# Patient Record
Sex: FEMALE | Race: BLACK OR AFRICAN AMERICAN | NOT HISPANIC OR LATINO | Employment: UNEMPLOYED | URBAN - METROPOLITAN AREA
[De-identification: names, ages, dates, MRNs, and addresses within clinical notes are randomized per-mention and may not be internally consistent; named-entity substitution may affect disease eponyms.]

---

## 2018-05-21 ENCOUNTER — HOSPITAL ENCOUNTER (EMERGENCY)
Facility: HOSPITAL | Age: 43
Discharge: ANOTHER HOSPITAL | End: 2018-05-21
Attending: EMERGENCY MEDICINE
Payer: MEDICAID

## 2018-05-21 VITALS
TEMPERATURE: 102 F | OXYGEN SATURATION: 97 % | RESPIRATION RATE: 20 BRPM | HEART RATE: 111 BPM | DIASTOLIC BLOOD PRESSURE: 56 MMHG | SYSTOLIC BLOOD PRESSURE: 116 MMHG | BODY MASS INDEX: 36.15 KG/M2 | HEIGHT: 65 IN | WEIGHT: 217 LBS

## 2018-05-21 DIAGNOSIS — R00.0 TACHYCARDIA: ICD-10-CM

## 2018-05-21 DIAGNOSIS — N39.0 URINARY TRACT INFECTION WITHOUT HEMATURIA, SITE UNSPECIFIED: ICD-10-CM

## 2018-05-21 DIAGNOSIS — A41.9 LINE SEPSIS ASSOCIATED WITH DIALYSIS CATHETER: ICD-10-CM

## 2018-05-21 DIAGNOSIS — Z99.2 ESRD (END STAGE RENAL DISEASE) ON DIALYSIS: ICD-10-CM

## 2018-05-21 DIAGNOSIS — N18.6 ESRD (END STAGE RENAL DISEASE) ON DIALYSIS: ICD-10-CM

## 2018-05-21 DIAGNOSIS — A41.9 SEPSIS, DUE TO UNSPECIFIED ORGANISM: Primary | ICD-10-CM

## 2018-05-21 DIAGNOSIS — T82.7XXA LINE SEPSIS ASSOCIATED WITH DIALYSIS CATHETER: ICD-10-CM

## 2018-05-21 LAB
ALBUMIN SERPL BCP-MCNC: 2.4 G/DL
ALP SERPL-CCNC: 76 U/L
ALT SERPL W/O P-5'-P-CCNC: 23 U/L
AMORPH CRY URNS QL MICRO: ABNORMAL
ANION GAP SERPL CALC-SCNC: 11 MMOL/L
AST SERPL-CCNC: 19 U/L
BACTERIA #/AREA URNS HPF: ABNORMAL /HPF
BASOPHILS # BLD AUTO: 0.02 K/UL
BASOPHILS NFR BLD: 0.1 %
BILIRUB SERPL-MCNC: 1.3 MG/DL
BILIRUB UR QL STRIP: NEGATIVE
BNP SERPL-MCNC: 407 PG/ML
BUN SERPL-MCNC: 18 MG/DL
CALCIUM SERPL-MCNC: 9.6 MG/DL
CHLORIDE SERPL-SCNC: 95 MMOL/L
CLARITY UR: ABNORMAL
CO2 SERPL-SCNC: 30 MMOL/L
COLOR UR: ABNORMAL
CREAT SERPL-MCNC: 4.3 MG/DL
DIFFERENTIAL METHOD: ABNORMAL
EOSINOPHIL # BLD AUTO: 0.1 K/UL
EOSINOPHIL NFR BLD: 0.2 %
ERYTHROCYTE [DISTWIDTH] IN BLOOD BY AUTOMATED COUNT: 15.5 %
EST. GFR  (AFRICAN AMERICAN): 14 ML/MIN/1.73 M^2
EST. GFR  (NON AFRICAN AMERICAN): 12 ML/MIN/1.73 M^2
GLUCOSE SERPL-MCNC: 266 MG/DL
GLUCOSE UR QL STRIP: ABNORMAL
GRAN CASTS #/AREA URNS LPF: 3 /LPF
HCT VFR BLD AUTO: 29.7 %
HGB BLD-MCNC: 9.4 G/DL
HGB UR QL STRIP: NEGATIVE
HYALINE CASTS #/AREA URNS LPF: ABNORMAL /LPF
INR PPP: 1.2
KETONES UR QL STRIP: NEGATIVE
LACTATE SERPL-SCNC: 2.5 MMOL/L
LEUKOCYTE ESTERASE UR QL STRIP: ABNORMAL
LYMPHOCYTES # BLD AUTO: 0.4 K/UL
LYMPHOCYTES NFR BLD: 1.5 %
MAGNESIUM SERPL-MCNC: 2 MG/DL
MCH RBC QN AUTO: 29.6 PG
MCHC RBC AUTO-ENTMCNC: 31.6 G/DL
MCV RBC AUTO: 93 FL
MICROSCOPIC COMMENT: ABNORMAL
MONOCYTES # BLD AUTO: 1.1 K/UL
MONOCYTES NFR BLD: 4 %
NEUTROPHILS # BLD AUTO: 25.1 K/UL
NEUTROPHILS NFR BLD: 93.3 %
NITRITE UR QL STRIP: NEGATIVE
PH UR STRIP: 5 [PH] (ref 5–8)
PHOSPHATE SERPL-MCNC: 1.6 MG/DL
PLATELET # BLD AUTO: 229 K/UL
PMV BLD AUTO: 9.5 FL
POTASSIUM SERPL-SCNC: 4.1 MMOL/L
PROT SERPL-MCNC: 7.4 G/DL
PROT UR QL STRIP: ABNORMAL
PROTHROMBIN TIME: 12.2 SEC
RBC # BLD AUTO: 3.18 M/UL
RBC #/AREA URNS HPF: 3 /HPF (ref 0–4)
SODIUM SERPL-SCNC: 136 MMOL/L
SP GR UR STRIP: 1.02 (ref 1–1.03)
SQUAMOUS #/AREA URNS HPF: 2 /HPF
TROPONIN I SERPL DL<=0.01 NG/ML-MCNC: 0.03 NG/ML
URN SPEC COLLECT METH UR: ABNORMAL
UROBILINOGEN UR STRIP-ACNC: NEGATIVE EU/DL
WBC # BLD AUTO: 26.84 K/UL
WBC #/AREA URNS HPF: 60 /HPF (ref 0–5)
WBC CASTS #/AREA URNS LPF: 1 /LPF
YEAST URNS QL MICRO: ABNORMAL

## 2018-05-21 PROCEDURE — 87186 SC STD MICRODIL/AGAR DIL: CPT

## 2018-05-21 PROCEDURE — 83735 ASSAY OF MAGNESIUM: CPT

## 2018-05-21 PROCEDURE — 87077 CULTURE AEROBIC IDENTIFY: CPT

## 2018-05-21 PROCEDURE — 80053 COMPREHEN METABOLIC PANEL: CPT

## 2018-05-21 PROCEDURE — 83880 ASSAY OF NATRIURETIC PEPTIDE: CPT

## 2018-05-21 PROCEDURE — 84484 ASSAY OF TROPONIN QUANT: CPT

## 2018-05-21 PROCEDURE — 85025 COMPLETE CBC W/AUTO DIFF WBC: CPT

## 2018-05-21 PROCEDURE — 83605 ASSAY OF LACTIC ACID: CPT

## 2018-05-21 PROCEDURE — 85610 PROTHROMBIN TIME: CPT

## 2018-05-21 PROCEDURE — 87040 BLOOD CULTURE FOR BACTERIA: CPT

## 2018-05-21 PROCEDURE — 81000 URINALYSIS NONAUTO W/SCOPE: CPT

## 2018-05-21 PROCEDURE — 25000003 PHARM REV CODE 250: Performed by: EMERGENCY MEDICINE

## 2018-05-21 PROCEDURE — 84100 ASSAY OF PHOSPHORUS: CPT

## 2018-05-21 PROCEDURE — 99285 EMERGENCY DEPT VISIT HI MDM: CPT

## 2018-05-21 RX ORDER — GENTAMICIN SULFATE 60 MG/50ML
120 INJECTION, SOLUTION INTRAVENOUS
Status: DISCONTINUED | OUTPATIENT
Start: 2018-05-21 | End: 2018-05-21

## 2018-05-21 RX ORDER — CALCIUM ACETATE 667 MG/1
667 CAPSULE ORAL 3 TIMES DAILY
Status: ON HOLD | COMMUNITY
End: 2019-01-09 | Stop reason: HOSPADM

## 2018-05-21 RX ORDER — FUROSEMIDE 40 MG/1
40 TABLET ORAL DAILY
COMMUNITY
End: 2023-04-19 | Stop reason: SDUPTHER

## 2018-05-21 RX ORDER — ASPIRIN 81 MG/1
81 TABLET ORAL DAILY
Status: ON HOLD | COMMUNITY
End: 2019-01-06

## 2018-05-21 RX ORDER — ACETAMINOPHEN 500 MG
1000 TABLET ORAL
Status: COMPLETED | OUTPATIENT
Start: 2018-05-21 | End: 2018-05-21

## 2018-05-21 RX ORDER — DOCUSATE SODIUM 100 MG/1
100 CAPSULE, LIQUID FILLED ORAL DAILY
Status: ON HOLD | COMMUNITY
End: 2023-12-01 | Stop reason: HOSPADM

## 2018-05-21 RX ORDER — LABETALOL 200 MG/1
400 TABLET, FILM COATED ORAL 2 TIMES DAILY
Status: ON HOLD | COMMUNITY
End: 2023-12-01 | Stop reason: HOSPADM

## 2018-05-21 RX ORDER — HYDRALAZINE HYDROCHLORIDE 50 MG/1
100 TABLET, FILM COATED ORAL EVERY 8 HOURS
COMMUNITY
End: 2021-09-01

## 2018-05-21 RX ORDER — VANCOMYCIN HCL IN 5 % DEXTROSE 1G/250ML
1000 PLASTIC BAG, INJECTION (ML) INTRAVENOUS
Status: DISCONTINUED | OUTPATIENT
Start: 2018-05-21 | End: 2018-05-21

## 2018-05-21 RX ORDER — SEVELAMER CARBONATE 800 MG/1
800 TABLET, FILM COATED ORAL
COMMUNITY
End: 2023-04-19 | Stop reason: ALTCHOICE

## 2018-05-21 RX ORDER — LOSARTAN POTASSIUM 100 MG/1
100 TABLET ORAL DAILY
COMMUNITY
End: 2021-09-01

## 2018-05-21 RX ORDER — AMLODIPINE BESYLATE 10 MG/1
10 TABLET ORAL DAILY
COMMUNITY
End: 2021-09-01

## 2018-05-21 RX ORDER — UBIDECARENONE 75 MG
500 CAPSULE ORAL DAILY
Status: ON HOLD | COMMUNITY
End: 2023-12-01 | Stop reason: HOSPADM

## 2018-05-21 RX ORDER — CALCITRIOL 0.5 UG/1
0.5 CAPSULE ORAL DAILY
COMMUNITY
End: 2023-08-07

## 2018-05-21 RX ADMIN — ACETAMINOPHEN 1000 MG: 500 TABLET, FILM COATED ORAL at 07:05

## 2018-05-21 NOTE — ED PROVIDER NOTES
"Encounter Date: 5/21/2018    SCRIBE #1 NOTE: I, Erica Roberts, am scribing for, and in the presence of,  Flaco Chou MD. I have scribed the following portions of the note - Other sections scribed: HPI and ROS.       History     Chief Complaint   Patient presents with    Fever     " I went to Dialysis today and they noted I had a fever of 101. I have also been having some pain in my left side."      Chief Complaint: Fever    HPI: This 43 y.o. Female with HTN, DM, and ESRD presents to the ED with a fever. Patient presented a temperature of 101 today while at dialysis. She received Vancomycin and Gentamycin at clinic and sent to ED for further evaluation and treatment. Patient suspects symptoms began last night. She reports episodes of emesis along with nausea, diarrhea and chills. She also reports constipation but relief secondary to suppository. Lastly, she reports a cough, post nasal drip, and a headache. She denies ear pain, sore throat, rhinorrhea or abdominal pain.      The history is provided by the patient. No  was used.     Review of patient's allergies indicates:   Allergen Reactions    Codeine     Sulfa (sulfonamide antibiotics)      Past Medical History:   Diagnosis Date    Blind     Diabetes     ESRD (end stage renal disease) on dialysis     Hypertension      Past Surgical History:   Procedure Laterality Date    EYE SURGERY       No family history on file.  Social History   Substance Use Topics    Smoking status: Never Smoker    Smokeless tobacco: Never Used    Alcohol use No     Review of Systems   Constitutional: Positive for chills and fever.   HENT: Positive for postnasal drip and sinus pressure. Negative for ear pain and sore throat.    Eyes: Negative for pain.   Respiratory: Positive for cough. Negative for shortness of breath.    Cardiovascular: Negative for chest pain.   Gastrointestinal: Positive for constipation, diarrhea, nausea and vomiting. Negative for " abdominal pain.   Genitourinary: Negative for dysuria and hematuria.   Musculoskeletal: Negative for myalgias (arm or leg pain).   Skin: Negative for rash.   Neurological: Negative for headaches.       Physical Exam     Initial Vitals [05/21/18 1803]   BP Pulse Resp Temp SpO2   (!) 143/74 (!) 130 20 (!) 103.9 °F (39.9 °C) 98 %      MAP       97         Physical Exam    Nursing note and vitals reviewed.  Constitutional: She appears well-developed and well-nourished. No distress.   HENT:   Right Ear: External ear normal.   Left Ear: External ear normal.   Mouth/Throat: Oropharynx is clear and moist.   Eyes: EOM are normal. Pupils are equal, round, and reactive to light.   Neck: Normal range of motion. Neck supple. No thyromegaly present. No JVD present.   No meningeal signs.    Cardiovascular: Normal rate, regular rhythm, normal heart sounds and intact distal pulses. Exam reveals no gallop and no friction rub.    No murmur heard.  Pulmonary/Chest: Breath sounds normal. No respiratory distress. She has no wheezes. She has no rhonchi. She has no rales. She exhibits no tenderness.   Abdominal: Soft. Bowel sounds are normal. She exhibits no distension and no mass. There is no tenderness. There is no rebound and no guarding.   No abdominal ttp. No cvattp.    Musculoskeletal: Normal range of motion. She exhibits no edema or tenderness.   Neurological: She is alert and oriented to person, place, and time. She has normal strength.   Skin: Skin is warm and dry.         ED Course   Critical Care  Date/Time: 5/21/2018 8:56 PM  Performed by: DIEGO SINGLETON  Authorized by: DIEGO SINGLETON   Direct patient critical care time: 15 minutes  Additional history critical care time: 5 minutes  Ordering / reviewing critical care time: 10 minutes  Documentation critical care time: 8 minutes  Consulting other physicians critical care time: 10 minutes  Other critical care time: 10 (transfer) minutes  Total critical care time  (exclusive of procedural time) : 58 minutes  Critical care time was exclusive of separately billable procedures and treating other patients and teaching time.  Critical care was necessary to treat or prevent imminent or life-threatening deterioration of the following conditions: sepsis.  Critical care was time spent personally by me on the following activities: development of treatment plan with patient or surrogate, evaluation of patient's response to treatment, examination of patient, obtaining history from patient or surrogate, ordering and performing treatments and interventions, ordering and review of laboratory studies, ordering and review of radiographic studies, pulse oximetry and re-evaluation of patient's condition.        Labs Reviewed   CBC W/ AUTO DIFFERENTIAL - Abnormal; Notable for the following:        Result Value    WBC 26.84 (*)     RBC 3.18 (*)     Hemoglobin 9.4 (*)     Hematocrit 29.7 (*)     MCHC 31.6 (*)     RDW 15.5 (*)     Gran # (ANC) 25.1 (*)     Lymph # 0.4 (*)     Mono # 1.1 (*)     Gran% 93.3 (*)     Lymph% 1.5 (*)     All other components within normal limits   COMPREHENSIVE METABOLIC PANEL - Abnormal; Notable for the following:     CO2 30 (*)     Glucose 266 (*)     Creatinine 4.3 (*)     Albumin 2.4 (*)     Total Bilirubin 1.3 (*)     eGFR if  14 (*)     eGFR if non  12 (*)     All other components within normal limits   URINALYSIS - Abnormal; Notable for the following:     Appearance, UA Cloudy (*)     Protein, UA 3+ (*)     Glucose, UA 3+ (*)     Leukocytes, UA Trace (*)     All other components within normal limits   LACTIC ACID, PLASMA - Abnormal; Notable for the following:     Lactate (Lactic Acid) 2.5 (*)     All other components within normal limits   B-TYPE NATRIURETIC PEPTIDE - Abnormal; Notable for the following:      (*)     All other components within normal limits   PHOSPHORUS - Abnormal; Notable for the following:     Phosphorus  1.6 (*)     All other components within normal limits   URINALYSIS MICROSCOPIC - Abnormal; Notable for the following:     WBC, UA 60 (*)     Bacteria, UA Few (*)     WBC Casts, UA 1 (*)     Granular Casts, UA 3 (*)     All other components within normal limits   CULTURE, BLOOD   CULTURE, BLOOD   PROTIME-INR   MAGNESIUM   TROPONIN I        Patient presents with sepsis. Likely line sepsis. Right tunneled subclavian Danyel catheter. Patient also has 60 wbc in Urine making this a possible source. Patient already received 2 IV abx at dialysis however names of these antibiotics are unknown and patient presented after dialysis center closed. Likely GEnt and vanc.                 Scribe Attestation:   Scribe #1: I performed the above scribed service and the documentation accurately describes the services I performed. I attest to the accuracy of the note.    Attending Attestation:           Physician Attestation for Scribe:  Physician Attestation Statement for Scribe #1: I, Flaco Chou MD, reviewed documentation, as scribed by Erica Roberts in my presence, and it is both accurate and complete.                    Clinical Impression:   The primary encounter diagnosis was Line sepsis associated with dialysis catheter. Diagnoses of Tachycardia and ESRD (end stage renal disease) on dialysis were also pertinent to this visit. UTI                           Flaco Chou MD  05/21/18 2057

## 2018-05-22 NOTE — ED NOTES
Attempted to call [facility] [place],[phone], where pt has dialysis, no answer, pt came by private vehicle. Unable to obtain which medications pt was given at dialysis.

## 2018-05-22 NOTE — ED NOTES
18g PIV to the R AC X's 1 attempt. Pt tolerated well. Blood obtained and sent to lab. Lab notified of Cx's. Pt states she received antibiotics during dialysis today. Pt given PO tylenol for fever. Pt refused cath for urine. Will continue to monitor.

## 2018-05-22 NOTE — ED NOTES
[facility] [address]  [phone]    Call Transfer Center Back when ready to give report @ [phone]

## 2018-05-24 LAB
BACTERIA BLD CULT: NORMAL

## 2018-09-20 ENCOUNTER — HOSPITAL ENCOUNTER (EMERGENCY)
Facility: HOSPITAL | Age: 43
Discharge: HOME | End: 2018-09-20
Attending: EMERGENCY MEDICINE
Payer: MEDICAID

## 2018-09-20 VITALS
WEIGHT: 221 LBS | OXYGEN SATURATION: 98 % | BODY MASS INDEX: 36.82 KG/M2 | DIASTOLIC BLOOD PRESSURE: 66 MMHG | RESPIRATION RATE: 18 BRPM | HEIGHT: 65 IN | SYSTOLIC BLOOD PRESSURE: 144 MMHG | HEART RATE: 90 BPM | TEMPERATURE: 99 F

## 2018-09-20 DIAGNOSIS — I95.9 TRANSIENT HYPOTENSION: Primary | ICD-10-CM

## 2018-09-20 PROCEDURE — 99283 EMERGENCY DEPT VISIT LOW MDM: CPT

## 2018-09-21 NOTE — ED TRIAGE NOTES
Pt arrived to the ED due to an elevated HR and decreased BP compared to her normal. Pt reports no s/s and has a hx of high blood pressure

## 2018-09-21 NOTE — ED PROVIDER NOTES
Encounter Date: 9/20/2018    SCRIBE #1 NOTE: I, Christiano Reynolds, am scribing for, and in the presence of,  Adali Barry MD. I have scribed the following portions of the note - Other sections scribed: HPI, ROS, PE .       History     Chief Complaint   Patient presents with    Hypotension     Pt states her pressure keep dropping, and it went down yesterday while at HD but today it was in 120's and Tachycardic     CC: Hypotension     This blind 43 y.o F with DM, HTN, and ESRD on dialysis presents to the ED c/o hypotension since yesterday. She states that her pressure started to drop yesterday at dialysis and that she has started to become tachycardic. She checked it again later and it was still 90 systolic. She denies fever, chills, cough. No other symptoms to report.         The history is provided by the patient. No  was used.     Review of patient's allergies indicates:   Allergen Reactions    Codeine     Sulfa (sulfonamide antibiotics)      Past Medical History:   Diagnosis Date    Blind     Diabetes     ESRD (end stage renal disease) on dialysis     Hypertension      Past Surgical History:   Procedure Laterality Date    EYE SURGERY       No family history on file.  Social History     Tobacco Use    Smoking status: Never Smoker    Smokeless tobacco: Never Used   Substance Use Topics    Alcohol use: No    Drug use: No     Review of Systems   Constitutional: Negative for fever.        (+) decreased BP     HENT: Negative for sore throat.    Eyes: Negative for redness.   Respiratory: Negative for shortness of breath.    Cardiovascular: Negative for chest pain.   Gastrointestinal: Negative for nausea.   Genitourinary: Negative for dysuria.   Musculoskeletal: Negative for back pain.   Skin: Negative for rash.   Neurological: Negative for weakness.   All other systems reviewed and are negative.      Physical Exam     Initial Vitals [09/20/18 1950]   BP Pulse Resp Temp SpO2   (!) 150/71 95 18  98.8 °F (37.1 °C) 96 %      MAP       --         Physical Exam    Nursing note and vitals reviewed.  Constitutional: She is not diaphoretic. No distress.   HENT:   Head: Normocephalic and atraumatic.   Mouth/Throat: Oropharynx is clear and moist.   Eyes: No scleral icterus.   Neck: Normal range of motion. Neck supple. No JVD present.   Cardiovascular: Normal rate and regular rhythm.   Pulmonary/Chest: Breath sounds normal. No stridor. No respiratory distress.   Dialysis catheter left chest wall with no erythema.    Abdominal: Soft. Bowel sounds are normal. She exhibits no distension. There is no tenderness.   Musculoskeletal: Normal range of motion. She exhibits no edema or tenderness.   New fistula in LUE, good pulse    Neurological: She is alert and oriented to person, place, and time. She has normal strength. No cranial nerve deficit or sensory deficit.   Skin: Skin is warm and dry.   Psychiatric: She has a normal mood and affect. Her behavior is normal. Thought content normal.         ED Course   Procedures  Labs Reviewed - No data to display       Imaging Results    None          Medical Decision Making:   Initial Assessment:   Patient with report of transient hypotension.  Currently has a mildly elevated blood pressure and normal heart rate.  No acute intervention.  Advised to monitor  Differential Diagnosis:   Dehydration  Sepsis            Scribe Attestation:   Scribe #1: I performed the above scribed service and the documentation accurately describes the services I performed. I attest to the accuracy of the note.    Attending Attestation:           Physician Attestation for Scribe:  Physician Attestation Statement for Scribe #1: I, Adali Barry MD, reviewed documentation, as scribed by Christiano Reynolds in my presence, and it is both accurate and complete.                    Clinical Impression:   The encounter diagnosis was Transient hypotension.      Disposition:   Disposition: Discharged  Condition:  Stable                        Adali Barry MD  09/20/18 2028

## 2018-10-05 ENCOUNTER — HOSPITAL ENCOUNTER (EMERGENCY)
Facility: HOSPITAL | Age: 43
Discharge: HOME | End: 2018-10-05
Attending: EMERGENCY MEDICINE
Payer: MEDICAID

## 2018-10-05 VITALS
DIASTOLIC BLOOD PRESSURE: 79 MMHG | RESPIRATION RATE: 19 BRPM | HEIGHT: 65 IN | WEIGHT: 221 LBS | SYSTOLIC BLOOD PRESSURE: 174 MMHG | HEART RATE: 96 BPM | OXYGEN SATURATION: 98 % | BODY MASS INDEX: 36.82 KG/M2 | TEMPERATURE: 99 F

## 2018-10-05 DIAGNOSIS — H53.9 CHANGE IN VISION: Primary | ICD-10-CM

## 2018-10-05 PROCEDURE — 99282 EMERGENCY DEPT VISIT SF MDM: CPT

## 2018-10-06 NOTE — DISCHARGE INSTRUCTIONS
Follow-up with Ophthalmology on Monday for re-evaluation.  Return to this ED if symptoms worsen, if you lose vision, if any change in character of symptoms, if any other problems occur.

## 2018-10-06 NOTE — ED TRIAGE NOTES
Pt presents to ED c/o distorted vision in R eye. Reports a blood vessel burst and she cannot see clearly. Hx HTN to R eye. Pt is blind in L eye.

## 2018-10-06 NOTE — ED NOTES
During visual acuity, pt states she does not have her glasses and she cannot see anything. Provider aware.

## 2018-10-06 NOTE — ED PROVIDER NOTES
"Encounter Date: 10/5/2018  SORT MSE:  Pt is a 43 y.o. female who presents for emergent consideration for right eye vision change. Pt will be moved to room when one is available, otherwise will wait in waiting room with triage nurse supervision.  Pt arrived by ambulatory. She is not in distress. Orders have been placed. DYLON Li DNP ACNP-BC 10/5/2018 8:50 PM     SCRIBE #1 NOTE: I, Hai Ibarra, am scribing for, and in the presence of,  Onofre Hunter PA-C. I have scribed the following portions of the note - Other sections scribed: HPI, ROS.       History     Chief Complaint   Patient presents with    Eye Problem     reports hx HTN of the eye; reports a vein has burst behind her eye and it is affected her field of vision in right eye     CC: Eye Problem    The patient is a 42 y/o female with past medical hx of DM, ESRD on dilaysis, HTN, glaucoma, and blindness (L eye) that presents for emergent consideration of a R eye problem that began roughly x30 minutes PTA. She reports that her glaucoma results in blood vessel ruptures in the back of her eye and believes that this may have happened to her R eye. She sees an intermittent red "film" or "sheet" in her field of vision. She reports a hx of this symptom that occurred approximately x1 month ago. This episode is similar to the one she previous had that resulted in cauterization. The pt otherwise notes a HA that began prior to these symptoms that has resolved. She denies eye pain, visual disturbance, weakness, numbness, facial asymmetry, speech difficulties, hx of stroke, PE, or DVT.     Of note: The pt was fully dialyzed today and receives heprin during them.      The history is provided by the patient. No  was used.     Review of patient's allergies indicates:   Allergen Reactions    Codeine     Sulfa (sulfonamide antibiotics)      Past Medical History:   Diagnosis Date    Blind     Diabetes     ESRD (end stage renal disease) on dialysis  "    Hypertension      Past Surgical History:   Procedure Laterality Date    EYE SURGERY       History reviewed. No pertinent family history.  Social History     Tobacco Use    Smoking status: Never Smoker    Smokeless tobacco: Never Used   Substance Use Topics    Alcohol use: No    Drug use: No     Review of Systems   Constitutional: Negative for chills and fever.   HENT: Negative for sore throat.    Eyes: Positive for redness. Negative for pain and visual disturbance.   Respiratory: Negative for shortness of breath.    Cardiovascular: Negative for chest pain.   Gastrointestinal: Negative for nausea.   Genitourinary: Negative for dysuria.   Musculoskeletal: Negative for back pain.   Skin: Negative for rash.   Neurological: Positive for headaches. Negative for facial asymmetry, speech difficulty, weakness, light-headedness and numbness.   Hematological: Does not bruise/bleed easily.       Physical Exam     Initial Vitals [10/05/18 2052]   BP Pulse Resp Temp SpO2   (!) 174/79 96 19 98.8 °F (37.1 °C) 98 %      MAP       --         Physical Exam    Nursing note and vitals reviewed.  Constitutional: She appears well-developed and well-nourished. She is not diaphoretic. No distress.   Overall well-appearing, nontoxic.  Resting comfortably on exam table.   HENT:   Head: Normocephalic and atraumatic.   Eyes: Conjunctivae and EOM are normal. Pupils are equal, round, and reactive to light.   No periorbital swelling or erythema. No rash or vesicular lesions near eye. No proptosis. Full EOMs bilaterally without pain. OD briskly reactive. OS chronically irregular, complete blindness. No scleral hyperemia or subconjunctival hemorrhage to OD. No fluorescein uptake. No dendritic lesions. Negative Marsha's sign. No anterior chamber bulge. No cloudy cornea. IOP: OD 14. No foreign body. Unable to perform visual acuity without glasses.    Neck: Normal range of motion. Neck supple. No tracheal deviation present.   Cardiovascular:  Intact distal pulses.   Pulmonary/Chest: Breath sounds normal. No stridor. No respiratory distress. She has no wheezes.   Abdominal: Soft. Bowel sounds are normal. She exhibits no distension. There is no tenderness.   Musculoskeletal: Normal range of motion.   Lymphadenopathy:     She has no cervical adenopathy.   Neurological: She is alert and oriented to person, place, and time.   Skin: Skin is warm and dry. Capillary refill takes less than 2 seconds.   Psychiatric: She has a normal mood and affect. Her behavior is normal. Judgment and thought content normal.         ED Course   Procedures  Labs Reviewed - No data to display       Imaging Results    None          Medical Decision Making:   Differential Diagnosis:   Retinal hemorrhage, corneal abrasion, macular degeneration, retinal detachment,  ED Management:  42yo F with pmh HTN, NIDDM, ESRD, OS blindness, with chief complaint acute onset floater to R visual field. Pt denies loss of vision. Denies trauma. Pt states approx 30min pta, she began with mild frontal headache, shortly thereafter began with red-tinted film floating in eye; comes and goes. Denies loss of vision. Denies headache at this time. pt states she experienced similar episode approx 1 month ago, presented to [facility], transferred to [facility]. Evaluated by ophthalmology. Pt states on Wednesday of this week she had surgery to, what sounds like, cauterize a vessel in her R eye. She states this is same symptom she experienced one month ago. Compliant with dialysis. Dialysis today. Compliant with medications. Unable to perform visual field secondary to not having her glasses.  Denies change in vision.  No corneal abrasion.  No fluorescein uptake.  Negative Marsha sign. No trauma. OD pupil is briskly reactive. Slit lamp without obvious hemorrhage. No cloudy cornea. OD IOP 14. Given recent surgery, heparin load today, may have small bleed; however, low no loss of vision. I will have her f/u with ophthalmology  on Monday. She will return if there is any worsening, if loss of vision, if any other problems occur.   Other:   I have discussed this case with another health care provider.       <> Summary of the Discussion: Dr. Humphrey Charlton Attestation:   Scribe #1: I performed the above scribed service and the documentation accurately describes the services I performed. I attest to the accuracy of the note.    Attending Attestation:     Physician Attestation Statement for NP/PA:   I have conducted a face to face encounter with this patient in addition to the NP/PA, due to NP/PA Request    Other NP/PA Attestation Additions:    History of Present Illness: Right eye vision changes, hx recent eye surgery, hemodialysed today   Physical Exam: Right eye slit lamp exam, no gross abnormalities noted, no vitreous hemorrhage, eye pressures WNL   Medical Decision Making: Probable changes with recent cauterization of eye vessels, no acute pathology now, needs to f/u with her opthalmologist       Physician Attestation for Scribe:  Physician Attestation Statement for Scribe #1: I, Onofre Hunter PA-C, reviewed documentation, as scribed by Hai Ibarra in my presence, and it is both accurate and complete.                    Clinical Impression:   The encounter diagnosis was Change in vision.      Disposition:   Disposition: Discharged  Condition: Stable                        Onofre Hunter PA-C  10/05/18 2133       Willard Yni MD  10/05/18 2210

## 2019-01-05 ENCOUNTER — HOSPITAL ENCOUNTER (INPATIENT)
Facility: HOSPITAL | Age: 44
LOS: 4 days | Discharge: HOME | DRG: 314 | End: 2019-01-09
Attending: EMERGENCY MEDICINE | Admitting: HOSPITALIST
Payer: MEDICAID

## 2019-01-05 DIAGNOSIS — I82.409 DVT (DEEP VENOUS THROMBOSIS): ICD-10-CM

## 2019-01-05 DIAGNOSIS — Z78.9 CENTRAL VENOUS CATHETER IN PLACE: ICD-10-CM

## 2019-01-05 DIAGNOSIS — A41.9 SEPSIS, DUE TO UNSPECIFIED ORGANISM: Primary | ICD-10-CM

## 2019-01-05 DIAGNOSIS — R50.9 FEVER: ICD-10-CM

## 2019-01-05 DIAGNOSIS — A41.9 SEPSIS: ICD-10-CM

## 2019-01-05 DIAGNOSIS — T82.7XXD BACTEREMIA ASSOCIATED WITH INTRAVASCULAR LINE, SUBSEQUENT ENCOUNTER: ICD-10-CM

## 2019-01-05 DIAGNOSIS — R78.81 BACTEREMIA ASSOCIATED WITH INTRAVASCULAR LINE, SUBSEQUENT ENCOUNTER: ICD-10-CM

## 2019-01-05 DIAGNOSIS — T82.7XXA BACTEREMIA ASSOCIATED WITH INTRAVASCULAR LINE: ICD-10-CM

## 2019-01-05 DIAGNOSIS — R78.81 STAPHYLOCOCCUS AUREUS BACTEREMIA: ICD-10-CM

## 2019-01-05 DIAGNOSIS — N18.6 ESRD (END STAGE RENAL DISEASE): ICD-10-CM

## 2019-01-05 DIAGNOSIS — R78.81 BACTEREMIA: ICD-10-CM

## 2019-01-05 DIAGNOSIS — B95.61 STAPHYLOCOCCUS AUREUS BACTEREMIA: ICD-10-CM

## 2019-01-05 DIAGNOSIS — R78.81 BACTEREMIA ASSOCIATED WITH INTRAVASCULAR LINE: ICD-10-CM

## 2019-01-05 PROBLEM — H40.9 GLAUCOMA: Status: ACTIVE | Noted: 2019-01-05

## 2019-01-05 PROBLEM — E11.9 TYPE 2 DIABETES MELLITUS: Status: ACTIVE | Noted: 2019-01-05

## 2019-01-05 PROBLEM — I10 ESSENTIAL HYPERTENSION: Status: ACTIVE | Noted: 2019-01-05

## 2019-01-05 LAB
ALBUMIN SERPL BCP-MCNC: 3.2 G/DL
ALP SERPL-CCNC: 54 U/L
ALT SERPL W/O P-5'-P-CCNC: 20 U/L
ANION GAP SERPL CALC-SCNC: 11 MMOL/L
AST SERPL-CCNC: 15 U/L
BACTERIA #/AREA URNS AUTO: ABNORMAL /HPF
BASOPHILS # BLD AUTO: 0.04 K/UL
BASOPHILS NFR BLD: 0.2 %
BILIRUB SERPL-MCNC: 0.7 MG/DL
BILIRUB UR QL STRIP: NEGATIVE
BUN SERPL-MCNC: 38 MG/DL
CALCIUM SERPL-MCNC: 10.8 MG/DL
CHLORIDE SERPL-SCNC: 97 MMOL/L
CLARITY UR REFRACT.AUTO: CLEAR
CO2 SERPL-SCNC: 27 MMOL/L
COLOR UR AUTO: YELLOW
CREAT SERPL-MCNC: 6.1 MG/DL
CTP QC/QA: YES
DIFFERENTIAL METHOD: ABNORMAL
EOSINOPHIL # BLD AUTO: 0.1 K/UL
EOSINOPHIL NFR BLD: 0.6 %
ERYTHROCYTE [DISTWIDTH] IN BLOOD BY AUTOMATED COUNT: 14.2 %
EST. GFR  (AFRICAN AMERICAN): 9.1 ML/MIN/1.73 M^2
EST. GFR  (NON AFRICAN AMERICAN): 7.9 ML/MIN/1.73 M^2
GLUCOSE SERPL-MCNC: 182 MG/DL
GLUCOSE UR QL STRIP: ABNORMAL
HCT VFR BLD AUTO: 32.8 %
HGB BLD-MCNC: 10.8 G/DL
HGB UR QL STRIP: NEGATIVE
HYALINE CASTS UR QL AUTO: 0 /LPF
IMM GRANULOCYTES # BLD AUTO: 0.21 K/UL
IMM GRANULOCYTES NFR BLD AUTO: 1 %
KETONES UR QL STRIP: NEGATIVE
LACTATE SERPL-SCNC: 2 MMOL/L
LACTATE SERPL-SCNC: 2.2 MMOL/L
LEUKOCYTE ESTERASE UR QL STRIP: NEGATIVE
LYMPHOCYTES # BLD AUTO: 0.3 K/UL
LYMPHOCYTES NFR BLD: 1.4 %
MCH RBC QN AUTO: 33.3 PG
MCHC RBC AUTO-ENTMCNC: 32.9 G/DL
MCV RBC AUTO: 101 FL
MICROSCOPIC COMMENT: ABNORMAL
MONOCYTES # BLD AUTO: 1.1 K/UL
MONOCYTES NFR BLD: 5.2 %
NEUTROPHILS # BLD AUTO: 19.3 K/UL
NEUTROPHILS NFR BLD: 91.6 %
NITRITE UR QL STRIP: NEGATIVE
NRBC BLD-RTO: 0 /100 WBC
PH UR STRIP: 7 [PH] (ref 5–8)
PLATELET # BLD AUTO: 174 K/UL
PMV BLD AUTO: 10.6 FL
POC MOLECULAR INFLUENZA A AGN: NEGATIVE
POC MOLECULAR INFLUENZA B AGN: NEGATIVE
POCT GLUCOSE: 172 MG/DL (ref 70–110)
POCT GLUCOSE: 190 MG/DL (ref 70–110)
POTASSIUM SERPL-SCNC: 4.3 MMOL/L
PROT SERPL-MCNC: 8 G/DL
PROT UR QL STRIP: ABNORMAL
RBC # BLD AUTO: 3.24 M/UL
RBC #/AREA URNS AUTO: 0 /HPF (ref 0–4)
SODIUM SERPL-SCNC: 135 MMOL/L
SP GR UR STRIP: 1.01 (ref 1–1.03)
SQUAMOUS #/AREA URNS AUTO: 4 /HPF
URN SPEC COLLECT METH UR: ABNORMAL
WBC # BLD AUTO: 21.04 K/UL
WBC #/AREA URNS AUTO: 5 /HPF (ref 0–5)

## 2019-01-05 PROCEDURE — 87040 BLOOD CULTURE FOR BACTERIA: CPT

## 2019-01-05 PROCEDURE — 87186 SC STD MICRODIL/AGAR DIL: CPT

## 2019-01-05 PROCEDURE — 96366 THER/PROPH/DIAG IV INF ADDON: CPT

## 2019-01-05 PROCEDURE — 25000003 PHARM REV CODE 250: Performed by: STUDENT IN AN ORGANIZED HEALTH CARE EDUCATION/TRAINING PROGRAM

## 2019-01-05 PROCEDURE — 81001 URINALYSIS AUTO W/SCOPE: CPT

## 2019-01-05 PROCEDURE — 11000001 HC ACUTE MED/SURG PRIVATE ROOM

## 2019-01-05 PROCEDURE — 87040 BLOOD CULTURE FOR BACTERIA: CPT | Mod: 59

## 2019-01-05 PROCEDURE — 96361 HYDRATE IV INFUSION ADD-ON: CPT

## 2019-01-05 PROCEDURE — 93010 ELECTROCARDIOGRAM REPORT: CPT | Mod: ,,, | Performed by: INTERNAL MEDICINE

## 2019-01-05 PROCEDURE — 99291 PR CRITICAL CARE, E/M 30-74 MINUTES: ICD-10-PCS | Mod: ,,, | Performed by: EMERGENCY MEDICINE

## 2019-01-05 PROCEDURE — 25000003 PHARM REV CODE 250: Performed by: PHYSICIAN ASSISTANT

## 2019-01-05 PROCEDURE — 99291 CRITICAL CARE FIRST HOUR: CPT | Mod: 25

## 2019-01-05 PROCEDURE — 99223 PR INITIAL HOSPITAL CARE,LEVL III: ICD-10-PCS | Mod: ,,, | Performed by: HOSPITALIST

## 2019-01-05 PROCEDURE — 63600175 PHARM REV CODE 636 W HCPCS: Performed by: PHYSICIAN ASSISTANT

## 2019-01-05 PROCEDURE — 85025 COMPLETE CBC W/AUTO DIFF WBC: CPT

## 2019-01-05 PROCEDURE — 96367 TX/PROPH/DG ADDL SEQ IV INF: CPT

## 2019-01-05 PROCEDURE — 94761 N-INVAS EAR/PLS OXIMETRY MLT: CPT

## 2019-01-05 PROCEDURE — 83605 ASSAY OF LACTIC ACID: CPT | Mod: 91

## 2019-01-05 PROCEDURE — 93010 EKG 12-LEAD: ICD-10-PCS | Mod: ,,, | Performed by: INTERNAL MEDICINE

## 2019-01-05 PROCEDURE — 93005 ELECTROCARDIOGRAM TRACING: CPT

## 2019-01-05 PROCEDURE — 99291 CRITICAL CARE FIRST HOUR: CPT | Mod: ,,, | Performed by: EMERGENCY MEDICINE

## 2019-01-05 PROCEDURE — 87077 CULTURE AEROBIC IDENTIFY: CPT

## 2019-01-05 PROCEDURE — 99223 1ST HOSP IP/OBS HIGH 75: CPT | Mod: ,,, | Performed by: HOSPITALIST

## 2019-01-05 PROCEDURE — S5571 INSULIN DISPOS PEN 3 ML: HCPCS | Performed by: STUDENT IN AN ORGANIZED HEALTH CARE EDUCATION/TRAINING PROGRAM

## 2019-01-05 PROCEDURE — 96365 THER/PROPH/DIAG IV INF INIT: CPT

## 2019-01-05 PROCEDURE — 80053 COMPREHEN METABOLIC PANEL: CPT

## 2019-01-05 PROCEDURE — 63600175 PHARM REV CODE 636 W HCPCS: Performed by: STUDENT IN AN ORGANIZED HEALTH CARE EDUCATION/TRAINING PROGRAM

## 2019-01-05 RX ORDER — NEOMYCIN SULFATE, POLYMYXIN B SULFATE, AND DEXAMETHASONE 3.5; 10000; 1 MG/G; [USP'U]/G; MG/G
OINTMENT OPHTHALMIC 3 TIMES DAILY
Status: DISCONTINUED | OUTPATIENT
Start: 2019-01-05 | End: 2019-01-09 | Stop reason: HOSPADM

## 2019-01-05 RX ORDER — ACETAMINOPHEN 325 MG/1
650 TABLET ORAL EVERY 8 HOURS PRN
Status: DISCONTINUED | OUTPATIENT
Start: 2019-01-05 | End: 2019-01-05 | Stop reason: SDUPTHER

## 2019-01-05 RX ORDER — IBUPROFEN 200 MG
16 TABLET ORAL
Status: DISCONTINUED | OUTPATIENT
Start: 2019-01-05 | End: 2019-01-05

## 2019-01-05 RX ORDER — HEPARIN SODIUM 5000 [USP'U]/ML
5000 INJECTION, SOLUTION INTRAVENOUS; SUBCUTANEOUS EVERY 8 HOURS
Status: DISCONTINUED | OUTPATIENT
Start: 2019-01-05 | End: 2019-01-05

## 2019-01-05 RX ORDER — LABETALOL 100 MG/1
400 TABLET, FILM COATED ORAL 2 TIMES DAILY
Status: DISCONTINUED | OUTPATIENT
Start: 2019-01-05 | End: 2019-01-05

## 2019-01-05 RX ORDER — HYDRALAZINE HYDROCHLORIDE 50 MG/1
100 TABLET, FILM COATED ORAL EVERY 8 HOURS
Status: DISCONTINUED | OUTPATIENT
Start: 2019-01-05 | End: 2019-01-05

## 2019-01-05 RX ORDER — LATANOPROST 50 UG/ML
1 SOLUTION/ DROPS OPHTHALMIC NIGHTLY
COMMUNITY
End: 2019-01-05 | Stop reason: CLARIF

## 2019-01-05 RX ORDER — NEOMYCIN SULFATE, POLYMYXIN B SULFATE, AND DEXAMETHASONE 3.5; 10000; 1 MG/G; [USP'U]/G; MG/G
OINTMENT OPHTHALMIC
COMMUNITY
End: 2023-08-07

## 2019-01-05 RX ORDER — SEVELAMER CARBONATE 800 MG/1
800 TABLET, FILM COATED ORAL
Status: DISCONTINUED | OUTPATIENT
Start: 2019-01-05 | End: 2019-01-09 | Stop reason: HOSPADM

## 2019-01-05 RX ORDER — LOSARTAN POTASSIUM 50 MG/1
100 TABLET ORAL DAILY
Status: DISCONTINUED | OUTPATIENT
Start: 2019-01-05 | End: 2019-01-05

## 2019-01-05 RX ORDER — SODIUM CHLORIDE 0.9 % (FLUSH) 0.9 %
5 SYRINGE (ML) INJECTION
Status: DISCONTINUED | OUTPATIENT
Start: 2019-01-05 | End: 2019-01-09 | Stop reason: HOSPADM

## 2019-01-05 RX ORDER — FUROSEMIDE 40 MG/1
40 TABLET ORAL DAILY
Status: DISCONTINUED | OUTPATIENT
Start: 2019-01-05 | End: 2019-01-09 | Stop reason: HOSPADM

## 2019-01-05 RX ORDER — LATANOPROST 50 UG/ML
1 SOLUTION/ DROPS OPHTHALMIC NIGHTLY
Status: DISCONTINUED | OUTPATIENT
Start: 2019-01-05 | End: 2019-01-05

## 2019-01-05 RX ORDER — GLUCAGON 1 MG
1 KIT INJECTION
Status: DISCONTINUED | OUTPATIENT
Start: 2019-01-05 | End: 2019-01-05

## 2019-01-05 RX ORDER — INSULIN ASPART 100 [IU]/ML
1-10 INJECTION, SOLUTION INTRAVENOUS; SUBCUTANEOUS
Status: DISCONTINUED | OUTPATIENT
Start: 2019-01-05 | End: 2019-01-09 | Stop reason: HOSPADM

## 2019-01-05 RX ORDER — BRIMONIDINE TARTRATE 2 MG/ML
SOLUTION/ DROPS OPHTHALMIC
COMMUNITY
End: 2023-08-07

## 2019-01-05 RX ORDER — SODIUM CHLORIDE 0.9 % (FLUSH) 0.9 %
5 SYRINGE (ML) INJECTION
Status: DISCONTINUED | OUTPATIENT
Start: 2019-01-05 | End: 2019-01-05

## 2019-01-05 RX ORDER — KETOROLAC TROMETHAMINE 5 MG/ML
1 SOLUTION OPHTHALMIC 4 TIMES DAILY
Status: DISCONTINUED | OUTPATIENT
Start: 2019-01-05 | End: 2019-01-09 | Stop reason: HOSPADM

## 2019-01-05 RX ORDER — ACETAMINOPHEN 325 MG/1
650 TABLET ORAL EVERY 8 HOURS PRN
Status: DISCONTINUED | OUTPATIENT
Start: 2019-01-05 | End: 2019-01-09 | Stop reason: HOSPADM

## 2019-01-05 RX ORDER — DORZOLAMIDE HYDROCHLORIDE AND TIMOLOL MALEATE 20; 5 MG/ML; MG/ML
1 SOLUTION/ DROPS OPHTHALMIC 2 TIMES DAILY
COMMUNITY
End: 2023-08-07

## 2019-01-05 RX ORDER — IBUPROFEN 200 MG
24 TABLET ORAL
Status: DISCONTINUED | OUTPATIENT
Start: 2019-01-05 | End: 2019-01-05

## 2019-01-05 RX ORDER — ONDANSETRON 8 MG/1
8 TABLET, ORALLY DISINTEGRATING ORAL EVERY 8 HOURS PRN
Status: DISCONTINUED | OUTPATIENT
Start: 2019-01-05 | End: 2019-01-09 | Stop reason: HOSPADM

## 2019-01-05 RX ORDER — DOCUSATE SODIUM 100 MG/1
100 CAPSULE, LIQUID FILLED ORAL 2 TIMES DAILY
Status: DISCONTINUED | OUTPATIENT
Start: 2019-01-05 | End: 2019-01-09 | Stop reason: HOSPADM

## 2019-01-05 RX ORDER — VANCOMYCIN 2 GRAM/500 ML IN 0.9 % SODIUM CHLORIDE INTRAVENOUS
2000
Status: COMPLETED | OUTPATIENT
Start: 2019-01-05 | End: 2019-01-05

## 2019-01-05 RX ORDER — KETOROLAC TROMETHAMINE 5 MG/ML
SOLUTION OPHTHALMIC
COMMUNITY
End: 2023-08-07

## 2019-01-05 RX ORDER — ACETAMINOPHEN 500 MG
1000 TABLET ORAL
Status: COMPLETED | OUTPATIENT
Start: 2019-01-05 | End: 2019-01-05

## 2019-01-05 RX ORDER — LABETALOL 200 MG/1
200 TABLET, FILM COATED ORAL 2 TIMES DAILY
Status: DISCONTINUED | OUTPATIENT
Start: 2019-01-05 | End: 2019-01-05

## 2019-01-05 RX ORDER — AMLODIPINE BESYLATE 10 MG/1
10 TABLET ORAL DAILY
Status: DISCONTINUED | OUTPATIENT
Start: 2019-01-05 | End: 2019-01-09 | Stop reason: HOSPADM

## 2019-01-05 RX ORDER — CALCITRIOL 0.5 UG/1
0.5 CAPSULE ORAL DAILY
Status: DISCONTINUED | OUTPATIENT
Start: 2019-01-05 | End: 2019-01-07

## 2019-01-05 RX ORDER — CALCIUM ACETATE 667 MG/1
667 CAPSULE ORAL 2 TIMES DAILY
Status: DISCONTINUED | OUTPATIENT
Start: 2019-01-05 | End: 2019-01-07

## 2019-01-05 RX ORDER — LATANOPROST 50 UG/ML
1 SOLUTION/ DROPS OPHTHALMIC NIGHTLY
Status: DISCONTINUED | OUTPATIENT
Start: 2019-01-05 | End: 2019-01-09 | Stop reason: HOSPADM

## 2019-01-05 RX ORDER — LATANOPROST 50 UG/ML
1 SOLUTION/ DROPS OPHTHALMIC NIGHTLY
COMMUNITY
End: 2023-08-07

## 2019-01-05 RX ORDER — INSULIN ASPART 100 [IU]/ML
0-5 INJECTION, SOLUTION INTRAVENOUS; SUBCUTANEOUS
Status: DISCONTINUED | OUTPATIENT
Start: 2019-01-05 | End: 2019-01-05

## 2019-01-05 RX ADMIN — CALCITRIOL 0.5 MCG: 0.5 CAPSULE, LIQUID FILLED ORAL at 05:01

## 2019-01-05 RX ADMIN — CALCIUM ACETATE 667 MG: 667 CAPSULE ORAL at 09:01

## 2019-01-05 RX ADMIN — LATANOPROST 1 DROP: 50 SOLUTION OPHTHALMIC at 09:01

## 2019-01-05 RX ADMIN — ONDANSETRON 8 MG: 8 TABLET, ORALLY DISINTEGRATING ORAL at 08:01

## 2019-01-05 RX ADMIN — ACETAMINOPHEN 650 MG: 325 TABLET ORAL at 09:01

## 2019-01-05 RX ADMIN — SODIUM CHLORIDE 500 ML: 0.9 INJECTION, SOLUTION INTRAVENOUS at 10:01

## 2019-01-05 RX ADMIN — ACETAMINOPHEN 1000 MG: 500 TABLET ORAL at 11:01

## 2019-01-05 RX ADMIN — FUROSEMIDE 40 MG: 40 TABLET ORAL at 05:01

## 2019-01-05 RX ADMIN — PIPERACILLIN AND TAZOBACTAM 4.5 G: 4; .5 INJECTION, POWDER, LYOPHILIZED, FOR SOLUTION INTRAVENOUS; PARENTERAL at 11:01

## 2019-01-05 RX ADMIN — INSULIN DETEMIR 10 UNITS: 100 INJECTION, SOLUTION SUBCUTANEOUS at 09:01

## 2019-01-05 RX ADMIN — SEVELAMER CARBONATE 800 MG: 800 TABLET, FILM COATED ORAL at 05:01

## 2019-01-05 RX ADMIN — NEOMYCIN, POLYMYXIN B SULFATES, DEXAMETHASONE: 1; 3.5; 1 OINTMENT OPHTHALMIC at 09:01

## 2019-01-05 RX ADMIN — APIXABAN 10 MG: 5 TABLET, FILM COATED ORAL at 09:01

## 2019-01-05 RX ADMIN — DOCUSATE SODIUM 100 MG: 100 CAPSULE, LIQUID FILLED ORAL at 09:01

## 2019-01-05 RX ADMIN — VANCOMYCIN HYDROCHLORIDE 2000 MG: 10 INJECTION, POWDER, LYOPHILIZED, FOR SOLUTION INTRAVENOUS at 11:01

## 2019-01-05 RX ADMIN — AMLODIPINE BESYLATE 10 MG: 10 TABLET ORAL at 05:01

## 2019-01-05 RX ADMIN — KETOROLAC TROMETHAMINE 1 DROP: 5 SOLUTION OPHTHALMIC at 09:01

## 2019-01-05 NOTE — ASSESSMENT & PLAN NOTE
-- Began dialysis in April 2018  -- Has Permacath on (L) chest as well as an AV fistula in the (L) arm  -- As the AV fistula has now matured, it has recently been utilized for dialysis, and the permacath is scheduled for removal at [facility] next week.  -- Hx of Permacath infection in (R) chest in May, 2018 complicated by MSSA bacteremia.   -- Current Permacath does not appear erythematous, and is without purulence or any discharge  -- Last dialysis appointment completed yesterday without incident  -- Electrolyte wnl  -- Does not appear fluid overloaded at this time  -- Pt does still make some urine. On Furosemide 40mg BID  -- Pt followed by Nephrologist Dr. Sweet at [facility]  Plan  -- Nephrology consult for dialysis while inpatient - Plan for Monday Dialysis  -- Daily BMP's  -- Will obtain LUE ultrasound to assess for patency of the L AV fistula

## 2019-01-05 NOTE — ASSESSMENT & PLAN NOTE
-- Symptoms began yesterday evening with fever, chills, non-productive cough, nausea, NBNB watery vomiting, and loosely formed stool  -- Pt reports sick contact (nurse) at dialysis yesterday  -- Hx of R sided permacath infection complicated by MSSA bacteremia in May, 2018; now with L sided permacath that has been scheduled for removal at [facility] next week as she now has completed a full dialysis session with newly matured L arm AV fistula  -- Tmax 102.7 upon arrival  -- CXR without signs of any acute pulmonary process. Not concerning for any developing pneumonia.   -- Initial lactate 2 (negative)  -- Rapid Flu negative  -- Leukocytosis of 21  -- Tachycardic in the 110's; not hypotensive (MAPS >94)  -- Received 500cc NS bolus in ED  Plan  -- Will obtain Blood cx (Peripheral line cx as well as Central line cx)  -- UA pending   -- Will continue Vanc/Zosyn for now and follow up on cx  -- Will leave L permacath in place for now as it does not appear infected (no erythema, swelling, purulence, discharge, foul smell) - currently bandaged.

## 2019-01-05 NOTE — ED NOTES
Patient identifiers verified and correct for Mary Waller.    LOC: The patient is awake, alert and aware of environment with an appropriate affect, the patient is oriented x 3 and speaking appropriately.  APPEARANCE: Patient resting comfortably and in no acute distress, patient is clean and well groomed, patient's clothing is properly fastened.  SKIN: The skin is warm and dry, color consistent with ethnicity, patient has normal skin turgor and moist mucus membranes, skin intact, no breakdown or bruising noted.  MUSCULOSKELETAL: Patient moving all extremities spontaneously, no obvious swelling or deformities noted.  RESPIRATORY: Airway is open and patent, respirations are spontaneous, patient has a normal effort and rate, no accessory muscle use noted, bilateral breath sounds clear and present.  CARDIAC: Patient has a normal rate and regular rhythm, no periphreal edema noted, capillary refill < 3 seconds.  ABDOMEN: Soft and non tender to palpation, no distention noted, normoactive bowel sounds present in all four quadrants.  NEUROLOGIC: PERRL, 3 mm bilaterally, eyes open spontaneously, behavior appropriate to situation, follows commands, facial expression symmetrical, bilateral hand grasp equal and even, purposeful motor response noted, normal sensation in all extremities when touched with a finger.

## 2019-01-05 NOTE — ED TRIAGE NOTES
Pt states she woke up with chills and nausea this am.  Pt reports initial temp at home 102.4.  Pt had dialysis yesterday. Pt having headache.  Pt has not taken any meds today.

## 2019-01-05 NOTE — HPI
Mary Waller is a 43 year old female with a PMHx of DM, ESRD on MWF dialysis since April 2018, HTN, Anemia of chronic disease, glaucoma, and blindness (L eye) who presents to the ED with fever, nausea, and vomiting that began overnight.  Patient reports chills, sweats, non-productive cough, nausea with 1 episode of vomiting and 1 episode of loose stool that began around 2:00 a.m. She states that there was a nurse sick with a cough at her dialysis treatment center.  She has not taken anything for her symptoms. Patient still makes urine, and denies dysuria, headache, dark or bloody stool, myalgias, chest pain, or shortness of breath. Patient has central catheter (permacath) in place in the left subclavian. She was previously receiving dialysis through this, but recently began receiving treatment through her AV fistula in the left upper arm. She reports that the catheter is scheduled to be removed early next week at [facility].     Of note, pt has a recent hx of permacath infection in May, 2018 complicated by MSSA bacteremia.

## 2019-01-05 NOTE — SUBJECTIVE & OBJECTIVE
Past Medical History:   Diagnosis Date    Blind     Diabetes     ESRD (end stage renal disease) on dialysis     Hypertension        Past Surgical History:   Procedure Laterality Date    AV FISTULA PLACEMENT      EYE SURGERY         Review of patient's allergies indicates:   Allergen Reactions    Codeine     Sulfa (sulfonamide antibiotics)        No current facility-administered medications on file prior to encounter.      Current Outpatient Medications on File Prior to Encounter   Medication Sig    amLODIPine (NORVASC) 10 MG tablet Take 10 mg by mouth once daily.    ATROPINE SULFATE, PF, OPHT Apply to eye.    brimonidine 0.2% (ALPHAGAN) 0.2 % Drop 1 drop every 8 (eight) hours.    calcitRIOL (ROCALTROL) 0.5 MCG Cap Take 0.5 mcg by mouth once daily.    calcium acetate (PHOSLO) 667 mg capsule Take 667 mg by mouth 2 (two) times daily.    cyanocobalamin (VITAMIN B-12) 500 MCG tablet Take 500 mcg by mouth once daily.    docusate sodium (COLACE) 100 MG capsule Take 100 mg by mouth 2 (two) times daily.    dorzolamide-timolol 2-0.5% (COSOPT) 22.3-6.8 mg/mL ophthalmic solution Place 1 drop into the right eye 2 (two) times daily.    furosemide (LASIX) 40 MG tablet Take 40 mg by mouth once daily.     hydrALAZINE (APRESOLINE) 50 MG tablet Take 100 mg by mouth every 8 (eight) hours.     ketorolac 0.5% (ACULAR) 0.5 % Drop 1 drop 4 (four) times daily.    labetalol (NORMODYNE) 200 MG tablet Take 400 mg by mouth 2 (two) times daily.     latanoprost 0.005 % ophthalmic solution Place 1 drop into the right eye every evening.    linagliptin (TRADJENTA ORAL) Take 5 mg by mouth once daily.    losartan (COZAAR) 100 MG tablet Take 100 mg by mouth once daily.    neomycin-polymyxin-dexamethasone (DEXACINE) 3.5 mg/g-10,000 unit/g-0.1 % Oint 3 (three) times daily.    sevelamer carbonate (RENVELA) 800 mg Tab Take 800 mg by mouth 2 (two) times daily.    [DISCONTINUED] latanoprost 0.005 % ophthalmic solution 1 drop every  evening.    aspirin (ECOTRIN) 81 MG EC tablet Take 81 mg by mouth once daily.     Family History     None        Tobacco Use    Smoking status: Never Smoker    Smokeless tobacco: Never Used   Substance and Sexual Activity    Alcohol use: No    Drug use: No    Sexual activity: Not on file     Review of Systems   Constitutional: Positive for activity change, appetite change, chills, fatigue and fever.   HENT: Negative for congestion and sinus pain.    Respiratory: Positive for cough. Negative for chest tightness and shortness of breath.    Cardiovascular: Negative for chest pain and palpitations.   Gastrointestinal: Positive for nausea and vomiting. Negative for abdominal distention, abdominal pain, blood in stool, constipation and diarrhea.        Loose stool   Genitourinary: Negative for dysuria and flank pain.   Musculoskeletal: Negative for arthralgias and myalgias.   Neurological: Negative for dizziness, light-headedness and headaches.   Psychiatric/Behavioral: Negative for agitation.     Objective:     Vital Signs (Most Recent):  Temp: (!) 100.6 °F (38.1 °C) (01/05/19 1121)  Pulse: (!) 114 (01/05/19 1121)  Resp: (!) 28 (01/05/19 1121)  BP: (!) 143/65 (01/05/19 1121)  SpO2: 99 % (01/05/19 1121) Vital Signs (24h Range):  Temp:  [100.6 °F (38.1 °C)-102.7 °F (39.3 °C)] 100.6 °F (38.1 °C)  Pulse:  [110-116] 114  Resp:  [18-28] 28  SpO2:  [98 %-99 %] 99 %  BP: (140-175)/(65-80) 143/65     Weight: 104.3 kg (230 lb)  Body mass index is 38.27 kg/m².    Physical Exam   Constitutional: She is oriented to person, place, and time. She appears well-developed and well-nourished. No distress.   HENT:   Head: Normocephalic.   Wearing Sunglasses (glaucoma)   Neck: Normal range of motion.   Cardiovascular: Normal rate, regular rhythm and normal heart sounds.   Pulmonary/Chest: Effort normal and breath sounds normal. No stridor. No respiratory distress. She has no wheezes.   Abdominal: Soft. Bowel sounds are normal. She  exhibits no distension. There is no tenderness. There is no guarding.   Musculoskeletal: Normal range of motion.   Neurological: She is alert and oriented to person, place, and time.   Skin: Skin is warm and dry. Capillary refill takes less than 2 seconds. She is not diaphoretic.   -- Permacath in L chest  -- AV fistula in L upper arm  -- Neither appear infected, erythematous, swollen, and are without purulence or discharge           Significant Labs: All pertinent labs within the past 24 hours have been reviewed.    Significant Imaging: I have reviewed all pertinent imaging results/findings within the past 24 hours.

## 2019-01-05 NOTE — H&P
[facility] Hospital Medicine  History & Physical    Patient Name: Mary Waller  MRN: [id number]  Admission Date: 1/5/2019  Attending Physician: Jessie Arias MD   Primary Care Provider: Bryan Murray MD    [facility] Medicine Team: Networked reference to record PCT  King Faulkner MD     Patient information was obtained from patient and ER records.     Subjective:     Principal Problem:Sepsis    Chief Complaint:   Chief Complaint   Patient presents with    Fever, Nausea, and Vomiting     since 2am. dialysis access right arm, dialysis MWF, had dialysis on Friday         HPI: Mary Waller is a 43 year old female with a PMHx of DM, ESRD on MWF dialysis since April 2018, HTN, Anemia of chronic disease, glaucoma, and blindness (L eye) who presents to the ED with fever, nausea, and vomiting that began overnight.  Patient reports chills, sweats, non-productive cough, nausea with 1 episode of vomiting and 1 episode of loose stool that began around 2:00 a.m. She states that there was a nurse sick with a cough at her dialysis treatment center.  She has not taken anything for her symptoms. Patient still makes urine, and denies dysuria, headache, dark or bloody stool, myalgias, chest pain, or shortness of breath. Patient has central catheter (permacath) in place in the left subclavian. She was previously receiving dialysis through this, but recently began receiving treatment through her AV fistula in the left upper arm. She reports that the catheter is scheduled to be removed early next week at [facility].     Of note, pt has a recent hx of permacath infection in May, 2018 complicated by MSSA bacteremia.         Past Medical History:   Diagnosis Date    Blind     Diabetes     ESRD (end stage renal disease) on dialysis     Hypertension        Past Surgical History:   Procedure Laterality Date    AV FISTULA PLACEMENT      EYE SURGERY         Review of patient's allergies indicates:    Allergen Reactions    Codeine     Sulfa (sulfonamide antibiotics)        No current facility-administered medications on file prior to encounter.      Current Outpatient Medications on File Prior to Encounter   Medication Sig    amLODIPine (NORVASC) 10 MG tablet Take 10 mg by mouth once daily.    ATROPINE SULFATE, PF, OPHT Apply to eye.    brimonidine 0.2% (ALPHAGAN) 0.2 % Drop 1 drop every 8 (eight) hours.    calcitRIOL (ROCALTROL) 0.5 MCG Cap Take 0.5 mcg by mouth once daily.    calcium acetate (PHOSLO) 667 mg capsule Take 667 mg by mouth 2 (two) times daily.    cyanocobalamin (VITAMIN B-12) 500 MCG tablet Take 500 mcg by mouth once daily.    docusate sodium (COLACE) 100 MG capsule Take 100 mg by mouth 2 (two) times daily.    dorzolamide-timolol 2-0.5% (COSOPT) 22.3-6.8 mg/mL ophthalmic solution Place 1 drop into the right eye 2 (two) times daily.    furosemide (LASIX) 40 MG tablet Take 40 mg by mouth once daily.     hydrALAZINE (APRESOLINE) 50 MG tablet Take 100 mg by mouth every 8 (eight) hours.     ketorolac 0.5% (ACULAR) 0.5 % Drop 1 drop 4 (four) times daily.    labetalol (NORMODYNE) 200 MG tablet Take 400 mg by mouth 2 (two) times daily.     latanoprost 0.005 % ophthalmic solution Place 1 drop into the right eye every evening.    linagliptin (TRADJENTA ORAL) Take 5 mg by mouth once daily.    losartan (COZAAR) 100 MG tablet Take 100 mg by mouth once daily.    neomycin-polymyxin-dexamethasone (DEXACINE) 3.5 mg/g-10,000 unit/g-0.1 % Oint 3 (three) times daily.    sevelamer carbonate (RENVELA) 800 mg Tab Take 800 mg by mouth 2 (two) times daily.    [DISCONTINUED] latanoprost 0.005 % ophthalmic solution 1 drop every evening.    aspirin (ECOTRIN) 81 MG EC tablet Take 81 mg by mouth once daily.     Family History     None        Tobacco Use    Smoking status: Never Smoker    Smokeless tobacco: Never Used   Substance and Sexual Activity    Alcohol use: No    Drug use: No    Sexual  activity: Not on file     Review of Systems   Constitutional: Positive for activity change, appetite change, chills, fatigue and fever.   HENT: Negative for congestion and sinus pain.    Respiratory: Positive for cough. Negative for chest tightness and shortness of breath.    Cardiovascular: Negative for chest pain and palpitations.   Gastrointestinal: Positive for nausea and vomiting. Negative for abdominal distention, abdominal pain, blood in stool, constipation and diarrhea.        Loose stool   Genitourinary: Negative for dysuria and flank pain.   Musculoskeletal: Negative for arthralgias and myalgias.   Neurological: Negative for dizziness, light-headedness and headaches.   Psychiatric/Behavioral: Negative for agitation.     Objective:     Vital Signs (Most Recent):  Temp: (!) 100.6 °F (38.1 °C) (01/05/19 1121)  Pulse: (!) 114 (01/05/19 1121)  Resp: (!) 28 (01/05/19 1121)  BP: (!) 143/65 (01/05/19 1121)  SpO2: 99 % (01/05/19 1121) Vital Signs (24h Range):  Temp:  [100.6 °F (38.1 °C)-102.7 °F (39.3 °C)] 100.6 °F (38.1 °C)  Pulse:  [110-116] 114  Resp:  [18-28] 28  SpO2:  [98 %-99 %] 99 %  BP: (140-175)/(65-80) 143/65     Weight: 104.3 kg (230 lb)  Body mass index is 38.27 kg/m².    Physical Exam   Constitutional: She is oriented to person, place, and time. She appears well-developed and well-nourished. No distress.   HENT:   Head: Normocephalic.   Wearing Sunglasses (glaucoma)   Neck: Normal range of motion.   Cardiovascular: Normal rate, regular rhythm and normal heart sounds.   Pulmonary/Chest: Effort normal and breath sounds normal. No stridor. No respiratory distress. She has no wheezes.   Abdominal: Soft. Bowel sounds are normal. She exhibits no distension. There is no tenderness. There is no guarding.   Musculoskeletal: Normal range of motion.   Neurological: She is alert and oriented to person, place, and time.   Skin: Skin is warm and dry. Capillary refill takes less than 2 seconds. She is not  diaphoretic.   -- Permacath in L chest  -- AV fistula in L upper arm  -- Neither appear infected, erythematous, swollen, and are without purulence or discharge           Significant Labs: All pertinent labs within the past 24 hours have been reviewed.    Significant Imaging: I have reviewed all pertinent imaging results/findings within the past 24 hours.    Assessment/Plan:     * Sepsis    -- Symptoms began yesterday evening with fever, chills, non-productive cough, nausea, NBNB watery vomiting, and loosely formed stool  -- Pt reports sick contact (nurse) at dialysis yesterday  -- Hx of R sided permacath infection complicated by MSSA bacteremia in May, 2018; now with L sided permacath that has been scheduled for removal at [facility] next week as she now has completed a full dialysis session with newly matured L arm AV fistula  -- Tmax 102.7 upon arrival  -- CXR without signs of any acute pulmonary process. Not concerning for any developing pneumonia.   -- Initial lactate 2 (negative)  -- Rapid Flu negative  -- Leukocytosis of 21  -- Tachycardic in the 110's; not hypotensive (MAPS >94)  -- Received 500cc NS bolus in ED  Plan  -- Will obtain Blood cx (Peripheral line cx as well as Central line cx)  -- UA pending   -- Will continue Vanc/Zosyn for now and follow up on cx  -- Will leave L permacath in place for now as it does not appear infected (no erythema, swelling, purulence, discharge, foul smell) - currently bandaged.      Glaucoma    -- Resume home eyedrops       ESRD (end stage renal disease)    -- Began dialysis in April 2018  -- Has Permacath on (L) chest as well as an AV fistula in the (L) arm  -- As the AV fistula has now matured, it has recently been utilized for dialysis, and the permacath is scheduled for removal at [facility] next week.  -- Hx of Permacath infection in (R) chest in May, 2018 complicated by MSSA bacteremia.   -- Current Permacath does not appear erythematous, and is without purulence or any  discharge  -- Last dialysis appointment completed yesterday without incident  -- Electrolyte wnl  -- Does not appear fluid overloaded at this time  -- Pt does still make some urine. On Furosemide 40mg BID  -- Pt followed by Nephrologist Dr. Sweet at [facility]  Plan  -- Nephrology consult for dialysis while inpatient - Plan for Monday Dialysis  -- Daily BMP's  -- Will obtain LUE ultrasound to assess for patency of the L AV fistula     Type 2 diabetes mellitus    -- Pt reports taking 15 units insulin long acting daily  -- Medication list does not reflect this but does show Tradjenta po 5mg daily  Plan  -- Will start on LDSSI and reassess as pt currently with poor po intake       Essential hypertension    -- Current home anti-hypertensives include Labetalol 200mg BID, Losartan 50mg qd, Amlodipine 10mg qd, and Hydralazine 100mg tid.  -- Will resume BP meds, but hold Losartan.        VTE Risk Mitigation (From admission, onward)        Ordered     heparin (porcine) injection 5,000 Units  Every 8 hours      01/05/19 1237     IP VTE HIGH RISK PATIENT  Once      01/05/19 1237             King Faulkner MD  Department of Hospital Medicine   [facility]

## 2019-01-05 NOTE — ED PROVIDER NOTES
Encounter Date: 1/5/2019       History     Chief Complaint   Patient presents with    Fever, Nausea, and Vomiting     since 2am. dialysis access right arm, dialysis MWF, had dialysis on Friday      43-year-old female with a history of DM, ESRD on dilaysis, HTN, glaucoma, and blindness (L eye) presents to the ED with fever, n/v that began overnight.  Patient reports chills, sweats, nausea with 1 episode of vomiting and 1 episode of loose stool began around 2:00 a.m. She states that there was a nurse sick at her dialysis treatment center.  She has not taken anything for her symptoms. Patient still urinates.  She denies dysuria, headache, dark or bloody stool, myalgias, chest pain, shortness of breath. Patient has central catheter in place in the left chest.  She was previously receiving dialysis through this, but now receives treatment through her AV fistula in the left upper arm.  She reports that the catheter is scheduled to be removed early next week.           Review of patient's allergies indicates:   Allergen Reactions    Codeine     Sulfa (sulfonamide antibiotics)      Past Medical History:   Diagnosis Date    Blind     Diabetes     ESRD (end stage renal disease) on dialysis     Hypertension      Past Surgical History:   Procedure Laterality Date    AV FISTULA PLACEMENT      EYE SURGERY       History reviewed. No pertinent family history.  Social History     Tobacco Use    Smoking status: Never Smoker    Smokeless tobacco: Never Used   Substance Use Topics    Alcohol use: No    Drug use: No     Review of Systems   Constitutional: Positive for chills and fever.   HENT: Negative for sore throat.    Respiratory: Negative for shortness of breath.    Cardiovascular: Negative for chest pain.   Gastrointestinal: Positive for diarrhea (loose stool), nausea and vomiting. Negative for abdominal pain and blood in stool.   Genitourinary: Negative for dysuria.   Musculoskeletal: Negative for back pain.   Skin:  Negative for rash.   Neurological: Positive for weakness.   Hematological: Does not bruise/bleed easily.       Physical Exam     Initial Vitals [01/05/19 0808]   BP Pulse Resp Temp SpO2   (!) 140/65 (!) 116 20 (!) 102.7 °F (39.3 °C) 99 %      MAP       --         Physical Exam    Nursing note and vitals reviewed.  Constitutional: She appears well-developed and well-nourished. She is not diaphoretic.  Non-toxic appearance. She does not appear ill. No distress.   HENT:   Head: Normocephalic and atraumatic.   Neck: Neck supple.   Cardiovascular: Regular rhythm. Tachycardia present.  Exam reveals no gallop and no friction rub.    No murmur heard.  Pulmonary/Chest: Effort normal and breath sounds normal. No accessory muscle usage. No tachypnea. No respiratory distress. She has no decreased breath sounds. She has no wheezes. She has no rhonchi. She has no rales.   Central venous catheter access noted to left upper chest.   Abdominal: Soft. She exhibits no distension. There is no tenderness.   Neurological: She is alert.   Skin: No rash noted.   Psychiatric: She has a normal mood and affect. Her behavior is normal.         ED Course   Critical Care  Date/Time: 1/5/2019 12:31 PM  Performed by: Cornelia Spring PA-C  Authorized by: Rafael Abdullahi MD   Direct patient critical care time: 10 minutes  Additional history critical care time: 5 minutes  Ordering / reviewing critical care time: 5 minutes  Documentation critical care time: 5 minutes  Consult with family critical care time: 5 minutes  Total critical care time (exclusive of procedural time) : 30 minutes  Critical care was necessary to treat or prevent imminent or life-threatening deterioration of the following conditions: sepsis.  Critical care was time spent personally by me on the following activities: development of treatment plan with patient or surrogate, discussions with consultants, discussions with primary provider, interpretation of cardiac output  measurements, evaluation of patient's response to treatment, examination of patient, obtaining history from patient or surrogate, ordering and performing treatments and interventions, ordering and review of laboratory studies, ordering and review of radiographic studies, re-evaluation of patient's condition and review of old charts.        Labs Reviewed   CBC W/ AUTO DIFFERENTIAL - Abnormal; Notable for the following components:       Result Value    WBC 21.04 (*)     RBC 3.24 (*)     Hemoglobin 10.8 (*)     Hematocrit 32.8 (*)      (*)     MCH 33.3 (*)     Immature Granulocytes 1.0 (*)     Gran # (ANC) 19.3 (*)     Immature Grans (Abs) 0.21 (*)     Lymph # 0.3 (*)     Mono # 1.1 (*)     Gran% 91.6 (*)     Lymph% 1.4 (*)     All other components within normal limits   COMPREHENSIVE METABOLIC PANEL - Abnormal; Notable for the following components:    Sodium 135 (*)     Glucose 182 (*)     BUN, Bld 38 (*)     Creatinine 6.1 (*)     Calcium 10.8 (*)     Albumin 3.2 (*)     Alkaline Phosphatase 54 (*)     eGFR if  9.1 (*)     eGFR if non  7.9 (*)     All other components within normal limits   URINALYSIS, REFLEX TO URINE CULTURE - Abnormal; Notable for the following components:    Protein, UA 3+ (*)     Glucose, UA 2+ (*)     All other components within normal limits    Narrative:     Preferred Collection Type->Urine, Clean Catch  WHITE TOP TUBE   URINALYSIS MICROSCOPIC - Abnormal; Notable for the following components:    Bacteria, UA Few (*)     All other components within normal limits    Narrative:     Preferred Collection Type->Urine, Clean Catch  WHITE TOP TUBE   CULTURE, BLOOD   CULTURE, BLOOD   CULTURE, BLOOD   CULTURE, BLOOD   LACTIC ACID, PLASMA   LACTIC ACID, PLASMA   POCT INFLUENZA A/B MOLECULAR          Imaging Results          X-Ray Chest AP Portable (Final result)  Result time 01/05/19 08:54:04    Final result by Roshan Porter MD (01/05/19 08:54:04)                  Impression:      No acute abnormality.    Left internal jugular central venous catheter has been placed, with tip overlying the right atrium.      Electronically signed by: Roshan Porter MD  Date:    01/05/2019  Time:    08:54             Narrative:    EXAMINATION:  XR CHEST AP PORTABLE    CLINICAL HISTORY:  fever;    TECHNIQUE:  Single frontal portable view of the chest was performed.    COMPARISON:  Chest radiograph 05/21/2018    FINDINGS:  Support devices: Right internal jugular central venous catheter has been removed.  Left internal jugular central venous catheter has been placed, with tip overlying the right atrium.    Lungs are mildly hypoinflated with associated diffuse accentuation of parenchymal markings.  There is additional mild diffuse attenuation of lung parenchymal markings related to patient body habitus and underpenetration of examination.  No focal consolidative opacity.  No pleural effusion or pneumothorax.    The cardiac silhouette is normal in size. The hilar and mediastinal contours are unremarkable.    Bones are intact.                                 Medical Decision Making:   History:   Old Medical Records: I decided to obtain old medical records.  Differential Diagnosis:   My differential diagnosis includes but is not limited to:  Influenza, viral syndrome, sepsis, pneumonia, UTI  Clinical Tests:   Lab Tests: Ordered and Reviewed  Radiological Study: Ordered and Reviewed  Medical Tests: Ordered and Reviewed  Sepsis Perfusion Assessment: I attest, a sepsis perfusion exam was performed within 6 hours of Septic Shock presentation, following fluid resuscitation.       APC / Resident Notes:   43-year-old female presents with fevers, nausea and vomiting since early this morning.  She is febrile at 1:02 a.m. 0.7, tachycardic.  Patient not given standard 30 cc/kg IV fluids given her ESRD/dialysis status.  Patient does urinate, so we will give 500 cc normal saline bolus.    Rapid flu negative.  CBC is  remarkable for a leukocytosis of 21.04.  Chest x-ray unremarkable. Lactate normal. Patient urinated prior to presenting to the ED.  She has been unable to provide a sample.  Patient was started on broad-spectrum antibiotics.  Source is unknown at this time though I suspect central line infection.  She will be admitted to Hospital Medicine for further treatment and management.  Patient comfortable with admission. I have reviewed the patient's records and discussed this case with my supervising physician.                   Clinical Impression:   The primary encounter diagnosis was Sepsis, due to unspecified organism. Diagnoses of Fever and Central venous catheter in place were also pertinent to this visit.      Disposition:   Disposition: Admitted  Condition: Fair                            Cornelia Spring PA-C  01/05/19 150

## 2019-01-05 NOTE — ASSESSMENT & PLAN NOTE
-- Current home anti-hypertensives include Labetalol 200mg BID, Losartan 50mg qd, Amlodipine 10mg qd, and Hydralazine 100mg tid.  -- Will resume BP meds, but hold Losartan.

## 2019-01-06 PROBLEM — B95.61 STAPHYLOCOCCUS AUREUS BACTEREMIA: Status: ACTIVE | Noted: 2019-01-06

## 2019-01-06 PROBLEM — R78.81 STAPHYLOCOCCUS AUREUS BACTEREMIA: Status: ACTIVE | Noted: 2019-01-06

## 2019-01-06 PROBLEM — I82.629 ACUTE DEEP VEIN THROMBOSIS (DVT) OF UPPER EXTREMITY: Status: ACTIVE | Noted: 2019-01-06

## 2019-01-06 LAB
ALBUMIN SERPL BCP-MCNC: 2.9 G/DL
ALP SERPL-CCNC: 57 U/L
ALT SERPL W/O P-5'-P-CCNC: 23 U/L
ANION GAP SERPL CALC-SCNC: 14 MMOL/L
ANISOCYTOSIS BLD QL SMEAR: SLIGHT
ASCENDING AORTA: 2.94 CM
AST SERPL-CCNC: 19 U/L
AV INDEX (PROSTH): 0.77
AV MEAN GRADIENT: 13.27 MMHG
AV PEAK GRADIENT: 22.28 MMHG
AV VALVE AREA: 2.55 CM2
BASOPHILS # BLD AUTO: 0.06 K/UL
BASOPHILS NFR BLD: 0.3 %
BILIRUB SERPL-MCNC: 1.1 MG/DL
BSA FOR ECHO PROCEDURE: 2.21 M2
BUN SERPL-MCNC: 49 MG/DL
CALCIUM SERPL-MCNC: 11.1 MG/DL
CHLORIDE SERPL-SCNC: 93 MMOL/L
CHOLEST SERPL-MCNC: 156 MG/DL
CHOLEST/HDLC SERPL: 7.8 {RATIO}
CO2 SERPL-SCNC: 26 MMOL/L
CREAT SERPL-MCNC: 7.6 MG/DL
CV ECHO LV RWT: 0.46 CM
DIFFERENTIAL METHOD: ABNORMAL
DOP CALC AO PEAK VEL: 2.36 M/S
DOP CALC AO VTI: 36.45 CM
DOP CALC LVOT AREA: 3.3 CM2
DOP CALC LVOT DIAMETER: 2.05 CM
DOP CALC LVOT STROKE VOLUME: 93.06 CM3
DOP CALCLVOT PEAK VEL VTI: 28.21 CM
E WAVE DECELERATION TIME: 275.05 MSEC
E/A RATIO: 0.8
E/E' RATIO: 11.14
ECHO LV POSTERIOR WALL: 1.1 CM (ref 0.6–1.1)
EOSINOPHIL # BLD AUTO: 0.1 K/UL
EOSINOPHIL NFR BLD: 0.3 %
ERYTHROCYTE [DISTWIDTH] IN BLOOD BY AUTOMATED COUNT: 14.4 %
EST. GFR  (AFRICAN AMERICAN): 7 ML/MIN/1.73 M^2
EST. GFR  (NON AFRICAN AMERICAN): 6 ML/MIN/1.73 M^2
ESTIMATED AVG GLUCOSE: 111 MG/DL
FRACTIONAL SHORTENING: 33 % (ref 28–44)
GLUCOSE SERPL-MCNC: 132 MG/DL
HBA1C MFR BLD HPLC: 5.5 %
HCT VFR BLD AUTO: 35.5 %
HDLC SERPL-MCNC: 20 MG/DL
HDLC SERPL: 12.8 %
HGB BLD-MCNC: 10.5 G/DL
IMM GRANULOCYTES # BLD AUTO: 0.15 K/UL
IMM GRANULOCYTES NFR BLD AUTO: 0.7 %
INTERVENTRICULAR SEPTUM: 1 CM (ref 0.6–1.1)
IVRT: 0.04 MSEC
LA MAJOR: 5.41 CM
LA MINOR: 5.37 CM
LA WIDTH: 4.93 CM
LDLC SERPL CALC-MCNC: 88 MG/DL
LEFT ATRIUM SIZE: 5.34 CM
LEFT ATRIUM VOLUME INDEX: 57 ML/M2
LEFT ATRIUM VOLUME: 120.61 CM3
LEFT INTERNAL DIMENSION IN SYSTOLE: 3.24 CM (ref 2.1–4)
LEFT VENTRICLE DIASTOLIC VOLUME INDEX: 51.29 ML/M2
LEFT VENTRICLE DIASTOLIC VOLUME: 108.45 ML
LEFT VENTRICLE MASS INDEX: 86.6 G/M2
LEFT VENTRICLE SYSTOLIC VOLUME INDEX: 20 ML/M2
LEFT VENTRICLE SYSTOLIC VOLUME: 42.28 ML
LEFT VENTRICULAR INTERNAL DIMENSION IN DIASTOLE: 4.82 CM (ref 3.5–6)
LEFT VENTRICULAR MASS: 183.14 G
LV LATERAL E/E' RATIO: 14.63
LV SEPTAL E/E' RATIO: 9
LYMPHOCYTES # BLD AUTO: 0.3 K/UL
LYMPHOCYTES NFR BLD: 1.5 %
MAGNESIUM SERPL-MCNC: 2 MG/DL
MCH RBC QN AUTO: 32.4 PG
MCHC RBC AUTO-ENTMCNC: 29.6 G/DL
MCV RBC AUTO: 110 FL
MONOCYTES # BLD AUTO: 1 K/UL
MONOCYTES NFR BLD: 4.7 %
MV PEAK A VEL: 1.47 M/S
MV PEAK E VEL: 1.17 M/S
NEUTROPHILS # BLD AUTO: 20.1 K/UL
NEUTROPHILS NFR BLD: 92.5 %
NONHDLC SERPL-MCNC: 136 MG/DL
NRBC BLD-RTO: 0 /100 WBC
PHOSPHATE SERPL-MCNC: 4.4 MG/DL
PISA TR MAX VEL: 1.49 M/S
PLATELET # BLD AUTO: 154 K/UL
PLATELET BLD QL SMEAR: ABNORMAL
PMV BLD AUTO: 10.2 FL
POCT GLUCOSE: 130 MG/DL (ref 70–110)
POCT GLUCOSE: 137 MG/DL (ref 70–110)
POCT GLUCOSE: 149 MG/DL (ref 70–110)
POCT GLUCOSE: 152 MG/DL (ref 70–110)
POIKILOCYTOSIS BLD QL SMEAR: SLIGHT
POLYCHROMASIA BLD QL SMEAR: ABNORMAL
POTASSIUM SERPL-SCNC: 4.5 MMOL/L
PROT SERPL-MCNC: 8 G/DL
PULM VEIN S/D RATIO: 1.24
PV PEAK D VEL: 0.58 M/S
PV PEAK S VEL: 0.72 M/S
RA MAJOR: 4.73 CM
RA PRESSURE: 3 MMHG
RA WIDTH: 3.94 CM
RBC # BLD AUTO: 3.24 M/UL
RIGHT VENTRICULAR END-DIASTOLIC DIMENSION: 3.78 CM
RV TISSUE DOPPLER FREE WALL SYSTOLIC VELOCITY 1 (APICAL 4 CHAMBER VIEW): 16.89 M/S
SINUS: 2.21 CM
SODIUM SERPL-SCNC: 133 MMOL/L
STJ: 2.17 CM
TDI LATERAL: 0.08
TDI SEPTAL: 0.13
TDI: 0.11
TR MAX PG: 8.88 MMHG
TRICUSPID ANNULAR PLANE SYSTOLIC EXCURSION: 3.37 CM
TRIGL SERPL-MCNC: 240 MG/DL
TV REST PULMONARY ARTERY PRESSURE: 12 MMHG
WBC # BLD AUTO: 21.77 K/UL

## 2019-01-06 PROCEDURE — 99232 PR SUBSEQUENT HOSPITAL CARE,LEVL II: ICD-10-PCS | Mod: ,,, | Performed by: HOSPITALIST

## 2019-01-06 PROCEDURE — 87040 BLOOD CULTURE FOR BACTERIA: CPT | Mod: 59

## 2019-01-06 PROCEDURE — 99233 SBSQ HOSP IP/OBS HIGH 50: CPT | Mod: ,,, | Performed by: INTERNAL MEDICINE

## 2019-01-06 PROCEDURE — 84100 ASSAY OF PHOSPHORUS: CPT

## 2019-01-06 PROCEDURE — 83735 ASSAY OF MAGNESIUM: CPT

## 2019-01-06 PROCEDURE — 99232 SBSQ HOSP IP/OBS MODERATE 35: CPT | Mod: ,,, | Performed by: HOSPITALIST

## 2019-01-06 PROCEDURE — 25000003 PHARM REV CODE 250: Performed by: STUDENT IN AN ORGANIZED HEALTH CARE EDUCATION/TRAINING PROGRAM

## 2019-01-06 PROCEDURE — 85025 COMPLETE CBC W/AUTO DIFF WBC: CPT

## 2019-01-06 PROCEDURE — 80061 LIPID PANEL: CPT

## 2019-01-06 PROCEDURE — 99233 PR SUBSEQUENT HOSPITAL CARE,LEVL III: ICD-10-PCS | Mod: ,,, | Performed by: INTERNAL MEDICINE

## 2019-01-06 PROCEDURE — 80053 COMPREHEN METABOLIC PANEL: CPT

## 2019-01-06 PROCEDURE — 83036 HEMOGLOBIN GLYCOSYLATED A1C: CPT

## 2019-01-06 PROCEDURE — 11000001 HC ACUTE MED/SURG PRIVATE ROOM

## 2019-01-06 PROCEDURE — 36415 COLL VENOUS BLD VENIPUNCTURE: CPT

## 2019-01-06 RX ORDER — BRINZOLAMIDE 10 MG/ML
1 SUSPENSION/ DROPS OPHTHALMIC 2 TIMES DAILY
COMMUNITY
End: 2023-08-07

## 2019-01-06 RX ORDER — PREDNISOLONE ACETATE 10 MG/ML
1 SUSPENSION/ DROPS OPHTHALMIC 3 TIMES DAILY
COMMUNITY
End: 2023-08-07

## 2019-01-06 RX ORDER — INSULIN GLARGINE 100 [IU]/ML
15 INJECTION, SOLUTION SUBCUTANEOUS NIGHTLY
COMMUNITY
End: 2021-09-01

## 2019-01-06 RX ORDER — SODIUM CHLORIDE 9 MG/ML
INJECTION, SOLUTION INTRAVENOUS ONCE
Status: COMPLETED | OUTPATIENT
Start: 2019-01-07 | End: 2019-01-07

## 2019-01-06 RX ORDER — SODIUM CHLORIDE 9 MG/ML
INJECTION, SOLUTION INTRAVENOUS
Status: DISCONTINUED | OUTPATIENT
Start: 2019-01-07 | End: 2019-01-09 | Stop reason: HOSPADM

## 2019-01-06 RX ADMIN — FUROSEMIDE 40 MG: 40 TABLET ORAL at 09:01

## 2019-01-06 RX ADMIN — SEVELAMER CARBONATE 800 MG: 800 TABLET, FILM COATED ORAL at 06:01

## 2019-01-06 RX ADMIN — NEOMYCIN, POLYMYXIN B SULFATES, DEXAMETHASONE: 1; 3.5; 1 OINTMENT OPHTHALMIC at 09:01

## 2019-01-06 RX ADMIN — ACETAMINOPHEN 650 MG: 325 TABLET ORAL at 08:01

## 2019-01-06 RX ADMIN — APIXABAN 10 MG: 5 TABLET, FILM COATED ORAL at 09:01

## 2019-01-06 RX ADMIN — LATANOPROST 1 DROP: 50 SOLUTION OPHTHALMIC at 09:01

## 2019-01-06 RX ADMIN — SEVELAMER CARBONATE 800 MG: 800 TABLET, FILM COATED ORAL at 09:01

## 2019-01-06 RX ADMIN — INSULIN DETEMIR 10 UNITS: 100 INJECTION, SOLUTION SUBCUTANEOUS at 09:01

## 2019-01-06 RX ADMIN — CALCIUM ACETATE 667 MG: 667 CAPSULE ORAL at 09:01

## 2019-01-06 RX ADMIN — AMLODIPINE BESYLATE 10 MG: 10 TABLET ORAL at 09:01

## 2019-01-06 RX ADMIN — ACETAMINOPHEN 650 MG: 325 TABLET ORAL at 09:01

## 2019-01-06 RX ADMIN — KETOROLAC TROMETHAMINE 1 DROP: 5 SOLUTION OPHTHALMIC at 01:01

## 2019-01-06 RX ADMIN — KETOROLAC TROMETHAMINE 1 DROP: 5 SOLUTION OPHTHALMIC at 09:01

## 2019-01-06 RX ADMIN — CALCITRIOL 0.5 MCG: 0.5 CAPSULE, LIQUID FILLED ORAL at 09:01

## 2019-01-06 RX ADMIN — SEVELAMER CARBONATE 800 MG: 800 TABLET, FILM COATED ORAL at 01:01

## 2019-01-06 NOTE — PROGRESS NOTES
[facility]  Infectious Disease  Progress Note    Patient Name: Mary Waller  MRN: [id number]  Admission Date: 1/5/2019  Length of Stay: 1 days  Attending Physician: Jessie Arias MD  Primary Care Provider: Bryan Murray MD    Isolation Status: No active isolations  Assessment/Plan:      * Sepsis    Mary Waller is a 44 yo female with a PMHx of DM, ESRD on MWF dialysis since April 2018, HTN, Anemia of chronic disease, glaucoma, and blindness (L eye) presented to the ED with F,N,V w associated chills, sweats.  Patient has central catheter (permacath) in place in the left subclavian. She was previously receiving dialysis through this, but recently began receiving treatment through her AV fistula in the left upper arm. She reports that the catheter is scheduled to be removed early next week at [facility]. Of note, pt has a recent hx of permacath infection in May, 2018 complicated by MSSA bacteremia.     Blood cxs positive for Staph Aureus susceptibility pending. F w elevated WBC count.    -Discontinue Zosyn  -Continue Vancomycin after HD  -Will follow susceptibilities of cultures and tailor abx accordingly.  -Recommend TTE to assess for valve vegetations.  -Patient will need to get permacath removed  -Repeat Blood Cultures x2 until clearance  -ID will continue to follow           Thank you for your consult. I will follow-up with patient. Please contact us if you have any additional questions.    Marcell Pinto PA-C  Infectious Disease  [facility]    Subjective:     Principal Problem:Sepsis    HPI: Mary Waller is a 43 year old female with a PMHx of DM, ESRD on MWF dialysis since April 2018, HTN, Anemia of chronic disease, glaucoma, and blindness (L eye) who presents to the ED with fever, nausea, and vomiting that began overnight.  Patient reports chills, sweats, non-productive cough, nausea with 1 episode of vomiting and 1 episode of loose stool that began  around 2:00 a.m. She states that there was a nurse sick with a cough at her dialysis treatment center.  She has not taken anything for her symptoms. Patient still makes urine, and denies dysuria, headache, dark or bloody stool, myalgias, chest pain, or shortness of breath. Patient has central catheter (permacath) in place in the left subclavian. She was previously receiving dialysis through this, but recently began receiving treatment through her AV fistula in the left upper arm. She reports that the catheter is scheduled to be removed early next week at [facility].   Blood cxs- Staph aureus susceptibility pending     Of note, pt has a recent hx of permacath infection in May, 2018 complicated by MSSA bacteremia.          Past Medical History:   Diagnosis Date    Blind     Diabetes     ESRD (end stage renal disease) on dialysis     Hypertension        Past Surgical History:   Procedure Laterality Date    AV FISTULA PLACEMENT      EYE SURGERY         Review of patient's allergies indicates:   Allergen Reactions    Codeine     Sulfa (sulfonamide antibiotics)        Medications:  Medications Prior to Admission   Medication Sig    amLODIPine (NORVASC) 10 MG tablet Take 10 mg by mouth once daily.    ATROPINE SULFATE, PF, OPHT Apply to eye.    brimonidine 0.2% (ALPHAGAN) 0.2 % Drop 1 drop every 8 (eight) hours.    calcitRIOL (ROCALTROL) 0.5 MCG Cap Take 0.5 mcg by mouth once daily.    calcium acetate (PHOSLO) 667 mg capsule Take 667 mg by mouth 2 (two) times daily.    cyanocobalamin (VITAMIN B-12) 500 MCG tablet Take 500 mcg by mouth once daily.    docusate sodium (COLACE) 100 MG capsule Take 100 mg by mouth 2 (two) times daily.    dorzolamide-timolol 2-0.5% (COSOPT) 22.3-6.8 mg/mL ophthalmic solution Place 1 drop into the right eye 2 (two) times daily.    furosemide (LASIX) 40 MG tablet Take 40 mg by mouth once daily.     hydrALAZINE (APRESOLINE) 50 MG tablet Take 100 mg by mouth every 8 (eight) hours.      ketorolac 0.5% (ACULAR) 0.5 % Drop 1 drop 4 (four) times daily.    labetalol (NORMODYNE) 200 MG tablet Take 400 mg by mouth 2 (two) times daily.     latanoprost 0.005 % ophthalmic solution Place 1 drop into the right eye every evening.    linagliptin (TRADJENTA ORAL) Take 5 mg by mouth once daily.    losartan (COZAAR) 100 MG tablet Take 100 mg by mouth once daily.    neomycin-polymyxin-dexamethasone (DEXACINE) 3.5 mg/g-10,000 unit/g-0.1 % Oint 3 (three) times daily.    sevelamer carbonate (RENVELA) 800 mg Tab Take 800 mg by mouth 2 (two) times daily.    aspirin (ECOTRIN) 81 MG EC tablet Take 81 mg by mouth once daily.     Antibiotics (From admission, onward)    Start     Stop Route Frequency Ordered    01/05/19 2300  piperacillin-tazobactam 4.5 g in sodium chloride 0.9% 100 mL IVPB (ready to mix system)      -- IV Every 12 hours (non-standard times) 01/05/19 1314    01/05/19 1500  neomycin-polymyxin-dexamethasone ophthalmic ointment      -- LEFT EYE 3 times daily 01/05/19 1228        Antifungals (From admission, onward)    None        Antivirals (From admission, onward)    None             There is no immunization history on file for this patient.    Family History     None        Social History     Socioeconomic History    Marital status: Single     Spouse name: None    Number of children: None    Years of education: None    Highest education level: None   Social Needs    Financial resource strain: None    Food insecurity - worry: None    Food insecurity - inability: None    Transportation needs - medical: None    Transportation needs - non-medical: None   Occupational History    None   Tobacco Use    Smoking status: Never Smoker    Smokeless tobacco: Never Used   Substance and Sexual Activity    Alcohol use: No    Drug use: No    Sexual activity: None   Other Topics Concern    None   Social History Narrative    None     Review of Systems   Constitutional: Positive for activity change,  appetite change, chills, fatigue and fever.   HENT: Negative for congestion and sinus pain.    Respiratory: Negative for cough, chest tightness and shortness of breath.    Cardiovascular: Negative for chest pain and palpitations.   Gastrointestinal: Positive for nausea and vomiting. Negative for abdominal distention, abdominal pain, blood in stool, constipation and diarrhea.   Genitourinary: Negative for dysuria and flank pain.   Musculoskeletal: Negative for arthralgias and myalgias.   Skin: Negative for color change and wound.   Neurological: Negative for dizziness, light-headedness and headaches.   Psychiatric/Behavioral: Negative for agitation.     Objective:     Vital Signs (Most Recent):  Temp: (!) 100.7 °F (38.2 °C) (01/06/19 0725)  Pulse: 108 (01/06/19 0725)  Resp: 18 (01/06/19 0725)  BP: (!) 142/63 (01/06/19 0725)  SpO2: (!) 93 % (01/06/19 0725) Vital Signs (24h Range):  Temp:  [99.6 °F (37.6 °C)-100.7 °F (38.2 °C)] 100.7 °F (38.2 °C)  Pulse:  [] 108  Resp:  [3-29] 18  SpO2:  [91 %-99 %] 93 %  BP: (116-167)/(55-75) 142/63     Weight: 106.4 kg (234 lb 9.1 oz)  Body mass index is 39.03 kg/m².    Estimated Creatinine Clearance: 11.8 mL/min (A) (based on SCr of 7.6 mg/dL (H)).    Physical Exam   Constitutional: She is oriented to person, place, and time. She appears well-developed and well-nourished. No distress.       HENT:   Head: Normocephalic.   Neck: Normal range of motion.   Cardiovascular: Normal rate, regular rhythm and normal heart sounds.   Pulmonary/Chest: Effort normal and breath sounds normal. No stridor. No respiratory distress. She has no wheezes.   Abdominal: Soft. Bowel sounds are normal. She exhibits no distension. There is no tenderness. There is no guarding.   Musculoskeletal: Normal range of motion.   Neurological: She is alert and oriented to person, place, and time.   Skin: Skin is warm and dry. Capillary refill takes less than 2 seconds. She is not diaphoretic.       Significant  Labs:   Blood Culture:   Recent Labs   Lab 01/05/19  0924 01/05/19  1001 01/05/19  1413   LABBLOO Gram stain moe bottle: Gram positive cocci in clusters resembling Staph   Results called to and read back by: Mehrdad Zhao RN 01/05/2019  23:10  Gram stain aer bottle: Gram positive cocci in clusters resembling Staph   Positive results previously called 01/06/2019  06:15  STAPHYLOCOCCUS AUREUS  Susceptibility pending  ID consult required at [facility],[place],[facility] and [facility].   Gram stain moe bottle: Gram positive cocci in clusters resembling Staph   Results called to and read back by: Mehrdad Zhao RN 01/06/2019  01:32  Gram stain aer bottle: Gram positive cocci in clusters resembling Staph   Positive results previously called 01/06/2019  02:17 Gram stain aer bottle: Gram positive cocci in clusters resembling Staph   Results called to and read back by: Mehrdad Zhao RN 01/06/2019  06:28     BMP:   Recent Labs   Lab 01/06/19  0525   *   *   K 4.5   CL 93*   CO2 26   BUN 49*   CREATININE 7.6*   CALCIUM 11.1*   MG 2.0     CBC:   Recent Labs   Lab 01/05/19  0924 01/06/19  0525   WBC 21.04* 21.77*   HGB 10.8* 10.5*   HCT 32.8* 35.5*    154     CMP:   Recent Labs   Lab 01/05/19  0924 01/06/19  0525   * 133*   K 4.3 4.5   CL 97 93*   CO2 27 26   * 132*   BUN 38* 49*   CREATININE 6.1* 7.6*   CALCIUM 10.8* 11.1*   PROT 8.0 8.0   ALBUMIN 3.2* 2.9*   BILITOT 0.7 1.1*   ALKPHOS 54* 57   AST 15 19   ALT 20 23   ANIONGAP 11 14   EGFRNONAA 7.9* 6.0*     Microbiology Results (last 7 days)     Procedure Component Value Units Date/Time    Blood culture [[id number]] Collected:  01/06/19 1029    Order Status:  Sent Specimen:  Blood Updated:  01/06/19 1032    Blood culture [[id number]] Collected:  01/06/19 1029    Order Status:  Sent Specimen:  Blood Updated:  01/06/19 1032    Blood culture x two cultures. Draw prior to antibiotics. [[id number]] Collected:  01/05/19 0941     Order Status:  Completed Specimen:  Blood from Peripheral, Hand, Right Updated:  01/06/19 0905     Blood Culture, Routine Gram stain moe bottle: Gram positive cocci in clusters resembling Staph      Blood Culture, Routine Results called to and read back by: Mehrdad Zhao RN 01/05/2019  23:10     Blood Culture, Routine Gram stain aer bottle: Gram positive cocci in clusters resembling Staph      Blood Culture, Routine Positive results previously called 01/06/2019  06:15     Blood Culture, Routine --     STAPHYLOCOCCUS AUREUS  Susceptibility pending  ID consult required at [facility],[place],[facility] and [facility] [place]   locations.      Narrative:       Aerobic and anaerobic    Blood culture [[id number]] Collected:  01/05/19 1413    Order Status:  Completed Specimen:  Catheter from Peripheral, Forearm, Right Updated:  01/06/19 0629     Blood Culture, Routine Gram stain aer bottle: Gram positive cocci in clusters resembling Staph      Blood Culture, Routine Results called to and read back by: Mehrdad Zhao RN 01/06/2019  06:28    Narrative:       Blood Line cultures    Blood culture x two cultures. Draw prior to antibiotics. [[id number]] Collected:  01/05/19 1001    Order Status:  Completed Specimen:  Blood from Peripheral, Antecubital, Right Updated:  01/06/19 0217     Blood Culture, Routine Gram stain moe bottle: Gram positive cocci in clusters resembling Staph      Blood Culture, Routine Results called to and read back by: Mehrdad Zhao RN 01/06/2019  01:32     Blood Culture, Routine Gram stain aer bottle: Gram positive cocci in clusters resembling Staph      Blood Culture, Routine Positive results previously called 01/06/2019  02:17    Narrative:       Aerobic and anaerobic    Blood culture [[id number]]     Order Status:  Canceled Specimen:  Catheter         Pathology Results  (Last 10 years)    None        All pertinent labs within the past 24 hours have been reviewed.  Recent Lab Results       01/06/19  0304    01/06/19  0525   01/05/19  2116   01/05/19  1757   01/05/19  1415        Immature Granulocytes   0.7           Immature Grans (Abs)   0.15  Comment:  Mild elevation in immature granulocytes is non specific and   can be seen in a variety of conditions including stress response,   acute inflammation, trauma and pregnancy. Correlation with other   laboratory and clinical findings is essential.             Albumin   2.9           Alkaline Phosphatase   57           ALT   23           Anion Gap   14           Aniso   Slight           Appearance, UA               AST   19           Bacteria, UA               Baso #   0.06           Basophil%   0.3           Bilirubin (UA)               Total Bilirubin   1.1  Comment:  For infants and newborns, interpretation of results should be based  on gestational age, weight and in agreement with clinical  observations.  Premature Infant recommended reference ranges:  Up to 24 hours.............<8.0 mg/dL  Up to 48 hours............<12.0 mg/dL  3-5 days..................<15.0 mg/dL  6-29 days.................<15.0 mg/dL             Blood Culture, Routine               BUN, Bld   49           Calcium   11.1           Chloride   93           Cholesterol   156  Comment:  The National Cholesterol Education Program (NCEP) has set the  following guidelines (reference ranges) for Cholesterol:  Optimal.....................<200 mg/dL  Borderline High.............200-239 mg/dL  High........................> or = 240 mg/dL             CO2   26           Color, UA               Creatinine   7.6           Differential Method   Automated           eGFR if    7.0           eGFR if non    6.0  Comment:  Calculation used to obtain the estimated glomerular filtration  rate (eGFR) is the CKD-EPI equation.              Eos #   0.1           Eosinophil%   0.3           Estimated Avg Glucose   111           Glucose   132           Glucose, UA               Gran # (ANC)    20.1           Gran%   92.5           HDL   20  Comment:  The National Cholesterol Education Program (NCEP) has set the  following guidelines (reference values) for HDL Cholesterol:  Low...............<40 mg/dL  Optimal...........>60 mg/dL             HDL/Chol Ratio   12.8           Hematocrit   35.5           Hemoglobin   10.5           Hemoglobin A1C   5.5  Comment:  ADA Screening Guidelines:  5.7-6.4%  Consistent with prediabetes  >or=6.5%  Consistent with diabetes  High levels of fetal hemoglobin interfere with the HbA1C  assay. Heterozygous hemoglobin variants (HbS, HgC, etc)do  not significantly interfere with this assay.   However, presence of multiple variants may affect accuracy.             Hyaline Casts, UA               Ketones, UA               Lactate, Ga         2.2  Comment:  Falsely low lactic acid results can be found in samples   containing >=13.0 mg/dL total bilirubin and/or >=3.5 mg/dL   direct bilirubin.       LDL Cholesterol   88.0  Comment:  The National Cholesterol Education Program (NCEP) has set the  following guidelines (reference values) for LDL Cholesterol:  Optimal.......................<130 mg/dL  Borderline High...............130-159 mg/dL  High..........................160-189 mg/dL  Very High.....................>190 mg/dL             Leukocytes, UA               Lymph #   0.3           Lymph%   1.5           Magnesium   2.0           MCH   32.4           MCHC   29.6           MCV   110           Microscopic Comment               Mono #   1.0           Mono%   4.7           MPV   10.2           Nitrite, UA               Non-HDL Cholesterol   136  Comment:  Risk category and Non-HDL cholesterol goals:  Coronary heart disease (CHD)or equivalent (10-year risk of CHD >20%):  Non-HDL cholesterol goal     <130 mg/dL  Two or more CHD risk factors and 10-year risk of CHD <= 20%:  Non-HDL cholesterol goal     <160 mg/dL  0 to 1 CHD risk factor:  Non-HDL cholesterol goal     <190 mg/dL              nRBC   0           Occult Blood UA               pH, UA               Phosphorus   4.4           Platelet Estimate   Appears normal           Platelets   154           POCT Glucose 137   172 190       Poik   Slight           Poly   Occasional           Potassium   4.5           Total Protein   8.0           Protein, UA               RBC   3.24           RBC, UA               RDW   14.4           Sodium   133           Specific [place], UA               Specimen UA               Squam Epithel, UA               Total Cholesterol/HDL Ratio   7.8           Triglycerides   240  Comment:  The National Cholesterol Education Program (NCEP) has set the  following guidelines (reference values) for triglycerides:  Normal......................<150 mg/dL  Borderline High.............150-199 mg/dL  High........................200-499 mg/dL             WBC, UA               WBC   21.77                            01/05/19  1413   01/05/19  1310        Immature Granulocytes         Immature Grans (Abs)         Albumin         Alkaline Phosphatase         ALT         Anion Gap         Aniso         Appearance, UA   Clear     AST         Bacteria, UA   Few     Baso #         Basophil%         Bilirubin (UA)   Negative     Total Bilirubin         Blood Culture, Routine Gram stain aer bottle: Gram positive cocci in clusters resembling Staph [P]        Results called to and read back by: Mehrdad Zhao RN 01/06/2019  06:28[P]       BUN, Bld         Calcium         Chloride         Cholesterol         CO2         Color, UA   Yellow     Creatinine         Differential Method         eGFR if          eGFR if non          Eos #         Eosinophil%         Estimated Avg Glucose         Glucose         Glucose, UA   2+     Gran # (ANC)         Gran%         HDL         HDL/Chol Ratio         Hematocrit         Hemoglobin         Hemoglobin A1C         Hyaline Casts, UA   0     Ketones, UA   Negative      Lactate, Ga         LDL Cholesterol         Leukocytes, UA   Negative     Lymph #         Lymph%         Magnesium         MCH         MCHC         MCV         Microscopic Comment   SEE COMMENT  Comment:  Other formed elements not mentioned in the report are not   present in the microscopic examination.        Mono #         Mono%         MPV         Nitrite, UA   Negative     Non-HDL Cholesterol         nRBC         Occult Blood UA   Negative     pH, UA   7.0     Phosphorus         Platelet Estimate         Platelets         POCT Glucose         Poik         Poly         Potassium         Total Protein         Protein, UA   3+  Comment:  Recommend a 24 hour urine protein or a urine   protein/creatinine ratio if globulin induced proteinuria is  clinically suspected.       RBC         RBC, UA   0     RDW         Sodium         Specific Gravity, UA   1.010     Specimen UA   Urine, Catheterized     Squam Epithel, UA   4     Total Cholesterol/HDL Ratio         Triglycerides         WBC, UA   5     WBC               Significant Imaging: I have reviewed all pertinent imaging results/findings within the past 24 hours.

## 2019-01-06 NOTE — ASSESSMENT & PLAN NOTE
-- Symptoms began yesterday evening with fever, chills, non-productive cough, nausea, NBNB watery vomiting, and loosely formed stool  -- Pt reports sick contact (nurse) at dialysis yesterday  -- Hx of R sided permacath infection complicated by MSSA bacteremia in May, 2018; now with L sided permacath that has been scheduled for removal at [facility] next week as she now has completed a full dialysis session with newly matured L arm AV fistula  -- Tmax 102.7 upon arrival  -- CXR without signs of any acute pulmonary process. Not concerning for any developing pneumonia.   -- Initial lactate and repeat (negative)  -- Rapid Flu negative  -- Leukocytosis of 21  -- Tachycardic in the 110's; not hypotensive   -- Received 500cc NS bolus in ED  Plan  -- Blood cx positive for Staph aureus  -- UA negative for UTI  -- Will continue Vanc following dialysis tomorrow and discontinue Zosyn per ID recommendations.   -- General Surgery consulted and permacath removed.   -- Currently afebrile and HDS.

## 2019-01-06 NOTE — PLAN OF CARE
Problem: Adult Inpatient Plan of Care  Goal: Plan of Care Review  Outcome: Ongoing (interventions implemented as appropriate)  Patient is AAOx4. Vital signs stable. No falls throughout shift. Patient ambulates independently. Family at bedside. Tylenol administered for complaint of headache. Blood glucose monitored.

## 2019-01-06 NOTE — PLAN OF CARE
Problem: Adult Inpatient Plan of Care  Goal: Plan of Care Review  Outcome: Ongoing (interventions implemented as appropriate)  Pt remains free of falls and injury. Pt remains on telemetry nsr-st. Provided with PRN analgesic for HA with positive results. Given ondansetron with positive results. Family members remained in room overnight. Bed low and locked, Call light within reach.

## 2019-01-06 NOTE — ASSESSMENT & PLAN NOTE
42 yo F with ESRD on HD via LUE AVG with bacteremia, permacath in place which she is no longer using    -obtained verbal consent to remove permacath at bedside. Area around permacath injected with 9 ml 1% lidocaine with epi. Area prepped and draped. Felt cuff dissected free, catheter removed. Pressure held. Good hemostasis. Dressing placed. Patient tolerated well.   -ID consulted, rec TTE  -may remove dressing tomorrow, gauze as needed for drainage  -general surgery will sign off please call with questions

## 2019-01-06 NOTE — ASSESSMENT & PLAN NOTE
-- RUE ultrasound reveals partially occlusive thrombus in the right internal jugular vein.  -- Recent hx of Permacath in R IJ likely contributory  -- Pt asymptomatic  Plan  -- Started on therapeutic apixaban at 10mg BID to treat DVT

## 2019-01-06 NOTE — SUBJECTIVE & OBJECTIVE
Past Medical History:   Diagnosis Date    Blind     Diabetes     ESRD (end stage renal disease) on dialysis     Hypertension        Past Surgical History:   Procedure Laterality Date    AV FISTULA PLACEMENT      EYE SURGERY         Review of patient's allergies indicates:   Allergen Reactions    Codeine     Sulfa (sulfonamide antibiotics)        Medications:  Medications Prior to Admission   Medication Sig    amLODIPine (NORVASC) 10 MG tablet Take 10 mg by mouth once daily.    ATROPINE SULFATE, PF, OPHT Apply to eye.    brimonidine 0.2% (ALPHAGAN) 0.2 % Drop 1 drop every 8 (eight) hours.    calcitRIOL (ROCALTROL) 0.5 MCG Cap Take 0.5 mcg by mouth once daily.    calcium acetate (PHOSLO) 667 mg capsule Take 667 mg by mouth 2 (two) times daily.    cyanocobalamin (VITAMIN B-12) 500 MCG tablet Take 500 mcg by mouth once daily.    docusate sodium (COLACE) 100 MG capsule Take 100 mg by mouth 2 (two) times daily.    dorzolamide-timolol 2-0.5% (COSOPT) 22.3-6.8 mg/mL ophthalmic solution Place 1 drop into the right eye 2 (two) times daily.    furosemide (LASIX) 40 MG tablet Take 40 mg by mouth once daily.     hydrALAZINE (APRESOLINE) 50 MG tablet Take 100 mg by mouth every 8 (eight) hours.     ketorolac 0.5% (ACULAR) 0.5 % Drop 1 drop 4 (four) times daily.    labetalol (NORMODYNE) 200 MG tablet Take 400 mg by mouth 2 (two) times daily.     latanoprost 0.005 % ophthalmic solution Place 1 drop into the right eye every evening.    linagliptin (TRADJENTA ORAL) Take 5 mg by mouth once daily.    losartan (COZAAR) 100 MG tablet Take 100 mg by mouth once daily.    neomycin-polymyxin-dexamethasone (DEXACINE) 3.5 mg/g-10,000 unit/g-0.1 % Oint 3 (three) times daily.    sevelamer carbonate (RENVELA) 800 mg Tab Take 800 mg by mouth 2 (two) times daily.    aspirin (ECOTRIN) 81 MG EC tablet Take 81 mg by mouth once daily.     Antibiotics (From admission, onward)    Start     Stop Route Frequency Ordered    01/05/19  2300  piperacillin-tazobactam 4.5 g in sodium chloride 0.9% 100 mL IVPB (ready to mix system)      -- IV Every 12 hours (non-standard times) 01/05/19 1314    01/05/19 1500  neomycin-polymyxin-dexamethasone ophthalmic ointment      -- LEFT EYE 3 times daily 01/05/19 1228        Antifungals (From admission, onward)    None        Antivirals (From admission, onward)    None             There is no immunization history on file for this patient.    Family History     None        Social History     Socioeconomic History    Marital status: Single     Spouse name: None    Number of children: None    Years of education: None    Highest education level: None   Social Needs    Financial resource strain: None    Food insecurity - worry: None    Food insecurity - inability: None    Transportation needs - medical: None    Transportation needs - non-medical: None   Occupational History    None   Tobacco Use    Smoking status: Never Smoker    Smokeless tobacco: Never Used   Substance and Sexual Activity    Alcohol use: No    Drug use: No    Sexual activity: None   Other Topics Concern    None   Social History Narrative    None     Review of Systems   Constitutional: Positive for activity change, appetite change, chills, fatigue and fever.   HENT: Negative for congestion and sinus pain.    Respiratory: Negative for cough, chest tightness and shortness of breath.    Cardiovascular: Negative for chest pain and palpitations.   Gastrointestinal: Positive for nausea and vomiting. Negative for abdominal distention, abdominal pain, blood in stool, constipation and diarrhea.   Genitourinary: Negative for dysuria and flank pain.   Musculoskeletal: Negative for arthralgias and myalgias.   Skin: Negative for color change and wound.   Neurological: Negative for dizziness, light-headedness and headaches.   Psychiatric/Behavioral: Negative for agitation.     Objective:     Vital Signs (Most Recent):  Temp: (!) 100.7 °F (38.2 °C)  (01/06/19 0725)  Pulse: 108 (01/06/19 0725)  Resp: 18 (01/06/19 0725)  BP: (!) 142/63 (01/06/19 0725)  SpO2: (!) 93 % (01/06/19 0725) Vital Signs (24h Range):  Temp:  [99.6 °F (37.6 °C)-100.7 °F (38.2 °C)] 100.7 °F (38.2 °C)  Pulse:  [] 108  Resp:  [3-29] 18  SpO2:  [91 %-99 %] 93 %  BP: (116-167)/(55-75) 142/63     Weight: 106.4 kg (234 lb 9.1 oz)  Body mass index is 39.03 kg/m².    Estimated Creatinine Clearance: 11.8 mL/min (A) (based on SCr of 7.6 mg/dL (H)).    Physical Exam   Constitutional: She is oriented to person, place, and time. She appears well-developed and well-nourished. No distress.       HENT:   Head: Normocephalic.   Neck: Normal range of motion.   Cardiovascular: Normal rate, regular rhythm and normal heart sounds.   Pulmonary/Chest: Effort normal and breath sounds normal. No stridor. No respiratory distress. She has no wheezes.   Abdominal: Soft. Bowel sounds are normal. She exhibits no distension. There is no tenderness. There is no guarding.   Musculoskeletal: Normal range of motion.   Neurological: She is alert and oriented to person, place, and time.   Skin: Skin is warm and dry. Capillary refill takes less than 2 seconds. She is not diaphoretic.       Significant Labs:   Blood Culture:   Recent Labs   Lab 01/05/19  0924 01/05/19  1001 01/05/19  1413   LABBLOO Gram stain moe bottle: Gram positive cocci in clusters resembling Staph   Results called to and read back by: Mehrdad Zhao RN 01/05/2019  23:10  Gram stain aer bottle: Gram positive cocci in clusters resembling Staph   Positive results previously called 01/06/2019  06:15  STAPHYLOCOCCUS AUREUS  Susceptibility pending  ID consult required at [facility],[place],[facility] and [facility].   Gram stain moe bottle: Gram positive cocci in clusters resembling Staph   Results called to and read back by: Mehrdad Zhao RN 01/06/2019  01:32  Gram stain aer bottle: Gram positive cocci in clusters resembling Staph    Positive results previously called 01/06/2019  02:17 Gram stain aer bottle: Gram positive cocci in clusters resembling Staph   Results called to and read back by: Mehrdad Zhao RN 01/06/2019  06:28     BMP:   Recent Labs   Lab 01/06/19 0525   *   *   K 4.5   CL 93*   CO2 26   BUN 49*   CREATININE 7.6*   CALCIUM 11.1*   MG 2.0     CBC:   Recent Labs   Lab 01/05/19 0924 01/06/19  0525   WBC 21.04* 21.77*   HGB 10.8* 10.5*   HCT 32.8* 35.5*    154     CMP:   Recent Labs   Lab 01/05/19 0924 01/06/19  0525   * 133*   K 4.3 4.5   CL 97 93*   CO2 27 26   * 132*   BUN 38* 49*   CREATININE 6.1* 7.6*   CALCIUM 10.8* 11.1*   PROT 8.0 8.0   ALBUMIN 3.2* 2.9*   BILITOT 0.7 1.1*   ALKPHOS 54* 57   AST 15 19   ALT 20 23   ANIONGAP 11 14   EGFRNONAA 7.9* 6.0*     Microbiology Results (last 7 days)     Procedure Component Value Units Date/Time    Blood culture [[id number]] Collected:  01/06/19 1029    Order Status:  Sent Specimen:  Blood Updated:  01/06/19 1032    Blood culture [[id number]] Collected:  01/06/19 1029    Order Status:  Sent Specimen:  Blood Updated:  01/06/19 1032    Blood culture x two cultures. Draw prior to antibiotics. [[id number]] Collected:  01/05/19 0924    Order Status:  Completed Specimen:  Blood from Peripheral, Hand, Right Updated:  01/06/19 0905     Blood Culture, Routine Gram stain moe bottle: Gram positive cocci in clusters resembling Staph      Blood Culture, Routine Results called to and read back by: Mehrdad Zhao RN 01/05/2019  23:10     Blood Culture, Routine Gram stain aer bottle: Gram positive cocci in clusters resembling Staph      Blood Culture, Routine Positive results previously called 01/06/2019  06:15     Blood Culture, Routine --     STAPHYLOCOCCUS AUREUS  Susceptibility pending  ID consult required at [facility],[place],[facility] and [facility].      Narrative:       Aerobic and anaerobic    Blood culture [[id number]] Collected:  01/05/19 1417     Order Status:  Completed Specimen:  Catheter from Peripheral, Forearm, Right Updated:  01/06/19 0629     Blood Culture, Routine Gram stain aer bottle: Gram positive cocci in clusters resembling Staph      Blood Culture, Routine Results called to and read back by: Mehrdad Zhao RN 01/06/2019  06:28    Narrative:       Blood Line cultures    Blood culture x two cultures. Draw prior to antibiotics. [[id number]] Collected:  01/05/19 1001    Order Status:  Completed Specimen:  Blood from Peripheral, Antecubital, Right Updated:  01/06/19 0217     Blood Culture, Routine Gram stain moe bottle: Gram positive cocci in clusters resembling Staph      Blood Culture, Routine Results called to and read back by: Mehrdad Zhao RN 01/06/2019  01:32     Blood Culture, Routine Gram stain aer bottle: Gram positive cocci in clusters resembling Staph      Blood Culture, Routine Positive results previously called 01/06/2019  02:17    Narrative:       Aerobic and anaerobic    Blood culture [[id number]]     Order Status:  Canceled Specimen:  Catheter         Pathology Results  (Last 10 years)    None        All pertinent labs within the past 24 hours have been reviewed.  Recent Lab Results       01/06/19  0723   01/06/19  0525   01/05/19  2116   01/05/19  1757   01/05/19  1415        Immature Granulocytes   0.7           Immature Grans (Abs)   0.15  Comment:  Mild elevation in immature granulocytes is non specific and   can be seen in a variety of conditions including stress response,   acute inflammation, trauma and pregnancy. Correlation with other   laboratory and clinical findings is essential.             Albumin   2.9           Alkaline Phosphatase   57           ALT   23           Anion Gap   14           Aniso   Slight           Appearance, UA               AST   19           Bacteria, UA               Baso #   0.06           Basophil%   0.3           Bilirubin (UA)               Total Bilirubin   1.1  Comment:  For infants and  newborns, interpretation of results should be based  on gestational age, weight and in agreement with clinical  observations.  Premature Infant recommended reference ranges:  Up to 24 hours.............<8.0 mg/dL  Up to 48 hours............<12.0 mg/dL  3-5 days..................<15.0 mg/dL  6-29 days.................<15.0 mg/dL             Blood Culture, Routine               BUN, Bld   49           Calcium   11.1           Chloride   93           Cholesterol   156  Comment:  The National Cholesterol Education Program (NCEP) has set the  following guidelines (reference ranges) for Cholesterol:  Optimal.....................<200 mg/dL  Borderline High.............200-239 mg/dL  High........................> or = 240 mg/dL             CO2   26           Color, UA               Creatinine   7.6           Differential Method   Automated           eGFR if    7.0           eGFR if non    6.0  Comment:  Calculation used to obtain the estimated glomerular filtration  rate (eGFR) is the CKD-EPI equation.              Eos #   0.1           Eosinophil%   0.3           Estimated Avg Glucose   111           Glucose   132           Glucose, UA               Gran # (ANC)   20.1           Gran%   92.5           HDL   20  Comment:  The National Cholesterol Education Program (NCEP) has set the  following guidelines (reference values) for HDL Cholesterol:  Low...............<40 mg/dL  Optimal...........>60 mg/dL             HDL/Chol Ratio   12.8           Hematocrit   35.5           Hemoglobin   10.5           Hemoglobin A1C   5.5  Comment:  ADA Screening Guidelines:  5.7-6.4%  Consistent with prediabetes  >or=6.5%  Consistent with diabetes  High levels of fetal hemoglobin interfere with the HbA1C  assay. Heterozygous hemoglobin variants (HbS, HgC, etc)do  not significantly interfere with this assay.   However, presence of multiple variants may affect accuracy.             Hyaline Casts, UA                Ketones, UA               Lactate, Ga         2.2  Comment:  Falsely low lactic acid results can be found in samples   containing >=13.0 mg/dL total bilirubin and/or >=3.5 mg/dL   direct bilirubin.       LDL Cholesterol   88.0  Comment:  The National Cholesterol Education Program (NCEP) has set the  following guidelines (reference values) for LDL Cholesterol:  Optimal.......................<130 mg/dL  Borderline High...............130-159 mg/dL  High..........................160-189 mg/dL  Very High.....................>190 mg/dL             Leukocytes, UA               Lymph #   0.3           Lymph%   1.5           Magnesium   2.0           MCH   32.4           MCHC   29.6           MCV   110           Microscopic Comment               Mono #   1.0           Mono%   4.7           MPV   10.2           Nitrite, UA               Non-HDL Cholesterol   136  Comment:  Risk category and Non-HDL cholesterol goals:  Coronary heart disease (CHD)or equivalent (10-year risk of CHD >20%):  Non-HDL cholesterol goal     <130 mg/dL  Two or more CHD risk factors and 10-year risk of CHD <= 20%:  Non-HDL cholesterol goal     <160 mg/dL  0 to 1 CHD risk factor:  Non-HDL cholesterol goal     <190 mg/dL             nRBC   0           Occult Blood UA               pH, UA               Phosphorus   4.4           Platelet Estimate   Appears normal           Platelets   154           POCT Glucose 137   172 190       Poik   Slight           Poly   Occasional           Potassium   4.5           Total Protein   8.0           Protein, UA               RBC   3.24           RBC, UA               RDW   14.4           Sodium   133           Specific [place], UA               Specimen UA               Squam Epithel, UA               Total Cholesterol/HDL Ratio   7.8           Triglycerides   240  Comment:  The National Cholesterol Education Program (NCEP) has set the  following guidelines (reference values) for  triglycerides:  Normal......................<150 mg/dL  Borderline High.............150-199 mg/dL  High........................200-499 mg/dL             WBC, UA               WBC   21.77                            01/05/19  1413   01/05/19  1310        Immature Granulocytes         Immature Grans (Abs)         Albumin         Alkaline Phosphatase         ALT         Anion Gap         Aniso         Appearance, UA   Clear     AST         Bacteria, UA   Few     Baso #         Basophil%         Bilirubin (UA)   Negative     Total Bilirubin         Blood Culture, Routine Gram stain aer bottle: Gram positive cocci in clusters resembling Staph [P]        Results called to and read back by: Mehrdad Zhao RN 01/06/2019  06:28[P]       BUN, Bld         Calcium         Chloride         Cholesterol         CO2         Color, UA   Yellow     Creatinine         Differential Method         eGFR if          eGFR if non          Eos #         Eosinophil%         Estimated Avg Glucose         Glucose         Glucose, UA   2+     Gran # (ANC)         Gran%         HDL         HDL/Chol Ratio         Hematocrit         Hemoglobin         Hemoglobin A1C         Hyaline Casts, UA   0     Ketones, UA   Negative     Lactate, Ga         LDL Cholesterol         Leukocytes, UA   Negative     Lymph #         Lymph%         Magnesium         MCH         MCHC         MCV         Microscopic Comment   SEE COMMENT  Comment:  Other formed elements not mentioned in the report are not   present in the microscopic examination.        Mono #         Mono%         MPV         Nitrite, UA   Negative     Non-HDL Cholesterol         nRBC         Occult Blood UA   Negative     pH, UA   7.0     Phosphorus         Platelet Estimate         Platelets         POCT Glucose         Poik         Poly         Potassium         Total Protein         Protein, UA   3+  Comment:  Recommend a 24 hour urine protein or a urine    protein/creatinine ratio if globulin induced proteinuria is  clinically suspected.       RBC         RBC, UA   0     RDW         Sodium         Specific Gravity, UA   1.010     Specimen UA   Urine, Catheterized     Squam Epithel, UA   4     Total Cholesterol/HDL Ratio         Triglycerides         WBC, UA   5     WBC               Significant Imaging: I have reviewed all pertinent imaging results/findings within the past 24 hours.

## 2019-01-06 NOTE — SUBJECTIVE & OBJECTIVE
No current facility-administered medications on file prior to encounter.      Current Outpatient Medications on File Prior to Encounter   Medication Sig    amLODIPine (NORVASC) 10 MG tablet Take 10 mg by mouth once daily.    ATROPINE SULFATE, PF, OPHT Apply to eye.    brimonidine 0.2% (ALPHAGAN) 0.2 % Drop 1 drop every 8 (eight) hours.    calcitRIOL (ROCALTROL) 0.5 MCG Cap Take 0.5 mcg by mouth once daily.    calcium acetate (PHOSLO) 667 mg capsule Take 667 mg by mouth 2 (two) times daily.    cyanocobalamin (VITAMIN B-12) 500 MCG tablet Take 500 mcg by mouth once daily.    docusate sodium (COLACE) 100 MG capsule Take 100 mg by mouth 2 (two) times daily.    dorzolamide-timolol 2-0.5% (COSOPT) 22.3-6.8 mg/mL ophthalmic solution Place 1 drop into the right eye 2 (two) times daily.    furosemide (LASIX) 40 MG tablet Take 40 mg by mouth once daily.     hydrALAZINE (APRESOLINE) 50 MG tablet Take 100 mg by mouth every 8 (eight) hours.     ketorolac 0.5% (ACULAR) 0.5 % Drop 1 drop 4 (four) times daily.    labetalol (NORMODYNE) 200 MG tablet Take 400 mg by mouth 2 (two) times daily.     latanoprost 0.005 % ophthalmic solution Place 1 drop into the right eye every evening.    linagliptin (TRADJENTA ORAL) Take 5 mg by mouth once daily.    losartan (COZAAR) 100 MG tablet Take 100 mg by mouth once daily.    neomycin-polymyxin-dexamethasone (DEXACINE) 3.5 mg/g-10,000 unit/g-0.1 % Oint 3 (three) times daily.    sevelamer carbonate (RENVELA) 800 mg Tab Take 800 mg by mouth 2 (two) times daily.    aspirin (ECOTRIN) 81 MG EC tablet Take 81 mg by mouth once daily.       Review of patient's allergies indicates:   Allergen Reactions    Codeine     Sulfa (sulfonamide antibiotics)        Past Medical History:   Diagnosis Date    Blind     Diabetes     ESRD (end stage renal disease) on dialysis     Hypertension      Past Surgical History:   Procedure Laterality Date    AV FISTULA PLACEMENT      EYE SURGERY        Family History     None        Tobacco Use    Smoking status: Never Smoker    Smokeless tobacco: Never Used   Substance and Sexual Activity    Alcohol use: No    Drug use: No    Sexual activity: Not on file     Review of Systems   Constitutional: Positive for chills and fever.   HENT: Negative for sore throat.    Respiratory: Negative for shortness of breath.    Cardiovascular: Negative for chest pain.   Gastrointestinal: Positive for diarrhea (loose stool), nausea and vomiting. Negative for abdominal pain and blood in stool.   Genitourinary: Negative for dysuria.   Musculoskeletal: Negative for back pain.   Skin: Negative for rash.   Neurological: Positive for weakness.   Hematological: Does not bruise/bleed easily.       Objective:     Vital Signs (Most Recent):  Temp: (!) 100.7 °F (38.2 °C) (01/06/19 0725)  Pulse: 98 (01/06/19 1128)  Resp: 18 (01/06/19 0725)  BP: (!) 142/63 (01/06/19 0725)  SpO2: (!) 93 % (01/06/19 0725) Vital Signs (24h Range):  Temp:  [99.6 °F (37.6 °C)-100.7 °F (38.2 °C)] 100.7 °F (38.2 °C)  Pulse:  [] 98  Resp:  [3-29] 18  SpO2:  [91 %-98 %] 93 %  BP: (116-167)/(55-75) 142/63     Weight: 106.4 kg (234 lb 9.1 oz)  Body mass index is 39.03 kg/m².    Physical Exam   Constitutional: She is oriented to person, place, and time. No distress.   Eyes:   Left eye blind   Cardiovascular: Normal rate.   Pulmonary/Chest: Effort normal. No respiratory distress.   Abdominal: Soft. She exhibits no distension.   Musculoskeletal: Normal range of motion.   Neurological: She is alert and oriented to person, place, and time.   Skin: Skin is warm and dry. She is not diaphoretic.   Psychiatric: She has a normal mood and affect. Her behavior is normal.   Left chest with permacath without sutures, cuff distal (not exposed)    Significant Labs:  CBC:   Recent Labs   Lab 01/06/19  0525   WBC 21.77*   RBC 3.24*   HGB 10.5*   HCT 35.5*      *   MCH 32.4*   MCHC 29.6*       Significant  Diagnostics:  CXR- Left permacath

## 2019-01-06 NOTE — CONSULTS
Full Consult pending.  Patient seen and evaluated, plan for HD Monday, awaiting blood cultures prior to further recommendations regarding patient's access.

## 2019-01-06 NOTE — CONSULTS
[facility]  General Surgery  Consult Note    Patient Name: Mary Waller  MRN: [id number]  Code Status: Full Code  Admission Date: 1/5/2019  Hospital Length of Stay: 1 days  Attending Physician: Jessie Arias MD  Primary Care Provider: Bryan Murray MD    Patient information was obtained from patient and ER records.     Inpatient consult to General Surgery  Consult performed by: Ana María Pulido MD  Consult ordered by: King Faulkner MD        Subjective:     Principal Problem: Sepsis    History of Present Illness: Mary Waller is a 42 yo female with a PMHx of DM, ESRD on MWF dialysis since April 2018, HTN, Anemia of chronic disease, glaucoma, and blindness (L eye) presented to the ED with F,N,V w associated chills, sweats.  Patient has central catheter (permacath) in place on the left. She was previously receiving dialysis through this, but recently began receiving treatment through her AV fistula in the left upper arm. She reports that the catheter is scheduled to be removed early next week at [facility]. Of note, pt has a recent hx of permacath infection in May, 2018 complicated by MSSA bacteremia.    No current facility-administered medications on file prior to encounter.      Current Outpatient Medications on File Prior to Encounter   Medication Sig    amLODIPine (NORVASC) 10 MG tablet Take 10 mg by mouth once daily.    ATROPINE SULFATE, PF, OPHT Apply to eye.    brimonidine 0.2% (ALPHAGAN) 0.2 % Drop 1 drop every 8 (eight) hours.    calcitRIOL (ROCALTROL) 0.5 MCG Cap Take 0.5 mcg by mouth once daily.    calcium acetate (PHOSLO) 667 mg capsule Take 667 mg by mouth 2 (two) times daily.    cyanocobalamin (VITAMIN B-12) 500 MCG tablet Take 500 mcg by mouth once daily.    docusate sodium (COLACE) 100 MG capsule Take 100 mg by mouth 2 (two) times daily.    dorzolamide-timolol 2-0.5% (COSOPT) 22.3-6.8 mg/mL ophthalmic solution Place 1 drop into the right eye 2 (two)  times daily.    furosemide (LASIX) 40 MG tablet Take 40 mg by mouth once daily.     hydrALAZINE (APRESOLINE) 50 MG tablet Take 100 mg by mouth every 8 (eight) hours.     ketorolac 0.5% (ACULAR) 0.5 % Drop 1 drop 4 (four) times daily.    labetalol (NORMODYNE) 200 MG tablet Take 400 mg by mouth 2 (two) times daily.     latanoprost 0.005 % ophthalmic solution Place 1 drop into the right eye every evening.    linagliptin (TRADJENTA ORAL) Take 5 mg by mouth once daily.    losartan (COZAAR) 100 MG tablet Take 100 mg by mouth once daily.    neomycin-polymyxin-dexamethasone (DEXACINE) 3.5 mg/g-10,000 unit/g-0.1 % Oint 3 (three) times daily.    sevelamer carbonate (RENVELA) 800 mg Tab Take 800 mg by mouth 2 (two) times daily.    aspirin (ECOTRIN) 81 MG EC tablet Take 81 mg by mouth once daily.       Review of patient's allergies indicates:   Allergen Reactions    Codeine     Sulfa (sulfonamide antibiotics)        Past Medical History:   Diagnosis Date    Blind     Diabetes     ESRD (end stage renal disease) on dialysis     Hypertension      Past Surgical History:   Procedure Laterality Date    AV FISTULA PLACEMENT      EYE SURGERY       Family History     None        Tobacco Use    Smoking status: Never Smoker    Smokeless tobacco: Never Used   Substance and Sexual Activity    Alcohol use: No    Drug use: No    Sexual activity: Not on file     Review of Systems   Constitutional: Positive for chills and fever.   HENT: Negative for sore throat.    Respiratory: Negative for shortness of breath.    Cardiovascular: Negative for chest pain.   Gastrointestinal: Positive for diarrhea (loose stool), nausea and vomiting. Negative for abdominal pain and blood in stool.   Genitourinary: Negative for dysuria.   Musculoskeletal: Negative for back pain.   Skin: Negative for rash.   Neurological: Positive for weakness.   Hematological: Does not bruise/bleed easily.       Objective:     Vital Signs (Most  Recent):  Temp: (!) 100.7 °F (38.2 °C) (01/06/19 0725)  Pulse: 98 (01/06/19 1128)  Resp: 18 (01/06/19 0725)  BP: (!) 142/63 (01/06/19 0725)  SpO2: (!) 93 % (01/06/19 0725) Vital Signs (24h Range):  Temp:  [99.6 °F (37.6 °C)-100.7 °F (38.2 °C)] 100.7 °F (38.2 °C)  Pulse:  [] 98  Resp:  [3-29] 18  SpO2:  [91 %-98 %] 93 %  BP: (116-167)/(55-75) 142/63     Weight: 106.4 kg (234 lb 9.1 oz)  Body mass index is 39.03 kg/m².    Physical Exam   Constitutional: She is oriented to person, place, and time. No distress.   Eyes:   Left eye blind   Cardiovascular: Normal rate.   Pulmonary/Chest: Effort normal. No respiratory distress.   Abdominal: Soft. She exhibits no distension.   Musculoskeletal: Normal range of motion.   Neurological: She is alert and oriented to person, place, and time.   Skin: Skin is warm and dry. She is not diaphoretic.   Psychiatric: She has a normal mood and affect. Her behavior is normal.   Left chest with permacath without sutures, cuff distal (not exposed)    Significant Labs:  CBC:   Recent Labs   Lab 01/06/19  0525   WBC 21.77*   RBC 3.24*   HGB 10.5*   HCT 35.5*      *   MCH 32.4*   MCHC 29.6*       Significant Diagnostics:  CXR- Left permacath    Assessment/Plan:     * Sepsis    44 yo F with ESRD on HD via LUE AVG with bacteremia, permacath in place which she is no longer using    -obtained verbal consent to remove permacath at bedside. Area around permacath injected with 9 ml 1% lidocaine with epi. Area prepped and draped. Felt cuff dissected free, catheter removed. Pressure held. Good hemostasis. Dressing placed. Patient tolerated well.   -ID consulted, rec TTE  -may remove dressing tomorrow, gauze as needed for drainage  -general surgery will sign off please call with questions       VTE Risk Mitigation (From admission, onward)        Ordered     apixaban tablet 10 mg  2 times daily      01/05/19 1701     IP VTE HIGH RISK PATIENT  Once      01/05/19 1235          Thank you  for your consult. I will sign off. Please contact us if you have any additional questions.    Ana María Pulido MD  General Surgery  [facility]

## 2019-01-06 NOTE — PROGRESS NOTES
Patient admitted to room 649. Report received from RAH Blankenship. Vital signs stable. No signs of acute distress noted. Family at bedside. Patient oriented to call light and instructed to call for assistance. Will continue to monitor.

## 2019-01-06 NOTE — HPI
Mary Waller is a 43 year old female with a PMHx of DM, ESRD on MWF dialysis since April 2018, HTN, Anemia of chronic disease, glaucoma, and blindness (L eye) who presents to the ED with fever, nausea, and vomiting that began overnight.  Patient reports chills, sweats, non-productive cough, nausea with 1 episode of vomiting and 1 episode of loose stool that began around 2:00 a.m. She states that there was a nurse sick with a cough at her dialysis treatment center.  She has not taken anything for her symptoms. Patient still makes urine, and denies dysuria, headache, dark or bloody stool, myalgias, chest pain, or shortness of breath. Patient has central catheter (permacath) in place in the left subclavian. She was previously receiving dialysis through this, but recently began receiving treatment through her AV fistula in the left upper arm. She reports that the catheter is scheduled to be removed early next week at [facility].   Blood cxs- Staph aureus susceptibility pending     Of note, pt has a recent hx of permacath infection in May, 2018 complicated by MSSA bacteremia.

## 2019-01-06 NOTE — ASSESSMENT & PLAN NOTE
-- Began dialysis in April 2018  -- Has Permacath on (L) chest as well as an AV fistula in the (L) arm  -- As the AV fistula has now matured, it has recently been utilized for dialysis, and the permacath is scheduled for removal at [facility] next week.  -- Hx of Permacath infection in (R) chest in May, 2018 complicated by MSSA bacteremia.   -- Current Permacath does not appear erythematous, and is without purulence or any discharge  -- Last dialysis appointment completed yesterday without incident  -- Electrolyte wnl  -- Does not appear fluid overloaded at this time  -- Pt does still make some urine. On Furosemide 40mg BID  -- Pt followed by Nephrologist Dr. Sweet at [facility]  Plan  -- Nephrology consult for dialysis while inpatient - Plan for Monday Dialysis  -- Daily BMP's  -- LUE ultrasound shows patency of the L AV fistula

## 2019-01-06 NOTE — PROGRESS NOTES
[facility] Hospital Medicine  Progress Note    Patient Name: Mary Waller  MRN: [id number]  Patient Class: IP- Inpatient   Admission Date: 1/5/2019   Length of Stay: 1 days  Attending Physician: Jessie Arias MD  Primary Care Provider: Bryan Murray MD    [facility] Medicine Team: Networked reference to record PCT  King Faulkner MD    Subjective:     Principal Problem:Sepsis    HPI:  Mary Waller is a 43 year old female with a PMHx of DM, ESRD on MWF dialysis since April 2018, HTN, Anemia of chronic disease, glaucoma, and blindness (L eye) who presents to the ED with fever, nausea, and vomiting that began overnight.  Patient reports chills, sweats, non-productive cough, nausea with 1 episode of vomiting and 1 episode of loose stool that began around 2:00 a.m. She states that there was a nurse sick with a cough at her dialysis treatment center.  She has not taken anything for her symptoms. Patient still makes urine, and denies dysuria, headache, dark or bloody stool, myalgias, chest pain, or shortness of breath. Patient has central catheter (permacath) in place in the left subclavian. She was previously receiving dialysis through this, but recently began receiving treatment through her AV fistula in the left upper arm. She reports that the catheter is scheduled to be removed early next week at [facility].     Of note, pt has a recent hx of permacath infection in May, 2018 complicated by MSSA bacteremia.         Interval History: NAEON. Pt remains afebrile. Blood cx positive for Staph aureus. Vanc to be continued after dialysis tomorrow. Permacath removed be general surgery. 2D Echo pending to assess for endocarditis/valvular vegetations. HDS.    Review of Systems   Constitutional: Positive for activity change, appetite change, chills, fatigue and fever.   HENT: Negative for congestion and sinus pain.    Respiratory: Negative for cough, chest tightness and shortness of breath.     Cardiovascular: Negative for chest pain and palpitations.   Gastrointestinal: Positive for nausea. Negative for abdominal distention, abdominal pain, blood in stool, constipation, diarrhea and vomiting.   Genitourinary: Negative for dysuria and flank pain.   Musculoskeletal: Negative for arthralgias and myalgias.   Skin: Negative for color change and wound.   Neurological: Negative for dizziness, light-headedness and headaches.   Psychiatric/Behavioral: Negative for agitation.     Objective:     Vital Signs (Most Recent):  Temp: 98.9 °F (37.2 °C) (01/06/19 1221)  Pulse: 97 (01/06/19 1221)  Resp: 18 (01/06/19 1221)  BP: (!) 126/58 (01/06/19 1221)  SpO2: 95 % (01/06/19 1221) Vital Signs (24h Range):  Temp:  [98.9 °F (37.2 °C)-100.7 °F (38.2 °C)] 98.9 °F (37.2 °C)  Pulse:  [] 97  Resp:  [18-20] 18  SpO2:  [91 %-97 %] 95 %  BP: (116-142)/(55-67) 126/58     Weight: 106.4 kg (234 lb 9.1 oz)  Body mass index is 39.03 kg/m².  No intake or output data in the 24 hours ending 01/06/19 1439   Physical Exam   Constitutional: She is oriented to person, place, and time. She appears well-developed and well-nourished. No distress.   HENT:   Head: Normocephalic.   Neck: Normal range of motion.   Cardiovascular: Normal rate, regular rhythm and normal heart sounds.   Pulmonary/Chest: Effort normal and breath sounds normal. No stridor. No respiratory distress. She has no wheezes.   Abdominal: Soft. Bowel sounds are normal. She exhibits no distension. There is no tenderness. There is no guarding.   Musculoskeletal: Normal range of motion.   Neurological: She is alert and oriented to person, place, and time.   Skin: Skin is warm and dry. Capillary refill takes less than 2 seconds. She is not diaphoretic.   -- AV fistula in L upper arm         Significant Labs: All pertinent labs within the past 24 hours have been reviewed.    Significant Imaging: I have reviewed and interpreted all pertinent imaging results/findings within the  past 24 hours.    Assessment/Plan:      * Sepsis    -- Symptoms began yesterday evening with fever, chills, non-productive cough, nausea, NBNB watery vomiting, and loosely formed stool  -- Pt reports sick contact (nurse) at dialysis yesterday  -- Hx of R sided permacath infection complicated by MSSA bacteremia in May, 2018; now with L sided permacath that has been scheduled for removal at [facility] next week as she now has completed a full dialysis session with newly matured L arm AV fistula  -- Tmax 102.7 upon arrival  -- CXR without signs of any acute pulmonary process. Not concerning for any developing pneumonia.   -- Initial lactate and repeat (negative)  -- Rapid Flu negative  -- Leukocytosis of 21  -- Tachycardic in the 110's; not hypotensive   -- Received 500cc NS bolus in ED  Plan  -- Blood cx positive for Staph aureus  -- UA negative for UTI  -- Will continue Vanc following dialysis tomorrow and discontinue Zosyn per ID recommendations.   -- General Surgery consulted and permacath removed.   -- Currently afebrile and HDS.      Acute deep vein thrombosis (DVT) of upper extremity    -- RUE ultrasound reveals partially occlusive thrombus in the right internal jugular vein.  -- Recent hx of Permacath in R IJ likely contributory  -- Pt asymptomatic  Plan  -- Started on therapeutic apixaban at 10mg BID to treat DVT     Staphylococcus aureus bacteremia    -- 3/3 Blood cx growing Gram + cocci in clusters resembling Staph  -- 1 blood cx already + for Staph aureus  -- ID Consulted  -- General surgery consulted and permacath removed  Plan    -- Discontinue Zosyn  -- Continue Vancomycin after HD, F/U daily random Vancs  -- Follow susceptibilities of cultures and tailor abx accordingly.  -- TTE pending to assess for valve vegetations.  -- Repeat Blood Cultures x2 until clearance  -- ID will continue to follow     Glaucoma    -- Resume home eyedrops       ESRD (end stage renal disease)    -- Began dialysis in April 2018  --  Has Permacath on (L) chest as well as an AV fistula in the (L) arm  -- As the AV fistula has now matured, it has recently been utilized for dialysis, and the permacath is scheduled for removal at [facility] next week.  -- Hx of Permacath infection in (R) chest in May, 2018 complicated by MSSA bacteremia.   -- Current Permacath does not appear erythematous, and is without purulence or any discharge  -- Last dialysis appointment completed yesterday without incident  -- Electrolyte wnl  -- Does not appear fluid overloaded at this time  -- Pt does still make some urine. On Furosemide 40mg BID  -- Pt followed by Nephrologist Dr. Sweet at [facility]  Plan  -- Nephrology consult for dialysis while inpatient - Plan for Monday Dialysis  -- Daily BMP's  -- LUE ultrasound shows patency of the L AV fistula     Type 2 diabetes mellitus    -- Pt reports taking 15 units insulin long acting daily  -- Medication list does not reflect this but does show Tradjenta po 5mg daily  Plan  -- Will start on LDSSI and reassess as pt currently with poor po intake       Essential hypertension    -- Current home anti-hypertensives include Labetalol 200mg BID, Losartan 50mg qd, Amlodipine 10mg qd, and Hydralazine 100mg tid.  -- Will resume BP meds, but hold Losartan.        VTE Risk Mitigation (From admission, onward)        Ordered     apixaban tablet 10 mg  2 times daily      01/05/19 1701     IP VTE HIGH RISK PATIENT  Once      01/05/19 1237              King Faulkner MD  Department of Hospital Medicine   [facility]

## 2019-01-06 NOTE — ASSESSMENT & PLAN NOTE
-- 3/3 Blood cx growing Gram + cocci in clusters resembling Staph  -- 1 blood cx already + for Staph aureus  -- ID Consulted  -- General surgery consulted and permacath removed  Plan    -- Discontinue Zosyn  -- Continue Vancomycin after HD, F/U daily random Vancs  -- Follow susceptibilities of cultures and tailor abx accordingly.  -- TTE pending to assess for valve vegetations.  -- Repeat Blood Cultures x2 until clearance  -- ID will continue to follow

## 2019-01-06 NOTE — HOSPITAL COURSE
Presents to the ED with F,N,V w associated chills and sweats. Admitted for sepsis. Patient has central catheter (permacath) in place in the left subclavian. She was previously receiving dialysis through this, but recently began receiving treatment through her AV fistula in the left upper arm. She reports that the catheter is scheduled to be removed early next week at [facility]. Of note, pt has a recent hx of permacath infection in May, 2018 complicated by MSSA bacteremia and infective endocarditis. Vanc/Zosyn began in the ED. General Surgery consulted and they were able to remove permacath. DVT incidentally found in the R IJ on U/S to assess for patency of AV fistula, which is asymptomatic. Therapeutic AC with Apixaban started for treatment of partially occlusive DVT. Pt's cultures speciated and was found to have MSSA bacteremia, and JEROME with large round calcified mass attached to the posterior MV leaflet with central echolucent space measuring 1.2 x 0.9 cm that is consistent with caseous necrosis, a variant of Mitral annular calcification. An old healed vegetation cannot be ruled out. There are also small nodular calcium deposits seen on the posterior MV leaflet. With these findings, we will treat the pt for infective endocarditis and she will receive 6 weeks of IV Cefazolin to be administered with dialysis treatments. Pt received HD according to her normal MWF schedule while inpatient without incident. At this time the leukocytosis has resolved, pt is afebrile, and labs are wnl. We will discharge pt home to resume home medications and to complete 6 weeks of IV Cefazolin following HD sessions and 12 weeks of AC on Eliquis for provoked DVT. She will f/u with her PCP along with the rest of her care team at [facility]. ID referral given.

## 2019-01-06 NOTE — PHARMACY MED REC
"Admission Medication Reconciliation - Pharmacy Consult Note    The home medication history was taken by Raquel Iverson, Pharmacy Technician.  Based on information gathered and subsequent review by the clinical pharmacist, the items below may need attention.     You may go to "Admission" then "Reconcile Home Medications" tabs to review and/or act upon these items.     Potentially problematic discrepancies with current MAR  o Patient IS taking the following which was not ordered upon admit  o Atropine sulfate eye drops -- one drop in the left eye BID   o Brimonidine 0.2% eye drops -- 1 drop in the right eye TID   o Dorzolamide-timolol 2-0.5%-- 1 drop in the right eye BID   o Brinzolamide 1%- 1 drop in the right eye BID  o Hydralazine 100 mg BID -- pt with labile blood pressures since admit  o Labetalol 400 mg BID -- pt with labile blood pressures since admit   o Losartan 100 mg daily -- pt with labile blood pressures since admit  o Netarsudil 0.02%- 1 drop in the right eye daily   o Prednisolone 1% eye drops - 1 drop into the right eye TID      Potential issues to be addressed PRIOR TO DISCHARGE  o Calcium acetate and sevelamer is duplicate therapy -- would clarify if patient needs both medications     Please address this information as you see fit.  Feel free to contact us if you have any questions or require assistance.    Nicolette Childs, PharmD  PGY-2 Internal Medicine Pharmacy Resident  [id number]                  .    .            "

## 2019-01-06 NOTE — HPI
Mary Waller is a 42 yo female with a PMHx of DM, ESRD on MWF dialysis since April 2018, HTN, Anemia of chronic disease, glaucoma, and blindness (L eye) presented to the ED with F,N,V w associated chills, sweats.  Patient has central catheter (permacath) in place on the left. She was previously receiving dialysis through this, but recently began receiving treatment through her AV fistula in the left upper arm. She reports that the catheter is scheduled to be removed early next week at [facility]. Of note, pt has a recent hx of permacath infection in May, 2018 complicated by MSSA bacteremia.

## 2019-01-06 NOTE — ASSESSMENT & PLAN NOTE
Mary Waller is a 44 yo female with a PMHx of DM, ESRD on MWF dialysis since April 2018, HTN, Anemia of chronic disease, glaucoma, and blindness (L eye) presented to the ED with F,N,V w associated chills, sweats.  Patient has central catheter (permacath) in place in the left subclavian. She was previously receiving dialysis through this, but recently began receiving treatment through her AV fistula in the left upper arm. She reports that the catheter is scheduled to be removed early next week at [facility]. Of note, pt has a recent hx of permacath infection in May, 2018 complicated by MSSA bacteremia.     Blood cxs positive for Staph Aureus susceptibility pending. F w elevated WBC count.    -Discontinue Zosyn  -Continue Vancomycin after HD  -Will follow susceptibilities of cultures and tailor abx accordingly.  -Recommend TTE to assess for valve vegetations.  -Patient will need to get permacath removed  -Repeat Blood Cultures x2 until clearance  -ID will continue to follow

## 2019-01-06 NOTE — CONSULTS
[facility]  Infectious Disease  Consult Note    Patient Name: Mary Waller  MRN: [id number]  Admission Date: 1/5/2019  Hospital Length of Stay: 1 days  Attending Physician: Jessie Arias MD  Primary Care Provider: Bryan Murray MD     Isolation Status: No active isolations        Inpatient consult to Infectious Diseases  Consult performed by: Marcell Pinto PA-C  Consult ordered by: Jessie Arias MD      See consult note from 1/6/19.    Marcell Pinto PA-C  1/6/2019

## 2019-01-06 NOTE — SUBJECTIVE & OBJECTIVE
Interval History: NAEON. Pt remains afebrile. Blood cx positive for Staph aureus. Vanc to be continued after dialysis tomorrow. Permacath removed be general surgery. 2D Echo pending to assess for endocarditis/valvular vegetations. HDS.    Review of Systems   Constitutional: Positive for activity change, appetite change, chills, fatigue and fever.   HENT: Negative for congestion and sinus pain.    Respiratory: Negative for cough, chest tightness and shortness of breath.    Cardiovascular: Negative for chest pain and palpitations.   Gastrointestinal: Positive for nausea. Negative for abdominal distention, abdominal pain, blood in stool, constipation, diarrhea and vomiting.   Genitourinary: Negative for dysuria and flank pain.   Musculoskeletal: Negative for arthralgias and myalgias.   Skin: Negative for color change and wound.   Neurological: Negative for dizziness, light-headedness and headaches.   Psychiatric/Behavioral: Negative for agitation.     Objective:     Vital Signs (Most Recent):  Temp: 98.9 °F (37.2 °C) (01/06/19 1221)  Pulse: 97 (01/06/19 1221)  Resp: 18 (01/06/19 1221)  BP: (!) 126/58 (01/06/19 1221)  SpO2: 95 % (01/06/19 1221) Vital Signs (24h Range):  Temp:  [98.9 °F (37.2 °C)-100.7 °F (38.2 °C)] 98.9 °F (37.2 °C)  Pulse:  [] 97  Resp:  [18-20] 18  SpO2:  [91 %-97 %] 95 %  BP: (116-142)/(55-67) 126/58     Weight: 106.4 kg (234 lb 9.1 oz)  Body mass index is 39.03 kg/m².  No intake or output data in the 24 hours ending 01/06/19 1439   Physical Exam   Constitutional: She is oriented to person, place, and time. She appears well-developed and well-nourished. No distress.   HENT:   Head: Normocephalic.   Neck: Normal range of motion.   Cardiovascular: Normal rate, regular rhythm and normal heart sounds.   Pulmonary/Chest: Effort normal and breath sounds normal. No stridor. No respiratory distress. She has no wheezes.   Abdominal: Soft. Bowel sounds are normal. She exhibits no distension. There is no  tenderness. There is no guarding.   Musculoskeletal: Normal range of motion.   Neurological: She is alert and oriented to person, place, and time.   Skin: Skin is warm and dry. Capillary refill takes less than 2 seconds. She is not diaphoretic.   -- AV fistula in L upper arm         Significant Labs: All pertinent labs within the past 24 hours have been reviewed.    Significant Imaging: I have reviewed and interpreted all pertinent imaging results/findings within the past 24 hours.

## 2019-01-07 LAB
ALBUMIN SERPL BCP-MCNC: 2.5 G/DL
ALP SERPL-CCNC: 53 U/L
ALT SERPL W/O P-5'-P-CCNC: 19 U/L
ANION GAP SERPL CALC-SCNC: 14 MMOL/L
AST SERPL-CCNC: 14 U/L
BACTERIA BLD CULT: NORMAL
BASOPHILS # BLD AUTO: 0.02 K/UL
BASOPHILS NFR BLD: 0.2 %
BILIRUB SERPL-MCNC: 0.6 MG/DL
BUN SERPL-MCNC: 60 MG/DL
CALCIUM SERPL-MCNC: 10.9 MG/DL
CHLORIDE SERPL-SCNC: 95 MMOL/L
CO2 SERPL-SCNC: 26 MMOL/L
CREAT SERPL-MCNC: 8.4 MG/DL
DIFFERENTIAL METHOD: ABNORMAL
EOSINOPHIL # BLD AUTO: 0.2 K/UL
EOSINOPHIL NFR BLD: 1.6 %
ERYTHROCYTE [DISTWIDTH] IN BLOOD BY AUTOMATED COUNT: 14.2 %
EST. GFR  (AFRICAN AMERICAN): 6.2 ML/MIN/1.73 M^2
EST. GFR  (NON AFRICAN AMERICAN): 5.4 ML/MIN/1.73 M^2
GLUCOSE SERPL-MCNC: 99 MG/DL
HBV SURFACE AB SER-ACNC: NEGATIVE M[IU]/ML
HBV SURFACE AG SERPL QL IA: NEGATIVE
HCT VFR BLD AUTO: 30.2 %
HGB BLD-MCNC: 9.5 G/DL
IMM GRANULOCYTES # BLD AUTO: 0.09 K/UL
IMM GRANULOCYTES NFR BLD AUTO: 0.8 %
LYMPHOCYTES # BLD AUTO: 0.7 K/UL
LYMPHOCYTES NFR BLD: 5.7 %
MAGNESIUM SERPL-MCNC: 2.2 MG/DL
MCH RBC QN AUTO: 32.2 PG
MCHC RBC AUTO-ENTMCNC: 31.5 G/DL
MCV RBC AUTO: 102 FL
MONOCYTES # BLD AUTO: 1.3 K/UL
MONOCYTES NFR BLD: 10.8 %
NEUTROPHILS # BLD AUTO: 9.6 K/UL
NEUTROPHILS NFR BLD: 80.9 %
NRBC BLD-RTO: 0 /100 WBC
PLATELET # BLD AUTO: 144 K/UL
PMV BLD AUTO: 10.9 FL
POCT GLUCOSE: 132 MG/DL (ref 70–110)
POCT GLUCOSE: 92 MG/DL (ref 70–110)
POCT GLUCOSE: 99 MG/DL (ref 70–110)
POTASSIUM SERPL-SCNC: 4.2 MMOL/L
PROT SERPL-MCNC: 7.3 G/DL
RBC # BLD AUTO: 2.95 M/UL
SODIUM SERPL-SCNC: 135 MMOL/L
VANCOMYCIN SERPL-MCNC: 18.8 UG/ML
WBC # BLD AUTO: 11.85 K/UL

## 2019-01-07 PROCEDURE — 83735 ASSAY OF MAGNESIUM: CPT

## 2019-01-07 PROCEDURE — 99232 PR SUBSEQUENT HOSPITAL CARE,LEVL II: ICD-10-PCS | Mod: ,,, | Performed by: HOSPITALIST

## 2019-01-07 PROCEDURE — 25000003 PHARM REV CODE 250: Performed by: STUDENT IN AN ORGANIZED HEALTH CARE EDUCATION/TRAINING PROGRAM

## 2019-01-07 PROCEDURE — 36415 COLL VENOUS BLD VENIPUNCTURE: CPT

## 2019-01-07 PROCEDURE — 99233 SBSQ HOSP IP/OBS HIGH 50: CPT | Mod: ,,, | Performed by: PHYSICIAN ASSISTANT

## 2019-01-07 PROCEDURE — 90935 HEMODIALYSIS ONE EVALUATION: CPT | Mod: ,,, | Performed by: NURSE PRACTITIONER

## 2019-01-07 PROCEDURE — 87040 BLOOD CULTURE FOR BACTERIA: CPT

## 2019-01-07 PROCEDURE — 87340 HEPATITIS B SURFACE AG IA: CPT

## 2019-01-07 PROCEDURE — 80053 COMPREHEN METABOLIC PANEL: CPT

## 2019-01-07 PROCEDURE — 99232 SBSQ HOSP IP/OBS MODERATE 35: CPT | Mod: ,,, | Performed by: HOSPITALIST

## 2019-01-07 PROCEDURE — 63600175 PHARM REV CODE 636 W HCPCS: Performed by: PHYSICIAN ASSISTANT

## 2019-01-07 PROCEDURE — 11000001 HC ACUTE MED/SURG PRIVATE ROOM

## 2019-01-07 PROCEDURE — 99233 PR SUBSEQUENT HOSPITAL CARE,LEVL III: ICD-10-PCS | Mod: ,,, | Performed by: PHYSICIAN ASSISTANT

## 2019-01-07 PROCEDURE — 90935 HEMODIALYSIS ONE EVALUATION: CPT

## 2019-01-07 PROCEDURE — 85025 COMPLETE CBC W/AUTO DIFF WBC: CPT

## 2019-01-07 PROCEDURE — 86706 HEP B SURFACE ANTIBODY: CPT

## 2019-01-07 PROCEDURE — 80202 ASSAY OF VANCOMYCIN: CPT

## 2019-01-07 PROCEDURE — 25000003 PHARM REV CODE 250: Performed by: INTERNAL MEDICINE

## 2019-01-07 PROCEDURE — 90935 PR HEMODIALYSIS, ONE EVALUATION: ICD-10-PCS | Mod: ,,, | Performed by: NURSE PRACTITIONER

## 2019-01-07 RX ORDER — VANCOMYCIN HCL IN 5 % DEXTROSE 1G/250ML
1000 PLASTIC BAG, INJECTION (ML) INTRAVENOUS
Status: DISCONTINUED | OUTPATIENT
Start: 2019-01-07 | End: 2019-01-07

## 2019-01-07 RX ORDER — LIDOCAINE AND PRILOCAINE 25; 25 MG/G; MG/G
CREAM TOPICAL ONCE
Status: COMPLETED | OUTPATIENT
Start: 2019-01-07 | End: 2019-01-07

## 2019-01-07 RX ORDER — CEFAZOLIN SODIUM 1 G/3ML
2 INJECTION, POWDER, FOR SOLUTION INTRAMUSCULAR; INTRAVENOUS
Status: DISCONTINUED | OUTPATIENT
Start: 2019-01-07 | End: 2019-01-09

## 2019-01-07 RX ADMIN — SODIUM CHLORIDE: 0.9 INJECTION, SOLUTION INTRAVENOUS at 08:01

## 2019-01-07 RX ADMIN — INSULIN DETEMIR 10 UNITS: 100 INJECTION, SOLUTION SUBCUTANEOUS at 10:01

## 2019-01-07 RX ADMIN — LATANOPROST 1 DROP: 50 SOLUTION OPHTHALMIC at 10:01

## 2019-01-07 RX ADMIN — KETOROLAC TROMETHAMINE 1 DROP: 5 SOLUTION OPHTHALMIC at 02:01

## 2019-01-07 RX ADMIN — DOCUSATE SODIUM 100 MG: 100 CAPSULE, LIQUID FILLED ORAL at 02:01

## 2019-01-07 RX ADMIN — SEVELAMER CARBONATE 800 MG: 800 TABLET, FILM COATED ORAL at 05:01

## 2019-01-07 RX ADMIN — KETOROLAC TROMETHAMINE 1 DROP: 5 SOLUTION OPHTHALMIC at 05:01

## 2019-01-07 RX ADMIN — APIXABAN 10 MG: 5 TABLET, FILM COATED ORAL at 10:01

## 2019-01-07 RX ADMIN — SEVELAMER CARBONATE 800 MG: 800 TABLET, FILM COATED ORAL at 07:01

## 2019-01-07 RX ADMIN — FUROSEMIDE 40 MG: 40 TABLET ORAL at 02:01

## 2019-01-07 RX ADMIN — KETOROLAC TROMETHAMINE 1 DROP: 5 SOLUTION OPHTHALMIC at 10:01

## 2019-01-07 RX ADMIN — NEOMYCIN, POLYMYXIN B SULFATES, DEXAMETHASONE: 1; 3.5; 1 OINTMENT OPHTHALMIC at 02:01

## 2019-01-07 RX ADMIN — APIXABAN 10 MG: 5 TABLET, FILM COATED ORAL at 02:01

## 2019-01-07 RX ADMIN — SEVELAMER CARBONATE 800 MG: 800 TABLET, FILM COATED ORAL at 02:01

## 2019-01-07 RX ADMIN — LIDOCAINE AND PRILOCAINE: 25; 25 CREAM TOPICAL at 05:01

## 2019-01-07 RX ADMIN — CEFAZOLIN 2 G: 1 INJECTION, POWDER, FOR SOLUTION INTRAMUSCULAR; INTRAVENOUS at 10:01

## 2019-01-07 RX ADMIN — NEOMYCIN, POLYMYXIN B SULFATES, DEXAMETHASONE: 1; 3.5; 1 OINTMENT OPHTHALMIC at 10:01

## 2019-01-07 RX ADMIN — AMLODIPINE BESYLATE 10 MG: 10 TABLET ORAL at 08:01

## 2019-01-07 NOTE — ASSESSMENT & PLAN NOTE
-- RUE ultrasound reveals partially occlusive thrombus in the right internal jugular vein.  -- Recent hx of Permacath in R IJ likely contributory  -- Pt asymptomatic  Plan  -- Continue therapeutic apixaban at 10mg BID to treat DVT

## 2019-01-07 NOTE — PROGRESS NOTES
Notified nursing staff that there are no openings today on JEROME schedule but will be able to fit patient in 1/8/19 at 1300. Patient will need to be NPO post midnight tonight to have JEROME completed tomorrow.

## 2019-01-07 NOTE — PLAN OF CARE
Problem: Fall Injury Risk  Goal: Absence of Fall and Fall-Related Injury  Outcome: Ongoing (interventions implemented as appropriate)  Intervention: Identify and Manage Contributors to Fall Injury Risk   01/06/19 0725 01/07/19 0739   Identify and Manage Contributors to Fall Injury Risk   Self-Care Promotion --  independence encouraged   Manage Acute Allergic Reaction   Medication Review/Management medications reviewed --      Intervention: Promote Injury-Free Environment   01/07/19 0504 01/07/19 0739   Optimize [facility] and Functional Mobility   Environmental Safety Modification --  assistive device/personal items within reach;clutter free environment maintained;room organization consistent   Optimize Balance and Safe Activity   Safety Promotion/Fall Prevention assistive device/personal item within reach;commode/urinal/bedpan at bedside;diversional activities provided;Fall Risk reviewed with patient/family;Fall Risk signage in place;family to remain at bedside;lighting adjusted;medications reviewed;nonskid shoes/socks when out of bed;side rails raised x 2 --

## 2019-01-07 NOTE — PROGRESS NOTES
[facility] Hospital Medicine  Progress Note    Patient Name: Mary Waller  MRN: [id number]  Patient Class: IP- Inpatient   Admission Date: 1/5/2019  Length of Stay: 2 days  Attending Physician: Jessie Arias MD  Primary Care Provider: Bryan Murray MD    [facility] Medicine Team: Networked reference to record PCT  King Faulkner MD    Subjective:     Principal Problem:Sepsis    HPI:  Mary Waller is a 43 year old female with a PMHx of DM, ESRD on MWF dialysis since April 2018, HTN, Anemia of chronic disease, glaucoma, and blindness (L eye) who presents to the ED with fever, nausea, and vomiting that began overnight.  Patient reports chills, sweats, non-productive cough, nausea with 1 episode of vomiting and 1 episode of loose stool that began around 2:00 a.m. She states that there was a nurse sick with a cough at her dialysis treatment center.  She has not taken anything for her symptoms. Patient still makes urine, and denies dysuria, headache, dark or bloody stool, myalgias, chest pain, or shortness of breath. Patient has central catheter (permacath) in place in the left subclavian. She was previously receiving dialysis through this, but recently began receiving treatment through her AV fistula in the left upper arm. She reports that the catheter is scheduled to be removed early next week at [facility].     Of note, pt has a recent hx of permacath infection in May, 2018 complicated by MSSA bacteremia.         Hospital Course:  Presents to the ED with F,N,V w associated chills, sweats.  Patient has central catheter (permacath) in place in the left subclavian. She was previously receiving dialysis through this, but recently began receiving treatment through her AV fistula in the left upper arm. She reports that the catheter is scheduled to be removed early next week at [facility]. Of note, pt has a recent hx of permacath infection in May, 2018 complicated by MSSA bacteremia. Vanc/Zosyn  began in the ED. Blood cx positive for staph aureus. General Surgery consulted and they were able to remove permacath. DVT incidentally found in the R IJ on U/S to assess for patency of AV fistula, which is asymptomatic. Therapeutic AC with Apixaban started for treatment of partially occlusive DVT.     Interval History: NAEON. Pt remains afebrile. Blood cx positive for Staph aureus. Vanc to be continued after dialysis today. 2D Echo concerning for mitral valve vegetation. JEROME to be done tomorrow at 1300. NPO at midnight. HDS.     Review of Systems   Constitutional: Positive for activity change, appetite change (improving), chills, fatigue and fever (tactile).   HENT: Negative for congestion and sinus pain.    Respiratory: Negative for cough, chest tightness and shortness of breath.    Cardiovascular: Negative for chest pain and palpitations.   Gastrointestinal: Negative for abdominal distention, abdominal pain, blood in stool, constipation, diarrhea and vomiting.   Genitourinary: Negative for dysuria and flank pain.   Musculoskeletal: Negative for arthralgias and myalgias.   Skin: Negative for color change and wound.   Neurological: Negative for dizziness, light-headedness and headaches.   Psychiatric/Behavioral: Negative for agitation.     Objective:     Vital Signs (Most Recent):  Temp: 99 °F (37.2 °C) (01/07/19 0830)  Pulse: 102 (01/07/19 0915)  Resp: 17 (01/07/19 0748)  BP: 136/74 (01/07/19 0900)  SpO2: (!) 93 % (01/07/19 0748) Vital Signs (24h Range):  Temp:  [98.9 °F (37.2 °C)-100.3 °F (37.9 °C)] 99 °F (37.2 °C)  Pulse:  [] 102  Resp:  [16-18] 17  SpO2:  [91 %-95 %] 93 %  BP: (126-179)/(58-78) 136/74     Weight: 106.1 kg (234 lb)  Body mass index is 38.94 kg/m².    Intake/Output Summary (Last 24 hours) at 1/7/2019 0922  Last data filed at 1/7/2019 0700  Gross per 24 hour   Intake 400 ml   Output 2 ml   Net 398 ml      Physical Exam   Constitutional: She is oriented to person, place, and time. She appears  well-developed and well-nourished. No distress.   HENT:   Head: Normocephalic.   Neck: Normal range of motion.   Cardiovascular: Normal rate, regular rhythm and normal heart sounds.   Pulmonary/Chest: Effort normal and breath sounds normal. No stridor. No respiratory distress. She has no wheezes.   Abdominal: Soft. Bowel sounds are normal. She exhibits no distension. There is no tenderness. There is no guarding.   Musculoskeletal: Normal range of motion.   Neurological: She is alert and oriented to person, place, and time.   Skin: Skin is warm and dry. Capillary refill takes less than 2 seconds. She is not diaphoretic.   -- AV fistula in L upper arm         Significant Labs: All pertinent labs within the past 24 hours have been reviewed.    Significant Imaging: I have reviewed and interpreted all pertinent imaging results/findings within the past 24 hours.    Assessment/Plan:      * Sepsis    -- Symptoms began yesterday evening with fever, chills, non-productive cough, nausea, NBNB watery vomiting, and loosely formed stool  -- Pt reports sick contact (nurse) at dialysis yesterday  -- Hx of R sided permacath infection complicated by MSSA bacteremia in May, 2018; now with L sided permacath that has been scheduled for removal at [facility] next week as she now has completed a full dialysis session with newly matured L arm AV fistula  -- Tmax 102.7 upon arrival  -- CXR without signs of any acute pulmonary process. Not concerning for any developing pneumonia.   -- Initial lactate and repeat (negative)  -- Rapid Flu negative  -- Leukocytosis of 21  -- Tachycardic in the 110's; not hypotensive   -- Received 500cc NS bolus in ED  Plan  -- Blood cx positive for Staph aureus. F/U repeat blood cx.  -- UA negative for UTI.  -- Will continue Vanc following dialysis today and discontinue Zosyn per ID recommendations. Random AM Vanc is 18.8 this morning.   -- General Surgery consulted and permacath removed.   -- Currently afebrile and  HDS.   -- Leukocytosis resolved  -- afebrile     Acute deep vein thrombosis (DVT) of upper extremity    -- RUE ultrasound reveals partially occlusive thrombus in the right internal jugular vein.  -- Recent hx of Permacath in R IJ likely contributory  -- Pt asymptomatic  Plan  -- Continue therapeutic apixaban at 10mg BID to treat DVT     Staphylococcus aureus bacteremia    -- 3/3 Blood cx growing Staph aureus  -- ID Consulted  -- General surgery consulted and permacath removed  Plan    -- Discontinue Zosyn  -- Continue Vancomycin after HD, F/U random Vanc post dialysis.  -- Follow susceptibilities of cultures and tailor abx accordingly.  -- Repeat Blood Cultures x2 until clearance  -- ID will continue to follow  -- TTE concerning for mitral valve vegetation.  -- JEROME scheduled for tomorrow 01/08 at 1300. NPO at midnight.      Glaucoma    -- Resume home eyedrops       ESRD (end stage renal disease)    -- Began dialysis in April 2018  -- Has Permacath on (L) chest as well as an AV fistula in the (L) arm  -- As the AV fistula has now matured, it has recently been utilized for dialysis, and the permacath is scheduled for removal at [facility] next week.  -- Hx of Permacath infection in (R) chest in May, 2018 complicated by MSSA bacteremia.   -- Current Permacath does not appear erythematous, and is without purulence or any discharge  -- Last dialysis appointment completed yesterday without incident  -- Electrolyte wnl  -- Does not appear fluid overloaded at this time  -- Pt does still make some urine. On Furosemide 40mg BID  -- Pt followed by Nephrologist Dr. Sweet at [facility]  Plan  -- Nephrology consult for dialysis while inpatient   -- Dialysis currently in process   -- Daily BMP's  -- LUE ultrasound shows patency of the L AV fistula     Type 2 diabetes mellitus    -- Pt reports taking 15 units insulin long acting daily  -- Medication list does not reflect this but does show Tradjenta po 5mg daily  Plan  -- Will start on  LDSSI and reassess as pt currently with poor po intake.  -- Well-controlled at this time.       Essential hypertension    -- Current home anti-hypertensives include Labetalol 200mg BID, Losartan 50mg qd, Amlodipine 10mg qd, and Hydralazine 100mg tid.  -- Will resume BP meds, but hold Losartan.        VTE Risk Mitigation (From admission, onward)        Ordered     apixaban tablet 10 mg  2 times daily      01/05/19 1701     IP VTE HIGH RISK PATIENT  Once      01/05/19 1237              King Faulkner MD  Department of Hospital Medicine   [facility]

## 2019-01-07 NOTE — PLAN OF CARE
Problem: Fall Injury Risk  Goal: Absence of Fall and Fall-Related Injury  Outcome: Ongoing (interventions implemented as appropriate)  Meds given as ordered tolerated well. No complaints of pain. No signs or symptoms of distress/discomfort noted. Attended dialysis site checked (+) bruit/thrill. Will continue to monitor.

## 2019-01-07 NOTE — ASSESSMENT & PLAN NOTE
-- 3/3 Blood cx growing Staph aureus  -- ID Consulted  -- General surgery consulted and permacath removed  Plan    -- Discontinue Zosyn  -- Continue Vancomycin after HD, F/U random Vanc post dialysis.  -- Follow susceptibilities of cultures and tailor abx accordingly.  -- Repeat Blood Cultures x2 until clearance  -- ID will continue to follow  -- TTE concerning for mitral valve vegetation.  -- JEROME scheduled for tomorrow 01/08 at 1300. NPO at midnight.

## 2019-01-07 NOTE — PLAN OF CARE
01/07/19 0920   Discharge Assessment   Assessment Type Discharge Planning Assessment   Confirmed/corrected address and phone number on facesheet? Yes   Assessment information obtained from? Patient;Medical Record   Expected Length of Stay (days) 3   Communicated expected length of stay with patient/caregiver no   Prior to hospitilization cognitive status: Alert/Oriented;No Deficits   Prior to hospitalization functional status: Independent   Current cognitive status: Alert/Oriented;No Deficits   Current Functional Status: Independent   Lives With parent(s);sibling(s)  (Patient lives with mother and sister)   Able to Return to Prior Arrangements yes   Is patient able to care for self after discharge? Yes   Who are your caregiver(s) and their phone number(s)? (Alejandra Edouard (mother)  [phone])   Patient's perception of discharge disposition home or selfcare   Patient currently being followed by outpatient case management? No   Patient currently receives any other outside agency services? Yes   Name and contact number of agency or person providing outside services [facility] [phone]   Is it the patient/care giver preference to resume care with the current outside agency? Yes   Equipment Currently Used at Home none   Do you have any problems affording any of your prescribed medications? No   Is the patient taking medications as prescribed? yes   Does the patient have transportation home? Yes   Transportation Anticipated family or friend will provide   Dialysis Name and Scheduled days ([facility] M_W_F)   Does the patient receive services at the [facility]mad[facility] Clinic? No   Discharge Plan A Home with family   Discharge Plan B Home with family   Patient/Family in Agreement with Plan yes

## 2019-01-07 NOTE — ASSESSMENT & PLAN NOTE
Mary Waller is a 44 yo female with a PMHx of DM, ESRD on MWF dialysis since April 2018, HTN, Anemia of chronic disease, glaucoma, and blindness (L eye) presented to the ED with F,N,V w associated chills, sweats.  Patient has central catheter (permacath) in place in the left subclavian. She was previously receiving dialysis through this, but recently began receiving treatment through her AV fistula in the E.. Of note, pt has a hx of permacath infection in May, 2018 complicated by MSSA bacteremia requiring catheter exchange.      Blood cxs positive for MSSA. She is currently on vancomycin. Repeat blood cultures 1/6 are positive. She underwent removal of permacath yesterday.  Afebrile, leukocytosis resolved.      -d/c vancomycin. Started cefazolin 2g after dialysis (2-2-3g M-W-F)   -repeat blood cultures today   -TTE with possible mitral vavle vegetations. Agree with JEROME.   -U/s BUE with + partially occlusive thrombus in the right internal jugular vein.   -ID will continue to follow

## 2019-01-07 NOTE — PROGRESS NOTES
Patient arrived via stretcher. Ambulated to bed. Patient weighed in bed. Left upper fistula accessed per STUART Castro RN. Arterial stick X 2. Needles secured. Maintenance HD initiated; lines secured. Patient without complaint.

## 2019-01-07 NOTE — PROGRESS NOTES
[facility]  Infectious Disease  Progress Note    Patient Name: Mary Waller  MRN: [id number]  Admission Date: 1/5/2019  Length of Stay: 2 days  Attending Physician: Jessie Arias MD  Primary Care Provider: rByan Murray MD    Isolation Status: No active isolations  Assessment/Plan:      Staphylococcus aureus bacteremia    Mary Waller is a 44 yo female with a PMHx of DM, ESRD on MWF dialysis since April 2018, HTN, Anemia of chronic disease, glaucoma, and blindness (L eye) presented to the ED with F,N,V w associated chills, sweats.  Patient has central catheter (permacath) in place in the left subclavian. She was previously receiving dialysis through this, but recently began receiving treatment through her AV fistula in the E.. Of note, pt has a hx of permacath infection in May, 2018 complicated by MSSA bacteremia requiring catheter exchange.      Blood cxs positive for MSSA. She is currently on vancomycin. Repeat blood cultures 1/6 are positive. She underwent removal of permacath yesterday.  Afebrile, leukocytosis resolved.      -d/c vancomycin. Started cefazolin 2g after dialysis (2-2-3g M-W-F)   -repeat blood cultures today   -TTE with possible mitral vavle vegetations. Agree with JEROME.   -U/s BUE with + partially occlusive thrombus in the right internal jugular vein.   -ID will continue to follow            Thank you for your consult. I will follow-up with patient. Please contact us if you have any additional questions.    Candy Felix PA-C  Infectious Disease  [facility]  [phone]    Subjective:     Principal Problem:Sepsis    HPI: Mary Waller is a 43 year old female with a PMHx of DM, ESRD on MWF dialysis since April 2018, HTN, Anemia of chronic disease, glaucoma, and blindness (L eye) who presents to the ED with fever, nausea, and vomiting that began overnight.  Patient reports chills, sweats, non-productive cough, nausea with 1 episode of vomiting and  1 episode of loose stool that began around 2:00 a.m. She states that there was a nurse sick with a cough at her dialysis treatment center.  She has not taken anything for her symptoms. Patient still makes urine, and denies dysuria, headache, dark or bloody stool, myalgias, chest pain, or shortness of breath. Patient has central catheter (permacath) in place in the left subclavian. She was previously receiving dialysis through this, but recently began receiving treatment through her AV fistula in the left upper arm. She reports that the catheter is scheduled to be removed early next week at [facility].   Blood cxs- Staph aureus susceptibility pending     Of note, pt has a recent hx of permacath infection in May, 2018 complicated by MSSA bacteremia.          Interval History:   Blood cultures +S. Aureus  permacath removed yesterday  Vancomycin 18 this AM  Repeat blood cultures today     Review of Systems   Constitutional: Positive for activity change and appetite change. Negative for chills, fatigue and fever.   HENT: Negative for congestion and sinus pain.    Respiratory: Negative for cough, chest tightness and shortness of breath.    Cardiovascular: Negative for chest pain and palpitations.   Gastrointestinal: Negative for abdominal distention, abdominal pain, blood in stool, constipation, diarrhea, nausea and vomiting.   Genitourinary: Negative for dysuria and flank pain.   Musculoskeletal: Negative for arthralgias and myalgias.   Skin: Negative for color change and wound.   Neurological: Negative for dizziness, light-headedness and headaches.   Psychiatric/Behavioral: Negative for agitation.     Objective:     Vital Signs (Most Recent):  Temp: 99 °F (37.2 °C) (01/07/19 0830)  Pulse: 93 (01/07/19 1100)  Resp: 17 (01/07/19 0748)  BP: 114/62 (01/07/19 1100)  SpO2: (!) 93 % (01/07/19 0748) Vital Signs (24h Range):  Temp:  [98.9 °F (37.2 °C)-100.3 °F (37.9 °C)] 99 °F (37.2 °C)  Pulse:  [] 93  Resp:  [16-18] 17  SpO2:   [91 %-95 %] 93 %  BP: (114-179)/(49-78) 114/62     Weight: 106.1 kg (234 lb)  Body mass index is 38.94 kg/m².    Estimated Creatinine Clearance: 10.7 mL/min (A) (based on SCr of 8.4 mg/dL (H)).    Physical Exam   Constitutional: She is oriented to person, place, and time. She appears well-developed and well-nourished. No distress.       HENT:   Head: Normocephalic.   Neck: Normal range of motion.   Cardiovascular: Normal rate, regular rhythm and normal heart sounds.   Pulmonary/Chest: Effort normal and breath sounds normal. No stridor. No respiratory distress. She has no wheezes.   Abdominal: Soft. Bowel sounds are normal. She exhibits no distension. There is no tenderness. There is no guarding.   Musculoskeletal: Normal range of motion.   Neurological: She is alert and oriented to person, place, and time.   Skin: Skin is warm and dry. Capillary refill takes less than 2 seconds. She is not diaphoretic.       Significant Labs:   Blood Culture:   Recent Labs   Lab 01/05/19  0924 01/05/19  1001 01/05/19  1413 01/06/19  1029   LABBLOO Gram stain moe bottle: Gram positive cocci in clusters resembling Staph   Results called to and read back by: Mehrdad Zhao RN 01/05/2019  23:10  Gram stain aer bottle: Gram positive cocci in clusters resembling Staph   Positive results previously called 01/06/2019  06:15  STAPHYLOCOCCUS AUREUS  ID consult required at [facility].   Gram stain moe bottle: Gram positive cocci in clusters resembling Staph   Results called to and read back by: Mehrdad Zhao RN 01/06/2019  01:32  Gram stain aer bottle: Gram positive cocci in clusters resembling Staph   Positive results previously called 01/06/2019  02:17  STAPHYLOCOCCUS AUREUS  ID consult required at [facility].  For susceptibility see order #[id number]   Gram stain aer bottle: Gram positive cocci in clusters resembling Staph   Results called to  and read back by: Mehrdad Zhao RN 01/06/2019  06:28  STAPHYLOCOCCUS AUREUS  For susceptibility see order #[id number]   Gram stain aer bottle: Gram positive cocci in clusters resembling Staph   Results called to and read back by:Britton Shukla RN 01/07/2019  10:30  Gram stain aer bottle: Gram positive cocci in clusters resembling Staph   Results called to and read back by: Xenia Moore RN  01/07/2019    05:04     CBC:   Recent Labs   Lab 01/06/19 0525 01/07/19 0438   WBC 21.77* 11.85   HGB 10.5* 9.5*   HCT 35.5* 30.2*    144*     CMP:   Recent Labs   Lab 01/06/19 0525 01/07/19 0438   * 135*   K 4.5 4.2   CL 93* 95   CO2 26 26   * 99   BUN 49* 60*   CREATININE 7.6* 8.4*   CALCIUM 11.1* 10.9*   PROT 8.0 7.3   ALBUMIN 2.9* 2.5*   BILITOT 1.1* 0.6   ALKPHOS 57 53*   AST 19 14   ALT 23 19   ANIONGAP 14 14   EGFRNONAA 6.0* 5.4*     Wound Culture: No results for input(s): LABAERO in the last 4320 hours.  All pertinent labs within the past 24 hours have been reviewed.    Significant Imaging: I have reviewed all pertinent imaging results/findings within the past 24 hours.

## 2019-01-07 NOTE — PLAN OF CARE
Patient and Family had made several inquiries with staff requesting patients HPV vaccine be given in hospital. Patient scheduled to receive shot on 1/8 1145a at clinic. Patient not scheduled for discharge prior to then. Patient and Family informed that pharmacy does not carry vaccine and clinic appointment should be rescheduled for earliest available date.

## 2019-01-07 NOTE — ASSESSMENT & PLAN NOTE
-- Began dialysis in April 2018  -- Has Permacath on (L) chest as well as an AV fistula in the (L) arm  -- As the AV fistula has now matured, it has recently been utilized for dialysis, and the permacath is scheduled for removal at [facility] next week.  -- Hx of Permacath infection in (R) chest in May, 2018 complicated by MSSA bacteremia.   -- Current Permacath does not appear erythematous, and is without purulence or any discharge  -- Last dialysis appointment completed yesterday without incident  -- Electrolyte wnl  -- Does not appear fluid overloaded at this time  -- Pt does still make some urine. On Furosemide 40mg BID  -- Pt followed by Nephrologist Dr. Sweet at [facility]  Plan  -- Nephrology consult for dialysis while inpatient   -- Dialysis currently in process   -- Daily BMP's  -- LUE ultrasound shows patency of the L AV fistula

## 2019-01-07 NOTE — SUBJECTIVE & OBJECTIVE
Interval History:   Blood cultures +S. Aureus  permacath removed yesterday  Vancomycin 18 this AM  Repeat blood cultures today     Review of Systems   Constitutional: Positive for activity change and appetite change. Negative for chills, fatigue and fever.   HENT: Negative for congestion and sinus pain.    Respiratory: Negative for cough, chest tightness and shortness of breath.    Cardiovascular: Negative for chest pain and palpitations.   Gastrointestinal: Negative for abdominal distention, abdominal pain, blood in stool, constipation, diarrhea, nausea and vomiting.   Genitourinary: Negative for dysuria and flank pain.   Musculoskeletal: Negative for arthralgias and myalgias.   Skin: Negative for color change and wound.   Neurological: Negative for dizziness, light-headedness and headaches.   Psychiatric/Behavioral: Negative for agitation.     Objective:     Vital Signs (Most Recent):  Temp: 99 °F (37.2 °C) (01/07/19 0830)  Pulse: 93 (01/07/19 1100)  Resp: 17 (01/07/19 0748)  BP: 114/62 (01/07/19 1100)  SpO2: (!) 93 % (01/07/19 0748) Vital Signs (24h Range):  Temp:  [98.9 °F (37.2 °C)-100.3 °F (37.9 °C)] 99 °F (37.2 °C)  Pulse:  [] 93  Resp:  [16-18] 17  SpO2:  [91 %-95 %] 93 %  BP: (114-179)/(49-78) 114/62     Weight: 106.1 kg (234 lb)  Body mass index is 38.94 kg/m².    Estimated Creatinine Clearance: 10.7 mL/min (A) (based on SCr of 8.4 mg/dL (H)).    Physical Exam   Constitutional: She is oriented to person, place, and time. She appears well-developed and well-nourished. No distress.       HENT:   Head: Normocephalic.   Neck: Normal range of motion.   Cardiovascular: Normal rate, regular rhythm and normal heart sounds.   Pulmonary/Chest: Effort normal and breath sounds normal. No stridor. No respiratory distress. She has no wheezes.   Abdominal: Soft. Bowel sounds are normal. She exhibits no distension. There is no tenderness. There is no guarding.   Musculoskeletal: Normal range of motion.    Neurological: She is alert and oriented to person, place, and time.   Skin: Skin is warm and dry. Capillary refill takes less than 2 seconds. She is not diaphoretic.       Significant Labs:   Blood Culture:   Recent Labs   Lab 01/05/19  0924 01/05/19  1001 01/05/19  1413 01/06/19  1029   LABBLOO Gram stain moe bottle: Gram positive cocci in clusters resembling Staph   Results called to and read back by: Mehrdad Zhao RN 01/05/2019  23:10  Gram stain aer bottle: Gram positive cocci in clusters resembling Staph   Positive results previously called 01/06/2019  06:15  STAPHYLOCOCCUS AUREUS  ID consult required at [facility] and [facility].   Gram stain moe bottle: Gram positive cocci in clusters resembling Staph   Results called to and read back by: Mehrdad Zhao RN 01/06/2019  01:32  Gram stain aer bottle: Gram positive cocci in clusters resembling Staph   Positive results previously called 01/06/2019  02:17  STAPHYLOCOCCUS AUREUS  ID consult required at [facility] and [facility].  For susceptibility see order #[id number]   Gram stain aer bottle: Gram positive cocci in clusters resembling Staph   Results called to and read back by: Mehrdad Zhao RN 01/06/2019  06:28  STAPHYLOCOCCUS AUREUS  For susceptibility see order #[id number]   Gram stain aer bottle: Gram positive cocci in clusters resembling Staph   Results called to and read back by:Britton Shukla RN 01/07/2019  10:30  Gram stain aer bottle: Gram positive cocci in clusters resembling Staph   Results called to and read back by: Xenia Moore RN  01/07/2019    05:04     CBC:   Recent Labs   Lab 01/06/19 0525 01/07/19  0438   WBC 21.77* 11.85   HGB 10.5* 9.5*   HCT 35.5* 30.2*    144*     CMP:   Recent Labs   Lab 01/06/19 0525 01/07/19  0438   * 135*   K 4.5 4.2   CL 93* 95   CO2 26 26   * 99   BUN 49* 60*   CREATININE 7.6* 8.4*   CALCIUM 11.1* 10.9*   PROT 8.0  7.3   ALBUMIN 2.9* 2.5*   BILITOT 1.1* 0.6   ALKPHOS 57 53*   AST 19 14   ALT 23 19   ANIONGAP 14 14   EGFRNONAA 6.0* 5.4*     Wound Culture: No results for input(s): LABAERO in the last 4320 hours.  All pertinent labs within the past 24 hours have been reviewed.    Significant Imaging: I have reviewed all pertinent imaging results/findings within the past 24 hours.

## 2019-01-07 NOTE — PROGRESS NOTES
[facility]AFF HEMODIALYSIS NOTE     Patient currently on hemodialysis for removal of uremic toxins and volume.     Patient seen and evaluated on hemodialysis, tolerating treatment, see HD flowsheet for vitals and assessments.      Ultrafiltration goal is 3L     Labs have been reviewed and the dialysate bath has been adjusted.     Assessment/Plan:  Seen on dialysis this morning, tolerating well.  Goal UF of 3L.  AVF cannulated and good flows reported by nursing staff.  Will continue IHD while inpatient  Low calcium bath.  Will discontinue calcium acetate.  Discontinue Calcitriol  PTH in AM  Continue Renvela 800 mg po TID with meals      MARLENE Lopez, FNP-BC  Nephrology  Pager:  [phone]

## 2019-01-07 NOTE — ASSESSMENT & PLAN NOTE
-- Symptoms began yesterday evening with fever, chills, non-productive cough, nausea, NBNB watery vomiting, and loosely formed stool  -- Pt reports sick contact (nurse) at dialysis yesterday  -- Hx of R sided permacath infection complicated by MSSA bacteremia in May, 2018; now with L sided permacath that has been scheduled for removal at [facility] next week as she now has completed a full dialysis session with newly matured L arm AV fistula  -- Tmax 102.7 upon arrival  -- CXR without signs of any acute pulmonary process. Not concerning for any developing pneumonia.   -- Initial lactate and repeat (negative)  -- Rapid Flu negative  -- Leukocytosis of 21  -- Tachycardic in the 110's; not hypotensive   -- Received 500cc NS bolus in ED  Plan  -- Blood cx positive for Staph aureus. F/U repeat blood cx.  -- UA negative for UTI.  -- Will continue Vanc following dialysis today and discontinue Zosyn per ID recommendations. Random AM Vanc is 18.8 this morning.   -- General Surgery consulted and permacath removed.   -- Currently afebrile and HDS.   -- Leukocytosis resolved  -- afebrile

## 2019-01-07 NOTE — PLAN OF CARE
Problem: Adult Inpatient Plan of Care  Goal: Plan of Care Review  Outcome: Ongoing (interventions implemented as appropriate)  Patient tolerated 3.5 hour treatment well. 3019 ml removed. Needles removed. Pressure held X 8 minutes. Hemostasis achieved. Pressure dressings applied. VS stable. Patient without complaint.

## 2019-01-07 NOTE — SUBJECTIVE & OBJECTIVE
Interval History: NAEON. Pt remains afebrile. Blood cx positive for Staph aureus. Vanc to be continued after dialysis today. 2D Echo concerning for mitral valve vegetation. JEROME to be done tomorrow at 1300. NPO at midnight. HDS.     Review of Systems   Constitutional: Positive for activity change, appetite change (improving), chills, fatigue and fever (tactile).   HENT: Negative for congestion and sinus pain.    Respiratory: Negative for cough, chest tightness and shortness of breath.    Cardiovascular: Negative for chest pain and palpitations.   Gastrointestinal: Negative for abdominal distention, abdominal pain, blood in stool, constipation, diarrhea and vomiting.   Genitourinary: Negative for dysuria and flank pain.   Musculoskeletal: Negative for arthralgias and myalgias.   Skin: Negative for color change and wound.   Neurological: Negative for dizziness, light-headedness and headaches.   Psychiatric/Behavioral: Negative for agitation.     Objective:     Vital Signs (Most Recent):  Temp: 99 °F (37.2 °C) (01/07/19 0830)  Pulse: 102 (01/07/19 0915)  Resp: 17 (01/07/19 0748)  BP: 136/74 (01/07/19 0900)  SpO2: (!) 93 % (01/07/19 0748) Vital Signs (24h Range):  Temp:  [98.9 °F (37.2 °C)-100.3 °F (37.9 °C)] 99 °F (37.2 °C)  Pulse:  [] 102  Resp:  [16-18] 17  SpO2:  [91 %-95 %] 93 %  BP: (126-179)/(58-78) 136/74     Weight: 106.1 kg (234 lb)  Body mass index is 38.94 kg/m².    Intake/Output Summary (Last 24 hours) at 1/7/2019 0922  Last data filed at 1/7/2019 0700  Gross per 24 hour   Intake 400 ml   Output 2 ml   Net 398 ml      Physical Exam   Constitutional: She is oriented to person, place, and time. She appears well-developed and well-nourished. No distress.   HENT:   Head: Normocephalic.   Neck: Normal range of motion.   Cardiovascular: Normal rate, regular rhythm and normal heart sounds.   Pulmonary/Chest: Effort normal and breath sounds normal. No stridor. No respiratory distress. She has no wheezes.    Abdominal: Soft. Bowel sounds are normal. She exhibits no distension. There is no tenderness. There is no guarding.   Musculoskeletal: Normal range of motion.   Neurological: She is alert and oriented to person, place, and time.   Skin: Skin is warm and dry. Capillary refill takes less than 2 seconds. She is not diaphoretic.   -- AV fistula in L upper arm         Significant Labs: All pertinent labs within the past 24 hours have been reviewed.    Significant Imaging: I have reviewed and interpreted all pertinent imaging results/findings within the past 24 hours.

## 2019-01-08 ENCOUNTER — ANESTHESIA EVENT (OUTPATIENT)
Dept: MEDSURG UNIT | Facility: HOSPITAL | Age: 44
DRG: 314 | End: 2019-01-08
Payer: MEDICAID

## 2019-01-08 ENCOUNTER — ANESTHESIA (OUTPATIENT)
Dept: MEDSURG UNIT | Facility: HOSPITAL | Age: 44
DRG: 314 | End: 2019-01-08
Payer: MEDICAID

## 2019-01-08 LAB
ALBUMIN SERPL BCP-MCNC: 2.6 G/DL
ALP SERPL-CCNC: 62 U/L
ALT SERPL W/O P-5'-P-CCNC: 16 U/L
ANION GAP SERPL CALC-SCNC: 11 MMOL/L
AST SERPL-CCNC: 15 U/L
BACTERIA BLD CULT: NORMAL
BASOPHILS # BLD AUTO: 0.03 K/UL
BASOPHILS NFR BLD: 0.4 %
BILIRUB SERPL-MCNC: 0.4 MG/DL
BSA FOR ECHO PROCEDURE: 2.21 M2
BUN SERPL-MCNC: 47 MG/DL
CALCIUM SERPL-MCNC: 10.6 MG/DL
CHLORIDE SERPL-SCNC: 103 MMOL/L
CO2 SERPL-SCNC: 22 MMOL/L
CREAT SERPL-MCNC: 6.9 MG/DL
DIFFERENTIAL METHOD: ABNORMAL
EOSINOPHIL # BLD AUTO: 0.3 K/UL
EOSINOPHIL NFR BLD: 3.6 %
ERYTHROCYTE [DISTWIDTH] IN BLOOD BY AUTOMATED COUNT: 14.3 %
EST. GFR  (AFRICAN AMERICAN): 7.8 ML/MIN/1.73 M^2
EST. GFR  (NON AFRICAN AMERICAN): 6.8 ML/MIN/1.73 M^2
GLUCOSE SERPL-MCNC: 155 MG/DL
HCT VFR BLD AUTO: 29.8 %
HGB BLD-MCNC: 9.4 G/DL
IMM GRANULOCYTES # BLD AUTO: 0.05 K/UL
IMM GRANULOCYTES NFR BLD AUTO: 0.7 %
LYMPHOCYTES # BLD AUTO: 1 K/UL
LYMPHOCYTES NFR BLD: 13 %
MAGNESIUM SERPL-MCNC: 2.4 MG/DL
MCH RBC QN AUTO: 32.4 PG
MCHC RBC AUTO-ENTMCNC: 31.5 G/DL
MCV RBC AUTO: 103 FL
MONOCYTES # BLD AUTO: 1.4 K/UL
MONOCYTES NFR BLD: 18.8 %
NEUTROPHILS # BLD AUTO: 4.8 K/UL
NEUTROPHILS NFR BLD: 63.5 %
NRBC BLD-RTO: 0 /100 WBC
PLATELET # BLD AUTO: 144 K/UL
PMV BLD AUTO: 10.8 FL
POCT GLUCOSE: 106 MG/DL (ref 70–110)
POCT GLUCOSE: 136 MG/DL (ref 70–110)
POCT GLUCOSE: 146 MG/DL (ref 70–110)
POCT GLUCOSE: 178 MG/DL (ref 70–110)
POCT GLUCOSE: 234 MG/DL (ref 70–110)
POTASSIUM SERPL-SCNC: 4.5 MMOL/L
PROT SERPL-MCNC: 7.7 G/DL
PTH-INTACT SERPL-MCNC: 36 PG/ML
RBC # BLD AUTO: 2.9 M/UL
SODIUM SERPL-SCNC: 136 MMOL/L
WBC # BLD AUTO: 7.55 K/UL

## 2019-01-08 PROCEDURE — D9220A PRA ANESTHESIA: ICD-10-PCS | Mod: ANES,,, | Performed by: ANESTHESIOLOGY

## 2019-01-08 PROCEDURE — D9220A PRA ANESTHESIA: ICD-10-PCS | Mod: CRNA,,, | Performed by: NURSE ANESTHETIST, CERTIFIED REGISTERED

## 2019-01-08 PROCEDURE — 36415 COLL VENOUS BLD VENIPUNCTURE: CPT

## 2019-01-08 PROCEDURE — 99233 PR SUBSEQUENT HOSPITAL CARE,LEVL III: ICD-10-PCS | Mod: ,,, | Performed by: HOSPITALIST

## 2019-01-08 PROCEDURE — 25000003 PHARM REV CODE 250: Performed by: STUDENT IN AN ORGANIZED HEALTH CARE EDUCATION/TRAINING PROGRAM

## 2019-01-08 PROCEDURE — 63600175 PHARM REV CODE 636 W HCPCS: Performed by: NURSE ANESTHETIST, CERTIFIED REGISTERED

## 2019-01-08 PROCEDURE — 11000001 HC ACUTE MED/SURG PRIVATE ROOM

## 2019-01-08 PROCEDURE — 99223 1ST HOSP IP/OBS HIGH 75: CPT | Mod: ,,, | Performed by: INTERNAL MEDICINE

## 2019-01-08 PROCEDURE — D9220A PRA ANESTHESIA: Mod: CRNA,,, | Performed by: NURSE ANESTHETIST, CERTIFIED REGISTERED

## 2019-01-08 PROCEDURE — 80053 COMPREHEN METABOLIC PANEL: CPT

## 2019-01-08 PROCEDURE — 99233 PR SUBSEQUENT HOSPITAL CARE,LEVL III: ICD-10-PCS | Mod: ,,, | Performed by: PHYSICIAN ASSISTANT

## 2019-01-08 PROCEDURE — 99223 PR INITIAL HOSPITAL CARE,LEVL III: ICD-10-PCS | Mod: ,,, | Performed by: INTERNAL MEDICINE

## 2019-01-08 PROCEDURE — 25000003 PHARM REV CODE 250: Performed by: INTERNAL MEDICINE

## 2019-01-08 PROCEDURE — 85025 COMPLETE CBC W/AUTO DIFF WBC: CPT

## 2019-01-08 PROCEDURE — 99233 SBSQ HOSP IP/OBS HIGH 50: CPT | Mod: ,,, | Performed by: HOSPITALIST

## 2019-01-08 PROCEDURE — 83970 ASSAY OF PARATHORMONE: CPT

## 2019-01-08 PROCEDURE — D9220A PRA ANESTHESIA: Mod: ANES,,, | Performed by: ANESTHESIOLOGY

## 2019-01-08 PROCEDURE — 99233 SBSQ HOSP IP/OBS HIGH 50: CPT | Mod: ,,, | Performed by: PHYSICIAN ASSISTANT

## 2019-01-08 PROCEDURE — 83735 ASSAY OF MAGNESIUM: CPT

## 2019-01-08 PROCEDURE — 82962 GLUCOSE BLOOD TEST: CPT

## 2019-01-08 RX ORDER — PROPOFOL 10 MG/ML
VIAL (ML) INTRAVENOUS
Status: DISCONTINUED | OUTPATIENT
Start: 2019-01-08 | End: 2019-01-08

## 2019-01-08 RX ORDER — SODIUM CHLORIDE 0.9 % (FLUSH) 0.9 %
3 SYRINGE (ML) INJECTION
Status: DISCONTINUED | OUTPATIENT
Start: 2019-01-08 | End: 2019-01-09 | Stop reason: HOSPADM

## 2019-01-08 RX ORDER — LABETALOL 200 MG/1
200 TABLET, FILM COATED ORAL EVERY 12 HOURS
Status: DISCONTINUED | OUTPATIENT
Start: 2019-01-08 | End: 2019-01-09 | Stop reason: HOSPADM

## 2019-01-08 RX ORDER — FENTANYL CITRATE 50 UG/ML
25 INJECTION, SOLUTION INTRAMUSCULAR; INTRAVENOUS EVERY 5 MIN PRN
Status: DISCONTINUED | OUTPATIENT
Start: 2019-01-08 | End: 2019-01-09 | Stop reason: HOSPADM

## 2019-01-08 RX ORDER — ONDANSETRON 2 MG/ML
INJECTION INTRAMUSCULAR; INTRAVENOUS
Status: DISCONTINUED | OUTPATIENT
Start: 2019-01-08 | End: 2019-01-08

## 2019-01-08 RX ORDER — LIDOCAINE HCL/PF 100 MG/5ML
SYRINGE (ML) INTRAVENOUS
Status: DISCONTINUED | OUTPATIENT
Start: 2019-01-08 | End: 2019-01-08

## 2019-01-08 RX ADMIN — DOCUSATE SODIUM 100 MG: 100 CAPSULE, LIQUID FILLED ORAL at 08:01

## 2019-01-08 RX ADMIN — LIDOCAINE HYDROCHLORIDE 100 MG: 20 INJECTION, SOLUTION INTRAVENOUS at 09:01

## 2019-01-08 RX ADMIN — PROPOFOL 40 MG: 10 INJECTION, EMULSION INTRAVENOUS at 09:01

## 2019-01-08 RX ADMIN — SODIUM CHLORIDE: 0.9 INJECTION, SOLUTION INTRAVENOUS at 09:01

## 2019-01-08 RX ADMIN — NEOMYCIN, POLYMYXIN B SULFATES, DEXAMETHASONE: 1; 3.5; 1 OINTMENT OPHTHALMIC at 08:01

## 2019-01-08 RX ADMIN — NEOMYCIN, POLYMYXIN B SULFATES, DEXAMETHASONE: 1; 3.5; 1 OINTMENT OPHTHALMIC at 02:01

## 2019-01-08 RX ADMIN — AMLODIPINE BESYLATE 10 MG: 10 TABLET ORAL at 08:01

## 2019-01-08 RX ADMIN — KETOROLAC TROMETHAMINE 1 DROP: 5 SOLUTION OPHTHALMIC at 05:01

## 2019-01-08 RX ADMIN — PROPOFOL 30 MG: 10 INJECTION, EMULSION INTRAVENOUS at 10:01

## 2019-01-08 RX ADMIN — LABETALOL HCL 200 MG: 200 TABLET, FILM COATED ORAL at 09:01

## 2019-01-08 RX ADMIN — SEVELAMER CARBONATE 800 MG: 800 TABLET, FILM COATED ORAL at 05:01

## 2019-01-08 RX ADMIN — KETOROLAC TROMETHAMINE 1 DROP: 5 SOLUTION OPHTHALMIC at 02:01

## 2019-01-08 RX ADMIN — ONDANSETRON 4 MG: 2 INJECTION INTRAMUSCULAR; INTRAVENOUS at 10:01

## 2019-01-08 RX ADMIN — APIXABAN 10 MG: 5 TABLET, FILM COATED ORAL at 09:01

## 2019-01-08 RX ADMIN — NEOMYCIN, POLYMYXIN B SULFATES, DEXAMETHASONE: 1; 3.5; 1 OINTMENT OPHTHALMIC at 09:01

## 2019-01-08 RX ADMIN — SEVELAMER CARBONATE 800 MG: 800 TABLET, FILM COATED ORAL at 08:01

## 2019-01-08 RX ADMIN — KETOROLAC TROMETHAMINE 1 DROP: 5 SOLUTION OPHTHALMIC at 08:01

## 2019-01-08 RX ADMIN — PROPOFOL 30 MG: 10 INJECTION, EMULSION INTRAVENOUS at 09:01

## 2019-01-08 RX ADMIN — APIXABAN 10 MG: 5 TABLET, FILM COATED ORAL at 08:01

## 2019-01-08 RX ADMIN — SEVELAMER CARBONATE 800 MG: 800 TABLET, FILM COATED ORAL at 11:01

## 2019-01-08 RX ADMIN — INSULIN DETEMIR 10 UNITS: 100 INJECTION, SOLUTION SUBCUTANEOUS at 09:01

## 2019-01-08 RX ADMIN — FUROSEMIDE 40 MG: 40 TABLET ORAL at 08:01

## 2019-01-08 RX ADMIN — LATANOPROST 1 DROP: 50 SOLUTION OPHTHALMIC at 09:01

## 2019-01-08 RX ADMIN — KETOROLAC TROMETHAMINE 1 DROP: 5 SOLUTION OPHTHALMIC at 09:01

## 2019-01-08 RX ADMIN — PROPOFOL 50 MG: 10 INJECTION, EMULSION INTRAVENOUS at 09:01

## 2019-01-08 NOTE — PLAN OF CARE
Vss. sats 95% on room air.  Pt denies pain or sob.  Left av fistula with positive thrill and bruit.  Left chest wall incision drsg change done. New guaze and tape placed.  Pt's mother re updated by ep pacu rn.  md to update pt in room.  See flowsheet for full assessment. Tele box on and confirmed by tele tech pt on monitor prior to transport back to room 649.

## 2019-01-08 NOTE — PLAN OF CARE
Problem: Fall Injury Risk  Goal: Absence of Fall and Fall-Related Injury  Meds given as ordered tolerated well. No complaints of pain. No signs or symptoms of distress/discomfort noted. JEROME done this morning. Ambulated in romm to restroom. Will continue to monitor.

## 2019-01-08 NOTE — ANESTHESIA PREPROCEDURE EVALUATION
01/08/2019  Mary Waller is a 43 y.o., female  with PMH of DM, ESRD on HD MWF, HTN, and anemia of chronic disease who presented to the ED with fever and vomiting. She had a Permacath in the left subclavian vein that was used for dialysis. She has since switched to her AVF in the E. She has a h/o Permacath infection with one noted in May c/b MSSA bacteremia that required catheter exchange. ID is on board for MSSA bacteremia (BCx drawn 1/5 and 1/6), and the patient is on cefazolin. Her Permacath was removed yesterday. A TTE on 1/6 was concerning for MV vegetations, so ID recommends a JEROME. The patient is on Eliquis for a right IJ DVT (previous Permacath location).         Anesthesia Evaluation         Review of Systems  Anesthesia Hx:  No problems with previous Anesthesia   Social:  Non-Smoker    Cardiovascular:   Exercise tolerance: poor Hypertension    Pulmonary:  Pulmonary Normal    Renal/:   Chronic Renal Disease, ESRD, Dialysis    Neurological:   Blindness in left eye from T1 DM   Endocrine:   Diabetes, type 1, using insulin        Physical Exam  General:  Well nourished, Obesity    Airway/Jaw/Neck:  Airway Findings: Mouth Opening: Normal General Airway Assessment: Adult  Mallampati: III  Improves to II with phonation.  TM Distance: Normal, at least 6 cm  Jaw/Neck Findings:  Neck ROM: Normal ROM      Dental:  Dental Findings: In tact   Chest/Lungs:  Chest/Lungs Findings: Clear to auscultation         Mental Status:  Mental Status Findings:  Cooperative         Anesthesia Plan  Type of Anesthesia, risks & benefits discussed:  Anesthesia Type:  general  Patient's Preference:   Intra-op Monitoring Plan: standard ASA monitors  Intra-op Monitoring Plan Comments:   Post Op Pain Control Plan: multimodal analgesia  Post Op Pain Control Plan Comments:   Induction:   IV  Beta Blocker:  Patient is not  currently on a Beta-Blocker (No further documentation required).       Informed Consent: Patient understands risks and agrees with Anesthesia plan.  Questions answered. Anesthesia consent signed with patient.  ASA Score: 3     Day of Surgery Review of History & Physical:    H&P update referred to the provider.  H&P completed by Anesthesiologist.       Ready For Surgery From Anesthesia Perspective.

## 2019-01-08 NOTE — SUBJECTIVE & OBJECTIVE
Interval History:  Pt without complaints today. Perm-a-cath removed. JEROME done.    Review of Systems   Constitutional: Positive for activity change and appetite change. Negative for chills, fatigue and fever.   HENT: Negative for congestion and sinus pain.    Respiratory: Negative for cough, chest tightness and shortness of breath.    Cardiovascular: Negative for chest pain and palpitations.   Gastrointestinal: Negative for abdominal distention, abdominal pain, blood in stool, constipation, diarrhea, nausea and vomiting.   Genitourinary: Negative for dysuria and flank pain.   Musculoskeletal: Negative for arthralgias and myalgias.   Skin: Negative for color change and wound.   Neurological: Negative for dizziness, light-headedness and headaches.   Psychiatric/Behavioral: Negative for agitation.     Objective:     Vital Signs (Most Recent):  Temp: 97.1 °F (36.2 °C) (01/08/19 1214)  Pulse: 89 (01/08/19 1214)  Resp: 17 (01/08/19 1214)  BP: (!) 142/66 (01/08/19 1214)  SpO2: 100 % (01/08/19 1214) Vital Signs (24h Range):  Temp:  [97.1 °F (36.2 °C)-98.6 °F (37 °C)] 97.1 °F (36.2 °C)  Pulse:  [] 89  Resp:  [16-20] 17  SpO2:  [90 %-100 %] 100 %  BP: (125-169)/(59-72) 142/66     Weight: 103.6 kg (228 lb 6.3 oz)  Body mass index is 38.01 kg/m².    Estimated Creatinine Clearance: 12.8 mL/min (A) (based on SCr of 6.9 mg/dL (H)).    Physical Exam   Constitutional: She is oriented to person, place, and time. She appears well-developed and well-nourished. No distress.       HENT:   Head: Normocephalic.   Neck: Normal range of motion.   Cardiovascular: Normal rate, regular rhythm and normal heart sounds.   Pulmonary/Chest: Effort normal and breath sounds normal. No stridor. No respiratory distress. She has no wheezes.   Abdominal: Soft. Bowel sounds are normal. She exhibits no distension. There is no tenderness. There is no guarding.   Musculoskeletal: Normal range of motion.   Neurological: She is alert and oriented to  person, place, and time.   Skin: Skin is warm and dry. Capillary refill takes less than 2 seconds. She is not diaphoretic.       Significant Labs:   Blood Culture:   Recent Labs   Lab 01/05/19  1001 01/05/19  1413 01/06/19  1029 01/07/19  1329 01/07/19  1330   LABBLOO Gram stain moe bottle: Gram positive cocci in clusters resembling Staph   Results called to and read back by: Mehrdad Zhao RN 01/06/2019  01:32  Gram stain aer bottle: Gram positive cocci in clusters resembling Staph   Positive results previously called 01/06/2019  02:17  STAPHYLOCOCCUS AUREUS  ID consult required at [facility].  For susceptibility see order #[id number]   Gram stain aer bottle: Gram positive cocci in clusters resembling Staph   Results called to and read back by: Mehrdad Zhao RN 01/06/2019  06:28  STAPHYLOCOCCUS AUREUS  For susceptibility see order #[id number]   Gram stain aer bottle: Gram positive cocci in clusters resembling Staph   Results called to and read back by: Xenia Moore RN  01/07/2019    05:04  STAPHYLOCOCCUS AUREUS  ID consult required at [facility].  For susceptibility see order #[id number]    Gram stain aer bottle: Gram positive cocci in clusters resembling Staph   Results called to and read back by:Britton Shukla RN 01/07/2019  10:30  STAPHYLOCOCCUS AUREUS  ID consult required at [facility].  For susceptibility see order #[id number]   No Growth to date  No Growth to date No Growth to date  No Growth to date     BMP:   Recent Labs   Lab 01/08/19  1248   *      K 4.5      CO2 22*   BUN 47*   CREATININE 6.9*   CALCIUM 10.6*   MG 2.4     CBC:   Recent Labs   Lab 01/07/19  0438 01/08/19  1248   WBC 11.85 7.55   HGB 9.5* 9.4*   HCT 30.2* 29.8*   * 144*     CMP:   Recent Labs   Lab 01/07/19  0438 01/08/19  1248   * 136   K 4.2 4.5   CL 95  103   CO2 26 22*   GLU 99 155*   BUN 60* 47*   CREATININE 8.4* 6.9*   CALCIUM 10.9* 10.6*   PROT 7.3 7.7   ALBUMIN 2.5* 2.6*   BILITOT 0.6 0.4   ALKPHOS 53* 62   AST 14 15   ALT 19 16   ANIONGAP 14 11   EGFRNONAA 5.4* 6.8*     Microbiology Results (last 7 days)     Procedure Component Value Units Date/Time    Blood culture [[id number]] Collected:  01/07/19 1330    Order Status:  Completed Specimen:  Blood Updated:  01/08/19 1422     Blood Culture, Routine No Growth to date     Blood Culture, Routine No Growth to date    Narrative:       2 separate line 30 minutes apart  Thank you!  Collection has been rescheduled by FB at 01/07/2019 09:58 Reason:   patient in dialysis will check back  Collection has been rescheduled by FB at 01/07/2019 09:58 Reason:   patient in dialysis will check back    Blood culture [[id number]] Collected:  01/07/19 1329    Order Status:  Completed Specimen:  Blood Updated:  01/08/19 1422     Blood Culture, Routine No Growth to date     Blood Culture, Routine No Growth to date    Narrative:       Collection has been rescheduled by FB at 01/07/2019 09:58 Reason:   patient in dialysis will check back  Collection has been rescheduled by FB at 01/07/2019 09:58 Reason:   patient in dialysis will check back    Blood culture x two cultures. Draw prior to antibiotics. [[id number]] Collected:  01/05/19 1001    Order Status:  Completed Specimen:  Blood from Peripheral, Antecubital, Right Updated:  01/08/19 0836     Blood Culture, Routine Gram stain moe bottle: Gram positive cocci in clusters resembling Staph      Blood Culture, Routine Results called to and read back by: Mehrdad Zhao RN 01/06/2019  01:32     Blood Culture, Routine Gram stain aer bottle: Gram positive cocci in clusters resembling Staph      Blood Culture, Routine Positive results previously called 01/06/2019  02:17     Blood Culture, Routine --     STAPHYLOCOCCUS AUREUS  ID consult required at [facility] [address] and [place]    locations.  For susceptibility see order #[id number]      Narrative:       Aerobic and anaerobic    Blood culture [[id number]] Collected:  01/05/19 1413    Order Status:  Completed Specimen:  Catheter from Peripheral, Forearm, Right Updated:  01/08/19 0836     Blood Culture, Routine Gram stain aer bottle: Gram positive cocci in clusters resembling Staph      Blood Culture, Routine Results called to and read back by: Mehrdad Zhao RN 01/06/2019  06:28     Blood Culture, Routine --     STAPHYLOCOCCUS AUREUS  For susceptibility see order #[id number]      Narrative:       Blood Line cultures    Blood culture [[id number]] Collected:  01/06/19 1029    Order Status:  Completed Specimen:  Blood Updated:  01/08/19 0816     Blood Culture, Routine Gram stain aer bottle: Gram positive cocci in clusters resembling Staph      Blood Culture, Routine Results called to and read back by: Xenia Moore RN  01/07/2019       Blood Culture, Routine 05:04     Blood Culture, Routine --     STAPHYLOCOCCUS AUREUS  ID consult required at [facility]   locations.  For susceptibility see order #[id number]      Blood culture [[id number]] Collected:  01/06/19 1029    Order Status:  Completed Specimen:  Blood Updated:  01/08/19 0816     Blood Culture, Routine Gram stain aer bottle: Gram positive cocci in clusters resembling Staph      Blood Culture, Routine Results called to and read back by:Britton Shukla RN 01/07/2019  10:30     Blood Culture, Routine --     STAPHYLOCOCCUS AUREUS  ID consult required at [facility]   locations.  For susceptibility see order #[id number]      Blood culture x two cultures. Draw prior to antibiotics. [[id number]]  (Susceptibility) Collected:  01/05/19 0924    Order Status:  Completed Specimen:  Blood from Peripheral, Hand, Right Updated:  01/07/19 1110     Blood Culture, Routine Gram stain moe bottle: Gram positive cocci in clusters resembling Staph       Blood Culture, Routine Results called to and read back by: Mehrdad Zhao RN 01/05/2019  23:10     Blood Culture, Routine Gram stain aer bottle: Gram positive cocci in clusters resembling Staph      Blood Culture, Routine Positive results previously called 01/06/2019  06:15     Blood Culture, Routine --     STAPHYLOCOCCUS AUREUS  ID consult required at [facility],[place],[facility] and [facility]   locations.      Narrative:       Aerobic and anaerobic    Blood culture [[id number]]     Order Status:  Canceled Specimen:  Catheter         Pathology Results  (Last 10 years)    None        Wound Culture: No results for input(s): LABAERO in the last 4320 hours.  All pertinent labs within the past 24 hours have been reviewed.  Recent Lab Results       01/08/19  1248   01/08/19  1218   01/08/19  1029   01/08/19  1018   01/08/19  0856        Immature Granulocytes 0.7             Immature Grans (Abs) 0.05  Comment:  Mild elevation in immature granulocytes is non specific and   can be seen in a variety of conditions including stress response,   acute inflammation, trauma and pregnancy. Correlation with other   laboratory and clinical findings is essential.               Albumin 2.6             Alkaline Phosphatase 62             ALT 16             Anion Gap 11             AST 15             Baso # 0.03             Basophil% 0.4             Total Bilirubin 0.4  Comment:  For infants and newborns, interpretation of results should be based  on gestational age, weight and in agreement with clinical  observations.  Premature Infant recommended reference ranges:  Up to 24 hours.............<8.0 mg/dL  Up to 48 hours............<12.0 mg/dL  3-5 days..................<15.0 mg/dL  6-29 days.................<15.0 mg/dL               BSA       2.21       BUN, Bld 47             Calcium 10.6             Chloride 103             CO2 22             Creatinine 6.9             Differential Method Automated             eGFR if   American 7.8             eGFR if non  6.8  Comment:  Calculation used to obtain the estimated glomerular filtration  rate (eGFR) is the CKD-EPI equation.                Eos # 0.3             Eosinophil% 3.6             Glucose 155             Gran # (ANC) 4.8             Gran% 63.5             Hematocrit 29.8             Hemoglobin 9.4             Lymph # 1.0             Lymph% 13.0             Magnesium 2.4             MCH 32.4             MCHC 31.5                          Mono # 1.4             Mono% 18.8             MPV 10.8             nRBC 0             Platelets 144             POCT Glucose   146 136   106     Potassium 4.5             Total Protein 7.7             PTH 36.0             RBC 2.90             RDW 14.3             Sodium 136             WBC 7.55                              01/07/19  1728        Immature Granulocytes       Immature Grans (Abs)       Albumin       Alkaline Phosphatase       ALT       Anion Gap       AST       Baso #       Basophil%       Total Bilirubin       BSA       BUN, Bld       Calcium       Chloride       CO2       Creatinine       Differential Method       eGFR if        eGFR if non        Eos #       Eosinophil%       Glucose       Gran # (ANC)       Gran%       Hematocrit       Hemoglobin       Lymph #       Lymph%       Magnesium       MCH       MCHC       MCV       Mono #       Mono%       MPV       nRBC       Platelets       POCT Glucose 92     Potassium       Total Protein       PTH       RBC       RDW       Sodium       WBC             Significant Imaging: I have reviewed all pertinent imaging results/findings within the past 24 hours.

## 2019-01-08 NOTE — PROGRESS NOTES
Pt's mom updated over phone by ep pacu rn. Verbalizes understanding. Will call back when pt returns to room once released from anesthesia.

## 2019-01-08 NOTE — CONSULTS
TRANSESOPHAGEAL ECHOCARDIOGRAPHY   PRE-PROCEDURE NOTE    01/08/2019    HPI:     Mary Waller is a 42 y/o female with PMH of DM, ESRD on HD MWF, HTN, and anemia of chronic disease who presented to the ED with fever and vomiting. She had a Permacath in the left subclavian vein that was used for dialysis. She has since switched to her AVF in the E. She has a h/o Permacath infection with one noted in May c/b MSSA bacteremia that required catheter exchange. ID is on board for MSSA bacteremia (BCx drawn 1/5 and 1/6), and the patient is on cefazolin. Her Permacath was removed yesterday. A TTE on 1/6 was concerning for MV vegetations, so ID recommends a JEROME. The patient is on Eliquis for a right IJ DVT (previous Permacath location).     TTE 1/6  · Normal left ventricular systolic function. The estimated ejection fraction is 65%  · Markedly calcified mitral annulus. Echodensity under the posterior mitral valve leaflet. Cannot rule out vegetation.  · Concentric left ventricular remodeling.  · Severe left atrial enlargement.  · Indeterminate left ventricular diastolic function.  · Normal right ventricular systolic function.  · There is right ventricular hypertrophy.  · The estimated PA systolic pressure is 12 mm Hg  · Normal central venous pressure (3 mm Hg).  · Trivial circumferential pericardial effusion.    Dysphagia or odynophagia:  No  Liver Disease, esophageal disease, or known varices:  No  Upper GI Bleeding: No  Snoring:  No  Sleep Apnea:  No  Prior neck surgery or radiation:  No  History of anesthetic difficulties:  No  Family history of anesthetic difficulties:  No  Last oral intake:  12 hours ago  Able to move neck in all directions:  Yes      Meds:     Scheduled Meds:   amLODIPine  10 mg Oral Daily    apixaban  10 mg Oral BID    ceFAZolin (ANCEF) IVPB  2 g Intravenous Every Mon, Wed, Fri    docusate sodium  100 mg Oral BID    furosemide  40 mg Oral Daily    insulin detemir U-100  10 Units  Subcutaneous QHS    ketorolac 0.5%  1 drop Right Eye QID    latanoprost  1 drop Right Eye QHS    neomycin-polymyxin-dexamethasone   Left Eye TID    sevelamer carbonate  800 mg Oral TID WM     PRN Meds:sodium chloride 0.9%, acetaminophen, dextrose 50%, insulin aspart U-100, ondansetron, sodium chloride 0.9%  Continuous Infusions:    Physical Exam:     Vitals:  Temp:  [97.6 °F (36.4 °C)-99.4 °F (37.4 °C)]   Pulse:  []   Resp:  [16-20]   BP: (114-179)/(49-78)   SpO2:  [93 %-96 %]        Constitutional: NAD, conversant  HEENT:   Sclera anicteric, Uvula midline, EOMI, OP clear  Neck:               No JVD, moves to all direction without any limitations  CV:               RRR, no murmurs / rubs / gallops, normal S1/S2  Pulm:               CTAB, no wheezes, rales, or ronchi  GI:               Abdomen soft, NTND, +BS  Extremities:              No LE edema, warm with palpable pulses  Neuro:   AAOX3, no focal motor deficits    Labs:     Recent Results (from the past 336 hour(s))   CBC auto differential    Collection Time: 01/07/19  4:38 AM   Result Value Ref Range    WBC 11.85 3.90 - 12.70 K/uL    Hemoglobin 9.5 (L) 12.0 - 16.0 g/dL    Hematocrit 30.2 (L) 37.0 - 48.5 %    Platelets 144 (L) 150 - 350 K/uL   CBC auto differential    Collection Time: 01/06/19  5:25 AM   Result Value Ref Range    WBC 21.77 (H) 3.90 - 12.70 K/uL    Hemoglobin 10.5 (L) 12.0 - 16.0 g/dL    Hematocrit 35.5 (L) 37.0 - 48.5 %    Platelets 154 150 - 350 K/uL   CBC auto differential    Collection Time: 01/05/19  9:24 AM   Result Value Ref Range    WBC 21.04 (H) 3.90 - 12.70 K/uL    Hemoglobin 10.8 (L) 12.0 - 16.0 g/dL    Hematocrit 32.8 (L) 37.0 - 48.5 %    Platelets 174 150 - 350 K/uL       No results found for this or any previous visit (from the past 336 hour(s)).    Estimated Creatinine Clearance: 10.5 mL/min (A) (based on SCr of 8.4 mg/dL (H)).      Assessment & Plan:     Staphylococcus aureus bacteremia  - JEROME to r/o IE    -The risks,  benefits & alternatives of the procedure were explained to the patient.    -The risks of transesophageal echo include but are not limited to:  Dental trauma, esophageal trauma/perforation, bleeding, laryngospasm/brochospasm, aspiration, sore throat/hoarseness, & dislodgement of the endotracheal tube/nasogastric tube (where applicable).    -The risks of moderate sedation include hypotension, respiratory depression, arrhythmias, bronchospasm, & death.    -Informed consent was obtained & the patient is agreeable to proceed with the procedure.    I will discuss with the attending physician. Attending addendum is to follow.     Further recommendations per attending addendum

## 2019-01-08 NOTE — NURSING TRANSFER
Nursing Transfer Note      1/8/2019     Transfer To: ep pacu 2 to 649a    Transfer via stretcher    Transfer with cardiac monitoring. Tele box on. Confirmed by tele tech    Transported by hospital transporter    Medicines sent: none    Chart send with patient: Yes    Notified: mother    Patient reassessed at: 1/8/19 1100    Upon arrival to floor: cardiac monitor applied, patient oriented to room, call bell in reach and bed in lowest position

## 2019-01-08 NOTE — TRANSFER OF CARE
"Anesthesia Transfer of Care Note    Patient: Mary Waller    Procedure(s) Performed: Procedure(s) (LRB):  ECHOCARDIOGRAM,TRANSESOPHAGEAL (N/A)    Patient location: [facility]    Anesthesia Type: general    Transport from OR: Transported from OR on 100% O2 by closed face mask with adequate spontaneous ventilation    Post pain: adequate analgesia    Post assessment: no apparent anesthetic complications    Post vital signs: stable    Level of consciousness: awake    Nausea/Vomiting: no nausea/vomiting    Complications: none    Transfer of care protocol was followed      Last vitals:   Visit Vitals  BP (!) 163/70 (BP Location: Right arm, Patient Position: Sitting)   Pulse 92   Temp 36.8 °C (98.2 °F) (Oral)   Resp 18   Ht 5' 5" (1.651 m)   Wt 103.6 kg (228 lb 6.3 oz)   SpO2 (!) 94%   Breastfeeding? No   BMI 38.01 kg/m²     "

## 2019-01-08 NOTE — PROGRESS NOTES
[facility]  Infectious Disease  Progress Note    Patient Name: Mary Waller  MRN: [id number]  Admission Date: 1/5/2019  Length of Stay: 3 days  Attending Physician: Peewee Alcanatr IV, MD  Primary Care Provider: Bryan Murray MD    Isolation Status: No active isolations  Assessment/Plan:      Staphylococcus aureus bacteremia    Mary Waller is a 42 yo female with a PMHx of DM, ESRD on MWF dialysis since April 2018, HTN, Anemia of chronic disease, glaucoma, and blindness (L eye) presented to the ED with F,N,V w associated chills, sweats.  Patient has central catheter (permacath) in place in the left subclavian. She was previously receiving dialysis through this, but recently began receiving treatment through her AV fistula in the E. Of note, pt has a hx of permacath infection in May, 2018 complicated by MSSA bacteremia requiring catheter exchange.      Blood cxs positive for MSSA. Switched from Vanc to Cefazolin Repeat blood cultures 1/6 are positive.  Repeat 1/7-NGTD. She underwent removal of permacath on 1/6.  Afebrile, leukocytosis resolved.  TTE with possible mitral vavle vegetations. JEROME w/ Evidence of necrotic (caseous) lesions on the mitral valve. Cannot rule out vegetation.      -Continue cefazolin 2g after dialysis (2-2-3g M-W-F)   - repeat blood cultures from 1/7-NGTD  - JEROME concerning as cannot rule out vegetation.  Will treat pt for endocarditis.  -Pt will need 6 weeks IV abx from clearance of blood cx. (Est end date 2/18/19 if blood cx from 1/7 remains clear)  - ESR, CRP, CBC, and CMP need to be drawn weekly as an outpatient with results faxed to the ID Department [phone]  -She will need ID f/u in 2 weeks  -ID will sign off.  Will monitor cultures from afar to ensure clearance.           Thank you for your consult. I will sign off. Please contact us if you have any additional questions.    Marcell Pinto PA-C  Infectious Disease  [facility]    Subjective:     Principal Problem:Sepsis    HPI: Mary Waller is a 43 year old female with a PMHx of DM, ESRD on MWF dialysis since April 2018, HTN, Anemia of chronic disease, glaucoma, and blindness (L eye) who presents to the ED with fever, nausea, and vomiting that began overnight.  Patient reports chills, sweats, non-productive cough, nausea with 1 episode of vomiting and 1 episode of loose stool that began around 2:00 a.m. She states that there was a nurse sick with a cough at her dialysis treatment center.  She has not taken anything for her symptoms. Patient still makes urine, and denies dysuria, headache, dark or bloody stool, myalgias, chest pain, or shortness of breath. Patient has central catheter (permacath) in place in the left subclavian. She was previously receiving dialysis through this, but recently began receiving treatment through her AV fistula in the left upper arm. She reports that the catheter is scheduled to be removed early next week at [facility].   Blood cxs- Staph aureus susceptibility pending     Of note, pt has a recent hx of permacath infection in May, 2018 complicated by MSSA bacteremia.          Interval History:  Pt without complaints today. Perm-a-cath removed. JEROME done.    Review of Systems   Constitutional: Positive for activity change and appetite change. Negative for chills, fatigue and fever.   HENT: Negative for congestion and sinus pain.    Respiratory: Negative for cough, chest tightness and shortness of breath.    Cardiovascular: Negative for chest pain and palpitations.   Gastrointestinal: Negative for abdominal distention, abdominal pain, blood in stool, constipation, diarrhea, nausea and vomiting.   Genitourinary: Negative for dysuria and flank pain.   Musculoskeletal: Negative for arthralgias and myalgias.   Skin: Negative for color change and wound.   Neurological: Negative for dizziness, light-headedness and headaches.   Psychiatric/Behavioral: Negative  for agitation.     Objective:     Vital Signs (Most Recent):  Temp: 97.1 °F (36.2 °C) (01/08/19 1214)  Pulse: 89 (01/08/19 1214)  Resp: 17 (01/08/19 1214)  BP: (!) 142/66 (01/08/19 1214)  SpO2: 100 % (01/08/19 1214) Vital Signs (24h Range):  Temp:  [97.1 °F (36.2 °C)-98.6 °F (37 °C)] 97.1 °F (36.2 °C)  Pulse:  [] 89  Resp:  [16-20] 17  SpO2:  [90 %-100 %] 100 %  BP: (125-169)/(59-72) 142/66     Weight: 103.6 kg (228 lb 6.3 oz)  Body mass index is 38.01 kg/m².    Estimated Creatinine Clearance: 12.8 mL/min (A) (based on SCr of 6.9 mg/dL (H)).    Physical Exam   Constitutional: She is oriented to person, place, and time. She appears well-developed and well-nourished. No distress.       HENT:   Head: Normocephalic.   Neck: Normal range of motion.   Cardiovascular: Normal rate, regular rhythm and normal heart sounds.   Pulmonary/Chest: Effort normal and breath sounds normal. No stridor. No respiratory distress. She has no wheezes.   Abdominal: Soft. Bowel sounds are normal. She exhibits no distension. There is no tenderness. There is no guarding.   Musculoskeletal: Normal range of motion.   Neurological: She is alert and oriented to person, place, and time.   Skin: Skin is warm and dry. Capillary refill takes less than 2 seconds. She is not diaphoretic.       Significant Labs:   Blood Culture:   Recent Labs   Lab 01/05/19  1001 01/05/19  1413 01/06/19  1029 01/07/19  1329 01/07/19  1330   LABBLOO Gram stain moe bottle: Gram positive cocci in clusters resembling Staph   Results called to and read back by: Mehrdad Zhao RN 01/06/2019  01:32  Gram stain aer bottle: Gram positive cocci in clusters resembling Staph   Positive results previously called 01/06/2019  02:17  STAPHYLOCOCCUS AUREUS  ID consult required at [facility] [place] and [facility].  For susceptibility see order #[id number]   Gram stain aer bottle: Gram positive cocci in clusters resembling Staph   Results called to and read  back by: Mehrdad Zhao RN 01/06/2019  06:28  STAPHYLOCOCCUS AUREUS  For susceptibility see order #[id number]   Gram stain aer bottle: Gram positive cocci in clusters resembling Staph   Results called to and read back by: Xenia Moore RN  01/07/2019    05:04  STAPHYLOCOCCUS AUREUS  ID consult required at [facility] and [facility].  For susceptibility see order #[id number]    Gram stain aer bottle: Gram positive cocci in clusters resembling Staph   Results called to and read back by:Britton Shukla RN 01/07/2019  10:30  STAPHYLOCOCCUS AUREUS  ID consult required at [facility] and [facility].  For susceptibility see order #[id number]   No Growth to date  No Growth to date No Growth to date  No Growth to date     BMP:   Recent Labs   Lab 01/08/19  1248   *      K 4.5      CO2 22*   BUN 47*   CREATININE 6.9*   CALCIUM 10.6*   MG 2.4     CBC:   Recent Labs   Lab 01/07/19  0438 01/08/19  1248   WBC 11.85 7.55   HGB 9.5* 9.4*   HCT 30.2* 29.8*   * 144*     CMP:   Recent Labs   Lab 01/07/19  0438 01/08/19  1248   * 136   K 4.2 4.5   CL 95 103   CO2 26 22*   GLU 99 155*   BUN 60* 47*   CREATININE 8.4* 6.9*   CALCIUM 10.9* 10.6*   PROT 7.3 7.7   ALBUMIN 2.5* 2.6*   BILITOT 0.6 0.4   ALKPHOS 53* 62   AST 14 15   ALT 19 16   ANIONGAP 14 11   EGFRNONAA 5.4* 6.8*     Microbiology Results (last 7 days)     Procedure Component Value Units Date/Time    Blood culture [[id number]] Collected:  01/07/19 1330    Order Status:  Completed Specimen:  Blood Updated:  01/08/19 1422     Blood Culture, Routine No Growth to date     Blood Culture, Routine No Growth to date    Narrative:       2 separate line 30 minutes apart  Thank you!  Collection has been rescheduled by FB at 01/07/2019 09:58 Reason:   patient in dialysis will check back  Collection has been rescheduled by FB at 01/07/2019 09:58 Reason:   patient in dialysis will check back     Blood culture [[id number]] Collected:  01/07/19 1329    Order Status:  Completed Specimen:  Blood Updated:  01/08/19 1422     Blood Culture, Routine No Growth to date     Blood Culture, Routine No Growth to date    Narrative:       Collection has been rescheduled by FB at 01/07/2019 09:58 Reason:   patient in dialysis will check back  Collection has been rescheduled by FB at 01/07/2019 09:58 Reason:   patient in dialysis will check back    Blood culture x two cultures. Draw prior to antibiotics. [[id number]] Collected:  01/05/19 1001    Order Status:  Completed Specimen:  Blood from Peripheral, Antecubital, Right Updated:  01/08/19 0836     Blood Culture, Routine Gram stain moe bottle: Gram positive cocci in clusters resembling Staph      Blood Culture, Routine Results called to and read back by: Mehrdad Zhao RN 01/06/2019  01:32     Blood Culture, Routine Gram stain aer bottle: Gram positive cocci in clusters resembling Staph      Blood Culture, Routine Positive results previously called 01/06/2019  02:17     Blood Culture, Routine --     STAPHYLOCOCCUS AUREUS  ID consult required at [facility] and [facility].  For susceptibility see order #[id number]      Narrative:       Aerobic and anaerobic    Blood culture [[id number]] Collected:  01/05/19 1413    Order Status:  Completed Specimen:  Catheter from Peripheral, Forearm, Right Updated:  01/08/19 0836     Blood Culture, Routine Gram stain aer bottle: Gram positive cocci in clusters resembling Staph      Blood Culture, Routine Results called to and read back by: Mehrdad Zhao RN 01/06/2019  06:28     Blood Culture, Routine --     STAPHYLOCOCCUS AUREUS  For susceptibility see order #[id number]      Narrative:       Blood Line cultures    Blood culture [[id number]] Collected:  01/06/19 1029    Order Status:  Completed Specimen:  Blood Updated:  01/08/19 0816     Blood Culture, Routine Gram stain aer bottle: Gram positive cocci in  clusters resembling Staph      Blood Culture, Routine Results called to and read back by: Xenia Moore RN  01/07/2019       Blood Culture, Routine 05:04     Blood Culture, Routine --     STAPHYLOCOCCUS AUREUS  ID consult required at [facility].  For susceptibility see order #[id number]      Blood culture [[id number]] Collected:  01/06/19 1029    Order Status:  Completed Specimen:  Blood Updated:  01/08/19 0816     Blood Culture, Routine Gram stain aer bottle: Gram positive cocci in clusters resembling Staph      Blood Culture, Routine Results called to and read back by:Britton Shukla RN 01/07/2019  10:30     Blood Culture, Routine --     STAPHYLOCOCCUS AUREUS  ID consult required at [facility].  For susceptibility see order #[id number]      Blood culture x two cultures. Draw prior to antibiotics. [[id number]]  (Susceptibility) Collected:  01/05/19 0924    Order Status:  Completed Specimen:  Blood from Peripheral, Hand, Right Updated:  01/07/19 1110     Blood Culture, Routine Gram stain moe bottle: Gram positive cocci in clusters resembling Staph      Blood Culture, Routine Results called to and read back by: Mehrdad Zhao RN 01/05/2019  23:10     Blood Culture, Routine Gram stain aer bottle: Gram positive cocci in clusters resembling Staph      Blood Culture, Routine Positive results previously called 01/06/2019  06:15     Blood Culture, Routine --     STAPHYLOCOCCUS AUREUS  ID consult required at [facility].      Narrative:       Aerobic and anaerobic    Blood culture [[id number]]     Order Status:  Canceled Specimen:  Catheter         Pathology Results  (Last 10 years)    None        Wound Culture: No results for input(s): LABAERO in the last 4320 hours.  All pertinent labs within the past 24 hours have been reviewed.  Recent Lab Results       01/08/19  1248   01/08/19  1218    01/08/19  1029   01/08/19  1018   01/08/19  0856        Immature Granulocytes 0.7             Immature Grans (Abs) 0.05  Comment:  Mild elevation in immature granulocytes is non specific and   can be seen in a variety of conditions including stress response,   acute inflammation, trauma and pregnancy. Correlation with other   laboratory and clinical findings is essential.               Albumin 2.6             Alkaline Phosphatase 62             ALT 16             Anion Gap 11             AST 15             Baso # 0.03             Basophil% 0.4             Total Bilirubin 0.4  Comment:  For infants and newborns, interpretation of results should be based  on gestational age, weight and in agreement with clinical  observations.  Premature Infant recommended reference ranges:  Up to 24 hours.............<8.0 mg/dL  Up to 48 hours............<12.0 mg/dL  3-5 days..................<15.0 mg/dL  6-29 days.................<15.0 mg/dL               BSA       2.21       BUN, Bld 47             Calcium 10.6             Chloride 103             CO2 22             Creatinine 6.9             Differential Method Automated             eGFR if  7.8             eGFR if non  6.8  Comment:  Calculation used to obtain the estimated glomerular filtration  rate (eGFR) is the CKD-EPI equation.                Eos # 0.3             Eosinophil% 3.6             Glucose 155             Gran # (ANC) 4.8             Gran% 63.5             Hematocrit 29.8             Hemoglobin 9.4             Lymph # 1.0             Lymph% 13.0             Magnesium 2.4             MCH 32.4             MCHC 31.5                          Mono # 1.4             Mono% 18.8             MPV 10.8             nRBC 0             Platelets 144             POCT Glucose   146 136   106     Potassium 4.5             Total Protein 7.7             PTH 36.0             RBC 2.90             RDW 14.3             Sodium 136             WBC  7.55                              01/07/19  1728        Immature Granulocytes       Immature Grans (Abs)       Albumin       Alkaline Phosphatase       ALT       Anion Gap       AST       Baso #       Basophil%       Total Bilirubin       BSA       BUN, Bld       Calcium       Chloride       CO2       Creatinine       Differential Method       eGFR if        eGFR if non        Eos #       Eosinophil%       Glucose       Gran # (ANC)       Gran%       Hematocrit       Hemoglobin       Lymph #       Lymph%       Magnesium       MCH       MCHC       MCV       Mono #       Mono%       MPV       nRBC       Platelets       POCT Glucose 92     Potassium       Total Protein       PTH       RBC       RDW       Sodium       WBC             Significant Imaging: I have reviewed all pertinent imaging results/findings within the past 24 hours.

## 2019-01-08 NOTE — ASSESSMENT & PLAN NOTE
Mary Waller is a 42 yo female with a PMHx of DM, ESRD on MWF dialysis since April 2018, HTN, Anemia of chronic disease, glaucoma, and blindness (L eye) presented to the ED with F,N,V w associated chills, sweats.  Patient has central catheter (permacath) in place in the left subclavian. She was previously receiving dialysis through this, but recently began receiving treatment through her AV fistula in the E. Of note, pt has a hx of permacath infection in May, 2018 complicated by MSSA bacteremia requiring catheter exchange.      Blood cxs positive for MSSA. Switched from Vanc to Cefazolin Repeat blood cultures 1/6 are positive.  Repeat 1/7-NGTD. She underwent removal of permacath on 1/6.  Afebrile, leukocytosis resolved.  TTE with possible mitral vavle vegetations. JEROME w/ Evidence of necrotic (caseous) lesions on the mitral valve. Cannot rule out vegetation.      -Continue cefazolin 2g after dialysis (2-2-3g M-W-F)   - repeat blood cultures from 1/7-NGTD  - JEROME concerning as cannot rule out vegetation.  Will treat pt for endocarditis.  -Pt will need 6 weeks IV abx from clearance of blood cx. (Est end date 2/18/19 if blood cx from 1/7 remains clear)  - ESR, CRP, CBC, and CMP need to be drawn weekly as an outpatient with results faxed to the ID Department [phone]  -She will need ID f/u in 2 weeks  -ID will sign off.  Will monitor cultures from afar to ensure clearance.

## 2019-01-09 VITALS
OXYGEN SATURATION: 98 % | DIASTOLIC BLOOD PRESSURE: 68 MMHG | RESPIRATION RATE: 18 BRPM | HEART RATE: 92 BPM | BODY MASS INDEX: 38.05 KG/M2 | HEIGHT: 65 IN | SYSTOLIC BLOOD PRESSURE: 137 MMHG | TEMPERATURE: 99 F | WEIGHT: 228.38 LBS

## 2019-01-09 PROBLEM — A41.9 SEPSIS: Status: RESOLVED | Noted: 2019-01-05 | Resolved: 2019-01-09

## 2019-01-09 PROBLEM — I33.0 ACUTE BACTERIAL ENDOCARDITIS: Status: ACTIVE | Noted: 2019-01-09

## 2019-01-09 LAB
ALBUMIN SERPL BCP-MCNC: 2.4 G/DL
ALP SERPL-CCNC: 61 U/L
ALT SERPL W/O P-5'-P-CCNC: 10 U/L
ANION GAP SERPL CALC-SCNC: 9 MMOL/L
AST SERPL-CCNC: 14 U/L
BACTERIA BLD CULT: NORMAL
BASOPHILS # BLD AUTO: 0.02 K/UL
BASOPHILS NFR BLD: 0.2 %
BILIRUB SERPL-MCNC: 0.5 MG/DL
BUN SERPL-MCNC: 56 MG/DL
CALCIUM SERPL-MCNC: 10.6 MG/DL
CHLORIDE SERPL-SCNC: 105 MMOL/L
CO2 SERPL-SCNC: 22 MMOL/L
CREAT SERPL-MCNC: 8.2 MG/DL
DIFFERENTIAL METHOD: ABNORMAL
EOSINOPHIL # BLD AUTO: 0.3 K/UL
EOSINOPHIL NFR BLD: 3.5 %
ERYTHROCYTE [DISTWIDTH] IN BLOOD BY AUTOMATED COUNT: 14.5 %
EST. GFR  (AFRICAN AMERICAN): 6.4 ML/MIN/1.73 M^2
EST. GFR  (NON AFRICAN AMERICAN): 5.5 ML/MIN/1.73 M^2
GLUCOSE SERPL-MCNC: 111 MG/DL
HCT VFR BLD AUTO: 28.6 %
HGB BLD-MCNC: 9.1 G/DL
IMM GRANULOCYTES # BLD AUTO: 0.05 K/UL
IMM GRANULOCYTES NFR BLD AUTO: 0.6 %
LYMPHOCYTES # BLD AUTO: 1.4 K/UL
LYMPHOCYTES NFR BLD: 17.6 %
MAGNESIUM SERPL-MCNC: 2.5 MG/DL
MCH RBC QN AUTO: 33.2 PG
MCHC RBC AUTO-ENTMCNC: 31.8 G/DL
MCV RBC AUTO: 104 FL
MONOCYTES # BLD AUTO: 1.3 K/UL
MONOCYTES NFR BLD: 15.8 %
NEUTROPHILS # BLD AUTO: 5 K/UL
NEUTROPHILS NFR BLD: 62.3 %
NRBC BLD-RTO: 0 /100 WBC
PLATELET # BLD AUTO: 155 K/UL
PMV BLD AUTO: 10.7 FL
POCT GLUCOSE: 127 MG/DL (ref 70–110)
POCT GLUCOSE: 91 MG/DL (ref 70–110)
POCT GLUCOSE: 92 MG/DL (ref 70–110)
POTASSIUM SERPL-SCNC: 4.7 MMOL/L
PROT SERPL-MCNC: 7.3 G/DL
RBC # BLD AUTO: 2.74 M/UL
SODIUM SERPL-SCNC: 136 MMOL/L
WBC # BLD AUTO: 8.05 K/UL

## 2019-01-09 PROCEDURE — 80053 COMPREHEN METABOLIC PANEL: CPT

## 2019-01-09 PROCEDURE — 90935 PR HEMODIALYSIS, ONE EVALUATION: ICD-10-PCS | Mod: ,,, | Performed by: NURSE PRACTITIONER

## 2019-01-09 PROCEDURE — 36415 COLL VENOUS BLD VENIPUNCTURE: CPT

## 2019-01-09 PROCEDURE — 99239 HOSP IP/OBS DSCHRG MGMT >30: CPT | Mod: ,,, | Performed by: HOSPITALIST

## 2019-01-09 PROCEDURE — 90935 HEMODIALYSIS ONE EVALUATION: CPT | Mod: ,,, | Performed by: NURSE PRACTITIONER

## 2019-01-09 PROCEDURE — 25000003 PHARM REV CODE 250: Performed by: NURSE PRACTITIONER

## 2019-01-09 PROCEDURE — 85025 COMPLETE CBC W/AUTO DIFF WBC: CPT

## 2019-01-09 PROCEDURE — 90935 HEMODIALYSIS ONE EVALUATION: CPT

## 2019-01-09 PROCEDURE — 63600175 PHARM REV CODE 636 W HCPCS: Performed by: STUDENT IN AN ORGANIZED HEALTH CARE EDUCATION/TRAINING PROGRAM

## 2019-01-09 PROCEDURE — 83735 ASSAY OF MAGNESIUM: CPT

## 2019-01-09 PROCEDURE — 25000003 PHARM REV CODE 250: Performed by: STUDENT IN AN ORGANIZED HEALTH CARE EDUCATION/TRAINING PROGRAM

## 2019-01-09 PROCEDURE — 99239 PR HOSPITAL DISCHARGE DAY,>30 MIN: ICD-10-PCS | Mod: ,,, | Performed by: HOSPITALIST

## 2019-01-09 RX ORDER — CEFAZOLIN SODIUM 1 G/3ML
2 INJECTION, POWDER, FOR SOLUTION INTRAMUSCULAR; INTRAVENOUS
Status: DISCONTINUED | OUTPATIENT
Start: 2019-01-09 | End: 2019-01-09 | Stop reason: HOSPADM

## 2019-01-09 RX ORDER — CEFAZOLIN SODIUM 1 G/3ML
INJECTION, POWDER, FOR SOLUTION INTRAMUSCULAR; INTRAVENOUS
Qty: 42 VIAL | Refills: 0 | Status: SHIPPED | OUTPATIENT
Start: 2019-01-09 | End: 2019-01-09

## 2019-01-09 RX ORDER — CEFAZOLIN SODIUM 1 G/3ML
INJECTION, POWDER, FOR SOLUTION INTRAMUSCULAR; INTRAVENOUS
Qty: 42 VIAL | Refills: 0 | Status: SHIPPED | OUTPATIENT
Start: 2019-01-09 | End: 2019-02-18

## 2019-01-09 RX ORDER — SODIUM CHLORIDE 9 MG/ML
INJECTION, SOLUTION INTRAVENOUS ONCE
Status: COMPLETED | OUTPATIENT
Start: 2019-01-09 | End: 2019-01-09

## 2019-01-09 RX ADMIN — SEVELAMER CARBONATE 800 MG: 800 TABLET, FILM COATED ORAL at 01:01

## 2019-01-09 RX ADMIN — CEFAZOLIN 2 G: 1 INJECTION, POWDER, FOR SOLUTION INTRAMUSCULAR; INTRAVENOUS at 02:01

## 2019-01-09 RX ADMIN — AMLODIPINE BESYLATE 10 MG: 10 TABLET ORAL at 01:01

## 2019-01-09 RX ADMIN — SODIUM CHLORIDE 350 ML: 0.9 INJECTION, SOLUTION INTRAVENOUS at 09:01

## 2019-01-09 RX ADMIN — FUROSEMIDE 40 MG: 40 TABLET ORAL at 01:01

## 2019-01-09 RX ADMIN — KETOROLAC TROMETHAMINE 1 DROP: 5 SOLUTION OPHTHALMIC at 01:01

## 2019-01-09 NOTE — PROGRESS NOTES
[facility] Hospital Medicine  Progress Note    Patient Name: Mary Waller  MRN: [id number]  Patient Class: IP- Inpatient   Admission Date: 1/5/2019  Length of Stay: 3 days  Attending Physician: Peewee Alcantar IV, MD  Primary Care Provider: Bryan Murray MD    [facility] Medicine Team: [facility] HOSP MED 5 Mason Gardner MD    Subjective:     Principal Problem:Sepsis    HPI:  Mary Waller is a 43 year old female with a PMHx of DM, ESRD on MWF dialysis since April 2018, HTN, Anemia of chronic disease, glaucoma, and blindness (L eye) who presents to the ED with fever, nausea, and vomiting that began overnight. Patient reports chills, sweats, non-productive cough, nausea with 1 episode of vomiting and 1 episode of loose stool that began around 2:00 a.m. She states that there was a nurse sick with a cough at her dialysis treatment center.  She has not taken anything for her symptoms. Patient still makes urine, and denies dysuria, headache, dark or bloody stool, myalgias, chest pain, or shortness of breath. Patient has central catheter (permacath) in place in the left subclavian. She was previously receiving dialysis through this, but recently began receiving treatment through her AV fistula in the left upper arm. She reports that the catheter is scheduled to be removed early next week at [facility].     Of note, pt has a recent hx of permacath infection in May, 2018 complicated by MSSA bacteremia.     Hospital Course:  Presents to the ED with F,N,V w associated chills, sweats.  Patient has central catheter (permacath) in place in the left subclavian. She was previously receiving dialysis through this, but recently began receiving treatment through her AV fistula in the left upper arm. She reports that the catheter is scheduled to be removed early next week at [facility]. Of note, pt has a recent hx of permacath infection in May, 2018 complicated by MSSA bacteremia. Vanc/Zosyn began in the ED. Blood cx  positive for staph aureus. General Surgery consulted and they were able to remove permacath. DVT incidentally found in the R IJ on U/S to assess for patency of AV fistula, which is asymptomatic. Therapeutic AC with Apixaban started for treatment of partially occlusive DVT.     Interval History (01/08/2019): Patient remained afebrile overnight. Had received Cefozolin with dialysis yesterday.Endorses night vidales, chills overnight with antibiotic administration. Denies hives, rash. Patient echocardiogram was suspicious mitral valve lesion and JEROME scheduled for today. JEROME results consistent with 1.2 cm calcified lesion on mitral valve with possible concern for endocarditis.     Review of Systems   Constitutional: Positive for activity change and chills. Negative for fatigue and fever.   HENT: Negative for congestion, postnasal drip, sinus pressure, sneezing and sore throat.    Eyes: Negative for visual disturbance.   Respiratory: Negative for cough, shortness of breath and wheezing.    Cardiovascular: Negative for chest pain, palpitations and leg swelling.   Gastrointestinal: Negative for abdominal distention, abdominal pain, constipation, diarrhea, nausea and vomiting.   Endocrine: Negative for polyuria.   Genitourinary: Negative for dysuria, flank pain and hematuria.   Musculoskeletal: Negative for arthralgias, gait problem and joint swelling.   Skin: Negative for rash and wound.   Neurological: Negative for tremors and weakness.   Hematological: Negative for adenopathy. Does not bruise/bleed easily.     Objective:     Vital Signs (Most Recent):  Temp: 99.3 °F (37.4 °C) (01/08/19 1640)  Pulse: 99 (01/08/19 1640)  Resp: 17 (01/08/19 1640)  BP: (!) 160/69 (01/08/19 1640)  SpO2: 97 % (01/08/19 1640) Vital Signs (24h Range):  Temp:  [97.1 °F (36.2 °C)-99.3 °F (37.4 °C)] 99.3 °F (37.4 °C)  Pulse:  [] 99  Resp:  [16-20] 17  SpO2:  [90 %-100 %] 97 %  BP: (125-163)/(59-70) 160/69     Weight: 103.6 kg (228 lb 6.3  oz)  Body mass index is 38.01 kg/m².    Intake/Output Summary (Last 24 hours) at 1/8/2019 1843  Last data filed at 1/8/2019 1400  Gross per 24 hour   Intake 0 ml   Output --   Net 0 ml      Physical Exam   Constitutional: She is oriented to person, place, and time. She appears well-developed and well-nourished. No distress.   HENT:   Mouth/Throat: Oropharynx is clear and moist.   Eyes: Conjunctivae are normal. Pupils are equal, round, and reactive to light. No scleral icterus.   Neck: Normal range of motion. Neck supple. No JVD present. No tracheal deviation present.   Cardiovascular: Normal rate, regular rhythm, normal heart sounds and intact distal pulses. Exam reveals no gallop and no friction rub.   No murmur heard.  Pulmonary/Chest: Effort normal and breath sounds normal. No stridor. No respiratory distress. She has no wheezes. She has no rales.   Abdominal: Soft. Bowel sounds are normal. She exhibits no distension and no mass. There is no tenderness. There is no guarding.   Musculoskeletal: Normal range of motion. She exhibits no edema, tenderness or deformity.   Left upper extremity fistula   Lymphadenopathy:     She has no cervical adenopathy.   Neurological: She is alert and oriented to person, place, and time.   Skin: Skin is warm and dry. Capillary refill takes less than 2 seconds. She is not diaphoretic. No erythema.     Significant Labs:   CBC:   Recent Labs   Lab 01/07/19  0438 01/08/19  1248   WBC 11.85 7.55   HGB 9.5* 9.4*   HCT 30.2* 29.8*   * 144*     CMP:   Recent Labs   Lab 01/07/19  0438 01/08/19  1248   * 136   K 4.2 4.5   CL 95 103   CO2 26 22*   GLU 99 155*   BUN 60* 47*   CREATININE 8.4* 6.9*   CALCIUM 10.9* 10.6*   PROT 7.3 7.7   ALBUMIN 2.5* 2.6*   BILITOT 0.6 0.4   ALKPHOS 53* 62   AST 14 15   ALT 19 16   ANIONGAP 14 11   EGFRNONAA 5.4* 6.8*       Significant Imaging: I have reviewed all pertinent imaging results/findings within the past 24 hours.    Assessment/Plan:       Infective Endocarditis    -- Initial presentation: fever, chills, maliase  -- Hx of R sided permacath infection complicated by MSSA bacteremia in May, 2018; now with L sided permacath that has been scheduled for removal at [facility] next week as she now has completed a full dialysis session with newly matured L arm AV fistula  -- Tmax 102.7 upon arrival; leukocytosis   -- CXR without signs of any acute pulmonary process. Not concerning for any developing pneumonia. UA negative for infectious process.   -- Initial lactate and repeat (negative)  -- Received 500cc NS bolus in ED  Plan  -- Blood cx positive for MSSA. F/U repeat blood cx daily.  -- permacath removed because of the concern for line sepsis   Had received Cefozolin with dialysis yesterday.Endorses night vidales, chills overnight with antibiotic administration. Denies hives, rash. Patient echocardiogram was suspicious mitral valve lesion and JEROME scheduled for today. JEROME results consistent with 1.2 cm calcified lesion on mitral valve with possible concern for endocarditis.    Plan:  -  Continue cefazolin 2g after dialysis (2-2-3g M-W-F)   - repeat blood cultures from 1/7-NGTD  - JEROME concerning as cannot rule out vegetation.  Will treat pt for endocarditis.  -Pt will need 6 weeks IV abx from clearance of blood cx. (Est end date 2/18/19 if blood cx from 1/7 remains clear)  - ESR, CRP, CBC, and CMP need to be drawn weekly as an outpatient with results faxed to the ID Department at [phone]  - ID f/u in 2 weeks     Acute deep vein thrombosis (DVT) of upper extremity    -- RUE ultrasound reveals partially occlusive thrombus in the right internal jugular vein.  -- Recent hx of Permacath in R IJ likely contributory  -- Pt asymptomatic  Plan  -- Continue therapeutic apixaban at 10mg BID to treat DVT (day # 4) will continue it for 7 days and then 5 mg BID for 11 weeks for provoked DVT ( s/p cathter insertion)     Staphylococcus aureus bacteremia    -- 3/3 Blood cx  growing Staph aureus  -- ID Consulted  -- General surgery consulted and permacath removed  Plan    -- Discontinue Zosyn  -- Continue Vancomycin after HD, F/U random Vanc post dialysis.  -- Follow susceptibilities of cultures and tailor abx accordingly.  -- Repeat Blood Cultures x2 until clearance  -- ID will continue to follow  -- TTE concerning for mitral valve vegetation.     Glaucoma    -- Resume home eyedrops       ESRD (end stage renal disease)    -- Began dialysis in April 2018  -- Has Permacath on (L) chest as well as an AV fistula in the (L) arm  -- As the AV fistula has now matured, it has recently been utilized for dialysis, and the permacath is scheduled for removal at [facility] next week.  -- Hx of Permacath infection in (R) chest in May, 2018 complicated by MSSA bacteremia.   -- Current Permacath does not appear erythematous, and is without purulence or any discharge  -- Last dialysis appointment completed yesterday without incident  -- Electrolyte wnl  -- Does not appear fluid overloaded at this time  -- Pt does still make some urine. On Furosemide 40mg BID  -- Pt followed by Nephrologist Dr. Sweet at [facility]  Plan  -- Nephrology consult for dialysis while inpatient   -- Dialysis 3X/week  -- Daily BMP's  -- LUE ultrasound shows patency of the L AV fistula     Type 2 diabetes mellitus    -- Pt reports taking 15 units insulin long acting daily  -- Medication list does not reflect this but does show Tradjenta po 5mg daily  Plan  -- Will continue on LDSSI and reassess as pt currently with poor po intake.  -- Well-controlled at this time.       Essential hypertension    -- Current home anti-hypertensives include Labetalol 200mg BID, Losartan 50mg qd, Amlodipine 10mg qd, and Hydralazine 100mg tid.  -- Will resume BP meds, as blood pressure tolerates.        VTE Risk Mitigation (From admission, onward)        Ordered     apixaban tablet 10 mg  2 times daily      01/05/19 1701     IP VTE HIGH RISK PATIENT  Once       01/05/19 1237        Mason Gardner MD   Internal Medicine PGY II  Department of Hospital Medicine [facility]

## 2019-01-09 NOTE — ANESTHESIA RELEASE NOTE
"Anesthesia Release from PACU Note    Patient: Mary Waller    Procedure(s) Performed: Procedure(s) (LRB):  ECHOCARDIOGRAM,TRANSESOPHAGEAL (N/A)    Anesthesia type: general    Post pain: Adequate analgesia    Post assessment: no apparent anesthetic complications    Last Vitals:   Visit Vitals  BP (!) 181/78 (BP Location: Right arm, Patient Position: Lying)   Pulse 86   Temp 36.7 °C (98 °F) (Tympanic)   Resp 18   Ht 5' 5" (1.651 m)   Wt 103.6 kg (228 lb 6.3 oz)   SpO2 98%   Breastfeeding? No   BMI 38.01 kg/m²       Post vital signs: stable    Level of consciousness: awake    Nausea/Vomiting: no nausea/no vomiting    Complications: none    Airway Patency: patent    Respiratory: unassisted    Cardiovascular: stable and blood pressure at baseline    Hydration: euvolemic  "

## 2019-01-09 NOTE — DISCHARGE SUMMARY
[facility] Hospital Medicine  Discharge Summary      Patient Name: Mary Waller  MRN: [id number]  Admission Date: 1/5/2019  Hospital Length of Stay: 4 days  Discharge Date and Time:  01/09/2019 12:57 PM  Attending Physician: Peewee Alcantar IV, MD   Discharging Provider: King Faulkner MD  Primary Care Provider: Bryan Murray MD  [facility] Medicine Team: [facility] HOSP MED 5 King Faulkner MD    HPI:   Mary Waller is a 43 year old female with a PMHx of DM, ESRD on MWF dialysis since April 2018, HTN, Anemia of chronic disease, glaucoma, and blindness (L eye) who presents to the ED with fever, nausea, and vomiting that began overnight.  Patient reports chills, sweats, non-productive cough, nausea with 1 episode of vomiting and 1 episode of loose stool that began around 2:00 a.m. She states that there was a nurse sick with a cough at her dialysis treatment center.  She has not taken anything for her symptoms. Patient still makes urine, and denies dysuria, headache, dark or bloody stool, myalgias, chest pain, or shortness of breath. Patient has central catheter (permacath) in place in the left subclavian. She was previously receiving dialysis through this, but recently began receiving treatment through her AV fistula in the left upper arm. She reports that the catheter is scheduled to be removed early next week at [facility].     Of note, pt has a recent hx of permacath infection in May, 2018 complicated by MSSA bacteremia.         Procedure(s) (LRB):  ECHOCARDIOGRAM,TRANSESOPHAGEAL (N/A)      Hospital Course:   Presents to the ED with F,N,V w associated chills  and sweats. Admitted for sepsis. Patient has central catheter (permacath) in place in the left subclavian. She was previously receiving dialysis through this, but recently began receiving treatment through her AV fistula in the left upper arm. She reports that the catheter is scheduled to be removed early next week at [facility]. Of note,  pt has a recent hx of permacath infection in May, 2018 complicated by MSSA bacteremia and infective endocarditis. Vanc/Zosyn began in the ED. General Surgery consulted and they were able to remove permacath. DVT incidentally found in the R IJ on U/S to assess for patency of AV fistula, which is asymptomatic. Therapeutic AC with Apixaban started for treatment of partially occlusive DVT. Pt's cultures speciated and was found to have MSSA bacteremia, and JEROME with large round calcified mass attached to the posterior MV leaflet with central echolucent space measuring 1.2 x 0.9 cm that is consistent with caseous necrosis, a variant of Mitral annular calcification. An old healed vegetation cannot be ruled out. There are also small nodular calcium deposits seen on the posterior MV leaflet. With these findings, we will treat the pt for infective endocarditis and she will receive 6 weeks of IV Cefazolin to be administered with dialysis treatments. Pt received HD according to her normal MWF schedule while inpatient without incident. At this time the leukocytosis has resolved, pt is afebrile, and labs are wnl. We will discharge pt home to resume home medications and to complete 6 weeks of IV Cefazolin following HD sessions and 12 weeks of AC on Eliquis for provoked DVT. She will f/u with her PCP along with the rest of her care team at [facility]. ID referral given.      Consults:   Consults (From admission, onward)        Status Ordering Provider     Inpatient consult to General Surgery  Once     Provider:  (Not yet assigned)    Completed SREE CARARNZA     Inpatient consult to Infectious Diseases  Once     Provider:  (Not yet assigned)    Completed CHANDU CARLOS     Inpatient consult to Nephrology  Once     Provider:  (Not yet assigned)    SREE Donald          No new Assessment & Plan notes have been filed under this hospital service since the last note was generated.  Service: [facility]  Medicine    Final Active Diagnoses:    Diagnosis Date Noted POA    PRINCIPAL PROBLEM:  Sepsis [A41.9] 01/05/2019 Yes    Bacteremia associated with intravascular line [T82.7XXA, R78.81]  Yes    Staphylococcus aureus bacteremia [R78.81] 01/06/2019 Unknown    Acute deep vein thrombosis (DVT) of upper extremity [I82.629] 01/06/2019 Unknown    Essential hypertension [I10] 01/05/2019 Unknown    Type 2 diabetes mellitus [E11.9] 01/05/2019 Unknown    ESRD (end stage renal disease) [N18.6] 01/05/2019 Unknown    Glaucoma [H40.9] 01/05/2019 Unknown      Problems Resolved During this Admission:       Discharged Condition: stable    Disposition:     Follow Up:  Follow-up Information     Bryan Murray MD.    Specialty:  General Practice  Why:  appt scheduled for 1/10/19 1015a  Contact information:  [address]  [place] [address] [phone]             [facility].    Why:  dialysis appointment for 1/11/19 at 1115a  Contact information:  [address] [facility] [address]  [phone]               Patient Instructions:      Ambulatory Referral to Infectious Disease   Referral Priority: Routine Referral Type: Consultation   Referral Reason: Specialty Services Required   Requested Specialty: Infectious Diseases   Number of Visits Requested: 1       Significant Diagnostic Studies:   Labs:   BMP:   Recent Labs   Lab 01/08/19  1248 01/09/19  0553   * 111*    136   K 4.5 4.7    105   CO2 22* 22*   BUN 47* 56*   CREATININE 6.9* 8.2*   CALCIUM 10.6* 10.6*   MG 2.4 2.5   , CMP   Recent Labs   Lab 01/08/19  1248 01/09/19  0553    136   K 4.5 4.7    105   CO2 22* 22*   * 111*   BUN 47* 56*   CREATININE 6.9* 8.2*   CALCIUM 10.6* 10.6*   PROT 7.7 7.3   ALBUMIN 2.6* 2.4*   BILITOT 0.4 0.5   ALKPHOS 62 61   AST 15 14   ALT 16 10   ANIONGAP 11 9   ESTGFRAFRICA 7.8* 6.4*   EGFRNONAA 6.8* 5.5*   , CBC   Recent Labs   Lab 01/08/19  1248 01/09/19  0553   WBC 7.55 8.05    HGB 9.4* 9.1*   HCT 29.8* 28.6*   * 155   , A1C:   Recent Labs   Lab 01/06/19  0525   HGBA1C 5.5    and All labs within the past 24 hours have been reviewed    Specimen (12h ago, onward)    None          Pending Diagnostic Studies:     None         Medications:  Reconciled Home Medications:      Medication List      START taking these medications    * apixaban 5 mg (74 tabs) Dspk  Commonly known as:  ELIQUIS  For the first 7 days take two 5 mg tablets twice daily.  After 7 days take one 5 mg tablet twice daily. (Follow package directions)     * apixaban 5 mg Tab  Commonly known as:  ELIQUIS  Take 1 tablet (5 mg total) by mouth 2 (two) times daily. Start this medication once starter pack is completed.     ceFAZolin 1 gram injection  Commonly known as:  ANCEF  Please give 2 grams IV every Monday/Wednesday and 3 grams every Friday after HD. End date: 2/18/19.         * This list has 2 medication(s) that are the same as other medications prescribed for you. Read the directions carefully, and ask your doctor or other care provider to review them with you.            CONTINUE taking these medications    amLODIPine 10 MG tablet  Commonly known as:  NORVASC  Take 10 mg by mouth once daily.     ATROPINE SULFATE (PF) OPHT  Apply 1 drop into left eye twice daily     BASAGLAR KWIKPEN U-100 INSULIN glargine 100 units/mL (3mL) SubQ pen  Generic drug:  insulin  Inject 15 Units into the skin every evening.     brimonidine 0.2% 0.2 % Drop  Commonly known as:  ALPHAGAN  Instill 1 drop into right eye 3 times daily     brinzolamide 1 % ophthalmic suspension  Commonly known as:  AZOPT  Place 1 drop into the right eye 2 (two) times daily.     calcitRIOL 0.5 MCG Cap  Commonly known as:  ROCALTROL  Take 0.5 mcg by mouth once daily.     docusate sodium 100 MG capsule  Commonly known as:  COLACE  Take 100 mg by mouth Daily.     dorzolamide-timolol 2-0.5% 22.3-6.8 mg/mL ophthalmic solution  Commonly known as:  COSOPT  Place 1 drop  into the right eye 2 (two) times daily.     furosemide 40 MG tablet  Commonly known as:  LASIX  Take 40 mg by mouth once daily.     hydrALAZINE 50 MG tablet  Commonly known as:  APRESOLINE  Take 100 mg by mouth every 8 (eight) hours.     ketorolac 0.5% 0.5 % Drop  Commonly known as:  ACULAR  Instill 1 drop into right eye 4 times daily     labetalol 200 MG tablet  Commonly known as:  NORMODYNE  Take 400 mg by mouth 2 (two) times daily.     latanoprost 0.005 % ophthalmic solution  Place 1 drop into the right eye every evening.     losartan 100 MG tablet  Commonly known as:  COZAAR  Take 100 mg by mouth once daily.     neomycin-polymyxin-dexamethasone 3.5 mg/g-10,000 unit/g-0.1 % Oint  Commonly known as:  DEXACINE  Apply 1 application into left eye 3 times daily     prednisoLONE acetate 1 % Drps  Commonly known as:  PRED FORTE  Place 1 drop into the right eye 3 (three) times daily.     RHOPRESSA 0.02 % Drop  Generic drug:  netarsudil  Instill 1 drop into right eye daily     sevelamer carbonate 800 mg Tab  Commonly known as:  RENVELA  Take 800 mg by mouth 3 (three) times daily with meals.     TRADJENTA ORAL  Take 5 mg by mouth once daily.     VITAMIN B-12 500 MCG tablet  Generic drug:  cyanocobalamin  Take 500 mcg by mouth once daily.        STOP taking these medications    calcium acetate 667 mg capsule  Commonly known as:  PHOSLO            Indwelling Lines/Drains at time of discharge:   Lines/Drains/Airways     Drain                 Hemodialysis AV Fistula Left upper arm -- days                Time spent on the discharge of patient: 35 minutes  Patient was seen and examined on the date of discharge and determined to be suitable for discharge.         King Faulkner MD  Department of Hospital Medicine  [facility]

## 2019-01-09 NOTE — PLAN OF CARE
[facility]      Patient Name: Mary Waller  YOB: 1975    PCP: Bryan Murray MD   [facility]  PCP Phone Number: [phone]  PCP Fax: [phone]    Encounter Date: 01/09/2019    Diagnoses:  Active Hospital Problems    Diagnosis  POA    *Acute bacterial endocarditis [I33.0]  Yes    Bacteremia associated with intravascular line [T82.7XXA, R78.81]  Yes    Staphylococcus aureus bacteremia [R78.81]  Yes    Acute deep vein thrombosis (DVT) of upper extremity [I82.629]  Yes    Essential hypertension [I10]  Yes    Type 2 diabetes mellitus [E11.9]  Yes    ESRD (end stage renal disease) [N18.6]  Yes    Glaucoma [H40.9]  Yes      Resolved Hospital Problems    Diagnosis Date Resolved POA    Sepsis [A41.9] 01/09/2019 Yes       Future Appointments   Date Time Provider Department Center   1/23/2019  9:00 AM Candy Felix PA-C [facility] ID [facility]     Follow-up Information     Bryan Murray MD.    Specialty:  General Practice  Why:  appt scheduled for 1/10/19 1015a  Contact information:  [place] [address]  [phone]             [facility].    Why:  dialysis appointment for 1/11/19 at 1115a  Contact information:  [address]  [facility] [address]  [phone]               Nursing:   ESR, CRP, CBC, and CMP need to be drawn weekly as an outpatient with results faxed to the [facility] [phone]    Medications: Review discharge medications with patient and family and provide education.      Current Discharge Medication List      START taking these medications    Details   apixaban (ELIQUIS) 5 mg (74 tabs) DsPk For the first 7 days take two 5 mg tablets twice daily.  After 7 days take one 5 mg tablet twice daily. (Follow package directions)  Qty: 74 tablet, Refills: 0      apixaban (ELIQUIS) 5 mg Tab Take 1 tablet (5 mg total) by mouth 2 (two) times daily. Start this medication  once starter pack is completed.  Qty: 60 tablet, Refills: 4      ceFAZolin (ANCEF) 1 gram injection Please give 2 grams IV every Monday/Wednesday and 3 grams every Friday after HD. End date: 2/18/19.  Qty: 42 vial, Refills: 0         CONTINUE these medications which have NOT CHANGED    Details   amLODIPine (NORVASC) 10 MG tablet Take 10 mg by mouth once daily.      ATROPINE SULFATE, PF, OPHT Apply 1 drop into left eye twice daily      brimonidine 0.2% (ALPHAGAN) 0.2 % Drop Instill 1 drop into right eye 3 times daily      brinzolamide (AZOPT) 1 % ophthalmic suspension Place 1 drop into the right eye 2 (two) times daily.      calcitRIOL (ROCALTROL) 0.5 MCG Cap Take 0.5 mcg by mouth once daily.      cyanocobalamin (VITAMIN B-12) 500 MCG tablet Take 500 mcg by mouth once daily.      docusate sodium (COLACE) 100 MG capsule Take 100 mg by mouth Daily.       dorzolamide-timolol 2-0.5% (COSOPT) 22.3-6.8 mg/mL ophthalmic solution Place 1 drop into the right eye 2 (two) times daily.      furosemide (LASIX) 40 MG tablet Take 40 mg by mouth once daily.       hydrALAZINE (APRESOLINE) 50 MG tablet Take 100 mg by mouth every 8 (eight) hours.       insulin (BASAGLAR KWIKPEN U-100 INSULIN) glargine 100 units/mL (3mL) SubQ pen Inject 15 Units into the skin every evening.      ketorolac 0.5% (ACULAR) 0.5 % Drop Instill 1 drop into right eye 4 times daily      labetalol (NORMODYNE) 200 MG tablet Take 400 mg by mouth 2 (two) times daily.       latanoprost 0.005 % ophthalmic solution Place 1 drop into the right eye every evening.      linagliptin (TRADJENTA ORAL) Take 5 mg by mouth once daily.      losartan (COZAAR) 100 MG tablet Take 100 mg by mouth once daily.      neomycin-polymyxin-dexamethasone (DEXACINE) 3.5 mg/g-10,000 unit/g-0.1 % Oint Apply 1 application into left eye 3 times daily      netarsudil (RHOPRESSA) 0.02 % Drop Instill 1 drop into right eye daily      prednisoLONE acetate (PRED FORTE) 1 % DrpS Place 1 drop into the  right eye 3 (three) times daily.      sevelamer carbonate (RENVELA) 800 mg Tab Take 800 mg by mouth 3 (three) times daily with meals.          STOP taking these medications       calcium acetate (PHOSLO) 667 mg capsule Comments:   Reason for Stopping:

## 2019-01-09 NOTE — SUBJECTIVE & OBJECTIVE
Interval History: Patient remained afebrile overnight. Had received Cefozolin with dialysis yesterday.Endorses night vidales, chills overnight with antibiotic administration. Denies hives, rash. Patient echocardiogram was suspicious mitral valve lesion and JEROME scheduled for today. JEROME results consistent with 1.2 cm calcified lesion on mitral valve with possible concern for endocarditis.     Review of Systems   Constitutional: Positive for activity change and chills. Negative for fatigue and fever.   HENT: Negative for congestion, postnasal drip, sinus pressure, sneezing and sore throat.    Eyes: Negative for visual disturbance.   Respiratory: Negative for cough, shortness of breath and wheezing.    Cardiovascular: Negative for chest pain, palpitations and leg swelling.   Gastrointestinal: Negative for abdominal distention, abdominal pain, constipation, diarrhea, nausea and vomiting.   Endocrine: Negative for polyuria.   Genitourinary: Negative for dysuria, flank pain and hematuria.   Musculoskeletal: Negative for arthralgias, gait problem and joint swelling.   Skin: Negative for rash and wound.   Neurological: Negative for tremors and weakness.   Hematological: Negative for adenopathy. Does not bruise/bleed easily.     Objective:     Vital Signs (Most Recent):  Temp: 99.3 °F (37.4 °C) (01/08/19 1640)  Pulse: 99 (01/08/19 1640)  Resp: 17 (01/08/19 1640)  BP: (!) 160/69 (01/08/19 1640)  SpO2: 97 % (01/08/19 1640) Vital Signs (24h Range):  Temp:  [97.1 °F (36.2 °C)-99.3 °F (37.4 °C)] 99.3 °F (37.4 °C)  Pulse:  [] 99  Resp:  [16-20] 17  SpO2:  [90 %-100 %] 97 %  BP: (125-163)/(59-70) 160/69     Weight: 103.6 kg (228 lb 6.3 oz)  Body mass index is 38.01 kg/m².    Intake/Output Summary (Last 24 hours) at 1/8/2019 0770  Last data filed at 1/8/2019 1400  Gross per 24 hour   Intake 0 ml   Output --   Net 0 ml      Physical Exam   Constitutional: She is oriented to person, place, and time. She appears well-developed and  well-nourished. No distress.   HENT:   Mouth/Throat: Oropharynx is clear and moist.   Eyes: Conjunctivae are normal. Pupils are equal, round, and reactive to light. No scleral icterus.   Neck: Normal range of motion. Neck supple. No JVD present. No tracheal deviation present.   Cardiovascular: Normal rate, regular rhythm, normal heart sounds and intact distal pulses. Exam reveals no gallop and no friction rub.   No murmur heard.  Pulmonary/Chest: Effort normal and breath sounds normal. No stridor. No respiratory distress. She has no wheezes. She has no rales.   Abdominal: Soft. Bowel sounds are normal. She exhibits no distension and no mass. There is no tenderness. There is no guarding.   Musculoskeletal: Normal range of motion. She exhibits no edema, tenderness or deformity.   Left upper extremity fistula   Lymphadenopathy:     She has no cervical adenopathy.   Neurological: She is alert and oriented to person, place, and time.   Skin: Skin is warm and dry. Capillary refill takes less than 2 seconds. She is not diaphoretic. No erythema.       Significant Labs:   CBC:   Recent Labs   Lab 01/07/19  0438 01/08/19  1248   WBC 11.85 7.55   HGB 9.5* 9.4*   HCT 30.2* 29.8*   * 144*     CMP:   Recent Labs   Lab 01/07/19  0438 01/08/19  1248   * 136   K 4.2 4.5   CL 95 103   CO2 26 22*   GLU 99 155*   BUN 60* 47*   CREATININE 8.4* 6.9*   CALCIUM 10.9* 10.6*   PROT 7.3 7.7   ALBUMIN 2.5* 2.6*   BILITOT 0.6 0.4   ALKPHOS 53* 62   AST 14 15   ALT 19 16   ANIONGAP 14 11   EGFRNONAA 5.4* 6.8*       Significant Imaging: I have reviewed all pertinent imaging results/findings within the past 24 hours.

## 2019-01-09 NOTE — PROGRESS NOTES
[facility]     Patient currently on hemodialysis for removal of uremic toxins and volume.     Patient seen and evaluated on hemodialysis, tolerating treatment, see HD flowsheet for vitals and assessments.      Ultrafiltration goal is 3L     Labs have been reviewed and the dialysate bath has been adjusted.     Assessment/Plan:  Seen on dialysis this morning, tolerating well with no complaints.  Will continue iHD while in-patient.    MARLENE Lopez, FNP-BC  Nephrology  Pager:  [phone]

## 2019-01-09 NOTE — ANESTHESIA POSTPROCEDURE EVALUATION
"Anesthesia Post Evaluation    Patient: Mary Waller    Procedure(s) Performed: Procedure(s) (LRB):  ECHOCARDIOGRAM,TRANSESOPHAGEAL (N/A)    Final Anesthesia Type: general  Patient location during evaluation: PACU  Patient participation: Yes- Able to Participate  Level of consciousness: awake and alert  Post-procedure vital signs: reviewed and stable  Pain management: adequate  Airway patency: patent  PONV status at discharge: No PONV  Anesthetic complications: no      Cardiovascular status: blood pressure returned to baseline  Respiratory status: unassisted  Hydration status: euvolemic  Follow-up not needed.        Visit Vitals  BP (!) 181/78 (BP Location: Right arm, Patient Position: Lying)   Pulse 86   Temp 36.7 °C (98 °F) (Tympanic)   Resp 18   Ht 5' 5" (1.651 m)   Wt 103.6 kg (228 lb 6.3 oz)   SpO2 98%   Breastfeeding? No   BMI 38.01 kg/m²       Pain/Linda Score: Pain Rating Prior to Med Admin: 0 (denies) (1/8/2019 10:55 AM)  Linda Score: 9 (1/8/2019 11:04 AM)        "

## 2019-01-09 NOTE — PLAN OF CARE
Problem: Device-Related Complication Risk (Hemodialysis)  Goal: Safe, Effective Therapy Delivery  Outcome: Ongoing (interventions implemented as appropriate)  AAOx3. No complaint of pain or discomfort. Went  to dialysis. Ambulate to restroom. Discharge after IV push administer by RN.

## 2019-01-11 ENCOUNTER — PATIENT OUTREACH (OUTPATIENT)
Dept: ADMINISTRATIVE | Facility: CLINIC | Age: 44
End: 2019-01-11

## 2019-01-11 NOTE — PROGRESS NOTES
C3 nurse attempted to contact patient. No answer. The following message was left for the patient to return the call:  Good afternoon. I am a nurse calling on behalf of [facility] from the [facility]ination [facility].  This is a Transitional Care Call for Mary Waller. When you have a moment please contact us at [phone] or [phone] Monday through Friday, between the hours of 8 am to 4 pm. We look forward to speaking with you. On behalf of [facility] have a nice day.    The patient does not have a scheduled HOSFU appointment within 7-14 days post hospital discharge date 01/09/19. Message sent to Physician staff to assist with HOSFU appointment scheduling.

## 2019-01-12 LAB
BACTERIA BLD CULT: NORMAL
BACTERIA BLD CULT: NORMAL

## 2019-01-23 ENCOUNTER — OFFICE VISIT (OUTPATIENT)
Dept: INFECTIOUS DISEASES | Facility: CLINIC | Age: 44
End: 2019-01-23
Payer: MEDICAID

## 2019-01-23 VITALS
SYSTOLIC BLOOD PRESSURE: 136 MMHG | HEART RATE: 82 BPM | WEIGHT: 236.56 LBS | BODY MASS INDEX: 39.41 KG/M2 | HEIGHT: 65 IN | TEMPERATURE: 99 F | DIASTOLIC BLOOD PRESSURE: 76 MMHG

## 2019-01-23 DIAGNOSIS — B95.61 MSSA BACTEREMIA: ICD-10-CM

## 2019-01-23 DIAGNOSIS — Z51.81 THERAPEUTIC DRUG MONITORING: Primary | ICD-10-CM

## 2019-01-23 DIAGNOSIS — R78.81 MSSA BACTEREMIA: ICD-10-CM

## 2019-01-23 PROCEDURE — 99213 PR OFFICE/OUTPT VISIT, EST, LEVL III, 20-29 MIN: ICD-10-PCS | Mod: S$PBB,,, | Performed by: PHYSICIAN ASSISTANT

## 2019-01-23 PROCEDURE — 99213 OFFICE O/P EST LOW 20 MIN: CPT | Mod: PBBFAC | Performed by: PHYSICIAN ASSISTANT

## 2019-01-23 PROCEDURE — 99999 PR PBB SHADOW E&M-EST. PATIENT-LVL III: CPT | Mod: PBBFAC,,, | Performed by: PHYSICIAN ASSISTANT

## 2019-01-23 PROCEDURE — 99213 OFFICE O/P EST LOW 20 MIN: CPT | Mod: S$PBB,,, | Performed by: PHYSICIAN ASSISTANT

## 2019-01-23 PROCEDURE — 99999 PR PBB SHADOW E&M-EST. PATIENT-LVL III: ICD-10-PCS | Mod: PBBFAC,,, | Performed by: PHYSICIAN ASSISTANT

## 2019-12-29 ENCOUNTER — HOSPITAL ENCOUNTER (EMERGENCY)
Facility: HOSPITAL | Age: 44
Discharge: HOME | End: 2019-12-30
Attending: EMERGENCY MEDICINE
Payer: MEDICARE

## 2019-12-29 VITALS
HEART RATE: 83 BPM | BODY MASS INDEX: 40.82 KG/M2 | HEIGHT: 65 IN | TEMPERATURE: 99 F | OXYGEN SATURATION: 99 % | DIASTOLIC BLOOD PRESSURE: 80 MMHG | WEIGHT: 245 LBS | SYSTOLIC BLOOD PRESSURE: 178 MMHG | RESPIRATION RATE: 16 BRPM

## 2019-12-29 DIAGNOSIS — I10 HYPERTENSION, UNSPECIFIED TYPE: ICD-10-CM

## 2019-12-29 DIAGNOSIS — H40.9 GLAUCOMA OF RIGHT EYE, UNSPECIFIED GLAUCOMA TYPE: Primary | ICD-10-CM

## 2019-12-29 DIAGNOSIS — H53.8 BLURRY VISION: ICD-10-CM

## 2019-12-29 LAB
ANION GAP SERPL CALC-SCNC: 11 MMOL/L (ref 8–16)
BUN SERPL-MCNC: 32 MG/DL (ref 6–20)
CALCIUM SERPL-MCNC: 8.4 MG/DL (ref 8.7–10.5)
CHLORIDE SERPL-SCNC: 98 MMOL/L (ref 95–110)
CO2 SERPL-SCNC: 29 MMOL/L (ref 23–29)
CREAT SERPL-MCNC: 5.3 MG/DL (ref 0.5–1.4)
EST. GFR  (AFRICAN AMERICAN): 11 ML/MIN/1.73 M^2
EST. GFR  (NON AFRICAN AMERICAN): 9 ML/MIN/1.73 M^2
GLUCOSE SERPL-MCNC: 127 MG/DL (ref 70–110)
POCT GLUCOSE: 151 MG/DL (ref 70–110)
POTASSIUM SERPL-SCNC: 4.3 MMOL/L (ref 3.5–5.1)
SODIUM SERPL-SCNC: 138 MMOL/L (ref 136–145)

## 2019-12-29 PROCEDURE — 25000003 PHARM REV CODE 250: Performed by: OPHTHALMOLOGY

## 2019-12-29 PROCEDURE — 80048 BASIC METABOLIC PNL TOTAL CA: CPT

## 2019-12-29 PROCEDURE — 25000003 PHARM REV CODE 250: Performed by: EMERGENCY MEDICINE

## 2019-12-29 PROCEDURE — 82962 GLUCOSE BLOOD TEST: CPT

## 2019-12-29 PROCEDURE — 99283 EMERGENCY DEPT VISIT LOW MDM: CPT

## 2019-12-29 RX ORDER — HYDRALAZINE HYDROCHLORIDE 25 MG/1
25 TABLET, FILM COATED ORAL
Status: COMPLETED | OUTPATIENT
Start: 2019-12-29 | End: 2019-12-29

## 2019-12-29 RX ORDER — TIMOLOL MALEATE 5 MG/ML
1 SOLUTION/ DROPS OPHTHALMIC
Status: COMPLETED | OUTPATIENT
Start: 2019-12-29 | End: 2019-12-29

## 2019-12-29 RX ORDER — LABETALOL 100 MG/1
200 TABLET, FILM COATED ORAL
Status: COMPLETED | OUTPATIENT
Start: 2019-12-29 | End: 2019-12-29

## 2019-12-29 RX ORDER — TIMOLOL MALEATE 5 MG/ML
1 SOLUTION/ DROPS OPHTHALMIC 2 TIMES DAILY
Status: DISCONTINUED | OUTPATIENT
Start: 2019-12-29 | End: 2019-12-30 | Stop reason: HOSPADM

## 2019-12-29 RX ORDER — BRIMONIDINE TARTRATE 1.5 MG/ML
1 SOLUTION/ DROPS OPHTHALMIC EVERY 8 HOURS
Status: DISCONTINUED | OUTPATIENT
Start: 2019-12-29 | End: 2019-12-30 | Stop reason: HOSPADM

## 2019-12-29 RX ORDER — TETRACAINE HYDROCHLORIDE 5 MG/ML
2 SOLUTION OPHTHALMIC
Status: COMPLETED | OUTPATIENT
Start: 2019-12-29 | End: 2019-12-29

## 2019-12-29 RX ORDER — ACETAZOLAMIDE 125 MG/1
125 TABLET ORAL ONCE
Status: COMPLETED | OUTPATIENT
Start: 2019-12-29 | End: 2019-12-29

## 2019-12-29 RX ORDER — TETRACAINE HYDROCHLORIDE 5 MG/ML
2 SOLUTION OPHTHALMIC
Status: DISCONTINUED | OUTPATIENT
Start: 2019-12-29 | End: 2019-12-29

## 2019-12-29 RX ORDER — BRIMONIDINE TARTRATE 2 MG/ML
1 SOLUTION/ DROPS OPHTHALMIC ONCE
Status: COMPLETED | OUTPATIENT
Start: 2019-12-29 | End: 2019-12-29

## 2019-12-29 RX ADMIN — BRIMONIDINE TARTRATE 1 DROP: 2 SOLUTION OPHTHALMIC at 07:12

## 2019-12-29 RX ADMIN — HYDRALAZINE HYDROCHLORIDE 25 MG: 25 TABLET, FILM COATED ORAL at 06:12

## 2019-12-29 RX ADMIN — TIMOLOL MALEATE 1 DROP: 5 SOLUTION/ DROPS OPHTHALMIC at 10:12

## 2019-12-29 RX ADMIN — ACETAZOLAMIDE 125 MG: 125 TABLET ORAL at 10:12

## 2019-12-29 RX ADMIN — TIMOLOL MALEATE 1 DROP: 5 SOLUTION OPHTHALMIC at 07:12

## 2019-12-29 RX ADMIN — TETRACAINE HYDROCHLORIDE 2 DROP: 5 SOLUTION OPHTHALMIC at 04:12

## 2019-12-29 RX ADMIN — LABETALOL HYDROCHLORIDE 200 MG: 100 TABLET, FILM COATED ORAL at 06:12

## 2019-12-29 NOTE — ED PROVIDER NOTES
"Encounter Date: 12/29/2019    SCRIBE #1 NOTE: I, Sayra Catalan, am scribing for, and in the presence of,  Edith Bolanos MD. I have scribed the following portions of the note - Other sections scribed: HPI/ROS/PE.       History     Chief Complaint   Patient presents with    Blurred Vision     patient states that her vision started to get blurry during dialysis and she wasn't able to get fully dialyzed. patient gets dialyzed on M, W, F and she has a left upper AV fistula    Hypertension     This 44 y.o. 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 Pt reports onset of blurry vision to the R eye began while she was receiving dialysis today. Pt reports she can see the color of objects, but cannot see specific details, like faces. Pt states she normally has slightly blurry vision to the R eye secondary to the cataract, but vision was worse today. Pt reports this similar episode happened before in the past when BP dropped. Pt reports BP was around "178/80" and then was "159" systolic when taken after onset of blurry vision. Pt reports she normally receives 4 hours of dialysis, but was only able to get 2.5 hours with minimal fluid removal. Pt notes dialysis is usually stopped when she is having blurry vision, which resolves symptom. However, symptom didn't resolve when dialysis was stopped today. She usually wears eye glasses.  She states she does not take her eyedrops on dialysis days.  Pt is allergic to Codeine and Sulfa. No alleviating factors. Otherwise, pt denies fever, chills, numbness or weakness in the arms or legs, speech difficulty, SOB, photophobia, and any other associated symptoms.    The history is provided by the patient. No  was used.     Review of patient's allergies indicates:   Allergen Reactions    Codeine     Sulfa (sulfonamide " "antibiotics)      Past Medical History:   Diagnosis Date    Blind     Diabetes     ESRD (end stage renal disease) on dialysis     Hypertension     Right cataract      Past Surgical History:   Procedure Laterality Date    AV FISTULA PLACEMENT      EYE SURGERY       No family history on file.  Social History     Tobacco Use    Smoking status: Never Smoker    Smokeless tobacco: Never Used   Substance Use Topics    Alcohol use: No    Drug use: No     Review of Systems   Constitutional: Negative for chills and fever.   HENT: Negative for congestion, rhinorrhea and sore throat.    Eyes: Positive for visual disturbance (blurry vision). Negative for photophobia.   Respiratory: Negative for cough and shortness of breath.    Cardiovascular: Negative for chest pain.   Gastrointestinal: Negative for abdominal pain, diarrhea, nausea and vomiting.   Genitourinary: Negative for dysuria.   Musculoskeletal: Negative for neck stiffness.   Skin: Negative for rash.   Neurological: Negative for speech difficulty, weakness, numbness and headaches.       Physical Exam     Initial Vitals [12/29/19 1443]   BP Pulse Resp Temp SpO2   (!) 205/98 88 18 98.4 °F (36.9 °C) 99 %      MAP       --         Physical Exam    Nursing note and vitals reviewed.  Constitutional: She appears well-developed and well-nourished. She is not diaphoretic. No distress.   HENT:   Mouth/Throat: Oropharynx is clear and moist.   Eyes: EOM are normal. Right eye exhibits no discharge. No scleral icterus.   L eye is atrophied. Normal conjunctiva of the R eye. R pupil is 4mm and sluggishly reactive. No discharge from the R eye.  Patient is not able to make out characters on eye chart with visual acuity testing.    Intra-ocular pressure right-sided:  Initially read 55, 6 subsequent "error" readings. Different tonometer used read 76.    Neck: Normal range of motion. Neck supple.   Cardiovascular: Normal rate and regular rhythm.   Pulmonary/Chest: Breath sounds " normal. No respiratory distress.   Abdominal: Soft. There is no tenderness.   Musculoskeletal: Normal range of motion. She exhibits no edema or tenderness.   Neurological: She is alert and oriented to person, place, and time. She has normal strength. No cranial nerve deficit or sensory deficit. She exhibits normal muscle tone. GCS score is 15. GCS eye subscore is 4. GCS verbal subscore is 5. GCS motor subscore is 6.   Skin: Skin is warm and dry.   Psychiatric: She has a normal mood and affect.         ED Course   Procedures  Labs Reviewed   BASIC METABOLIC PANEL - Abnormal; Notable for the following components:       Result Value    Glucose 127 (*)     BUN, Bld 32 (*)     Creatinine 5.3 (*)     Calcium 8.4 (*)     eGFR if  11 (*)     eGFR if non  9 (*)     All other components within normal limits   POCT GLUCOSE - Abnormal; Notable for the following components:    POCT Glucose 151 (*)     All other components within normal limits          Imaging Results    None          Medical Decision Making:   Initial Assessment:   44-year-old female with history of cataracts, glaucoma, diabetes, hypertension, end-stage renal disease presents with acute onset right blurry vision.  Differential broad and includes but not limited to glaucoma, hyperglycemia, retinal hemorrhage. Workup initiated with BMP, will check visual acuity and intra-ocular pressure.  Anticipate ophthalmology consult as patient is already blind in left eye.  Patient's blood pressure elevated, she has no chest pain, shortness of breath, unilateral numbness or weakness. She does not take all of her blood pressure medications on her hemodialysis days.  I will give her her scheduled labetalol and hydralazine.            Scribe Attestation:   Scribe #1: I performed the above scribed service and the documentation accurately describes the services I performed. I attest to the accuracy of the note.            ED Course as of Dec 29 1924    Sun Dec 29, 2019   1905 Case discussed with Dr. Manley (ophthalmology, [facility]) who recommends transfer for emergent Ophthalmology evaluation.  He states eyedrops unlikely to resolve pressure this high.  As patient has no other complaints, no neurologic deficits, I feel patient can be safely transferred via private vehicle.  She has a family member at bedside who will drive her.  She has been given her home blood pressure medications.  Will give 1st dose of eyedrops prior to departure.    [LH]      ED Course User Index  [LH] Edith Bolanos MD              Scribe Attestation: I, Edith Bolanos MD, personally performed the services described in this documentation. All medical record entries made by the scribe were at my direction and in my presence. I have reviewed the chart and agree that the record reflects my personal performance and is accurate and complete.   Clinical Impression:       ICD-10-CM ICD-9-CM   1. Glaucoma of right eye, unspecified glaucoma type H40.9 365.9   2. Hypertension, unspecified type I10 401.9   3. Blurry vision H53.8 368.8                             Edith Bolanos MD  12/29/19 2105

## 2019-12-29 NOTE — ED TRIAGE NOTES
Blurry vision starting around 2 pm in her right eye, after completing 2 out of 4 hours of dialysis. Pt reports being completely blind in her left eye

## 2019-12-30 ENCOUNTER — TELEPHONE (OUTPATIENT)
Dept: OPHTHALMOLOGY | Facility: CLINIC | Age: 44
End: 2019-12-30

## 2019-12-30 PROCEDURE — 25000003 PHARM REV CODE 250: Performed by: EMERGENCY MEDICINE

## 2019-12-30 PROCEDURE — 25000003 PHARM REV CODE 250: Performed by: OPHTHALMOLOGY

## 2019-12-30 RX ORDER — DORZOLAMIDE HCL 20 MG/ML
1 SOLUTION/ DROPS OPHTHALMIC 3 TIMES DAILY
Status: DISCONTINUED | OUTPATIENT
Start: 2019-12-30 | End: 2019-12-30 | Stop reason: HOSPADM

## 2019-12-30 RX ORDER — DORZOLAMIDE HCL 20 MG/ML
1 SOLUTION/ DROPS OPHTHALMIC 3 TIMES DAILY
Status: DISCONTINUED | OUTPATIENT
Start: 2019-12-30 | End: 2019-12-30

## 2019-12-30 RX ORDER — LATANOPROST 50 UG/ML
1 SOLUTION/ DROPS OPHTHALMIC NIGHTLY
Status: DISCONTINUED | OUTPATIENT
Start: 2019-12-30 | End: 2019-12-30 | Stop reason: HOSPADM

## 2019-12-30 RX ADMIN — LATANOPROST 1 DROP: 50 SOLUTION OPHTHALMIC at 02:12

## 2019-12-30 RX ADMIN — BRIMONIDINE TARTRATE 1 DROP: 1.5 SOLUTION OPHTHALMIC at 12:12

## 2019-12-30 RX ADMIN — DORZOLAMIDE HYDROCHLORIDE 1 DROP: 20 SOLUTION/ DROPS OPHTHALMIC at 02:12

## 2019-12-30 NOTE — PROVIDER PROGRESS NOTES - EMERGENCY DEPT.
Encounter Date: 12/29/2019    ED Physician Progress Notes           ED Course: I, Chaparro Putnam MD have assumed care of this patient from Dr. Morataya. Patient is a 44-year-old transferred from [facility] for Ophthalmology consult for concern of acute angle glaucoma.    At the time of signout plan was pending Ophthalmology consultation and recommendations.    Medications given in the ED:    Medications   brimonidine 0.15 % OPTH DROP ophthalmic solution 1 drop (1 drop Right Eye Given by Other 12/30/19 0020)   timolol maleate 0.5% ophthalmic solution 1 drop (1 drop Right Eye Given by Other 12/29/19 2230)   tetracaine HCl (PF) 0.5 % Drop 2 drop (2 drops Right Eye Given 12/29/19 1645)   labetalol tablet 200 mg (200 mg Oral Given 12/29/19 1831)   hydrALAZINE tablet 25 mg (25 mg Oral Given 12/29/19 1831)   timolol maleate 0.5% ophthalmic solution 1 drop (1 drop Right Eye Given 12/29/19 1908)   brimonidine 0.2% ophthalmic solution 1 drop (1 drop Right Eye Given 12/29/19 1908)   acetaZOLAMIDE tablet 125 mg (125 mg Oral Given 12/29/19 2253)     Patient evaluated by Ophthalmology and I tapped.  As per Ophthalmology plan for discharge home with follow-up 9:00 a.m. at [facility] at [facility] 12/30/19.  12:54 AM  Pt has meds given by optho and will d/c with additional latanoprost and dorzolamide.         Disposition:  Discharged home     Impression:  Glaucoma of right eye.

## 2019-12-30 NOTE — ED PROVIDER NOTES
Encounter Date: 12/29/2019       History     Chief Complaint   Patient presents with    [facility] transfer for Ophthalmology     Pt transfered [facility] for Ophthalmology. Pt states she was receiving dialysis this morning and suddenly had blurred vision to left eye.      44-year-old female with end-stage renal disease on dialysis presents as a transfer from outside facility for evaluation of right eye blurry vision and increased ocular pressure.  Patient reports that during dialysis she started to get blurry vision.  She reports that she is blind in left eye.  She follows with Ophthalmology and uses eye drops in her right eye.  She states that she has previously had bleeding in her eye.  She was given medication at the outside facility, and reports that she has had mild improvement in her vision.  She states that she cannot make out facial features, but she can see colors in general she is.    The history is provided by the patient.     Review of patient's allergies indicates:   Allergen Reactions    Codeine     Sulfa (sulfonamide antibiotics)      Past Medical History:   Diagnosis Date    Blind     Diabetes     ESRD (end stage renal disease) on dialysis     Hypertension     Right cataract      Past Surgical History:   Procedure Laterality Date    AV FISTULA PLACEMENT      EYE SURGERY       No family history on file.  Social History     Tobacco Use    Smoking status: Never Smoker    Smokeless tobacco: Never Used   Substance Use Topics    Alcohol use: No    Drug use: No     Review of Systems   Eyes: Positive for visual disturbance.   All other systems reviewed and are negative.      Physical Exam     Initial Vitals [12/29/19 1443]   BP Pulse Resp Temp SpO2   (!) 205/98 88 18 98.4 °F (36.9 °C) 99 %      MAP       --         Physical Exam    Nursing note and vitals reviewed.  Constitutional: Vital signs are normal. She appears well-developed and well-nourished.   HENT:   Head: Normocephalic and atraumatic.    Mouth/Throat: Oropharynx is clear and moist.   Eyes:   Left eye distorted at baseline   Right eye pupil non reactive   Neck: Trachea normal and normal range of motion. Neck supple.   Cardiovascular: Normal rate and normal pulses.   Abdominal: Normal appearance.   Musculoskeletal: Normal range of motion.   Neurological: She is alert and oriented to person, place, and time.   Skin: Skin is warm and dry.         ED Course   Procedures  Labs Reviewed   BASIC METABOLIC PANEL - Abnormal; Notable for the following components:       Result Value    Glucose 127 (*)     BUN, Bld 32 (*)     Creatinine 5.3 (*)     Calcium 8.4 (*)     eGFR if  11 (*)     eGFR if non  9 (*)     All other components within normal limits   POCT GLUCOSE - Abnormal; Notable for the following components:    POCT Glucose 151 (*)     All other components within normal limits          Imaging Results    None          Medical Decision Making:   History:   I obtained history from: someone other than patient.  Old Medical Records: I decided to obtain old medical records.  Initial Assessment:   Evaluation of visual deficit  ED Management:  Elevated IOP documented at outside facility. Optho consulted on arrival for further management. Final dispo pending their recommendations, turned over to Dr. Putnam                    ED Course as of Dec 29 2212   Sun Dec 29, 2019   1905 Case discussed with Dr. Manley (ophthalmology, [facility]) who recommends transfer for emergent Ophthalmology evaluation.  He states eyedrops unlikely to resolve pressure this high.  As patient has no other complaints, no neurologic deficits, I feel patient can be safely transferred via private vehicle.  She has a family member at bedside who will drive her.  She has been given her home blood pressure medications.  Will give 1st dose of eyedrops prior to departure.    [LH]      ED Course User Index  [LH] Edith Bolanos MD                Clinical  Impression:       ICD-10-CM ICD-9-CM   1. Glaucoma of right eye, unspecified glaucoma type H40.9 365.9   2. Hypertension, unspecified type I10 401.9   3. Blurry vision H53.8 368.8                             Feliberto Morataya MD  12/29/19 5908

## 2019-12-30 NOTE — CONSULTS
Consultation Report  Ophthalmology Service    Date: 12/30/2019    Chief complaint/Reason for Consult: Elevated IOP     History of Present Illness: Mary Waller is a 44 y.o. female who presents with super elevated OD IOP in 50s.    Patient was undergoing dialysis 12/29/2019 morning and experienced interval reduction in OD [facility]. She presented to outside ED and was determined to have OD IOP in the 50s, hence was transferred to [facility] [place] [place].    Patient and mother are both poor historians.  Mother puts drop for glaucoma in patient's OD. Patient and mother are aware about the importance of drop therapy but have poor insight into drop therapy regimen. Reports to take OD Combigan 3-4 times a day, Alphagan 2-3 times a day, and ?Ketorolac?.    Patient denies any flashes or floaters or ocular discomfort OU.    Follows with Dr. 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     PSurgHx:  has a past surgical history that includes Eye surgery and AV fistula placement.     Home Medications:   Prior to Admission medications    Medication Sig Start Date End Date Taking? Authorizing Provider   amLODIPine (NORVASC) 10 MG tablet Take 10 mg by mouth once daily.   Yes Historical Provider, MD   ATROPINE SULFATE, PF, OPHT Apply 1 drop into left eye twice daily   Yes Historical Provider, MD   brimonidine 0.2% (ALPHAGAN) 0.2 % Drop Instill 1 drop into right eye 3 times daily   Yes Historical Provider, MD   brinzolamide (AZOPT) 1 % ophthalmic suspension Place 1 drop into the right eye 2 (two) times daily.   Yes Historical Provider, MD   calcitRIOL (ROCALTROL) 0.5 MCG Cap Take 0.5 mcg by mouth once daily.   Yes Historical Provider, MD   cyanocobalamin (VITAMIN B-12) 500 MCG tablet Take 500  mcg by mouth once daily.   Yes Historical Provider, MD   docusate sodium (COLACE) 100 MG capsule Take 100 mg by mouth Daily.    Yes Historical Provider, MD   dorzolamide-timolol 2-0.5% (COSOPT) 22.3-6.8 mg/mL ophthalmic solution Place 1 drop into the right eye 2 (two) times daily.   Yes Historical Provider, MD   furosemide (LASIX) 40 MG tablet Take 40 mg by mouth once daily.    Yes Historical Provider, MD   hydrALAZINE (APRESOLINE) 50 MG tablet Take 100 mg by mouth every 8 (eight) hours.    Yes Historical Provider, MD   insulin (BASAGLAR KWIKPEN U-100 INSULIN) glargine 100 units/mL (3mL) SubQ pen Inject 15 Units into the skin every evening.   Yes Historical Provider, MD   ketorolac 0.5% (ACULAR) 0.5 % Drop Instill 1 drop into right eye 4 times daily   Yes Historical Provider, MD   labetalol (NORMODYNE) 200 MG tablet Take 400 mg by mouth 2 (two) times daily.    Yes Historical Provider, MD   latanoprost 0.005 % ophthalmic solution Place 1 drop into the right eye every evening.   Yes Historical Provider, MD   linagliptin (TRADJENTA ORAL) Take 5 mg by mouth once daily.   Yes Historical Provider, MD   losartan (COZAAR) 100 MG tablet Take 100 mg by mouth once daily.   Yes Historical Provider, MD   neomycin-polymyxin-dexamethasone (DEXACINE) 3.5 mg/g-10,000 unit/g-0.1 % Oint Apply 1 application into left eye 3 times daily   Yes Historical Provider, MD   netarsudil (RHOPRESSA) 0.02 % Drop Instill 1 drop into right eye daily   Yes Historical Provider, MD   prednisoLONE acetate (PRED FORTE) 1 % DrpS Place 1 drop into the right eye 3 (three) times daily.   Yes Historical Provider, MD   sevelamer carbonate (RENVELA) 800 mg Tab Take 800 mg by mouth 3 (three) times daily with meals.    Yes Historical Provider, MD   apixaban (ELIQUIS) 5 mg (74 tabs) DsPk For the first 7 days take two 5 mg tablets twice daily.  After 7 days take one 5 mg tablet twice daily. (Follow package directions) 1/9/19   King Faulkner MD         Medications this encounter:       Allergies: is allergic to codeine and sulfa (sulfonamide antibiotics).     Social:  reports that she has never smoked. She has never used smokeless tobacco. She reports that she does not drink alcohol or use drugs.     Family Hx: No family history of glaucoma, macular degeneration, or blindness. family history is not on file.     ROS: As per HPI    Ocular examination/Dilated fundus examination:  Base Eye Exam     Visual Acuity (Snellen - Linear)       Right Left    Dist sc CF@1ft NLP   Discharge OD near VA cc 20/400  OS is NLP           Tonometry (Tonopen, 9:58 PM)       Right Left    Pressure 44           Tonometry #2 (Tonopen, 1:16 AM)       Right Left    Pressure 10           Gonioscopy       Right Left    Temporal PTM     Nasal PTM     Superior PTM     Inferior PTM    Hazy view, angle does not open further with depression           Pupils       Dark Light Shape React    Right 3 3 Round non-reactive    Left              Extraocular Movement       Right Left     Full, Ortho Full, Ortho            Slit Lamp and Fundus Exam     External Exam       Right Left    External Normal Normal          Slit Lamp Exam       Right Left    Lids/Lashes Normal Normal    Conjunctiva/Sclera White and quiet phthisical eye    Cornea Haze, 2 areas of marked corneal edema phthisical eye    Anterior Chamber deep, unable to assess cell and flare due to corneal haze     Iris round but unreactive, no NVI     Lens 1+ Nuclear sclerosis, 2+ Cortical cataract     Vitreous No view           Fundus Exam       Right Left    Disc No view     Macula No view     Vessels No view     Periphery No view     Poor view OD due to poor dilation, existing corneal edema, and corneal PEEs s/p drop therapy prior to AC tap                Assessment/Plan:   1. Elevated IOP OD, acute  - Unclear etiology as angle is open  - IOP at presentation was 50s on tonopen  - Given Brimonidine and Timolol, 5 min in between, 3 cycles  - With  Acetazolamide 125 mg PO (max dose appropriate for dialysis patient)  - IOP remained in 40s  - s/p Anterior chamber tap #1 0.5cc, ending IOP 14  - IOP rosa to high 20s  - s/p Anterior chamber tap #2 0.25cc, ending IOP 10   - Discharged with Timolol, Dorzolamide, Brimonidine, Latanoprost  - Patient to take each 5 min apart tonight and again tomorrow morning.  - Patient to present to glaucoma clinic tomorrow at 8:30am for 9:00am appointment  - Patient follows with an outside ophthalmologist: Dr. Powers, phone [phone]    2. Monocular  - Precautions discussed with patient    Patient discussed with Dr. Quintanilla.    Raman Parekh MD PGY-2  Ophthalmology Resident  12/30/2019  9:38 PM

## 2019-12-30 NOTE — ED TRIAGE NOTES
Mary Waller, a 44 y.o. female presents to the ED w/ complaint of blurred vision    Triage note: Pt presents to the ED as a transfer from [facility] for optho consult. Pt reports she was getting dialyzed this AM and only got through 2 hours of dialysis before her vision in right eye got very blurry. Pt eye still blurry. Elevated BP  Chief Complaint   Patient presents with    [facility] transfer for Ophthalmology     Pt transfered POV for Ophthalmology. Pt states she was receiving dialysis this morning and suddenly had blurred vision to left eye.      Review of patient's allergies indicates:   Allergen Reactions    Codeine     Sulfa (sulfonamide antibiotics)      Past Medical History:   Diagnosis Date    Blind     Diabetes     ESRD (end stage renal disease) on dialysis     Hypertension     Right cataract

## 2019-12-30 NOTE — TELEPHONE ENCOUNTER
Called patient at [phone] (Home) *Preferred x2 and [phone] (Mobile) x1 and left a voice mail message each time.    Left our clinic phone number [phone].    Advised patient to either come to our clinic for her 9:00am appointment today with glaucoma or to seek outside ophthalmology care as soon as possible.

## 2021-09-01 ENCOUNTER — HOSPITAL ENCOUNTER (EMERGENCY)
Facility: HOSPITAL | Age: 46
Discharge: HOME | End: 2021-09-01
Attending: EMERGENCY MEDICINE
Payer: MEDICARE

## 2021-09-01 VITALS
SYSTOLIC BLOOD PRESSURE: 224 MMHG | OXYGEN SATURATION: 100 % | TEMPERATURE: 99 F | DIASTOLIC BLOOD PRESSURE: 100 MMHG | BODY MASS INDEX: 42.2 KG/M2 | HEART RATE: 84 BPM | WEIGHT: 253.31 LBS | RESPIRATION RATE: 18 BRPM | HEIGHT: 65 IN

## 2021-09-01 DIAGNOSIS — N18.6 ESRD ON DIALYSIS: Primary | ICD-10-CM

## 2021-09-01 DIAGNOSIS — I10 HYPERTENSION, UNSPECIFIED TYPE: ICD-10-CM

## 2021-09-01 DIAGNOSIS — R03.0 ELEVATED BLOOD PRESSURE READING: ICD-10-CM

## 2021-09-01 DIAGNOSIS — Z99.2 ESRD ON DIALYSIS: Primary | ICD-10-CM

## 2021-09-01 PROCEDURE — 25000003 PHARM REV CODE 250: Performed by: EMERGENCY MEDICINE

## 2021-09-01 PROCEDURE — 99283 EMERGENCY DEPT VISIT LOW MDM: CPT

## 2021-09-01 RX ORDER — NITROGLYCERIN 40 MG/1
1 PATCH TRANSDERMAL
Status: DISCONTINUED | OUTPATIENT
Start: 2021-09-01 | End: 2021-09-01 | Stop reason: HOSPADM

## 2021-09-01 RX ADMIN — NITROGLYCERIN 1 PATCH: 0.2 PATCH TRANSDERMAL at 01:09

## 2023-06-06 ENCOUNTER — TELEPHONE (OUTPATIENT)
Dept: TRANSPLANT | Facility: CLINIC | Age: 48
End: 2023-06-06

## 2023-06-07 ENCOUNTER — TELEPHONE (OUTPATIENT)
Dept: TRANSPLANT | Facility: CLINIC | Age: 48
End: 2023-06-07
Payer: MEDICARE

## 2023-06-12 ENCOUNTER — TELEPHONE (OUTPATIENT)
Dept: TRANSPLANT | Facility: CLINIC | Age: 48
End: 2023-06-12
Payer: MEDICARE

## 2023-06-14 DIAGNOSIS — Z76.82 ORGAN TRANSPLANT CANDIDATE: Primary | ICD-10-CM

## 2023-07-21 PROBLEM — Z99.2 ESRD (END STAGE RENAL DISEASE) ON DIALYSIS: Status: ACTIVE | Noted: 2023-07-21

## 2023-07-21 PROBLEM — N18.6 ESRD (END STAGE RENAL DISEASE) ON DIALYSIS: Status: ACTIVE | Noted: 2023-07-21

## 2023-08-07 ENCOUNTER — HOSPITAL ENCOUNTER (OUTPATIENT)
Facility: HOSPITAL | Age: 48
Discharge: HOME | End: 2023-08-08
Attending: STUDENT IN AN ORGANIZED HEALTH CARE EDUCATION/TRAINING PROGRAM | Admitting: HOSPITALIST
Payer: MEDICARE

## 2023-08-07 DIAGNOSIS — Z99.2 ESRD (END STAGE RENAL DISEASE) ON DIALYSIS: Primary | ICD-10-CM

## 2023-08-07 DIAGNOSIS — R07.9 CHEST PAIN: ICD-10-CM

## 2023-08-07 DIAGNOSIS — E87.5 HYPERKALEMIA: ICD-10-CM

## 2023-08-07 DIAGNOSIS — R11.10 VOMITING: ICD-10-CM

## 2023-08-07 DIAGNOSIS — N18.6 ESRD (END STAGE RENAL DISEASE) ON DIALYSIS: Primary | ICD-10-CM

## 2023-08-07 PROBLEM — R11.0 NAUSEA: Status: ACTIVE | Noted: 2023-08-07

## 2023-08-07 LAB
ALBUMIN SERPL BCP-MCNC: 3.5 G/DL (ref 3.5–5.2)
ALP SERPL-CCNC: 68 U/L (ref 55–135)
ALT SERPL W/O P-5'-P-CCNC: 12 U/L (ref 10–44)
ANION GAP SERPL CALC-SCNC: 12 MMOL/L (ref 8–16)
ANION GAP SERPL CALC-SCNC: 17 MMOL/L (ref 8–16)
AST SERPL-CCNC: 6 U/L (ref 10–40)
BASOPHILS # BLD AUTO: 0.03 K/UL (ref 0–0.2)
BASOPHILS NFR BLD: 0.4 % (ref 0–1.9)
BILIRUB SERPL-MCNC: 0.5 MG/DL (ref 0.1–1)
BUN SERPL-MCNC: 56 MG/DL (ref 6–30)
BUN SERPL-MCNC: 60 MG/DL (ref 6–20)
CALCIUM SERPL-MCNC: 8.7 MG/DL (ref 8.7–10.5)
CHLORIDE SERPL-SCNC: 96 MMOL/L (ref 95–110)
CHLORIDE SERPL-SCNC: 96 MMOL/L (ref 95–110)
CO2 SERPL-SCNC: 26 MMOL/L (ref 23–29)
CREAT SERPL-MCNC: 12.1 MG/DL (ref 0.5–1.4)
CREAT SERPL-MCNC: 13.2 MG/DL (ref 0.5–1.4)
CTP QC/QA: YES
CTP QC/QA: YES
DIFFERENTIAL METHOD: ABNORMAL
EOSINOPHIL # BLD AUTO: 0.1 K/UL (ref 0–0.5)
EOSINOPHIL NFR BLD: 1.3 % (ref 0–8)
ERYTHROCYTE [DISTWIDTH] IN BLOOD BY AUTOMATED COUNT: 13 % (ref 11.5–14.5)
EST. GFR  (NO RACE VARIABLE): 4 ML/MIN/1.73 M^2
GLUCOSE SERPL-MCNC: 139 MG/DL (ref 70–110)
GLUCOSE SERPL-MCNC: 140 MG/DL (ref 70–110)
HCT VFR BLD AUTO: 33.1 % (ref 37–48.5)
HCT VFR BLD CALC: 38 %PCV (ref 36–54)
HGB BLD-MCNC: 10.4 G/DL (ref 12–16)
IMM GRANULOCYTES # BLD AUTO: 0.02 K/UL (ref 0–0.04)
IMM GRANULOCYTES NFR BLD AUTO: 0.3 % (ref 0–0.5)
LIPASE SERPL-CCNC: 144 U/L (ref 4–60)
LYMPHOCYTES # BLD AUTO: 1 K/UL (ref 1–4.8)
LYMPHOCYTES NFR BLD: 13.8 % (ref 18–48)
MAGNESIUM SERPL-MCNC: 2.5 MG/DL (ref 1.6–2.6)
MCH RBC QN AUTO: 31.9 PG (ref 27–31)
MCHC RBC AUTO-ENTMCNC: 31.4 G/DL (ref 32–36)
MCV RBC AUTO: 102 FL (ref 82–98)
MONOCYTES # BLD AUTO: 0.8 K/UL (ref 0.3–1)
MONOCYTES NFR BLD: 11.3 % (ref 4–15)
NEUTROPHILS # BLD AUTO: 5.4 K/UL (ref 1.8–7.7)
NEUTROPHILS NFR BLD: 72.9 % (ref 38–73)
NRBC BLD-RTO: 0 /100 WBC
PHOSPHATE SERPL-MCNC: 3.9 MG/DL (ref 2.7–4.5)
PLATELET # BLD AUTO: 139 K/UL (ref 150–450)
PMV BLD AUTO: 10.6 FL (ref 9.2–12.9)
POC IONIZED CALCIUM: 1.01 MMOL/L (ref 1.06–1.42)
POC MOLECULAR INFLUENZA A AGN: NEGATIVE
POC MOLECULAR INFLUENZA B AGN: NEGATIVE
POC TCO2 (MEASURED): 26 MMOL/L (ref 23–29)
POTASSIUM BLD-SCNC: 5.3 MMOL/L (ref 3.5–5.1)
POTASSIUM SERPL-SCNC: 5.4 MMOL/L (ref 3.5–5.1)
PROT SERPL-MCNC: 8.4 G/DL (ref 6–8.4)
RBC # BLD AUTO: 3.26 M/UL (ref 4–5.4)
SAMPLE: ABNORMAL
SARS-COV-2 RDRP RESP QL NAA+PROBE: NEGATIVE
SODIUM BLD-SCNC: 133 MMOL/L (ref 136–145)
SODIUM SERPL-SCNC: 134 MMOL/L (ref 136–145)
WBC # BLD AUTO: 7.44 K/UL (ref 3.9–12.7)

## 2023-08-07 PROCEDURE — 99900035 HC TECH TIME PER 15 MIN (STAT): Mod: NTX

## 2023-08-07 PROCEDURE — 85025 COMPLETE CBC W/AUTO DIFF WBC: CPT | Mod: NTX | Performed by: EMERGENCY MEDICINE

## 2023-08-07 PROCEDURE — G0378 HOSPITAL OBSERVATION PER HR: HCPCS | Mod: NTX

## 2023-08-07 PROCEDURE — 84132 ASSAY OF SERUM POTASSIUM: CPT | Mod: NTX

## 2023-08-07 PROCEDURE — 82330 ASSAY OF CALCIUM: CPT | Mod: NTX

## 2023-08-07 PROCEDURE — 80053 COMPREHEN METABOLIC PANEL: CPT | Mod: NTX | Performed by: EMERGENCY MEDICINE

## 2023-08-07 PROCEDURE — 96374 THER/PROPH/DIAG INJ IV PUSH: CPT | Mod: NTX

## 2023-08-07 PROCEDURE — 87635 SARS-COV-2 COVID-19 AMP PRB: CPT | Mod: NTX | Performed by: STUDENT IN AN ORGANIZED HEALTH CARE EDUCATION/TRAINING PROGRAM

## 2023-08-07 PROCEDURE — 82308 ASSAY OF CALCITONIN: CPT

## 2023-08-07 PROCEDURE — 83690 ASSAY OF LIPASE: CPT | Mod: NTX | Performed by: EMERGENCY MEDICINE

## 2023-08-07 PROCEDURE — 84100 ASSAY OF PHOSPHORUS: CPT | Mod: NTX | Performed by: EMERGENCY MEDICINE

## 2023-08-07 PROCEDURE — 63600175 PHARM REV CODE 636 W HCPCS: Mod: NTX | Performed by: STUDENT IN AN ORGANIZED HEALTH CARE EDUCATION/TRAINING PROGRAM

## 2023-08-07 PROCEDURE — 93010 EKG 12-LEAD: ICD-10-PCS | Mod: NTX,,, | Performed by: INTERNAL MEDICINE

## 2023-08-07 PROCEDURE — 82565 ASSAY OF CREATININE: CPT | Mod: NTX

## 2023-08-07 PROCEDURE — 82565 ASSAY OF CREATININE: CPT | Mod: 91,NTX

## 2023-08-07 PROCEDURE — 83735 ASSAY OF MAGNESIUM: CPT | Mod: NTX | Performed by: EMERGENCY MEDICINE

## 2023-08-07 PROCEDURE — 87502 INFLUENZA DNA AMP PROBE: CPT | Mod: NTX

## 2023-08-07 PROCEDURE — G0257 UNSCHED DIALYSIS ESRD PT HOS: HCPCS | Mod: NTX

## 2023-08-07 PROCEDURE — 80047 BASIC METABLC PNL IONIZED CA: CPT | Mod: NTX

## 2023-08-07 PROCEDURE — 85014 HEMATOCRIT: CPT | Mod: NTX

## 2023-08-07 PROCEDURE — 93005 ELECTROCARDIOGRAM TRACING: CPT | Mod: NTX

## 2023-08-07 PROCEDURE — 96376 TX/PRO/DX INJ SAME DRUG ADON: CPT | Mod: 59,NTX

## 2023-08-07 PROCEDURE — 99285 EMERGENCY DEPT VISIT HI MDM: CPT | Mod: 25,NTX

## 2023-08-07 PROCEDURE — 25000003 PHARM REV CODE 250: Mod: NTX | Performed by: STUDENT IN AN ORGANIZED HEALTH CARE EDUCATION/TRAINING PROGRAM

## 2023-08-07 PROCEDURE — 84295 ASSAY OF SERUM SODIUM: CPT | Mod: NTX

## 2023-08-07 PROCEDURE — 93010 ELECTROCARDIOGRAM REPORT: CPT | Mod: NTX,,, | Performed by: INTERNAL MEDICINE

## 2023-08-07 PROCEDURE — 82800 BLOOD PH: CPT | Mod: NTX

## 2023-08-07 RX ORDER — LANOLIN ALCOHOL/MO/W.PET/CERES
800 CREAM (GRAM) TOPICAL
Status: DISCONTINUED | OUTPATIENT
Start: 2023-08-07 | End: 2023-08-08 | Stop reason: HOSPADM

## 2023-08-07 RX ORDER — GLUCAGON 1 MG
1 KIT INJECTION
Status: DISCONTINUED | OUTPATIENT
Start: 2023-08-07 | End: 2023-08-08 | Stop reason: HOSPADM

## 2023-08-07 RX ORDER — HYDRALAZINE HYDROCHLORIDE 20 MG/ML
10 INJECTION INTRAMUSCULAR; INTRAVENOUS
Status: COMPLETED | OUTPATIENT
Start: 2023-08-07 | End: 2023-08-07

## 2023-08-07 RX ORDER — ONDANSETRON 2 MG/ML
4 INJECTION INTRAMUSCULAR; INTRAVENOUS
Status: COMPLETED | OUTPATIENT
Start: 2023-08-07 | End: 2023-08-07

## 2023-08-07 RX ORDER — INSULIN ASPART 100 [IU]/ML
0-5 INJECTION, SOLUTION INTRAVENOUS; SUBCUTANEOUS
Status: DISCONTINUED | OUTPATIENT
Start: 2023-08-07 | End: 2023-08-08 | Stop reason: HOSPADM

## 2023-08-07 RX ORDER — IBUPROFEN 200 MG
24 TABLET ORAL
Status: DISCONTINUED | OUTPATIENT
Start: 2023-08-07 | End: 2023-08-08 | Stop reason: HOSPADM

## 2023-08-07 RX ORDER — AMOXICILLIN 250 MG
1 CAPSULE ORAL DAILY PRN
Status: DISCONTINUED | OUTPATIENT
Start: 2023-08-07 | End: 2023-08-08 | Stop reason: HOSPADM

## 2023-08-07 RX ORDER — SODIUM CHLORIDE 0.9 % (FLUSH) 0.9 %
10 SYRINGE (ML) INJECTION
Status: DISCONTINUED | OUTPATIENT
Start: 2023-08-07 | End: 2023-08-08 | Stop reason: HOSPADM

## 2023-08-07 RX ORDER — BRIMONIDINE TARTRATE, TIMOLOL MALEATE 2; 5 MG/ML; MG/ML
1 SOLUTION/ DROPS OPHTHALMIC NIGHTLY
COMMUNITY
Start: 2023-07-11

## 2023-08-07 RX ORDER — FUROSEMIDE 80 MG/1
80 TABLET ORAL NIGHTLY
Status: ON HOLD | COMMUNITY
Start: 2023-07-11 | End: 2023-12-01 | Stop reason: HOSPADM

## 2023-08-07 RX ORDER — ATORVASTATIN CALCIUM 40 MG/1
40 TABLET, FILM COATED ORAL DAILY
Status: DISCONTINUED | OUTPATIENT
Start: 2023-08-08 | End: 2023-08-08 | Stop reason: HOSPADM

## 2023-08-07 RX ORDER — ASPIRIN 81 MG/1
81 TABLET ORAL DAILY
Status: DISCONTINUED | OUTPATIENT
Start: 2023-08-08 | End: 2023-08-08 | Stop reason: HOSPADM

## 2023-08-07 RX ORDER — ACETAMINOPHEN 325 MG/1
650 TABLET ORAL EVERY 4 HOURS PRN
Status: DISCONTINUED | OUTPATIENT
Start: 2023-08-07 | End: 2023-08-08 | Stop reason: HOSPADM

## 2023-08-07 RX ORDER — SEVELAMER CARBONATE 800 MG/1
2400 TABLET, FILM COATED ORAL 4 TIMES DAILY
Status: ON HOLD | COMMUNITY
Start: 2023-05-15 | End: 2023-12-04 | Stop reason: SDUPTHER

## 2023-08-07 RX ORDER — MUPIROCIN 20 MG/G
OINTMENT TOPICAL 2 TIMES DAILY
Status: DISCONTINUED | OUTPATIENT
Start: 2023-08-07 | End: 2023-08-08 | Stop reason: HOSPADM

## 2023-08-07 RX ORDER — NALOXONE HCL 0.4 MG/ML
0.02 VIAL (ML) INJECTION
Status: DISCONTINUED | OUTPATIENT
Start: 2023-08-07 | End: 2023-08-08 | Stop reason: HOSPADM

## 2023-08-07 RX ORDER — SODIUM CHLORIDE 0.9 % (FLUSH) 0.9 %
10 SYRINGE (ML) INJECTION EVERY 8 HOURS
Status: DISCONTINUED | OUTPATIENT
Start: 2023-08-07 | End: 2023-08-07

## 2023-08-07 RX ORDER — TALC
6 POWDER (GRAM) TOPICAL NIGHTLY PRN
Status: DISCONTINUED | OUTPATIENT
Start: 2023-08-07 | End: 2023-08-08 | Stop reason: HOSPADM

## 2023-08-07 RX ORDER — ATROPINE SULFATE 10 MG/ML
1 SOLUTION/ DROPS OPHTHALMIC NIGHTLY
COMMUNITY
Start: 2023-08-06

## 2023-08-07 RX ORDER — LABETALOL 100 MG/1
400 TABLET, FILM COATED ORAL 2 TIMES DAILY
Status: DISCONTINUED | OUTPATIENT
Start: 2023-08-07 | End: 2023-08-08

## 2023-08-07 RX ORDER — ROSUVASTATIN CALCIUM 40 MG/1
40 TABLET, COATED ORAL NIGHTLY
COMMUNITY
Start: 2023-07-11

## 2023-08-07 RX ORDER — ATROPINE SULFATE 10 MG/ML
1 SOLUTION/ DROPS OPHTHALMIC NIGHTLY
Status: DISCONTINUED | OUTPATIENT
Start: 2023-08-07 | End: 2023-08-08

## 2023-08-07 RX ORDER — ACETAMINOPHEN 325 MG/1
650 TABLET ORAL
Status: COMPLETED | OUTPATIENT
Start: 2023-08-07 | End: 2023-08-07

## 2023-08-07 RX ORDER — ASPIRIN 81 MG/1
81 TABLET ORAL DAILY
Status: ON HOLD | COMMUNITY
End: 2023-12-04 | Stop reason: HOSPADM

## 2023-08-07 RX ORDER — IBUPROFEN 200 MG
16 TABLET ORAL
Status: DISCONTINUED | OUTPATIENT
Start: 2023-08-07 | End: 2023-08-08 | Stop reason: HOSPADM

## 2023-08-07 RX ADMIN — ONDANSETRON 4 MG: 2 INJECTION INTRAMUSCULAR; INTRAVENOUS at 01:08

## 2023-08-07 RX ADMIN — ACETAMINOPHEN 650 MG: 325 TABLET ORAL at 03:08

## 2023-08-07 RX ADMIN — HYDRALAZINE HYDROCHLORIDE 10 MG: 20 INJECTION INTRAMUSCULAR; INTRAVENOUS at 01:08

## 2023-08-07 RX ADMIN — HYDRALAZINE HYDROCHLORIDE 10 MG: 20 INJECTION INTRAMUSCULAR; INTRAVENOUS at 04:08

## 2023-08-07 NOTE — PLAN OF CARE
[facility] - Emergency Dept  Discharge Assessment    Primary Care Provider: Bryan Murray MD   Case Management Assessment     PCP: See above  Pharmacy: [facility] on [address] and [address]    Patient Arrived From: Home  Existing Help at Home: Mother    Barriers to Discharge: None    Discharge Plan:    A. Discharge to home and resume dialysis @ [facility]   B. Home with family      Discharge planning assessment completed with patient's assistance.  Patient's mother, Alejandra Edouard, at the bedside. Patient is from home with mother and independent.  She receives dialysis at [facility] on [address] on MWF.  When medically stable, patient will discharge to home.  She will need a followup appointment with her PCP and additional appointments as ordered by the discharging provider.      Discharge Assessment (most recent)       BRIEF DISCHARGE ASSESSMENT - 08/07/23 1606          Discharge Planning    Assessment Type Discharge Planning Brief Assessment     Resource/Environmental Concerns none     Support Systems Parent     Assistance Needed None     Equipment Currently Used at Home none     Current Living Arrangements home     Care Facility Name n/a     Patient/Family Anticipates Transition to home with family     Patient/Family Anticipated Services at Transition outpatient care   PCP followup and resume dialysis    DME Needed Upon Discharge  none     Discharge Plan A Home with family     Discharge Plan B Home

## 2023-08-07 NOTE — SUBJECTIVE & OBJECTIVE
Past Medical History:   Diagnosis Date    Blind     Diabetes     ESRD (end stage renal disease) on dialysis     Hypertension     Right cataract        Past Surgical History:   Procedure Laterality Date    AV FISTULA PLACEMENT      EYE SURGERY         Review of patient's allergies indicates:   Allergen Reactions    Codeine     Sulfa (sulfonamide antibiotics)        No current facility-administered medications on file prior to encounter.     Current Outpatient Medications on File Prior to Encounter   Medication Sig    aspirin (ECOTRIN) 81 MG EC tablet Take 81 mg by mouth once daily.    atropine 1% (ISOPTO ATROPINE) 1 % Drop Place 1 drop into the left eye every evening.    cyanocobalamin (VITAMIN B-12) 500 MCG tablet Take 500 mcg by mouth once daily.    docusate sodium (COLACE) 100 MG capsule Take 100 mg by mouth Daily.     furosemide (LASIX) 80 MG tablet Take 80 mg by mouth every evening.    labetalol (NORMODYNE) 200 MG tablet Take 400 mg by mouth 2 (two) times daily.     rosuvastatin (CRESTOR) 40 MG Tab Take 40 mg by mouth every evening.    sevelamer carbonate (RENVELA) 800 mg Tab Take 2,400 mg by mouth 4 (four) times daily.    tirzepatide (MOUNJARO) 10 mg/0.5 mL PnIj Inject 10mg under the skin every 7 days (Patient taking differently: Inject 10 mg into the skin. SATURDAYS)    [DISCONTINUED] furosemide (LASIX) 40 MG tablet Take 2 tablets (80 mg total) by mouth 2 (two) times daily.    calcium acetate,phosphat bind, (PHOSLO) 667 mg tablet Take 1 tablet (667 mg total) by mouth 3 (three) times daily with meals.    COMBIGAN 0.2-0.5 % Drop Place 1 drop into the right eye every evening.    docosahexaenoic acid/epa (FISH OIL ORAL) Take 1,000 mg by mouth once daily.    [DISCONTINUED] amLODIPine (NORVASC) 10 MG tablet Take 10 mg by mouth once daily.    [DISCONTINUED] ATROPINE SULFATE, PF, OPHT Apply 1 drop into left eye twice daily    [DISCONTINUED] brimonidine 0.2% (ALPHAGAN) 0.2 % Drop Instill 1 drop into right eye 3 times  daily    [DISCONTINUED] brinzolamide (AZOPT) 1 % ophthalmic suspension Place 1 drop into the right eye 2 (two) times daily.    [DISCONTINUED] calcitRIOL (ROCALTROL) 0.5 MCG Cap Take 0.5 mcg by mouth once daily.    [DISCONTINUED] dorzolamide-timolol 2-0.5% (COSOPT) 22.3-6.8 mg/mL ophthalmic solution Place 1 drop into the right eye 2 (two) times daily.    [DISCONTINUED] hydrALAZINE (APRESOLINE) 50 MG tablet Take 100 mg by mouth every 8 (eight) hours.     [DISCONTINUED] insulin (BASAGLAR KWIKPEN U-100 INSULIN) glargine 100 units/mL (3mL) SubQ pen Inject 15 Units into the skin every evening.    [DISCONTINUED] ketorolac 0.5% (ACULAR) 0.5 % Drop Instill 1 drop into right eye 4 times daily    [DISCONTINUED] latanoprost 0.005 % ophthalmic solution Place 1 drop into the right eye every evening.    [DISCONTINUED] losartan (COZAAR) 100 MG tablet Take 100 mg by mouth once daily.    [DISCONTINUED] neomycin-polymyxin-dexamethasone (DEXACINE) 3.5 mg/g-10,000 unit/g-0.1 % Oint Apply 1 application into left eye 3 times daily    [DISCONTINUED] netarsudil (RHOPRESSA) 0.02 % Drop Instill 1 drop into right eye daily    [DISCONTINUED] prednisoLONE acetate (PRED FORTE) 1 % DrpS Place 1 drop into the right eye 3 (three) times daily.     Family History    None       Tobacco Use    Smoking status: Never    Smokeless tobacco: Never   Substance and Sexual Activity    Alcohol use: No    Drug use: No    Sexual activity: Not on file     Review of Systems   Constitutional:  Negative for chills and fever.   HENT:  Negative for nosebleeds and tinnitus.    Eyes:  Negative for photophobia and visual disturbance.   Respiratory:  Negative for shortness of breath and wheezing.    Cardiovascular:  Negative for chest pain, palpitations and leg swelling.   Gastrointestinal:  Positive for nausea. Negative for abdominal distention and vomiting.   Genitourinary:  Negative for dysuria, flank pain and hematuria.   Musculoskeletal:  Negative for gait problem and  joint swelling.   Skin:  Negative for rash and wound.   Neurological:  Negative for seizures and syncope.     Objective:     Vital Signs (Most Recent):  Temp: 98.7 °F (37.1 °C) (08/07/23 1019)  Pulse: 90 (08/07/23 1646)  Resp: 18 (08/07/23 1646)  BP: (!) 163/79 (08/07/23 1647)  SpO2: 100 % (08/07/23 1646) Vital Signs (24h Range):  Temp:  [98.7 °F (37.1 °C)] 98.7 °F (37.1 °C)  Pulse:  [82-90] 90  Resp:  [18] 18  SpO2:  [100 %] 100 %  BP: (163-225)/(79-98) 163/79     Weight: 99.8 kg (220 lb)  Body mass index is 36.61 kg/m².     Physical Exam  Vitals and nursing note reviewed.   Constitutional:       General: She is not in acute distress.     Appearance: She is well-developed.   HENT:      Head: Normocephalic and atraumatic.      Right Ear: External ear normal.      Left Ear: External ear normal.   Eyes:      General:         Right eye: No discharge.         Left eye: No discharge.      Conjunctiva/sclera: Conjunctivae normal.      Comments: Blind left eye   Neck:      Thyroid: No thyromegaly.   Cardiovascular:      Rate and Rhythm: Normal rate and regular rhythm.      Heart sounds: No murmur heard.  Pulmonary:      Effort: Pulmonary effort is normal. No respiratory distress.      Breath sounds: Normal breath sounds.   Abdominal:      General: Bowel sounds are normal. There is no distension.      Palpations: Abdomen is soft. There is no mass.      Tenderness: There is no abdominal tenderness.   Musculoskeletal:         General: No deformity.      Cervical back: Normal range of motion.      Comments: Fistula left arm palpable thrill   Skin:     General: Skin is warm and dry.   Neurological:      Mental Status: She is alert and oriented to person, place, and time.      Sensory: No sensory deficit.   Psychiatric:         Mood and Affect: Mood normal.         Behavior: Behavior normal.                Significant Labs: CBC:   Recent Labs   Lab 08/07/23  1056 08/07/23  1201   WBC 7.44  --    HGB 10.4*  --    HCT 33.1* 38    *  --      CMP:   Recent Labs   Lab 08/07/23  1056   *   K 5.4*   CL 96   CO2 26   *   BUN 60*   CREATININE 12.1*   CALCIUM 8.7   PROT 8.4   ALBUMIN 3.5   BILITOT 0.5   ALKPHOS 68   AST 6*   ALT 12   ANIONGAP 12       Significant Imaging:   Imaging Results    None

## 2023-08-07 NOTE — ASSESSMENT & PLAN NOTE
Presents with 2 episodes of emesis and nausea that started after beginning mounjaro. Lipase 144 on arrival without abdominal pain and requesting to begin diet. Suspect emesis a side effect of mounjaro and less likely pancreatitis  Will start CLD and advance as tolerated

## 2023-08-07 NOTE — HPI
Mary Waller 48 y.o. female with ESRD on HD MWF, HTN, DM, chronic left eye blindness presents to the hospital with a chief complaint of nausea.  She reports she developed nausea with 1 episode of emesis that began Saturday after beginning to take 1 jaw.  This resolved Sunday, but then occurred today.  She describes emesis as a clear color.  She reports she is currently hungry.  She has had no sick contacts or eating undercooked foods.  She reports she was dialyzed Monday Wednesday Friday at [facility] with residual urine output.  She last attended dialysis Friday.  She denies fever chest pain shortness O breath abdominal pain leg swelling melena hematuria hematemesis dizziness and syncope.      In the ED, afebrile without leukocytosis potassium 5.4 BUN 60 creatinine 12.1 COVID negative.

## 2023-08-07 NOTE — ASSESSMENT & PLAN NOTE
"Developed nausea and 2 episodes of emeis after starting monjaro. Suspect monjaro caused emesis.      Patient's FSGs are controlled on current medication regimen.  Last A1c reviewed-   Lab Results   Component Value Date    HGBA1C 5.6 07/05/2023     Most recent fingerstick glucose reviewed- No results for input(s): "POCTGLUCOSE" in the last 24 hours.  Current correctional scale  Low  Maintain anti-hyperglycemic dose as follows-   Antihyperglycemics (From admission, onward)    Start     Stop Route Frequency Ordered    08/07/23 1834  insulin aspart U-100 pen 0-5 Units         -- SubQ Before meals & nightly PRN 08/07/23 1734        Hold Oral hypoglycemics while patient is in the hospital.  "

## 2023-08-07 NOTE — ED NOTES
Respiratory:  Positive for cough (Clear sputum. Intermittent.). Negative for shortness of breath and stridor.    Cardiovascular:  Negative for chest pain and leg swelling.   Gastrointestinal:  Positive for nausea and vomiting. Negative for abdominal pain, constipation and diarrhea.   Genitourinary:  Negative for dysuria and hematuria.   Musculoskeletal:  Negative for gait problem and neck stiffness.        (-) UE swelling.     Skin:  Negative for pallor and wound.   Neurological:  Positive for weakness (Generalized.) and headaches (Frontal.). Negative for syncope, facial asymmetry and speech difficulty.   Psychiatric/Behavioral:  Negative for confusion.    All other systems reviewed and are negative.

## 2023-08-07 NOTE — CONSULTS
[facility] on Call.  47 y/o female with ESRD on HD followed by Dr. Chris at [facility] here with GI issues (last HD Friday) Pte. Has a mild elevation of K+ and will be admitted for observation and Dialysis (see orders)  Case discussed with Dr. Morfin (ED)

## 2023-08-07 NOTE — ASSESSMENT & PLAN NOTE
Presents with potassium of 5.4 after not attending HD today.   Nephrology consulted for HD  Check Phos and continue binders

## 2023-08-07 NOTE — H&P
[facility] Emergency Dept  [facility] Medicine  History & Physical    Patient Name: Mary Waller  MRN: [id number]  Patient Class: OP- Observation  Admission Date: 8/7/2023  Attending Physician: James Kimble, *   Primary Care Provider: Bryan Murray MD         Patient information was obtained from patient, past medical records and ER records.     Subjective:     Principal Problem:ESRD (end stage renal disease) on dialysis    Chief Complaint:   Chief Complaint   Patient presents with    Nausea    Vomiting    Fatigue     Pt c/o fatigue accompanied by n/v x 2 days. Pt states she took her BP meds this morning. Pt denies cp, sob. Dialysis on M/W/F, full treatment on 08/04/23        HPI: Mary Waller 48 y.o. female with ESRD on HD MWF, HTN, DM, chronic left eye blindness presents to the hospital with a chief complaint of nausea.  She reports she developed nausea with 1 episode of emesis that began Saturday after beginning to take 1 jaw.  This resolved Sunday, but then occurred today.  She describes emesis as a clear color.  She reports she is currently hungry.  She has had no sick contacts or eating undercooked foods.  She reports she was dialyzed Monday Wednesday Friday at [facility] with residual urine output.  She last attended dialysis Friday.  She denies fever chest pain shortness O breath abdominal pain leg swelling melena hematuria hematemesis dizziness and syncope.      In the ED, afebrile without leukocytosis potassium 5.4 BUN 60 creatinine 12.1 COVID negative.      Past Medical History:   Diagnosis Date    Blind     Diabetes     ESRD (end stage renal disease) on dialysis     Hypertension     Right cataract        Past Surgical History:   Procedure Laterality Date    AV FISTULA PLACEMENT      EYE SURGERY         Review of patient's allergies indicates:   Allergen Reactions    Codeine     Sulfa (sulfonamide antibiotics)        No current facility-administered  medications on file prior to encounter.     Current Outpatient Medications on File Prior to Encounter   Medication Sig    aspirin (ECOTRIN) 81 MG EC tablet Take 81 mg by mouth once daily.    atropine 1% (ISOPTO ATROPINE) 1 % Drop Place 1 drop into the left eye every evening.    cyanocobalamin (VITAMIN B-12) 500 MCG tablet Take 500 mcg by mouth once daily.    docusate sodium (COLACE) 100 MG capsule Take 100 mg by mouth Daily.     furosemide (LASIX) 80 MG tablet Take 80 mg by mouth every evening.    labetalol (NORMODYNE) 200 MG tablet Take 400 mg by mouth 2 (two) times daily.     rosuvastatin (CRESTOR) 40 MG Tab Take 40 mg by mouth every evening.    sevelamer carbonate (RENVELA) 800 mg Tab Take 2,400 mg by mouth 4 (four) times daily.    tirzepatide (MOUNJARO) 10 mg/0.5 mL PnIj Inject 10mg under the skin every 7 days (Patient taking differently: Inject 10 mg into the skin. SATURDAYS)    [DISCONTINUED] furosemide (LASIX) 40 MG tablet Take 2 tablets (80 mg total) by mouth 2 (two) times daily.    calcium acetate,phosphat bind, (PHOSLO) 667 mg tablet Take 1 tablet (667 mg total) by mouth 3 (three) times daily with meals.    COMBIGAN 0.2-0.5 % Drop Place 1 drop into the right eye every evening.    docosahexaenoic acid/epa (FISH OIL ORAL) Take 1,000 mg by mouth once daily.    [DISCONTINUED] amLODIPine (NORVASC) 10 MG tablet Take 10 mg by mouth once daily.    [DISCONTINUED] ATROPINE SULFATE, PF, OPHT Apply 1 drop into left eye twice daily    [DISCONTINUED] brimonidine 0.2% (ALPHAGAN) 0.2 % Drop Instill 1 drop into right eye 3 times daily    [DISCONTINUED] brinzolamide (AZOPT) 1 % ophthalmic suspension Place 1 drop into the right eye 2 (two) times daily.    [DISCONTINUED] calcitRIOL (ROCALTROL) 0.5 MCG Cap Take 0.5 mcg by mouth once daily.    [DISCONTINUED] dorzolamide-timolol 2-0.5% (COSOPT) 22.3-6.8 mg/mL ophthalmic solution Place 1 drop into the right eye 2 (two) times daily.    [DISCONTINUED]  hydrALAZINE (APRESOLINE) 50 MG tablet Take 100 mg by mouth every 8 (eight) hours.     [DISCONTINUED] insulin (BASAGLAR KWIKPEN U-100 INSULIN) glargine 100 units/mL (3mL) SubQ pen Inject 15 Units into the skin every evening.    [DISCONTINUED] ketorolac 0.5% (ACULAR) 0.5 % Drop Instill 1 drop into right eye 4 times daily    [DISCONTINUED] latanoprost 0.005 % ophthalmic solution Place 1 drop into the right eye every evening.    [DISCONTINUED] losartan (COZAAR) 100 MG tablet Take 100 mg by mouth once daily.    [DISCONTINUED] neomycin-polymyxin-dexamethasone (DEXACINE) 3.5 mg/g-10,000 unit/g-0.1 % Oint Apply 1 application into left eye 3 times daily    [DISCONTINUED] netarsudil (RHOPRESSA) 0.02 % Drop Instill 1 drop into right eye daily    [DISCONTINUED] prednisoLONE acetate (PRED FORTE) 1 % DrpS Place 1 drop into the right eye 3 (three) times daily.     Family History    None       Tobacco Use    Smoking status: Never    Smokeless tobacco: Never   Substance and Sexual Activity    Alcohol use: No    Drug use: No    Sexual activity: Not on file     Review of Systems   Constitutional:  Negative for chills and fever.   HENT:  Negative for nosebleeds and tinnitus.    Eyes:  Negative for photophobia and visual disturbance.   Respiratory:  Negative for shortness of breath and wheezing.    Cardiovascular:  Negative for chest pain, palpitations and leg swelling.   Gastrointestinal:  Positive for nausea. Negative for abdominal distention and vomiting.   Genitourinary:  Negative for dysuria, flank pain and hematuria.   Musculoskeletal:  Negative for gait problem and joint swelling.   Skin:  Negative for rash and wound.   Neurological:  Negative for seizures and syncope.     Objective:     Vital Signs (Most Recent):  Temp: 98.7 °F (37.1 °C) (08/07/23 1019)  Pulse: 90 (08/07/23 1646)  Resp: 18 (08/07/23 1646)  BP: (!) 163/79 (08/07/23 1647)  SpO2: 100 % (08/07/23 1646) Vital Signs (24h Range):  Temp:  [98.7 °F (37.1 °C)]  98.7 °F (37.1 °C)  Pulse:  [82-90] 90  Resp:  [18] 18  SpO2:  [100 %] 100 %  BP: (163-225)/(79-98) 163/79     Weight: 99.8 kg (220 lb)  Body mass index is 36.61 kg/m².     Physical Exam  Vitals and nursing note reviewed.   Constitutional:       General: She is not in acute distress.     Appearance: She is well-developed.   HENT:      Head: Normocephalic and atraumatic.      Right Ear: External ear normal.      Left Ear: External ear normal.   Eyes:      General:         Right eye: No discharge.         Left eye: No discharge.      Conjunctiva/sclera: Conjunctivae normal.      Comments: Blind left eye   Neck:      Thyroid: No thyromegaly.   Cardiovascular:      Rate and Rhythm: Normal rate and regular rhythm.      Heart sounds: No murmur heard.  Pulmonary:      Effort: Pulmonary effort is normal. No respiratory distress.      Breath sounds: Normal breath sounds.   Abdominal:      General: Bowel sounds are normal. There is no distension.      Palpations: Abdomen is soft. There is no mass.      Tenderness: There is no abdominal tenderness.   Musculoskeletal:         General: No deformity.      Cervical back: Normal range of motion.      Comments: Fistula left arm palpable thrill   Skin:     General: Skin is warm and dry.   Neurological:      Mental Status: She is alert and oriented to person, place, and time.      Sensory: No sensory deficit.   Psychiatric:         Mood and Affect: Mood normal.         Behavior: Behavior normal.                Significant Labs: CBC:   Recent Labs   Lab 08/07/23  1056 08/07/23  1201   WBC 7.44  --    HGB 10.4*  --    HCT 33.1* 38   *  --      CMP:   Recent Labs   Lab 08/07/23  1056   *   K 5.4*   CL 96   CO2 26   *   BUN 60*   CREATININE 12.1*   CALCIUM 8.7   PROT 8.4   ALBUMIN 3.5   BILITOT 0.5   ALKPHOS 68   AST 6*   ALT 12   ANIONGAP 12       Significant Imaging:   Imaging Results    None           Assessment/Plan:     * ESRD (end stage renal disease) on  "dialysis  Presents with potassium of 5.4 after not attending HD today.   Nephrology consulted for HD  Check Phos and continue binders    Nausea  Presents with 2 episodes of emesis and nausea that started after beginning mounjaro. Lipase 144 on arrival without abdominal pain and requesting to begin diet. Suspect emesis a side effect of mounjaro and less likely pancreatitis  Will start CLD and advance as tolerated    Type 2 diabetes mellitus  Developed nausea and 2 episodes of emeis after starting monjaro. Suspect monjaro caused emesis.      Patient's FSGs are controlled on current medication regimen.  Last A1c reviewed-   Lab Results   Component Value Date    HGBA1C 5.6 07/05/2023     Most recent fingerstick glucose reviewed- No results for input(s): "POCTGLUCOSE" in the last 24 hours.  Current correctional scale  Low  Maintain anti-hyperglycemic dose as follows-   Antihyperglycemics (From admission, onward)    Start     Stop Route Frequency Ordered    08/07/23 1834  insulin aspart U-100 pen 0-5 Units         -- SubQ Before meals & nightly PRN 08/07/23 1734        Hold Oral hypoglycemics while patient is in the hospital.    Essential hypertension  Poorly controlled. Resume home labetalol. HD for volume control      VTE Risk Mitigation (From admission, onward)         Ordered     Place JODY hose  Until discontinued         08/07/23 1709     IP VTE HIGH RISK PATIENT  Once         08/07/23 1709     Place sequential compression device  Until discontinued         08/07/23 1709               Discussed with ED physician.    VTE: JODY/SCD  Code: Full  Diet: CLD  Dispo: pending HD      On 08/07/2023, patient should be placed in hospital observation services under my care in collaboration with James Kimble MD.      Shant Ortiz PA-C  Department of Hospital Medicine  [facility] - Emergency Dept  "

## 2023-08-07 NOTE — ED PROVIDER NOTES
Encounter Date: 8/7/2023    SCRIBE #1 NOTE: I, Stas Felix, am scribing for, and in the presence of,  Sabrina Nielson DO. I have scribed the following portions of the note - Other sections scribed: HPI, ROS, PE.       History     Chief Complaint   Patient presents with    Nausea    Vomiting    Fatigue     Pt c/o fatigue accompanied by n/v x 2 days. Pt states she took her BP meds this morning. Pt denies cp, sob. Dialysis on M/W/F, full treatment on 08/04/23     Mary Waller is a 48 y.o. female with a PMHx of DM, HTN, ESRD on dialysis (M/W/F), presents to the ED for generalized weakness onset 8/5/23. Associated symptoms are N/V (x 1 today), intermittent, productive cough (clear sputum) and frontal HA. Patient reports that her BP had dropped 8/5/23 and that she has not been feeling well ever since. She states that she recieves dialysis at [facility] and endorses that her last treatment was 8/4/23. She states that she is still urinating. She endorses taking Louise and Robitussin today. No other medications taken PTA. No alleviating or exacerbating factors noted. Denies LOC, UE and LE swelling, CP, SOB, fever, chills, congestion, abdominal pain, dysuria, hematuria, diarrhea, constipation or any associated symptoms. Denies any recent sick contacts.      The history is provided by the patient. No  was used.     Review of patient's allergies indicates:   Allergen Reactions    Codeine     Sulfa (sulfonamide antibiotics)      Past Medical History:   Diagnosis Date    Blind     Diabetes     ESRD (end stage renal disease) on dialysis     Hypertension     Right cataract      Past Surgical History:   Procedure Laterality Date    AV FISTULA PLACEMENT      EYE SURGERY       No family history on file.  Social History     Tobacco Use    Smoking status: Never    Smokeless tobacco: Never   Substance Use Topics    Alcohol use: No    Drug use: No     Review of Systems   Constitutional:   Negative for chills and fever.   HENT:  Negative for congestion, drooling, facial swelling and trouble swallowing.    Eyes:  Negative for redness and visual disturbance.   Respiratory:  Positive for cough (Clear sputum. Intermittent.). Negative for shortness of breath and stridor.    Cardiovascular:  Negative for chest pain and leg swelling.   Gastrointestinal:  Positive for nausea and vomiting. Negative for abdominal pain, constipation and diarrhea.   Genitourinary:  Negative for dysuria and hematuria.   Musculoskeletal:  Negative for gait problem and neck stiffness.        (-) UE swelling.     Skin:  Negative for pallor and wound.   Neurological:  Positive for weakness (Generalized.) and headaches (Frontal.). Negative for syncope, facial asymmetry and speech difficulty.   Psychiatric/Behavioral:  Negative for confusion.    All other systems reviewed and are negative.      Physical Exam     Initial Vitals [08/07/23 1019]   BP Pulse Resp Temp SpO2   (!) 225/98 82 18 98.7 °F (37.1 °C) 100 %      MAP       --         Physical Exam    Nursing note and vitals reviewed.  Constitutional: She appears well-developed. She is not diaphoretic. She is Obese .  Non-toxic appearance. She appears ill.   Hypertensive    HENT:   Head: Normocephalic and atraumatic.   Right Ear: External ear normal.   Left Ear: External ear normal.   Mouth/Throat: Uvula is midline, oropharynx is clear and moist and mucous membranes are normal.   Eyes: Conjunctivae are normal. No scleral icterus.   Neck: Neck supple. No JVD present.   Normal range of motion.  Cardiovascular:  Normal rate, regular rhythm, normal heart sounds and intact distal pulses.           Pulmonary/Chest: Breath sounds normal. No respiratory distress.   Abdominal: Abdomen is soft. She exhibits no distension. There is no abdominal tenderness. There is no rebound and no guarding.   Musculoskeletal:         General: No tenderness or edema. Normal range of motion.      Cervical back:  Normal range of motion and neck supple. No rigidity. No spinous process tenderness.      Comments: AV fistula on LUE with good thrill and bruits.     Neurological: She is alert. She has normal strength. No sensory deficit. She displays a negative Romberg sign.   Moves all extremities, follows all commands, no focal neurologic deficits.      Skin: Skin is warm and dry. No rash noted. No erythema.   Psychiatric: She has a normal mood and affect.         ED Course   Procedures  Labs Reviewed   CBC W/ AUTO DIFFERENTIAL - Abnormal; Notable for the following components:       Result Value    RBC 3.26 (*)     Hemoglobin 10.4 (*)     Hematocrit 33.1 (*)      (*)     MCH 31.9 (*)     MCHC 31.4 (*)     Platelets 139 (*)     Lymph % 13.8 (*)     All other components within normal limits   COMPREHENSIVE METABOLIC PANEL - Abnormal; Notable for the following components:    Sodium 134 (*)     Potassium 5.4 (*)     Glucose 140 (*)     BUN 60 (*)     Creatinine 12.1 (*)     AST 6 (*)     eGFR 4 (*)     All other components within normal limits   LIPASE - Abnormal; Notable for the following components:    Lipase 144 (*)     All other components within normal limits   ISTAT PROCEDURE - Abnormal; Notable for the following components:    POC Glucose 139 (*)     POC BUN 56 (*)     POC Creatinine 13.2 (*)     POC Sodium 133 (*)     POC Potassium 5.3 (*)     POC Anion Gap 17 (*)     POC Ionized Calcium 1.01 (*)     All other components within normal limits   MAGNESIUM   PHOSPHORUS   MAGNESIUM   PHOSPHORUS   SARS-COV-2 RDRP GENE   POCT INFLUENZA A/B MOLECULAR     EKG Readings: (Independently Interpreted)   Normal sinus rhythm with a rate of 76 bpm, rightward axis, normal intervals with no acute ST segment changes. EKG with improved tachycardia when compared to previous EKG from 2019.     ECG Results              EKG 12-lead (Final result)  Result time 08/07/23 23:16:56      Final result by Interface, Lab In [facility] (08/07/23  23:16:56)                   Narrative:    Test Reason : R11.10,    Vent. Rate : 076 BPM     Atrial Rate : 076 BPM     P-R Int : 142 ms          QRS Dur : 086 ms      QT Int : 408 ms       P-R-T Axes : 073 104 064 degrees     QTc Int : 459 ms    Normal sinus rhythm  Rightward axis  Anterior infarct ,age undetermined  Abnormal ECG  When compared with ECG of 05-JAN-2019 09:47,  Significant changes have occurred  Confirmed by Marvel MILLS, Kitty GRACE ([id number]) on 8/7/2023 11:16:43 PM    Referred By: SOHAN   SELF           Confirmed By:Kitty Grier MD                                  Imaging Results    None          Medications   ondansetron injection 4 mg (4 mg Intravenous Given 8/7/23 1359)   hydrALAZINE injection 10 mg (10 mg Intravenous Given 8/7/23 1359)   acetaminophen tablet 650 mg (650 mg Oral Given 8/7/23 1556)   hydrALAZINE injection 10 mg (10 mg Intravenous Given 8/7/23 1634)     Medical Decision Making:   History:   Old Medical Records: I decided to obtain old medical records.  Old Records Summarized: other records.       <> Summary of Records: External documents reviewed.     Independently Interpreted Test(s):   I have ordered and independently interpreted EKG Reading(s) - see summary below       <> Summary of EKG Reading(s): EKG independently interpreted by Sabrina Nielson,  reads: See ED course.       Clinical Tests:   Lab Tests: Ordered and Reviewed  Medical Tests: Reviewed and Ordered  ED Management:   MDM  This is an emergent evaluation of a 48 y.o. female with a PMHx of DM, HTN, ESRD on dialysis (M/W/F), presents to the ED for generalized weakness onset 8/5/23. Initial vitals in the ED  [08/07/23 1019]  BP: (!) 225/98  Pulse: 82  Resp: 18  Temp: 98.7 °F (37.1 °C)  SpO2: 100 % .     Physical exam noted above. DDx includes but is not limited to COVID vs influenza vs other viral illness, medication induced side effect, dehydration, electrolyte abnormality, hepatitis, pancreatitis, UTI. Also  considered but clinically less likely to be bowel obstruction, appendicitis, dissection, ACS, hypertensive emergency, Stroke. Will obtain labs and imaging including CBC, CMP, Lipase, UA, COVID, influenza, and EKG. Will also provide IV antiemetics. Will continue to monitor and frequently reassess pending results of labs, treatments and final disposition.    Patient is aware of plan and is amenable.     Sabrina Nielson D.O  EMERGENCY MEDICINE  1:19 PM 08/07/2023    UPDATE:  Labs reveal a mildly elevated lipase. CMP consistent with history of ESRD on HD with a potassium of 5.4, BUN 60, Cr 12.1. UA pending. Patient states she makes little urine at base line and urinated prior to coming to the ED. EKG without acute STEMI or Peak T waves. She was treated with IV zofran. She was also given Tylenol for a headache and IV hydralazine for elevated blood pressure.     On reassessment, the patient symptoms were improved. Blood pressure also downward trending. Given benign exam and work-up consistent with patient needing dialysis, symptoms likely due to her new diabetic medication. I attempted to arrange for patient to have outpatient dialysis at her dialysis center, but I was notified by nursing staff at that the facility that the last patient was about to be done for the day and they could not dialysis anymore patients today. Given the need for the patient to be dialyzed today, I consulted nephrology and discussed case with Dr. Andrade, who states orders would be placed to have the patient dialyzed tonight. Case discussed with [facility] medicine KATHY, Shant Ortiz, and the patient was placed on the observation service. The patient is aware and agreeable to the plan.      This chart was completed using dictation software, as a result there may be some transcription errors               Scribe Attestation:   Scribe #1: I performed the above scribed service and the documentation accurately describes the services I performed.  I attest to the accuracy of the note.                     Clinical Impression:   Final diagnoses:  [R11.10] Vomiting  [R07.9] Chest pain  [E87.5] Hyperkalemia        ED Disposition Condition    Observation Stable              I, Sabrina Nielson DO, personally performed the services described in this documentation. All medical record entries made by the scribe were at my direction and in my presence. I have reviewed the chart and agree that the record reflects my personal performance and is accurate and complete.                Sabrina Nielson,   08/08/23 4640

## 2023-08-08 VITALS
TEMPERATURE: 99 F | WEIGHT: 228 LBS | RESPIRATION RATE: 18 BRPM | DIASTOLIC BLOOD PRESSURE: 85 MMHG | OXYGEN SATURATION: 100 % | SYSTOLIC BLOOD PRESSURE: 184 MMHG | HEIGHT: 65 IN | HEART RATE: 80 BPM | BODY MASS INDEX: 37.99 KG/M2

## 2023-08-08 LAB
ALBUMIN SERPL BCP-MCNC: 3.3 G/DL (ref 3.5–5.2)
ALP SERPL-CCNC: 63 U/L (ref 55–135)
ALT SERPL W/O P-5'-P-CCNC: 12 U/L (ref 10–44)
AMORPH CRY URNS QL MICRO: ABNORMAL
ANION GAP SERPL CALC-SCNC: 12 MMOL/L (ref 8–16)
AST SERPL-CCNC: 8 U/L (ref 10–40)
BACTERIA #/AREA URNS HPF: ABNORMAL /HPF
BASOPHILS # BLD AUTO: 0.03 K/UL (ref 0–0.2)
BASOPHILS NFR BLD: 0.4 % (ref 0–1.9)
BILIRUB SERPL-MCNC: 0.5 MG/DL (ref 0.1–1)
BILIRUB UR QL STRIP: NEGATIVE
BUN SERPL-MCNC: 32 MG/DL (ref 6–20)
CALCIUM SERPL-MCNC: 8.7 MG/DL (ref 8.7–10.5)
CHLORIDE SERPL-SCNC: 96 MMOL/L (ref 95–110)
CLARITY UR: ABNORMAL
CO2 SERPL-SCNC: 27 MMOL/L (ref 23–29)
COLOR UR: YELLOW
CREAT SERPL-MCNC: 7.3 MG/DL (ref 0.5–1.4)
DIFFERENTIAL METHOD: ABNORMAL
EOSINOPHIL # BLD AUTO: 0.1 K/UL (ref 0–0.5)
EOSINOPHIL NFR BLD: 1.4 % (ref 0–8)
ERYTHROCYTE [DISTWIDTH] IN BLOOD BY AUTOMATED COUNT: 13 % (ref 11.5–14.5)
EST. GFR  (NO RACE VARIABLE): 6 ML/MIN/1.73 M^2
GLUCOSE SERPL-MCNC: 85 MG/DL (ref 70–110)
GLUCOSE UR QL STRIP: ABNORMAL
HCT VFR BLD AUTO: 31.4 % (ref 37–48.5)
HGB BLD-MCNC: 10.1 G/DL (ref 12–16)
HGB UR QL STRIP: NEGATIVE
HYALINE CASTS #/AREA URNS LPF: 0 /LPF
IMM GRANULOCYTES # BLD AUTO: 0.05 K/UL (ref 0–0.04)
IMM GRANULOCYTES NFR BLD AUTO: 0.6 % (ref 0–0.5)
KETONES UR QL STRIP: NEGATIVE
LEUKOCYTE ESTERASE UR QL STRIP: ABNORMAL
LYMPHOCYTES # BLD AUTO: 1.3 K/UL (ref 1–4.8)
LYMPHOCYTES NFR BLD: 16.3 % (ref 18–48)
MCH RBC QN AUTO: 31.7 PG (ref 27–31)
MCHC RBC AUTO-ENTMCNC: 32.2 G/DL (ref 32–36)
MCV RBC AUTO: 98 FL (ref 82–98)
MICROSCOPIC COMMENT: ABNORMAL
MONOCYTES # BLD AUTO: 1 K/UL (ref 0.3–1)
MONOCYTES NFR BLD: 12.5 % (ref 4–15)
NEUTROPHILS # BLD AUTO: 5.5 K/UL (ref 1.8–7.7)
NEUTROPHILS NFR BLD: 68.8 % (ref 38–73)
NITRITE UR QL STRIP: NEGATIVE
NON-SQ EPI CELLS #/AREA URNS HPF: 3 /HPF
NRBC BLD-RTO: 0 /100 WBC
PH UR STRIP: 7 [PH] (ref 5–8)
PLATELET # BLD AUTO: 142 K/UL (ref 150–450)
PMV BLD AUTO: 11.7 FL (ref 9.2–12.9)
POCT GLUCOSE: 108 MG/DL (ref 70–110)
POCT GLUCOSE: 97 MG/DL (ref 70–110)
POTASSIUM SERPL-SCNC: 4.7 MMOL/L (ref 3.5–5.1)
PROT SERPL-MCNC: 8.3 G/DL (ref 6–8.4)
PROT UR QL STRIP: ABNORMAL
RBC # BLD AUTO: 3.19 M/UL (ref 4–5.4)
RBC #/AREA URNS HPF: 0 /HPF (ref 0–4)
SODIUM SERPL-SCNC: 135 MMOL/L (ref 136–145)
SP GR UR STRIP: 1.01 (ref 1–1.03)
SQUAMOUS #/AREA URNS HPF: 25 /HPF
URN SPEC COLLECT METH UR: ABNORMAL
UROBILINOGEN UR STRIP-ACNC: NEGATIVE EU/DL
WBC # BLD AUTO: 8.03 K/UL (ref 3.9–12.7)
WBC #/AREA URNS HPF: 22 /HPF (ref 0–5)

## 2023-08-08 PROCEDURE — 87086 URINE CULTURE/COLONY COUNT: CPT | Mod: NTX | Performed by: EMERGENCY MEDICINE

## 2023-08-08 PROCEDURE — 25000003 PHARM REV CODE 250: Mod: NTX | Performed by: PHYSICIAN ASSISTANT

## 2023-08-08 PROCEDURE — 36415 COLL VENOUS BLD VENIPUNCTURE: CPT | Mod: NTX | Performed by: PHYSICIAN ASSISTANT

## 2023-08-08 PROCEDURE — 25000003 PHARM REV CODE 250: Mod: NTX | Performed by: INTERNAL MEDICINE

## 2023-08-08 PROCEDURE — 80053 COMPREHEN METABOLIC PANEL: CPT | Mod: NTX | Performed by: PHYSICIAN ASSISTANT

## 2023-08-08 PROCEDURE — G0378 HOSPITAL OBSERVATION PER HR: HCPCS | Mod: NTX

## 2023-08-08 PROCEDURE — 81000 URINALYSIS NONAUTO W/SCOPE: CPT | Mod: NTX | Performed by: EMERGENCY MEDICINE

## 2023-08-08 PROCEDURE — 85025 COMPLETE CBC W/AUTO DIFF WBC: CPT | Mod: NTX | Performed by: PHYSICIAN ASSISTANT

## 2023-08-08 RX ORDER — ATROPINE SULFATE 10 MG/ML
1 SOLUTION/ DROPS OPHTHALMIC DAILY
Status: DISCONTINUED | OUTPATIENT
Start: 2023-08-08 | End: 2023-08-08 | Stop reason: HOSPADM

## 2023-08-08 RX ORDER — LABETALOL 100 MG/1
200 TABLET, FILM COATED ORAL 2 TIMES DAILY
Status: DISCONTINUED | OUTPATIENT
Start: 2023-08-08 | End: 2023-08-08 | Stop reason: HOSPADM

## 2023-08-08 RX ADMIN — MUPIROCIN: 20 OINTMENT TOPICAL at 09:08

## 2023-08-08 RX ADMIN — ACETAMINOPHEN 650 MG: 325 TABLET ORAL at 07:08

## 2023-08-08 RX ADMIN — ATROPINE SULFATE 1 DROP: 10 SOLUTION/ DROPS OPHTHALMIC at 09:08

## 2023-08-08 RX ADMIN — ASPIRIN 81 MG: 81 TABLET, COATED ORAL at 09:08

## 2023-08-08 RX ADMIN — LABETALOL HYDROCHLORIDE 200 MG: 100 TABLET, FILM COATED ORAL at 09:08

## 2023-08-08 RX ADMIN — ATORVASTATIN CALCIUM 40 MG: 40 TABLET, FILM COATED ORAL at 09:08

## 2023-08-08 NOTE — NURSING
[facility] [place]  Nurses Note -- 4 Eyes      8/8/2023       Skin assessed on: Admit      [x] No Pressure Injuries Present    [x]Prevention Measures Documented    [] Yes LDA  for Pressure Injury Previously documented     [] Yes New Pressure Injury Discovered   [] LDA for New Pressure Injury Added      Attending RN:  Caren Huizar, RN     Second RN:  Kalina Martinez

## 2023-08-08 NOTE — PLAN OF CARE
Problem: Device-Related Complication Risk (Hemodialysis)  Goal: Safe, Effective Therapy Delivery  Outcome: Ongoing, Progressing     Problem: Hemodynamic Instability (Hemodialysis)  Goal: Effective Tissue Perfusion  Outcome: Ongoing, Progressing     Problem: Infection (Hemodialysis)  Goal: Absence of Infection Signs and Symptoms  Outcome: Ongoing, Progressing     Problem: Adult Inpatient Plan of Care  Goal: Plan of Care Review  Outcome: Ongoing, Progressing  Goal: Patient-Specific Goal (Individualized)  Outcome: Ongoing, Progressing  Goal: Absence of Hospital-Acquired Illness or Injury  Outcome: Ongoing, Progressing  Goal: Optimal Comfort and Wellbeing  Outcome: Ongoing, Progressing  Goal: Readiness for Transition of Care  Outcome: Ongoing, Progressing     Problem: Diabetes Comorbidity  Goal: Blood Glucose Level Within Targeted Range  Outcome: Ongoing, Progressing     Problem: Adjustment to Illness (Sepsis/Septic Shock)  Goal: Optimal Coping  Outcome: Ongoing, Progressing     Problem: Bleeding (Sepsis/Septic Shock)  Goal: Absence of Bleeding  Outcome: Ongoing, Progressing     Problem: Glycemic Control Impaired (Sepsis/Septic Shock)  Goal: Blood Glucose Level Within Desired Range  Outcome: Ongoing, Progressing     Problem: Infection Progression (Sepsis/Septic Shock)  Goal: Absence of Infection Signs and Symptoms  Outcome: Ongoing, Progressing     Problem: Nutrition Impaired (Sepsis/Septic Shock)  Goal: Optimal Nutrition Intake  Outcome: Ongoing, Progressing

## 2023-08-08 NOTE — PLAN OF CARE
Problem: Device-Related Complication Risk (Hemodialysis)  Goal: Safe, Effective Therapy Delivery  Outcome: Met     Problem: Hemodynamic Instability (Hemodialysis)  Goal: Effective Tissue Perfusion  Outcome: Met     Problem: Infection (Hemodialysis)  Goal: Absence of Infection Signs and Symptoms  Outcome: Met     Problem: Adult Inpatient Plan of Care  Goal: Plan of Care Review  Outcome: Met  Goal: Patient-Specific Goal (Individualized)  Outcome: Met  Goal: Absence of Hospital-Acquired Illness or Injury  Outcome: Met  Goal: Optimal Comfort and Wellbeing  Outcome: Met  Goal: Readiness for Transition of Care  Outcome: Met     Problem: Diabetes Comorbidity  Goal: Blood Glucose Level Within Targeted Range  Outcome: Met     Problem: Adjustment to Illness (Sepsis/Septic Shock)  Goal: Optimal Coping  Outcome: Met     Problem: Bleeding (Sepsis/Septic Shock)  Goal: Absence of Bleeding  Outcome: Met     Problem: Glycemic Control Impaired (Sepsis/Septic Shock)  Goal: Blood Glucose Level Within Desired Range  Outcome: Met     Problem: Infection Progression (Sepsis/Septic Shock)  Goal: Absence of Infection Signs and Symptoms  Outcome: Met     Problem: Nutrition Impaired (Sepsis/Septic Shock)  Goal: Optimal Nutrition Intake  Outcome: Met

## 2023-08-08 NOTE — HOSPITAL COURSE
Mary Waller was placed under observation for dialysis needs.    Throuhgout her observation stay, Ms. Waller's nausea subsided. She was noted to tolerate a clear liquid diet and was requesting a renal diet, prior to her discharge. Nephrology was consulted for dialysis needs and Ms. Waller completed a successful complete session, prior to her discharge. Ms. Waller is medically and hemodynamically stable for discharge. Vitals prior to discharge are as follows:  Afebrile at 98.7° F, HR:  85 bpm, RR:  20, BP:  153/69 with oxygen saturation of 100% on RA.    All findings and plan were explained to the patient. All questions and concerns were answered. Patient verbalized understanding. Patient is in stable condition to d/c home and has been informed to follow up with her PCP within the next 7-10 days to discuss her observation stay and to follow-up with her regular outpatient dialysis sessions. Patient has been educated to return to the ED if she experiences any further chest pain, lightheadness, weakness, or discomfort.

## 2023-08-08 NOTE — PLAN OF CARE
08/08/23 0926   Final Note   Assessment Type Final Discharge Note   Anticipated Discharge Disposition Home   Hospital Resources/Appts/Education Provided Appointments scheduled and added to AVS   Post-Acute Status   Post-Acute Authorization Other   Other Status No Post-Acute Service Needs     Pts nurse Tristin notified that the pt can d/c from CM standpoint

## 2023-08-08 NOTE — DISCHARGE SUMMARY
[facility] Medicine  Discharge Summary      Patient Name: Mary Waller  MRN: [id number]  [facility]: [id number]  Patient Class: OP- Observation  Admission Date: 8/7/2023  Hospital Length of Stay: 0 days  Discharge Date and Time:  08/08/2023 8:56 AM  Attending Physician: Jeff Wells MD   Discharging Provider: Bonilla Marrero PA-C  Primary Care Provider: Bryan Murray MD    Primary Care Team: BONILLA MARRERO    HPI:   Mary Waller 48 y.o. female with ESRD on HD MWF, HTN, DM, chronic left eye blindness presents to the hospital with a chief complaint of nausea.  She reports she developed nausea with 1 episode of emesis that began Saturday after beginning to take 1 jaw.  This resolved Sunday, but then occurred today.  She describes emesis as a clear color.  She reports she is currently hungry.  She has had no sick contacts or eating undercooked foods.  She reports she was dialyzed Monday Wednesday Friday at [facility] with residual urine output.  She last attended dialysis Friday.  She denies fever chest pain shortness O breath abdominal pain leg swelling melena hematuria hematemesis dizziness and syncope.      In the ED, afebrile without leukocytosis potassium 5.4 BUN 60 creatinine 12.1 COVID negative.      * No surgery found *      Hospital Course:   Mary Waller was placed under observation for dialysis needs.    Throuhgout her observation stay, Ms. Waller's nausea subsided. She was noted to tolerate a clear liquid diet and was requesting a renal diet, prior to her discharge. Nephrology was consulted for dialysis needs and Ms. Waller completed a successful complete session, prior to her discharge. Ms. Waller is medically and hemodynamically stable for discharge. Vitals prior to discharge are as follows:  Afebrile at 98.7° F, HR:  85 bpm, RR:  20, BP:  153/69 with oxygen saturation of 100% on RA.    All findings and plan were explained to the patient. All  questions and concerns were answered. Patient verbalized understanding. Patient is in stable condition to d/c home and has been informed to follow up with her PCP within the next 7-10 days to discuss her observation stay and to follow-up with her regular outpatient dialysis sessions. Patient has been educated to return to the ED if she experiences any further chest pain, lightheadness, weakness, or discomfort.       Goals of Care Treatment Preferences:  Code Status: Full Code      Consults:   Consults (From admission, onward)        Status Ordering Provider     Case Management/  Once        Provider:  (Not yet assigned)    Acknowledged EMMA LUGO     Inpatient consult to Nephrology  Once        Provider:  Stephanie Emery MD    Acknowledged [place]          No new Assessment & Plan notes have been filed under this hospital service since the last note was generated.  Service: Hospital Medicine    Final Active Diagnoses:    Diagnosis Date Noted POA    PRINCIPAL PROBLEM:  ESRD (end stage renal disease) on dialysis [N18.6, Z99.2] 07/21/2023 Not Applicable    Nausea [R11.0] 08/07/2023 Unknown    Essential hypertension [I10] 01/05/2019 Yes    Type 2 diabetes mellitus [E11.9] 01/05/2019 Yes      Problems Resolved During this Admission:       Discharged Condition: stable    Disposition: Home or Self Care    Follow Up:    Patient Instructions:      Diet renal     Activity as tolerated       Significant Diagnostic Studies: Labs:   CMP   Recent Labs   Lab 08/07/23  1056 08/08/23  0330   * 135*   K 5.4* 4.7   CL 96 96   CO2 26 27   * 85   BUN 60* 32*   CREATININE 12.1* 7.3*   CALCIUM 8.7 8.7   PROT 8.4 8.3   ALBUMIN 3.5 3.3*   BILITOT 0.5 0.5   ALKPHOS 68 63   AST 6* 8*   ALT 12 12   ANIONGAP 12 12    and CBC   Recent Labs   Lab 08/07/23  1056 08/07/23  1201 08/08/23  0330   WBC 7.44  --  8.03   HGB 10.4*  --  10.1*   HCT 33.1*   < > 31.4*   *  --  142*    < > =  values in this interval not displayed.       Pending Diagnostic Studies:     None         Medications:  Reconciled Home Medications:      Medication List      CONTINUE taking these medications    aspirin 81 MG EC tablet  Commonly known as: ECOTRIN  Take 81 mg by mouth once daily.     atropine 1% 1 % Drop  Commonly known as: ISOPTO ATROPINE  Place 1 drop into the left eye every evening.     calcium acetate(phosphat bind) 667 mg tablet  Commonly known as: PHOSLO  Take 1 tablet (667 mg total) by mouth 3 (three) times daily with meals.     COMBIGAN 0.2-0.5 % Drop  Generic drug: brimonidine-timoloL  Place 1 drop into the right eye every evening.     docusate sodium 100 MG capsule  Commonly known as: COLACE  Take 100 mg by mouth Daily.     FISH OIL ORAL  Take 1,000 mg by mouth once daily.     furosemide 80 MG tablet  Commonly known as: LASIX  Take 80 mg by mouth every evening.     labetaloL 200 MG tablet  Commonly known as: NORMODYNE  Take 400 mg by mouth 2 (two) times daily.     MOUNJARO 10 mg/0.5 mL Pnij  Generic drug: tirzepatide  Inject 10mg under the skin every 7 days     rosuvastatin 40 MG Tab  Commonly known as: CRESTOR  Take 40 mg by mouth every evening.     sevelamer carbonate 800 mg Tab  Commonly known as: RENVELA  Take 2,400 mg by mouth 4 (four) times daily.     VITAMIN B-12 500 MCG tablet  Generic drug: cyanocobalamin  Take 500 mcg by mouth once daily.            Indwelling Lines/Drains at time of discharge:   Lines/Drains/Airways     Drain  Duration                Hemodialysis AV Fistula Left upper arm -- days                Time spent on the discharge of patient: 40 minutes      Bonilla Marrero PA-C  Department of [facility] Medicine  [facility]  08/08/2023

## 2023-08-08 NOTE — ED NOTES
Introduced self at bedside, pt is resting comfortably, denies any pain at this time. Pt is alert, calm, and oriented x4, no acute distress noted. Family at bedside.

## 2023-08-08 NOTE — NURSING
Patient discharged per MD orders.  IV removed.  Catheter tip intact.  No distress noted.  Discharge instructions explained.  AVS given to patient.  Patient verbalized understanding.  VSS.  Afebrile.  No complains of pain, N/V, diarrhea, or SOB.  Rx provided.    Patient left with belongings to Main Entrance via wheelchair per transport.  Family with patient.

## 2023-08-10 LAB — BACTERIA UR CULT: NORMAL

## 2023-08-16 ENCOUNTER — TELEPHONE (OUTPATIENT)
Dept: TRANSPLANT | Facility: CLINIC | Age: 48
End: 2023-08-16
Payer: MEDICARE

## 2023-08-17 ENCOUNTER — OFFICE VISIT (OUTPATIENT)
Dept: TRANSPLANT | Facility: CLINIC | Age: 48
End: 2023-08-17
Payer: MEDICARE

## 2023-08-17 ENCOUNTER — LAB VISIT (OUTPATIENT)
Dept: LAB | Facility: HOSPITAL | Age: 48
End: 2023-08-17
Payer: MEDICARE

## 2023-08-17 ENCOUNTER — TELEPHONE (OUTPATIENT)
Dept: TRANSPLANT | Facility: CLINIC | Age: 48
End: 2023-08-17
Payer: MEDICARE

## 2023-08-17 VITALS
RESPIRATION RATE: 18 BRPM | BODY MASS INDEX: 36.28 KG/M2 | SYSTOLIC BLOOD PRESSURE: 157 MMHG | WEIGHT: 225.75 LBS | HEART RATE: 85 BPM | OXYGEN SATURATION: 100 % | TEMPERATURE: 97 F | HEIGHT: 66 IN | DIASTOLIC BLOOD PRESSURE: 70 MMHG

## 2023-08-17 DIAGNOSIS — N18.6 ESRD (END STAGE RENAL DISEASE) ON DIALYSIS: ICD-10-CM

## 2023-08-17 DIAGNOSIS — Z01.818 PRE-TRANSPLANT EVALUATION FOR CHRONIC KIDNEY DISEASE: Primary | ICD-10-CM

## 2023-08-17 DIAGNOSIS — Z99.2 TYPE 2 DIABETES MELLITUS WITH CHRONIC KIDNEY DISEASE ON CHRONIC DIALYSIS, UNSPECIFIED WHETHER LONG TERM INSULIN USE: ICD-10-CM

## 2023-08-17 DIAGNOSIS — Z01.818 PRE-TRANSPLANT EVALUATION FOR KIDNEY TRANSPLANT: ICD-10-CM

## 2023-08-17 DIAGNOSIS — Z99.2 ESRD ON DIALYSIS: ICD-10-CM

## 2023-08-17 DIAGNOSIS — H40.9 GLAUCOMA, UNSPECIFIED GLAUCOMA TYPE, UNSPECIFIED LATERALITY: ICD-10-CM

## 2023-08-17 DIAGNOSIS — I10 ESSENTIAL HYPERTENSION: ICD-10-CM

## 2023-08-17 DIAGNOSIS — N18.6 ESRD ON DIALYSIS: ICD-10-CM

## 2023-08-17 DIAGNOSIS — Z76.82 ORGAN TRANSPLANT CANDIDATE: ICD-10-CM

## 2023-08-17 DIAGNOSIS — E11.22 TYPE 2 DIABETES MELLITUS WITH CHRONIC KIDNEY DISEASE ON CHRONIC DIALYSIS, UNSPECIFIED WHETHER LONG TERM INSULIN USE: ICD-10-CM

## 2023-08-17 DIAGNOSIS — Z99.2 ESRD (END STAGE RENAL DISEASE) ON DIALYSIS: ICD-10-CM

## 2023-08-17 DIAGNOSIS — I25.119 CORONARY ARTERY DISEASE INVOLVING NATIVE CORONARY ARTERY OF NATIVE HEART WITH ANGINA PECTORIS: ICD-10-CM

## 2023-08-17 DIAGNOSIS — N18.6 TYPE 2 DIABETES MELLITUS WITH CHRONIC KIDNEY DISEASE ON CHRONIC DIALYSIS, UNSPECIFIED WHETHER LONG TERM INSULIN USE: ICD-10-CM

## 2023-08-17 LAB
A1 AG RBC QL: NORMAL
ABO + RH BLD: NORMAL
ALBUMIN SERPL BCP-MCNC: 3.5 G/DL (ref 3.5–5.2)
ALP SERPL-CCNC: 65 U/L (ref 55–135)
ALT SERPL W/O P-5'-P-CCNC: 13 U/L (ref 10–44)
ANION GAP SERPL CALC-SCNC: 13 MMOL/L (ref 8–16)
APTT PPP: 25.4 SEC (ref 21–32)
AST SERPL-CCNC: 8 U/L (ref 10–40)
BASOPHILS # BLD AUTO: 0.03 K/UL (ref 0–0.2)
BASOPHILS NFR BLD: 0.5 % (ref 0–1.9)
BILIRUB DIRECT SERPL-MCNC: 0.2 MG/DL (ref 0.1–0.3)
BILIRUB SERPL-MCNC: 0.5 MG/DL (ref 0.1–1)
BLD GP AB SCN CELLS X3 SERPL QL: NORMAL
BUN SERPL-MCNC: 39 MG/DL (ref 6–20)
CALCIUM SERPL-MCNC: 8.9 MG/DL (ref 8.7–10.5)
CHLORIDE SERPL-SCNC: 93 MMOL/L (ref 95–110)
CHOLEST SERPL-MCNC: 98 MG/DL (ref 120–199)
CHOLEST/HDLC SERPL: 3.3 {RATIO} (ref 2–5)
CO2 SERPL-SCNC: 30 MMOL/L (ref 23–29)
CREAT SERPL-MCNC: 7.3 MG/DL (ref 0.5–1.4)
DIFFERENTIAL METHOD: ABNORMAL
EOSINOPHIL # BLD AUTO: 0.1 K/UL (ref 0–0.5)
EOSINOPHIL NFR BLD: 2.1 % (ref 0–8)
ERYTHROCYTE [DISTWIDTH] IN BLOOD BY AUTOMATED COUNT: 13.2 % (ref 11.5–14.5)
EST. GFR  (NO RACE VARIABLE): 6.5 ML/MIN/1.73 M^2
ESTIMATED AVG GLUCOSE: 105 MG/DL (ref 68–131)
GLUCOSE SERPL-MCNC: 153 MG/DL (ref 70–110)
HAV IGG SER QL IA: NORMAL
HBA1C MFR BLD: 5.3 % (ref 4–5.6)
HBV CORE AB SERPL QL IA: NORMAL
HBV SURFACE AB SER-ACNC: 44.93 MIU/ML
HBV SURFACE AB SER-ACNC: REACTIVE M[IU]/ML
HBV SURFACE AG SERPL QL IA: NORMAL
HCG INTACT+B SERPL-ACNC: <2.4 MIU/ML
HCT VFR BLD AUTO: 35.9 % (ref 37–48.5)
HCV AB SERPL QL IA: NORMAL
HDLC SERPL-MCNC: 30 MG/DL (ref 40–75)
HDLC SERPL: 30.6 % (ref 20–50)
HGB BLD-MCNC: 11.2 G/DL (ref 12–16)
HIV 1+2 AB+HIV1 P24 AG SERPL QL IA: NORMAL
IMM GRANULOCYTES # BLD AUTO: 0.01 K/UL (ref 0–0.04)
IMM GRANULOCYTES NFR BLD AUTO: 0.2 % (ref 0–0.5)
INR PPP: 1 (ref 0.8–1.2)
LDLC SERPL CALC-MCNC: 47 MG/DL (ref 63–159)
LYMPHOCYTES # BLD AUTO: 1.2 K/UL (ref 1–4.8)
LYMPHOCYTES NFR BLD: 19.7 % (ref 18–48)
MCH RBC QN AUTO: 32.5 PG (ref 27–31)
MCHC RBC AUTO-ENTMCNC: 31.2 G/DL (ref 32–36)
MCV RBC AUTO: 104 FL (ref 82–98)
MONOCYTES # BLD AUTO: 0.8 K/UL (ref 0.3–1)
MONOCYTES NFR BLD: 12.5 % (ref 4–15)
NEUTROPHILS # BLD AUTO: 4 K/UL (ref 1.8–7.7)
NEUTROPHILS NFR BLD: 65 % (ref 38–73)
NONHDLC SERPL-MCNC: 68 MG/DL
NRBC BLD-RTO: 0 /100 WBC
PHOSPHATE SERPL-MCNC: 3.6 MG/DL (ref 2.7–4.5)
PLATELET # BLD AUTO: 159 K/UL (ref 150–450)
PMV BLD AUTO: 11 FL (ref 9.2–12.9)
POTASSIUM SERPL-SCNC: 4.6 MMOL/L (ref 3.5–5.1)
PROT SERPL-MCNC: 9 G/DL (ref 6–8.4)
PROTHROMBIN TIME: 11 SEC (ref 9–12.5)
PTH-INTACT SERPL-MCNC: 341.2 PG/ML (ref 9–77)
RBC # BLD AUTO: 3.45 M/UL (ref 4–5.4)
SODIUM SERPL-SCNC: 136 MMOL/L (ref 136–145)
SPECIMEN OUTDATE: NORMAL
TRIGL SERPL-MCNC: 105 MG/DL (ref 30–150)
VARICELLA INTERPRETATION: POSITIVE
VARICELLA ZOSTER IGG: >4000 AU/ML
WBC # BLD AUTO: 6.08 K/UL (ref 3.9–12.7)

## 2023-08-17 PROCEDURE — 86825 HLA X-MATH NON-CYTOTOXIC: CPT | Mod: 91,PO,TXP | Performed by: NURSE PRACTITIONER

## 2023-08-17 PROCEDURE — 86704 HEP B CORE ANTIBODY TOTAL: CPT | Mod: TXP | Performed by: NURSE PRACTITIONER

## 2023-08-17 PROCEDURE — 86644 CMV ANTIBODY: CPT | Mod: TXP | Performed by: NURSE PRACTITIONER

## 2023-08-17 PROCEDURE — 86828 HLA CLASS I&II ANTIBODY QUAL: CPT | Mod: 91,PO,TXP | Performed by: NURSE PRACTITIONER

## 2023-08-17 PROCEDURE — 86706 HEP B SURFACE ANTIBODY: CPT | Mod: TXP | Performed by: NURSE PRACTITIONER

## 2023-08-17 PROCEDURE — 84100 ASSAY OF PHOSPHORUS: CPT | Mod: TXP | Performed by: NURSE PRACTITIONER

## 2023-08-17 PROCEDURE — 86790 VIRUS ANTIBODY NOS: CPT | Mod: TXP | Performed by: NURSE PRACTITIONER

## 2023-08-17 PROCEDURE — 86901 BLOOD TYPING SEROLOGIC RH(D): CPT | Mod: TXP | Performed by: NURSE PRACTITIONER

## 2023-08-17 PROCEDURE — 99999 PR PBB SHADOW E&M-EST. PATIENT-LVL V: CPT | Mod: PBBFAC,TXP,, | Performed by: NURSE PRACTITIONER

## 2023-08-17 PROCEDURE — 99203 OFFICE O/P NEW LOW 30 MIN: CPT | Mod: S$PBB,TXP,, | Performed by: TRANSPLANT SURGERY

## 2023-08-17 PROCEDURE — 85610 PROTHROMBIN TIME: CPT | Mod: TXP | Performed by: NURSE PRACTITIONER

## 2023-08-17 PROCEDURE — 99204 PR OFFICE/OUTPT VISIT, NEW, LEVL IV, 45-59 MIN: ICD-10-PCS | Mod: S$PBB,TXP,, | Performed by: PHYSICIAN ASSISTANT

## 2023-08-17 PROCEDURE — 99215 OFFICE O/P EST HI 40 MIN: CPT | Mod: PBBFAC,TXP | Performed by: NURSE PRACTITIONER

## 2023-08-17 PROCEDURE — 99205 PR OFFICE/OUTPT VISIT, NEW, LEVL V, 60-74 MIN: ICD-10-PCS | Mod: S$PBB,TXP,, | Performed by: NURSE PRACTITIONER

## 2023-08-17 PROCEDURE — 36415 COLL VENOUS BLD VENIPUNCTURE: CPT | Mod: TXP | Performed by: NURSE PRACTITIONER

## 2023-08-17 PROCEDURE — 82248 BILIRUBIN DIRECT: CPT | Mod: TXP | Performed by: NURSE PRACTITIONER

## 2023-08-17 PROCEDURE — 86905 BLOOD TYPING RBC ANTIGENS: CPT | Mod: TXP | Performed by: NURSE PRACTITIONER

## 2023-08-17 PROCEDURE — 83036 HEMOGLOBIN GLYCOSYLATED A1C: CPT | Mod: TXP | Performed by: NURSE PRACTITIONER

## 2023-08-17 PROCEDURE — 87389 HIV-1 AG W/HIV-1&-2 AB AG IA: CPT | Mod: TXP | Performed by: NURSE PRACTITIONER

## 2023-08-17 PROCEDURE — 87340 HEPATITIS B SURFACE AG IA: CPT | Mod: TXP | Performed by: NURSE PRACTITIONER

## 2023-08-17 PROCEDURE — 83970 ASSAY OF PARATHORMONE: CPT | Mod: TXP | Performed by: NURSE PRACTITIONER

## 2023-08-17 PROCEDURE — 86592 SYPHILIS TEST NON-TREP QUAL: CPT | Mod: TXP | Performed by: NURSE PRACTITIONER

## 2023-08-17 PROCEDURE — 99204 OFFICE O/P NEW MOD 45 MIN: CPT | Mod: S$PBB,TXP,, | Performed by: PHYSICIAN ASSISTANT

## 2023-08-17 PROCEDURE — 80061 LIPID PANEL: CPT | Mod: TXP | Performed by: NURSE PRACTITIONER

## 2023-08-17 PROCEDURE — 86829 HLA CLASS I/II ANTIBODY QUAL: CPT | Mod: PO,TXP | Performed by: NURSE PRACTITIONER

## 2023-08-17 PROCEDURE — 86803 HEPATITIS C AB TEST: CPT | Mod: TXP | Performed by: NURSE PRACTITIONER

## 2023-08-17 PROCEDURE — 86833 HLA CLASS II HIGH DEFIN QUAL: CPT | Mod: PO,TXP | Performed by: NURSE PRACTITIONER

## 2023-08-17 PROCEDURE — 85730 THROMBOPLASTIN TIME PARTIAL: CPT | Mod: TXP | Performed by: NURSE PRACTITIONER

## 2023-08-17 PROCEDURE — 99203 PR OFFICE/OUTPT VISIT, NEW, LEVL III, 30-44 MIN: ICD-10-PCS | Mod: S$PBB,TXP,, | Performed by: TRANSPLANT SURGERY

## 2023-08-17 PROCEDURE — 84702 CHORIONIC GONADOTROPIN TEST: CPT | Mod: TXP | Performed by: NURSE PRACTITIONER

## 2023-08-17 PROCEDURE — 86787 VARICELLA-ZOSTER ANTIBODY: CPT | Mod: TXP | Performed by: NURSE PRACTITIONER

## 2023-08-17 PROCEDURE — 99205 OFFICE O/P NEW HI 60 MIN: CPT | Mod: S$PBB,TXP,, | Performed by: NURSE PRACTITIONER

## 2023-08-17 PROCEDURE — 85025 COMPLETE CBC W/AUTO DIFF WBC: CPT | Mod: TXP | Performed by: NURSE PRACTITIONER

## 2023-08-17 PROCEDURE — 99999 PR PBB SHADOW E&M-EST. PATIENT-LVL V: ICD-10-PCS | Mod: PBBFAC,TXP,, | Performed by: NURSE PRACTITIONER

## 2023-08-17 PROCEDURE — 86832 HLA CLASS I HIGH DEFIN QUAL: CPT | Mod: PO,TXP | Performed by: NURSE PRACTITIONER

## 2023-08-17 PROCEDURE — 86480 TB TEST CELL IMMUN MEASURE: CPT | Mod: TXP | Performed by: NURSE PRACTITIONER

## 2023-08-17 PROCEDURE — 80053 COMPREHEN METABOLIC PANEL: CPT | Mod: TXP | Performed by: NURSE PRACTITIONER

## 2023-08-17 RX ORDER — DULAGLUTIDE 3 MG/.5ML
3 INJECTION, SOLUTION SUBCUTANEOUS
Status: ON HOLD | COMMUNITY
End: 2023-12-04 | Stop reason: HOSPADM

## 2023-08-17 NOTE — PROGRESS NOTES
INITIAL PATIENT EDUCATION NOTE    Ms. Mary Waller was seen in pre-kidney transplant clinic for evaluation for kidney, kidney/pancreas or pancreas only transplant.  The patient attended a an individual video education session that discussed/reviewed the following aspects of transplantation: evaluation including diagnostic and laboratory testing,( Chemistries, Hematology, Serologies including HIV and Hepatitis and HLA) required for transplantation and selection committee process, UNOS waitlist management/multiple listings, types of organs offered (KDPI < 85%, KDPI > 85%, PHS risk, DCD, HCV+, HIV+ for HIV+ recipients and enbloc/dual), financial aspects, surgical procedures, dietary instruction pre- and post-transplant, health maintenance pre- and post-transplant, post-transplant hospitalization and outpatient follow-up, potential to participate in a research protocol, and medication management and side effects.  A question and answer session was provided after the presentation.    The patient was seen by all members of the multi-disciplinary team to include: Nephrologist/KATHY, Surgeon, , Transplant Coordinator, , Pharmacist and Dietician (if applicable).    The patient reviewed and signed all consents for evaluation which were witnessed and sent to scanning into the [facility] chart.    The patient was given an education book and plan for further evaluation based on her individual assessment.      Reviewed program requirement for complete COVID vaccination with documentation prior to listing.  COVID education information reviewed with patient. Patient encouraged to be up to date on all vaccinations.       The patient was informed that the transplant team would manage immediate post op pain. If the patient requires long term pain management, they will need to have that pain management addressed by their PCP or previous provider who wrote for long term pain medicines.    The patient  was encouraged to call with any questions or concerns.

## 2023-08-17 NOTE — TELEPHONE ENCOUNTER
Reviewed pt transplant labs.  Notified dialysis unit dietitian of the following abnormal labs via fax and requested their most recent nutrition note on this pt.  Once this note is received it will be scanned into pt's chart.     Cholesterol 98  Glucose 153

## 2023-08-17 NOTE — PROGRESS NOTES
PRE-TRANSPLANT INFECTIOUS DISEASE CONSULT    Reason for Visit:  Pre-transplant evaluation  Referring Provider: Dr. Aries Bourne     History of Present Illness:    48 y.o. female with a history of renal disease presents for pre-kidney transplant evaluation.  She has a history of MSSA bacteremia in 2019 treated with ancef for planned 6 weeks.  At that time, TTE with possible mitral vavle vegetations. JEROME w/ Evidence of necrotic (caseous) lesions on the mitral valve. Cannot rule out vegetation. Per notes, there was no follow up at completion of therapy.    Patient reports completing therapy and denies infectious issues since that time.  Reports only hardware in body is vascular stents.      Infectious History:  Recent hospital admissions: Yes - last week secondary to Mounjaro medication - resolved   Recent infections: No  Recent or current antibiotic use: No  History of recurrent infections *(sinus / pneumonia / UTI / SBP)*: No  History of diabetic foot wound or bone/joint infection: No  Recent dental infections, issues or procedures: No  History of chicken pox: No  History of shingles: No  History of STI: No  History of COVID infection: No    History of Immunosuppression:  Prior chemotherapy / immunosuppression: No  Prior transplant: No  History of splenectomy: No    Tuberculosis:  Prior screening for latent TB: Yes - TST at HD - reportedly negative  Prior diagnosis of latent TB: No  Risk factors for TB *known exposure, incarceration, homelessness*: No    Geographical exposures:  Currently lives in [place] with with mother  Lived in the following states: [place], [place]  Lived or travelled to the [facility] [place]: No  International travel: No  Travel-associated illness: No    Social/Environmental:  Occupation:  Not working  Pets: Yes -dog  Livestock: No  Fishing / hunting: No  Hobbies: TV  Water: [place]  Consumption of raw/undercooked meat or seafood?  No  Tobacco: No  Alcohol: No  Recreational drug use:  No  Sexual  partners: none      Past Histories:   Past Medical History:   Diagnosis Date    Blind     Diabetes     ESRD (end stage renal disease) on dialysis     Hypertension     Right cataract      Past Surgical History:   Procedure Laterality Date    AV FISTULA PLACEMENT      EYE SURGERY       No family history on file.  Social History     Tobacco Use    Smoking status: Never    Smokeless tobacco: Never   Substance Use Topics    Alcohol use: No    Drug use: No     Review of patient's allergies indicates:   Allergen Reactions    Codeine     Sulfa (sulfonamide antibiotics)          Immunization History:  Received all childhood vaccines: Yes  All household members receive annual flu vaccine: Yes  All household members are up to date on COVID vaccine: Yes      Current antibiotics:  Antibiotics (From admission, onward)      None              Review of Systems  Review of Systems   Constitutional: Negative for chills, decreased appetite, fever, malaise/fatigue, night sweats, weight gain and weight loss.   HENT:  Negative for congestion, ear pain, hearing loss, hoarse voice, sore throat and tinnitus.    Eyes:  Negative for blurred vision, redness and visual disturbance.   Cardiovascular:  Negative for chest pain, leg swelling and palpitations.   Respiratory:  Negative for cough, hemoptysis, shortness of breath, sputum production and wheezing.    Endocrine: Negative for cold intolerance and heat intolerance.   Hematologic/Lymphatic: Negative for adenopathy. Does not bruise/bleed easily.   Skin:  Negative for dry skin, itching, rash and suspicious lesions.   Musculoskeletal:  Negative for back pain, joint pain, myalgias and neck pain.   Gastrointestinal:  Negative for abdominal pain, constipation, diarrhea, heartburn, nausea and vomiting.   Genitourinary:  Negative for dysuria, flank pain, frequency, hematuria, hesitancy and urgency.   Neurological:  Negative for dizziness, headaches, numbness, paresthesias and weakness.    Psychiatric/Behavioral:  Negative for depression and memory loss. The patient does not have insomnia and is not nervous/anxious.    Allergic/Immunologic: Negative for environmental allergies, HIV exposure, hives and persistent infections.        Objective  Physical Exam  Constitutional:       General: She is not in acute distress.     Appearance: Normal appearance. She is well-developed. She is not ill-appearing, toxic-appearing or diaphoretic.       HENT:      Head: Normocephalic and atraumatic.      Mouth/Throat:      Lips: Pink. No lesions.      Mouth: Mucous membranes are moist. No injury, lacerations or oral lesions.      Dentition: Abnormal dentition (multiple missing teeth). Does not have dentures. No gingival swelling, dental abscesses or gum lesions.      Tongue: No lesions.      Palate: No mass and lesions.      Pharynx: Oropharynx is clear.     Cardiovascular:      Rate and Rhythm: Normal rate and regular rhythm.      Heart sounds: Normal heart sounds. No murmur heard.     No friction rub. No gallop.   Pulmonary:      Effort: Pulmonary effort is normal. No respiratory distress.      Breath sounds: Normal breath sounds. No wheezing or rales.   Abdominal:      General: Bowel sounds are normal. There is no distension.      Palpations: Abdomen is soft. There is no mass.      Tenderness: There is no abdominal tenderness. There is no guarding or rebound.   Skin:     General: Skin is warm and dry.   Neurological:      Mental Status: She is alert and oriented to person, place, and time.   Psychiatric:         Behavior: Behavior normal. Behavior is cooperative.         Labs:    CBC:   Lab Results   Component Value Date    WBC 8.03 08/08/2023    HGB 10.1 (L) 08/08/2023    HCT 31.4 (L) 08/08/2023    MCV 98 08/08/2023     (L) 08/08/2023    GRAN 5.5 08/08/2023    GRAN 68.8 08/08/2023    LYMPH 1.3 08/08/2023    LYMPH 16.3 (L) 08/08/2023    MONO 1.0 08/08/2023    MONO 12.5 08/08/2023    EOSINOPHIL 1.4  "08/08/2023       Syphilis screening: No results found for: "RPR", "PRPQ", "FTAABS"     TB screening: No results found for: "TBGOLDPLUS", "TSPOTSCREN"    HIV screening: No results found for: "LBK69UTKD"    Strongyloides IgG: No results found for: "STRONGANTIGG"    Hepatitis Serologies:   Lab Results   Component Value Date    HEPBSAB 29 07/05/2023    HEPCAB Nonreactive 07/05/2023        Varicella IgG: No results found for: "VARICELLAINT"        There is no immunization history on file for this patient.     Imaging:  Accession #: [id number]  Transesophageal echo (JEROME) complete with Doppler and color    Height:  Not recorded   Weight:  103.6 kg (228 lb 6.3 oz)   Blood Pressure:  163/70    Date of Study:  1/8/19   Ordering Provider:  King Faulkner MD   Clinical Indications:  Bacteremia associated with intravascular line [T82.7XXA, R78.81 (ICD-10-CM)]       Interpreting Physicians  Performing Staff   Adrian Schmitz MD Qamruddin, Salima, MD Tech:  Emma Mcgee RN   Support Staff:  Adrian Schmitz MD          Reason for Exam  Priority: Routine  Dx: Bacteremia associated with intravascular line [T82.7XXA, R78.81 (ICD-10-CM)]   Comments: TTE concerning for/cannot rule out mitral valvular vegetation in pt with staph bacteremia presumably 2/2 indwelling permacath (now removed). Request JEROME to determine duration of antibiotics for bacteremia +/- infective endocarditis.     Result Image Hyperlink     Show images for Transesophageal echo (JEROME) (Cupid Only)    Summary    Normal left ventricular systolic function. The estimated ejection fraction is 65%  Normal right ventricular systolic function.  Normal LV diastolic function.  Presence of large round calcified mass attached to the posterior MV leaflet with central echolucent space measuring 1.2 x 0.9 cm that is consistent with caseous necrosis, a variant of Mitral annular calcification. An old healed vegetation cannot be ruled out. There are also small nodular " calcium deposits seen on the posterior MV leaflet.  Limited JEROME due to patient developing hypoxia, pulmonic not evaluated on this study.  No gross abnormality of the Aortic (trileaflet) and TV valves seen.     XR CHEST PA AND LATERAL  Order: [id number]  Narrative    Exam:  XR CHEST 2 VW     Indication: 46 years Female  needs dialysis     Comparison:  None.     IMPRESSION:     Lines/Tubes: None.   Lungs: Adequately inflated.  No significant airspace opacities.   Pleural spaces: No pneumothorax or pleural effusion.   Heart/Mediastinum: Heart size within normal limits.   Bones: No acute osseous abnormality.     [id number]  Exam End: 09/04/21 14:25    Specimen Collected: 09/04/21 14:26 Last Res       Assessment and Plan    1. Risks of Infection: Available serologies were reviewed. No unusual risks of infection or significant barriers to transplantation were identified from the infectious disease standpoint given the information available at this time. It is recommended her 2D echo be updated and refer to ID clinic for any concerning findings.  It is recommended the patient have their teeth evaluated/treated prior to transplant but this should not delay transplant and is not a contraindication.      - Acute infectious issues: None   - Pending serologies: Hepatitis B surface Ab, Hepatitis C Ab, HIV, Quantiferon gold / T-spot, RPR, Strongyloides IgG, and VZV IgG   - Please call if any pending serologic testing is positive.    2. Immunizations:  Based on the patient's immunization history and serologies, the following immunizations are recommended:  - Hepatitis A    Patient does not have immunity to hepatitis A    Vaccination ordered today: Yes   - Hepatitis B    Patient does have immunity to hepatitis B    Vaccination ordered today: No. Reason for not ordering: Immunity   - COVID    Current CDC vaccination recommendations were discussed with the patient   - Annual high dose influenza     Vaccination ordered today: No.  Reason for not ordering: not season   - Prevnar 20    Vaccination ordered today: Yes   - Tdap    Vaccination ordered today: Yes   - Shingrix    Vaccination ordered today: Yes    Recommended Pre-Transplant Immunization Schedule   Vaccine  0m 1m 2m 6m   Pneumococcal conjugate vaccine (Prevnar 20) X      Tetanus-diphtheria-pertussis (Tdap)* X      Hepatitis A Vaccine (Havrix)** X   X   Hepatitis B Vaccine (Heplisav)** X X     Influenza (annual) X      Zoster Recombinant Vaccine (Shingrix) X  X           *Administer booster every 10 years.       **Administer if no immunity demonstrated on serologies               Patient will receive vaccines at local pharmacy. A written prescription was provided for all vaccine doses.     3. Counseling:   I discussed with the patient the risk for increased susceptibility to infections following transplantation including increased risk for infection right after transplant and if rejection should occur.  The patient has been counseled on the importance of vaccinations to decrease risk of infection and severe illness. Specific guidance has been provided to the patient regarding the patient's occupation, hobbies and activities to avoid future infectious complications.     4. Transplant Candidacy: Based on available information, there are no identified significant barriers to transplantation from an infectious disease standpoint.  Final determination of transplant candidacy will be made once evaluation is complete and reviewed by the Selection Committee.    5. Vaccine and Serology Needs:      Follow up with infectious disease as needed.       The total time for evaluation and management services performed on 08/17/2023 was greater than 45 minutes.

## 2023-08-17 NOTE — PROGRESS NOTES
Transplant Surgery  Kidney Transplant Recipient Evaluation    Referring Physician: Aries Bourne  Current Nephrologist: Aries Bourne    Subjective:     Reason for Visit: evaluate transplant candidacy    History of Present Illness: Mary Waller is a 48 y.o. year old female undergoing transplant evaluation.    Dialysis History: Mary is on hemodialysis.      Transplant History: N/A    Etiology of Renal Disease: Diabetes Mellitus - Type II (based on medical records from referral).    External provider notes reviewed: Yes    Review of Systems  Objective:     Physical Exam:  Constitutional:   Vitals reviewed: yes   Well-nourished and well-groomed: yes  Eyes:   Sclerae icteric: no   Extraocular movements intact: yes  GI:    Bowel sounds normal: yes   Tenderness: no    If yes, quadrant/location: not applicable   Palpable masses: no    If yes, quadrant/location: not applicable   Hepatosplenomegaly: no   Ascites: no   Hernia: no    If yes, type/location: not applicable   Surgical scars: no    If yes, type/location: not applicable  Resp:   Effort normal: yes   Breath sounds normal: yes    CV:   Regular rate and rhythm: yes   Heart sounds normal: yes   Femoral pulses normal: yes   Extremities edematous: no  Skin:   Rashes or lesions present: no    If yes, describe:not applicable   Jaundice:: no    Musculoskeletal:   Gait normal: yes   Strength normal: yes  Psych:   Oriented to person, place, and time: yes   Affect and mood normal: yes    Additional comments: not applicable    Diagnostics:  The following labs have been reviewed: NA  The following radiology images have been independently reviewed and interpreted: NA    Counseling: We provided Mary Waller with a group education session today.  We discussed kidney transplantation at length with her, including risks, potential complications, and alternatives in the management of her renal failure.  The discussion included complications related to anesthesia,  bleeding, infection, primary nonfunction, and ATN.  I discussed the typical postoperative course, length of hospitalization, the need for long-term immunosuppression, and the need for long-term routine follow-up.  I discussed living-donor and -donor transplantation and the relative advantages and disadvantages of each.  I also discussed average waiting times for both living donation and  donation.  I discussed national and center-specific survival rates.  I also mentioned the potential benefit of multicenter listing to candidates listed with centers within more than one organ procurement organization.  All questions were answered.    Patient advised that it is recommended that all transplant candidates, and their close contacts and household members receive Covid vaccination.    Final determination of transplant candidacy will be made once evaluation is complete and reviewed by the Kidney & Kidney/Pancreas Selection Committee.    Coronavirus disease (COVID-19) caused by severe acute respiratory virus coronavirus 2 (SARS-C0V 2) is associated with increased mortality in solid organ transplant recipients (SOT) compared to non-transplant patients. Vaccine responses to vaccination are depressed against SARS-CoV2 compared to normal individuals but improve with third vaccination doses. Vaccination prior to SOT provides both the best opportunity for transplant candidates to develop protective immunity and to reduce the risk of serious COVID19 infections post transplantation. Organ transplant candidates at [facility] will be required to receive SARS-CoV-2 vaccination prior to being listed with a an active status, whenever possible. Exceptions will be made for disability related reasons or for sincerely held Evangelical beliefs.          Transplant Surgery - Candidacy   Assessment/Plan:   Mary Waller has end stage renal disease (ESRD) on dialysis. I see no surgical  contraindication to placing a kidney transplant. Based on available information, Mary Waller is a suitable kidney transplant candidate.     Additional testing to be completed according to the Written Order Guidelines for Adult Pre-kidney and Pancreas Transplant Evaluation (KI-02).  Interpretation of tests and discussion of patient management involves all members of the multidisciplinary transplant team.    Bony Swanson MD

## 2023-08-17 NOTE — LETTER
August 18, 2023        Aries Bourne  [address]  [facility] [address]  Phone: [phone]  Fax: [phone]             [facility] Trans[facility] 1st Fl  [address]  [facility] [address]  Phone: [phone]   Patient: Mary Waller   MR Number: [id number]   YOB: 1975   Date of Visit: 8/17/2023       Dear Dr. Aries Bourne    Thank you for referring Mary Waller to me for evaluation. Attached you will find relevant portions of my assessment and plan of care.    If you have questions, please do not hesitate to call me. I look forward to following Mary Waller along with you.    Sincerely,    Justine Masterson, NP    Enclosure    If you would like to receive this communication electronically, please contact [email or web address] or [phone] to request [employer] access.    [employer] is a tool which provides read-only access to select patient information with whom you have a relationship. Its easy to use and provides real time access to review your patients record including encounter summaries, notes, results, and demographic information.    If you feel you have received this communication in error or would no longer like to receive these types of communications, please e-mail [email or web address]

## 2023-08-17 NOTE — PROGRESS NOTES
PHARM.D. PRE-TRANSPLANT NOTE:    This patient's medication therapy was evaluated as part of her pre-transplant evaluation.      The following general pharmacologic concerns were noted: None    The following concerns for post-operative pain management were noted: None    The following pharmacologic concerns related to HCV therapy were noted: None      This patient's medication profile was reviewed for considerations for DAA Hepatitis C therapy:    [X]  No current inducers of CYP 3A4 or PGP  [X]  No amiodarone on this patient's EMR profile in the last 24 months  [X]  No past or current atrial fibrillation on this patient's EMR profile       Current Outpatient Medications   Medication Sig Dispense Refill    aspirin (ECOTRIN) 81 MG EC tablet Take 81 mg by mouth once daily.      atropine 1% (ISOPTO ATROPINE) 1 % Drop Place 1 drop into the left eye every evening.      COMBIGAN 0.2-0.5 % Drop Place 1 drop into the right eye every evening.      cyanocobalamin (VITAMIN B-12) 500 MCG tablet Take 500 mcg by mouth once daily.      docosahexaenoic acid/epa (FISH OIL ORAL) Take 1,000 mg by mouth once daily.      docusate sodium (COLACE) 100 MG capsule Take 100 mg by mouth Daily.       dulaglutide (TRULICITY) 3 mg/0.5 mL pen injector Inject 3 mg into the skin every 7 days.      furosemide (LASIX) 80 MG tablet Take 80 mg by mouth every evening.      l-methylfolate-b2-b6-b12 (CEREFOLIN) 6-5-50-1 mg Tab Take 1 tablet by mouth once daily.      labetalol (NORMODYNE) 200 MG tablet Take 400 mg by mouth 2 (two) times daily.       rosuvastatin (CRESTOR) 40 MG Tab Take 40 mg by mouth every evening.      sevelamer carbonate (RENVELA) 800 mg Tab Take 2,400 mg by mouth 4 (four) times daily.       No current facility-administered medications for this visit.           I am available for consultation and can be contacted, as needed by the other members of the Transplant team.

## 2023-08-17 NOTE — PROGRESS NOTES
Transplant Nephrology  Kidney Transplant Recipient Evaluation    Referring Physician: Aries Bourne  Current Nephrologist: Aries Bourne    Subjective:   CC:  Initial evaluation of kidney transplant candidacy.    HPI:  Ms. Waller is a 48 y.o. year old Black or  female who has presented to be evaluated as a potential kidney transplant recipient.  She has ESRD secondary to diabetic nephropathy and HTN. No biopsy.  Patient is currently on hemodialysis started on 4/20/2018. Patient is dialyzing on MWF schedule.  Patient reports that she is tolerating dialysis well.. She has a RUE AV fistula for dialysis access.     Patient here today to establish care here. Is listed at [facility] and wants to change listing to [facility]. 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 She was diagnosed with DM at age 32. Previously on insulin now on [facility]. Came with medical records. Recent SPECT (abnormality due to scaring) and Echo. Will be due for MMG later this month. Colonoscopy UTD. Denies pulmonary disease, liver disease, stroke, DVt/PE, chronic wounds/infections/amputations. Patient is functional. She walks for exercise and helps her sister with instacart deliveries and shopping.     Body mass index is 36.94 kg/m².    Previous Transplant: no  Previous Blood Transfusion: yes  Previous neurogenic bladder/ urine incontinence still urinates  Anticoagulation/ antiplatelet therapy and reason: none  Potential Donor: yes, brother-in-law  High KDPI candidate: no do to weight  Meets center eligibility for accepting HCV+ donor offer: declines for now and would like to see if brother-in-law a candidate first.       Current Outpatient Medications:     aspirin (ECOTRIN) 81 MG EC tablet, Take 81 mg by mouth once daily., Disp: , Rfl:     atropine 1% (ISOPTO ATROPINE) 1 % Drop, Place 1 drop into the left eye every evening., Disp: , Rfl:     COMBIGAN 0.2-0.5  % Drop, Place 1 drop into the right eye every evening., Disp: , Rfl:     cyanocobalamin (VITAMIN B-12) 500 MCG tablet, Take 500 mcg by mouth once daily., Disp: , Rfl:     docosahexaenoic acid/epa (FISH OIL ORAL), Take 1,000 mg by mouth once daily., Disp: , Rfl:     docusate sodium (COLACE) 100 MG capsule, Take 100 mg by mouth Daily. , Disp: , Rfl:     dulaglutide (TRULICITY) 3 mg/0.5 mL pen injector, Inject 3 mg into the skin every 7 days., Disp: , Rfl:     furosemide (LASIX) 80 MG tablet, Take 80 mg by mouth every evening., Disp: , Rfl:     l-methylfolate-b2-b6-b12 (CEREFOLIN) 6-5-50-1 mg Tab, Take 1 tablet by mouth once daily., Disp: , Rfl:     labetalol (NORMODYNE) 200 MG tablet, Take 400 mg by mouth 2 (two) times daily. , Disp: , Rfl:     rosuvastatin (CRESTOR) 40 MG Tab, Take 40 mg by mouth every evening., Disp: , Rfl:     sevelamer carbonate (RENVELA) 800 mg Tab, Take 2,400 mg by mouth 4 (four) times daily., Disp: , Rfl:   No current facility-administered medications for this visit.      Past Medical and Surgical History: Ms. Waller  has a past medical history of Blind, Diabetes, ESRD (end stage renal disease) on dialysis, Hypertension, and Right cataract.  She has a past surgical history that includes Eye surgery and AV fistula placement.    Past Social and Family History: Ms. Waller reports that she has never smoked. She has never used smokeless tobacco. She reports that she does not drink alcohol and does not use drugs. Her family history includes Diabetes in her father, mother, and sister; Hyperlipidemia in her father and mother.    Review of Systems   Constitutional:  Negative for appetite change, chills, fatigue and fever.   HENT:  Negative for trouble swallowing.    Eyes:  Positive for visual disturbance.   Respiratory:  Negative for cough, chest tightness, shortness of breath and wheezing.    Cardiovascular:  Negative for chest pain, palpitations and leg swelling.   Gastrointestinal:  Negative for  "abdominal pain, constipation, diarrhea and nausea.   Genitourinary:  Negative for difficulty urinating, frequency and urgency.   Musculoskeletal:  Negative for arthralgias and myalgias.   Skin:  Negative for rash.   Neurological:  Negative for dizziness, weakness, light-headedness and headaches.   Psychiatric/Behavioral:  Negative for sleep disturbance.        Objective:   Blood pressure (!) 157/70, pulse 85, temperature 97.3 °F (36.3 °C), temperature source Temporal, resp. rate 18, height 5' 5.55" (1.665 m), weight 102.4 kg (225 lb 12 oz), SpO2 100 %.body mass index is 36.94 kg/m².    Physical Exam  Constitutional:       General: She is not in acute distress.     Appearance: She is well-developed. She is not diaphoretic.   Cardiovascular:      Rate and Rhythm: Normal rate and regular rhythm.      Heart sounds: Normal heart sounds.   Pulmonary:      Effort: Pulmonary effort is normal.      Breath sounds: Normal breath sounds.   Abdominal:      General: Bowel sounds are normal.      Palpations: Abdomen is soft.   Musculoskeletal:         General: No tenderness. Normal range of motion.   Skin:     General: Skin is warm and dry.      Findings: No rash.      Nails: There is no clubbing.   Neurological:      Mental Status: She is alert and oriented to person, place, and time.   Psychiatric:         Behavior: Behavior normal.         Labs:  Lab Results   Component Value Date    WBC 6.08 08/17/2023    HGB 11.2 (L) 08/17/2023    HCT 35.9 (L) 08/17/2023     08/17/2023    K 4.6 08/17/2023    CL 93 (L) 08/17/2023    CO2 30 (H) 08/17/2023    BUN 39 (H) 08/17/2023    CREATININE 7.3 (H) 08/17/2023    EGFRNORACEVR 6.5 (A) 08/17/2023    CALCIUM 8.9 08/17/2023    PHOS 3.6 08/17/2023    MG 2.5 08/07/2023    ALBUMIN 3.5 08/17/2023    AST 8 (L) 08/17/2023    ALT 13 08/17/2023    .2 (H) 08/17/2023       Lab Results   Component Value Date    BILIRUBINUA Negative 08/08/2023    LIPASE 144 (H) 08/07/2023    PROTEINUA 2+ (A) " "08/08/2023    NITRITE Negative 08/08/2023    RBCUA 0 08/08/2023    WBCUA 22 (H) 08/08/2023       No results found for: "HLAABCTYPE"    Labs were reviewed with the patient.    Assessment:     1. Pre-transplant evaluation for chronic kidney disease    2. ESRD on dialysis    3. Pre-transplant evaluation for kidney transplant    4. ESRD (end stage renal disease) on dialysis    5. Essential hypertension    6. Type 2 diabetes mellitus with chronic kidney disease on chronic dialysis, unspecified whether long term insulin use    7. Coronary artery disease involving native coronary artery of native heart with angina pectoris    8. Glaucoma, unspecified glaucoma type, unspecified laterality        Plan:   Ms. Waller is a 48 y.o. year old Black or  female who has presented to be evaluated as a potential kidney transplant recipient.  She has ESRD secondary to diabetic nephropathy and HTN. No biopsy.  Patient is currently on hemodialysis started on 4/20/2018.    Prior to Listing, will need the following items to be completed:  1. Standard serologies, cardiac and imaging studies   2. Needs Cardiology clearance  3. Due for UTD MMG. Obtain most recent PAP     Transplant Candidacy:   Based on available information, Ms. Waller is a high-risk kidney transplant candidate. CAD, DM, obesity   Meets center eligibility for accepting HCV+ donor offer - Yes.  Patient educated on HCV+ donors. Mary is willing to accept HCV+ donor offer - No decline for now  Patient is a candidate for KDPI > 85 kidney donor offer - No. Due to weight  Final determination of transplant candidacy will be made once workup is complete and reviewed by the selection committee.    Patient advised that it is recommended that all transplant candidates, and their close contacts and household members receive Covid vaccination.    UNOS Patient Status  Functional Status: 80% - Normal activity with effort: some symptoms of disease    Counseling:  Exercise: " reminded patient of the importance of regular exercise for weight management, blood sugar and blood pressure management.  I also explained exercise has been shown to improve cardiovascular health, energy level, and sleep hygiene.  Lastly, I advised her that cardiovascular complications are leading cause of death for renal transplant recipients, and regular exercise can help lower this risk.    We discussed various aspects of kidney transplantation including transplant surgery, immunosuppressive medications and the need for close follow up. We discussed the pregnancy (child bearing age female) following transplant, adverse effects of MMF/MYF on a fetus, and the need to plan pregnancy with TXP post receiving a kidney transplant. We also discussed side effects of immunosuppression including weight gain, hypertension, hyperlipidemia, new-onset diabetes after transplantation, infections and malignancies, especially skin cancers and lymphomas. I also reviewed the risk of acute rejection, vascular thrombosis, recurrent disease and potential transmission of infections such as hepatitis and HIV. I informed the patient that the average time on the wait list in the [facility] is between 5 to 7 years.     Justine Masterson NP          2012

## 2023-08-18 LAB
CMV IGG SERPL QL IA: NORMAL
GAMMA INTERFERON BACKGROUND BLD IA-ACNC: 0.06 IU/ML
M TB IFN-G CD4+ BCKGRND COR BLD-ACNC: 0.01 IU/ML
M TB IFN-G CD4+ BCKGRND COR BLD-ACNC: 0.03 IU/ML
MITOGEN IGNF BCKGRD COR BLD-ACNC: 2.53 IU/ML
RPR SER QL: NORMAL
STRONGYLOIDES ANTIBODY IGG: NEGATIVE
TB GOLD PLUS: NEGATIVE

## 2023-08-18 NOTE — PROGRESS NOTES
Transplant Recipient Adult Psychosocial Assessment    Mary Waller  [address] [place] [address]  Telephone Information:   Mobile [phone]   Home  [phone] (home)  Work  There is no work phone number on file.  E-mail  [email or web address]    Sex: female  YOB: 1975  Age: 48 y.o.    Encounter Date: 2023  [place] Citizen: yes  Primary Language: English   Needed: no    Emergency Contact:  Name: Alejandra Edouard  Relationship: mother  Address: same as patient  Phone Numbers:  [phone] (mobile)    Family/Social Support:   Number of dependents/: none  Marital history: never , no children  Other family dynamics: mother is living, father ; one sister who lives locally and is supportive    Household Composition:  Name: Mary Waller  Age: 48  Relationship: patient  Does person drive? no    Name: Alejandra Edouard  Age: 70  Relationship: mother  Does person drive? yes      Do you and your caregivers have access to reliable transportation? yes  PRIMARY CAREGIVER: Alejandra Edouard will be primary caregiver, phone number [phone].      provided in-depth information to patient and caregiver regarding pre- and post-transplant caregiver role.   strongly encourages patient and caregiver to have concrete plan regarding post-transplant care giving, including back-up caregiver(s) to ensure care giving needs are met as needed.    Patient and Caregiver states understanding all aspects of caregiver role/commitment and is able/willing/committed to being caregiver to the fullest extent necessary.    Patient and Caregiver verbalizes understanding of the education provided today and caregiver responsibilities.         remains available. Patient and Caregiver agree to contact  in a timely manner if concerns arise.      Able to take time off work without financial concerns: yes.     Additional Significant Others who will  Assist with Transplant:  Name: Susie De Souza [phone]  Age: 33  City: [place] State: [place]  Relationship: sister  Does person drive? yes    Living Will: no  Healthcare Power of : no  Advance Directives on file: <<no information> per medical record.  Verbally reviewed LW/HCPA information.   provided patient with copy of LW/HCPA documents and provided education on completion of forms.    Living Donors: Education and resource information given to patient. Brother in Law may be interested in donation.    Highest Education Level: Attended College/Technical School  Reading Ability: college  Reports difficulty with: seeing  Learns Best By:  hands on     Status: no  VA Benefits: no     Working for Income: No  If no, reason not working: Disability  Patient is disabled.  Prior to disability, patient  was employed as private sitter..    Spouse/Significant Other Employment: mother is disabled    Disabled: yes: date disability began: , due to: ESRD.    Monthly Income:   Disability: $958  Food [place]: $58  Able to afford all costs now and if transplanted, including medications: yes  Patient and Caregiver verbalizes understanding of personal responsibilities related to transplant costs and the importance of having a financial plan to ensure that patients transplant costs are fully covered.       provided fundraising information/education. Patient and Caregiververbalizes understanding.   remains available.    Insurance:   Payer/Plan Subscr  Sex Relation Sub. Ins. ID Effective Group Num   1. [place]ARE - [place]* JEANNE PHILLIP CHA* 1975 Female Self [id number] 19                                    [address]   2. [place] JEANNE PHILLIP CHA* 1975 Female Self [id number]* 23                                    [address] [address]     Primary Insurance (for UNOS reporting): Public Insurance - Medicare FFS (Fee For Service)  Secondary Insurance (for UNOS  reporting): Public Insurance - Medicaid  Patient and Caregiver verbalizes clear understanding that patient may experience difficulty obtaining and/or be denied insurance coverage post-surgery. This includes and is not limited to disability insurance, life insurance, health insurance, burial insurance, long term care insurance, and other insurances.      Patient and Caregiver also reports understanding that future health concerns related to or unrelated to transplantation may not be covered by patient's insurance.  Resources and information provided and reviewed.     Patient and Caregiver provides verbal permission to release any necessary information to outside resources for patient care and discharge planning.  Resources and information provided are reviewed.      Dialysis Adherence: Patient reports adherent with dialysis treatments.  Dialysis compliance letter sent.  Infusion Service: patient utilizing? no  Home Health: patient utilizing? no  DME: no  Pulmonary/Cardiac Rehab: denies   ADLS:  Pt states ability to independently accomplish most adls. She is visually impaired and requires some assistance in this area.    Adherence:   Pt states current and expected compliance with all healthcare recommendations. .  Adherence education and counseling provided.     Per History Section:  Past Medical History:   Diagnosis Date    Blind     Diabetes     ESRD (end stage renal disease) on dialysis     Hypertension     Right cataract      Social History     Tobacco Use    Smoking status: Never    Smokeless tobacco: Never   Substance Use Topics    Alcohol use: No     Social History     Substance and Sexual Activity   Drug Use No     Social History     Substance and Sexual Activity   Sexual Activity Not Currently       Per Today's Psychosocial:  Tobacco: none, patient denies any use.  Alcohol: none, patient denies any use.  Illicit Drugs/Non-prescribed Medications: none, patient denies any use.    Patient and Caregiver states  clear understanding of the potential impact of substance use as it relates to transplant candidacy and is aware of possible random substance screening.  Substance abstinence/cessation counseling, education and resources provided and reviewed.     Arrests/DWI/Treatment/Rehab: patient denies    Psychiatric History:    Mental Health:  pt denies mental health concerns  Psychiatrist/Counselor: denies  Medications:  denies  Suicide/Homicide Issues: Pt denies current or past suicidal/homicidal ideation.    Safety at home: Pt denies any home safety concerns.      Knowledge: Patient and Caregiver states having clear understanding and realistic expectations regarding the potential risks and potential benefits of organ transplantation and organ donation and agrees to discuss with health care team members and support system members, as well as to utilize available resources and express questions and/or concerns in order to further facilitate the pt informed decision-making.  Resources and information provided and reviewed.    Patient and Caregiver is aware of [facility]'s affiliation and/or partnership with agencies in home health care, LTAC, SNF, [facility], and other hospitals and clinics.    Understanding: Patient and Caregiver reports having a clear understanding of the many lifetime commitments involved with being a transplant recipient, including costs, compliance, medications, lab work, procedures, appointments, concrete and financial planning, preparedness, timely and appropriate communication of concerns, abstinence (ETOH, tobacco, illicit non-prescribed drugs), adherence to all health care team recommendations, support system and caregiver involvement, appropriate and timely resource utilization and follow-through, mental health counseling as needed/recommended, and patient and caregiver responsibilities.  Social Service Handbook, resources and detailed educational information provided and reviewed.  Educational information  "provided.    Patient and Caregiver also reports current and expected compliance with health care regime and states having a clear understanding of the importance of compliance.      Patient and Caregiver reports a clear understanding that risks and benefits may be involved with organ transplantation and with organ donation.       Patient and Caregiver also reports clear understanding that psychosocial risk factors may affect patient, and include but are not limited to feelings of depression, generalized anxiety, anxiety regarding dependence on others, post traumatic stress disorder, feelings of guilt and other emotional and/or mental concerns, and/or exacerbation of existing mental health concerns.  Detailed resources provided and discussed.      Patient and Caregiver agrees to access appropriate resources in a timely manner as needed and/or as recommended, and to communicate concerns appropriately.  Patient and Caregiver also reports a clear understanding of treatment options available.     Patient and Caregiver received education in a group setting.   reviewed education, provided additional information, and answered questions.    Feelings or Concerns: "I would like to get my life back."    Coping: Identify Patient & Caregiver Strategies to [place]:   1. In the past, coping with major surgery and/or related stress - pt has some hx of surgical procedures   2. Currently & Pre-transplant - family support, playing candy crush and watching movies   3. At the time of surgery - family support   4. During post-Transplant & Recovery Period - family support    Goals: end dialysis.  Patient referred to Vocational Rehabilitation.    Interview Behavior: Patient and Caregiver presents as alert and oriented x 4, pleasant, good eye contact, well groomed, recall good, concentration/judgement good, average intelligence, calm, communicative, cooperative, and asking and answering questions appropriately. Pt was accompanied " by her mother who presented as supportive of pt's pursuit of transplant.          Transplant Social Work - Candidacy  Assessment/Plan:     Psychosocial Suitability:  Based on psychosocial risk factors, patient presents as low risk, due to reports caregiver support, transportation and financial plans .  Lives locally.    Recommendations/Additional Comments: Recommend fundraising to offset costs associated with transplant.    Gale Sepulveda LCSW

## 2023-08-22 ENCOUNTER — HOSPITAL ENCOUNTER (OUTPATIENT)
Dept: RADIOLOGY | Facility: HOSPITAL | Age: 48
Discharge: HOME | End: 2023-08-22
Attending: NURSE PRACTITIONER
Payer: MEDICARE

## 2023-08-22 DIAGNOSIS — Z76.82 ORGAN TRANSPLANT CANDIDATE: ICD-10-CM

## 2023-08-22 PROCEDURE — 71046 X-RAY EXAM CHEST 2 VIEWS: CPT | Mod: 26,TXP,, | Performed by: RADIOLOGY

## 2023-08-22 PROCEDURE — 93978 US DOPP ILIACS BILATERAL: ICD-10-PCS | Mod: 26,TXP,, | Performed by: RADIOLOGY

## 2023-08-22 PROCEDURE — 71046 XR CHEST PA AND LATERAL: ICD-10-PCS | Mod: 26,TXP,, | Performed by: RADIOLOGY

## 2023-08-22 PROCEDURE — 72170 X-RAY EXAM OF PELVIS: CPT | Mod: TC,TXP

## 2023-08-22 PROCEDURE — 76770 US EXAM ABDO BACK WALL COMP: CPT | Mod: TC,TXP

## 2023-08-22 PROCEDURE — 76770 US EXAM ABDO BACK WALL COMP: CPT | Mod: 26,TXP,, | Performed by: RADIOLOGY

## 2023-08-22 PROCEDURE — 72170 XR PELVIS ROUTINE AP: ICD-10-PCS | Mod: 26,TXP,, | Performed by: RADIOLOGY

## 2023-08-22 PROCEDURE — 76770 US RETROPERITONEAL COMPLETE: ICD-10-PCS | Mod: 26,TXP,, | Performed by: RADIOLOGY

## 2023-08-22 PROCEDURE — 71046 X-RAY EXAM CHEST 2 VIEWS: CPT | Mod: TC,TXP

## 2023-08-22 PROCEDURE — 93978 VASCULAR STUDY: CPT | Mod: TC,TXP

## 2023-08-22 PROCEDURE — 72170 X-RAY EXAM OF PELVIS: CPT | Mod: 26,TXP,, | Performed by: RADIOLOGY

## 2023-08-22 PROCEDURE — 93978 VASCULAR STUDY: CPT | Mod: 26,TXP,, | Performed by: RADIOLOGY

## 2023-08-24 ENCOUNTER — DOCUMENTATION ONLY (OUTPATIENT)
Dept: TRANSPLANT | Facility: CLINIC | Age: 48
End: 2023-08-24
Payer: MEDICARE

## 2023-08-28 ENCOUNTER — DOCUMENTATION ONLY (OUTPATIENT)
Dept: TRANSPLANT | Facility: CLINIC | Age: 48
End: 2023-08-28
Payer: MEDICARE

## 2023-08-29 ENCOUNTER — TELEPHONE (OUTPATIENT)
Dept: TRANSPLANT | Facility: CLINIC | Age: 48
End: 2023-08-29
Payer: MEDICARE

## 2023-08-29 NOTE — TELEPHONE ENCOUNTER
MA notes per adherence form     FOR THE PAST THREE MONTHS:    0-AMA's  0-No-shows    No concerns with care giving, transportation, or mental health    Scanned in pt's media    Destiny Son  Abdominal Transplant MA

## 2023-09-01 ENCOUNTER — COMMITTEE REVIEW (OUTPATIENT)
Dept: TRANSPLANT | Facility: CLINIC | Age: 48
End: 2023-09-01
Payer: MEDICARE

## 2023-09-01 ENCOUNTER — EPISODE CHANGES (OUTPATIENT)
Dept: TRANSPLANT | Facility: CLINIC | Age: 48
End: 2023-09-01

## 2023-09-01 NOTE — COMMITTEE REVIEW
Native Organ Dx: Diabetes Mellitus - Type II      SELECTION COMMITTEE NOTE    Mary Waller was presented at selection committee on 9/1/23 .  Patient met selection criteria for kidney transplant related to ESRD due to  Diabetes Mellitus - Type II.  No absolute contraindications to transplant at this time.  Patient will be placed on the cadaveric wait list pending  CT scan of abd and pelvis w/o contrast for iliac calcifications and [facility] cardiology clearance.  and final financial approval from insurance company.  Patient will return to clinic for routine appointment in 1 year(s). Patient does not meet criteria for High KDPI kidney offer. Patient does not meet HCV+ donor offer per patient choice. Patient does not meet criteria for dual/enbloc, due to weight.      Note written by Gretel Mcguire RN    ===============================================  I was present at the meeting and attest to the decision of the committee.    Itzel Grubbs, DO  Transplant Nephrology

## 2023-09-08 ENCOUNTER — TELEPHONE (OUTPATIENT)
Dept: TRANSPLANT | Facility: CLINIC | Age: 48
End: 2023-09-08
Payer: MEDICARE

## 2023-09-08 DIAGNOSIS — Z76.82 AWAITING ORGAN TRANSPLANT STATUS: Primary | ICD-10-CM

## 2023-09-20 ENCOUNTER — TELEPHONE (OUTPATIENT)
Dept: TRANSPLANT | Facility: CLINIC | Age: 48
End: 2023-09-20
Payer: MEDICARE

## 2023-09-21 ENCOUNTER — TELEPHONE (OUTPATIENT)
Dept: TRANSPLANT | Facility: CLINIC | Age: 48
End: 2023-09-21
Payer: MEDICARE

## 2023-09-21 DIAGNOSIS — Z76.82 AWAITING TRANSPLANTATION OF KIDNEY: Primary | ICD-10-CM

## 2023-09-21 DIAGNOSIS — Z76.82 AWAITING ORGAN TRANSPLANT STATUS: ICD-10-CM

## 2023-09-28 PROCEDURE — 81376 HLA II TYPING 1 LOCUS LR: CPT | Mod: 91,PO,TXP | Performed by: NURSE PRACTITIONER

## 2023-09-28 PROCEDURE — 81373 HLA I TYPING 1 LOCUS LR: CPT | Mod: PO,TXP | Performed by: NURSE PRACTITIONER

## 2023-09-28 PROCEDURE — 81373 HLA I TYPING 1 LOCUS LR: CPT | Mod: 91,PO,TXP | Performed by: NURSE PRACTITIONER

## 2023-09-28 PROCEDURE — 81376 HLA II TYPING 1 LOCUS LR: CPT | Mod: PO,TXP | Performed by: NURSE PRACTITIONER

## 2023-09-29 LAB
B CELL RESULTS - XM AUTO: NEGATIVE
B MCS AVERAGE - XM AUTO: -28.3
FXMAU TESTING DATE: NORMAL
HLATY INTERPRETATION: NORMAL
SERUM COLLECTION DT - XM AUTO: NORMAL
T CELL RESULTS - XM AUTO: NEGATIVE
T MCS AVERAGE - XM AUTO: -10.2

## 2023-10-09 ENCOUNTER — TELEPHONE (OUTPATIENT)
Dept: TRANSPLANT | Facility: CLINIC | Age: 48
End: 2023-10-09
Payer: MEDICARE

## 2023-10-09 PROCEDURE — 81372 HLA I TYPING COMPLETE LR: CPT | Mod: PO,TXP | Performed by: NURSE PRACTITIONER

## 2023-10-09 NOTE — PROGRESS NOTES
Cardiology Clinic Note  Reason for Visit: Cardiac Risk Stratification  Referring Physician: Justine Masterson NP    HPI:   Problem List:  ESRD on HD  CAD s/p PCI (2019 at [facility])  HTN  DM2  HLD      Patient presenting cardiac risk stratification for possible kidney transplantation. Today, feels well. On HD MWF via [facility] AVF for 6 years. ESRD due to HTN, DM2. Had a stent placed at [facility] reportedly 4 years ago, but no record of this. Also, had a nuclear stress test earlier this year at [facility] reportedly. No bleeding noted on ASA. BP low sometimes during HD, but always completes her sessions. Walks with her sister frequently and can walk 2 flights of stairs without issue.     Per transplant nephrology notes:  48 y.o. female with a history of renal disease presents for pre-kidney transplant evaluation.  She has a history of MSSA bacteremia in 2019 treated with ancef for planned 6 weeks. At that time, TTE with possible mitral vavle vegetations. JEROME w/ Evidence of necrotic (caseous) lesions on the mitral valve. Cannot rule out vegetation. Per notes, there was no follow up at completion of therapy.  Patient reports completing therapy and denies infectious issues since that time.  Reports only hardware in body is vascular stents.      ROS:    Constitution: Negative for fever, chills, weight loss or gain.   HENT: Negative for sore throat, rhinorrhea, or headache.  Eyes: Negative for blurred or double vision.   Cardiovascular: See above  Pulmonary: Negative for SOB   Gastrointestinal: Negative for abdominal pain, nausea, vomiting, or diarrhea.   : Negative for dysuria.   Neurological: Negative for focal weakness or sensory changes.  PMH:     Past Medical History:   Diagnosis Date    Blind     Diabetes     ESRD (end stage renal disease) on dialysis     Hypertension     Right cataract      Past Surgical History:   Procedure Laterality Date    AV FISTULA PLACEMENT      EYE SURGERY       Allergies:     Review of patient's  allergies indicates:   Allergen Reactions    Codeine     Sulfa (sulfonamide antibiotics)      Medications:     Current Outpatient Medications on File Prior to Visit   Medication Sig Dispense Refill    aspirin (ECOTRIN) 81 MG EC tablet Take 81 mg by mouth once daily.      atropine 1% (ISOPTO ATROPINE) 1 % Drop Place 1 drop into the left eye every evening.      COMBIGAN 0.2-0.5 % Drop Place 1 drop into the right eye every evening.      cyanocobalamin (VITAMIN B-12) 500 MCG tablet Take 500 mcg by mouth once daily.      docosahexaenoic acid/epa (FISH OIL ORAL) Take 1,000 mg by mouth once daily.      docusate sodium (COLACE) 100 MG capsule Take 100 mg by mouth Daily.       dulaglutide (TRULICITY) 3 mg/0.5 mL pen injector Inject 3 mg into the skin every 7 days.      furosemide (LASIX) 80 MG tablet Take 80 mg by mouth every evening.      l-methylfolate-b2-b6-b12 (CEREFOLIN) 6-5-50-1 mg Tab Take 1 tablet by mouth once daily.      labetalol (NORMODYNE) 200 MG tablet Take 400 mg by mouth 2 (two) times daily.       rosuvastatin (CRESTOR) 40 MG Tab Take 40 mg by mouth every evening.      sevelamer carbonate (RENVELA) 800 mg Tab Take 2,400 mg by mouth 4 (four) times daily.      [DISCONTINUED] amLODIPine (NORVASC) 10 MG tablet Take 10 mg by mouth once daily.      [DISCONTINUED] hydrALAZINE (APRESOLINE) 50 MG tablet Take 100 mg by mouth every 8 (eight) hours.       [DISCONTINUED] insulin (BASAGLAR KWIKPEN U-100 INSULIN) glargine 100 units/mL (3mL) SubQ pen Inject 15 Units into the skin every evening.      [DISCONTINUED] losartan (COZAAR) 100 MG tablet Take 100 mg by mouth once daily.       Current Facility-Administered Medications on File Prior to Visit   Medication Dose Route Frequency Provider Last Rate Last Admin    [COMPLETED] heparin (porcine) injection 3,000 Units  3,000 Units Intravenous Once Aries Bourne MD   3,000 Units at 10/09/23 1019    [DISCONTINUED] heparin (porcine) injection 1,000 Units  1,000 Units  "Intravenous Continuous Aries Bourne MD   1,000 Units at 10/09/23 1024     Social History:     Social History     Tobacco Use    Smoking status: Never    Smokeless tobacco: Never   Substance Use Topics    Alcohol use: No     Family History:     Family History   Problem Relation Age of Onset    Diabetes Mother     Hyperlipidemia Mother     Diabetes Father     Hyperlipidemia Father     Diabetes Sister      Physical Exam:   /70   Pulse 83   Ht 5' 5.5" (1.664 m)   Wt 101.2 kg (223 lb)   SpO2 97%   BMI 36.54 kg/m²        Physical Exam   Constitutional: She is oriented to person, place, and time.   HENT:   Head: Normocephalic.   Mouth/Throat: Mucous membranes are moist.   Eyes:   Blind in L eye   Cardiovascular: Normal rate and regular rhythm.   AVF in LUE with thrill/bruit   Pulmonary/Chest: Effort normal. No respiratory distress.   Abdominal: Soft. Normal appearance.   Musculoskeletal:      Cervical back: Neck supple.      Right lower leg: No edema.      Left lower leg: No edema.   Neurological: She is alert and oriented to person, place, and time.   Skin: Skin is warm.        Labs:     Lab Results   Component Value Date     (L) 10/04/2023    K 5.6 (H) 10/04/2023     10/04/2023    CO2 30 (H) 08/17/2023    BUN 39 (H) 08/17/2023    CREATININE 10.01 (H) 10/04/2023    ANIONGAP 13 08/17/2023     Lab Results   Component Value Date    HGBA1C 5.7 10/04/2023     Lab Results   Component Value Date     (H) 05/21/2018    Lab Results   Component Value Date    WBC 5.93 10/04/2023    HGB 11.1 (L) 10/04/2023    HCT 35.4 (L) 10/04/2023    HCT 38 08/07/2023     10/04/2023    GRAN 4.0 08/17/2023    GRAN 65.0 08/17/2023     Lab Results   Component Value Date    CHOL 98 (L) 08/17/2023    HDL 30 (L) 08/17/2023    LDLCALC 47.0 (L) 08/17/2023    TRIG 105 08/17/2023          Imaging:   TTE 1/6/2019  Normal left ventricular systolic function. The estimated ejection fraction is 65%  Markedly calcified " mitral annulus. Echodensity under the posterior mitral valve leaflet. Cannot rule out vegetation.  Concentric left ventricular remodeling.  Severe left atrial enlargement.  Indeterminate left ventricular diastolic function.  Normal right ventricular systolic function.  There is right ventricular hypertrophy.  The estimated PA systolic pressure is 12 mm Hg  Normal central venous pressure (3 mm Hg).  Trivial circumferential pericardial effusion.    JEROME 1/8/2019    Normal left ventricular systolic function. The estimated ejection fraction is 65%  Normal right ventricular systolic function.  Normal LV diastolic function.  Presence of large round calcified mass attached to the posterior MV leaflet with central echolucent space measuring 1.2 x 0.9 cm that is consistent with caseous necrosis, a variant of Mitral annular calcification. An old healed vegetation cannot be ruled out. There are also small nodular calcium deposits seen on the posterior MV leaflet.  Limited JEROME due to patient developing hypoxia, pulmonic not evaluated on this study.  No gross abnormality of the Aortic (trileaflet) and TV valves seen.    Assessment:      1. Awaiting organ transplant status  Ambulatory referral/consult to Cardiology      2. Pre-transplant evaluation for kidney transplant        3. ESRD (end stage renal disease)        4. Essential hypertension        5. Type 2 diabetes mellitus with chronic kidney disease on chronic dialysis, unspecified whether long term insulin use        6. Mixed hyperlipidemia        7. Severe obesity (BMI 35.0-39.9) with comorbidity              Plan:     RCRI 2; 10.1% 3-day risk of death, MI, or cardiac arrest  Adequate functional status as can perform > 4 METs  No active symptoms concerning for angina or heart failure  Requested [facility] records of prior PCI and stress test for completion  No further cardiac work-up needed prior to potential transplant listing  Continue current medical regimen and follow-up  with transplant nephrology    RTC PRN.    Discussed case with Dr Willis Gutierrez MD. .    Bryan Rosa MD PGY6  Cardiovascular Medicine Fellow  [facility]  Pager: [phone]

## 2023-10-10 ENCOUNTER — TELEPHONE (OUTPATIENT)
Dept: TRANSPLANT | Facility: CLINIC | Age: 48
End: 2023-10-10
Payer: MEDICARE

## 2023-10-10 ENCOUNTER — OFFICE VISIT (OUTPATIENT)
Dept: CARDIOLOGY | Facility: CLINIC | Age: 48
End: 2023-10-10
Payer: MEDICARE

## 2023-10-10 VITALS
DIASTOLIC BLOOD PRESSURE: 70 MMHG | HEIGHT: 66 IN | HEART RATE: 83 BPM | WEIGHT: 223 LBS | OXYGEN SATURATION: 97 % | SYSTOLIC BLOOD PRESSURE: 125 MMHG | BODY MASS INDEX: 35.84 KG/M2

## 2023-10-10 DIAGNOSIS — Z01.818 PRE-TRANSPLANT EVALUATION FOR KIDNEY TRANSPLANT: ICD-10-CM

## 2023-10-10 DIAGNOSIS — Z76.82 AWAITING ORGAN TRANSPLANT STATUS: Primary | ICD-10-CM

## 2023-10-10 DIAGNOSIS — I10 ESSENTIAL HYPERTENSION: ICD-10-CM

## 2023-10-10 DIAGNOSIS — E11.22 TYPE 2 DIABETES MELLITUS WITH CHRONIC KIDNEY DISEASE ON CHRONIC DIALYSIS, UNSPECIFIED WHETHER LONG TERM INSULIN USE: ICD-10-CM

## 2023-10-10 DIAGNOSIS — E66.01 SEVERE OBESITY (BMI 35.0-39.9) WITH COMORBIDITY: ICD-10-CM

## 2023-10-10 DIAGNOSIS — E78.2 MIXED HYPERLIPIDEMIA: ICD-10-CM

## 2023-10-10 DIAGNOSIS — Z99.2 TYPE 2 DIABETES MELLITUS WITH CHRONIC KIDNEY DISEASE ON CHRONIC DIALYSIS, UNSPECIFIED WHETHER LONG TERM INSULIN USE: ICD-10-CM

## 2023-10-10 DIAGNOSIS — N18.6 ESRD (END STAGE RENAL DISEASE): ICD-10-CM

## 2023-10-10 DIAGNOSIS — N18.6 TYPE 2 DIABETES MELLITUS WITH CHRONIC KIDNEY DISEASE ON CHRONIC DIALYSIS, UNSPECIFIED WHETHER LONG TERM INSULIN USE: ICD-10-CM

## 2023-10-10 PROBLEM — I82.629 ACUTE DEEP VEIN THROMBOSIS (DVT) OF UPPER EXTREMITY: Status: RESOLVED | Noted: 2019-01-06 | Resolved: 2023-10-10

## 2023-10-10 PROCEDURE — 99204 OFFICE O/P NEW MOD 45 MIN: CPT | Mod: S$PBB,GC,TXP, | Performed by: INTERNAL MEDICINE

## 2023-10-10 PROCEDURE — 99999 PR PBB SHADOW E&M-EST. PATIENT-LVL V: ICD-10-PCS | Mod: PBBFAC,GC,TXP, | Performed by: INTERNAL MEDICINE

## 2023-10-10 PROCEDURE — 99204 PR OFFICE/OUTPT VISIT, NEW, LEVL IV, 45-59 MIN: ICD-10-PCS | Mod: S$PBB,GC,TXP, | Performed by: INTERNAL MEDICINE

## 2023-10-10 PROCEDURE — 99999 PR PBB SHADOW E&M-EST. PATIENT-LVL V: CPT | Mod: PBBFAC,GC,TXP, | Performed by: INTERNAL MEDICINE

## 2023-10-10 PROCEDURE — 99215 OFFICE O/P EST HI 40 MIN: CPT | Mod: PBBFAC,TXP | Performed by: INTERNAL MEDICINE

## 2023-10-10 NOTE — TELEPHONE ENCOUNTER
----- Message from Justine Masterson NP sent at 10/10/2023  9:23 AM CDT -----  JULIETA  ----- Message -----  From: Bryan Rosa MD  Sent: 10/10/2023   8:37 AM CDT  To: Justine Masterson NP    No further cardiac work-up needed for potential listing

## 2023-10-14 ENCOUNTER — HOSPITAL ENCOUNTER (OUTPATIENT)
Dept: RADIOLOGY | Facility: HOSPITAL | Age: 48
Discharge: HOME | End: 2023-10-14
Attending: NURSE PRACTITIONER
Payer: MEDICARE

## 2023-10-14 DIAGNOSIS — Z76.82 AWAITING ORGAN TRANSPLANT STATUS: ICD-10-CM

## 2023-10-14 PROCEDURE — 74176 CT ABDOMEN PELVIS WITHOUT CONTRAST: ICD-10-PCS | Mod: 26,TXP,, | Performed by: STUDENT IN AN ORGANIZED HEALTH CARE EDUCATION/TRAINING PROGRAM

## 2023-10-14 PROCEDURE — 74176 CT ABD & PELVIS W/O CONTRAST: CPT | Mod: TC,TXP

## 2023-10-14 PROCEDURE — 74176 CT ABD & PELVIS W/O CONTRAST: CPT | Mod: 26,TXP,, | Performed by: STUDENT IN AN ORGANIZED HEALTH CARE EDUCATION/TRAINING PROGRAM

## 2023-10-20 ENCOUNTER — TELEPHONE (OUTPATIENT)
Dept: TRANSPLANT | Facility: CLINIC | Age: 48
End: 2023-10-20
Payer: MEDICARE

## 2023-10-23 LAB
CLASS I ANTIBODY COMMENTS - LUMINEX: NORMAL
CLASS II ANTIBODY COMMENTS - LUMINEX: NORMAL
CPRA %: 0
SERUM COLLECTION DT - LUMINEX CLASS I: NORMAL
SERUM COLLECTION DT - LUMINEX CLASS II: NORMAL
SPCL1 TESTING DATE: NORMAL
SPCL2 TESTING DATE: NORMAL
SPCLU TESTING DATE: NORMAL

## 2023-11-01 ENCOUNTER — TELEPHONE (OUTPATIENT)
Dept: TRANSPLANT | Facility: CLINIC | Age: 48
End: 2023-11-01
Payer: MEDICARE

## 2023-11-02 ENCOUNTER — TELEPHONE (OUTPATIENT)
Dept: TRANSPLANT | Facility: CLINIC | Age: 48
End: 2023-11-02
Payer: MEDICARE

## 2023-11-02 NOTE — PROGRESS NOTES
"  KIDNEY WAIT LISTING NOTE    Date of Financial clearance to list: 23      SSN/[facility]:     Organ: Kidney    Last Name: Christin  First Name: Mary    : 1975       Gender: female        MRN#: [id number]                                   State of Permanent Residence: [address] [place] [address] [place] [address]    Ethnicity: Not  or /a   Race:      Black or     CLINICAL INFORMATION   Candidate Medical Urgency Status: Active (1)  Number of Previous Kidney Transplants:   Number of Previous Solid Organ Transplants:   Did you enter number of previous kidney or other solid organ transplants? {YES/NO:}  Is this Candidate a Prior Living Donor: {YES/NO:}  (If yes, please generate letter to [facility] with patient's date of donation, recipient SSN, signed by Surgical Director after patient is listed in order to receive priority points).      ABO  ABO Blood Group:   A POS     ABO Confirmation: (THESE DATES MUST BE PRIOR TO THE LIST DATE AND SUPPORTED BY SEPARATE LAB REPORTS)    Internal Results    Lab Results   Component Value Date    GROUPTRH A POS 2023     No results found for: "ABO"    External Results    ABO Date 1:    ABO Date 2  Are either of these ABO results based on External Labs? {YES/NO:}  (If Yes, STOP and go to source document in Media Tab for verification).    VITALS  Height:    Weight:    (Use height from Transplant clinic visits only).  Did you enter height/weight? {Yes/No:69974}    HLA    Class I:  No results found for: "URJI7HR", "DZGX5TI", "GQBI6JK", "KKIV4ON", "PKZEV3JS", "OXMSU2DA", "MLDOS9CG", "RJVHD0DC"    Class II:  No results found for: "NJYDLQ81NM", "SOQUML74BJ", "CIHFQZ659TM", "ZPMVUT8318", "LXAXR8VS", "AEIHP2OE"    Tested for HLA Antibodies: {tested for HLAAB:79435}     If result is "Positive" antibodies are detected     If result is "Negative or questionable" no antibodies detected    No results found for: "CIPRAS", "CIIPRAS"    DIALYSIS " "INFORMATION  Is patient Pre-Dialysis: {Yes/No:00962}     GFR Information  Report GFR being used as the criteria for placement on the kidney list. If not, leave blank  GFR < or = 20 ml/min? {Yes/No:23222}  If Yes, Specify value  ___   ml/min     Initial date GFR became 20 or less:   Is GFR obtained from an Outside lab Result? {Yes/No:12133}  (If YES verify with source document scanned into media)    If patient on Dialysis:    Is candidate currently on dialysis for ESRD? {Yes/No:82731}  If Yes,  Date Chronic Dialysis Started:   3/17/2023  (verify with source document in Media Tab)   Dialysis Unit Name: [facility] [address] [address]                        Physician Name:  Dr. Kaci Yoder  NPI#: [id number]    DIABETES INFORMATION  Primary Native Kidney Diagnosis: Diabetes Mellitus - Type II  C-Peptide Value - No results found for: "CPEPTIDE"  Current Diabetes Status: {Diabetes status:50561}    FOR NON-KIDNEY DEPARTMENT USE ONLY:  Additional Organs Registered? {add'nal organs:53844}    Maximum Acceptable Number of HLA Mismatches  ABDR:     6      (0-6)               AB:               (0-4)  ADR:   _____  (0-4)              BDR: _____ (0-4)  A:        _____  (0-2)              B:      _____ (0-2)          DR: ______ (0-2)    Will Recipient Accept?   Accept HBcAB Positive Organ:            Yes  Accept HBV CAROLYN Positive Organ:        {YES/NO:20267}  Accept HCV Antibody Positive Organ: {YES/NO:20267}   Accept HCV CAROLYN Positive Organ: {YES/NO:20267}    Dual Kidney and En Bloc Opt In : No  Dual  Local:   No  Dual Import:   No  En Bloc Local:   No  En Bloc Import: No     Accept KDPI > 85: Single: No     Local: No     Import: No  Accept KDPI > 85: Dual: No     Local: No     Import: No    ### NURSE TO VERIFY CONSENT AND MAKE ANY NECESSARY CHANGES NEEDED IN UNET AT THE TIME OF VERIFICATION ###    Unacceptible Antigens  If yes, list     Lab Results   Component Value Date    CIABCLM WEAK " B37, A66 08/17/2023       ### DO NOT LIST IF ANTIGEN VALUE WEAK ###    Hep B Vaccine series completed: {Yes / No / Unknown:74}    Blood Type x2 was verified by myself and (type in Name of second coordinator).  Blood type determination and reporting was completed according to the programs protocols and OPTN requirements.

## 2023-11-06 LAB
HLA DQA1 1: NORMAL
HLA DQA1 2: NORMAL
HLA DRB4 1: NORMAL
HLA-A 1 SERO. EQUIV: 2
HLA-A 1: NORMAL
HLA-A 2 SERO. EQUIV: 34
HLA-A 2: NORMAL
HLA-B 1 SERO. EQUIV: 44
HLA-B 1: NORMAL
HLA-B 2 SERO. EQUIV: 53
HLA-B 2: NORMAL
HLA-BW 1 SERO. EQUIV: 4
HLA-BW 2 SERO. EQUIV: NORMAL
HLA-C 1: NORMAL
HLA-C 2: NORMAL
HLA-CW 1 SERO. EQUIV: 4
HLA-CW 2 SERO. EQUIV: NORMAL
HLA-DPA1 1: NORMAL
HLA-DPA1 2: NORMAL
HLA-DPB1 1: NORMAL
HLA-DPB1 2: NORMAL
HLA-DQ 1 SERO. EQUIV: 6
HLA-DQ 2 SERO. EQUIV: NORMAL
HLA-DQB1 1: NORMAL
HLA-DQB1 2: NORMAL
HLA-DRB1 1 SERO. EQUIV: 13
HLA-DRB1 1: NORMAL
HLA-DRB1 2 SERO. EQUIV: 15
HLA-DRB1 2: NORMAL
HLA-DRB3 1: NORMAL
HLA-DRB3 2: NORMAL
HLA-DRB345 1 SERO. EQUIV: 52
HLA-DRB345 2 SERO. EQUIV: 51
HLA-DRB4 2: NORMAL
HLA-DRB5 1: NORMAL
HLA-DRB5 2: NORMAL
SSALL INTERPRETATION: NORMAL
SSALL TESTING DATE: NORMAL
SSDPA TESTING DATE: NORMAL
SSDPB TESTING DATE: NORMAL
SSDQA TESTING DATE: NORMAL
SSDQB TESTING DATE: NORMAL
SSDRB TESTING DATE: NORMAL
SSOA TESTING DATE: NORMAL
SSOB TESTING DATE: NORMAL
SSOC TESTING DATE: NORMAL
SSODR TESTING DATE: NORMAL

## 2023-11-07 LAB
HLA-B27 RELATED AG QL: NEGATIVE
HLA-B27 RELATED AG QL: NORMAL
Lab: NORMAL

## 2023-11-08 ENCOUNTER — TELEPHONE (OUTPATIENT)
Dept: TRANSPLANT | Facility: CLINIC | Age: 48
End: 2023-11-08
Payer: MEDICARE

## 2023-11-08 NOTE — PROGRESS NOTES
Spoke to pt, confirmed that she will be going to the lab on 11/14 to have 2nd ABO drawn,   will activate on kidney transplant list when resulted

## 2023-11-14 ENCOUNTER — LAB VISIT (OUTPATIENT)
Dept: LAB | Facility: HOSPITAL | Age: 48
End: 2023-11-14
Attending: NURSE PRACTITIONER
Payer: MEDICARE

## 2023-11-14 DIAGNOSIS — Z76.82 ORGAN TRANSPLANT CANDIDATE: ICD-10-CM

## 2023-11-14 LAB — ABO + RH BLD: NORMAL

## 2023-11-14 PROCEDURE — 86901 BLOOD TYPING SEROLOGIC RH(D): CPT | Mod: TXP | Performed by: NURSE PRACTITIONER

## 2023-11-14 PROCEDURE — 36415 COLL VENOUS BLD VENIPUNCTURE: CPT | Mod: TXP | Performed by: NURSE PRACTITIONER

## 2023-11-15 ENCOUNTER — TELEPHONE (OUTPATIENT)
Dept: TRANSPLANT | Facility: CLINIC | Age: 48
End: 2023-11-15
Payer: MEDICARE

## 2023-11-15 NOTE — PROGRESS NOTES
"  KIDNEY WAIT LISTING NOTE    Date of Financial clearance to list: 23      SSN/[facility]:     Organ: Kidney    Last Name: Christin  First Name: Mary    : 1975       Gender: female        MRN#: [id number]                                   State of Permanent Residence: [address] [place] [address] [address] [place] [id number]    Ethnicity: Not  or /a   Race:      Black or     CLINICAL INFORMATION   Candidate Medical Urgency Status: Active (1)  Number of Previous Kidney Transplants: 0  Number of Previous Solid Organ Transplants: 0  Did you enter number of previous kidney or other solid organ transplants? Yes  Is this Candidate a Prior Living Donor: No  (If yes, please generate letter to [employer] with patient's date of donation, recipient SSN, signed by Surgical Director after patient is listed in order to receive priority points).      ABO  ABO Blood Group:   A POS     ABO Confirmation: (THESE DATES MUST BE PRIOR TO THE LIST DATE AND SUPPORTED BY SEPARATE LAB REPORTS)    Internal Results    Lab Results   Component Value Date    GROUPTRH A POS 2023    GROUPTRH A POS 2023     No results found for: "ABO"    External Results    ABO Date 1:    ABO Date 2  Are either of these ABO results based on External Labs? No  (If Yes, STOP and go to source document in Media Tab for verification).    VITALS  Height:  5' 5.55"  Weight:  102.4kg  (Use height from Transplant clinic visits only).  Did you enter height/weight? Yes    HLA    Class I:  Lab Results   Component Value Date    ABPJ3UO 2 2023    VVZI8RN 34 2023    KIGC1EZ 44 2023    ZFOB2CX 53 2023    ZDPPA0LH 4 2023    HUAWX8NR XX 2023    ROVOG5YJ 4 2023    NMLUD9WR XX 2023       Class II:  Lab Results   Component Value Date    NNZRLQ20EK 13 2023    CXORIC78UH 15 2023    NCNZIO390KG 52 2023    LAQSKC4316 51 2023    TBOGM6KV 6 2023    CXGYE2JF XX 2023       Tested for " "HLA Antibodies: Yes, no antibodies detected     If result is "Positive" antibodies are detected     If result is "Negative or questionable" no antibodies detected    No results found for: "CIPRAS", "CIIPRAS"    DIALYSIS INFORMATION  Is patient Pre-Dialysis: No     GFR Information  Report GFR being used as the criteria for placement on the kidney list. If not, leave blank  GFR < or = 20 ml/min? n/a  If Yes, Specify value  ___   ml/min     Initial date GFR became 20 or less:   Is GFR obtained from an Outside lab Result? n/a  (If YES verify with source document scanned into media)    If patient on Dialysis:    Is candidate currently on dialysis for ESRD? Yes  If Yes,  Date Chronic Dialysis Started:   4/20/18  (verify with source document in Media Tab)   Dialysis Unit Name: [facility] [address] [address]                        Physician Name:  Dr. Kaci Yoder  NPI#: [id number]    DIABETES INFORMATION  Primary Native Kidney Diagnosis: Diabetes Mellitus - Type II  C-Peptide Value - No results found for: "CPEPTIDE"  Current Diabetes Status: Type II    FOR NON-KIDNEY DEPARTMENT USE ONLY:  Additional Organs Registered? none    Maximum Acceptable Number of HLA Mismatches  ABDR:     6      (0-6)               AB:               (0-4)  ADR:   _____  (0-4)              BDR: _____ (0-4)  A:        _____  (0-2)              B:      _____ (0-2)          DR: ______ (0-2)    Will Recipient Accept?   Accept HBcAB Positive Organ:            Yes  Accept HBV CAROLYN Positive Organ:        No  Accept HCV Antibody Positive Organ: No   Accept HCV CAROLYN Positive Organ:        No    Dual Kidney and En Bloc Opt In : No  Dual  Local:   No  Dual Import:   No  En Bloc Local:   No  En Bloc Import: No     Accept KDPI > 85: Single: No     Local: No     Import: No  Accept KDPI > 85: Dual: No     Local: No     Import: No    ### NURSE TO VERIFY CONSENT AND MAKE ANY NECESSARY CHANGES NEEDED IN UNET AT THE TIME OF " VERIFICATION ###    Unacceptible Antigens  If yes, list     Lab Results   Component Value Date    CIABCLM WEAK B37, A66 08/17/2023       ### DO NOT LIST IF ANTIGEN VALUE WEAK ###    Hep B Vaccine series completed: yes    Blood Type x2 was verified by myself and Raul Barlow RN.  Blood type determination and reporting was completed according to the programs protocols and OPTN requirements.

## 2023-11-16 DIAGNOSIS — I77.0 ARTERIOVENOUS FISTULA: Primary | ICD-10-CM

## 2023-11-21 ENCOUNTER — TELEPHONE (OUTPATIENT)
Dept: TRANSPLANT | Facility: CLINIC | Age: 48
End: 2023-11-21
Payer: MEDICARE

## 2023-11-21 DIAGNOSIS — Z76.82 PRE-KIDNEY TRANSPLANT, LISTED: Primary | ICD-10-CM

## 2023-11-21 DIAGNOSIS — Z76.82 ORGAN TRANSPLANT CANDIDATE: Primary | ICD-10-CM

## 2023-11-21 NOTE — TELEPHONE ENCOUNTER
YEARLY LIST MANAGEMENT NOTE    Mary Waller's kidney transplant listing status reviewed.  Patient is due for follow-up appointments on August 2024.  Appointments will be scheduled per protocol.

## 2023-11-21 NOTE — PROGRESS NOTES
"Gretel Mcguire, RN  Transplant Coordinator     Progress Notes  Signed     Creation Time: 11/15/2023  2:41 PM  Date of Financial clearance to list: 23        SSN/[facility]:                                    Organ: Kidney     Last Name: Christin  First Name: Mary     : 1975                                      Gender: female                         MRN#: [id number]                                   State of Permanent Residence: [address] [place] [address] [place] [address]     Ethnicity: Not  or /a   Race:       Black or      CLINICAL INFORMATION   Candidate Medical Urgency Status: Active (1)  Number of Previous Kidney Transplants: 0  Number of Previous Solid Organ Transplants: 0  Did you enter number of previous kidney or other solid organ transplants? Yes  Is this Candidate a Prior Living Donor: No  (If yes, please generate letter to [employer] with patient's date of donation, recipient SSN, signed by Surgical Director after patient is listed in order to receive priority points).                 ABO  ABO Blood Group:   A POS      ABO Confirmation: (THESE DATES MUST BE PRIOR TO THE LIST DATE AND SUPPORTED BY SEPARATE LAB REPORTS)     Internal Results           Lab Results   Component Value Date     GROUPTRH A POS 2023     GROUPTRH A POS 2023      No results found for: "ABO"     External Results     ABO Date 1:                                        ABO Date 2  Are either of these ABO results based on External Labs? No  (If Yes, STOP and go to source document in Media Tab for verification).     VITALS  Height:  5' 5.55"  Weight:  102.4kg  (Use height from Transplant clinic visits only).  Did you enter height/weight? Yes     HLA     Class I:        Lab Results   Component Value Date     IDEG4BI 2 2023     RVOP3KB 34 2023     IDLQ3QC 44 2023     XFGH2OB 53 2023     EIIBB8BT 4 2023     YWVZO9IX XX 2023     LVLYN2XT 4 2023     OHTUD5ON " "XX 08/17/2023         Class II:        Lab Results   Component Value Date     ZOARZE06IH 13 08/17/2023     JELOSE29OF 15 08/17/2023     CGJUQA455QL 52 08/17/2023     OCLCDA9845 51 08/17/2023     PXEXM4PO 6 08/17/2023     WFTNB3CQ XX 08/17/2023         Tested for HLA Antibodies: Yes, no antibodies detected                 If result is "Positive" antibodies are detected                 If result is "Negative or questionable" no antibodies detected     No results found for: "CIPRAS", "CIIPRAS"     DIALYSIS INFORMATION  Is patient Pre-Dialysis: No      GFR Information  Report GFR being used as the criteria for placement on the kidney list. If not, leave blank  GFR < or = 20 ml/min? n/a  If Yes, Specify value  ___   ml/min     Initial date GFR became 20 or less:   Is GFR obtained from an Outside lab Result? n/a  (If YES verify with source document scanned into media)     If patient on Dialysis:     Is candidate currently on dialysis for ESRD? Yes  If Yes,  Date Chronic Dialysis Started:   4/20/18  (verify with source document in Media Tab)   Dialysis Unit Name: [facility] [address] [address]                        Physician Name:  Dr. Kaci Yoder  NPI#: [id number]     DIABETES INFORMATION  Primary Native Kidney Diagnosis: Diabetes Mellitus - Type II  C-Peptide Value - No results found for: "CPEPTIDE"  Current Diabetes Status: Type II     FOR NON-KIDNEY DEPARTMENT USE ONLY:  Additional Organs Registered? none     Maximum Acceptable Number of HLA Mismatches  ABDR:     6      (0-6)               AB:               (0-4)  ADR:   _____  (0-4)              BDR: _____ (0-4)  A:        _____  (0-2)              B:      _____ (0-2)          DR: ______ (0-2)     Will Recipient Accept?   Accept HBcAB Positive Organ:            Yes  Accept HBV CAROLYN Positive Organ:        No  Accept HCV Antibody Positive Organ: No   Accept HCV CAROLYN Positive Organ:        No     Dual Kidney and En Bloc Opt " In : No  Dual  Local:        No  Dual Import:      No  En Bloc Local:   No  En Bloc Import: No      Accept KDPI > 85:      Single: No                                      Local: No                                      Import: No  Accept KDPI > 85:      Dual: No                                      Local: No                                      Import: No     ### NURSE TO VERIFY CONSENT AND MAKE ANY NECESSARY CHANGES NEEDED IN UNET AT THE TIME OF VERIFICATION ###     Unacceptible Antigens  If yes, list           Lab Results   Component Value Date     CIABCLM WEAK B37, A66 08/17/2023         ### DO NOT LIST IF ANTIGEN VALUE WEAK ###     Hep B Vaccine series completed: yes     Blood Type x2 was verified by myself and Raul Barlow RN.  Blood type determination and reporting was completed according to the programs protocols and OPTN requirements.

## 2023-11-27 ENCOUNTER — TELEPHONE (OUTPATIENT)
Dept: TRANSPLANT | Facility: CLINIC | Age: 48
End: 2023-11-27
Payer: MEDICARE

## 2023-11-27 DIAGNOSIS — Z76.82 AWAITING ORGAN TRANSPLANT STATUS: Primary | ICD-10-CM

## 2023-11-28 ENCOUNTER — TELEPHONE (OUTPATIENT)
Dept: TRANSPLANT | Facility: CLINIC | Age: 48
End: 2023-11-28
Payer: MEDICARE

## 2023-11-28 DIAGNOSIS — N18.6 ESRD (END STAGE RENAL DISEASE): Primary | ICD-10-CM

## 2023-11-28 PROBLEM — D69.6 THROMBOCYTOPENIA, UNSPECIFIED: Status: ACTIVE | Noted: 2023-11-28

## 2023-11-28 PROBLEM — D63.8 ANEMIA OF CHRONIC DISEASE: Status: ACTIVE | Noted: 2023-11-28

## 2023-11-28 NOTE — TELEPHONE ENCOUNTER
ON-CALL NOTE    [employer]OS# [id number]    Notified by Enzo Nicholson, , that Mary Waller is eligible for kidney offer.  Spoke with patient and identified no acute medical issues with telephone assessment. Protocol script read to patient regarding PHS risk criteria donor offer . Patient verbalized understanding, all questions answered, patient accepts organ offer. Notified by Enzo Nicholson that blood is needed for virtual crossmatch.  Current sample of blood is not available for crossmatch.  Patient reports no sensitizing events. Notified Tamra in HLA Lab to perform a prospective  crossmatch per guideline. Patient is currently in the ER to have her blood draw for prospective crossmatch.    Patient was asked if they have had a positive COVID-19 test or if they have any signs or symptoms. Informed patient that they will be tested for COVID-19 upon arrival to the hospital, unless have a previous positive result. If tested and result is positive, the transplant will not be able to occur, they will be inactivated on the wait list for 21 days per protocol and required to quarantine.     Patient notified of plan to have blood draw in ER tonight, return home and await updates on case.  Orders placed. Donor OR has not bee set yet and states understanding.

## 2023-11-28 NOTE — TELEPHONE ENCOUNTER
UNOS ID: [id number]    Received update from [facility], Donor OR scheduled for 1700 pm today. Phoned patient and given update, verbalized understanding. Patient to remain on standby as backup and await further updates.    11/28/2023 @ 2000 pm - Phoned patient and informed that have not received any new updates as of yet. Patient instructed to remain at home on standby as backup and await updates. Patient also instructed to continue normal routine until told otherwise. Verbalized understanding. All questions answered to patient's satisfaction.    11/28/2023 @ 2257 pm- Received update from Enzo VELASCO With instructions to admit patient for Kidney transplant with surgery scheduled for 0700 am. Phoned patient and gave detailed admit instructions and directions. Patient and mother verbalized understanding. Patient had dialysis on yesterday (11/27/2023) with no issues. Patient last ate at 1900 pm, patient made NPO. Patient estimated arrival time is midnight.    Chanel (Admit) notified @ 2242 pm. CSN: [id number]  Mike (U CN) notified of admit @ 2247 pm.  Bret (Nsg Sup) notified of admit @ 2250 pm  HElizabeth,KATHY notified of admit @ 2255 pm  Paresh (HLA) notified of admit @ 2256 pm

## 2023-11-29 ENCOUNTER — HOSPITAL ENCOUNTER (INPATIENT)
Facility: HOSPITAL | Age: 48
LOS: 5 days | Discharge: HOME | DRG: 651 | End: 2023-12-04
Attending: TRANSPLANT SURGERY | Admitting: TRANSPLANT SURGERY
Payer: MEDICARE

## 2023-11-29 ENCOUNTER — ANESTHESIA (OUTPATIENT)
Dept: SURGERY | Facility: HOSPITAL | Age: 48
DRG: 651 | End: 2023-11-29
Payer: MEDICARE

## 2023-11-29 ENCOUNTER — ANESTHESIA EVENT (OUTPATIENT)
Dept: SURGERY | Facility: HOSPITAL | Age: 48
DRG: 651 | End: 2023-11-29
Payer: MEDICARE

## 2023-11-29 DIAGNOSIS — E11.22 TYPE 2 DIABETES MELLITUS WITH CHRONIC KIDNEY DISEASE ON CHRONIC DIALYSIS, UNSPECIFIED WHETHER LONG TERM INSULIN USE: ICD-10-CM

## 2023-11-29 DIAGNOSIS — I10 ESSENTIAL HYPERTENSION: ICD-10-CM

## 2023-11-29 DIAGNOSIS — I25.119 CORONARY ARTERY DISEASE INVOLVING NATIVE CORONARY ARTERY OF NATIVE HEART WITH ANGINA PECTORIS: ICD-10-CM

## 2023-11-29 DIAGNOSIS — Z99.2 DELAYED GRAFT FUNCTION OF KIDNEY TRANSPLANT DUE TO ATN REQUIRING ACUTE DIALYSIS: ICD-10-CM

## 2023-11-29 DIAGNOSIS — E66.01 CLASS 2 SEVERE OBESITY DUE TO EXCESS CALORIES WITH SERIOUS COMORBIDITY AND BODY MASS INDEX (BMI) OF 38.0 TO 38.9 IN ADULT: ICD-10-CM

## 2023-11-29 DIAGNOSIS — Z99.2 TYPE 2 DIABETES MELLITUS WITH CHRONIC KIDNEY DISEASE ON CHRONIC DIALYSIS, UNSPECIFIED WHETHER LONG TERM INSULIN USE: ICD-10-CM

## 2023-11-29 DIAGNOSIS — T38.0X5A ADVERSE EFFECT OF CORTICOSTEROIDS, INITIAL ENCOUNTER: ICD-10-CM

## 2023-11-29 DIAGNOSIS — N18.6 ESRD (END STAGE RENAL DISEASE): ICD-10-CM

## 2023-11-29 DIAGNOSIS — Z29.89 PROPHYLACTIC IMMUNOTHERAPY: ICD-10-CM

## 2023-11-29 DIAGNOSIS — E87.5 HYPERKALEMIA: ICD-10-CM

## 2023-11-29 DIAGNOSIS — D69.6 THROMBOCYTOPENIA, UNSPECIFIED: ICD-10-CM

## 2023-11-29 DIAGNOSIS — Z01.818 PRE-OP EVALUATION: ICD-10-CM

## 2023-11-29 DIAGNOSIS — D63.8 ANEMIA OF CHRONIC DISEASE: ICD-10-CM

## 2023-11-29 DIAGNOSIS — N18.6 TYPE 2 DIABETES MELLITUS WITH CHRONIC KIDNEY DISEASE ON CHRONIC DIALYSIS, UNSPECIFIED WHETHER LONG TERM INSULIN USE: ICD-10-CM

## 2023-11-29 DIAGNOSIS — Z94.0 S/P KIDNEY TRANSPLANT: Primary | ICD-10-CM

## 2023-11-29 DIAGNOSIS — T86.19 DELAYED GRAFT FUNCTION OF KIDNEY TRANSPLANT DUE TO ATN REQUIRING ACUTE DIALYSIS: ICD-10-CM

## 2023-11-29 DIAGNOSIS — Z79.60 LONG-TERM USE OF IMMUNOSUPPRESSANT MEDICATION: ICD-10-CM

## 2023-11-29 PROBLEM — E66.812 CLASS 2 SEVERE OBESITY DUE TO EXCESS CALORIES WITH SERIOUS COMORBIDITY AND BODY MASS INDEX (BMI) OF 38.0 TO 38.9 IN ADULT: Status: ACTIVE | Noted: 2023-10-10

## 2023-11-29 LAB
ABO + RH BLD: NORMAL
ALBUMIN SERPL BCP-MCNC: 2.8 G/DL (ref 3.5–5.2)
ALBUMIN SERPL BCP-MCNC: 3.1 G/DL (ref 3.5–5.2)
ALBUMIN SERPL BCP-MCNC: 3.2 G/DL (ref 3.5–5.2)
ALP SERPL-CCNC: 63 U/L (ref 55–135)
ALT SERPL W/O P-5'-P-CCNC: 13 U/L (ref 10–44)
ANION GAP SERPL CALC-SCNC: 12 MMOL/L (ref 8–16)
ANION GAP SERPL CALC-SCNC: 14 MMOL/L (ref 8–16)
ANION GAP SERPL CALC-SCNC: 19 MMOL/L (ref 8–16)
ANISOCYTOSIS BLD QL SMEAR: SLIGHT
APTT PPP: 25.6 SEC (ref 21–32)
AST SERPL-CCNC: 7 U/L (ref 10–40)
BASOPHILS # BLD AUTO: 0.03 K/UL (ref 0–0.2)
BASOPHILS # BLD AUTO: 0.03 K/UL (ref 0–0.2)
BASOPHILS NFR BLD: 0.1 % (ref 0–1.9)
BASOPHILS NFR BLD: 0.4 % (ref 0–1.9)
BILIRUB SERPL-MCNC: 0.5 MG/DL (ref 0.1–1)
BLD GP AB SCN CELLS X3 SERPL QL: NORMAL
BUN SERPL-MCNC: 37 MG/DL (ref 6–20)
BUN SERPL-MCNC: 62 MG/DL (ref 6–20)
BUN SERPL-MCNC: 69 MG/DL (ref 6–20)
CALCIUM SERPL-MCNC: 8.3 MG/DL (ref 8.7–10.5)
CALCIUM SERPL-MCNC: 8.9 MG/DL (ref 8.7–10.5)
CALCIUM SERPL-MCNC: 9.2 MG/DL (ref 8.7–10.5)
CHLORIDE SERPL-SCNC: 94 MMOL/L (ref 95–110)
CHLORIDE SERPL-SCNC: 98 MMOL/L (ref 95–110)
CHLORIDE SERPL-SCNC: 99 MMOL/L (ref 95–110)
CO2 SERPL-SCNC: 21 MMOL/L (ref 23–29)
CO2 SERPL-SCNC: 23 MMOL/L (ref 23–29)
CO2 SERPL-SCNC: 25 MMOL/L (ref 23–29)
CREAT SERPL-MCNC: 10 MG/DL (ref 0.5–1.4)
CREAT SERPL-MCNC: 10.4 MG/DL (ref 0.5–1.4)
CREAT SERPL-MCNC: 7.6 MG/DL (ref 0.5–1.4)
DIFFERENTIAL METHOD: ABNORMAL
DIFFERENTIAL METHOD: ABNORMAL
EOSINOPHIL # BLD AUTO: 0 K/UL (ref 0–0.5)
EOSINOPHIL # BLD AUTO: 0.2 K/UL (ref 0–0.5)
EOSINOPHIL NFR BLD: 0 % (ref 0–8)
EOSINOPHIL NFR BLD: 2 % (ref 0–8)
ERYTHROCYTE [DISTWIDTH] IN BLOOD BY AUTOMATED COUNT: 13.6 % (ref 11.5–14.5)
ERYTHROCYTE [DISTWIDTH] IN BLOOD BY AUTOMATED COUNT: 14.2 % (ref 11.5–14.5)
EST. GFR  (NO RACE VARIABLE): 4.2 ML/MIN/1.73 M^2
EST. GFR  (NO RACE VARIABLE): 4.4 ML/MIN/1.73 M^2
EST. GFR  (NO RACE VARIABLE): 6.2 ML/MIN/1.73 M^2
GLUCOSE SERPL-MCNC: 127 MG/DL (ref 70–110)
GLUCOSE SERPL-MCNC: 128 MG/DL (ref 70–110)
GLUCOSE SERPL-MCNC: 237 MG/DL (ref 70–110)
GLUCOSE SERPL-MCNC: 79 MG/DL (ref 70–110)
HBV CORE AB SERPL QL IA: NORMAL
HBV CORE IGM SERPL QL IA: NORMAL
HBV SURFACE AG SERPL QL IA: NORMAL
HCO3 UR-SCNC: 21.2 MMOL/L (ref 24–28)
HCT VFR BLD AUTO: 32.3 % (ref 37–48.5)
HCT VFR BLD AUTO: 34.1 % (ref 37–48.5)
HCT VFR BLD AUTO: 36.3 % (ref 37–48.5)
HCT VFR BLD CALC: 31 %PCV (ref 36–54)
HCV AB SERPL QL IA: NORMAL
HCV RNA SERPL QL NAA+PROBE: NOT DETECTED
HCV RNA SPEC NAA+PROBE-ACNC: NOT DETECTED IU/ML
HGB BLD-MCNC: 10.7 G/DL (ref 12–16)
HGB BLD-MCNC: 11.3 G/DL (ref 12–16)
HIV 1+2 AB+HIV1 P24 AG SERPL QL IA: NORMAL
HYPOCHROMIA BLD QL SMEAR: ABNORMAL
IMM GRANULOCYTES # BLD AUTO: 0.02 K/UL (ref 0–0.04)
IMM GRANULOCYTES # BLD AUTO: 0.14 K/UL (ref 0–0.04)
IMM GRANULOCYTES NFR BLD AUTO: 0.3 % (ref 0–0.5)
IMM GRANULOCYTES NFR BLD AUTO: 0.6 % (ref 0–0.5)
INR PPP: 1 (ref 0.8–1.2)
LYMPHOCYTES # BLD AUTO: 0 K/UL (ref 1–4.8)
LYMPHOCYTES # BLD AUTO: 1.5 K/UL (ref 1–4.8)
LYMPHOCYTES NFR BLD: 0.2 % (ref 18–48)
LYMPHOCYTES NFR BLD: 19.2 % (ref 18–48)
MCH RBC QN AUTO: 31.6 PG (ref 27–31)
MCH RBC QN AUTO: 31.7 PG (ref 27–31)
MCHC RBC AUTO-ENTMCNC: 31.1 G/DL (ref 32–36)
MCHC RBC AUTO-ENTMCNC: 31.4 G/DL (ref 32–36)
MCV RBC AUTO: 101 FL (ref 82–98)
MCV RBC AUTO: 102 FL (ref 82–98)
MONOCYTES # BLD AUTO: 0.5 K/UL (ref 0.3–1)
MONOCYTES # BLD AUTO: 1 K/UL (ref 0.3–1)
MONOCYTES NFR BLD: 13.5 % (ref 4–15)
MONOCYTES NFR BLD: 2.4 % (ref 4–15)
NEUTROPHILS # BLD AUTO: 21 K/UL (ref 1.8–7.7)
NEUTROPHILS # BLD AUTO: 4.9 K/UL (ref 1.8–7.7)
NEUTROPHILS NFR BLD: 64.6 % (ref 38–73)
NEUTROPHILS NFR BLD: 96.7 % (ref 38–73)
NRBC BLD-RTO: 0 /100 WBC
NRBC BLD-RTO: 0 /100 WBC
PCO2 BLDA: 35.6 MMHG (ref 35–45)
PH SMN: 7.38 [PH] (ref 7.35–7.45)
PHOSPHATE SERPL-MCNC: 3.1 MG/DL (ref 2.7–4.5)
PHOSPHATE SERPL-MCNC: 3.6 MG/DL (ref 2.7–4.5)
PHOSPHATE SERPL-MCNC: 3.8 MG/DL (ref 2.7–4.5)
PLATELET # BLD AUTO: 129 K/UL (ref 150–450)
PLATELET # BLD AUTO: 146 K/UL (ref 150–450)
PLATELET BLD QL SMEAR: ABNORMAL
PMV BLD AUTO: 10.9 FL (ref 9.2–12.9)
PMV BLD AUTO: 11.3 FL (ref 9.2–12.9)
PO2 BLDA: 188 MMHG (ref 80–100)
POC BE: -4 MMOL/L
POC IONIZED CALCIUM: 1.14 MMOL/L (ref 1.06–1.42)
POC SATURATED O2: 100 % (ref 95–100)
POC TCO2: 22 MMOL/L (ref 23–27)
POCT GLUCOSE: 101 MG/DL (ref 70–110)
POCT GLUCOSE: 107 MG/DL (ref 70–110)
POCT GLUCOSE: 111 MG/DL (ref 70–110)
POCT GLUCOSE: 129 MG/DL (ref 70–110)
POCT GLUCOSE: 198 MG/DL (ref 70–110)
POCT GLUCOSE: 216 MG/DL (ref 70–110)
POCT GLUCOSE: 247 MG/DL (ref 70–110)
POCT GLUCOSE: 273 MG/DL (ref 70–110)
POCT GLUCOSE: 349 MG/DL (ref 70–110)
POCT GLUCOSE: 91 MG/DL (ref 70–110)
POLYCHROMASIA BLD QL SMEAR: ABNORMAL
POTASSIUM BLD-SCNC: 5.2 MMOL/L (ref 3.5–5.1)
POTASSIUM SERPL-SCNC: 5.6 MMOL/L (ref 3.5–5.1)
POTASSIUM SERPL-SCNC: 5.9 MMOL/L (ref 3.5–5.1)
POTASSIUM SERPL-SCNC: 6.7 MMOL/L (ref 3.5–5.1)
PROT SERPL-MCNC: 8.2 G/DL (ref 6–8.4)
PROTHROMBIN TIME: 11.2 SEC (ref 9–12.5)
RBC # BLD AUTO: 3.39 M/UL (ref 4–5.4)
RBC # BLD AUTO: 3.56 M/UL (ref 4–5.4)
SAMPLE: ABNORMAL
SARS-COV-2 RDRP RESP QL NAA+PROBE: NEGATIVE
SODIUM BLD-SCNC: 134 MMOL/L (ref 136–145)
SODIUM SERPL-SCNC: 131 MMOL/L (ref 136–145)
SODIUM SERPL-SCNC: 136 MMOL/L (ref 136–145)
SODIUM SERPL-SCNC: 138 MMOL/L (ref 136–145)
SPECIMEN OUTDATE: NORMAL
WBC # BLD AUTO: 21.72 K/UL (ref 3.9–12.7)
WBC # BLD AUTO: 7.56 K/UL (ref 3.9–12.7)
WBC TOXIC VACUOLES BLD QL SMEAR: PRESENT

## 2023-11-29 PROCEDURE — 63600175 PHARM REV CODE 636 W HCPCS: Performed by: PHYSICIAN ASSISTANT

## 2023-11-29 PROCEDURE — 37000009 HC ANESTHESIA EA ADD 15 MINS: Performed by: TRANSPLANT SURGERY

## 2023-11-29 PROCEDURE — 50360 PR TRANSPLANTATION OF KIDNEY: ICD-10-PCS | Mod: LT,,, | Performed by: TRANSPLANT SURGERY

## 2023-11-29 PROCEDURE — 25000003 PHARM REV CODE 250: Performed by: INTERNAL MEDICINE

## 2023-11-29 PROCEDURE — D9220A PRA ANESTHESIA: ICD-10-PCS | Mod: ANES,,, | Performed by: ANESTHESIOLOGY

## 2023-11-29 PROCEDURE — 99222 PR INITIAL HOSPITAL CARE,LEVL II: ICD-10-PCS | Mod: ,,, | Performed by: NURSE PRACTITIONER

## 2023-11-29 PROCEDURE — 71000016 HC POSTOP RECOV ADDL HR: Performed by: TRANSPLANT SURGERY

## 2023-11-29 PROCEDURE — 25000242 PHARM REV CODE 250 ALT 637 W/ HCPCS

## 2023-11-29 PROCEDURE — 99223 PR INITIAL HOSPITAL CARE,LEVL III: ICD-10-PCS | Mod: 57,AI,NTX, | Performed by: NURSE PRACTITIONER

## 2023-11-29 PROCEDURE — 99900035 HC TECH TIME PER 15 MIN (STAT)

## 2023-11-29 PROCEDURE — 63600175 PHARM REV CODE 636 W HCPCS: Mod: NTX | Performed by: NURSE PRACTITIONER

## 2023-11-29 PROCEDURE — 85025 COMPLETE CBC W/AUTO DIFF WBC: CPT | Performed by: NURSE PRACTITIONER

## 2023-11-29 PROCEDURE — 50360 RNL ALTRNSPLJ W/O RCP NFRCT: CPT | Mod: LT,,, | Performed by: TRANSPLANT SURGERY

## 2023-11-29 PROCEDURE — 63600175 PHARM REV CODE 636 W HCPCS: Mod: NTX | Performed by: TRANSPLANT SURGERY

## 2023-11-29 PROCEDURE — U0002 COVID-19 LAB TEST NON-CDC: HCPCS | Performed by: NURSE PRACTITIONER

## 2023-11-29 PROCEDURE — 25000003 PHARM REV CODE 250

## 2023-11-29 PROCEDURE — 63600175 PHARM REV CODE 636 W HCPCS

## 2023-11-29 PROCEDURE — 81300002 HC KIDNEY TRANSPORT, GROUND 4-5 HOURS

## 2023-11-29 PROCEDURE — 85730 THROMBOPLASTIN TIME PARTIAL: CPT | Performed by: NURSE PRACTITIONER

## 2023-11-29 PROCEDURE — 99223 PR INITIAL HOSPITAL CARE,LEVL III: ICD-10-PCS | Mod: 25,,, | Performed by: INTERNAL MEDICINE

## 2023-11-29 PROCEDURE — 99223 1ST HOSP IP/OBS HIGH 75: CPT | Mod: 57,AI,NTX, | Performed by: NURSE PRACTITIONER

## 2023-11-29 PROCEDURE — 63600175 PHARM REV CODE 636 W HCPCS: Mod: NTX

## 2023-11-29 PROCEDURE — 99222 1ST HOSP IP/OBS MODERATE 55: CPT | Mod: ,,, | Performed by: NURSE PRACTITIONER

## 2023-11-29 PROCEDURE — 81200001 HC KIDNEY ACQUISITION - CADAVER

## 2023-11-29 PROCEDURE — 84100 ASSAY OF PHOSPHORUS: CPT | Performed by: NURSE PRACTITIONER

## 2023-11-29 PROCEDURE — 50605 INSERT URETERAL SUPPORT: CPT | Mod: 51,LT,, | Performed by: TRANSPLANT SURGERY

## 2023-11-29 PROCEDURE — 85014 HEMATOCRIT: CPT

## 2023-11-29 PROCEDURE — 86900 BLOOD TYPING SEROLOGIC ABO: CPT | Performed by: NURSE PRACTITIONER

## 2023-11-29 PROCEDURE — 36415 COLL VENOUS BLD VENIPUNCTURE: CPT

## 2023-11-29 PROCEDURE — 93010 ELECTROCARDIOGRAM REPORT: CPT | Mod: ,,, | Performed by: INTERNAL MEDICINE

## 2023-11-29 PROCEDURE — 93005 ELECTROCARDIOGRAM TRACING: CPT

## 2023-11-29 PROCEDURE — 25000003 PHARM REV CODE 250: Performed by: NURSE ANESTHETIST, CERTIFIED REGISTERED

## 2023-11-29 PROCEDURE — 25000003 PHARM REV CODE 250: Mod: NTX | Performed by: NURSE PRACTITIONER

## 2023-11-29 PROCEDURE — S5010 5% DEXTROSE AND 0.45% SALINE: HCPCS

## 2023-11-29 PROCEDURE — 25000003 PHARM REV CODE 250: Performed by: PHYSICIAN ASSISTANT

## 2023-11-29 PROCEDURE — 99223 1ST HOSP IP/OBS HIGH 75: CPT | Mod: 25,,, | Performed by: INTERNAL MEDICINE

## 2023-11-29 PROCEDURE — 86704 HEP B CORE ANTIBODY TOTAL: CPT | Performed by: NURSE PRACTITIONER

## 2023-11-29 PROCEDURE — 71000015 HC POSTOP RECOV 1ST HR: Performed by: TRANSPLANT SURGERY

## 2023-11-29 PROCEDURE — 87522 HEPATITIS C REVRS TRNSCRPJ: CPT | Performed by: NURSE PRACTITIONER

## 2023-11-29 PROCEDURE — 86705 HEP B CORE ANTIBODY IGM: CPT | Performed by: NURSE PRACTITIONER

## 2023-11-29 PROCEDURE — 36620 INSERTION CATHETER ARTERY: CPT | Mod: 59,,, | Performed by: ANESTHESIOLOGY

## 2023-11-29 PROCEDURE — 27201423 OPTIME MED/SURG SUP & DEVICES STERILE SUPPLY: Performed by: TRANSPLANT SURGERY

## 2023-11-29 PROCEDURE — 93010 EKG 12-LEAD: ICD-10-PCS | Mod: ,,, | Performed by: INTERNAL MEDICINE

## 2023-11-29 PROCEDURE — 36000931 HC OR TIME LEV VII EA ADD 15 MIN: Performed by: TRANSPLANT SURGERY

## 2023-11-29 PROCEDURE — 80100014 HC HEMODIALYSIS 1:1

## 2023-11-29 PROCEDURE — 20600001 HC STEP DOWN PRIVATE ROOM

## 2023-11-29 PROCEDURE — 27800505 HC CATH, RADIAL ARTERY KIT: Mod: NTX | Performed by: ANESTHESIOLOGY

## 2023-11-29 PROCEDURE — 90935 PR HEMODIALYSIS, ONE EVALUATION: ICD-10-PCS | Mod: ,,, | Performed by: INTERNAL MEDICINE

## 2023-11-29 PROCEDURE — 87340 HEPATITIS B SURFACE AG IA: CPT | Performed by: NURSE PRACTITIONER

## 2023-11-29 PROCEDURE — 71000033 HC RECOVERY, INTIAL HOUR: Performed by: TRANSPLANT SURGERY

## 2023-11-29 PROCEDURE — 25000003 PHARM REV CODE 250: Mod: NTX

## 2023-11-29 PROCEDURE — 50605 PR URETEROTOMY TO INSERT STENT: ICD-10-PCS | Mod: 51,LT,, | Performed by: TRANSPLANT SURGERY

## 2023-11-29 PROCEDURE — 27201037 HC PRESSURE MONITORING SET UP: Mod: NTX

## 2023-11-29 PROCEDURE — 27100025 HC TUBING, SET FLUID WARMER: Mod: NTX | Performed by: ANESTHESIOLOGY

## 2023-11-29 PROCEDURE — 36415 COLL VENOUS BLD VENIPUNCTURE: CPT | Performed by: NURSE PRACTITIONER

## 2023-11-29 PROCEDURE — C2617 STENT, NON-COR, TEM W/O DEL: HCPCS | Performed by: TRANSPLANT SURGERY

## 2023-11-29 PROCEDURE — 86706 HEP B SURFACE ANTIBODY: CPT | Performed by: NURSE PRACTITIONER

## 2023-11-29 PROCEDURE — D9220A PRA ANESTHESIA: ICD-10-PCS | Mod: CRNA,,, | Performed by: NURSE ANESTHETIST, CERTIFIED REGISTERED

## 2023-11-29 PROCEDURE — 90935 HEMODIALYSIS ONE EVALUATION: CPT | Mod: ,,, | Performed by: INTERNAL MEDICINE

## 2023-11-29 PROCEDURE — 37000008 HC ANESTHESIA 1ST 15 MINUTES: Performed by: TRANSPLANT SURGERY

## 2023-11-29 PROCEDURE — 80053 COMPREHEN METABOLIC PANEL: CPT | Performed by: NURSE PRACTITIONER

## 2023-11-29 PROCEDURE — 85025 COMPLETE CBC W/AUTO DIFF WBC: CPT | Mod: 91

## 2023-11-29 PROCEDURE — 50323 PR TRANSPLANT,PREP CADAVER RENAL GRAFT: ICD-10-PCS | Mod: 51,,, | Performed by: TRANSPLANT SURGERY

## 2023-11-29 PROCEDURE — 85610 PROTHROMBIN TIME: CPT | Performed by: NURSE PRACTITIONER

## 2023-11-29 PROCEDURE — 80069 RENAL FUNCTION PANEL: CPT

## 2023-11-29 PROCEDURE — 86803 HEPATITIS C AB TEST: CPT | Performed by: NURSE PRACTITIONER

## 2023-11-29 PROCEDURE — 87389 HIV-1 AG W/HIV-1&-2 AB AG IA: CPT | Performed by: NURSE PRACTITIONER

## 2023-11-29 PROCEDURE — D9220A PRA ANESTHESIA: Mod: CRNA,,, | Performed by: NURSE ANESTHETIST, CERTIFIED REGISTERED

## 2023-11-29 PROCEDURE — 80069 RENAL FUNCTION PANEL: CPT | Mod: 91

## 2023-11-29 PROCEDURE — 27000221 HC OXYGEN, UP TO 24 HOURS

## 2023-11-29 PROCEDURE — 36620 ARTERIAL: ICD-10-PCS | Mod: 59,,, | Performed by: ANESTHESIOLOGY

## 2023-11-29 PROCEDURE — 63600175 PHARM REV CODE 636 W HCPCS: Performed by: NURSE ANESTHETIST, CERTIFIED REGISTERED

## 2023-11-29 PROCEDURE — 36000930 HC OR TIME LEV VII 1ST 15 MIN: Performed by: TRANSPLANT SURGERY

## 2023-11-29 PROCEDURE — 94761 N-INVAS EAR/PLS OXIMETRY MLT: CPT

## 2023-11-29 PROCEDURE — D9220A PRA ANESTHESIA: Mod: ANES,,, | Performed by: ANESTHESIOLOGY

## 2023-11-29 DEVICE — IMPLANTABLE DEVICE
Type: IMPLANTABLE DEVICE | Site: URETER | Status: NON-FUNCTIONAL
Removed: 2023-12-19

## 2023-11-29 RX ORDER — ACETAMINOPHEN 325 MG/1
650 TABLET ORAL
Status: DISCONTINUED | OUTPATIENT
Start: 2023-11-30 | End: 2023-11-29

## 2023-11-29 RX ORDER — MIDODRINE HYDROCHLORIDE 5 MG/1
5 TABLET ORAL
Status: DISCONTINUED | OUTPATIENT
Start: 2023-11-29 | End: 2023-12-01

## 2023-11-29 RX ORDER — PREGABALIN 75 MG/1
75 CAPSULE ORAL
Status: COMPLETED | OUTPATIENT
Start: 2023-11-29 | End: 2023-11-29

## 2023-11-29 RX ORDER — CALCIUM GLUCONATE 20 MG/ML
1 INJECTION, SOLUTION INTRAVENOUS EVERY 10 MIN PRN
Status: DISCONTINUED | OUTPATIENT
Start: 2023-11-29 | End: 2023-12-04 | Stop reason: HOSPADM

## 2023-11-29 RX ORDER — BUPIVACAINE HYDROCHLORIDE 2.5 MG/ML
INJECTION, SOLUTION EPIDURAL; INFILTRATION; INTRACAUDAL
Status: DISCONTINUED | OUTPATIENT
Start: 2023-11-29 | End: 2023-11-29 | Stop reason: HOSPADM

## 2023-11-29 RX ORDER — ONDANSETRON 2 MG/ML
4 INJECTION INTRAMUSCULAR; INTRAVENOUS EVERY 6 HOURS PRN
Status: DISCONTINUED | OUTPATIENT
Start: 2023-11-29 | End: 2023-12-04 | Stop reason: HOSPADM

## 2023-11-29 RX ORDER — ACETAMINOPHEN 325 MG/1
650 TABLET ORAL EVERY 8 HOURS
Status: DISCONTINUED | OUTPATIENT
Start: 2023-11-29 | End: 2023-11-29

## 2023-11-29 RX ORDER — MUPIROCIN 20 MG/G
1 OINTMENT TOPICAL 2 TIMES DAILY
Status: COMPLETED | OUTPATIENT
Start: 2023-11-29 | End: 2023-12-04

## 2023-11-29 RX ORDER — PREDNISONE 20 MG/1
20 TABLET ORAL DAILY
Status: DISCONTINUED | OUTPATIENT
Start: 2023-12-02 | End: 2023-12-04 | Stop reason: HOSPADM

## 2023-11-29 RX ORDER — SODIUM CHLORIDE 9 MG/ML
INJECTION, SOLUTION INTRAVENOUS CONTINUOUS
Status: DISCONTINUED | OUTPATIENT
Start: 2023-11-29 | End: 2023-11-29

## 2023-11-29 RX ORDER — DIPHENHYDRAMINE HCL 25 MG
50 CAPSULE ORAL ONCE AS NEEDED
Status: DISCONTINUED | OUTPATIENT
Start: 2023-11-29 | End: 2023-12-04 | Stop reason: HOSPADM

## 2023-11-29 RX ORDER — VALGANCICLOVIR 450 MG/1
450 TABLET, FILM COATED ORAL EVERY MORNING
Status: DISCONTINUED | OUTPATIENT
Start: 2023-12-09 | End: 2023-12-04 | Stop reason: HOSPADM

## 2023-11-29 RX ORDER — SODIUM CHLORIDE 0.9 % (FLUSH) 0.9 %
10 SYRINGE (ML) INJECTION
Status: DISCONTINUED | OUTPATIENT
Start: 2023-11-29 | End: 2023-12-04 | Stop reason: HOSPADM

## 2023-11-29 RX ORDER — ALBUTEROL SULFATE 2.5 MG/.5ML
10 SOLUTION RESPIRATORY (INHALATION) ONCE
Status: COMPLETED | OUTPATIENT
Start: 2023-11-29 | End: 2023-11-29

## 2023-11-29 RX ORDER — OXYCODONE HYDROCHLORIDE 5 MG/1
5 TABLET ORAL EVERY 6 HOURS PRN
Status: DISCONTINUED | OUTPATIENT
Start: 2023-11-29 | End: 2023-12-04 | Stop reason: HOSPADM

## 2023-11-29 RX ORDER — ROCURONIUM BROMIDE 10 MG/ML
INJECTION, SOLUTION INTRAVENOUS
Status: DISCONTINUED | OUTPATIENT
Start: 2023-11-29 | End: 2023-11-29

## 2023-11-29 RX ORDER — MUPIROCIN 20 MG/G
OINTMENT TOPICAL
Status: DISCONTINUED | OUTPATIENT
Start: 2023-11-29 | End: 2023-11-29 | Stop reason: HOSPADM

## 2023-11-29 RX ORDER — CALCIUM GLUCONATE 20 MG/ML
1 INJECTION, SOLUTION INTRAVENOUS ONCE
Status: COMPLETED | OUTPATIENT
Start: 2023-11-29 | End: 2023-11-29

## 2023-11-29 RX ORDER — METHYLPREDNISOLONE SOD SUCC 125 MG
250 VIAL (EA) INJECTION ONCE
Status: COMPLETED | OUTPATIENT
Start: 2023-11-30 | End: 2023-11-30

## 2023-11-29 RX ORDER — MIDAZOLAM HYDROCHLORIDE 1 MG/ML
INJECTION, SOLUTION INTRAMUSCULAR; INTRAVENOUS
Status: DISCONTINUED | OUTPATIENT
Start: 2023-11-29 | End: 2023-11-29

## 2023-11-29 RX ORDER — IBUPROFEN 200 MG
16 TABLET ORAL
Status: DISCONTINUED | OUTPATIENT
Start: 2023-11-29 | End: 2023-12-04 | Stop reason: HOSPADM

## 2023-11-29 RX ORDER — TRAMADOL HYDROCHLORIDE 50 MG/1
50 TABLET ORAL EVERY 6 HOURS PRN
Status: DISCONTINUED | OUTPATIENT
Start: 2023-11-29 | End: 2023-12-04 | Stop reason: HOSPADM

## 2023-11-29 RX ORDER — FENTANYL CITRATE 50 UG/ML
INJECTION, SOLUTION INTRAMUSCULAR; INTRAVENOUS
Status: DISCONTINUED | OUTPATIENT
Start: 2023-11-29 | End: 2023-11-29

## 2023-11-29 RX ORDER — LIDOCAINE HYDROCHLORIDE 20 MG/ML
INJECTION INTRAVENOUS
Status: DISCONTINUED | OUTPATIENT
Start: 2023-11-29 | End: 2023-11-29

## 2023-11-29 RX ORDER — MIDODRINE HYDROCHLORIDE 5 MG/1
5 TABLET ORAL
Status: DISCONTINUED | OUTPATIENT
Start: 2023-11-29 | End: 2023-11-29

## 2023-11-29 RX ORDER — CALCIUM GLUCONATE 20 MG/ML
1 INJECTION, SOLUTION INTRAVENOUS EVERY 10 MIN PRN
Status: DISCONTINUED | OUTPATIENT
Start: 2023-11-29 | End: 2023-11-29

## 2023-11-29 RX ORDER — IBUPROFEN 200 MG
24 TABLET ORAL
Status: DISCONTINUED | OUTPATIENT
Start: 2023-11-29 | End: 2023-12-04 | Stop reason: HOSPADM

## 2023-11-29 RX ORDER — ACETAMINOPHEN 325 MG/1
650 TABLET ORAL EVERY 8 HOURS
Status: DISCONTINUED | OUTPATIENT
Start: 2023-11-29 | End: 2023-12-04 | Stop reason: HOSPADM

## 2023-11-29 RX ORDER — ACETAMINOPHEN 650 MG/20.3ML
650 LIQUID ORAL ONCE
Status: COMPLETED | OUTPATIENT
Start: 2023-11-29 | End: 2023-11-29

## 2023-11-29 RX ORDER — ONDANSETRON 2 MG/ML
INJECTION INTRAMUSCULAR; INTRAVENOUS
Status: DISCONTINUED | OUTPATIENT
Start: 2023-11-29 | End: 2023-11-29

## 2023-11-29 RX ORDER — MANNITOL 20 G/100ML
INJECTION, SOLUTION INTRAVENOUS
Status: DISCONTINUED | OUTPATIENT
Start: 2023-11-29 | End: 2023-11-29

## 2023-11-29 RX ORDER — GLUCAGON 1 MG
1 KIT INJECTION CONTINUOUS PRN
Status: DISCONTINUED | OUTPATIENT
Start: 2023-11-29 | End: 2023-12-04 | Stop reason: HOSPADM

## 2023-11-29 RX ORDER — BISACODYL 5 MG
10 TABLET, DELAYED RELEASE (ENTERIC COATED) ORAL NIGHTLY
Status: DISCONTINUED | OUTPATIENT
Start: 2023-11-29 | End: 2023-12-04 | Stop reason: HOSPADM

## 2023-11-29 RX ORDER — METHYLPREDNISOLONE SOD SUCC 125 MG
125 VIAL (EA) INJECTION ONCE
Status: COMPLETED | OUTPATIENT
Start: 2023-12-01 | End: 2023-12-01

## 2023-11-29 RX ORDER — PROPOFOL 10 MG/ML
VIAL (ML) INTRAVENOUS
Status: DISCONTINUED | OUTPATIENT
Start: 2023-11-29 | End: 2023-11-29

## 2023-11-29 RX ORDER — FENTANYL CITRATE 50 UG/ML
25 INJECTION, SOLUTION INTRAMUSCULAR; INTRAVENOUS EVERY 5 MIN PRN
Status: DISCONTINUED | OUTPATIENT
Start: 2023-11-29 | End: 2023-11-29 | Stop reason: HOSPADM

## 2023-11-29 RX ORDER — DEXTROSE MONOHYDRATE AND SODIUM CHLORIDE 5; .45 G/100ML; G/100ML
INJECTION, SOLUTION INTRAVENOUS CONTINUOUS
Status: DISCONTINUED | OUTPATIENT
Start: 2023-11-29 | End: 2023-11-29

## 2023-11-29 RX ORDER — TACROLIMUS 1 MG/1
3 CAPSULE ORAL 2 TIMES DAILY
Status: DISCONTINUED | OUTPATIENT
Start: 2023-11-29 | End: 2023-11-30

## 2023-11-29 RX ORDER — SODIUM CHLORIDE 9 MG/ML
INJECTION, SOLUTION INTRAVENOUS
Status: DISCONTINUED | OUTPATIENT
Start: 2023-11-29 | End: 2023-12-02

## 2023-11-29 RX ORDER — EPINEPHRINE 1 MG/ML
1 INJECTION, SOLUTION, CONCENTRATE INTRAVENOUS ONCE AS NEEDED
Status: DISCONTINUED | OUTPATIENT
Start: 2023-11-29 | End: 2023-12-04 | Stop reason: HOSPADM

## 2023-11-29 RX ORDER — DOCUSATE SODIUM 100 MG/1
100 CAPSULE, LIQUID FILLED ORAL 3 TIMES DAILY
Status: DISCONTINUED | OUTPATIENT
Start: 2023-11-29 | End: 2023-12-04 | Stop reason: HOSPADM

## 2023-11-29 RX ORDER — DEXMEDETOMIDINE HYDROCHLORIDE 100 UG/ML
INJECTION, SOLUTION INTRAVENOUS
Status: DISCONTINUED | OUTPATIENT
Start: 2023-11-29 | End: 2023-11-29

## 2023-11-29 RX ORDER — SODIUM CHLORIDE 0.9 % (FLUSH) 0.9 %
10 SYRINGE (ML) INJECTION
Status: DISCONTINUED | OUTPATIENT
Start: 2023-11-29 | End: 2023-11-29 | Stop reason: HOSPADM

## 2023-11-29 RX ORDER — INSULIN ASPART 100 [IU]/ML
0-5 INJECTION, SOLUTION INTRAVENOUS; SUBCUTANEOUS
Status: DISCONTINUED | OUTPATIENT
Start: 2023-11-29 | End: 2023-12-04 | Stop reason: HOSPADM

## 2023-11-29 RX ORDER — DIPHENHYDRAMINE HYDROCHLORIDE 50 MG/ML
50 INJECTION INTRAMUSCULAR; INTRAVENOUS ONCE
Status: COMPLETED | OUTPATIENT
Start: 2023-11-29 | End: 2023-11-29

## 2023-11-29 RX ORDER — PHENYLEPHRINE HYDROCHLORIDE 10 MG/ML
INJECTION INTRAVENOUS
Status: DISCONTINUED | OUTPATIENT
Start: 2023-11-29 | End: 2023-11-29

## 2023-11-29 RX ORDER — MYCOPHENOLATE MOFETIL 250 MG/1
1000 CAPSULE ORAL 2 TIMES DAILY
Status: DISCONTINUED | OUTPATIENT
Start: 2023-11-29 | End: 2023-12-04 | Stop reason: HOSPADM

## 2023-11-29 RX ORDER — FAMOTIDINE 20 MG/1
20 TABLET, FILM COATED ORAL NIGHTLY
Status: DISCONTINUED | OUTPATIENT
Start: 2023-11-29 | End: 2023-12-04 | Stop reason: HOSPADM

## 2023-11-29 RX ORDER — HALOPERIDOL 5 MG/ML
INJECTION INTRAMUSCULAR
Status: DISCONTINUED | OUTPATIENT
Start: 2023-11-29 | End: 2023-11-29

## 2023-11-29 RX ORDER — FUROSEMIDE 10 MG/ML
INJECTION INTRAMUSCULAR; INTRAVENOUS
Status: DISCONTINUED | OUTPATIENT
Start: 2023-11-29 | End: 2023-11-29

## 2023-11-29 RX ORDER — HEPARIN SODIUM 5000 [USP'U]/ML
5000 INJECTION, SOLUTION INTRAVENOUS; SUBCUTANEOUS EVERY 8 HOURS
Status: DISCONTINUED | OUTPATIENT
Start: 2023-11-29 | End: 2023-12-04 | Stop reason: HOSPADM

## 2023-11-29 RX ORDER — MIDODRINE HYDROCHLORIDE 5 MG/1
5 TABLET ORAL ONCE
Status: COMPLETED | OUTPATIENT
Start: 2023-11-29 | End: 2023-11-29

## 2023-11-29 RX ORDER — KETAMINE HCL IN 0.9 % NACL 50 MG/5 ML
SYRINGE (ML) INTRAVENOUS
Status: DISCONTINUED | OUTPATIENT
Start: 2023-11-29 | End: 2023-11-29

## 2023-11-29 RX ORDER — SODIUM CHLORIDE 0.9 % (FLUSH) 0.9 %
3 SYRINGE (ML) INJECTION
Status: DISCONTINUED | OUTPATIENT
Start: 2023-11-29 | End: 2023-11-29 | Stop reason: HOSPADM

## 2023-11-29 RX ORDER — HEPARIN SODIUM 5000 [USP'U]/ML
5000 INJECTION, SOLUTION INTRAVENOUS; SUBCUTANEOUS ONCE
Status: COMPLETED | OUTPATIENT
Start: 2023-11-29 | End: 2023-11-29

## 2023-11-29 RX ORDER — CEFAZOLIN SODIUM 1 G/3ML
INJECTION, POWDER, FOR SOLUTION INTRAMUSCULAR; INTRAVENOUS
Status: DISCONTINUED | OUTPATIENT
Start: 2023-11-29 | End: 2023-11-29

## 2023-11-29 RX ORDER — DIPHENHYDRAMINE HCL 25 MG
25 CAPSULE ORAL
Status: DISCONTINUED | OUTPATIENT
Start: 2023-11-30 | End: 2023-12-01

## 2023-11-29 RX ADMIN — CALCIUM GLUCONATE 1 G: 20 INJECTION, SOLUTION INTRAVENOUS at 02:11

## 2023-11-29 RX ADMIN — THERA TABS 1 TABLET: TAB at 01:11

## 2023-11-29 RX ADMIN — PHENYLEPHRINE HYDROCHLORIDE 100 MCG: 10 INJECTION INTRAVENOUS at 10:11

## 2023-11-29 RX ADMIN — DEXMEDETOMIDINE 12 MCG: 100 INJECTION, SOLUTION, CONCENTRATE INTRAVENOUS at 10:11

## 2023-11-29 RX ADMIN — BISACODYL 10 MG: 5 TABLET, COATED ORAL at 08:11

## 2023-11-29 RX ADMIN — ONDANSETRON 4 MG: 2 INJECTION INTRAMUSCULAR; INTRAVENOUS at 11:11

## 2023-11-29 RX ADMIN — PROPOFOL 200 MG: 10 INJECTION, EMULSION INTRAVENOUS at 07:11

## 2023-11-29 RX ADMIN — PREGABALIN 75 MG: 75 CAPSULE ORAL at 05:11

## 2023-11-29 RX ADMIN — FENTANYL CITRATE 50 MCG: 50 INJECTION, SOLUTION INTRAMUSCULAR; INTRAVENOUS at 07:11

## 2023-11-29 RX ADMIN — DEXTROSE MONOHYDRATE 250 ML: 100 INJECTION, SOLUTION INTRAVENOUS at 07:11

## 2023-11-29 RX ADMIN — ROCURONIUM BROMIDE 30 MG: 10 INJECTION INTRAVENOUS at 09:11

## 2023-11-29 RX ADMIN — SUGAMMADEX 400 MG: 100 INJECTION, SOLUTION INTRAVENOUS at 11:11

## 2023-11-29 RX ADMIN — FENTANYL CITRATE 50 MCG: 50 INJECTION, SOLUTION INTRAMUSCULAR; INTRAVENOUS at 10:11

## 2023-11-29 RX ADMIN — FENTANYL CITRATE 50 MCG: 50 INJECTION, SOLUTION INTRAMUSCULAR; INTRAVENOUS at 08:11

## 2023-11-29 RX ADMIN — INSULIN HUMAN 10 UNITS: 100 INJECTION, SOLUTION PARENTERAL at 10:11

## 2023-11-29 RX ADMIN — DOCUSATE SODIUM 100 MG: 100 CAPSULE, LIQUID FILLED ORAL at 08:11

## 2023-11-29 RX ADMIN — MYCOPHENOLATE MOFETIL 1000 MG: 250 CAPSULE ORAL at 08:11

## 2023-11-29 RX ADMIN — OXYCODONE HYDROCHLORIDE 5 MG: 5 TABLET ORAL at 01:11

## 2023-11-29 RX ADMIN — HEPARIN SODIUM 5000 UNITS: 5000 INJECTION INTRAVENOUS; SUBCUTANEOUS at 08:11

## 2023-11-29 RX ADMIN — DIPHENHYDRAMINE HYDROCHLORIDE 50 MG: 50 INJECTION, SOLUTION INTRAMUSCULAR; INTRAVENOUS at 08:11

## 2023-11-29 RX ADMIN — TRAMADOL HYDROCHLORIDE 50 MG: 50 TABLET, COATED ORAL at 06:11

## 2023-11-29 RX ADMIN — DEXTROSE MONOHYDRATE 250 ML: 100 INJECTION, SOLUTION INTRAVENOUS at 09:11

## 2023-11-29 RX ADMIN — INSULIN HUMAN 10 UNITS: 100 INJECTION, SOLUTION PARENTERAL at 07:11

## 2023-11-29 RX ADMIN — TACROLIMUS 3 MG: 1 CAPSULE ORAL at 06:11

## 2023-11-29 RX ADMIN — ACETAMINOPHEN 650 MG: 160 SOLUTION ORAL at 07:11

## 2023-11-29 RX ADMIN — MIDODRINE HYDROCHLORIDE 5 MG: 5 TABLET ORAL at 04:11

## 2023-11-29 RX ADMIN — ROCURONIUM BROMIDE 20 MG: 10 INJECTION INTRAVENOUS at 10:11

## 2023-11-29 RX ADMIN — ACETAMINOPHEN 650 MG: 325 TABLET ORAL at 08:11

## 2023-11-29 RX ADMIN — MIDAZOLAM HYDROCHLORIDE 2 MG: 1 INJECTION, SOLUTION INTRAMUSCULAR; INTRAVENOUS at 07:11

## 2023-11-29 RX ADMIN — DEXMEDETOMIDINE 8 MCG: 100 INJECTION, SOLUTION, CONCENTRATE INTRAVENOUS at 11:11

## 2023-11-29 RX ADMIN — MYCOPHENOLATE MOFETIL 1000 MG: 250 CAPSULE ORAL at 01:11

## 2023-11-29 RX ADMIN — MUPIROCIN 1 G: 20 OINTMENT TOPICAL at 08:11

## 2023-11-29 RX ADMIN — CEFAZOLIN 2 G: 2 INJECTION, POWDER, FOR SOLUTION INTRAMUSCULAR; INTRAVENOUS at 06:11

## 2023-11-29 RX ADMIN — CALCIUM GLUCONATE 1 G: 20 INJECTION, SOLUTION INTRAVENOUS at 07:11

## 2023-11-29 RX ADMIN — FENTANYL CITRATE 50 MCG: 50 INJECTION, SOLUTION INTRAMUSCULAR; INTRAVENOUS at 09:11

## 2023-11-29 RX ADMIN — GLYCOPYRROLATE 0.1 MCG: 0.2 INJECTION, SOLUTION INTRAMUSCULAR; INTRAVENOUS at 07:11

## 2023-11-29 RX ADMIN — MANNITOL 25 G: 20 INJECTION, SOLUTION INTRAVENOUS at 10:11

## 2023-11-29 RX ADMIN — HEPARIN SODIUM 5000 UNITS: 5000 INJECTION INTRAVENOUS; SUBCUTANEOUS at 03:11

## 2023-11-29 RX ADMIN — ACETAMINOPHEN 650 MG: 325 TABLET ORAL at 03:11

## 2023-11-29 RX ADMIN — Medication 20 MG: at 10:11

## 2023-11-29 RX ADMIN — HEPARIN SODIUM 5000 UNITS: 5000 INJECTION INTRAVENOUS; SUBCUTANEOUS at 12:11

## 2023-11-29 RX ADMIN — ALBUTEROL SULFATE: 2.5 SOLUTION RESPIRATORY (INHALATION) at 03:11

## 2023-11-29 RX ADMIN — CEFAZOLIN 2 G: 330 INJECTION, POWDER, FOR SOLUTION INTRAMUSCULAR; INTRAVENOUS at 08:11

## 2023-11-29 RX ADMIN — FUROSEMIDE 100 MG: 10 INJECTION, SOLUTION INTRAMUSCULAR; INTRAVENOUS at 10:11

## 2023-11-29 RX ADMIN — ANTI-THYMOCYTE GLOBULIN (RABBIT) 125 MG: 5 INJECTION, POWDER, LYOPHILIZED, FOR SOLUTION INTRAVENOUS at 09:11

## 2023-11-29 RX ADMIN — METHYLPREDNISOLONE SODIUM SUCCINATE 500 MG: 500 INJECTION INTRAMUSCULAR; INTRAVENOUS at 07:11

## 2023-11-29 RX ADMIN — Medication 30 MG: at 08:11

## 2023-11-29 RX ADMIN — HALOPERIDOL LACTATE 1 MG: 5 INJECTION, SOLUTION INTRAMUSCULAR at 08:11

## 2023-11-29 RX ADMIN — LIDOCAINE HYDROCHLORIDE 75 MG: 20 INJECTION INTRAVENOUS at 07:11

## 2023-11-29 RX ADMIN — SODIUM CHLORIDE, SODIUM ACETATE ANHYDROUS, SODIUM GLUCONATE, POTASSIUM CHLORIDE, AND MAGNESIUM CHLORIDE: 526; 222; 502; 37; 30 INJECTION, SOLUTION INTRAVENOUS at 07:11

## 2023-11-29 RX ADMIN — DEXTROSE AND SODIUM CHLORIDE: 5; 450 INJECTION, SOLUTION INTRAVENOUS at 12:11

## 2023-11-29 RX ADMIN — ROCURONIUM BROMIDE 50 MG: 10 INJECTION INTRAVENOUS at 07:11

## 2023-11-29 RX ADMIN — FAMOTIDINE 20 MG: 20 TABLET, FILM COATED ORAL at 08:11

## 2023-11-29 RX ADMIN — SODIUM CHLORIDE 0.25 MCG/KG/MIN: 9 INJECTION, SOLUTION INTRAVENOUS at 08:11

## 2023-11-29 RX ADMIN — PHENYLEPHRINE HYDROCHLORIDE 100 MCG: 10 INJECTION INTRAVENOUS at 11:11

## 2023-11-29 RX ADMIN — INSULIN ASPART 1 UNITS: 100 INJECTION, SOLUTION INTRAVENOUS; SUBCUTANEOUS at 08:11

## 2023-11-29 RX ADMIN — DOCUSATE SODIUM 100 MG: 100 CAPSULE, LIQUID FILLED ORAL at 03:11

## 2023-11-29 NOTE — SUBJECTIVE & OBJECTIVE
Subjective:     Chief Complaint/Reason for Admission: kidney transplant    History of Present Illness:  Mary Waller is a 48yr AAF with ESRD secondary to DM nephropathy and HTN. Other PMH includes CAD s/p PCI LAD (2019 at [facility]), and HLD. She started HD 4/20/18. She dialyzes on a MWF schedule, last HD 11/27. She reports occasion hypotension during or after treatments but is able to always complete HD. She has a RUE AV fistula for HD access. Native uop around 1 cup or less per day. .5kg. She is being admitted for kidney transplant surgery. She feels well in her usual state of health. She denies any recent illnesses or hospitalizations. Pre op labs and diagnostics studies pending.     Dialysis History: Ms. Hernandez with ESRD, requiring chronic dialysis who is on hemodialysis started on 04/2018. Patient is dialyzing on MWF schedule.  Patient reports that she is tolerating dialysis well.  Date of Last Dialysis: 11/27/23    Native urine output per day: ~360 mL    Previous Transplant: no    PTA Medications   Medication Sig    aspirin (ECOTRIN) 81 MG EC tablet Take 81 mg by mouth once daily.    atropine 1% (ISOPTO ATROPINE) 1 % Drop Place 1 drop into the left eye every evening.    COMBIGAN 0.2-0.5 % Drop Place 1 drop into the right eye every evening.    cyanocobalamin (VITAMIN B-12) 500 MCG tablet Take 500 mcg by mouth once daily.    docosahexaenoic acid/epa (FISH OIL ORAL) Take 1,000 mg by mouth once daily.    docusate sodium (COLACE) 100 MG capsule Take 100 mg by mouth Daily.     dulaglutide (TRULICITY) 3 mg/0.5 mL pen injector Inject 3 mg into the skin every 7 days.    ergocalciferol (VITAMIN D2) 50,000 unit Cap Take 50,000 Units by mouth every 7 days. Take 1 capsule by mouth weekly x 12 weeks, then decrease to once per month.    furosemide (LASIX) 80 MG tablet Take 80 mg by mouth every evening.    l-methylfolate-b2-b6-b12 (CEREFOLIN) 6-5-50-1 mg Tab Take 1 tablet by mouth once daily.    labetalol (NORMODYNE) 200  MG tablet Take 400 mg by mouth 2 (two) times daily.     patiromer calcium sorbitex (VELTASSA) 8.4 gram PwPk Take 1 packet (8.4 g total) by mouth every Tuesday, Thursday, Saturday, Sunday. This medicine is used to treat your potassium level. (Patient not taking: Reported on 11/21/2023)    rosuvastatin (CRESTOR) 40 MG Tab Take 40 mg by mouth every evening.    sevelamer carbonate (RENVELA) 800 mg Tab Take 2,400 mg by mouth 4 (four) times daily.       Review of patient's allergies indicates:   Allergen Reactions    Codeine     Sulfa (sulfonamide antibiotics)        Past Medical History:   Diagnosis Date    Blind     Diabetes     ESRD (end stage renal disease) on dialysis     Hypertension     Right cataract      Past Surgical History:   Procedure Laterality Date    AV FISTULA PLACEMENT      EYE SURGERY       Family History       Problem Relation (Age of Onset)    Diabetes Mother, Father, Sister    Hyperlipidemia Mother, Father          Tobacco Use    Smoking status: Never    Smokeless tobacco: Never   Substance and Sexual Activity    Alcohol use: No    Drug use: No    Sexual activity: Not Currently        Review of Systems   Constitutional:  Negative for appetite change, chills, fatigue and fever.   Eyes:  Positive for visual disturbance.   Respiratory:  Negative for cough, chest tightness and shortness of breath.    Cardiovascular:  Negative for chest pain, palpitations and leg swelling.   Gastrointestinal:  Negative for abdominal distention, abdominal pain, constipation, diarrhea, nausea and vomiting.   Genitourinary:  Positive for decreased urine volume. Negative for difficulty urinating, dysuria, frequency and urgency.   Musculoskeletal:  Negative for back pain and neck pain.   Skin:  Negative for color change, pallor, rash and wound.   Neurological:  Negative for dizziness, weakness and headaches.   Psychiatric/Behavioral:  Negative for confusion and sleep disturbance.      Objective:     Vital Signs (Most  "Recent):  Temp: 98.3 °F (36.8 °C) (11/29/23 0000)  Pulse: 85 (11/29/23 0000)  Resp: 18 (11/29/23 0000)  BP: 135/67 (11/29/23 0000)  SpO2: 100 % (11/29/23 0000)  Height: 5' 5" (165.1 cm)  Weight: 105.3 kg (232 lb 4.1 oz)  Body mass index is 38.65 kg/m².      Physical Exam  Vitals and nursing note reviewed.   Constitutional:       General: She is awake.      Appearance: Normal appearance.   Cardiovascular:      Rate and Rhythm: Normal rate and regular rhythm.      Pulses: Normal pulses.   Pulmonary:      Effort: Pulmonary effort is normal. No respiratory distress.      Breath sounds: Normal breath sounds. No decreased breath sounds, wheezing or rales.   Abdominal:      General: Bowel sounds are normal. There is no distension.      Palpations: Abdomen is soft.      Tenderness: There is no abdominal tenderness. There is no guarding.   Musculoskeletal:         General: Normal range of motion.   Skin:     General: Skin is warm and dry.   Neurological:      Mental Status: She is alert and oriented to person, place, and time.   Psychiatric:         Behavior: Behavior is cooperative.          Laboratory/Diagnostic Studies: pending    Covid test pending. If positive, patient instructed that surgery will be canceled and patient will be discharged home to quarantine. Patient would remain inactive on txp list for 28 days and will be assess at that time to be placed back on waitlist.   "

## 2023-11-29 NOTE — ASSESSMENT & PLAN NOTE
-Based on donor factors, DGF expected  -Dialysis today and prn  -No post op IVF since already up 5 kg

## 2023-11-29 NOTE — CONSULTS
[facility]  Kidney Transplant  Consult Note    Inpatient consult to Transplant Nephrology (KTM)  Consult performed by: Kaci Fernández MD  Consult ordered by: Miguel Venegas MD            Subjective:     History of Present Illness:   Mary Waller is a 48yr AAF with ESRD secondary to DM nephropathy and HTN. Other PMH includes CAD s/p PCI LAD (2019 at [facility]), and HLD. She started HD 4/20/18. She dialyzes on a MWF schedule, last HD 11/27. She reports occasion hypotension during or after treatments but is able to always complete HD. She has a RUE AV fistula for HD access. Native uop around 1 cup or less per day. .5kg. She is being admitted for kidney transplant surgery.   Transplant completed uneventfully, and anuria noted (as expected, based on CIT and donor factors). Urgent dialysis ordered and initiated as soon as able to set up post lab results.    Past Medical and Surgical History: Ms. Waller has a past medical history of Blind, Diabetes, ESRD (end stage renal disease) on dialysis, Hypertension, and Right cataract.  She has a past surgical history that includes Eye surgery and AV fistula placement.    Past Social and Family History: Ms. Waller reports that she has never smoked. She has never used smokeless tobacco. She reports that she does not drink alcohol and does not use drugs.Her family history includes Diabetes in her father, mother, and sister; Hyperlipidemia in her father and mother.    Intake/Output - Last 3 Shifts         11/27 0700  11/28 0659 11/28 0700  11/29 0659 11/29 0700  11/30 0659    I.V. (mL/kg)   2125 (20.2)    IV Piggyback   500    Total Intake(mL/kg)   2625 (24.9)    Urine (mL/kg/hr)   20 (0)    Blood   75    Total Output   95    Net   +2530                    Review of Systems   Constitutional:  Negative for appetite change and fever.   HENT: Negative.     Eyes:  Positive for visual disturbance.   Respiratory:  Negative for shortness of breath.   "  Cardiovascular:  Negative for chest pain and leg swelling.   Gastrointestinal:  Positive for abdominal pain ((surgical wound)).   Genitourinary:  Positive for decreased urine volume. Negative for difficulty urinating.   Musculoskeletal: Negative.    Skin:  Negative for color change, pallor, rash and wound.   Allergic/Immunologic: Positive for immunocompromised state.     Objective:     Vital Signs (Most Recent):  Temp: 98.2 °F (36.8 °C) (11/29/23 1600)  Pulse: 90 (11/29/23 1715)  Resp: 16 (11/29/23 1715)  BP: (!) 99/51 (11/29/23 1715)  SpO2: 97 % (11/29/23 1715) Vital Signs (24h Range):  Temp:  [97.7 °F (36.5 °C)-98.3 °F (36.8 °C)] 98.2 °F (36.8 °C)  Pulse:  [76-95] 90  Resp:  [12-18] 16  SpO2:  [96 %-100 %] 97 %  BP: ()/(49-72) 99/51     Weight: 105.3 kg (232 lb 4.1 oz)  Height: 5' 5" (165.1 cm)  Body mass index is 38.65 kg/m².     Physical Exam  Constitutional:       General: She is not in acute distress.  Cardiovascular:      Rate and Rhythm: Normal rate and regular rhythm.   Pulmonary:      Effort: Pulmonary effort is normal. No respiratory distress.      Breath sounds: Normal breath sounds.   Abdominal:      General: Bowel sounds are normal.      Palpations: Abdomen is soft. There is no mass.      Tenderness: There is no abdominal tenderness.   Musculoskeletal:      Right lower leg: No edema.      Left lower leg: No edema.     Sleepy from recent anesthesia, arousable     Significant Labs:  Recent Labs   Lab 11/29/23  0056 11/29/23  1233    131*   K 5.6* 6.7*   CL 99 98   CO2 25 21*   BUN 62* 69*   CREATININE 10.4* 10.0*   CALCIUM 9.2 8.3*   PHOS 3.6 3.1    (Pre and post op chems)    Diagnostics:  US allograft pending  CXR pre op w/o acute abnormalities.  Assessment/Plan:     Cardiac/Vascular  Essential hypertension  - Hold meds pre-op, restart slowly post-op to avoid hypotension  -OK to use midodrine w dialysis for BP support    Renal/  S/P kidney transplant  DDKT 11/29/23 - surgery " uncomplicated    Hyperkalemia  -Hyperkalemia present on admit, pt due for dialysis today & not done pre-op. Noted to be more hyperkalemic post transpalnt as expected given DGF was anticipated  -Urgent dialysis initiated      Delayed graft function of kidney transplant due to ATN requiring acute dialysis  -Based on donor factors, DGF expected  -Dialysis today and prn  -No post op IVF since already up 5 kg      Will follow with you and assess daily for HD needs.    Kaci Fernández MD  Kidney Transplant  [facility] Transplant Step[facility]

## 2023-11-29 NOTE — SUBJECTIVE & OBJECTIVE
Subjective:     History of Present Illness:   Mary Waller is a 48yr AAF with ESRD secondary to DM nephropathy and HTN. Other PMH includes CAD s/p PCI LAD (2019 at [facility]), and HLD. She started HD 4/20/18. She dialyzes on a MWF schedule, last HD 11/27. She reports occasion hypotension during or after treatments but is able to always complete HD. She has a RUE AV fistula for HD access. Native uop around 1 cup or less per day. .5kg. She is being admitted for kidney transplant surgery.   Transplant completed uneventfully, and anuria noted (as expected, based on CIT and donor factors). Urgent dialysis ordered and initiated as soon as able to set up post lab results.    Past Medical and Surgical History: Ms. Waller has a past medical history of Blind, Diabetes, ESRD (end stage renal disease) on dialysis, Hypertension, and Right cataract.  She has a past surgical history that includes Eye surgery and AV fistula placement.    Past Social and Family History: Ms. Waller reports that she has never smoked. She has never used smokeless tobacco. She reports that she does not drink alcohol and does not use drugs.Her family history includes Diabetes in her father, mother, and sister; Hyperlipidemia in her father and mother.    Intake/Output - Last 3 Shifts         11/27 0700  11/28 0659 11/28 0700  11/29 0659 11/29 0700  11/30 0659    I.V. (mL/kg)   2125 (20.2)    IV Piggyback   500    Total Intake(mL/kg)   2625 (24.9)    Urine (mL/kg/hr)   20 (0)    Blood   75    Total Output   95    Net   +2530                    Review of Systems   Constitutional:  Negative for appetite change and fever.   HENT: Negative.     Eyes:  Positive for visual disturbance.   Respiratory:  Negative for shortness of breath.    Cardiovascular:  Negative for chest pain and leg swelling.   Gastrointestinal:  Positive for abdominal pain ((surgical wound)).   Genitourinary:  Positive for decreased urine volume. Negative for difficulty urinating.  "  Musculoskeletal: Negative.    Skin:  Negative for color change, pallor, rash and wound.   Allergic/Immunologic: Positive for immunocompromised state.     Objective:     Vital Signs (Most Recent):  Temp: 98.2 °F (36.8 °C) (11/29/23 1600)  Pulse: 90 (11/29/23 1715)  Resp: 16 (11/29/23 1715)  BP: (!) 99/51 (11/29/23 1715)  SpO2: 97 % (11/29/23 1715) Vital Signs (24h Range):  Temp:  [97.7 °F (36.5 °C)-98.3 °F (36.8 °C)] 98.2 °F (36.8 °C)  Pulse:  [76-95] 90  Resp:  [12-18] 16  SpO2:  [96 %-100 %] 97 %  BP: ()/(49-72) 99/51     Weight: 105.3 kg (232 lb 4.1 oz)  Height: 5' 5" (165.1 cm)  Body mass index is 38.65 kg/m².     Physical Exam  Constitutional:       General: She is not in acute distress.  Cardiovascular:      Rate and Rhythm: Normal rate and regular rhythm.   Pulmonary:      Effort: Pulmonary effort is normal. No respiratory distress.      Breath sounds: Normal breath sounds.   Abdominal:      General: Bowel sounds are normal.      Palpations: Abdomen is soft. There is no mass.      Tenderness: There is no abdominal tenderness.   Musculoskeletal:      Right lower leg: No edema.      Left lower leg: No edema.     Sleepy from recent anesthesia, arousable     Significant Labs:  Recent Labs   Lab 11/29/23  0056 11/29/23  1233    131*   K 5.6* 6.7*   CL 99 98   CO2 25 21*   BUN 62* 69*   CREATININE 10.4* 10.0*   CALCIUM 9.2 8.3*   PHOS 3.6 3.1    (Pre and post op chems)    Diagnostics:  US allograft pending  CXR pre op w/o acute abnormalities.  "

## 2023-11-29 NOTE — HPI
Mary Waller is a 48yr AAF with ESRD secondary to DM nephropathy and HTN. Other PMH includes CAD s/p PCI LAD (2019 at [facility]), and HLD. She started HD 4/20/18. She dialyzes on a MWF schedule, last HD 11/27. She reports occasion hypotension during or after treatments but is able to always complete HD. She has a RUE AV fistula for HD access. Native uop around 1 cup or less per day. .5kg. She is being admitted for kidney transplant surgery. She feels well in her usual state of health. She denies any recent illnesses or hospitalizations. Pre op labs and diagnostics studies pending.

## 2023-11-29 NOTE — ANESTHESIA PROCEDURE NOTES
Intubation    Date/Time: 11/29/2023 8:04 AM    Performed by: Georgina Henry CRNA  Authorized by: Teodora Sheppard MD    Intubation:     Induction:  Intravenous    Intubated:  Postinduction    Mask Ventilation:  Easy mask    Attempts:  1    Attempted By:  CRNA    Method of Intubation:  Bougie and video laryngoscopy    Blade:  Bennett 3    Laryngeal View Grade: Grade III - only epiglottis visible      Difficult Airway Encountered?: Yes      Future Airway Recommendations:  Use Bennett and bougie    Complications:  None    Airway Device:  Oral endotracheal tube    Airway Device Size:  7.5    Style/Cuff Inflation:  Cuffed    Inflation Amount (mL):  6    Tube secured:  22    Secured at:  The lips    Placement Verified By:  Capnometry    Complicating Factors:  Anterior larynx, obesity, short neck and oropharyngeal edema or fat    Findings Post-Intubation:  BS equal bilateral and atraumatic/condition of teeth unchanged

## 2023-11-29 NOTE — ASSESSMENT & PLAN NOTE
- ESRD 2/2 DM and HTN  - Admit for kidney transplant surgery planned 11/29 at 7am with Dr. Moore  - Pre op labs and diagnostic studies pending, to be reviewed before surgery

## 2023-11-29 NOTE — H&P
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 Other PMH includes CAD s/p PCI LAD (2019 at [facility]), and HLD. She started HD 4/20/18. She dialyzes on a MWF schedule, last HD 11/27. She reports occasion hypotension during or after treatments but is able to always complete HD. She has a RUE AV fistula for HD access. Native uop around 1 cup or less per day. .5kg. She is being admitted for kidney transplant surgery. She feels well in her usual state of health. She denies any recent illnesses or hospitalizations. Pre op labs and diagnostics studies pending.     Dialysis History: Ms. Hernandez with ESRD, requiring chronic dialysis who is on hemodialysis started on 04/2018. Patient is dialyzing on MWF schedule.  Patient reports that she is tolerating dialysis well.  Date of Last Dialysis: 11/27/23    Native urine output per day: ~360 mL    Previous Transplant: no    PTA Medications   Medication Sig    aspirin (ECOTRIN) 81 MG EC tablet Take 81 mg by mouth once daily.    atropine 1% (ISOPTO ATROPINE) 1 % Drop Place 1 drop into the left eye every evening.    COMBIGAN 0.2-0.5 % Drop Place 1 drop into the right eye every evening.    cyanocobalamin (VITAMIN B-12) 500 MCG tablet Take 500 mcg by mouth once daily.    docosahexaenoic acid/epa (FISH OIL ORAL) Take 1,000 mg by mouth once daily.    docusate sodium (COLACE) 100 MG capsule Take 100 mg by mouth Daily.     dulaglutide (TRULICITY) 3 mg/0.5 mL pen injector Inject 3 mg into the skin every 7 days.    ergocalciferol (VITAMIN D2) 50,000 unit Cap Take 50,000 Units by mouth every 7 days. Take 1 capsule by mouth weekly x 12 weeks, then decrease to once per month.    furosemide (LASIX) 80 MG tablet Take 80 mg by mouth every evening.    l-methylfolate-b2-b6-b12 (CEREFOLIN) 6-5-50-1 mg Tab Take 1  tablet by mouth once daily.    labetalol (NORMODYNE) 200 MG tablet Take 400 mg by mouth 2 (two) times daily.     patiromer calcium sorbitex (VELTASSA) 8.4 gram PwPk Take 1 packet (8.4 g total) by mouth every Tuesday, Thursday, Saturday, Sunday. This medicine is used to treat your potassium level. (Patient not taking: Reported on 11/21/2023)    rosuvastatin (CRESTOR) 40 MG Tab Take 40 mg by mouth every evening.    sevelamer carbonate (RENVELA) 800 mg Tab Take 2,400 mg by mouth 4 (four) times daily.       Review of patient's allergies indicates:   Allergen Reactions    Codeine     Sulfa (sulfonamide antibiotics)        Past Medical History:   Diagnosis Date    Blind     Diabetes     ESRD (end stage renal disease) on dialysis     Hypertension     Right cataract      Past Surgical History:   Procedure Laterality Date    AV FISTULA PLACEMENT      EYE SURGERY       Family History       Problem Relation (Age of Onset)    Diabetes Mother, Father, Sister    Hyperlipidemia Mother, Father          Tobacco Use    Smoking status: Never    Smokeless tobacco: Never   Substance and Sexual Activity    Alcohol use: No    Drug use: No    Sexual activity: Not Currently        Review of Systems   Constitutional:  Negative for appetite change, chills, fatigue and fever.   Eyes:  Positive for visual disturbance.   Respiratory:  Negative for cough, chest tightness and shortness of breath.    Cardiovascular:  Negative for chest pain, palpitations and leg swelling.   Gastrointestinal:  Negative for abdominal distention, abdominal pain, constipation, diarrhea, nausea and vomiting.   Genitourinary:  Positive for decreased urine volume. Negative for difficulty urinating, dysuria, frequency and urgency.   Musculoskeletal:  Negative for back pain and neck pain.   Skin:  Negative for color change, pallor, rash and wound.   Neurological:  Negative for dizziness, weakness and headaches.   Psychiatric/Behavioral:  Negative for confusion and sleep  "disturbance.      Objective:     Vital Signs (Most Recent):  Temp: 98.3 °F (36.8 °C) (11/29/23 0000)  Pulse: 85 (11/29/23 0000)  Resp: 18 (11/29/23 0000)  BP: 135/67 (11/29/23 0000)  SpO2: 100 % (11/29/23 0000)  Height: 5' 5" (165.1 cm)  Weight: 105.3 kg (232 lb 4.1 oz)  Body mass index is 38.65 kg/m².      Physical Exam  Vitals and nursing note reviewed.   Constitutional:       General: She is awake.      Appearance: Normal appearance.   Cardiovascular:      Rate and Rhythm: Normal rate and regular rhythm.      Pulses: Normal pulses.   Pulmonary:      Effort: Pulmonary effort is normal. No respiratory distress.      Breath sounds: Normal breath sounds. No decreased breath sounds, wheezing or rales.   Abdominal:      General: Bowel sounds are normal. There is no distension.      Palpations: Abdomen is soft.      Tenderness: There is no abdominal tenderness. There is no guarding.   Musculoskeletal:         General: Normal range of motion.   Skin:     General: Skin is warm and dry.   Neurological:      Mental Status: She is alert and oriented to person, place, and time.   Psychiatric:         Behavior: Behavior is cooperative.          Laboratory/Diagnostic Studies: pending    Covid test pending. If positive, patient instructed that surgery will be canceled and patient will be discharged home to quarantine. Patient would remain inactive on txp list for 28 days and will be assess at that time to be placed back on waitlist.   Assessment/Plan:     Cardiac/Vascular  Coronary artery disease involving native coronary artery of native heart with angina pectoris  - h/o CAD with PCI to LAD 2019  - hold ASA    Essential hypertension  - Hold meds pre-op (labetolol 200mg BID only), restart slowly post-op to avoid hypotension    Renal/  * ESRD (end stage renal disease)  - ESRD 2/2 DM and HTN  - Admit for kidney transplant surgery planned 11/29 at 7am with Dr. Moore  - Pre op labs and diagnostic studies pending, to be reviewed " before surgery    Hematology  Thrombocytopenia, unspecified  - chronic, likely multifactorial  - monitor closely post-op for bleeding    Oncology  Anemia of chronic disease  - chronic related to renal disease  - monitor H/H closely post-op    Endocrine  Type 2 diabetes mellitus  - consult endocrine post-op for DM managmenet        The patient presents for kidney transplant.  There are no apparent contraindications to proceeding with the planned transplant.  The patient understands that the transplant could potentially be cancelled pending detailed assessment of the donor organ.  She will receive Thymoglobulin induction.  A complete discussion of the transplant procedure, including risks, complications, and alternatives, as well as any donor-specific risk factors requiring specific disclosure, will be carried out by the responsible staff surgeon prior to the procedure.     Medical decision making for this encounter includes review of pertinent labs and diagnostic studies, assessment and planning, discussions with consulting providers, discussion with patient/family, and participation in multidisciplinary rounds. Time spent caring for patient: 60 minutes    Rika Harper DNP  Kidney Transplant  [facility]

## 2023-11-29 NOTE — ASSESSMENT & PLAN NOTE
Lab Results   Component Value Date    CREATININE 10.0 (H) 11/29/2023     Managed per primary team  S/p kidney transplant

## 2023-11-29 NOTE — PROGRESS NOTES
Nurses Note -- 4 Eyes      11/29/2023   1:09 AM      Skin assessed during: Admit      [x] No Altered Skin Integrity Present    []Prevention Measures Documented      [] Yes- Altered Skin Integrity Present or Discovered   [] LDA Added if Not in Epic (Describe Wound)   [] New Altered Skin Integrity was Present on Admit and Documented in LDA   [] Wound Image Taken    Wound Care Consulted? No    Attending Nurse:  Rola Fraga RN     Second RN/Staff Member:  Mike Menchaca RN

## 2023-11-29 NOTE — HOSPITAL COURSE
Patient is now s/p DBD KTX 11/29 (CIT 15hrs, 2 arteries, 1 vein, 2 day correa, donor CMV +). Post op K 6.7. Patient given calcium gluc and had STAT HD with 1L of fluid removed. DGF expected based on increased CIT and donor factors including donor LC).      Interval History:   NAEON  No complaints this AM  Correa with blood tinged urine but no clots removed on 12/2  HD 3.5 liters removed on 12/2, rec'd 1 unit of PRBC   Cr elevated and having minimal UOP consistent with DGF

## 2023-11-29 NOTE — PROGRESS NOTES
Anesthesia at bedside - patient to be shifted prior to leaving for OR. BG 91 prior to initiating D10. KELSEY Raymundo, NP also at bedside to place PIV.

## 2023-11-29 NOTE — PROGRESS NOTES
TRANSPLANT NOTE:      ORGAN: LEFT KIDNEY    Disease Etiology: Diabetes Mellitus - Type II  Donor Type: Donation after Brain Death    [facility] High Risk: Yes    Donor CMV Status:   Donor HBcAB: Negative    Donor HCV Status: Negative    Peak cPRA % (within 1 year of transplant): 0%      Mary Waller is a 48 y.o. female s/p  Donation after Brain Death   kidney transplant on 11/29/2023 (Kidney) for Diabetes Mellitus - Type II.   This patient will receive 3 doses of Thymoglobulin for induction.  This patients maintenance immunosuppression will include a steroid taper per protocol to 5mg daily, Prograf, and Cellcept maintenance.  Opportunistic infection prophylaxis will include Valcyte for 6 months (CMV D + , R - ) and Bactrim for 6 months.  Patient is to begin self medications upon transfer to the [facility], and I plan to meet with this patient and his/her support person prior to discharge to review the medication section of the Kidney Transplant Education Manual.  I have reviewed the pre-op medications and have restarted those, as appropriate.

## 2023-11-29 NOTE — TRANSFER OF CARE
"Anesthesia Transfer of Care Note    Patient: Mary Waller    Procedure(s) Performed: Procedure(s) (LRB):  TRANSPLANT, KIDNEY (N/A)    Patient location: PACU    Anesthesia Type: general    Transport from OR: Transported from OR on 6-10 L/min O2 by face mask with adequate spontaneous ventilation    Post pain: adequate analgesia    Post assessment: no apparent anesthetic complications    Post vital signs: stable    Level of consciousness: sedated    Nausea/Vomiting: no nausea/vomiting    Complications: none    Transfer of care protocol was followed    Last vitals: Visit Vitals  BP (!) 117/59 (BP Location: Right arm, Patient Position: Lying)   Pulse 76   Temp 36.8 °C (98.2 °F) (Oral)   Resp 12   Ht 5' 5" (1.651 m)   Wt 105.3 kg (232 lb 4.1 oz)   SpO2 100%   Breastfeeding No   BMI 38.65 kg/m²     "

## 2023-11-29 NOTE — PROGRESS NOTES
Nursing Transfer Note      Reason patient is being transferred: Post procedure    Transfer To: [id number]    Transfer via Bed    Transfer with Telemetry & infusion pump    Transported by PCT    Medicines sent: Calcium Gluconate 1 gm infusion     Any special needs or follow-up needed: Routine    Chart send with patient: Yes    Notified: mother    Patient reassessed at: 7115 11/29/2023

## 2023-11-29 NOTE — HPI
Reason for Consult: Management of T2DM, Hyperglycemia     Surgical Procedure and Date: Kidney Transplant on 11/29/23    Diabetes diagnosis year: in her 30s    Home Diabetes Medications:  Trulicity 3mg every 7 days     How often checking glucose at home?  Once daily     BG readings on regimen: 130s  Hypoglycemia on the regimen?  No  Missed doses on regimen?  No    Diabetes Complications include:     Diabetic nephropathy  , Diabetic chronic kidney disease     , and Diabetic retinopathy     Complicating diabetes co morbidities:   ESRD s/p kidney transplant, Glucocorticoid Use, HTN, CAD, HLD     HPI:   Patient is a 48 y.o. female with a diagnosis of ESRD secondary to DM nephropathy and HTN. Other PMH includes CAD s/p PCI LAD (2019 at [facility]), and HLD. Patient now presents for the above procedure(s). Endocrinology consulted for management of T2DM.

## 2023-11-29 NOTE — PROGRESS NOTES
Bedside HD initiated, cannulated upper left arm with 15 gauge needles. /. Uf net goal set for 2 liters as tolerated. B/P 136/63. Pulse 88. O2 sat 97% on room air.  Patient alert and stable

## 2023-11-29 NOTE — CONSULTS
[facility] - Transplant Stepdown  Endocrinology  Diabetes Consult Note    Consult Requested by: Torri Moore MD   Reason for admit: ESRD (end stage renal disease)    HISTORY OF PRESENT ILLNESS:  Reason for Consult: Management of T2DM, Hyperglycemia     Surgical Procedure and Date: Kidney Transplant on 11/29/23    Diabetes diagnosis year: in her 30s    Home Diabetes Medications:  Trulicity 3mg every 7 days     How often checking glucose at home?  Once daily     BG readings on regimen: 130s  Hypoglycemia on the regimen?  No  Missed doses on regimen?  No    Diabetes Complications include:     Diabetic nephropathy  , Diabetic chronic kidney disease     , and Diabetic retinopathy     Complicating diabetes co morbidities:   ESRD s/p kidney transplant, Glucocorticoid Use, HTN, CAD, HLD     HPI:   Patient is a 48 y.o. female with a diagnosis of ESRD secondary to DM nephropathy and HTN. Other PMH includes CAD s/p PCI LAD (2019 at [facility]), and HLD. Patient now presents for the above procedure(s). Endocrinology consulted for management of T2DM.         Interval HPI:   Overnight events: Admitted to [facility] s/p kidney transplant. Received Solu Medrol 500 mg this morning. BG remains within goal ranges without insulin requirements. Creatinine 10.0. Diet renal    Eating:   NPO (this morning)  Nausea: No  Hypoglycemia and intervention: No  Fever: No  TPN and/or TF: No  If yes, type of TF/TPN and rate: n/a    PMH, PSH, FH, SH reviewed     ROS:  Constitutional: Negative for weight changes.  Eyes: Positive for visual disturbance.  Respiratory: Negative for cough.   Cardiovascular: Negative for chest pain.  Gastrointestinal: Negative for nausea.  Endocrine: Negative for polyuria, polydipsia.  Musculoskeletal: Negative for back pain.  Skin: Negative for rash.  Neurological: Negative for syncope.  Psychiatric/Behavioral: Negative for depression.      Review of Systems    Current Medications and/or Treatments Impacting Glycemic  Control  Immunotherapy:    Immunosuppressants           Stop Route Frequency     antithymocyte globulin (rabbit) 125 mg, hydrocortisone sodium succinate (SOLU-CORTEF) 20 mg in sodium chloride 0.9% 500 mL (FOR PERIPHERAL LINE ADMINISTRATION ONLY)         12/02/23 0859 IV Daily     tacrolimus capsule 3 mg         -- Oral 2 times daily     mycophenolate capsule 1,000 mg         -- Oral 2 times daily          Steroids:   Hormones (From admission, onward)      Start     Stop Route Frequency Ordered    12/02/23 0900  predniSONE tablet 20 mg  (IP TXP KIDNEY POST-OP THYMO WITH PERIPHERAL PRECHECKED)         -- Oral Daily 11/29/23 1220    12/01/23 0800  methylPREDNISolone sodium succinate injection 125 mg  (IP TXP KIDNEY POST-OP THYMO WITH PERIPHERAL PRECHECKED)         -- IV Once 11/29/23 1219    11/30/23 0900  antithymocyte globulin (rabbit) 125 mg, hydrocortisone sodium succinate (SOLU-CORTEF) 20 mg in sodium chloride 0.9% 500 mL (FOR PERIPHERAL LINE ADMINISTRATION ONLY)  (IP TXP KIDNEY POST-OP THYMO WITH PERIPHERAL PRECHECKED)         12/02/23 0859 IV Daily 11/29/23 1219    11/30/23 0800  methylPREDNISolone sodium succinate injection 250 mg  (IP TXP KIDNEY POST-OP THYMO WITH PERIPHERAL PRECHECKED)         -- IV Once 11/29/23 1219    11/29/23 1316  hydrocortisone sodium succinate injection 100 mg  (IP TXP KIDNEY POST-OP THYMO WITH PERIPHERAL PRECHECKED)         04/27/35 0516 IV Once as needed 11/29/23 1220          Pressors:    Autonomic Drugs (From admission, onward)      Start     Stop Route Frequency Ordered    11/29/23 1316  EPINEPHrine (PF) injection 1 mg  (IP TXP KIDNEY POST-OP THYMO WITH PERIPHERAL PRECHECKED)         04/27/35 0516 SubQ Once as needed 11/29/23 1220          Hyperglycemia/Diabetes Medications:   Antihyperglycemics (From admission, onward)      Start     Stop Route Frequency Ordered    11/29/23 1314  insulin aspart U-100 pen 0-5 Units  (INSULIN - LOW CORRECTION DOSE (Recommended for BMI <25, GFR<15  "or on dialysis, Age > 70, type 1 diabetes, ESLD, severe CHF or patients on <70 units of insulin daily))         -- SubQ Before meals & nightly PRN 11/29/23 1219             PHYSICAL EXAMINATION:  Vitals:    11/29/23 1451   BP: 136/64   Pulse: 95   Resp: 16   Temp:      Body mass index is 38.65 kg/m².     Physical Exam   Constitutional: Well developed, well nourished, NAD.  ENT: External ears no masses with nose patent; normal hearing.  Neck: Supple; trachea midline.  Cardiovascular: Normal rate and regular rhythm  Lungs: Normal effort; lungs anterior bilaterally clear to auscultation.  Abdomen: Soft, no masses, no hernias.  MS: No clubbing or cyanosis of nails noted; unable to assess gait.  Skin: No rashes, lesions, or ulcers; no nodules.  Psychiatric: Good judgement and insight; normal mood and affect.  Neurological: Cranial nerves are grossly intact.   Foot: Nails in good condition, no amputations noted.      Labs Reviewed and Include   Recent Labs   Lab 11/29/23  0056 11/29/23  1233   * 127*   CALCIUM 9.2 8.3*   ALBUMIN 3.2* 2.8*   PROT 8.2  --     131*   K 5.6* 6.7*   CO2 25 21*   CL 99 98   BUN 62* 69*   CREATININE 10.4* 10.0*   ALKPHOS 63  --    ALT 13  --    AST 7*  --    BILITOT 0.5  --      Lab Results   Component Value Date    WBC 7.56 11/29/2023    HGB 10.7 (L) 11/29/2023    HCT 32.3 (L) 11/29/2023     (H) 11/29/2023     (L) 11/29/2023     No results for input(s): "TSH", "FREET4" in the last 168 hours.  Lab Results   Component Value Date    HGBA1C 5.7 10/04/2023       Nutritional status:   Body mass index is 38.65 kg/m².  Lab Results   Component Value Date    ALBUMIN 2.8 (L) 11/29/2023    ALBUMIN 3.2 (L) 11/29/2023    ALBUMIN 4.2 11/01/2023     No results found for: "PREALBUMIN"    Estimated Creatinine Clearance: 8.5 mL/min (A) (based on SCr of 10 mg/dL (H)).    Accu-Checks  Recent Labs     11/29/23  0049 11/29/23  0256 11/29/23  0545 11/29/23  0718 11/29/23  1220   POCTGLUCOSE " 129* 107 101 91 111*        ASSESSMENT and PLAN    Renal/  * ESRD (end stage renal disease)  Lab Results   Component Value Date    CREATININE 10.0 (H) 11/29/2023     Managed per primary team  S/p kidney transplant      S/P kidney transplant  Managed per primary team  Optimize BG control        Immunology/Multi System  Prophylactic immunotherapy  May increase insulin resistance.         Endocrine  Severe obesity (BMI 35.0-39.9) with comorbidity  Body mass index is 38.65 kg/m².  May increase insulin resistance.         Type 2 diabetes mellitus without complication, without long-term current use of insulin  BG goal 140-180    Continue Low Dose Correction Scale  Will monitor closely for prandial excursions with steroid taper and initiate scheduled insulin if indicated  BG monitoring ac/hs    ** Please call Endocrine for any BG related issues **    Discharge plans: TBD      Other  Adverse effect of corticosteroids  Glucocorticoids markedly increase glucoses. Expect steroid taper to help with glucose control.           Plan discussed with patient, family, and RN at bedside.     Flores Crook NP  Endocrinology  [facility] - Transplant Stepdown

## 2023-11-29 NOTE — ANESTHESIA PREPROCEDURE EVALUATION
[facility]  Anesthesia Pre-Operative Evaluation         Patient Name: Mary Waller  YOB: 1975  MRN: [id number]    SUBJECTIVE:     Pre-operative evaluation for Procedure(s) (LRB):  TRANSPLANT, KIDNEY (N/A)     11/29/2023    Mary Waller is a 48 y.o. female w/ a significant PMHx of ESRD secondary to DM nephropathy and HTN. Other PMH includes CAD s/p PCI LAD (2019 at [facility]), and HLD.     Last HD was Monday 11/27 via LUE AVF  Access: 22g PIV RUE    Patient now presents for the above procedure(s).    TTE: 2019  Normal left ventricular systolic function. The estimated ejection fraction is 65%  Markedly calcified mitral annulus. Echodensity under the posterior mitral valve leaflet. Cannot rule out vegetation.  Concentric left ventricular remodeling.  Severe left atrial enlargement.  Indeterminate left ventricular diastolic function.  Normal right ventricular systolic function.  There is right ventricular hypertrophy.  The estimated PA systolic pressure is 12 mm Hg  Normal central venous pressure (3 mm Hg).  Trivial circumferential pericardial effusion.    LDA:        Peripheral IV - Single Lumen 22 G Anterior;Right Forearm (Active)   Site Assessment Clean;Dry;Intact;No redness;No swelling 11/29/23 0105   Extremity Assessment Distal to IV No warmth;No redness;No abnormal discoloration;No swelling 11/29/23 0105   Line Status Flushed;Saline locked 11/29/23 0105   Dressing Status Clean;Dry;Intact 11/29/23 0105   Dressing Intervention First dressing 11/29/23 0105   Dressing Change Due 12/03/23 11/29/23 0105   Site Change Due 12/03/23 11/29/23 0105   Reason Not Rotated Not due 11/29/23 0105   Number of days:             Hemodialysis AV Fistula Left upper arm (Active)   Needle Size 16ga 11/27/23 1020   Site Assessment Clean;Dry;Intact;No swelling;No redness 11/29/23 0105   Patency Present;Thrill;Bruit 11/29/23 0105   Status Deaccessed 11/27/23 1445   Flows Good 11/27/23 1445    Dressing Intervention First dressing 11/27/23 1445   Dressing Status Clean;Dry;Intact 11/27/23 1445   Site Condition No complications 11/27/23 1445   Dressing Gauze 11/27/23 1445   Number of days:        Prev airway: None documented.    Drips: None documented.      Patient Active Problem List   Diagnosis    Line sepsis associated with dialysis catheter    Essential hypertension    Type 2 diabetes mellitus    ESRD (end stage renal disease)    Glaucoma    Staphylococcus aureus bacteremia    Bacteremia associated with intravascular line    Acute bacterial endocarditis    ESRD (end stage renal disease) on dialysis    Nausea    Pre-transplant evaluation for kidney transplant    Coronary artery disease involving native coronary artery of native heart with angina pectoris    Severe obesity (BMI 35.0-39.9) with comorbidity    Anemia of chronic disease    Thrombocytopenia, unspecified       Review of patient's allergies indicates:   Allergen Reactions    Codeine     Sulfa (sulfonamide antibiotics)        Current Inpatient Medications:   acetaminophen  650 mg Per NG tube Once    antithymocyte globulin (rabbit) 125 mg, hydrocortisone sodium succinate (SOLU-CORTEF) 20 mg in sodium chloride 0.9% 500 mL (FOR PERIPHERAL LINE ADMINISTRATION ONLY)  1.5 mg/kg (Adjusted) Intravenous Once    diphenhydrAMINE  50 mg Intravenous Once    methylPREDNISolone sodium succinate injection  500 mg Intravenous Once       Current Facility-Administered Medications on File Prior to Encounter   Medication Dose Route Frequency Provider Last Rate Last Admin    heparin (porcine) injection 1,000 Units  1,000 Units Intravenous Continuous Aries Bourne MD   1,000 Units at 11/27/23 1020     Current Outpatient Medications on File Prior to Encounter   Medication Sig Dispense Refill    aspirin (ECOTRIN) 81 MG EC tablet Take 81 mg by mouth once daily.      atropine 1% (ISOPTO ATROPINE) 1 % Drop Place 1 drop into the left eye every  evening.      COMBIGAN 0.2-0.5 % Drop Place 1 drop into the right eye every evening.      cyanocobalamin (VITAMIN B-12) 500 MCG tablet Take 500 mcg by mouth once daily.      docosahexaenoic acid/epa (FISH OIL ORAL) Take 1,000 mg by mouth once daily.      docusate sodium (COLACE) 100 MG capsule Take 100 mg by mouth Daily.       dulaglutide (TRULICITY) 3 mg/0.5 mL pen injector Inject 3 mg into the skin every 7 days.      ergocalciferol (VITAMIN D2) 50,000 unit Cap Take 50,000 Units by mouth every 7 days. Take 1 capsule by mouth weekly x 12 weeks, then decrease to once per month.      furosemide (LASIX) 80 MG tablet Take 80 mg by mouth every evening.      l-methylfolate-b2-b6-b12 (CEREFOLIN) 6-5-50-1 mg Tab Take 1 tablet by mouth once daily.      labetalol (NORMODYNE) 200 MG tablet Take 400 mg by mouth 2 (two) times daily.       patiromer calcium sorbitex (VELTASSA) 8.4 gram PwPk Take 1 packet (8.4 g total) by mouth every Tuesday, Thursday, Saturday, Sunday. This medicine is used to treat your potassium level. (Patient not taking: Reported on 11/21/2023) 17 packet 1    rosuvastatin (CRESTOR) 40 MG Tab Take 40 mg by mouth every evening.      sevelamer carbonate (RENVELA) 800 mg Tab Take 2,400 mg by mouth 4 (four) times daily.      [DISCONTINUED] amLODIPine (NORVASC) 10 MG tablet Take 10 mg by mouth once daily.      [DISCONTINUED] hydrALAZINE (APRESOLINE) 50 MG tablet Take 100 mg by mouth every 8 (eight) hours.       [DISCONTINUED] insulin (BASAGLAR KWIKPEN U-100 INSULIN) glargine 100 units/mL (3mL) SubQ pen Inject 15 Units into the skin every evening.      [DISCONTINUED] losartan (COZAAR) 100 MG tablet Take 100 mg by mouth once daily.         Past Surgical History:   Procedure Laterality Date    AV FISTULA PLACEMENT      EYE SURGERY         OBJECTIVE:     Vital Signs Range (Last 24H):  Temp:  [36.8 °C (98.3 °F)]   Pulse:  [85]   Resp:  [18]   BP: (135)/(67)   SpO2:  [100 %]       Significant Labs:  Lab  Results   Component Value Date    WBC 7.56 11/29/2023    HGB 10.7 (L) 11/29/2023    HCT 34.1 (L) 11/29/2023     (L) 11/29/2023    CHOL 98 (L) 08/17/2023    TRIG 105 08/17/2023    HDL 30 (L) 08/17/2023    ALT 13 11/29/2023    AST 7 (L) 11/29/2023     11/29/2023    K 5.6 (H) 11/29/2023    CL 99 11/29/2023    CREATININE 10.4 (H) 11/29/2023    BUN 62 (H) 11/29/2023    CO2 25 11/29/2023    INR 1.0 11/29/2023    HGBA1C 5.7 10/04/2023       Diagnostic Studies: No relevant studies.    EKG:   Results for orders placed or performed during the hospital encounter of 08/07/23   EKG 12-lead    Collection Time: 08/07/23 11:45 AM    Narrative    Test Reason : R11.10,    Vent. Rate : 076 BPM     Atrial Rate : 076 BPM     P-R Int : 142 ms          QRS Dur : 086 ms      QT Int : 408 ms       P-R-T Axes : 073 104 064 degrees     QTc Int : 459 ms    Normal sinus rhythm  Rightward axis  Anterior infarct ,age undetermined  Abnormal ECG  When compared with ECG of 05-JAN-2019 09:47,  Significant changes have occurred  Confirmed by Kitty Grier MD ([id number]) on 8/7/2023 11:16:43 PM    Referred By: [email or web address]   SELF           Confirmed By:Kitty Grier MD       ASSESSMENT/PLAN:                                                                                                                    11/29/2023  Mary Waller is a 48 y.o., female.      Pre-op Assessment    I have reviewed the Patient Summary Reports.     I have reviewed the Nursing Notes.    I have reviewed the Medications.     Review of Systems  Anesthesia Hx:  No problems with previous Anesthesia   History of prior surgery of interest to airway management or planning:          Denies Family Hx of Anesthesia complications.    Denies Personal Hx of Anesthesia complications.                    Hematology/Oncology:    Oncology Normal                                   Cardiovascular:     Hypertension   CAD   CABG/stent (PCI 2019)   Angina        ECG has been reviewed.                           Pulmonary:       Denies Shortness of breath.  Denies Recent URI.                 Renal/:  Chronic Renal Disease, ESRD, Dialysis                Hepatic/GI:      Denies GERD. Denies Liver Disease.            Neurological:    Denies CVA.    Denies Seizures.                                Endocrine:  Diabetes               Physical Exam  General: Well nourished, Cooperative and Alert    Airway:  Mallampati: III / II  Mouth Opening: Small, but > 3cm  TM Distance: Normal  Tongue: Normal  Neck ROM: Normal ROM    Dental:  Periodontal disease        Anesthesia Plan  Type of Anesthesia, risks & benefits discussed:    Anesthesia Type: Gen ETT  Intra-op Monitoring Plan: Standard ASA Monitors and Art Line  Post Op Pain Control Plan: multimodal analgesia and IV/PO Opioids PRN  Induction:  IV  Airway Plan: Direct, Post-Induction  Informed Consent: Informed consent signed with the Patient and all parties understand the risks and agree with anesthesia plan.  All questions answered.   ASA Score: 3  Day of Surgery Review of History & Physical: H&P Update referred to the surgeon/provider.  Anesthesia Plan Notes: K=5.6 at 0100.  Discussed this am w surgeon.  Plan to proceed with transplant.  Likely need for dialysis postop.  Requested for shift to begin prior to transfer to OR.  Will complete shift in OR and plan for post op dialysis    Ready For Surgery From Anesthesia Perspective.     .

## 2023-11-29 NOTE — PROGRESS NOTES
Mary was seen while receiving her dialysis today in her TSU room.  Indication: fabrizio & hyperkalemia (K 6.7) after kidney transplant [=DGF, which was expected based on CIT and donor factors].  Reviewed case with dialysis nurse at bedside. BP soft- try midodrine.  HD being tolerated so far- see orders and flowsheet for precise details.

## 2023-11-29 NOTE — ASSESSMENT & PLAN NOTE
- Hold meds pre-op, restart slowly post-op to avoid hypotension  -OK to use midodrine w dialysis for BP support

## 2023-11-29 NOTE — ANESTHESIA PROCEDURE NOTES
Arterial    Diagnosis: ESRD    Patient location during procedure: done in OR  Procedure start time: 11/29/2023 8:10 AM  Timeout: 11/29/2023 8:10 AM  Procedure end time: 11/29/2023 8:12 AM    Staffing  Authorizing Provider: Teodora Sheppard MD  Performing Provider: Georgina Henry CRNA    Staffing  Performed by: Georgina Henry CRNA  Authorized by: Teodora Sheppard MD    Anesthesiologist was present at the time of the procedure.    Preanesthetic Checklist  Completed: patient identified, IV checked, site marked, risks and benefits discussed, surgical consent, monitors and equipment checked, pre-op evaluation, timeout performed and anesthesia consent givenArterial  Skin Prep: chlorhexidine gluconate  Local Infiltration: none  Orientation: right  Location: radial    Catheter Size: 20 G  Catheter placement by Anatomical landmarks and Ultrasound guidance. Heme positive aspiration all ports.   Vessel Caliber: patent  Vascular Doppler:  not done  Needle advanced into vessel with real time Ultrasound guidance.Insertion Attempts: 1  Assessment  Dressing: secured with tape and tegaderm

## 2023-11-29 NOTE — PLAN OF CARE
Pre-operative Discussion Note  Kidney Transplant Surgery    Mary Waller is a 48 y.o. female with ESRD, requiring chronic dialysis admitted for kidney transplant.  I discussed the planned procedure in detail, including expected hospital course and outcomes, benefits, risks, and potential complications.  Complications discussed included death, graft failure, bleeding, infection, vascular thrombosis, and rejection.  I discussed the risks of anesthesia, as well as the potential need for re-operation.  The possibility of other complications not specifically mentioned was also discussed.  Also, I discussed the need for lifelong immunosuppression and the possibility of serious complications from immunosuppressive drugs.    The discussion included the risks that the patient will incur if she elects to not have the proposed procedure.    Relevant donor-specific risk factors were disclosed and discussed with the patient, including:   PHS: I discussed the use of organs from donors with [facility] risk criteria, including the testing protocols utilized, as well as data from the literature regarding the likelihood of transmission of hepatitis or HIV. The patient is willing to consider such grafts.    Specific [facility] donor risk criteria for the organ donor include:  Incarceration (confinement in half-way, correction, or juvenile correction facility) for greater than or equal to 72 consecutive hours    HCV: n/a    The patient was SARS-CoV-2 /COVID-19 tested with negative results.    COVID-19: I discussed the possibility of COVID-19 transmission with the patient. Although CAROLYN testing is available for the virus using a technique which in theory should be very accurate, there is no data yet regarding the likelihood of a  false-negative test leading to virus transmission. Based on accuracy of testing for other viruses, it is expected that this risk is extremely small.     I also discussed that transplant immunosuppression will increase  susceptibility to COVID-19 and other viruses, and that although we use stringent precautions to protect patients from infection, it is possible for a transplant recipient to contract this infection. If COVID-19 infection should occur, it would be a serious matter with a significant risk of death.    All questions were answered.  The patient and available family members voice understanding and agree to proceed with the transplant.    UNOS Patient Status  Note on scores:  ICU = 10 = total assistance  TSU = 20-30 = partial assistance  Outpatient admitted for transplant requiring medical care in last year = 40-50 = partial assistance  Scores 60 or higher indicate no assistance, meaning no need for medical care in last year. This would be very unusual for a transplant candidate.    UNOS Patient Status  Functional Status: 50% - Requires considerable assistance and frequent medical care  Physical Capacity: No Limitations

## 2023-11-29 NOTE — OP NOTE
Operative Report    Date of Procedure: 11/29/2023  Date of Transplant (UNOS): 11/29/23    Surgeons:  Surgeon(s) and Role:     * Torri Moore MD - Primary     * Miguel Venegas MD - Resident - Assisting     * Dominique Quinteros MD - Fellow    First Assistant Attestation:  The indicated resident served as first assistant for this procedure.    Pre-operative Diagnosis: ESRD, requiring chronic dialysis secondary to Diabetes Mellitus - Type II  Post-operative Diagnosis: Same    Procedure(s) Performed:   Back Table Preparation of Left Kidney    Donation after Brain Death Kidney transplant    Anesthesia: General endotracheal    Preamble  Indications and Patient Counseling: The patient is a 48 y.o. year-old female with end-stage kidney disease secondary to Diabetes Mellitus - Type II who has been evaluated for a kidney transplant.  The procedure was thoroughly discussed with the patient, including potential risks, complications, and alternatives.  Specific complications mentioned included death, graft non-function, bleeding, infection, and rejection, as well as the possibility of other complications not specifically mentioned.    Donor Risk Factors: Prior to the operation, the patient was advised of any donor-specific risk factors requiring specific disclosure.  Factors in this case included PHS risk criteria .      Specific PHS donor risk criteria for the organ donor include:  Incarceration (confinement in long-term, half-way, or juvenile correction facility) for greater than or equal to 72 consecutive hours    All questions were answered, the patient voiced appropriate understanding, and she agreed to proceed with the planned procedure.    ABO Confirmation: Immediately following arrival of the donor organ and prior to implantation, a formal ABO confirmation was done according to hospital and UNOS policies.  I confirmed the UNOS ID number ([id number]) of the donor organ and the donor and recipient ABO types, directly verifying  these data by comparison with the UNOS Match Run report ([id number]).  This confirmation was personally done by an attending surgeon and circulating nurse, and is officially documented elsewhere.    Time-Out: A complete time out was carried out prior to incision, with confirmation of patient identity, correct procedure, correct operative site, appropriate antibiotic prophylaxis, review of any known allergies, and presence of all needed equipment.    Procedure in Detail  Prior to starting the operation, the left kidney  was prepared on the back table. Arterial anatomy was double. Venous anatomy was single. Ureteral anatomy was single. Back table vascular reconstruction was not required .  Unneeded fat was removed from the kidney, the vessels were cleaned of adherent tissue and tested for leaks, and the kidney was maintained at ice temperature in organ preservation solution until it was brought to the operative field.     The patient was brought into the operating room and placed in a supine position on the OR table.  After the induction of general endotracheal anesthesia, lines were placed by the anesthesiologist.  The urinary bladder was catheterized and irrigated with antibiotic solution.  There was no tension on the axillae and all pressure points were padded.  Sequential compression boots were used as were Adeline Huggers.  The abdomen was prepped and draped in the usual sterile fashion.  Skin was incised over the right with a knife and deepened with electrocautery.  The peritoneum and its contents were swept medially, exposing the right external iliac artery and the right external iliac vein.  The Bookwalter retractor was used to provide exposure.  Overlying lymphatics were ligated or cauterized and the vessels were dissected free for a length compatible with anastomosis.  The kidney was brought to the OR table at 11/29/2023  9:43 AM.  Venous control was obtained with a vascular clamp.  A venotomy was made, the vein  irrigated, and an end renal to right external iliac vein anastomosis was created with 5-0 polypropylene.  Arterial control was obtained with a vascular clamp.  Arteriotomy was made, the artery irrigated, and an aortic patch to right external iliac artery anastomosis was created with 5-0 polypropylene.  The kidney was unclamped and reperfused at 11/29/2023 10:11 AM.  Reperfusion quality was good. Intraoperative urine production was not observed.  After hemostasis was obtained, a Lich uretero-neocystostomy was created.  The bladder was filled and identified, opened, and the anastomosis created using 6-0 PDS.  The bladder muscle was closed over the distal ureter to create an antireflux tunnel.  A ureteral stent was used.  With the kidney well perfused and sitting appropriately without tension on the anastomoses, viscera were replaced in their usual position.  The wound was closed after a final check for hemostasis.  Overall, the graft quality was assessed to be good. At the end of the case the needle, sponge and instrument counts were all correct.  Sterile dressings were applied and the patient was brought to the recovery room/ICU in good condition.    Estimated Blood Loss: 75 mL  Fluids Administered:    Drains: none  Specimens: none    Findings:    Organ Transplanted: Left Kidney    Arterial Anatomy: double  Number of Arteries: 2  Configuration of Multiple Arteries: common patch   Venous Anatomy: single  Number of Veins: 1  Ureteral Anatomy: single  Number of Ureters: 1  Reperfusion Quality: good  Overall Graft Quality: good  Intraoperative Urine Production: no  Alexandra: not to be removed before 2 days.  Ureteral Stent: Yes    Ischemic Times:   Anastomosis (warm ischemia) time: 28 minutes   Cold ischemia time: 903 minutes  Total ischemia time: 931 minutes    Donor Data:  UNOS ID: [id number]   UNOS Match Run: [id number]   Donor Type: Donation after Brain Death   Donor CMV Status: Positive   Donor HBcAB: Negative   Donor HCV  Status: Negative

## 2023-11-29 NOTE — PLAN OF CARE
Patient arrived to unit for Kidney transplant surgery. THOMAS Harper NP notified of patient's arrival. Chest X-Ray, EKG, and blood work collected. Consents pending. SCD/Teds placed. Chlorhexidine bath done. NPO upon arrival. OR time 7am.  VSS. NAD. WCTM.

## 2023-11-29 NOTE — PROGRESS NOTES
Patient transported off unit via bed by OR tech. D10 bolus, 10U IV insulin, and calcium gluconate given prior to patient leaving floor. In NAD.

## 2023-11-29 NOTE — ASSESSMENT & PLAN NOTE
-Hyperkalemia present on admit, pt due for dialysis today & not done pre-op. Noted to be more hyperkalemic post transpalnt as expected given DGF was anticipated  -Urgent dialysis initiated

## 2023-11-29 NOTE — SUBJECTIVE & OBJECTIVE
Interval HPI:   Overnight events: Admitted to [facility] s/p kidney transplant. Received Solu Medrol 500 mg this morning. BG remains within goal ranges without insulin requirements. Creatinine 10.0. Diet renal    Eating:   NPO (this morning)  Nausea: No  Hypoglycemia and intervention: No  Fever: No  TPN and/or TF: No  If yes, type of TF/TPN and rate: n/a    PMH, PSH, FH, SH reviewed     ROS:  Constitutional: Negative for weight changes.  Eyes: Positive for visual disturbance.  Respiratory: Negative for cough.   Cardiovascular: Negative for chest pain.  Gastrointestinal: Negative for nausea.  Endocrine: Negative for polyuria, polydipsia.  Musculoskeletal: Negative for back pain.  Skin: Negative for rash.  Neurological: Negative for syncope.  Psychiatric/Behavioral: Negative for depression.      Review of Systems    Current Medications and/or Treatments Impacting Glycemic Control  Immunotherapy:    Immunosuppressants           Stop Route Frequency     antithymocyte globulin (rabbit) 125 mg, hydrocortisone sodium succinate (SOLU-CORTEF) 20 mg in sodium chloride 0.9% 500 mL (FOR PERIPHERAL LINE ADMINISTRATION ONLY)         12/02/23 0859 IV Daily     tacrolimus capsule 3 mg         -- Oral 2 times daily     mycophenolate capsule 1,000 mg         -- Oral 2 times daily          Steroids:   Hormones (From admission, onward)      Start     Stop Route Frequency Ordered    12/02/23 0900  predniSONE tablet 20 mg  (IP TXP KIDNEY POST-OP THYMO WITH PERIPHERAL PRECHECKED)         -- Oral Daily 11/29/23 1220    12/01/23 0800  methylPREDNISolone sodium succinate injection 125 mg  (IP TXP KIDNEY POST-OP THYMO WITH PERIPHERAL PRECHECKED)         -- IV Once 11/29/23 1219    11/30/23 0900  antithymocyte globulin (rabbit) 125 mg, hydrocortisone sodium succinate (SOLU-CORTEF) 20 mg in sodium chloride 0.9% 500 mL (FOR PERIPHERAL LINE ADMINISTRATION ONLY)  (IP TXP KIDNEY POST-OP THYMO WITH PERIPHERAL PRECHECKED)         12/02/23 0859 IV Daily  11/29/23 1219    11/30/23 0800  methylPREDNISolone sodium succinate injection 250 mg  (IP TXP KIDNEY POST-OP THYMO WITH PERIPHERAL PRECHECKED)         -- IV Once 11/29/23 1219    11/29/23 1316  hydrocortisone sodium succinate injection 100 mg  (IP TXP KIDNEY POST-OP THYMO WITH PERIPHERAL PRECHECKED)         04/27/35 0516 IV Once as needed 11/29/23 1220          Pressors:    Autonomic Drugs (From admission, onward)      Start     Stop Route Frequency Ordered    11/29/23 1316  EPINEPHrine (PF) injection 1 mg  (IP TXP KIDNEY POST-OP THYMO WITH PERIPHERAL PRECHECKED)         04/27/35 0516 SubQ Once as needed 11/29/23 1220          Hyperglycemia/Diabetes Medications:   Antihyperglycemics (From admission, onward)      Start     Stop Route Frequency Ordered    11/29/23 1314  insulin aspart U-100 pen 0-5 Units  (INSULIN - LOW CORRECTION DOSE (Recommended for BMI <25, GFR<15 or on dialysis, Age > 70, type 1 diabetes, ESLD, severe CHF or patients on <70 units of insulin daily))         -- SubQ Before meals & nightly PRN 11/29/23 1219             PHYSICAL EXAMINATION:  Vitals:    11/29/23 1451   BP: 136/64   Pulse: 95   Resp: 16   Temp:      Body mass index is 38.65 kg/m².     Physical Exam   Constitutional: Well developed, well nourished, NAD.  ENT: External ears no masses with nose patent; normal hearing.  Neck: Supple; trachea midline.  Cardiovascular: Normal rate and regular rhythm  Lungs: Normal effort; lungs anterior bilaterally clear to auscultation.  Abdomen: Soft, no masses, no hernias.  MS: No clubbing or cyanosis of nails noted; unable to assess gait.  Skin: No rashes, lesions, or ulcers; no nodules.  Psychiatric: Good judgement and insight; normal mood and affect.  Neurological: Cranial nerves are grossly intact.   Foot: Nails in good condition, no amputations noted.

## 2023-11-29 NOTE — ASSESSMENT & PLAN NOTE
BG goal 140-180    Continue Low Dose Correction Scale  Will monitor closely for prandial excursions with steroid taper and initiate scheduled insulin if indicated  BG monitoring ac/hs    ** Please call Endocrine for any BG related issues **    Discharge plans: [facility]AN[facility]

## 2023-11-30 DIAGNOSIS — Z94.0 KIDNEY REPLACED BY TRANSPLANT: Primary | ICD-10-CM

## 2023-11-30 PROBLEM — T88.4XXA HARD TO INTUBATE: Status: ACTIVE | Noted: 2023-11-30

## 2023-11-30 PROBLEM — Z79.60 LONG-TERM USE OF IMMUNOSUPPRESSANT MEDICATION: Status: ACTIVE | Noted: 2023-11-30

## 2023-11-30 PROBLEM — D62 ACUTE BLOOD LOSS ANEMIA: Status: ACTIVE | Noted: 2023-11-30

## 2023-11-30 LAB
ALBUMIN SERPL BCP-MCNC: 2.4 G/DL (ref 3.5–5.2)
ALBUMIN SERPL BCP-MCNC: 2.6 G/DL (ref 3.5–5.2)
ALBUMIN SERPL BCP-MCNC: 2.6 G/DL (ref 3.5–5.2)
ANION GAP SERPL CALC-SCNC: 11 MMOL/L (ref 8–16)
ANION GAP SERPL CALC-SCNC: 13 MMOL/L (ref 8–16)
ANION GAP SERPL CALC-SCNC: 14 MMOL/L (ref 8–16)
BASOPHILS # BLD AUTO: 0.01 K/UL (ref 0–0.2)
BASOPHILS # BLD AUTO: 0.01 K/UL (ref 0–0.2)
BASOPHILS NFR BLD: 0.1 % (ref 0–1.9)
BASOPHILS NFR BLD: 0.1 % (ref 0–1.9)
BUN SERPL-MCNC: 24 MG/DL (ref 6–20)
BUN SERPL-MCNC: 46 MG/DL (ref 6–20)
BUN SERPL-MCNC: 52 MG/DL (ref 6–20)
CALCIUM SERPL-MCNC: 8.2 MG/DL (ref 8.7–10.5)
CALCIUM SERPL-MCNC: 8.4 MG/DL (ref 8.7–10.5)
CALCIUM SERPL-MCNC: 8.4 MG/DL (ref 8.7–10.5)
CHLORIDE SERPL-SCNC: 105 MMOL/L (ref 95–110)
CHLORIDE SERPL-SCNC: 97 MMOL/L (ref 95–110)
CHLORIDE SERPL-SCNC: 98 MMOL/L (ref 95–110)
CO2 SERPL-SCNC: 19 MMOL/L (ref 23–29)
CO2 SERPL-SCNC: 20 MMOL/L (ref 23–29)
CO2 SERPL-SCNC: 22 MMOL/L (ref 23–29)
CREAT SERPL-MCNC: 4.4 MG/DL (ref 0.5–1.4)
CREAT SERPL-MCNC: 8.1 MG/DL (ref 0.5–1.4)
CREAT SERPL-MCNC: 8.1 MG/DL (ref 0.5–1.4)
DIFFERENTIAL METHOD: ABNORMAL
DIFFERENTIAL METHOD: ABNORMAL
EOSINOPHIL # BLD AUTO: 0 K/UL (ref 0–0.5)
EOSINOPHIL # BLD AUTO: 0 K/UL (ref 0–0.5)
EOSINOPHIL NFR BLD: 0 % (ref 0–8)
EOSINOPHIL NFR BLD: 0.1 % (ref 0–8)
ERYTHROCYTE [DISTWIDTH] IN BLOOD BY AUTOMATED COUNT: 14.2 % (ref 11.5–14.5)
ERYTHROCYTE [DISTWIDTH] IN BLOOD BY AUTOMATED COUNT: 14.4 % (ref 11.5–14.5)
EST. GFR  (NO RACE VARIABLE): 11.9 ML/MIN/1.73 M^2
EST. GFR  (NO RACE VARIABLE): 5.7 ML/MIN/1.73 M^2
EST. GFR  (NO RACE VARIABLE): 5.7 ML/MIN/1.73 M^2
GLUCOSE SERPL-MCNC: 148 MG/DL (ref 70–110)
GLUCOSE SERPL-MCNC: 150 MG/DL (ref 70–110)
GLUCOSE SERPL-MCNC: 230 MG/DL (ref 70–110)
HCT VFR BLD AUTO: 27 % (ref 37–48.5)
HCT VFR BLD AUTO: 29.8 % (ref 37–48.5)
HGB BLD-MCNC: 8.8 G/DL (ref 12–16)
HGB BLD-MCNC: 9.3 G/DL (ref 12–16)
IMM GRANULOCYTES # BLD AUTO: 0.11 K/UL (ref 0–0.04)
IMM GRANULOCYTES # BLD AUTO: 0.15 K/UL (ref 0–0.04)
IMM GRANULOCYTES NFR BLD AUTO: 0.7 % (ref 0–0.5)
IMM GRANULOCYTES NFR BLD AUTO: 1 % (ref 0–0.5)
LYMPHOCYTES # BLD AUTO: 0.1 K/UL (ref 1–4.8)
LYMPHOCYTES # BLD AUTO: 0.3 K/UL (ref 1–4.8)
LYMPHOCYTES NFR BLD: 0.7 % (ref 18–48)
LYMPHOCYTES NFR BLD: 1.8 % (ref 18–48)
MAGNESIUM SERPL-MCNC: 2.2 MG/DL (ref 1.6–2.6)
MCH RBC QN AUTO: 31.8 PG (ref 27–31)
MCH RBC QN AUTO: 32.6 PG (ref 27–31)
MCHC RBC AUTO-ENTMCNC: 31.2 G/DL (ref 32–36)
MCHC RBC AUTO-ENTMCNC: 32.6 G/DL (ref 32–36)
MCV RBC AUTO: 100 FL (ref 82–98)
MCV RBC AUTO: 102 FL (ref 82–98)
MONOCYTES # BLD AUTO: 1.5 K/UL (ref 0.3–1)
MONOCYTES # BLD AUTO: 1.6 K/UL (ref 0.3–1)
MONOCYTES NFR BLD: 10.4 % (ref 4–15)
MONOCYTES NFR BLD: 9.7 % (ref 4–15)
NEUTROPHILS # BLD AUTO: 13.1 K/UL (ref 1.8–7.7)
NEUTROPHILS # BLD AUTO: 13.6 K/UL (ref 1.8–7.7)
NEUTROPHILS NFR BLD: 86.6 % (ref 38–73)
NEUTROPHILS NFR BLD: 88.8 % (ref 38–73)
NRBC BLD-RTO: 0 /100 WBC
NRBC BLD-RTO: 0 /100 WBC
PHOSPHATE SERPL-MCNC: 3.8 MG/DL (ref 2.7–4.5)
PHOSPHATE SERPL-MCNC: 4.1 MG/DL (ref 2.7–4.5)
PHOSPHATE SERPL-MCNC: 4.1 MG/DL (ref 2.7–4.5)
PLATELET # BLD AUTO: 115 K/UL (ref 150–450)
PLATELET # BLD AUTO: 141 K/UL (ref 150–450)
PMV BLD AUTO: 11.8 FL (ref 9.2–12.9)
PMV BLD AUTO: 11.9 FL (ref 9.2–12.9)
POCT GLUCOSE: 138 MG/DL (ref 70–110)
POCT GLUCOSE: 143 MG/DL (ref 70–110)
POCT GLUCOSE: 161 MG/DL (ref 70–110)
POCT GLUCOSE: 178 MG/DL (ref 70–110)
POCT GLUCOSE: 192 MG/DL (ref 70–110)
POCT GLUCOSE: 217 MG/DL (ref 70–110)
POCT GLUCOSE: 284 MG/DL (ref 70–110)
POTASSIUM SERPL-SCNC: 4.8 MMOL/L (ref 3.5–5.1)
POTASSIUM SERPL-SCNC: 5.3 MMOL/L (ref 3.5–5.1)
POTASSIUM SERPL-SCNC: 6.1 MMOL/L (ref 3.5–5.1)
RBC # BLD AUTO: 2.7 M/UL (ref 4–5.4)
RBC # BLD AUTO: 2.92 M/UL (ref 4–5.4)
SODIUM SERPL-SCNC: 131 MMOL/L (ref 136–145)
SODIUM SERPL-SCNC: 133 MMOL/L (ref 136–145)
SODIUM SERPL-SCNC: 135 MMOL/L (ref 136–145)
TACROLIMUS BLD-MCNC: <2 NG/ML (ref 5–15)
WBC # BLD AUTO: 15.14 K/UL (ref 3.9–12.7)
WBC # BLD AUTO: 15.3 K/UL (ref 3.9–12.7)

## 2023-11-30 PROCEDURE — 99900035 HC TECH TIME PER 15 MIN (STAT)

## 2023-11-30 PROCEDURE — 94761 N-INVAS EAR/PLS OXIMETRY MLT: CPT

## 2023-11-30 PROCEDURE — 63600175 PHARM REV CODE 636 W HCPCS: Performed by: NURSE PRACTITIONER

## 2023-11-30 PROCEDURE — 99233 SBSQ HOSP IP/OBS HIGH 50: CPT | Mod: ,,,

## 2023-11-30 PROCEDURE — 80197 ASSAY OF TACROLIMUS: CPT

## 2023-11-30 PROCEDURE — 94640 AIRWAY INHALATION TREATMENT: CPT

## 2023-11-30 PROCEDURE — 25000003 PHARM REV CODE 250: Performed by: PHYSICIAN ASSISTANT

## 2023-11-30 PROCEDURE — 80100014 HC HEMODIALYSIS 1:1

## 2023-11-30 PROCEDURE — 99232 PR SUBSEQUENT HOSPITAL CARE,LEVL II: ICD-10-PCS | Mod: ,,, | Performed by: PHYSICIAN ASSISTANT

## 2023-11-30 PROCEDURE — 99233 PR SUBSEQUENT HOSPITAL CARE,LEVL III: ICD-10-PCS | Mod: ,,,

## 2023-11-30 PROCEDURE — 20600001 HC STEP DOWN PRIVATE ROOM

## 2023-11-30 PROCEDURE — 25000003 PHARM REV CODE 250

## 2023-11-30 PROCEDURE — 63600175 PHARM REV CODE 636 W HCPCS: Performed by: PHYSICIAN ASSISTANT

## 2023-11-30 PROCEDURE — 63600175 PHARM REV CODE 636 W HCPCS

## 2023-11-30 PROCEDURE — 85025 COMPLETE CBC W/AUTO DIFF WBC: CPT

## 2023-11-30 PROCEDURE — 36415 COLL VENOUS BLD VENIPUNCTURE: CPT

## 2023-11-30 PROCEDURE — 85025 COMPLETE CBC W/AUTO DIFF WBC: CPT | Mod: 91

## 2023-11-30 PROCEDURE — 80069 RENAL FUNCTION PANEL: CPT | Performed by: PHYSICIAN ASSISTANT

## 2023-11-30 PROCEDURE — 99232 SBSQ HOSP IP/OBS MODERATE 35: CPT | Mod: ,,, | Performed by: PHYSICIAN ASSISTANT

## 2023-11-30 PROCEDURE — 80069 RENAL FUNCTION PANEL: CPT | Mod: 91

## 2023-11-30 PROCEDURE — 83735 ASSAY OF MAGNESIUM: CPT

## 2023-11-30 PROCEDURE — 36415 COLL VENOUS BLD VENIPUNCTURE: CPT | Performed by: PHYSICIAN ASSISTANT

## 2023-11-30 PROCEDURE — 25000003 PHARM REV CODE 250: Performed by: INTERNAL MEDICINE

## 2023-11-30 PROCEDURE — 25000242 PHARM REV CODE 250 ALT 637 W/ HCPCS: Performed by: PHYSICIAN ASSISTANT

## 2023-11-30 RX ORDER — SIMETHICONE 80 MG
1 TABLET,CHEWABLE ORAL 3 TIMES DAILY PRN
Status: DISCONTINUED | OUTPATIENT
Start: 2023-11-30 | End: 2023-12-04 | Stop reason: HOSPADM

## 2023-11-30 RX ORDER — MIDODRINE HYDROCHLORIDE 5 MG/1
5 TABLET ORAL ONCE
Status: COMPLETED | OUTPATIENT
Start: 2023-11-30 | End: 2023-11-30

## 2023-11-30 RX ORDER — TACROLIMUS 1 MG/1
4 CAPSULE ORAL 2 TIMES DAILY
Status: DISCONTINUED | OUTPATIENT
Start: 2023-11-30 | End: 2023-12-01

## 2023-11-30 RX ORDER — MYCOPHENOLATE MOFETIL 250 MG/1
1000 CAPSULE ORAL 2 TIMES DAILY
Qty: 240 CAPSULE | Refills: 11 | Status: SHIPPED | OUTPATIENT
Start: 2023-11-30 | End: 2023-12-01 | Stop reason: SDUPTHER

## 2023-11-30 RX ORDER — PROCHLORPERAZINE EDISYLATE 5 MG/ML
2.5 INJECTION INTRAMUSCULAR; INTRAVENOUS EVERY 6 HOURS PRN
Status: DISCONTINUED | OUTPATIENT
Start: 2023-11-30 | End: 2023-12-04 | Stop reason: HOSPADM

## 2023-11-30 RX ORDER — VALGANCICLOVIR 450 MG/1
900 TABLET, FILM COATED ORAL DAILY
Qty: 60 TABLET | Refills: 5 | Status: SHIPPED | OUTPATIENT
Start: 2023-11-30 | End: 2023-12-01 | Stop reason: SDUPTHER

## 2023-11-30 RX ORDER — TACROLIMUS 1 MG/1
1 CAPSULE ORAL ONCE
Status: COMPLETED | OUTPATIENT
Start: 2023-11-30 | End: 2023-11-30

## 2023-11-30 RX ORDER — MIDODRINE HYDROCHLORIDE 5 MG/1
5 TABLET ORAL ONCE
Status: DISCONTINUED | OUTPATIENT
Start: 2023-11-30 | End: 2023-11-30

## 2023-11-30 RX ORDER — PREDNISONE 5 MG/1
TABLET ORAL
Qty: 70 TABLET | Refills: 11 | Status: SHIPPED | OUTPATIENT
Start: 2023-12-02 | End: 2023-12-01 | Stop reason: SDUPTHER

## 2023-11-30 RX ORDER — CALCIUM GLUCONATE 20 MG/ML
1 INJECTION, SOLUTION INTRAVENOUS EVERY 10 MIN PRN
Status: DISCONTINUED | OUTPATIENT
Start: 2023-11-30 | End: 2023-12-04 | Stop reason: HOSPADM

## 2023-11-30 RX ORDER — TACROLIMUS 1 MG/1
6 CAPSULE ORAL EVERY 12 HOURS
Qty: 360 CAPSULE | Refills: 11 | Status: SHIPPED | OUTPATIENT
Start: 2023-11-30 | End: 2023-12-04 | Stop reason: SDUPTHER

## 2023-11-30 RX ORDER — CALCIUM GLUCONATE 20 MG/ML
1 INJECTION, SOLUTION INTRAVENOUS ONCE
Status: COMPLETED | OUTPATIENT
Start: 2023-11-30 | End: 2023-11-30

## 2023-11-30 RX ORDER — ALBUTEROL SULFATE 2.5 MG/.5ML
10 SOLUTION RESPIRATORY (INHALATION) ONCE
Status: COMPLETED | OUTPATIENT
Start: 2023-11-30 | End: 2023-11-30

## 2023-11-30 RX ORDER — SODIUM CHLORIDE 9 MG/ML
INJECTION, SOLUTION INTRAVENOUS ONCE
Status: DISCONTINUED | OUTPATIENT
Start: 2023-11-30 | End: 2023-12-01

## 2023-11-30 RX ADMIN — OXYCODONE HYDROCHLORIDE 5 MG: 5 TABLET ORAL at 06:11

## 2023-11-30 RX ADMIN — DOCUSATE SODIUM 100 MG: 100 CAPSULE, LIQUID FILLED ORAL at 01:11

## 2023-11-30 RX ADMIN — HEPARIN SODIUM 5000 UNITS: 5000 INJECTION INTRAVENOUS; SUBCUTANEOUS at 08:11

## 2023-11-30 RX ADMIN — MYCOPHENOLATE MOFETIL 1000 MG: 250 CAPSULE ORAL at 08:11

## 2023-11-30 RX ADMIN — OXYCODONE HYDROCHLORIDE 5 MG: 5 TABLET ORAL at 12:11

## 2023-11-30 RX ADMIN — INSULIN ASPART 1 UNITS: 100 INJECTION, SOLUTION INTRAVENOUS; SUBCUTANEOUS at 09:11

## 2023-11-30 RX ADMIN — TACROLIMUS 4 MG: 1 CAPSULE ORAL at 05:11

## 2023-11-30 RX ADMIN — DIPHENHYDRAMINE HYDROCHLORIDE 25 MG: 25 CAPSULE ORAL at 04:11

## 2023-11-30 RX ADMIN — FAMOTIDINE 20 MG: 20 TABLET, FILM COATED ORAL at 08:11

## 2023-11-30 RX ADMIN — PROCHLORPERAZINE EDISYLATE 2.5 MG: 5 INJECTION INTRAMUSCULAR; INTRAVENOUS at 11:11

## 2023-11-30 RX ADMIN — TACROLIMUS 1 MG: 1 CAPSULE ORAL at 12:11

## 2023-11-30 RX ADMIN — DEXTROSE MONOHYDRATE 250 ML: 100 INJECTION, SOLUTION INTRAVENOUS at 04:11

## 2023-11-30 RX ADMIN — BISACODYL 10 MG: 5 TABLET, COATED ORAL at 08:11

## 2023-11-30 RX ADMIN — HEPARIN SODIUM 5000 UNITS: 5000 INJECTION INTRAVENOUS; SUBCUTANEOUS at 04:11

## 2023-11-30 RX ADMIN — METHYLPREDNISOLONE SODIUM SUCCINATE 250 MG: 125 INJECTION, POWDER, FOR SOLUTION INTRAMUSCULAR; INTRAVENOUS at 04:11

## 2023-11-30 RX ADMIN — TRAMADOL HYDROCHLORIDE 50 MG: 50 TABLET, COATED ORAL at 05:11

## 2023-11-30 RX ADMIN — HEPARIN SODIUM 5000 UNITS: 5000 INJECTION INTRAVENOUS; SUBCUTANEOUS at 05:11

## 2023-11-30 RX ADMIN — MUPIROCIN 1 G: 20 OINTMENT TOPICAL at 08:11

## 2023-11-30 RX ADMIN — MIDODRINE HYDROCHLORIDE 5 MG: 5 TABLET ORAL at 08:11

## 2023-11-30 RX ADMIN — ACETAMINOPHEN 650 MG: 325 TABLET ORAL at 04:11

## 2023-11-30 RX ADMIN — CEFAZOLIN 2 G: 2 INJECTION, POWDER, FOR SOLUTION INTRAMUSCULAR; INTRAVENOUS at 02:11

## 2023-11-30 RX ADMIN — DOCUSATE SODIUM 100 MG: 100 CAPSULE, LIQUID FILLED ORAL at 08:11

## 2023-11-30 RX ADMIN — INSULIN HUMAN 10 UNITS: 100 INJECTION, SOLUTION PARENTERAL at 05:11

## 2023-11-30 RX ADMIN — ONDANSETRON 4 MG: 2 INJECTION INTRAMUSCULAR; INTRAVENOUS at 09:11

## 2023-11-30 RX ADMIN — MIDODRINE HYDROCHLORIDE 5 MG: 5 TABLET ORAL at 09:11

## 2023-11-30 RX ADMIN — SIMETHICONE 80 MG: 80 TABLET, CHEWABLE ORAL at 12:11

## 2023-11-30 RX ADMIN — MYCOPHENOLATE MOFETIL 1000 MG: 250 CAPSULE ORAL at 01:11

## 2023-11-30 RX ADMIN — ACETAMINOPHEN 650 MG: 325 TABLET ORAL at 05:11

## 2023-11-30 RX ADMIN — ALBUTEROL SULFATE 10 MG: 2.5 SOLUTION RESPIRATORY (INHALATION) at 04:11

## 2023-11-30 RX ADMIN — THERA TABS 1 TABLET: TAB at 01:11

## 2023-11-30 RX ADMIN — TACROLIMUS 3 MG: 1 CAPSULE ORAL at 08:11

## 2023-11-30 RX ADMIN — ANTI-THYMOCYTE GLOBULIN (RABBIT) 125 MG: 5 INJECTION, POWDER, LYOPHILIZED, FOR SOLUTION INTRAVENOUS at 05:11

## 2023-11-30 RX ADMIN — CALCIUM GLUCONATE 1 G: 20 INJECTION, SOLUTION INTRAVENOUS at 04:11

## 2023-11-30 NOTE — PROGRESS NOTES
Patient admitted for Kidney transplant.Transplant coordinator met with the patient on rounds to introduce self and explain the coordinator role. The post-transplant teaching book was given. Transplant Coordinator explained that she will follow the patient while in the hospital and assist with discharge.       ESRD 2/2 DMII/HTN  Thymo induction  CMV D+/R- (MISMATCH)  Donor w/LC. Up to~3. Terminal Cr 2

## 2023-11-30 NOTE — PLAN OF CARE
Patient s/p kidney transplant. Dressing to RLQ CDI, no drain in place. IVF d/c'd. Alexandra to gravity with scant pink-tinged urine. Potassium 6.7 after surgery - HD done at bedside, 1L removed. Repeat labs ordered for 2000. Mother at bedside.

## 2023-11-30 NOTE — PLAN OF CARE
Recommendations     1. Continue Renal diet      2. If PO intake <50%, add ONS Novasource renal BID       3. RD to monitor and follow     Goals: Meet % EEN, EPN by RD f/u  Nutrition Goal Status: new  Communication of RD Recs:  (POC)

## 2023-11-30 NOTE — ANESTHESIA POSTPROCEDURE EVALUATION
Anesthesia Post Evaluation    Patient: Mary Waller    Procedure(s) Performed: Procedure(s) (LRB):  TRANSPLANT, KIDNEY (N/A)    Final Anesthesia Type: general      Patient location during evaluation: PACU  Patient participation: Yes- Able to Participate  Level of consciousness: awake and alert and oriented  Post-procedure vital signs: reviewed and stable  Pain management: adequate  Airway patency: patent    PONV status at discharge: No PONV  Anesthetic complications: no      Cardiovascular status: hemodynamically stable  Respiratory status: unassisted and spontaneous ventilation  Hydration status: euvolemic  Follow-up not needed.          Vitals Value Taken Time   BP 92/50 11/30/23 1132   Temp 36.9 °C (98.5 °F) 11/30/23 0800   Pulse 85 11/30/23 1139   Resp 18 11/30/23 0800   SpO2 100 % 11/30/23 1139   Vitals shown include unvalidated device data.      Event Time   Out of Recovery 12:45:00         Pain/Linda Score: Pain Rating Prior to Med Admin: 9 (11/30/2023  6:32 AM)  Pain Rating Post Med Admin: 8 (11/30/2023  7:32 AM)  Linda Score: 9 (11/29/2023 12:45 PM)

## 2023-11-30 NOTE — PROGRESS NOTES
[facility]  Kidney Transplant  Progress Note      Reason for Follow-up: Reassessment of Kidney Transplant - 11/29/2023  (#1) recipient and management of immunosuppression.    ORGAN: LEFT KIDNEY    Donor Type: Donation after Brain Death    Donor CMV Status: Positive  Donor HBcAB:Negative  Donor HCV Status:Negative  Donor HBV CAROLYN:   Donor HCV CAROLYN: Negative      Subjective:   History of Present Illness:  Mary Waller is a 48yr AAF with ESRD secondary to DM nephropathy and HTN. Other PMH includes CAD s/p PCI LAD (2019 at [facility]), and HLD. She started HD 4/20/18. She dialyzes on a MWF schedule, last HD 11/27. She reports occasion hypotension during or after treatments but is able to always complete HD. She has a RUE AV fistula for HD access. Native uop around 1 cup or less per day. .5kg. She is being admitted for kidney transplant surgery. She feels well in her usual state of health. She denies any recent illnesses or hospitalizations. Pre op labs and diagnostics studies pending.       Hospital Course:  Patient is now s/p DBD KTX 11/29 (CIT 15hrs, 2 arteries, 1 vein, 2 day correa, donor CMV +). Post op K 6.7. Patient given calcium gluc and had STAT HD with 1L of fluid removed. DGF expected based on increased CIT and donor factors including donor LC).      Interval History: Patient continued with hyperkalemia over night which patient was shifted twice over night. K 5.3 this morning. Plan for HD today. Midodrine ordered to be given prior to HD. Remains anuric with 40ml of urine output since surgery. H&H down trending but stable. Repeat CBC and RFP ordered for 1400. Kidney transplant US ordered to assess blood flow and for any fluid collections. (-) flatus, (-) bowel movement. Tolerating a diet. Denies any nausea or vomiting. Encourage OOB to chair and ambulation. All VSS. Cont to monitor      Past Medical, Surgical, Family, and Social History:   Unchanged from H&P.    Scheduled Meds:   sodium  chloride 0.9%   Intravenous Once    acetaminophen  650 mg Oral Q8H    antithymocyte globulin (rabbit) 125 mg, hydrocortisone sodium succinate (SOLU-CORTEF) 20 mg in sodium chloride 0.9% 500 mL (FOR PERIPHERAL LINE ADMINISTRATION ONLY)  1.5 mg/kg (Adjusted) Intravenous Daily    bisacodyL  10 mg Oral QHS    diphenhydrAMINE  25 mg Oral Q24H    docusate sodium  100 mg Oral TID    famotidine  20 mg Oral QHS    heparin (porcine)  5,000 Units Subcutaneous Q8H    [START ON 12/1/2023] methylPREDNISolone sodium succinate injection  125 mg Intravenous Once    methylPREDNISolone sodium succinate injection  250 mg Intravenous Once    multivitamin  1 tablet Oral Daily    mupirocin  1 g Nasal BID    mycophenolate  1,000 mg Oral BID    [START ON 12/2/2023] predniSONE  20 mg Oral Daily    tacrolimus  3 mg Oral BID    [START ON 12/9/2023] valGANciclovir  450 mg Oral Daily AM     Continuous Infusions:   glucagon (human recombinant)       PRN Meds:sodium chloride 0.9%, [COMPLETED] calcium gluconate IVPB **AND** calcium gluconate IVPB, [COMPLETED] calcium gluconate IVPB **AND** calcium gluconate IVPB, dextrose 10%, [COMPLETED] dextrose 10% **AND** dextrose 10% **AND** [COMPLETED] insulin regular, [COMPLETED] dextrose 10% **AND** dextrose 10% **AND** [COMPLETED] insulin regular, [COMPLETED] dextrose 10% **AND** dextrose 10% **AND** [COMPLETED] insulin regular, diphenhydrAMINE, EPINEPHrine (PF), glucagon (human recombinant), glucose, glucose, glucose, hydrocortisone sodium succinate, insulin aspart U-100, midodrine, ondansetron, oxyCODONE, sodium chloride 0.9%, sodium chloride 0.9%, sodium chloride 0.9%, traMADoL    Intake/Output - Last 3 Shifts         11/28 0700 11/29 0659 11/29 0700 11/30 0659 11/30 0700 12/01 0659    I.V. (mL/kg)  2125 (20.2)     Other  200     IV Piggyback  500     Total Intake(mL/kg)  2825 (26.8)     Urine (mL/kg/hr)  40 (0) 25 (0.1)    Other  1700     Blood  75     Total Output  1815 25    Net  +1010 -25         "            Review of Systems   Constitutional:  Negative for appetite change, chills, fatigue and fever.   HENT:  Negative for facial swelling and trouble swallowing.    Eyes:  Positive for visual disturbance.   Respiratory:  Negative for cough, chest tightness, shortness of breath, wheezing and stridor.    Cardiovascular:  Negative for chest pain, palpitations and leg swelling.   Gastrointestinal:  Positive for abdominal distention and abdominal pain. Negative for diarrhea, nausea and vomiting.   Genitourinary:  Positive for decreased urine volume and hematuria. Difficulty urinating: correa in place.  Musculoskeletal:  Negative for back pain and neck pain.   Skin:  Positive for wound. Negative for color change, pallor and rash.   Allergic/Immunologic: Positive for immunocompromised state.   Neurological:  Negative for dizziness, tremors, weakness and headaches.   Psychiatric/Behavioral:  Negative for agitation, behavioral problems, confusion and decreased concentration.       Objective:     Vital Signs (Most Recent):  Temp: 98.5 °F (36.9 °C) (11/30/23 0800)  Pulse: 83 (11/30/23 0800)  Resp: 18 (11/30/23 0800)  BP: 136/61 (11/30/23 0800)  SpO2: 99 % (11/30/23 0800) Vital Signs (24h Range):  Temp:  [97.7 °F (36.5 °C)-100.1 °F (37.8 °C)] 98.5 °F (36.9 °C)  Pulse:  [] 83  Resp:  [14-18] 18  SpO2:  [95 %-100 %] 99 %  BP: ()/(49-72) 136/61     Weight: 105.3 kg (232 lb 4.1 oz)  Height: 5' 5" (165.1 cm)  Body mass index is 38.65 kg/m².     Physical Exam  Vitals and nursing note reviewed.   Constitutional:       General: She is awake. She is not in acute distress.     Appearance: She is obese. She is not toxic-appearing.   Eyes:      General: No scleral icterus.  Cardiovascular:      Rate and Rhythm: Normal rate and regular rhythm.      Pulses: Normal pulses.      Heart sounds: Normal heart sounds.   Pulmonary:      Effort: Pulmonary effort is normal. No respiratory distress.      Breath sounds: Examination of " the right-lower field reveals decreased breath sounds. Examination of the left-lower field reveals decreased breath sounds. Decreased breath sounds present. No wheezing or rales.   Abdominal:      General: Bowel sounds are decreased. There is distension.      Palpations: Abdomen is soft.      Tenderness: There is abdominal tenderness. There is no guarding or rebound.          Comments: RLQ incision CDI with staples intact   Musculoskeletal:         General: Normal range of motion.      Right upper arm: Edema present.      Left upper arm: Edema present.      Cervical back: Neck supple.      Right lower leg: Edema present.      Left lower leg: Edema present.   Skin:     General: Skin is warm and dry.      Capillary Refill: Capillary refill takes 2 to 3 seconds.   Neurological:      Mental Status: She is alert and oriented to person, place, and time.   Psychiatric:         Attention and Perception: Attention normal.         Mood and Affect: Mood normal.         Speech: Speech normal.         Behavior: Behavior normal. Behavior is cooperative.         Thought Content: Thought content normal.         Judgment: Judgment normal.          Laboratory:  CBC:   Recent Labs   Lab 11/29/23 0056 11/29/23  0839 11/29/23 1233 11/29/23 2007 11/30/23  0639   WBC 7.56  --   --  21.72* 15.30*   RBC 3.39*  --   --  3.56* 2.92*   HGB 10.7*  --   --  11.3* 9.3*   HCT 34.1*   < > 32.3* 36.3* 29.8*   *  --   --  129* 141*   *  --   --  102* 102*   MCH 31.6*  --   --  31.7* 31.8*   MCHC 31.4*  --   --  31.1* 31.2*    < > = values in this interval not displayed.     CMP:   Recent Labs   Lab 11/29/23  0056 11/29/23  1233 11/29/23 2007 11/30/23 0232 11/30/23  0639   *   < > 237* 230* 150*   CALCIUM 9.2   < > 8.9 8.2* 8.4*   ALBUMIN 3.2*   < > 3.1* 2.6* 2.6*   PROT 8.2  --   --   --   --       < > 136 131* 133*   K 5.6*   < > 5.9* 6.1* 5.3*   CO2 25   < > 23 20* 22*   CL 99   < > 94* 97 98   BUN 62*   < > 37* 46*  "52*   CREATININE 10.4*   < > 7.6* 8.1* 8.1*   ALKPHOS 63  --   --   --   --    ALT 13  --   --   --   --    AST 7*  --   --   --   --     < > = values in this interval not displayed.     Labs within the past 24 hours have been reviewed.    Diagnostic Results:  Kidney transplant US ordered and pending   Assessment/Plan:     * S/P kidney transplant  -  s/p DBD KTX 11/29 (CIT 15hrs, 2 arteries, 1 vein, 2 day correa, donor CMV +, thmyo induction).   - Post op K 6.7. Patient given calcium gluc and had STAT HD with 1L of fluid removed.   - DGF expected based on increased CIT and donor factors including donor LC).  - continued with hyperkalemia over night (11/29PM)  which patient was shifted twice over night.   - K 5.3 this morning.   - Plan for HD today. Midodrine ordered to be given prior to HD.   - Remains anuric with 40ml of urine output since surgery.   - H&H down trending but stable. Repeat CBC and RFP ordered for 1400.   - Kidney transplant US ordered to assess blood flow and for any fluid collections.   - (-) flatus, (-) bowel movement.   - Tolerating a diet. Denies any nausea or vomiting.   - Encourage OOB to chair and ambulation.  - Cont to monitor    Acute blood loss anemia  - expected post op  - H&H down trending but stable  - no overt s/s of bleeding  - repeat CBC and RFP ordered (1400)  - KTX US ordered to assess blood flow and for fluid collections  - monitor       Long-term use of immunosuppressant medication  - see "prophylactic immunotherapy"      Hyperkalemia  -see "s/p kidney transplant"    Delayed graft function of kidney transplant due to ATN requiring acute dialysis  -see "s/p kidney transplant"    Adverse effect of corticosteroids  - Endocrine consulted; appreciate assistance       Prophylactic immunotherapy  Continue prograf, cellcept, steroids. Continue to monitor prograf level daily, monitor for toxic side effects, and adjust for therapeutic dose.       Thrombocytopenia, unspecified  - chronic, " "likely multifactorial  - monitor closely    Anemia of chronic disease  - chronic related to renal disease  - see "acute blood loss anemia"      Class 2 severe obesity due to excess calories with serious comorbidity and body mass index (BMI) of 38.0 to 38.9 in adult  - monitor      Coronary artery disease involving native coronary artery of native heart with angina pectoris  - h/o CAD with PCI to LAD 2019  - hold ASA; restart when appropriate      Type 2 diabetes mellitus with chronic kidney disease on chronic dialysis  -Endocrine consulted; appreciate assistance     Essential hypertension  - hold home meds for now. BP stable  -OK to use midodrine w dialysis for BP support        Discharge Planning:  Not a candidate at this time    Medical decision making for this encounter includes review of pertinent labs and diagnostic studies, assessment and planning, discussions with consulting providers, discussion with patient/family, and participation in multidisciplinary rounds. Time spent caring for patient: 60 minutes    Tracy Raymundo NP  Kidney Transplant  [facility]  "

## 2023-11-30 NOTE — ASSESSMENT & PLAN NOTE
-  s/p DBD KTX 11/29 (CIT 15hrs, 2 arteries, 1 vein, 2 day correa, donor CMV +, thmyo induction).   - Post op K 6.7. Patient given calcium gluc and had STAT HD with 1L of fluid removed.   - DGF expected based on increased CIT and donor factors including donor LC).  - continued with hyperkalemia over night (11/29PM)  which patient was shifted twice over night.   - K 5.3 this morning.   - Plan for HD today. Midodrine ordered to be given prior to HD.   - Remains anuric with 40ml of urine output since surgery.   - H&H down trending but stable. Repeat CBC and RFP ordered for 1400.   - Kidney transplant US ordered to assess blood flow and for any fluid collections.   - (-) flatus, (-) bowel movement.   - Tolerating a diet. Denies any nausea or vomiting.   - Encourage OOB to chair and ambulation.  - Cont to monitor

## 2023-11-30 NOTE — PLAN OF CARE
Pt. AAOx4, VSS, spo2> 94% on room air. NRS on telemetry monitor. Alexandra in place with minimal output. No complaints of pain/N/V this shift. RLQ incision CDI. Pt shifted this shift for K+ pf 5.9--repeat pending. Accucheck AC/HS--SSI administered. Antibiotics administered per treatment plan. Mother at bedside--attentive to pt. Bed in low locked position, call light within reach, pt instructed to call for assistance needed, pt verbalized understanding, remains free from falls this shift. WCTM.

## 2023-11-30 NOTE — PROGRESS NOTES
"[facility]  Endocrinology  Progress Note    Admit Date: 11/29/2023     Reason for Consult: Management of T2DM, Hyperglycemia     Surgical Procedure and Date: Kidney Transplant on 11/29/23    Diabetes diagnosis year: in her 30s    Home Diabetes Medications:  Trulicity 3mg every 7 days     How often checking glucose at home?  Once daily     BG readings on regimen: 130s  Hypoglycemia on the regimen?  No  Missed doses on regimen?  No    Diabetes Complications include:     Diabetic nephropathy  , Diabetic chronic kidney disease     , and Diabetic retinopathy     Complicating diabetes co morbidities:   ESRD s/p kidney transplant, Glucocorticoid Use, HTN, CAD, HLD     HPI:   Patient is a 48 y.o. female with a diagnosis of ESRD secondary to DM nephropathy and HTN. Other PMH includes CAD s/p PCI LAD (2019 at [facility]), and HLD. Patient now presents for the above procedure(s). Endocrinology consulted for management of T2DM.         Interval HPI:   No acute events overnight. Patient in room 06655/30467 A. Blood glucose stable. BG at and above goal on current insulin regimen (SSI ). Steroid use- Methylprednisolone  250 mg .   1 Day Post-Op  Renal function- Abnormal - Cr 8.1   Vasopressors-  None     Diet renal     Eating:   <25%  Nausea: No  Hypoglycemia and intervention: No  Fever: No  TPN and/or TF: No    BP (!) 92/50   Pulse 84   Temp 98.5 °F (36.9 °C) (Oral)   Resp 18   Ht 5' 5" (1.651 m)   Wt 105.3 kg (232 lb 4.1 oz)   SpO2 97%   Breastfeeding No   BMI 38.65 kg/m²     Labs Reviewed and Include    Recent Labs   Lab 11/30/23  0639   *   CALCIUM 8.4*   ALBUMIN 2.6*   *   K 5.3*   CO2 22*   CL 98   BUN 52*   CREATININE 8.1*     Lab Results   Component Value Date    WBC 15.30 (H) 11/30/2023    HGB 9.3 (L) 11/30/2023    HCT 29.8 (L) 11/30/2023     (H) 11/30/2023     (L) 11/30/2023     No results for input(s): "TSH", "FREET4" in the last 168 hours.  Lab Results   Component " "Value Date    HGBA1C 5.7 10/04/2023       Nutritional status:   Body mass index is 38.65 kg/m².  Lab Results   Component Value Date    ALBUMIN 2.6 (L) 11/30/2023    ALBUMIN 2.6 (L) 11/30/2023    ALBUMIN 3.1 (L) 11/29/2023     No results found for: "PREALBUMIN"    Estimated Creatinine Clearance: 10.5 mL/min (A) (based on SCr of 8.1 mg/dL (H)).    Accu-Checks  Recent Labs     11/29/23  0718 11/29/23  1220 11/29/23  1530 11/29/23  2028 11/29/23  2235 11/29/23  2311 11/29/23  2344 11/30/23  0524 11/30/23  0601 11/30/23  0635   POCTGLUCOSE 91 111* 198* 216* 349* 273* 247* 284* 192* 161*       Current Medications and/or Treatments Impacting Glycemic Control  Immunotherapy:    Immunosuppressants           Stop Route Frequency     antithymocyte globulin (rabbit) 125 mg, hydrocortisone sodium succinate (SOLU-CORTEF) 20 mg in sodium chloride 0.9% 500 mL (FOR PERIPHERAL LINE ADMINISTRATION ONLY)         12/02/23 0859 IV Daily     tacrolimus capsule 3 mg         -- Oral 2 times daily     mycophenolate capsule 1,000 mg         -- Oral 2 times daily          Steroids:   Hormones (From admission, onward)      Start     Stop Route Frequency Ordered    12/02/23 0900  predniSONE tablet 20 mg  (IP TXP KIDNEY POST-OP THYMO WITH PERIPHERAL PRECHECKED)         -- Oral Daily 11/29/23 1220    12/01/23 0800  methylPREDNISolone sodium succinate injection 125 mg  (IP TXP KIDNEY POST-OP THYMO WITH PERIPHERAL PRECHECKED)         -- IV Once 11/29/23 1219    11/30/23 0900  antithymocyte globulin (rabbit) 125 mg, hydrocortisone sodium succinate (SOLU-CORTEF) 20 mg in sodium chloride 0.9% 500 mL (FOR PERIPHERAL LINE ADMINISTRATION ONLY)  (IP TXP KIDNEY POST-OP THYMO WITH PERIPHERAL PRECHECKED)         12/02/23 0859 IV Daily 11/29/23 1219    11/30/23 0800  methylPREDNISolone sodium succinate injection 250 mg  (IP TXP KIDNEY POST-OP THYMO WITH PERIPHERAL PRECHECKED)         -- IV Once 11/29/23 1219    11/29/23 1316  hydrocortisone sodium succinate " injection 100 mg  (IP TXP KIDNEY POST-OP THYMO WITH PERIPHERAL PRECHECKED)         04/27/35 0516 IV Once as needed 11/29/23 1220          Pressors:    Autonomic Drugs (From admission, onward)      Start     Stop Route Frequency Ordered    11/30/23 0946  midodrine tablet 5 mg         -- Oral Once 11/30/23 0946    11/29/23 1753  midodrine tablet 5 mg         -- Oral As needed (PRN) 11/29/23 1753    11/29/23 1316  EPINEPHrine (PF) injection 1 mg  (IP TXP KIDNEY POST-OP THYMO WITH PERIPHERAL PRECHECKED)         04/27/35 0516 SubQ Once as needed 11/29/23 1220          Hyperglycemia/Diabetes Medications:   Antihyperglycemics (From admission, onward)      Start     Stop Route Frequency Ordered    11/29/23 1314  insulin aspart U-100 pen 0-5 Units  (INSULIN - LOW CORRECTION DOSE (Recommended for BMI <25, GFR<15 or on dialysis, Age > 70, type 1 diabetes, ESLD, severe CHF or patients on <70 units of insulin daily))         -- SubQ Before meals & nightly PRN 11/29/23 1219            ASSESSMENT and PLAN    Renal/  * S/P kidney transplant  Managed per primary team  Optimize BG control        Endocrine  Class 2 severe obesity due to excess calories with serious comorbidity and body mass index (BMI) of 38.0 to 38.9 in adult  Body mass index is 38.65 kg/m².  May increase insulin resistance.         Type 2 diabetes mellitus with chronic kidney disease on chronic dialysis  BG goal 140-180  T2DM.  S/p kidney txp. BG above goal at times of potassium correction. At goal this morning.  Will monitor on correction insuln.  May need scheduled insulin with steroids.    -Continue Low Dose Correction Scale  - Will monitor closely for prandial excursions with steroid taper and initiate scheduled insulin if indicated  BG monitoring ac/hs    ** Please call Endocrine for any BG related issues **    Discharge plans: ROSEANNE Pinto PA-C  Endocrinology  [facility]

## 2023-11-30 NOTE — SUBJECTIVE & OBJECTIVE
"Interval HPI:   No acute events overnight. Patient in room 19813/55034 A. Blood glucose stable. BG at and above goal on current insulin regimen (SSI ). Steroid use- Methylprednisolone  250 mg .   1 Day Post-Op  Renal function- Abnormal - Cr 8.1   Vasopressors-  None     Diet renal     Eating:   <25%  Nausea: No  Hypoglycemia and intervention: No  Fever: No  TPN and/or TF: No    BP (!) 92/50   Pulse 84   Temp 98.5 °F (36.9 °C) (Oral)   Resp 18   Ht 5' 5" (1.651 m)   Wt 105.3 kg (232 lb 4.1 oz)   SpO2 97%   Breastfeeding No   BMI 38.65 kg/m²     Labs Reviewed and Include    Recent Labs   Lab 11/30/23  0639   *   CALCIUM 8.4*   ALBUMIN 2.6*   *   K 5.3*   CO2 22*   CL 98   BUN 52*   CREATININE 8.1*     Lab Results   Component Value Date    WBC 15.30 (H) 11/30/2023    HGB 9.3 (L) 11/30/2023    HCT 29.8 (L) 11/30/2023     (H) 11/30/2023     (L) 11/30/2023     No results for input(s): "TSH", "FREET4" in the last 168 hours.  Lab Results   Component Value Date    HGBA1C 5.7 10/04/2023       Nutritional status:   Body mass index is 38.65 kg/m².  Lab Results   Component Value Date    ALBUMIN 2.6 (L) 11/30/2023    ALBUMIN 2.6 (L) 11/30/2023    ALBUMIN 3.1 (L) 11/29/2023     No results found for: "PREALBUMIN"    Estimated Creatinine Clearance: 10.5 mL/min (A) (based on SCr of 8.1 mg/dL (H)).    Accu-Checks  Recent Labs     11/29/23  0718 11/29/23  1220 11/29/23  1530 11/29/23  2028 11/29/23  2235 11/29/23  2311 11/29/23  2344 11/30/23  0524 11/30/23  0601 11/30/23  0635   POCTGLUCOSE 91 111* 198* 216* 349* 273* 247* 284* 192* 161*       Current Medications and/or Treatments Impacting Glycemic Control  Immunotherapy:    Immunosuppressants           Stop Route Frequency     antithymocyte globulin (rabbit) 125 mg, hydrocortisone sodium succinate (SOLU-CORTEF) 20 mg in sodium chloride 0.9% 500 mL (FOR PERIPHERAL LINE ADMINISTRATION ONLY)         12/02/23 0859 IV Daily     tacrolimus capsule 3 mg   " Stop Gatorade          -- Oral 2 times daily     mycophenolate capsule 1,000 mg         -- Oral 2 times daily          Steroids:   Hormones (From admission, onward)      Start     Stop Route Frequency Ordered    12/02/23 0900  predniSONE tablet 20 mg  (IP TXP KIDNEY POST-OP THYMO WITH PERIPHERAL PRECHECKED)         -- Oral Daily 11/29/23 1220    12/01/23 0800  methylPREDNISolone sodium succinate injection 125 mg  (IP TXP KIDNEY POST-OP THYMO WITH PERIPHERAL PRECHECKED)         -- IV Once 11/29/23 1219    11/30/23 0900  antithymocyte globulin (rabbit) 125 mg, hydrocortisone sodium succinate (SOLU-CORTEF) 20 mg in sodium chloride 0.9% 500 mL (FOR PERIPHERAL LINE ADMINISTRATION ONLY)  (IP TXP KIDNEY POST-OP THYMO WITH PERIPHERAL PRECHECKED)         12/02/23 0859 IV Daily 11/29/23 1219    11/30/23 0800  methylPREDNISolone sodium succinate injection 250 mg  (IP TXP KIDNEY POST-OP THYMO WITH PERIPHERAL PRECHECKED)         -- IV Once 11/29/23 1219    11/29/23 1316  hydrocortisone sodium succinate injection 100 mg  (IP TXP KIDNEY POST-OP THYMO WITH PERIPHERAL PRECHECKED)         04/27/35 0516 IV Once as needed 11/29/23 1220          Pressors:    Autonomic Drugs (From admission, onward)      Start     Stop Route Frequency Ordered    11/30/23 0946  midodrine tablet 5 mg         -- Oral Once 11/30/23 0946    11/29/23 1753  midodrine tablet 5 mg         -- Oral As needed (PRN) 11/29/23 1753    11/29/23 1316  EPINEPHrine (PF) injection 1 mg  (IP TXP KIDNEY POST-OP THYMO WITH PERIPHERAL PRECHECKED)         04/27/35 0516 SubQ Once as needed 11/29/23 1220          Hyperglycemia/Diabetes Medications:   Antihyperglycemics (From admission, onward)      Start     Stop Route Frequency Ordered    11/29/23 1314  insulin aspart U-100 pen 0-5 Units  (INSULIN - LOW CORRECTION DOSE (Recommended for BMI <25, GFR<15 or on dialysis, Age > 70, type 1 diabetes, ESLD, severe CHF or patients on <70 units of insulin daily))         -- SubQ Before meals & nightly PRN  11/29/23 6964

## 2023-11-30 NOTE — PROGRESS NOTES
11/30/23 1200 11/30/23 1212        Hemodialysis AV Fistula Left upper arm   No Placement Date or Time found.   Location: Left upper arm   Patency  --  Absent ;Thrill;Bruit   Status  --  Deaccessed   Site Condition  --  No complications   During Hemodialysis Assessment   Blood Flow Rate (mL/min) 400 mL/min  --    Dialysate Flow Rate (mL/min) 700 ml/min  --    Ultrafiltration Rate (mL/Hr) 0 mL/Hr  --    Arteriovenous Lines Secure Yes  --    Arterial Pressure (mmHg) -160 mmHg  --    Venous Pressure (mmHg) 130  --    UF Removed (mL) 258 mL  --    TMP 20  --    Venous Line in Air Detector Yes  --    Transducer Dry Yes  --    Access Visible Yes  --    Intra-Hemodialysis Comments New orders to end treatment.  --    Post-Hemodialysis Assessment   Rinseback Volume (mL) 250 mL  --    Blood Volume Processed (Liters) 80.5 L  --    Dialyzer Clearance Moderately streaked  --    Duration of Treatment 210 minutes  --    Total UF (mL) 258 mL  --    Net Fluid Removal -242  --    Patient Response to Treatment sabi. well  --    Arterial bleeding stop time (min)  --  5 min   Venous bleeding stop time (min)  --  5 min     Left patient NAD

## 2023-11-30 NOTE — ASSESSMENT & PLAN NOTE
BG goal 140-180  T2DM.  S/p kidney txp. BG above goal at times of potassium correction. At goal this morning.  Will monitor on correction insuln.  May need scheduled insulin with steroids.    -Continue Low Dose Correction Scale  - Will monitor closely for prandial excursions with steroid taper and initiate scheduled insulin if indicated  BG monitoring ac/hs    ** Please call Endocrine for any BG related issues **    Discharge plans: TBD

## 2023-11-30 NOTE — PROGRESS NOTES
11/30/23 0815 11/30/23 0828   Pre-Hemodialysis Assessment   Timeout Performed 0815  --         Hemodialysis AV Fistula Left upper arm   No Placement Date or Time found.   Location: Left upper arm   Needle Size 15ga  --    Patency Present;Thrill;Bruit  --    Status Accessed  --    Flows Good  --    During Hemodialysis Assessment   Blood Flow Rate (mL/min)  --  400 mL/min   Dialysate Flow Rate (mL/min)  --  700 ml/min   Ultrafiltration Rate (mL/Hr)  --  380 mL/Hr   Arteriovenous Lines Secure  --  Yes   Arterial Pressure (mmHg)  --  -110 mmHg   Venous Pressure (mmHg)  --  150   Blood Volume Processed (Liters)  --  0 L   UF Removed (mL)  --  0 mL   TMP  --  20   Venous Line in Air Detector  --  Yes   Transducer Dry  --  Yes   Access Visible  --  Yes    notified of access issue?  --  N/A   Heparin given?  --  N/A   Intra-Hemodialysis Comments  --  HD started     BS HD started via left upper arm AVF.

## 2023-11-30 NOTE — SUBJECTIVE & OBJECTIVE
Subjective:   History of Present Illness:  Mary Waller is a 48yr AAF with ESRD secondary to DM nephropathy and HTN. Other PMH includes CAD s/p PCI LAD (2019 at [facility]), and HLD. She started HD 4/20/18. She dialyzes on a MWF schedule, last HD 11/27. She reports occasion hypotension during or after treatments but is able to always complete HD. She has a RUE AV fistula for HD access. Native uop around 1 cup or less per day. .5kg. She is being admitted for kidney transplant surgery. She feels well in her usual state of health. She denies any recent illnesses or hospitalizations. Pre op labs and diagnostics studies pending.       Hospital Course:  Patient is now s/p DBD KTX 11/29 (CIT 15hrs, 2 arteries, 1 vein, 2 day correa, donor CMV +). Post op K 6.7. Patient given calcium gluc and had STAT HD with 1L of fluid removed. DGF expected based on increased CIT and donor factors including donor LC).      Interval History: Patient continued with hyperkalemia over night which patient was shifted twice over night. K 5.3 this morning. Plan for HD today. Midodrine ordered to be given prior to HD. Remains anuric with 40ml of urine output since surgery. H&H down trending but stable. Repeat CBC and RFP ordered for 1400. Kidney transplant US ordered to assess blood flow and for any fluid collections. (-) flatus, (-) bowel movement. Tolerating a diet. Denies any nausea or vomiting. Encourage OOB to chair and ambulation. All VSS. Cont to monitor      Past Medical, Surgical, Family, and Social History:   Unchanged from H&P.    Scheduled Meds:   sodium chloride 0.9%   Intravenous Once    acetaminophen  650 mg Oral Q8H    antithymocyte globulin (rabbit) 125 mg, hydrocortisone sodium succinate (SOLU-CORTEF) 20 mg in sodium chloride 0.9% 500 mL (FOR PERIPHERAL LINE ADMINISTRATION ONLY)  1.5 mg/kg (Adjusted) Intravenous Daily    bisacodyL  10 mg Oral QHS    diphenhydrAMINE  25 mg Oral Q24H    docusate sodium  100 mg Oral TID     famotidine  20 mg Oral QHS    heparin (porcine)  5,000 Units Subcutaneous Q8H    [START ON 12/1/2023] methylPREDNISolone sodium succinate injection  125 mg Intravenous Once    methylPREDNISolone sodium succinate injection  250 mg Intravenous Once    multivitamin  1 tablet Oral Daily    mupirocin  1 g Nasal BID    mycophenolate  1,000 mg Oral BID    [START ON 12/2/2023] predniSONE  20 mg Oral Daily    tacrolimus  3 mg Oral BID    [START ON 12/9/2023] valGANciclovir  450 mg Oral Daily AM     Continuous Infusions:   glucagon (human recombinant)       PRN Meds:sodium chloride 0.9%, [COMPLETED] calcium gluconate IVPB **AND** calcium gluconate IVPB, [COMPLETED] calcium gluconate IVPB **AND** calcium gluconate IVPB, dextrose 10%, [COMPLETED] dextrose 10% **AND** dextrose 10% **AND** [COMPLETED] insulin regular, [COMPLETED] dextrose 10% **AND** dextrose 10% **AND** [COMPLETED] insulin regular, [COMPLETED] dextrose 10% **AND** dextrose 10% **AND** [COMPLETED] insulin regular, diphenhydrAMINE, EPINEPHrine (PF), glucagon (human recombinant), glucose, glucose, glucose, hydrocortisone sodium succinate, insulin aspart U-100, midodrine, ondansetron, oxyCODONE, sodium chloride 0.9%, sodium chloride 0.9%, sodium chloride 0.9%, traMADoL    Intake/Output - Last 3 Shifts         11/28 0700  11/29 0659 11/29 0700  11/30 0659 11/30 0700  12/01 0659    I.V. (mL/kg)  2125 (20.2)     Other  200     IV Piggyback  500     Total Intake(mL/kg)  2825 (26.8)     Urine (mL/kg/hr)  40 (0) 25 (0.1)    Other  1700     Blood  75     Total Output  1815 25    Net  +1010 -25                    Review of Systems   Constitutional:  Negative for appetite change, chills, fatigue and fever.   HENT:  Negative for facial swelling and trouble swallowing.    Eyes:  Positive for visual disturbance.   Respiratory:  Negative for cough, chest tightness, shortness of breath, wheezing and stridor.    Cardiovascular:  Negative for chest pain, palpitations and leg  "swelling.   Gastrointestinal:  Positive for abdominal distention and abdominal pain. Negative for diarrhea, nausea and vomiting.   Genitourinary:  Positive for decreased urine volume and hematuria. Difficulty urinating: correa in place.  Musculoskeletal:  Negative for back pain and neck pain.   Skin:  Positive for wound. Negative for color change, pallor and rash.   Allergic/Immunologic: Positive for immunocompromised state.   Neurological:  Negative for dizziness, tremors, weakness and headaches.   Psychiatric/Behavioral:  Negative for agitation, behavioral problems, confusion and decreased concentration.       Objective:     Vital Signs (Most Recent):  Temp: 98.5 °F (36.9 °C) (11/30/23 0800)  Pulse: 83 (11/30/23 0800)  Resp: 18 (11/30/23 0800)  BP: 136/61 (11/30/23 0800)  SpO2: 99 % (11/30/23 0800) Vital Signs (24h Range):  Temp:  [97.7 °F (36.5 °C)-100.1 °F (37.8 °C)] 98.5 °F (36.9 °C)  Pulse:  [] 83  Resp:  [14-18] 18  SpO2:  [95 %-100 %] 99 %  BP: ()/(49-72) 136/61     Weight: 105.3 kg (232 lb 4.1 oz)  Height: 5' 5" (165.1 cm)  Body mass index is 38.65 kg/m².     Physical Exam  Vitals and nursing note reviewed.   Constitutional:       General: She is awake. She is not in acute distress.     Appearance: She is obese. She is not toxic-appearing.   Eyes:      General: No scleral icterus.  Cardiovascular:      Rate and Rhythm: Normal rate and regular rhythm.      Pulses: Normal pulses.      Heart sounds: Normal heart sounds.   Pulmonary:      Effort: Pulmonary effort is normal. No respiratory distress.      Breath sounds: Examination of the right-lower field reveals decreased breath sounds. Examination of the left-lower field reveals decreased breath sounds. Decreased breath sounds present. No wheezing or rales.   Abdominal:      General: Bowel sounds are decreased. There is distension.      Palpations: Abdomen is soft.      Tenderness: There is abdominal tenderness. There is no guarding or rebound. "          Comments: RLQ incision CDI with staples intact   Musculoskeletal:         General: Normal range of motion.      Right upper arm: Edema present.      Left upper arm: Edema present.      Cervical back: Neck supple.      Right lower leg: Edema present.      Left lower leg: Edema present.   Skin:     General: Skin is warm and dry.      Capillary Refill: Capillary refill takes 2 to 3 seconds.   Neurological:      Mental Status: She is alert and oriented to person, place, and time.   Psychiatric:         Attention and Perception: Attention normal.         Mood and Affect: Mood normal.         Speech: Speech normal.         Behavior: Behavior normal. Behavior is cooperative.         Thought Content: Thought content normal.         Judgment: Judgment normal.          Laboratory:  CBC:   Recent Labs   Lab 11/29/23 0056 11/29/23  0839 11/29/23  1233 11/29/23 2007 11/30/23  0639   WBC 7.56  --   --  21.72* 15.30*   RBC 3.39*  --   --  3.56* 2.92*   HGB 10.7*  --   --  11.3* 9.3*   HCT 34.1*   < > 32.3* 36.3* 29.8*   *  --   --  129* 141*   *  --   --  102* 102*   MCH 31.6*  --   --  31.7* 31.8*   MCHC 31.4*  --   --  31.1* 31.2*    < > = values in this interval not displayed.     CMP:   Recent Labs   Lab 11/29/23  0056 11/29/23  1233 11/29/23 2007 11/30/23  0232 11/30/23  0639   *   < > 237* 230* 150*   CALCIUM 9.2   < > 8.9 8.2* 8.4*   ALBUMIN 3.2*   < > 3.1* 2.6* 2.6*   PROT 8.2  --   --   --   --       < > 136 131* 133*   K 5.6*   < > 5.9* 6.1* 5.3*   CO2 25   < > 23 20* 22*   CL 99   < > 94* 97 98   BUN 62*   < > 37* 46* 52*   CREATININE 10.4*   < > 7.6* 8.1* 8.1*   ALKPHOS 63  --   --   --   --    ALT 13  --   --   --   --    AST 7*  --   --   --   --     < > = values in this interval not displayed.     Labs within the past 24 hours have been reviewed.    Diagnostic Results:  Kidney transplant US ordered and pending

## 2023-11-30 NOTE — ASSESSMENT & PLAN NOTE
Continue prograf, cellcept, steroids. Continue to monitor prograf level daily, monitor for toxic side effects, and adjust for therapeutic dose.

## 2023-11-30 NOTE — PROGRESS NOTES
HD completed and blood returned and needles pulled. Net uf removed 1 liter. . Post bleeding time < 4 minutes. Post B/P 110/58, pulse 98, o2 sat on room air 99%. Patient alert and stable.

## 2023-11-30 NOTE — PLAN OF CARE
Patient POD#1 - HD done at bedside this AM for continued hyperkalemia. Repeat potassium post-HD 4.8. Surgical dressing removed per NP - incision painted with betadine. Kidney US done - per report, elevated RIs noted. Patient OOB to chair with standby assistance this afternoon. Patient nauseated during HD - Zofran and Compazine given prn - nausea resolved following treatment. Otherwise tolerating PO without difficulty but did not eat much for lunch. BG WNL. Mother at bedside.     Self-meds introduced to patient and mother - patient reports difficulty reading print on blue card but confirmed understanding of medications. Patient's mother to pull medications tonight.

## 2023-11-30 NOTE — CONSULTS
"  [facility] - Transplant Stepdown  Adult Nutrition  Consult Note    SUMMARY     Recommendations    1. Continue Renal diet     2. If PO intake <50%, add ONS Novasource renal BID      3. RD to monitor and follow    Goals: Meet % EEN, EPN by RD f/u  Nutrition Goal Status: new  Communication of RD Recs:  (POC)    Assessment and Plan    Nutrition Problem  Increased nutrient needs (protein, energy)    Related to (etiology):   Increased physiological demands    Signs and Symptoms (as evidenced by):   S/p DBD Kidney transplant (11/29)    Interventions/Recommendations (treatment strategy):  Collaboration with other providers  Nutrition education    Nutrition Diagnosis Status:   New     Reason for Assessment    Reason For Assessment: consult  Diagnosis:  (s/p kidney transplant)  Relevant Medical History: HTN, T2DM, CAD, OB, anemia of chronic disease, DGF  Interdisciplinary Rounds: did not attend    General Information Comments:   S/p DBD Kidney transplant (11/29). DGF expected. Pt is on bedside HD during RD visits. Reports good appetite, ate 100% dinner last night. Good PO intake PTA. -BM/flatus. Denies N/V, chewing/swallowing difficulties. Patient appears lethargic, not appropriate for education. Educated caregiver on post tx nutrition guidelines. Family verbalized understanding. Per wt hx,  lb.  lb likely 2/2 fluid. Pt appears nourished, NFPE not warranted. No indicator of malnutrition.     Nutrition Discharge Planning: Post transplant nutrition education provided on 11/30. Food safety/drug interactions emphasized. General healthy/low salt diet recommended. Education material left at bedside. No other needs identified.    Nutrition Risk Screen    Nutrition Risk Screen: no indicators present    Nutrition/Diet History    Spiritual, Cultural Beliefs, Advent Practices, Values that Affect Care: no    Anthropometrics    Temp: 97.9 °F (36.6 °C)  Height Method: Stated  Height: 5' 5" (165.1 cm)  Height " (inches): 65 in  Weight Method: Standard Scale  Weight: 105.3 kg (232 lb 4.1 oz)  Weight (lb): 232.26 lb  Ideal Body Weight (IBW), Female: 125 lb  % Ideal Body Weight, Female (lb): 185.81 %  BMI (Calculated): 38.6       Lab/Procedures/Meds    Pertinent Labs Reviewed: reviewed  Pertinent Labs Comments: H/H: 9.3/29.8, glucose 150, Na 133, K 5.3, BUN 52, Cr 8.1, albumin 2.6, eGFR 5.7  Pertinent Medications Reviewed: reviewed  Pertinent Medications Comments: prednisone, methylprednisolone, tacrolimus, mycophenolate, famotidine, docusate, bisacodyl, heparin, MV    Estimated/Assessed Needs    Weight Used For Calorie Calculations: 105.2 kg (232 lb)  Energy Calorie Requirements (kcal): 1693 kcal  Energy Need Method: [place]- Viktoror  Protein Requirements: 85-114g (1.5-2g/kg IBW)  Weight Used For Protein Calculations: 56.7 kg (125 lb)  Fluid Requirements (mL): 1ml/kcal or per MD  Estimated Fluid Requirement Method: RDA Method  RDA Method (mL): 1693     Nutrition Prescription Ordered    Current Diet Order: Renal    Evaluation of Received Nutrient/Fluid Intake    I/O: + 1L since admit  Energy Calories Required: meeting needs  Protein Required: meeting needs  Comments: LBM 11/28  Tolerance: tolerating  % Intake of Estimated Energy Needs: 75 - 100 %  % Meal Intake: 75 - 100 %    Nutrition Risk    Level of Risk/Frequency of Follow-up:  (1x/week)       Monitor and Evaluation    Food and Nutrient Intake: energy intake, food and beverage intake  Food and Nutrient Adminstration: diet order  Knowledge/Beliefs/Attitudes: food and nutrition knowledge/skill  Physical Activity and Function: nutrition-related ADLs and IADLs  Anthropometric Measurements: height/length, weight change, weight, body mass index  Biochemical Data, Medical Tests and Procedures: electrolyte and renal panel, gastrointestinal profile, glucose/endocrine profile, inflammatory profile, lipid profile  Nutrition-Focused Physical Findings: overall appearance        Nutrition Follow-Up    RD Follow-up?: Yes

## 2023-11-30 NOTE — ASSESSMENT & PLAN NOTE
- expected post op  - H&H down trending but stable  - no overt s/s of bleeding  - repeat CBC and RFP ordered (1400)  - KTX US ordered to assess blood flow and for fluid collections  - monitor

## 2023-12-01 LAB
ALBUMIN SERPL BCP-MCNC: 2.6 G/DL (ref 3.5–5.2)
ANION GAP SERPL CALC-SCNC: 13 MMOL/L (ref 8–16)
BASOPHILS # BLD AUTO: 0 K/UL (ref 0–0.2)
BASOPHILS NFR BLD: 0 % (ref 0–1.9)
BUN SERPL-MCNC: 47 MG/DL (ref 6–20)
CALCIUM SERPL-MCNC: 8.1 MG/DL (ref 8.7–10.5)
CHLORIDE SERPL-SCNC: 101 MMOL/L (ref 95–110)
CMV IGG SERPL QL IA: NORMAL
CO2 SERPL-SCNC: 20 MMOL/L (ref 23–29)
CREAT SERPL-MCNC: 6.2 MG/DL (ref 0.5–1.4)
DIFFERENTIAL METHOD: ABNORMAL
EOSINOPHIL # BLD AUTO: 0 K/UL (ref 0–0.5)
EOSINOPHIL NFR BLD: 0 % (ref 0–8)
ERYTHROCYTE [DISTWIDTH] IN BLOOD BY AUTOMATED COUNT: 14.5 % (ref 11.5–14.5)
EST. GFR  (NO RACE VARIABLE): 7.9 ML/MIN/1.73 M^2
GLUCOSE SERPL-MCNC: 195 MG/DL (ref 70–110)
HBV SURFACE AB SER QL IA: POSITIVE
HBV SURFACE AB SERPL IA-ACNC: 15 MIU/ML
HCT VFR BLD AUTO: 25.9 % (ref 37–48.5)
HGB BLD-MCNC: 8.1 G/DL (ref 12–16)
IMM GRANULOCYTES # BLD AUTO: 0.05 K/UL (ref 0–0.04)
IMM GRANULOCYTES NFR BLD AUTO: 0.6 % (ref 0–0.5)
LYMPHOCYTES # BLD AUTO: 0.1 K/UL (ref 1–4.8)
LYMPHOCYTES NFR BLD: 1.5 % (ref 18–48)
MAGNESIUM SERPL-MCNC: 2.1 MG/DL (ref 1.6–2.6)
MCH RBC QN AUTO: 32 PG (ref 27–31)
MCHC RBC AUTO-ENTMCNC: 31.3 G/DL (ref 32–36)
MCV RBC AUTO: 102 FL (ref 82–98)
MONOCYTES # BLD AUTO: 0.5 K/UL (ref 0.3–1)
MONOCYTES NFR BLD: 6 % (ref 4–15)
NEUTROPHILS # BLD AUTO: 7.8 K/UL (ref 1.8–7.7)
NEUTROPHILS NFR BLD: 91.9 % (ref 38–73)
NRBC BLD-RTO: 0 /100 WBC
PHOSPHATE SERPL-MCNC: 6.1 MG/DL (ref 2.7–4.5)
PLATELET # BLD AUTO: 107 K/UL (ref 150–450)
PMV BLD AUTO: 12.1 FL (ref 9.2–12.9)
POCT GLUCOSE: 198 MG/DL (ref 70–110)
POCT GLUCOSE: 232 MG/DL (ref 70–110)
POCT GLUCOSE: 239 MG/DL (ref 70–110)
POCT GLUCOSE: 305 MG/DL (ref 70–110)
POTASSIUM SERPL-SCNC: 5.3 MMOL/L (ref 3.5–5.1)
RBC # BLD AUTO: 2.53 M/UL (ref 4–5.4)
SODIUM SERPL-SCNC: 134 MMOL/L (ref 136–145)
TACROLIMUS BLD-MCNC: 4.8 NG/ML (ref 5–15)
WBC # BLD AUTO: 8.46 K/UL (ref 3.9–12.7)

## 2023-12-01 PROCEDURE — 63600175 PHARM REV CODE 636 W HCPCS

## 2023-12-01 PROCEDURE — 99232 SBSQ HOSP IP/OBS MODERATE 35: CPT | Mod: ,,, | Performed by: PHYSICIAN ASSISTANT

## 2023-12-01 PROCEDURE — 99232 PR SUBSEQUENT HOSPITAL CARE,LEVL II: ICD-10-PCS | Mod: ,,, | Performed by: PHYSICIAN ASSISTANT

## 2023-12-01 PROCEDURE — 20600001 HC STEP DOWN PRIVATE ROOM

## 2023-12-01 PROCEDURE — 99233 SBSQ HOSP IP/OBS HIGH 50: CPT | Mod: 24,,, | Performed by: NURSE PRACTITIONER

## 2023-12-01 PROCEDURE — 80197 ASSAY OF TACROLIMUS: CPT

## 2023-12-01 PROCEDURE — 99233 PR SUBSEQUENT HOSPITAL CARE,LEVL III: ICD-10-PCS | Mod: 24,,, | Performed by: NURSE PRACTITIONER

## 2023-12-01 PROCEDURE — 63600175 PHARM REV CODE 636 W HCPCS: Performed by: INTERNAL MEDICINE

## 2023-12-01 PROCEDURE — 25000003 PHARM REV CODE 250

## 2023-12-01 PROCEDURE — 25000003 PHARM REV CODE 250: Performed by: TRANSPLANT SURGERY

## 2023-12-01 PROCEDURE — 82955 ASSAY OF G6PD ENZYME: CPT

## 2023-12-01 PROCEDURE — 85025 COMPLETE CBC W/AUTO DIFF WBC: CPT

## 2023-12-01 PROCEDURE — 83735 ASSAY OF MAGNESIUM: CPT

## 2023-12-01 PROCEDURE — 80069 RENAL FUNCTION PANEL: CPT

## 2023-12-01 PROCEDURE — 25000003 PHARM REV CODE 250: Performed by: NURSE PRACTITIONER

## 2023-12-01 PROCEDURE — 63600175 PHARM REV CODE 636 W HCPCS: Performed by: PHYSICIAN ASSISTANT

## 2023-12-01 PROCEDURE — 86644 CMV ANTIBODY: CPT

## 2023-12-01 PROCEDURE — 36415 COLL VENOUS BLD VENIPUNCTURE: CPT

## 2023-12-01 RX ORDER — FAMOTIDINE 20 MG/1
20 TABLET, FILM COATED ORAL NIGHTLY
Qty: 30 TABLET | Refills: 0 | Status: SHIPPED | OUTPATIENT
Start: 2023-11-29 | End: 2024-01-09

## 2023-12-01 RX ORDER — TACROLIMUS 1 MG/1
6 CAPSULE ORAL 2 TIMES DAILY
Status: DISCONTINUED | OUTPATIENT
Start: 2023-12-01 | End: 2023-12-03

## 2023-12-01 RX ORDER — OXYCODONE HYDROCHLORIDE 5 MG/1
5 TABLET ORAL EVERY 4 HOURS PRN
Qty: 40 TABLET | Refills: 0 | Status: SHIPPED | OUTPATIENT
Start: 2023-12-01 | End: 2023-12-21

## 2023-12-01 RX ORDER — BRIMONIDINE TARTRATE 2 MG/ML
1 SOLUTION/ DROPS OPHTHALMIC NIGHTLY
Status: DISCONTINUED | OUTPATIENT
Start: 2023-12-01 | End: 2023-12-02

## 2023-12-01 RX ORDER — TIMOLOL MALEATE 5 MG/ML
1 SOLUTION/ DROPS OPHTHALMIC NIGHTLY
Status: DISCONTINUED | OUTPATIENT
Start: 2023-12-01 | End: 2023-12-02

## 2023-12-01 RX ORDER — SEVELAMER CARBONATE 800 MG/1
1600 TABLET, FILM COATED ORAL
Status: DISCONTINUED | OUTPATIENT
Start: 2023-12-01 | End: 2023-12-04

## 2023-12-01 RX ORDER — PREDNISONE 5 MG/1
TABLET ORAL
Qty: 70 TABLET | Refills: 11 | Status: SHIPPED | OUTPATIENT
Start: 2023-12-02

## 2023-12-01 RX ORDER — MYCOPHENOLATE MOFETIL 250 MG/1
1000 CAPSULE ORAL 2 TIMES DAILY
Qty: 240 CAPSULE | Refills: 11 | Status: SHIPPED | OUTPATIENT
Start: 2023-12-01

## 2023-12-01 RX ORDER — TACROLIMUS 1 MG/1
2 CAPSULE ORAL ONCE
Status: COMPLETED | OUTPATIENT
Start: 2023-12-01 | End: 2023-12-01

## 2023-12-01 RX ORDER — VALGANCICLOVIR 450 MG/1
450 TABLET, FILM COATED ORAL
Qty: 12 TABLET | Refills: 6 | Status: ON HOLD | OUTPATIENT
Start: 2023-12-01 | End: 2023-12-23

## 2023-12-01 RX ORDER — ERGOCALCIFEROL 1.25 MG/1
50000 CAPSULE ORAL
Status: DISCONTINUED | OUTPATIENT
Start: 2023-12-01 | End: 2023-12-04 | Stop reason: HOSPADM

## 2023-12-01 RX ORDER — INSULIN ASPART 100 [IU]/ML
3 INJECTION, SOLUTION INTRAVENOUS; SUBCUTANEOUS
Status: DISCONTINUED | OUTPATIENT
Start: 2023-12-01 | End: 2023-12-02

## 2023-12-01 RX ORDER — ATROPINE SULFATE 10 MG/ML
1 SOLUTION/ DROPS OPHTHALMIC NIGHTLY
Status: DISCONTINUED | OUTPATIENT
Start: 2023-12-01 | End: 2023-12-02

## 2023-12-01 RX ORDER — MIDODRINE HYDROCHLORIDE 5 MG/1
10 TABLET ORAL
Status: DISCONTINUED | OUTPATIENT
Start: 2023-12-01 | End: 2023-12-04 | Stop reason: HOSPADM

## 2023-12-01 RX ADMIN — FAMOTIDINE 20 MG: 20 TABLET, FILM COATED ORAL at 08:12

## 2023-12-01 RX ADMIN — ACETAMINOPHEN 650 MG: 325 TABLET ORAL at 08:12

## 2023-12-01 RX ADMIN — INSULIN ASPART 3 UNITS: 100 INJECTION, SOLUTION INTRAVENOUS; SUBCUTANEOUS at 01:12

## 2023-12-01 RX ADMIN — DOCUSATE SODIUM 100 MG: 100 CAPSULE, LIQUID FILLED ORAL at 02:12

## 2023-12-01 RX ADMIN — SEVELAMER CARBONATE 1600 MG: 800 TABLET, FILM COATED ORAL at 01:12

## 2023-12-01 RX ADMIN — INSULIN ASPART 2 UNITS: 100 INJECTION, SOLUTION INTRAVENOUS; SUBCUTANEOUS at 05:12

## 2023-12-01 RX ADMIN — DOCUSATE SODIUM 100 MG: 100 CAPSULE, LIQUID FILLED ORAL at 08:12

## 2023-12-01 RX ADMIN — HEPARIN SODIUM 5000 UNITS: 5000 INJECTION INTRAVENOUS; SUBCUTANEOUS at 05:12

## 2023-12-01 RX ADMIN — ACETAMINOPHEN 650 MG: 325 TABLET ORAL at 05:12

## 2023-12-01 RX ADMIN — TACROLIMUS 6 MG: 1 CAPSULE ORAL at 05:12

## 2023-12-01 RX ADMIN — MYCOPHENOLATE MOFETIL 1000 MG: 250 CAPSULE ORAL at 08:12

## 2023-12-01 RX ADMIN — HEPARIN SODIUM 5000 UNITS: 5000 INJECTION INTRAVENOUS; SUBCUTANEOUS at 08:12

## 2023-12-01 RX ADMIN — ANTI-THYMOCYTE GLOBULIN (RABBIT) 125 MG: 5 INJECTION, POWDER, LYOPHILIZED, FOR SOLUTION INTRAVENOUS at 03:12

## 2023-12-01 RX ADMIN — METHYLPREDNISOLONE SODIUM SUCCINATE 125 MG: 125 INJECTION, POWDER, LYOPHILIZED, FOR SOLUTION INTRAMUSCULAR; INTRAVENOUS at 08:12

## 2023-12-01 RX ADMIN — INSULIN ASPART 3 UNITS: 100 INJECTION, SOLUTION INTRAVENOUS; SUBCUTANEOUS at 05:12

## 2023-12-01 RX ADMIN — BISACODYL 10 MG: 5 TABLET, COATED ORAL at 08:12

## 2023-12-01 RX ADMIN — ERGOCALCIFEROL 50000 UNITS: 1.25 CAPSULE ORAL at 01:12

## 2023-12-01 RX ADMIN — MUPIROCIN 1 G: 20 OINTMENT TOPICAL at 08:12

## 2023-12-01 RX ADMIN — DIPHENHYDRAMINE HYDROCHLORIDE 25 MG: 25 CAPSULE ORAL at 08:12

## 2023-12-01 RX ADMIN — TACROLIMUS 2 MG: 1 CAPSULE ORAL at 02:12

## 2023-12-01 RX ADMIN — INSULIN ASPART 2 UNITS: 100 INJECTION, SOLUTION INTRAVENOUS; SUBCUTANEOUS at 10:12

## 2023-12-01 RX ADMIN — THERA TABS 1 TABLET: TAB at 08:12

## 2023-12-01 RX ADMIN — BRIMONIDINE TARTRATE 1 DROP: 2 SOLUTION OPHTHALMIC at 08:12

## 2023-12-01 RX ADMIN — SEVELAMER CARBONATE 1600 MG: 800 TABLET, FILM COATED ORAL at 05:12

## 2023-12-01 RX ADMIN — OXYCODONE HYDROCHLORIDE 5 MG: 5 TABLET ORAL at 08:12

## 2023-12-01 RX ADMIN — ACETAMINOPHEN 650 MG: 325 TABLET ORAL at 02:12

## 2023-12-01 RX ADMIN — TACROLIMUS 4 MG: 1 CAPSULE ORAL at 08:12

## 2023-12-01 RX ADMIN — INSULIN ASPART 2 UNITS: 100 INJECTION, SOLUTION INTRAVENOUS; SUBCUTANEOUS at 08:12

## 2023-12-01 RX ADMIN — SODIUM ZIRCONIUM CYCLOSILICATE 10 G: 5 POWDER, FOR SUSPENSION ORAL at 09:12

## 2023-12-01 RX ADMIN — OXYCODONE HYDROCHLORIDE 5 MG: 5 TABLET ORAL at 09:12

## 2023-12-01 RX ADMIN — ATROPINE SULFATE 1 DROP: 10 SOLUTION/ DROPS OPHTHALMIC at 10:12

## 2023-12-01 RX ADMIN — MUPIROCIN 1 G: 20 OINTMENT TOPICAL at 09:12

## 2023-12-01 RX ADMIN — TIMOLOL MALEATE 1 DROP: 5 SOLUTION OPHTHALMIC at 08:12

## 2023-12-01 RX ADMIN — HEPARIN SODIUM 5000 UNITS: 5000 INJECTION INTRAVENOUS; SUBCUTANEOUS at 03:12

## 2023-12-01 NOTE — PLAN OF CARE
CHW met with patient/family at bedside. Patient experience rounding completed and reviewed the following.     Do you know your discharge plan? Yes      Have you discussed your needs and preferences with your SW/CM? Yes     If you are discharging home, do you have help at home? Yes Family     Do you think you will need help additional at home at discharge? YNo     Do you currently have difficulty keeping up with bills, affording medicine or buying food?  No    Assigned SW/CM notified of any patient/family needs or concerns. Appropriate resources provided to address patient's needs.    The patient had a pleasant stay at [facility]

## 2023-12-01 NOTE — PROGRESS NOTES
EDUCATION NOTE:    Met with Mary Waller and her caregiver to provide teaching re: immunosuppressant medications.  Reviewed medication section of the Kidney Transplant Education book that was provided.  Emphasized the importance of compliance, role of the blue medication card, concerns for drug interactions, and process of obtaining refills.  Counseled regarding Prograf, Cellcept , prednisone, including directions for use, monitoring, how to handle missed doses, and side effects.  Ms Waller verbalized understanding and had the opportunity to ask questions.

## 2023-12-01 NOTE — ASSESSMENT & PLAN NOTE
BG goal 140-180  T2DM.  S/p kidney txp. BG at and above goal.  Will start on scheduled insulin with steroids.    - Start Novolog 3 units with meals.  - Continue Low Dose Correction Scale  - Will monitor closely for prandial excursions with steroid taper and initiate scheduled insulin if indicated  - BG monitoring ac/hs    ** Please call Endocrine for any BG related issues **    Discharge plans: TBD

## 2023-12-01 NOTE — PROGRESS NOTES
"[facility] - Transplant Stepdown  Adult Nutrition  Progress Note    SUMMARY       Recommendations    1. Continue Renal diet     2. If PO intake <50%, add ONS Novasource renal BID      3. RD to monitor and follow      Goals: Meet % EEN, EPN by RD f/u  Nutrition Goal Status: progressing towards goal  Communication of RD Recs:  (POC)    Assessment and Plan    Nutrition Problem  Increased nutrient needs (protein, energy)     Related to (etiology):   Increased physiological demands     Signs and Symptoms (as evidenced by):   S/p DBD Kidney transplant (11/29)     Interventions/Recommendations (treatment strategy):  Collaboration with other providers  Nutrition education     Nutrition Diagnosis Status:   Continues     Reason for Assessment    Reason For Assessment: RD follow-up  Diagnosis:  (s/p kidney transplant)  Relevant Medical History: HTN, T2DM, CAD, OB, anemia of chronic disease, DGF  Interdisciplinary Rounds: did not attend  General Information Comments: RD f/u: pt denies n/v/d/c. Pt endorses fair to good appetite, stating to RD that they consumed 50-75% of their breakfast and 75% of their dinner last night. Pt and caregiver had no questions in regards to the post tx diet. Pt appears nourished with no s/s of malnutrition. RD to continue to monitor.  Nutrition Discharge Planning: Post transplant nutrition education provided on 11/30. Food safety/drug interactions emphasized. General healthy/low salt diet recommended. Education material left at bedside. No other needs identified.    Nutrition Risk Screen    Nutrition Risk Screen: no indicators present    Nutrition/Diet History    Spiritual, Cultural Beliefs, Restoration Practices, Values that Affect Care: no    Anthropometrics    Temp: 98.3 °F (36.8 °C)  Height Method: Stated  Height: 5' 5" (165.1 cm)  Height (inches): 65 in  Weight Method: Standard Scale  Weight: 105.3 kg (232 lb 4.1 oz)  Weight (lb): 232.26 lb  Ideal Body Weight (IBW), Female: 125 lb  % Ideal " Body Weight, Female (lb): 185.81 %  BMI (Calculated): 38.6  BMI Grade: 35 - 39.9 - obesity - grade II    Lab/Procedures/Meds    Pertinent Labs Reviewed: reviewed  Pertinent Labs Comments: Na: 134, K: 5.3, BUN: 47, cr: 6.2, GFR: 7.9, gluc: 195, Ca: 8.1, phos: 6.1, alb: 2.6  Pertinent Medications Reviewed: reviewed  Pertinent Medications Comments: Bisacodyl, Ca Gluconate, docusate, VitD, famotidine, heparin, methylprednisolone, mycophenolate. Prednisone, sevelamer, tacrolimus    Estimated/Assessed Needs    Weight Used For Calorie Calculations: 105.2 kg (232 lb)  Energy Calorie Requirements (kcal): 1693 kcal  Energy Need Method: [place] Viktoror  Protein Requirements: 85-114g (1.5-2g/kg IBW)  Weight Used For Protein Calculations: 56.7 kg (125 lb)  Fluid Requirements (mL): 1ml/kcal or per MD  Estimated Fluid Requirement Method: RDA Method  RDA Method (mL): 1693    Nutrition Prescription Ordered    Current Diet Order: Renal    Evaluation of Received Nutrient/Fluid Intake    I/O: +717ml since admit  Energy Calories Required: meeting needs  Protein Required: meeting needs  Comments: LBM 11/28  Tolerance: tolerating  % Intake of Estimated Energy Needs: 50 - 75 %  % Meal Intake: 50 - 75 %    Nutrition Risk    Level of Risk/Frequency of Follow-up:  (1x/week)     Monitor and Evaluation    Food and Nutrient Intake: energy intake, food and beverage intake  Food and Nutrient Adminstration: diet order  Knowledge/Beliefs/Attitudes: food and nutrition knowledge/skill  Physical Activity and Function: nutrition-related ADLs and IADLs  Anthropometric Measurements: height/length, weight change, weight, body mass index  Biochemical Data, Medical Tests and Procedures: electrolyte and renal panel, gastrointestinal profile, glucose/endocrine profile, inflammatory profile, lipid profile  Nutrition-Focused Physical Findings: overall appearance     Nutrition Follow-Up    RD Follow-up?: Yes

## 2023-12-01 NOTE — SUBJECTIVE & OBJECTIVE
Subjective:   History of Present Illness:  Mary Waller is a 48yr AAF with ESRD secondary to DM nephropathy and HTN. Other PMH includes CAD s/p PCI LAD (2019 at [facility]), and HLD. She started HD 4/20/18. She dialyzes on a MWF schedule, last HD 11/27. She reports occasion hypotension during or after treatments but is able to always complete HD. She has a RUE AV fistula for HD access. Native uop around 1 cup or less per day. .5kg. She is being admitted for kidney transplant surgery. She feels well in her usual state of health. She denies any recent illnesses or hospitalizations. Pre op labs and diagnostics studies pending.       Hospital Course:  Patient is now s/p DBD KTX 11/29 (CIT 15hrs, 2 arteries, 1 vein, 2 day correa, donor CMV +). Post op K 6.7. Patient given calcium gluc and had STAT HD with 1L of fluid removed. DGF expected based on increased CIT and donor factors including donor LC).      Interval History: no acute events overnight. Pt had HD 11/29 and 11/30. 1L removed w last HD on 11/30. Of note, pt w low BP during HD. Plan to cont Midodrine w HD. K+ 5.3 this am. Giving Lokelma. No change in UOP, pt remains oliguric. Cr trending up. Last Kidney US 11/30 showed elevated RIs within the transplant which can be seen in the setting of edema or ATN. US also noted 11.2 x 3.8 x 2.4 cm fluid collection deep in pelvis. Reviewed w surgeon. Tentative plan for HD tomorrow. CBC stable.   Patient is tolerating diet, (+) flatus, (-) bowel movement. She denies any nausea or vomiting. Encourage OOB to chair and ambulate 3 times today. Encourage IS. VSS. Cont to monitor    Pt has 2 day correa, kept in place to allow for strict I/O.      Past Medical, Surgical, Family, and Social History:   Unchanged from H&P.    Scheduled Meds:   acetaminophen  650 mg Oral Q8H    antithymocyte globulin (rabbit) 125 mg, hydrocortisone sodium succinate (SOLU-CORTEF) 20 mg in sodium chloride 0.9% 500 mL (FOR PERIPHERAL LINE ADMINISTRATION  ONLY)  1.5 mg/kg (Adjusted) Intravenous Daily    atropine 1%  1 drop Left Eye QHS    bisacodyL  10 mg Oral QHS    brimonidine 0.2%  1 drop Right Eye QHS    docusate sodium  100 mg Oral TID    ergocalciferol  50,000 Units Oral Q7 Days    famotidine  20 mg Oral QHS    heparin (porcine)  5,000 Units Subcutaneous Q8H    methylPREDNISolone sodium succinate injection  125 mg Intravenous Once    multivitamin  1 tablet Oral Daily    mupirocin  1 g Nasal BID    mycophenolate  1,000 mg Oral BID    [START ON 12/2/2023] predniSONE  20 mg Oral Daily    sevelamer carbonate  1,600 mg Oral TID WM    tacrolimus  4 mg Oral BID    timolol maleate 0.5%  1 drop Right Eye QHS    [START ON 12/9/2023] valGANciclovir  450 mg Oral Daily AM     Continuous Infusions:   glucagon (human recombinant)       PRN Meds:sodium chloride 0.9%, [COMPLETED] calcium gluconate IVPB **AND** calcium gluconate IVPB, [COMPLETED] calcium gluconate IVPB **AND** calcium gluconate IVPB, dextrose 10%, [COMPLETED] dextrose 10% **AND** dextrose 10% **AND** [COMPLETED] insulin regular, diphenhydrAMINE, EPINEPHrine (PF), glucagon (human recombinant), glucose, glucose, glucose, hydrocortisone sodium succinate, insulin aspart U-100, midodrine, ondansetron, oxyCODONE, prochlorperazine, simethicone, sodium chloride 0.9%, sodium chloride 0.9%, sodium chloride 0.9%, traMADoL    Intake/Output - Last 3 Shifts         11/29 0700  11/30 0659 11/30 0700  12/01 0659 12/01 0700 12/02 0659    I.V. (mL/kg) 2125 (20.2)      Other 200      IV Piggyback 500      Total Intake(mL/kg) 2825 (26.8)      Urine (mL/kg/hr) 40 (0) 35 (0)     Other 1700 258     Blood 75      Total Output 1815 293     Net +1010 -293                     Review of Systems   Constitutional:  Negative for appetite change, chills, fatigue and fever.   HENT:  Negative for facial swelling and trouble swallowing.    Eyes:  Positive for visual disturbance.   Respiratory:  Negative for cough, chest tightness, shortness of  "breath, wheezing and stridor.    Cardiovascular:  Negative for chest pain, palpitations and leg swelling.   Gastrointestinal:  Positive for abdominal distention and abdominal pain. Negative for diarrhea, nausea and vomiting.   Genitourinary:  Positive for decreased urine volume and hematuria. Difficulty urinating: correa in place.  Musculoskeletal:  Negative for back pain and neck pain.   Skin:  Positive for wound. Negative for color change, pallor and rash.   Allergic/Immunologic: Positive for immunocompromised state.   Neurological:  Negative for dizziness, tremors, weakness and headaches.   Psychiatric/Behavioral:  Negative for agitation, behavioral problems, confusion and decreased concentration.       Objective:     Vital Signs (Most Recent):  Temp: 98.3 °F (36.8 °C) (12/01/23 0733)  Pulse: 88 (12/01/23 0733)  Resp: 16 (12/01/23 0904)  BP: 126/60 (12/01/23 0733)  SpO2: 98 % (12/01/23 0733) Vital Signs (24h Range):  Temp:  [97.9 °F (36.6 °C)-98.4 °F (36.9 °C)] 98.3 °F (36.8 °C)  Pulse:  [79-91] 88  Resp:  [16-18] 16  SpO2:  [96 %-100 %] 98 %  BP: ()/(48-66) 126/60     Weight: 105.3 kg (232 lb 4.1 oz)  Height: 5' 5" (165.1 cm)  Body mass index is 38.65 kg/m².     Physical Exam  Vitals and nursing note reviewed.   Constitutional:       General: She is awake. She is not in acute distress.     Appearance: She is obese. She is not toxic-appearing.   Eyes:      General: No scleral icterus.  Cardiovascular:      Rate and Rhythm: Normal rate and regular rhythm.      Pulses: Normal pulses.      Heart sounds: Normal heart sounds.   Pulmonary:      Effort: Pulmonary effort is normal. No respiratory distress.      Breath sounds: Examination of the right-lower field reveals decreased breath sounds. Examination of the left-lower field reveals decreased breath sounds. Decreased breath sounds present. No wheezing or rales.   Abdominal:      General: Bowel sounds are decreased. There is distension.      Palpations: Abdomen " is soft.      Tenderness: There is abdominal tenderness. There is no guarding or rebound.          Comments: RLQ incision CDI with staples intact   Musculoskeletal:         General: Normal range of motion.      Right upper arm: Edema present.      Left upper arm: Edema present.      Cervical back: Neck supple.      Right lower leg: Edema present.      Left lower leg: Edema present.   Skin:     General: Skin is warm and dry.      Capillary Refill: Capillary refill takes 2 to 3 seconds.   Neurological:      Mental Status: She is alert and oriented to person, place, and time.   Psychiatric:         Attention and Perception: Attention normal.         Mood and Affect: Mood normal.         Speech: Speech normal.         Behavior: Behavior normal. Behavior is cooperative.         Thought Content: Thought content normal.         Judgment: Judgment normal.          Laboratory:  CBC:   Recent Labs   Lab 11/30/23  0639 11/30/23  1415 12/01/23  0604   WBC 15.30* 15.14* 8.46   RBC 2.92* 2.70* 2.53*   HGB 9.3* 8.8* 8.1*   HCT 29.8* 27.0* 25.9*   * 115* 107*   * 100* 102*   MCH 31.8* 32.6* 32.0*   MCHC 31.2* 32.6 31.3*       CMP:   Recent Labs   Lab 11/29/23  0056 11/29/23  1233 11/30/23  0639 11/30/23  1415 12/01/23  0604   *   < > 150* 148* 195*   CALCIUM 9.2   < > 8.4* 8.4* 8.1*   ALBUMIN 3.2*   < > 2.6* 2.4* 2.6*   PROT 8.2  --   --   --   --       < > 133* 135* 134*   K 5.6*   < > 5.3* 4.8 5.3*   CO2 25   < > 22* 19* 20*   CL 99   < > 98 105 101   BUN 62*   < > 52* 24* 47*   CREATININE 10.4*   < > 8.1* 4.4* 6.2*   ALKPHOS 63  --   --   --   --    ALT 13  --   --   --   --    AST 7*  --   --   --   --     < > = values in this interval not displayed.       Labs within the past 24 hours have been reviewed.    Diagnostic Results:  Kidney transplant US ordered and reviewed

## 2023-12-01 NOTE — DISCHARGE SUMMARY
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 Other PMH includes CAD s/p PCI LAD (2019 at [facility]), and HLD. She started HD 4/20/18. She dialyzes on a MWF schedule, last HD 11/27. She reports occasion hypotension during or after treatments but is able to always complete HD. She has a RUE AV fistula for HD access. Native uop around 1 cup or less per day. .5kg. She is being admitted for kidney transplant surgery. She feels well in her usual state of health. She denies any recent illnesses or hospitalizations. Pre op labs and diagnostics studies pending.     Procedure(s) (LRB):  TRANSPLANT, KIDNEY (N/A)     Hospital Course:    Patient is now s/p DBD KTxp 11/29 (CIT 15hrs, 2 arteries, 1 vein, 2 day correa, donor CMV +). Post op K 6.7. Patient given calcium gluc and had STAT HD with 1L of fluid removed. DGF expected based on increased CIT and donor factors including donor LC). Kept correa in another 24 hr for strict I/O and to monitor output.     Pt had HD 11/29 and 11/30. 1L removed w HD on 11/30. Of note, pt w low BP during HD. Plan to cont Midodrine w HD. Lokelma POD#2 for K+ 5.3. Pt continues to require HD. Cr slow to improve. Pt making some urine w lasix 120 mg bid.She had gross hematuria. She required 1u PRBC 12/2/23 and responded appropriately. Last Kidney US 11/30 showed elevated RIs within the transplant which can be seen in the setting of edema or ATN. US also noted 11.2 x 3.8 x 2.4 cm fluid collection deep in pelvis. Reviewed w surgeon. Patient is tolerating diet, (+) flatus, (+)bowel movement. She denies any  nausea or vomiting. With encouragement, pt ambulated in room and is up in chair.  Encourage IS. VSS.     Pt has outpt HD chair in place MWF in afternoon. Plan for HD this am and discharge.    Discharge instructions reviewed by Pharmacist, Nursing and Transplant coordinator.  Patient and CG verbalize understanding and are in agreement with discharge from hospital today.  Patient is medically stable for discharge.  Patient will f/u w labs in am and per transplant coordinator.  Next visit tomorrow.        Goals of Care Treatment Preferences:  Code Status: Full Code      Final Active Diagnoses:    Diagnosis Date Noted POA    PRINCIPAL PROBLEM:  S/P kidney transplant [Z94.0] 11/29/2023 Not Applicable    Long-term use of immunosuppressant medication [Z79.60] 11/30/2023 Not Applicable    Acute blood loss anemia [D62] 11/30/2023 No    Hard to intubate [T88.4XXA] 11/30/2023 Unknown    Prophylactic immunotherapy [Z29.89] 11/29/2023 Not Applicable    Adverse effect of corticosteroids [T38.0X5A] 11/29/2023 No    Delayed graft function of kidney transplant due to ATN requiring acute dialysis [T86.19, Z99.2] 11/29/2023 Not Applicable    Hyperkalemia [E87.5] 11/29/2023 Yes    Anemia of chronic disease [D63.8] 11/28/2023 Yes    Thrombocytopenia, unspecified [D69.6] 11/28/2023 Yes    Class 2 severe obesity due to excess calories with serious comorbidity and body mass index (BMI) of 38.0 to 38.9 in adult [E66.01, Z68.38] 10/10/2023 Not Applicable    Coronary artery disease involving native coronary artery of native heart with angina pectoris [I25.119] 08/17/2023 Yes    Type 2 diabetes mellitus with chronic kidney disease on chronic dialysis [E11.22, N18.6, Z99.2] 01/05/2019 Not Applicable    Essential hypertension [I10] 01/05/2019 Yes      Problems Resolved During this Admission:       Treatments: see hospital course    Consults (From admission, onward)          Status Ordering Provider     Inpatient consult to Registered  Dietitian/Nutritionist  Once        Provider:  (Not yet assigned)    Completed BENY DAVIS     Inpatient consult to Transplant Nephrology (KTM)  Once        Provider:  (Not yet assigned)    Completed BENY DAVIS     Inpatient consult to Endocrinology  Once        Provider:  (Not yet assigned)    Completed CHRISTOPHER SALGADO            Pending Diagnostic Studies:       Procedure Component Value Units Date/Time    Cytomegalovirus antibody, IgG [[id number]] Collected: 12/01/23 0604    Order Status: Sent Lab Status: In process Updated: 12/01/23 0657    Specimen: Blood     G6PD,Quantitative [[id number]] Collected: 12/01/23 0604    Order Status: Sent Lab Status: In process Updated: 12/01/23 0657    Specimen: Blood           Significant Diagnostic Studies: Labs:   CMP  Sodium   Date Value Ref Range Status   12/03/2023 134 (L) 136 - 145 mmol/L Final     Potassium   Date Value Ref Range Status   12/03/2023 3.9 3.5 - 5.1 mmol/L Final     Chloride   Date Value Ref Range Status   12/03/2023 99 95 - 110 mmol/L Final   11/01/2023 99 96 - 108 mEq/L Final     CO2   Date Value Ref Range Status   12/03/2023 22 (L) 23 - 29 mmol/L Final     Glucose   Date Value Ref Range Status   12/03/2023 120 (H) 70 - 110 mg/dL Final     BUN   Date Value Ref Range Status   12/03/2023 59 (H) 6 - 20 mg/dL Final     Creatinine   Date Value Ref Range Status   12/03/2023 5.9 (H) 0.5 - 1.4 mg/dL Final     Calcium   Date Value Ref Range Status   12/03/2023 7.8 (L) 8.7 - 10.5 mg/dL Final     Total Protein   Date Value Ref Range Status   11/29/2023 8.2 6.0 - 8.4 g/dL Final     Albumin   Date Value Ref Range Status   12/03/2023 2.7 (L) 3.5 - 5.2 g/dL Final   11/01/2023 4.2 3.5 - 5.2 g/dL Final     Total Bilirubin   Date Value Ref Range Status   11/29/2023 0.5 0.1 - 1.0 mg/dL Final     Comment:     For infants and newborns, interpretation of results should be based  on gestational age, weight and in agreement with clinical  observations.    Premature  Infant recommended reference ranges:  Up to 24 hours.............<8.0 mg/dL  Up to 48 hours............<12.0 mg/dL  3-5 days..................<15.0 mg/dL  6-29 days.................<15.0 mg/dL       Alkaline Phosphatase   Date Value Ref Range Status   11/29/2023 63 55 - 135 U/L Final     AST   Date Value Ref Range Status   11/29/2023 7 (L) 10 - 40 U/L Final     ALT   Date Value Ref Range Status   11/29/2023 13 10 - 44 U/L Final     Anion Gap   Date Value Ref Range Status   12/03/2023 13 8 - 16 mmol/L Final     eGFR   Date Value Ref Range Status   12/03/2023 8.4 (A) >60 mL/min/1.73 m^2 Final       Lab Results   Component Value Date    WBC 8.55 12/03/2023    HGB 8.0 (L) 12/03/2023    HCT 24.4 (L) 12/03/2023    MCV 96 12/03/2023     (L) 12/03/2023       Tacrolimus Lvl   Date Value Ref Range Status   12/03/2023 6.3 5.0 - 15.0 ng/mL Final     Comment:     Testing performed by a chemiluminescent microparticle   immunoassay on the [employer] i System.    CAUTION: No firm therapeutic range exists for tacrolimus in whole   blood. The   complexity of the clinical state, individual differences in   sensitivity to   immunosuppressive and nephrotoxic effects of tacrolimus,   co-administration   of other immunosuppressants, type of transplant, time post-transplant   and a   number of other factors contribute to different requirements for   optimal   blood levels of tacrolimus. Therefore, individual tacrolimus values   cannot   be used as the sole indicator for making changes in treatment regimen   and   each patient should be thoroughly evaluated clinically before changes   in   treatment regimens are made. Each user must establish his or her own   ranges   based on clinical experience.  Therapeutic ranges vary according to the commercial test used, and   therefore   should be established for each commercial test. Values obtained with   different assay methods cannot be used interchangeably due to   differences in    assay methods and cross-reactivity with metabolites, nor should   correction   factors be applied. Therefore, consistent use of one assay for   individual   patients is recommended.     12/02/2023 7.1 5.0 - 15.0 ng/mL Final     Comment:     Testing performed by a chemiluminescent microparticle   immunoassay on the [employer] i System.    CAUTION: No firm therapeutic range exists for tacrolimus in whole   blood. The   complexity of the clinical state, individual differences in   sensitivity to   immunosuppressive and nephrotoxic effects of tacrolimus,   co-administration   of other immunosuppressants, type of transplant, time post-transplant   and a   number of other factors contribute to different requirements for   optimal   blood levels of tacrolimus. Therefore, individual tacrolimus values   cannot   be used as the sole indicator for making changes in treatment regimen   and   each patient should be thoroughly evaluated clinically before changes   in   treatment regimens are made. Each user must establish his or her own   ranges   based on clinical experience.  Therapeutic ranges vary according to the commercial test used, and   therefore   should be established for each commercial test. Values obtained with   different assay methods cannot be used interchangeably due to   differences in   assay methods and cross-reactivity with metabolites, nor should   correction   factors be applied. Therefore, consistent use of one assay for   individual   patients is recommended.     12/01/2023 4.8 (L) 5.0 - 15.0 ng/mL Final     Comment:     Testing performed by a chemiluminescent microparticle   immunoassay on the [employer] System.    CAUTION: No firm therapeutic range exists for tacrolimus in whole   blood. The   complexity of the clinical state, individual differences in   sensitivity to   immunosuppressive and nephrotoxic effects of tacrolimus,   co-administration   of other immunosuppressants, type of  transplant, time post-transplant   and a   number of other factors contribute to different requirements for   optimal   blood levels of tacrolimus. Therefore, individual tacrolimus values   cannot   be used as the sole indicator for making changes in treatment regimen   and   each patient should be thoroughly evaluated clinically before changes   in   treatment regimens are made. Each user must establish his or her own   ranges   based on clinical experience.  Therapeutic ranges vary according to the commercial test used, and   therefore   should be established for each commercial test. Values obtained with   different assay methods cannot be used interchangeably due to   differences in   assay methods and cross-reactivity with metabolites, nor should   correction   factors be applied. Therefore, consistent use of one assay for   individual   patients is recommended.     11/30/2023 <2.0 (L) 5.0 - 15.0 ng/mL Final     Comment:     Testing performed by a chemiluminescent microparticle   immunoassay on the [employer] System.    CAUTION: No firm therapeutic range exists for tacrolimus in whole   blood. The   complexity of the clinical state, individual differences in   sensitivity to   immunosuppressive and nephrotoxic effects of tacrolimus,   co-administration   of other immunosuppressants, type of transplant, time post-transplant   and a   number of other factors contribute to different requirements for   optimal   blood levels of tacrolimus. Therefore, individual tacrolimus values   cannot   be used as the sole indicator for making changes in treatment regimen   and   each patient should be thoroughly evaluated clinically before changes   in   treatment regimens are made. Each user must establish his or her own   ranges   based on clinical experience.  Therapeutic ranges vary according to the commercial test used, and   therefore   should be established for each commercial test. Values obtained with   different  "assay methods cannot be used interchangeably due to   differences in   assay methods and cross-reactivity with metabolites, nor should   correction   factors be applied. Therefore, consistent use of one assay for   individual   patients is recommended.       INR   Lab Results   Component Value Date    INR 1.0 11/29/2023    INR 1.0 08/17/2023    INR 1.2 05/21/2018      All labs within the past 24 hours have been reviewed    Microbiology: Blood Culture   Lab Results   Component Value Date    LABBLOO No growth after 5 days. 01/07/2019    and Urine Culture    Lab Results   Component Value Date    LABURIN No significant growth 08/08/2023     Radiology: X-Ray: CXR: X-Ray Chest 1 View (CXR):   Results for orders placed or performed during the hospital encounter of 11/29/23   X-Ray Chest 1 View    Narrative    EXAMINATION:  XR CHEST 1 VIEW    CLINICAL HISTORY:  pre op exam;    TECHNIQUE:  Single frontal view of the chest was performed.    COMPARISON:  08/22/2023    FINDINGS:  Cardiac silhouette appears within normal limits.  Lungs are symmetrically expanded without evidence of confluent airspace consolidation.  No significant volume of pleural fluid or pneumothorax identified.  Osseous structures demonstrate stable degenerative changes.  Vascular graft projects over the left clavicle.      Impression    No radiographic evidence of acute intrathoracic process on this single view..      Electronically signed by: Hudson Sullivan MD  Date:    11/29/2023  Time:    01:48       Discharged Condition: fair    Disposition: to local address    Follow Up: see hospital course    Patient Instructions:   No discharge procedures on file.    Medications:  Reconciled Home Medications:      Medication List        START taking these medications      BD ULTRA-FINE MIRIAM PEN NEEDLE 32 gauge x 5/32" Ndle  Generic drug: pen needle, diabetic  Inject 1 each into the skin 3 (three) times daily.     blood sugar diagnostic Strp  1 each by " Misc.(Non-Drug; Combo Route) route 3 (three) times daily.     blood-glucose meter kit  USE TO TEST BLOOD GLUCOSE     famotidine 20 MG tablet  Commonly known as: PEPCID  Take 1 tablet (20 mg total) by mouth every evening.     HumaLOG KwikPen Insulin 100 unit/mL pen  Generic drug: insulin lispro  Inject 5 Units into the skin 3 (three) times daily with meals. Plus sliding scale: TDD=30 units     lancets Misc  1 each by Misc.(Non-Drug; Combo Route) route 3 (three) times daily.     Lmefol Ca-acetyl-meB12-algal 6 mg-600 mg- 2 mg-90.314 mg Tab  Commonly known as: CEREFOLIN NAC (ALGAL OIL)  Take 1 tablet by mouth once daily.     metOLazone 10 MG tablet  Commonly known as: ZAROXOLYN  Take 1 tablet (10 mg total) by mouth once daily. for 5 days     mycophenolate 250 mg Cap  Commonly known as: CELLCEPT  Take 4 capsules (1,000 mg total) by mouth 2 (two) times daily.     oxyCODONE 5 MG immediate release tablet  Commonly known as: ROXICODONE  Take 1 tablet (5 mg total) by mouth every 4 (four) hours as needed for Pain.     predniSONE 5 MG tablet  Commonly known as: DELTASONE  Take by mouth daily:  20mg 12/2-12/8, 15mg 12/9-12/15, 10mg 12/16-12/22, then 5mg daily beginning 12/23/2023     sodium bicarbonate 650 MG tablet  Take 2 tablets (1,300 mg total) by mouth 2 (two) times daily.     tacrolimus 1 MG Cap  Commonly known as: PROGRAF  Take 7 capsules (7 mg total) by mouth every 12 (twelve) hours.     valGANciclovir 450 mg Tab  Commonly known as: VALCYTE  Take 1 tablet (450 mg total) by mouth every Mon, Wed, Fri. STOP 5/29/24            CHANGE how you take these medications      furosemide 80 MG tablet  Commonly known as: LASIX  Take 1½ tablets (120 mg total) by mouth 2 (two) times daily. for 5 days  What changed:   how much to take  when to take this            CONTINUE taking these medications      atropine 1% 1 % Drop  Commonly known as: ISOPTO ATROPINE  Place 1 drop into the left eye every evening.     COMBIGAN 0.2-0.5 %  Drop  Generic drug: brimonidine-timoloL  Place 1 drop into the right eye every evening.     rosuvastatin 40 MG Tab  Commonly known as: CRESTOR  Take 40 mg by mouth every evening.     VITAMIN D2 50,000 unit Cap  Generic drug: ergocalciferol  Take 50,000 Units by mouth every 7 days. Take 1 capsule by mouth weekly x 12 weeks, then decrease to once per month.            STOP taking these medications      aspirin 81 MG EC tablet  Commonly known as: ECOTRIN     docusate sodium 100 MG capsule  Commonly known as: COLACE     FISH OIL ORAL     l-methylfolate-b2-b6-b12 6-5-50-1 mg Tab  Commonly known as: CEREFOLIN     labetaloL 200 MG tablet  Commonly known as: NORMODYNE     patiromer calcium sorbitex 8.4 gram Pwpk  Commonly known as: VELTASSA     sevelamer carbonate 800 mg Tab  Commonly known as: RENVELA     TRULICITY 3 mg/0.5 mL pen injector  Generic drug: dulaglutide     VITAMIN B-12 500 MCG tablet  Generic drug: cyanocobalamin              Time spent caring for patient (Greater than 1/2 spent in direct face-to-face contact): > 30 minutes    Sangita Garcia NP  Kidney Transplant  [facility] - Transplant Stepdown

## 2023-12-01 NOTE — ASSESSMENT & PLAN NOTE
-  s/p DBD KTX 11/29 (CIT 15hrs, 2 arteries, 1 vein, 2 day correa, donor CMV +, thmyo induction).   - Post op K 6.7. Patient given calcium gluc and had STAT HD with 1L of fluid removed.   - DGF expected based on increased CIT and donor factors including donor LC).  - continued with hyperkalemia over night (11/29PM)  which patient was shifted twice over night.   - K 5.3 this morning.   - Plan for HD today. Midodrine ordered to be given prior to HD.   - Remains anuric with 40ml of urine output since surgery.   - H&H down trending but stable. Repeat CBC and RFP ordered for 1400.   - Kidney transplant US ordered to assess blood flow and for any fluid collections.   - (+) flatus, (-) bowel movement.   - Tolerating a diet. Denies any nausea or vomiting.   - Encourage OOB to chair and ambulation.  - Cont to monitor

## 2023-12-01 NOTE — PROGRESS NOTES
[facility]  Kidney Transplant  Progress Note      Reason for Follow-up: Reassessment of Kidney Transplant - 11/29/2023  (#1) recipient and management of immunosuppression.    ORGAN: LEFT KIDNEY    Donor Type: Donation after Brain Death    Donor CMV Status: Positive  Donor HBcAB:Negative  Donor HCV Status:Negative  Donor HBV CAROLYN:   Donor HCV CAROLYN: Negative      Subjective:   History of Present Illness:  Mary Waller is a 48yr AAF with ESRD secondary to DM nephropathy and HTN. Other PMH includes CAD s/p PCI LAD (2019 at [facility]), and HLD. She started HD 4/20/18. She dialyzes on a MWF schedule, last HD 11/27. She reports occasion hypotension during or after treatments but is able to always complete HD. She has a RUE AV fistula for HD access. Native uop around 1 cup or less per day. .5kg. She is being admitted for kidney transplant surgery. She feels well in her usual state of health. She denies any recent illnesses or hospitalizations. Pre op labs and diagnostics studies pending.       Hospital Course:  Patient is now s/p DBD KTX 11/29 (CIT 15hrs, 2 arteries, 1 vein, 2 day correa, donor CMV +). Post op K 6.7. Patient given calcium gluc and had STAT HD with 1L of fluid removed. DGF expected based on increased CIT and donor factors including donor LC).      Interval History: no acute events overnight. Pt had HD 11/29 and 11/30. 1L removed w last HD on 11/30. Of note, pt w low BP during HD. Plan to cont Midodrine w HD. K+ 5.3 this am. Giving Lokelma. No change in UOP, pt remains oliguric. Cr trending up. Last Kidney US 11/30 showed elevated RIs within the transplant which can be seen in the setting of edema or ATN. US also noted 11.2 x 3.8 x 2.4 cm fluid collection deep in pelvis. Reviewed w surgeon. Tentative plan for HD tomorrow. CBC stable.   Patient is tolerating diet, (+) flatus, (-) bowel movement. She denies any nausea or vomiting. Encourage OOB to chair and ambulate 3 times today. Encourage  IS. VSS. Cont to monitor    Pt has 2 day madeline, kept in place to allow for strict I/O.      Past Medical, Surgical, Family, and Social History:   Unchanged from H&P.    Scheduled Meds:   acetaminophen  650 mg Oral Q8H    antithymocyte globulin (rabbit) 125 mg, hydrocortisone sodium succinate (SOLU-CORTEF) 20 mg in sodium chloride 0.9% 500 mL (FOR PERIPHERAL LINE ADMINISTRATION ONLY)  1.5 mg/kg (Adjusted) Intravenous Daily    atropine 1%  1 drop Left Eye QHS    bisacodyL  10 mg Oral QHS    brimonidine 0.2%  1 drop Right Eye QHS    docusate sodium  100 mg Oral TID    ergocalciferol  50,000 Units Oral Q7 Days    famotidine  20 mg Oral QHS    heparin (porcine)  5,000 Units Subcutaneous Q8H    methylPREDNISolone sodium succinate injection  125 mg Intravenous Once    multivitamin  1 tablet Oral Daily    mupirocin  1 g Nasal BID    mycophenolate  1,000 mg Oral BID    [START ON 12/2/2023] predniSONE  20 mg Oral Daily    sevelamer carbonate  1,600 mg Oral TID WM    tacrolimus  4 mg Oral BID    timolol maleate 0.5%  1 drop Right Eye QHS    [START ON 12/9/2023] valGANciclovir  450 mg Oral Daily AM     Continuous Infusions:   glucagon (human recombinant)       PRN Meds:sodium chloride 0.9%, [COMPLETED] calcium gluconate IVPB **AND** calcium gluconate IVPB, [COMPLETED] calcium gluconate IVPB **AND** calcium gluconate IVPB, dextrose 10%, [COMPLETED] dextrose 10% **AND** dextrose 10% **AND** [COMPLETED] insulin regular, diphenhydrAMINE, EPINEPHrine (PF), glucagon (human recombinant), glucose, glucose, glucose, hydrocortisone sodium succinate, insulin aspart U-100, midodrine, ondansetron, oxyCODONE, prochlorperazine, simethicone, sodium chloride 0.9%, sodium chloride 0.9%, sodium chloride 0.9%, traMADoL    Intake/Output - Last 3 Shifts         11/29 0700  11/30 0659 11/30 0700  12/01 0659 12/01 0700  12/02 0659    I.V. (mL/kg) 2125 (20.2)      Other 200      IV Piggyback 500      Total Intake(mL/kg) 2825 (26.8)      Urine  "(mL/kg/hr) 40 (0) 35 (0)     Other 1700 258     Blood 75      Total Output 1815 293     Net +1010 -293                     Review of Systems   Constitutional:  Negative for appetite change, chills, fatigue and fever.   HENT:  Negative for facial swelling and trouble swallowing.    Eyes:  Positive for visual disturbance.   Respiratory:  Negative for cough, chest tightness, shortness of breath, wheezing and stridor.    Cardiovascular:  Negative for chest pain, palpitations and leg swelling.   Gastrointestinal:  Positive for abdominal distention and abdominal pain. Negative for diarrhea, nausea and vomiting.   Genitourinary:  Positive for decreased urine volume and hematuria. Difficulty urinating: correa in place.  Musculoskeletal:  Negative for back pain and neck pain.   Skin:  Positive for wound. Negative for color change, pallor and rash.   Allergic/Immunologic: Positive for immunocompromised state.   Neurological:  Negative for dizziness, tremors, weakness and headaches.   Psychiatric/Behavioral:  Negative for agitation, behavioral problems, confusion and decreased concentration.       Objective:     Vital Signs (Most Recent):  Temp: 98.3 °F (36.8 °C) (12/01/23 0733)  Pulse: 88 (12/01/23 0733)  Resp: 16 (12/01/23 0904)  BP: 126/60 (12/01/23 0733)  SpO2: 98 % (12/01/23 0733) Vital Signs (24h Range):  Temp:  [97.9 °F (36.6 °C)-98.4 °F (36.9 °C)] 98.3 °F (36.8 °C)  Pulse:  [79-91] 88  Resp:  [16-18] 16  SpO2:  [96 %-100 %] 98 %  BP: ()/(48-66) 126/60     Weight: 105.3 kg (232 lb 4.1 oz)  Height: 5' 5" (165.1 cm)  Body mass index is 38.65 kg/m².    Physical Exam  Vitals and nursing note reviewed.   Constitutional:       General: She is awake. She is not in acute distress.     Appearance: She is obese. She is not toxic-appearing.   Eyes:      General: No scleral icterus.  Cardiovascular:      Rate and Rhythm: Normal rate and regular rhythm.      Pulses: Normal pulses.      Heart sounds: Normal heart sounds. "   Pulmonary:      Effort: Pulmonary effort is normal. No respiratory distress.      Breath sounds: Examination of the right-lower field reveals decreased breath sounds. Examination of the left-lower field reveals decreased breath sounds. Decreased breath sounds present. No wheezing or rales.   Abdominal:      General: Bowel sounds are decreased. There is distension.      Palpations: Abdomen is soft.      Tenderness: There is abdominal tenderness. There is no guarding or rebound.          Comments: RLQ incision CDI with staples intact   Musculoskeletal:         General: Normal range of motion.      Right upper arm: Edema present.      Left upper arm: Edema present.      Cervical back: Neck supple.      Right lower leg: Edema present.      Left lower leg: Edema present.   Skin:     General: Skin is warm and dry.      Capillary Refill: Capillary refill takes 2 to 3 seconds.   Neurological:      Mental Status: She is alert and oriented to person, place, and time.   Psychiatric:         Attention and Perception: Attention normal.         Mood and Affect: Mood normal.         Speech: Speech normal.         Behavior: Behavior normal. Behavior is cooperative.         Thought Content: Thought content normal.         Judgment: Judgment normal.          Laboratory:  CBC:   Recent Labs   Lab 11/30/23  0639 11/30/23  1415 12/01/23  0604   WBC 15.30* 15.14* 8.46   RBC 2.92* 2.70* 2.53*   HGB 9.3* 8.8* 8.1*   HCT 29.8* 27.0* 25.9*   * 115* 107*   * 100* 102*   MCH 31.8* 32.6* 32.0*   MCHC 31.2* 32.6 31.3*       CMP:   Recent Labs   Lab 11/29/23  0056 11/29/23  1233 11/30/23  0639 11/30/23  1415 12/01/23  0604   *   < > 150* 148* 195*   CALCIUM 9.2   < > 8.4* 8.4* 8.1*   ALBUMIN 3.2*   < > 2.6* 2.4* 2.6*   PROT 8.2  --   --   --   --       < > 133* 135* 134*   K 5.6*   < > 5.3* 4.8 5.3*   CO2 25   < > 22* 19* 20*   CL 99   < > 98 105 101   BUN 62*   < > 52* 24* 47*   CREATININE 10.4*   < > 8.1* 4.4* 6.2*  "  ALKPHOS 63  --   --   --   --    ALT 13  --   --   --   --    AST 7*  --   --   --   --     < > = values in this interval not displayed.       Labs within the past 24 hours have been reviewed.    Diagnostic Results:  Kidney transplant US ordered and reviewed   Assessment/Plan:     * S/P kidney transplant  -  s/p DBD KTX 11/29 (CIT 15hrs, 2 arteries, 1 vein, 2 day correa, donor CMV +, thmyo induction).   - Post op K 6.7. Patient given calcium gluc and had STAT HD with 1L of fluid removed.   - DGF expected based on increased CIT and donor factors including donor LC).  - continued with hyperkalemia over night (11/29PM)  which patient was shifted twice over night.   - K 5.3 this morning.   - Plan for HD today. Midodrine ordered to be given prior to HD.   - Remains anuric with 40ml of urine output since surgery.   - H&H down trending but stable. Repeat CBC and RFP ordered for 1400.   - Kidney transplant US ordered to assess blood flow and for any fluid collections.   - (+) flatus, (-) bowel movement.   - Tolerating a diet. Denies any nausea or vomiting.   - Encourage OOB to chair and ambulation.  - Cont to monitor    Acute blood loss anemia  - expected post op  - H&H down trending but stable  - no overt s/s of bleeding  - repeat CBC stable  - KTX US ordered showed satisfactory flow, fluid collection deep in pelvis. Cont to  monitor       Long-term use of immunosuppressant medication  - see "prophylactic immunotherapy"      Hyperkalemia  -see "s/p kidney transplant"  - Lokelma 12/1/23 for k+ 5.3    Delayed graft function of kidney transplant due to ATN requiring acute dialysis  - see "s/p kidney transplant"    Adverse effect of corticosteroids  - Endocrine consulted; appreciate assistance       Prophylactic immunotherapy  Continue prograf, cellcept, steroids. Continue to monitor prograf level daily, monitor for toxic side effects, and adjust for therapeutic dose.         Thrombocytopenia, unspecified  - chronic, likely " "multifactorial  - monitor closely    Anemia of chronic disease  - chronic related to renal disease  - see "acute blood loss anemia"      Class 2 severe obesity due to excess calories with serious comorbidity and body mass index (BMI) of 38.0 to 38.9 in adult  - monitor        Coronary artery disease involving native coronary artery of native heart with angina pectoris  - h/o CAD with PCI to LAD 2019  - hold ASA; restart when appropriate    ESRD (end stage renal disease)  - ESRD 2/2 DM and HTN  - Admit for kidney transplant surgery planned 11/29 at 7am with Dr. Moore  - Pre op labs and diagnostic studies pending, to be reviewed before surgery    Type 2 diabetes mellitus with chronic kidney disease on chronic dialysis  - Endocrine consulted; appreciate recs    Essential hypertension  - hold home meds for now. BP stable  - Midodrine w dialysis for BP support        Discharge Planning:  Discussed plan of care.  No plan for discharge today.    Medical decision making for this encounter includes review of pertinent labs and diagnostic studies, assessment and planning, discussions with consulting providers, discussion with patient/family, and participation in multidisciplinary rounds. Time spent caring for patient: 60 minutes    Sangita Garcia NP  Kidney Transplant  [facility] Trans[facility]  "

## 2023-12-01 NOTE — PROGRESS NOTES
"[facility] - Transplant Stepdown  Endocrinology  Progress Note    Admit Date: 2023     Reason for Consult: Management of T2DM, Hyperglycemia     Surgical Procedure and Date: Kidney Transplant on 23    Diabetes diagnosis year: in her 30s    Home Diabetes Medications:  Trulicity 3mg every 7 days     How often checking glucose at home?  Once daily     BG readings on regimen: 130s  Hypoglycemia on the regimen?  No  Missed doses on regimen?  No    Diabetes Complications include:     Diabetic nephropathy  , Diabetic chronic kidney disease     , and Diabetic retinopathy     Complicating diabetes co morbidities:   ESRD s/p kidney transplant, Glucocorticoid Use, HTN, CAD, HLD     HPI:   Patient is a 48 y.o. female with a diagnosis of ESRD secondary to DM nephropathy and HTN. Other PMH includes CAD s/p PCI LAD (2019 at [facility]), and HLD. Patient now presents for the above procedure(s). Endocrinology consulted for management of T2DM.         Interval HPI:   No acute events overnight. Patient in room 43415/21169 A. Blood glucose worsening. BG at and above goal on current insulin regimen (SSI ). Steroid use- Methylprednisolone  .   2 Days Post-Op  Renal function- Abnormal -   Vasopressors-  None     Diet renal     Eatin%  Nausea: No  Hypoglycemia and intervention: No  Fever: No  TPN and/or TF: No    /60   Pulse 88   Temp 98.3 °F (36.8 °C)   Resp 16   Ht 5' 5" (1.651 m)   Wt 105.3 kg (232 lb 4.1 oz)   SpO2 98%   Breastfeeding No   BMI 38.65 kg/m²     Labs Reviewed and Include    Recent Labs   Lab 23  0604   *   CALCIUM 8.1*   ALBUMIN 2.6*   *   K 5.3*   CO2 20*      BUN 47*   CREATININE 6.2*     Lab Results   Component Value Date    WBC 8.46 2023    HGB 8.1 (L) 2023    HCT 25.9 (L) 2023     (H) 2023     (L) 2023     No results for input(s): "TSH", "FREET4" in the last 168 hours.  Lab Results   Component Value Date    HGBA1C 5.7 " "10/04/2023       Nutritional status:   Body mass index is 38.65 kg/m².  Lab Results   Component Value Date    ALBUMIN 2.6 (L) 12/01/2023    ALBUMIN 2.4 (L) 11/30/2023    ALBUMIN 2.6 (L) 11/30/2023     No results found for: "PREALBUMIN"    Estimated Creatinine Clearance: 13.7 mL/min (A) (based on SCr of 6.2 mg/dL (H)).    Accu-Checks  Recent Labs     11/29/23  2311 11/29/23  2344 11/30/23  0524 11/30/23  0601 11/30/23  0635 11/30/23  0813 11/30/23  1242 11/30/23  1732 11/30/23  2155 12/01/23  0855   POCTGLUCOSE 273* 247* 284* 192* 161* 143* 138* 178* 217* 232*       Current Medications and/or Treatments Impacting Glycemic Control  Immunotherapy:    Immunosuppressants           Stop Route Frequency     tacrolimus capsule 4 mg         -- Oral 2 times daily     antithymocyte globulin (rabbit) 125 mg, hydrocortisone sodium succinate (SOLU-CORTEF) 20 mg in sodium chloride 0.9% 500 mL (FOR PERIPHERAL LINE ADMINISTRATION ONLY)         12/02/23 0859 IV Daily     mycophenolate capsule 1,000 mg         -- Oral 2 times daily          Steroids:   Hormones (From admission, onward)      Start     Stop Route Frequency Ordered    12/02/23 0900  predniSONE tablet 20 mg  (IP TXP KIDNEY POST-OP THYMO WITH PERIPHERAL PRECHECKED)         -- Oral Daily 11/29/23 1220    12/01/23 0800  methylPREDNISolone sodium succinate injection 125 mg  (IP TXP KIDNEY POST-OP THYMO WITH PERIPHERAL PRECHECKED)         -- IV Once 11/29/23 1219    11/30/23 0900  antithymocyte globulin (rabbit) 125 mg, hydrocortisone sodium succinate (SOLU-CORTEF) 20 mg in sodium chloride 0.9% 500 mL (FOR PERIPHERAL LINE ADMINISTRATION ONLY)  (IP TXP KIDNEY POST-OP THYMO WITH PERIPHERAL PRECHECKED)         12/02/23 0859 IV Daily 11/29/23 1219    11/29/23 1316  hydrocortisone sodium succinate injection 100 mg  (IP TXP KIDNEY POST-OP THYMO WITH PERIPHERAL PRECHECKED)         04/27/35 0516 IV Once as needed 11/29/23 1220          Pressors:    Autonomic Drugs (From admission, " onward)      Start     Stop Route Frequency Ordered    11/29/23 1753  midodrine tablet 5 mg         -- Oral As needed (PRN) 11/29/23 1753    11/29/23 1316  EPINEPHrine (PF) injection 1 mg  (IP TXP KIDNEY POST-OP THYMO WITH PERIPHERAL PRECHECKED)         04/27/35 0516 SubQ Once as needed 11/29/23 1220          Hyperglycemia/Diabetes Medications:   Antihyperglycemics (From admission, onward)      Start     Stop Route Frequency Ordered    11/29/23 1314  insulin aspart U-100 pen 0-5 Units  (INSULIN - LOW CORRECTION DOSE (Recommended for BMI <25, GFR<15 or on dialysis, Age > 70, type 1 diabetes, ESLD, severe CHF or patients on <70 units of insulin daily))         -- SubQ Before meals & nightly PRN 11/29/23 1219            ASSESSMENT and PLAN    Renal/  * S/P kidney transplant  Managed per primary team  Optimize BG control        Endocrine  Class 2 severe obesity due to excess calories with serious comorbidity and body mass index (BMI) of 38.0 to 38.9 in adult  Body mass index is 38.65 kg/m².  May increase insulin resistance.         Type 2 diabetes mellitus with chronic kidney disease on chronic dialysis  BG goal 140-180  T2DM.  S/p kidney txp. BG at and above goal.  Will start on scheduled insulin with steroids.    - Start Novolog 3 units with meals.  - Continue Low Dose Correction Scale  - Will monitor closely for prandial excursions with steroid taper and initiate scheduled insulin if indicated  - BG monitoring ac/hs    ** Please call Endocrine for any BG related issues **    Discharge plans: ROSEANNE Pinto PA-C  Endocrinology  [facility] Trans[facility]

## 2023-12-01 NOTE — PROGRESS NOTES
Admit Note     Met with patient, mother, and sister to assess needs. Patient is a 48 y.o. single female, admitted for for kidney transplant.      Kidney transplant status [Z94.0]  ESRD (end stage renal disease) [N18.6]      Patient admitted from home on 11/29/2023 .  At this time, patient presents as alert and oriented x 4, pleasant, good eye contact, well groomed, recall good, concentration/judgement good, average intelligence, calm, communicative, cooperative, and asking and answering questions appropriately.  At this time, patients caregiver presents as alert and oriented x 4, pleasant, good eye contact, well groomed, recall good, concentration/judgement good, average intelligence, calm, communicative, cooperative, and asking and answering questions appropriately.    Household/Family Systems     Patient resides with patient's mother, at [address] [place] [address]   [place] [address].  Support system includes her mother Alejandra Edouard ([phone]), and her sister Susie De Souza ([phone]).  Patient does not have dependents that are need of being cared for.     Patients primary caregiver is Alejandra Edouard, patients mother, phone number [phone].  Confirmed patients contact information is [phone] (home);   Telephone Information:   Mobile [phone]   .    During admission, patient's caregiver plans to stay at home.  Confirmed patient and patients caregivers do have access to reliable transportation.    Cognitive Status/Learning     Patient reports reading ability as college and states patient does not have difficulty with N/A.  Patient reports patient learns best by hands on learning.   Needed: No.   Highest education level: Attended College/Technical School    Vocation/Disability   .  Working for Income: No  If no, reason not working: Disability  Patient is  disabled. 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    Adherence     Patient reports a high  level of adherence to patients health care regimen.  Adherence counseling and education provided. Patient verbalizes understanding.    Substance Use    Patient reports the following substance usage.    Tobacco: none, patient denies any use.  Alcohol: none, patient denies any use.  Illicit Drugs/Non-prescribed Medications: none, patient denies any use.  Patient states clear understanding of the potential impact of substance use.  Substance abstinence/cessation counseling, education and resources provided and reviewed.     Services Utilizing/ADLS    Infusion Service: Prior to admission, patient utilizing? no  Home Health: Prior to admission, patient utilizing? no  DME: Prior to admission, no  Pulmonary/Cardiac Rehab: Prior to admission, no  Dialysis:  Prior to admission, yes Patient was at [facility]. Per MD patients chair time (10:30 am) is too early to accommodate transplant labs. Referral sent to [facility]. [facility] reports patient can have MWF 3pm chair time per Lauren at [facility].   Transplant Specialty Pharmacy:  Prior to admission, no; patient provides permission to release necessary information to specialty pharmacy for medications post-tranplant. Resources provided and patient is choosing [facility] pharmacy at this time.    Prior to admission, patient reports patient was independent with ADLS and was not driving.  Patient reports patient is not able to care for self at this time due to compromised medical condition (as documented in medical record) and physical weakness..  Patient indicates a willingness to care for self once medically cleared to do so.    Insurance/Medications    Insured by   Payer/Plan Subscr  Sex Relation Sub. Ins. ID Effective Group Num   1. [place] - [place]* JEANNE PHILLIP CHA* 1975 Female Self [id number]                                    [address]   2. [place] JEANNE PHILLIP CHA* 1975 Female Self [id number]* [id number]                                    [address] [address]       Primary Insurance (for UNOS reporting): Public Insurance - Medicare FFS (Fee For Service)  Secondary Insurance (for UNOS reporting): Public Insurance - Medicaid    Patient reports patient is able to obtain and afford medications at this time and at time of discharge.    Living Will/Healthcare Power of     Patient states patient does not have a LW and/or HCPA.   provided education regarding LW and HCPA and the completion of forms.    Coping/Mental Health    Patient is coping adequately with the aid of  family members and friends.  Patient denies mental health difficulties. Patient and caregivers denied needing or wanting referrals at this time. Patient agrees to contact transplant team with needs, questions, or concerns as they arise.     Discharge Planning    At time of discharge, patient plans to return to patient's home under the care of Alejandra Edouard.  Patients mother will transport patient.  Per rounds today, expected discharge date has not been medically determined at this time. Patient and patients caregiver  verbalize understanding and are involved in treatment planning and discharge process.    Additional Concerns    Patient's caretaker denies additional needs and/or concerns at this time.  providing ongoing psychosocial support, education, resources and d/c planning as needed.  SW remains available.  remains available. Patient's caregiver verbalizes understanding and agreement with information reviewed,  availability and how to access available resources as needed. Patient denies additional needs and/or concerns at this time. Patient verbalizes understanding and agreement with information reviewed, social work availability, and how to access available resources as needed.

## 2023-12-01 NOTE — PROGRESS NOTES
Transplant Teaching Book given to patient, Mary Waller, on 11/30/23.  During the course of the hospital stay the patient received information regarding kidney transplant. Teaching and instruction were completed.  Areas that were discussed included: how to contact the Transplant Team, the importance of measuring intake of fluids and urine output, and monitoring vital signs such as blood pressure, temperature, and daily weights.  Parameters for which to report abnormal findings were given.  Appointment were provided along with the rational for the importance of lab work and clinic visits.  A written medication list was provided.  The importance of immunosuppressive medications, their common side effects, and treatment to prevent or minimize side effects has been reviewed.  Signs and symptoms of rejection and infection along with various treatments were reviewed.  The need to avoid infection was discussed.  Wound care and special consideration regarding activities of daily living were explained.  Written and verbal teaching of the above information was given.     Discussed with the patient and caregiver the importance of maintaining COVID-19 precautions; wearing a mask, good handwashing, and social distancing.  Also, to report any signs or symptoms (fever, difficulty breathing, loss of taste/smell, etc.), suspected exposure, or COVID testing, immediately to the transplant program.     Education was provided to the patient and her mother.

## 2023-12-01 NOTE — PLAN OF CARE
Pt is AAOx4; afebrile; vital signs stable. Alexandra with small amount of clear red urine, free of clots. -239; meal insulin added. Appetite is good. She is up to the chair and walking short distances, will walk in [place] this evening. Med and betadine painting education done and mother pulling self meds. 2nd thymo dose running with no adverse effects thus far.

## 2023-12-01 NOTE — PLAN OF CARE
Recommendations     1. Continue Renal diet      2. If PO intake <50%, add ONS Novasource renal BID       3. RD to monitor and follow        Goals: Meet % EEN, EPN by RD f/u  Nutrition Goal Status: progressing towards goal  Communication of RD Recs:  (POC)

## 2023-12-01 NOTE — SUBJECTIVE & OBJECTIVE
"Interval HPI:   No acute events overnight. Patient in room 07381/46066 A. Blood glucose worsening. BG at and above goal on current insulin regimen (SSI ). Steroid use- Methylprednisolone  .   2 Days Post-Op  Renal function- Abnormal -   Vasopressors-  None     Diet renal     Eatin%  Nausea: No  Hypoglycemia and intervention: No  Fever: No  TPN and/or TF: No    /60   Pulse 88   Temp 98.3 °F (36.8 °C)   Resp 16   Ht 5' 5" (1.651 m)   Wt 105.3 kg (232 lb 4.1 oz)   SpO2 98%   Breastfeeding No   BMI 38.65 kg/m²     Labs Reviewed and Include    Recent Labs   Lab 23  0604   *   CALCIUM 8.1*   ALBUMIN 2.6*   *   K 5.3*   CO2 20*      BUN 47*   CREATININE 6.2*     Lab Results   Component Value Date    WBC 8.46 2023    HGB 8.1 (L) 2023    HCT 25.9 (L) 2023     (H) 2023     (L) 2023     No results for input(s): "TSH", "FREET4" in the last 168 hours.  Lab Results   Component Value Date    HGBA1C 5.7 10/04/2023       Nutritional status:   Body mass index is 38.65 kg/m².  Lab Results   Component Value Date    ALBUMIN 2.6 (L) 2023    ALBUMIN 2.4 (L) 2023    ALBUMIN 2.6 (L) 2023     No results found for: "PREALBUMIN"    Estimated Creatinine Clearance: 13.7 mL/min (A) (based on SCr of 6.2 mg/dL (H)).    Accu-Checks  Recent Labs     23  2311 23  2344 23  0524 23  0601 23  0635 23  0813 23  1242 23  1732 23  2155 23  0855   POCTGLUCOSE 273* 247* 284* 192* 161* 143* 138* 178* 217* 232*       Current Medications and/or Treatments Impacting Glycemic Control  Immunotherapy:    Immunosuppressants           Stop Route Frequency     tacrolimus capsule 4 mg         -- Oral 2 times daily     antithymocyte globulin (rabbit) 125 mg, hydrocortisone sodium succinate (SOLU-CORTEF) 20 mg in sodium chloride 0.9% 500 mL (FOR PERIPHERAL LINE ADMINISTRATION ONLY)         23 0859 IV " Daily     mycophenolate capsule 1,000 mg         -- Oral 2 times daily          Steroids:   Hormones (From admission, onward)      Start     Stop Route Frequency Ordered    12/02/23 0900  predniSONE tablet 20 mg  (IP TXP KIDNEY POST-OP THYMO WITH PERIPHERAL PRECHECKED)         -- Oral Daily 11/29/23 1220    12/01/23 0800  methylPREDNISolone sodium succinate injection 125 mg  (IP TXP KIDNEY POST-OP THYMO WITH PERIPHERAL PRECHECKED)         -- IV Once 11/29/23 1219    11/30/23 0900  antithymocyte globulin (rabbit) 125 mg, hydrocortisone sodium succinate (SOLU-CORTEF) 20 mg in sodium chloride 0.9% 500 mL (FOR PERIPHERAL LINE ADMINISTRATION ONLY)  (IP TXP KIDNEY POST-OP THYMO WITH PERIPHERAL PRECHECKED)         12/02/23 0859 IV Daily 11/29/23 1219    11/29/23 1316  hydrocortisone sodium succinate injection 100 mg  (IP TXP KIDNEY POST-OP THYMO WITH PERIPHERAL PRECHECKED)         04/27/35 0516 IV Once as needed 11/29/23 1220          Pressors:    Autonomic Drugs (From admission, onward)      Start     Stop Route Frequency Ordered    11/29/23 1753  midodrine tablet 5 mg         -- Oral As needed (PRN) 11/29/23 1753    11/29/23 1316  EPINEPHrine (PF) injection 1 mg  (IP TXP KIDNEY POST-OP THYMO WITH PERIPHERAL PRECHECKED)         04/27/35 0516 SubQ Once as needed 11/29/23 1220          Hyperglycemia/Diabetes Medications:   Antihyperglycemics (From admission, onward)      Start     Stop Route Frequency Ordered    11/29/23 1314  insulin aspart U-100 pen 0-5 Units  (INSULIN - LOW CORRECTION DOSE (Recommended for BMI <25, GFR<15 or on dialysis, Age > 70, type 1 diabetes, ESLD, severe CHF or patients on <70 units of insulin daily))         -- SubQ Before meals & nightly PRN 11/29/23 1219

## 2023-12-01 NOTE — ASSESSMENT & PLAN NOTE
- expected post op  - H&H down trending but stable  - no overt s/s of bleeding  - repeat CBC stable  - KTX US ordered showed satisfactory flow, fluid collection deep in pelvis. Cont to  monitor

## 2023-12-02 LAB
ABO + RH BLD: NORMAL
ALBUMIN SERPL BCP-MCNC: 2.6 G/DL (ref 3.5–5.2)
ANION GAP SERPL CALC-SCNC: 14 MMOL/L (ref 8–16)
BASOPHILS # BLD AUTO: 0 K/UL (ref 0–0.2)
BASOPHILS NFR BLD: 0 % (ref 0–1.9)
BLD GP AB SCN CELLS X3 SERPL QL: NORMAL
BLD PROD TYP BPU: NORMAL
BLD PROD TYP BPU: NORMAL
BLOOD UNIT EXPIRATION DATE: NORMAL
BLOOD UNIT EXPIRATION DATE: NORMAL
BLOOD UNIT TYPE CODE: 6200
BLOOD UNIT TYPE CODE: 6200
BLOOD UNIT TYPE: NORMAL
BLOOD UNIT TYPE: NORMAL
BUN SERPL-MCNC: 83 MG/DL (ref 6–20)
CALCIUM SERPL-MCNC: 7.5 MG/DL (ref 8.7–10.5)
CHLORIDE SERPL-SCNC: 98 MMOL/L (ref 95–110)
CO2 SERPL-SCNC: 19 MMOL/L (ref 23–29)
CODING SYSTEM: NORMAL
CODING SYSTEM: NORMAL
CREAT SERPL-MCNC: 8 MG/DL (ref 0.5–1.4)
CROSSMATCH INTERPRETATION: NORMAL
CROSSMATCH INTERPRETATION: NORMAL
DIFFERENTIAL METHOD: ABNORMAL
DISPENSE STATUS: NORMAL
DISPENSE STATUS: NORMAL
EOSINOPHIL # BLD AUTO: 0 K/UL (ref 0–0.5)
EOSINOPHIL NFR BLD: 0 % (ref 0–8)
ERYTHROCYTE [DISTWIDTH] IN BLOOD BY AUTOMATED COUNT: 13.6 % (ref 11.5–14.5)
EST. GFR  (NO RACE VARIABLE): 5.8 ML/MIN/1.73 M^2
G6PD RBC-CCNT: NORMAL
GLUCOSE SERPL-MCNC: 197 MG/DL (ref 70–110)
HCT VFR BLD AUTO: 21 % (ref 37–48.5)
HGB BLD-MCNC: 6.7 G/DL (ref 12–16)
IMM GRANULOCYTES # BLD AUTO: 0.04 K/UL (ref 0–0.04)
IMM GRANULOCYTES NFR BLD AUTO: 0.5 % (ref 0–0.5)
LYMPHOCYTES # BLD AUTO: 0.2 K/UL (ref 1–4.8)
LYMPHOCYTES NFR BLD: 2.7 % (ref 18–48)
MAGNESIUM SERPL-MCNC: 2.2 MG/DL (ref 1.6–2.6)
MCH RBC QN AUTO: 31.5 PG (ref 27–31)
MCHC RBC AUTO-ENTMCNC: 31.9 G/DL (ref 32–36)
MCV RBC AUTO: 99 FL (ref 82–98)
MONOCYTES # BLD AUTO: 1.2 K/UL (ref 0.3–1)
MONOCYTES NFR BLD: 15.4 % (ref 4–15)
NEUTROPHILS # BLD AUTO: 6.1 K/UL (ref 1.8–7.7)
NEUTROPHILS NFR BLD: 81.4 % (ref 38–73)
NRBC BLD-RTO: 0 /100 WBC
NUM UNITS TRANS PACKED RBC: NORMAL
NUM UNITS TRANS PACKED RBC: NORMAL
PHOSPHATE SERPL-MCNC: 4.6 MG/DL (ref 2.7–4.5)
PLATELET # BLD AUTO: 108 K/UL (ref 150–450)
PMV BLD AUTO: 12.3 FL (ref 9.2–12.9)
POCT GLUCOSE: 182 MG/DL (ref 70–110)
POCT GLUCOSE: 195 MG/DL (ref 70–110)
POCT GLUCOSE: 251 MG/DL (ref 70–110)
POTASSIUM SERPL-SCNC: 5.1 MMOL/L (ref 3.5–5.1)
RBC # BLD AUTO: 2.13 M/UL (ref 4–5.4)
SODIUM SERPL-SCNC: 131 MMOL/L (ref 136–145)
SPECIMEN OUTDATE: NORMAL
TACROLIMUS BLD-MCNC: 7.1 NG/ML (ref 5–15)
WBC # BLD AUTO: 7.52 K/UL (ref 3.9–12.7)

## 2023-12-02 PROCEDURE — 85025 COMPLETE CBC W/AUTO DIFF WBC: CPT

## 2023-12-02 PROCEDURE — 99232 PR SUBSEQUENT HOSPITAL CARE,LEVL II: ICD-10-PCS | Mod: ,,, | Performed by: PHYSICIAN ASSISTANT

## 2023-12-02 PROCEDURE — 25000003 PHARM REV CODE 250: Performed by: NURSE PRACTITIONER

## 2023-12-02 PROCEDURE — 25000003 PHARM REV CODE 250

## 2023-12-02 PROCEDURE — 86920 COMPATIBILITY TEST SPIN: CPT | Performed by: NURSE PRACTITIONER

## 2023-12-02 PROCEDURE — 36430 TRANSFUSION BLD/BLD COMPNT: CPT

## 2023-12-02 PROCEDURE — 86900 BLOOD TYPING SEROLOGIC ABO: CPT | Performed by: TRANSPLANT SURGERY

## 2023-12-02 PROCEDURE — P9016 RBC LEUKOCYTES REDUCED: HCPCS | Performed by: TRANSPLANT SURGERY

## 2023-12-02 PROCEDURE — 25000003 PHARM REV CODE 250: Performed by: INTERNAL MEDICINE

## 2023-12-02 PROCEDURE — 63600175 PHARM REV CODE 636 W HCPCS

## 2023-12-02 PROCEDURE — 90935 PR HEMODIALYSIS, ONE EVALUATION: ICD-10-PCS | Mod: ,,, | Performed by: INTERNAL MEDICINE

## 2023-12-02 PROCEDURE — 99232 SBSQ HOSP IP/OBS MODERATE 35: CPT | Mod: ,,, | Performed by: PHYSICIAN ASSISTANT

## 2023-12-02 PROCEDURE — 20600001 HC STEP DOWN PRIVATE ROOM

## 2023-12-02 PROCEDURE — 80069 RENAL FUNCTION PANEL: CPT

## 2023-12-02 PROCEDURE — 36415 COLL VENOUS BLD VENIPUNCTURE: CPT

## 2023-12-02 PROCEDURE — 80197 ASSAY OF TACROLIMUS: CPT

## 2023-12-02 PROCEDURE — 63600175 PHARM REV CODE 636 W HCPCS: Performed by: INTERNAL MEDICINE

## 2023-12-02 PROCEDURE — 90935 HEMODIALYSIS ONE EVALUATION: CPT | Mod: ,,, | Performed by: INTERNAL MEDICINE

## 2023-12-02 PROCEDURE — 90935 HEMODIALYSIS ONE EVALUATION: CPT

## 2023-12-02 PROCEDURE — 83735 ASSAY OF MAGNESIUM: CPT

## 2023-12-02 PROCEDURE — 86920 COMPATIBILITY TEST SPIN: CPT | Performed by: TRANSPLANT SURGERY

## 2023-12-02 PROCEDURE — 25000003 PHARM REV CODE 250: Performed by: TRANSPLANT SURGERY

## 2023-12-02 RX ORDER — L-MEFOL/A-CYST/MEB12/ALGAL OIL 6-600-2 MG
1 TABLET ORAL DAILY
Status: DISCONTINUED | OUTPATIENT
Start: 2023-12-02 | End: 2023-12-04 | Stop reason: HOSPADM

## 2023-12-02 RX ORDER — SODIUM BICARBONATE 650 MG/1
1300 TABLET ORAL 2 TIMES DAILY
Status: DISCONTINUED | OUTPATIENT
Start: 2023-12-02 | End: 2023-12-04 | Stop reason: HOSPADM

## 2023-12-02 RX ORDER — SODIUM CHLORIDE 9 MG/ML
INJECTION, SOLUTION INTRAVENOUS
Status: DISCONTINUED | OUTPATIENT
Start: 2023-12-02 | End: 2023-12-04

## 2023-12-02 RX ORDER — INSULIN ASPART 100 [IU]/ML
5 INJECTION, SOLUTION INTRAVENOUS; SUBCUTANEOUS
Status: DISCONTINUED | OUTPATIENT
Start: 2023-12-02 | End: 2023-12-04 | Stop reason: HOSPADM

## 2023-12-02 RX ORDER — BRIMONIDINE TARTRATE 2 MG/ML
1 SOLUTION/ DROPS OPHTHALMIC 2 TIMES DAILY
Status: DISCONTINUED | OUTPATIENT
Start: 2023-12-02 | End: 2023-12-04 | Stop reason: HOSPADM

## 2023-12-02 RX ORDER — ATROPINE SULFATE 10 MG/ML
1 SOLUTION/ DROPS OPHTHALMIC 2 TIMES DAILY
Status: DISCONTINUED | OUTPATIENT
Start: 2023-12-02 | End: 2023-12-04 | Stop reason: HOSPADM

## 2023-12-02 RX ORDER — TIMOLOL MALEATE 5 MG/ML
1 SOLUTION/ DROPS OPHTHALMIC 2 TIMES DAILY
Status: DISCONTINUED | OUTPATIENT
Start: 2023-12-02 | End: 2023-12-04 | Stop reason: HOSPADM

## 2023-12-02 RX ORDER — HYDROCODONE BITARTRATE AND ACETAMINOPHEN 500; 5 MG/1; MG/1
TABLET ORAL
Status: DISCONTINUED | OUTPATIENT
Start: 2023-12-02 | End: 2023-12-04

## 2023-12-02 RX ADMIN — SEVELAMER CARBONATE 1600 MG: 800 TABLET, FILM COATED ORAL at 02:12

## 2023-12-02 RX ADMIN — TACROLIMUS 6 MG: 1 CAPSULE ORAL at 09:12

## 2023-12-02 RX ADMIN — BISACODYL 10 MG: 5 TABLET, COATED ORAL at 07:12

## 2023-12-02 RX ADMIN — SODIUM BICARBONATE 1300 MG: 650 TABLET ORAL at 07:12

## 2023-12-02 RX ADMIN — HEPARIN SODIUM 5000 UNITS: 5000 INJECTION INTRAVENOUS; SUBCUTANEOUS at 09:12

## 2023-12-02 RX ADMIN — MUPIROCIN 1 G: 20 OINTMENT TOPICAL at 02:12

## 2023-12-02 RX ADMIN — TACROLIMUS 6 MG: 1 CAPSULE ORAL at 07:12

## 2023-12-02 RX ADMIN — ACETAMINOPHEN 650 MG: 325 TABLET ORAL at 07:12

## 2023-12-02 RX ADMIN — ACETAMINOPHEN 650 MG: 325 TABLET ORAL at 09:12

## 2023-12-02 RX ADMIN — INSULIN ASPART 5 UNITS: 100 INJECTION, SOLUTION INTRAVENOUS; SUBCUTANEOUS at 10:12

## 2023-12-02 RX ADMIN — MIDODRINE HYDROCHLORIDE 10 MG: 5 TABLET ORAL at 06:12

## 2023-12-02 RX ADMIN — HEPARIN SODIUM 5000 UNITS: 5000 INJECTION INTRAVENOUS; SUBCUTANEOUS at 07:12

## 2023-12-02 RX ADMIN — DOCUSATE SODIUM 100 MG: 100 CAPSULE, LIQUID FILLED ORAL at 07:12

## 2023-12-02 RX ADMIN — FUROSEMIDE 120 MG: 40 TABLET ORAL at 07:12

## 2023-12-02 RX ADMIN — MYCOPHENOLATE MOFETIL 1000 MG: 250 CAPSULE ORAL at 09:12

## 2023-12-02 RX ADMIN — INSULIN ASPART 5 UNITS: 100 INJECTION, SOLUTION INTRAVENOUS; SUBCUTANEOUS at 02:12

## 2023-12-02 RX ADMIN — MUPIROCIN 1 G: 20 OINTMENT TOPICAL at 08:12

## 2023-12-02 RX ADMIN — BRIMONIDINE TARTRATE 1 DROP: 2 SOLUTION OPHTHALMIC at 08:12

## 2023-12-02 RX ADMIN — INSULIN ASPART 3 UNITS: 100 INJECTION, SOLUTION INTRAVENOUS; SUBCUTANEOUS at 02:12

## 2023-12-02 RX ADMIN — FUROSEMIDE 120 MG: 40 TABLET ORAL at 10:12

## 2023-12-02 RX ADMIN — SEVELAMER CARBONATE 1600 MG: 800 TABLET, FILM COATED ORAL at 09:12

## 2023-12-02 RX ADMIN — INSULIN ASPART 5 UNITS: 100 INJECTION, SOLUTION INTRAVENOUS; SUBCUTANEOUS at 08:12

## 2023-12-02 RX ADMIN — ATROPINE SULFATE 1 DROP: 10 SOLUTION/ DROPS OPHTHALMIC at 08:12

## 2023-12-02 RX ADMIN — PREDNISONE 20 MG: 20 TABLET ORAL at 09:12

## 2023-12-02 RX ADMIN — Medication 1 TABLET: at 08:12

## 2023-12-02 RX ADMIN — TRAMADOL HYDROCHLORIDE 50 MG: 50 TABLET, COATED ORAL at 09:12

## 2023-12-02 RX ADMIN — TIMOLOL MALEATE 1 DROP: 5 SOLUTION OPHTHALMIC at 08:12

## 2023-12-02 RX ADMIN — FAMOTIDINE 20 MG: 20 TABLET, FILM COATED ORAL at 07:12

## 2023-12-02 RX ADMIN — SEVELAMER CARBONATE 1600 MG: 800 TABLET, FILM COATED ORAL at 08:12

## 2023-12-02 NOTE — PROGRESS NOTES
Patient arrived in a wheelchair to dialysis unit.   Report received from primary nurse  VS's per dialysis Flowsheet.     Hemodialysis initiated using the following:     Dialysis Access: left arm fistula     Needle size: 15G  Insertion with no complications.  Type and screen collected and sent to lab, notified MD that Type and Screen needs to be collected and sent. Ok to start HD at this time. Pt due 1 Unit of PRBCs on today. Awaiting lab results     Will Maintain telemetry and blood pressure monitoring throughout treatment.  Refer to dialysis flowsheet and MAR for details.

## 2023-12-02 NOTE — SUBJECTIVE & OBJECTIVE
"Interval HPI:   No acute events overnight. Patient in room 30888/75321 A. Blood glucose worsening. BG at and above goal on current insulin regimen (SSI ). Steroid use- Pred 20.   2 Days Post-Op  Renal function- Abnormal -   Vasopressors-  None      Diet renal      Eatin%  Nausea: No  Hypoglycemia and intervention: No  Fever: No  TPN and/or TF: No    BP (!) 157/64 (BP Location: Left leg, Patient Position: Lying)   Pulse 81   Temp 98 °F (36.7 °C) (Oral)   Resp 15   Ht 5' 5" (1.651 m)   Wt 105.3 kg (232 lb 4.1 oz)   SpO2 100%   Breastfeeding No   BMI 38.65 kg/m²     Labs Reviewed and Include    No results for input(s): "GLU", "CALCIUM", "ALBUMIN", "PROT", "NA", "K", "CO2", "CL", "BUN", "CREATININE", "ALKPHOS", "ALT", "AST", "BILITOT" in the last 24 hours.  Lab Results   Component Value Date    WBC 8.46 2023    HGB 8.1 (L) 2023    HCT 25.9 (L) 2023     (H) 2023     (L) 2023     No results for input(s): "TSH", "FREET4" in the last 168 hours.  Lab Results   Component Value Date    HGBA1C 5.7 10/04/2023       Nutritional status:   Body mass index is 38.65 kg/m².  Lab Results   Component Value Date    ALBUMIN 2.6 (L) 2023    ALBUMIN 2.4 (L) 2023    ALBUMIN 2.6 (L) 2023     No results found for: "PREALBUMIN"    Estimated Creatinine Clearance: 13.7 mL/min (A) (based on SCr of 6.2 mg/dL (H)).    Accu-Checks  Recent Labs     23  0601 23  0635 23  0813 23  1242 23  1732 23  2155 23  0855 23  1256 23  1735 23   POCTGLUCOSE 192* 161* 143* 138* 178* 217* 232* 198* 239* 305*       Current Medications and/or Treatments Impacting Glycemic Control  Immunotherapy:    Immunosuppressants           Stop Route Frequency     tacrolimus capsule 6 mg         -- Oral 2 times daily     mycophenolate capsule 1,000 mg         -- Oral 2 times daily          Steroids:   Hormones (From admission, onward)      " Start     Stop Route Frequency Ordered    12/02/23 0900  predniSONE tablet 20 mg  (IP TXP KIDNEY POST-OP THYMO WITH PERIPHERAL PRECHECKED)         -- Oral Daily 11/29/23 1220    11/29/23 1316  hydrocortisone sodium succinate injection 100 mg  (IP TXP KIDNEY POST-OP THYMO WITH PERIPHERAL PRECHECKED)         04/27/35 0516 IV Once as needed 11/29/23 1220          Pressors:    Autonomic Drugs (From admission, onward)      Start     Stop Route Frequency Ordered    12/01/23 0951  midodrine tablet 10 mg         -- Oral On Call Procedure 12/01/23 0951    11/29/23 1316  EPINEPHrine (PF) injection 1 mg  (IP TXP KIDNEY POST-OP THYMO WITH PERIPHERAL PRECHECKED)         04/27/35 0516 SubQ Once as needed 11/29/23 1220          Hyperglycemia/Diabetes Medications:   Antihyperglycemics (From admission, onward)      Start     Stop Route Frequency Ordered    12/02/23 0715  insulin aspart U-100 pen 5 Units         -- SubQ 3 times daily with meals 12/02/23 0702    11/29/23 1314  insulin aspart U-100 pen 0-5 Units  (INSULIN - LOW CORRECTION DOSE (Recommended for BMI <25, GFR<15 or on dialysis, Age > 70, type 1 diabetes, ESLD, severe CHF or patients on <70 units of insulin daily))         -- SubQ Before meals & nightly PRN 11/29/23 1219

## 2023-12-02 NOTE — PLAN OF CARE
Pt AAOx4. VSS, afebrile, on room air. NSR on tele monitoring. Pain controlled w/ PRN Tramadol. Pt on renal diet w/ good appetite, eating >75% of meals. Blood glucose monitoring ACHS. HD this afternoon, 1 U RBC ordered w/ HD. Pt OOBTC & ambulated in hallways w/ mother & this RN. Pt's mother pulled PM meds. Blue card updated & self-med bin restocked. Pt updated on plan of care.

## 2023-12-02 NOTE — PROGRESS NOTES
[facility]  Kidney Transplant  Progress Note      Reason for Follow-up: Reassessment of Kidney Transplant - 11/29/2023  (#1) recipient and management of immunosuppression.    ORGAN: LEFT KIDNEY    Donor Type: Donation after Brain Death    Donor CMV Status: Positive  Donor HBcAB:Negative  Donor HCV Status:Negative  Donor HBV CAROLYN:   Donor HCV CAROLYN: Negative      Subjective:   History of Present Illness:  Mary Waller is a 48yr AAF with ESRD secondary to DM nephropathy and HTN. Other PMH includes CAD s/p PCI LAD (2019 at [facility]), and HLD. She started HD 4/20/18. She dialyzes on a MWF schedule, last HD 11/27. She reports occasion hypotension during or after treatments but is able to always complete HD. She has a RUE AV fistula for HD access. Native uop around 1 cup or less per day. .5kg. She is being admitted for kidney transplant surgery. She feels well in her usual state of health. She denies any recent illnesses or hospitalizations. Pre op labs and diagnostics studies pending.     Ms. Waller is a 48 y.o. year old female who is status post Kidney Transplant - 11/29/2023  (#1).  Her maintenance immunosuppression consists of:   Immunosuppressants (From admission, onward)      Start     Stop Route Frequency Ordered    12/01/23 1800  tacrolimus capsule 6 mg  (IP TXP KIDNEY POST-OP THYMO WITH PERIPHERAL PRECHECKED)         -- Oral 2 times daily 12/01/23 1321    11/29/23 1330  mycophenolate capsule 1,000 mg  (IP TXP KIDNEY POST-OP THYMO WITH PERIPHERAL PRECHECKED)         -- Oral 2 times daily 11/29/23 1220            Her post transplant course has been complicated by delayed graft function requiring dialysis.    Hospital Course:  Patient is now s/p DBD KTX 11/29 (CIT 15hrs, 2 arteries, 1 vein, 2 day correa, donor CMV +). Post op K 6.7. Patient given calcium gluc and had STAT HD with 1L of fluid removed. DGF expected based on increased CIT and donor factors including donor LC).      Interval  History:   ABHILASH  No complaints this AM  Requesting correa removal so that she can mobilize more  Correa with blood tinged urine but no clots present; will plan for removal today  Discussed restarting her home eye medication and lasix with Nephrology  Will need dialysis today  Cr elevated and having minimal UOP consistent with DGF      Past Medical, Surgical, Family, and Social History:   Unchanged from H&P.    Scheduled Meds:   acetaminophen  650 mg Oral Q8H    atropine 1%  1 drop Left Eye QHS    bisacodyL  10 mg Oral QHS    brimonidine 0.2%  1 drop Right Eye QHS    docusate sodium  100 mg Oral TID    ergocalciferol  50,000 Units Oral Q7 Days    famotidine  20 mg Oral QHS    furosemide  120 mg Oral BID    heparin (porcine)  5,000 Units Subcutaneous Q8H    insulin aspart U-100  5 Units Subcutaneous TIDWM    multivitamin  1 tablet Oral Daily    mupirocin  1 g Nasal BID    mycophenolate  1,000 mg Oral BID    predniSONE  20 mg Oral Daily    sevelamer carbonate  1,600 mg Oral TID WM    sodium bicarbonate  1,300 mg Oral BID    tacrolimus  6 mg Oral BID    timolol maleate 0.5%  1 drop Right Eye QHS    [START ON 12/9/2023] valGANciclovir  450 mg Oral Daily AM     Continuous Infusions:   glucagon (human recombinant)       PRN Meds:0.9%  NaCl infusion (for blood administration), sodium chloride 0.9%, [COMPLETED] calcium gluconate IVPB **AND** calcium gluconate IVPB, [COMPLETED] calcium gluconate IVPB **AND** calcium gluconate IVPB, dextrose 10%, [COMPLETED] dextrose 10% **AND** dextrose 10% **AND** [COMPLETED] insulin regular, diphenhydrAMINE, EPINEPHrine (PF), glucagon (human recombinant), glucose, glucose, glucose, hydrocortisone sodium succinate, insulin aspart U-100, midodrine, ondansetron, oxyCODONE, prochlorperazine, simethicone, sodium chloride 0.9%, sodium chloride 0.9%, sodium chloride 0.9%, sodium chloride 0.9%, traMADoL    Intake/Output - Last 3 Shifts         11/30 0700 12/01 0659 12/01 0700 12/02 0659 12/02  "0700  12/03 0659    P.O.  1140 180    I.V. (mL/kg)       Other       IV Piggyback       Total Intake(mL/kg)  1140 (10.8) 180 (1.7)    Urine (mL/kg/hr) 35 (0) 50 (0)     Other 258      Blood       Total Output 293 50     Net -293 +1090 +180                    Review of Systems   Constitutional:  Negative for appetite change, chills, fatigue and fever.   HENT:  Negative for facial swelling and trouble swallowing.    Eyes:  Positive for visual disturbance.   Respiratory:  Negative for cough, chest tightness, shortness of breath, wheezing and stridor.    Cardiovascular:  Negative for chest pain, palpitations and leg swelling.   Gastrointestinal:  Negative for abdominal distention, abdominal pain, diarrhea, nausea and vomiting.   Genitourinary:  Positive for decreased urine volume and hematuria. Difficulty urinating: correa in place.  Musculoskeletal:  Negative for back pain and neck pain.   Skin:  Positive for wound. Negative for color change, pallor and rash.   Allergic/Immunologic: Positive for immunocompromised state.   Neurological:  Negative for dizziness, tremors, weakness and headaches.   Psychiatric/Behavioral:  Negative for agitation, behavioral problems, confusion and decreased concentration.       Objective:     Vital Signs (Most Recent):  Temp: 98.1 °F (36.7 °C) (12/02/23 1133)  Pulse: 87 (12/02/23 1133)  Resp: 20 (12/02/23 1133)  BP: (!) 148/70 (12/02/23 1133)  SpO2: 99 % (12/02/23 1133) Vital Signs (24h Range):  Temp:  [97.9 °F (36.6 °C)-98.8 °F (37.1 °C)] 98.1 °F (36.7 °C)  Pulse:  [81-94] 87  Resp:  [12-20] 20  SpO2:  [94 %-100 %] 99 %  BP: (120-172)/(57-73) 148/70     Weight: 105.3 kg (232 lb 4.1 oz)  Height: 5' 5" (165.1 cm)  Body mass index is 38.65 kg/m².     Physical Exam  Vitals and nursing note reviewed.   Constitutional:       General: She is awake. She is not in acute distress.     Appearance: She is obese. She is not toxic-appearing.   Eyes:      General: No scleral icterus.  Cardiovascular:     "  Rate and Rhythm: Normal rate and regular rhythm.      Pulses: Normal pulses.      Heart sounds: Normal heart sounds.   Pulmonary:      Effort: Pulmonary effort is normal. No respiratory distress.      Breath sounds: Examination of the right-lower field reveals decreased breath sounds. Examination of the left-lower field reveals decreased breath sounds. Decreased breath sounds present. No wheezing or rales.   Abdominal:      General: Bowel sounds are decreased. There is distension.      Palpations: Abdomen is soft.      Tenderness: There is no abdominal tenderness. There is no guarding or rebound.          Comments: RLQ incision CDI with staples intact   Genitourinary:     Comments: Alexandra with blood tinged urine  Musculoskeletal:         General: Normal range of motion.      Right upper arm: Edema present.      Left upper arm: Edema present.      Cervical back: Neck supple.      Right lower leg: Edema present.      Left lower leg: Edema present.   Skin:     General: Skin is warm and dry.      Capillary Refill: Capillary refill takes 2 to 3 seconds.   Neurological:      Mental Status: She is alert and oriented to person, place, and time.   Psychiatric:         Attention and Perception: Attention normal.         Mood and Affect: Mood normal.         Speech: Speech normal.         Behavior: Behavior normal. Behavior is cooperative.          Laboratory:  CBC:   Recent Labs   Lab 11/30/23  1415 12/01/23  0604 12/02/23  0715   WBC 15.14* 8.46 7.52   RBC 2.70* 2.53* 2.13*   HGB 8.8* 8.1* 6.7*   HCT 27.0* 25.9* 21.0*   * 107* 108*   * 102* 99*   MCH 32.6* 32.0* 31.5*   MCHC 32.6 31.3* 31.9*     BMP:   Recent Labs   Lab 11/30/23  1415 12/01/23  0604 12/02/23  0715   * 195* 197*   * 134* 131*   K 4.8 5.3* 5.1    101 98   CO2 19* 20* 19*   BUN 24* 47* 83*   CREATININE 4.4* 6.2* 8.0*   CALCIUM 8.4* 8.1* 7.5*       Diagnostic Results:  US - Kidney: Results for orders placed during the hospital  "encounter of 11/29/23    US Transplant Kidney With Doppler    Narrative  EXAMINATION:  US TRANSPLANT KIDNEY WITH DOPPLER    CLINICAL HISTORY:  s/p POD 1 kidney tx;    TECHNIQUE:  Real time gray scale and doppler ultrasound was performed over the patient's renal allograft.    COMPARISON:  None    FINDINGS:  Renal allograft in the right lower quadrant.  The allograft measures 10.9 cm. Satisfactory perfusion. No hydronephrosis.    Stent present.    Deep to the incision is an 11.2 x 3.8 x 2.4 cm fluid collection.    Bladder is not distended.      Vasculature:    Resistive indices are elevated and range from 0.82 to 1.0.    There are 2 main renal arteries on a patch.  Main renal artery peak systolic velocity: 141 and 101cm/sec with normal waveform.    Renal artery/iliac ratio: 0.51 and 0.36.    The main renal vein is patent.    Impression  Elevated resistive indices within the transplant, can be seen in the setting of edema or ATN.    Fluid collection deep to the incision.    This report was flagged in Epic as abnormal.      Electronically signed by: Vivian Lozada MD  Date:    11/30/2023  Time:    16:02  Assessment/Plan:     * S/P kidney transplant  -  s/p DBD KTX 11/29 (CIT 15hrs, 2 arteries, 1 vein, 2 day correa, donor CMV +, thmyo induction).   - Post op K 6.7. Patient given calcium gluc and had STAT HD with 1L of fluid removed.   - DGF expected based on increased CIT and donor factors including donor LC).  - persistent hyperkalemia requiring shifting preop and HD immediately postop; given Lokelma POD1  - will have HD again today   - correa removal today    Acute blood loss anemia  - expected post op  - H&H down trending but stable  - no overt s/s of bleeding  - repeat CBC stable  - KTX US ordered showed satisfactory flow, fluid collection deep in pelvis. Cont to  monitor       Long-term use of immunosuppressant medication  - see "prophylactic immunotherapy"      Hyperkalemia  -see "s/p kidney transplant"  - Lokelma " "12/1/23 for k+ 5.3    Delayed graft function of kidney transplant due to ATN requiring acute dialysis  - see "s/p kidney transplant"    Adverse effect of corticosteroids  - Endocrine consulted; appreciate assistance       Prophylactic immunotherapy  Continue prograf, cellcept, steroids. Continue to monitor prograf level daily, monitor for toxic side effects, and adjust for therapeutic dose.         Thrombocytopenia, unspecified  - chronic, likely multifactorial  - monitor closely    Anemia of chronic disease  - chronic related to renal disease  - see "acute blood loss anemia"      Class 2 severe obesity due to excess calories with serious comorbidity and body mass index (BMI) of 38.0 to 38.9 in adult  - monitor        Coronary artery disease involving native coronary artery of native heart with angina pectoris  - h/o CAD with PCI to LAD 2019  - hold ASA; restart when appropriate    ESRD (end stage renal disease)  - ESRD 2/2 DM and HTN  - Admit for kidney transplant surgery planned 11/29 at 7am with Dr. Moore  - Pre op labs and diagnostic studies pending, to be reviewed before surgery    Type 2 diabetes mellitus with chronic kidney disease on chronic dialysis  - Endocrine consulted; appreciate recs    Essential hypertension  - hold home meds for now. BP stable  - Midodrine w dialysis for BP support        Discharge Planning:  Not ready for discharge    Medical decision making for this encounter includes review of pertinent labs and diagnostic studies, assessment and planning, discussions with consulting providers, discussion with patient/family, and participation in multidisciplinary rounds. Time spent caring for patient: 60 minutes    Dominique Quinteros MD  Kidney Transplant  [facility]  "

## 2023-12-02 NOTE — PLAN OF CARE
PLAN OF CARE NOTE     Fall bundle in place. Pt. Remained free from falls/rauma/injuries. No complaints of CP/ SOB/discomfort. VSS. A&Ox4. Tele, NSR. Pt. Reports pain this shift, given PRN Oxy & scheduled Tylenol. Pt. Completed Thymo #3/3 completed w/o complications. Pts' incision cleansed w/iodine & new dressing applied due to scant serosangoues drainage; staples intact. Self-meds pulled by pts' mother, done so @ 100%. Total UOP via correa was 50 cc. Pt. Potentially will undergo HD today, no scheduled time. Pt. Ambulated well to chair & in room. The POC reviewed w/ pt. & pts' mother, questions were answered & encouraged. No further concerns at this time.

## 2023-12-02 NOTE — ASSESSMENT & PLAN NOTE
BG goal 140-180  T2DM.  S/p kidney txp. BG at and above goal.  Will increase scheduled insulin with steroids.    - Increase Novolog 5 units with meals.  - Continue Low Dose Correction Scale  - BG monitoring ac/hs    ** Please call Endocrine for any BG related issues **    Discharge plans: TBD

## 2023-12-02 NOTE — ASSESSMENT & PLAN NOTE
-  s/p DBD KTX 11/29 (CIT 15hrs, 2 arteries, 1 vein, 2 day correa, donor CMV +, thmyo induction).   - Post op K 6.7. Patient given calcium gluc and had STAT HD with 1L of fluid removed.   - DGF expected based on increased CIT and donor factors including donor LC).  - persistent hyperkalemia requiring shifting preop and HD immediately postop; given Lokelma POD1  - will have HD again today   - correa removal today

## 2023-12-02 NOTE — PROGRESS NOTES
"[facility] - Transplant Stepdown  Endocrinology  Progress Note    Admit Date: 2023     Reason for Consult: Management of T2DM, Hyperglycemia     Surgical Procedure and Date: Kidney Transplant on 23    Diabetes diagnosis year: in her 30s    Home Diabetes Medications:  Trulicity 3mg every 7 days     How often checking glucose at home?  Once daily     BG readings on regimen: 130s  Hypoglycemia on the regimen?  No  Missed doses on regimen?  No    Diabetes Complications include:     Diabetic nephropathy  , Diabetic chronic kidney disease     , and Diabetic retinopathy     Complicating diabetes co morbidities:   ESRD s/p kidney transplant, Glucocorticoid Use, HTN, CAD, HLD     HPI:   Patient is a 48 y.o. female with a diagnosis of ESRD secondary to DM nephropathy and HTN. Other PMH includes CAD s/p PCI LAD (2019 at [facility]), and HLD. Patient now presents for the above procedure(s). Endocrinology consulted for management of T2DM.         Interval HPI:   No acute events overnight. Patient in room 09568/27839 A. Blood glucose worsening. BG at and above goal on current insulin regimen (SSI ). Steroid use- Pred 20.   2 Days Post-Op  Renal function- Abnormal -   Vasopressors-  None      Diet renal      Eatin%  Nausea: No  Hypoglycemia and intervention: No  Fever: No  TPN and/or TF: No    BP (!) 157/64 (BP Location: Left leg, Patient Position: Lying)   Pulse 81   Temp 98 °F (36.7 °C) (Oral)   Resp 15   Ht 5' 5" (1.651 m)   Wt 105.3 kg (232 lb 4.1 oz)   SpO2 100%   Breastfeeding No   BMI 38.65 kg/m²     Labs Reviewed and Include    No results for input(s): "GLU", "CALCIUM", "ALBUMIN", "PROT", "NA", "K", "CO2", "CL", "BUN", "CREATININE", "ALKPHOS", "ALT", "AST", "BILITOT" in the last 24 hours.  Lab Results   Component Value Date    WBC 8.46 2023    HGB 8.1 (L) 2023    HCT 25.9 (L) 2023     (H) 2023     (L) 2023     No results for input(s): "TSH", "FREET4" in the " "last 168 hours.  Lab Results   Component Value Date    HGBA1C 5.7 10/04/2023       Nutritional status:   Body mass index is 38.65 kg/m².  Lab Results   Component Value Date    ALBUMIN 2.6 (L) 12/01/2023    ALBUMIN 2.4 (L) 11/30/2023    ALBUMIN 2.6 (L) 11/30/2023     No results found for: "PREALBUMIN"    Estimated Creatinine Clearance: 13.7 mL/min (A) (based on SCr of 6.2 mg/dL (H)).    Accu-Checks  Recent Labs     11/30/23  0601 11/30/23  0635 11/30/23  0813 11/30/23  1242 11/30/23  1732 11/30/23  2155 12/01/23  0855 12/01/23  1256 12/01/23  1735 12/01/23  2205   POCTGLUCOSE 192* 161* 143* 138* 178* 217* 232* 198* 239* 305*       Current Medications and/or Treatments Impacting Glycemic Control  Immunotherapy:    Immunosuppressants           Stop Route Frequency     tacrolimus capsule 6 mg         -- Oral 2 times daily     mycophenolate capsule 1,000 mg         -- Oral 2 times daily          Steroids:   Hormones (From admission, onward)      Start     Stop Route Frequency Ordered    12/02/23 0900  predniSONE tablet 20 mg  (IP TXP KIDNEY POST-OP THYMO WITH PERIPHERAL PRECHECKED)         -- Oral Daily 11/29/23 1220    11/29/23 1316  hydrocortisone sodium succinate injection 100 mg  (IP TXP KIDNEY POST-OP THYMO WITH PERIPHERAL PRECHECKED)         04/27/35 0516 IV Once as needed 11/29/23 1220          Pressors:    Autonomic Drugs (From admission, onward)      Start     Stop Route Frequency Ordered    12/01/23 0951  midodrine tablet 10 mg         -- Oral On Call Procedure 12/01/23 0951    11/29/23 1316  EPINEPHrine (PF) injection 1 mg  (IP TXP KIDNEY POST-OP THYMO WITH PERIPHERAL PRECHECKED)         04/27/35 0516 SubQ Once as needed 11/29/23 1220          Hyperglycemia/Diabetes Medications:   Antihyperglycemics (From admission, onward)      Start     Stop Route Frequency Ordered    12/02/23 0715  insulin aspart U-100 pen 5 Units         -- SubQ 3 times daily with meals 12/02/23 0702    11/29/23 1314  insulin aspart U-100 " pen 0-5 Units  (INSULIN - LOW CORRECTION DOSE (Recommended for BMI <25, GFR<15 or on dialysis, Age > 70, type 1 diabetes, ESLD, severe CHF or patients on <70 units of insulin daily))         -- SubQ Before meals & nightly PRN 11/29/23 1219            ASSESSMENT and PLAN    Renal/  * S/P kidney transplant  Managed per primary team  Optimize BG control        Endocrine  Class 2 severe obesity due to excess calories with serious comorbidity and body mass index (BMI) of 38.0 to 38.9 in adult  Body mass index is 38.65 kg/m².  May increase insulin resistance.         Type 2 diabetes mellitus with chronic kidney disease on chronic dialysis  BG goal 140-180  T2DM.  S/p kidney txp. BG at and above goal.  Will increase scheduled insulin with steroids.    - Increase Novolog 5 units with meals.  - Continue Low Dose Correction Scale  - BG monitoring ac/hs    ** Please call Endocrine for any BG related issues **    Discharge plans: ROSEANNE Pinto PA-C  Endocrinology  [facility] Trans[facility]

## 2023-12-02 NOTE — SUBJECTIVE & OBJECTIVE
Subjective:   History of Present Illness:  Mary Waller is a 48yr AAF with ESRD secondary to DM nephropathy and HTN. Other PMH includes CAD s/p PCI LAD (2019 at [facility]), and HLD. She started HD 4/20/18. She dialyzes on a MWF schedule, last HD 11/27. She reports occasion hypotension during or after treatments but is able to always complete HD. She has a RUE AV fistula for HD access. Native uop around 1 cup or less per day. .5kg. She is being admitted for kidney transplant surgery. She feels well in her usual state of health. She denies any recent illnesses or hospitalizations. Pre op labs and diagnostics studies pending.     Ms. Waller is a 48 y.o. year old female who is status post Kidney Transplant - 11/29/2023  (#1).  Her maintenance immunosuppression consists of:   Immunosuppressants (From admission, onward)      Start     Stop Route Frequency Ordered    12/01/23 1800  tacrolimus capsule 6 mg  (IP TXP KIDNEY POST-OP THYMO WITH PERIPHERAL PRECHECKED)         -- Oral 2 times daily 12/01/23 1321    11/29/23 1330  mycophenolate capsule 1,000 mg  (IP TXP KIDNEY POST-OP THYMO WITH PERIPHERAL PRECHECKED)         -- Oral 2 times daily 11/29/23 1220            Her post transplant course has been complicated by delayed graft function requiring dialysis.    Hospital Course:  Patient is now s/p DBD KTX 11/29 (CIT 15hrs, 2 arteries, 1 vein, 2 day correa, donor CMV +). Post op K 6.7. Patient given calcium gluc and had STAT HD with 1L of fluid removed. DGF expected based on increased CIT and donor factors including donor LC).      Interval History:   NAEON  No complaints this AM  Requesting correa removal so that she can mobilize more  Correa with blood tinged urine but no clots present; will plan for removal today  Discussed restarting her home eye medication and lasix with Nephrology  Will need dialysis today  Cr elevated and having minimal UOP consistent with DGF      Past Medical, Surgical, Family, and Social History:    Unchanged from H&P.    Scheduled Meds:   acetaminophen  650 mg Oral Q8H    atropine 1%  1 drop Left Eye QHS    bisacodyL  10 mg Oral QHS    brimonidine 0.2%  1 drop Right Eye QHS    docusate sodium  100 mg Oral TID    ergocalciferol  50,000 Units Oral Q7 Days    famotidine  20 mg Oral QHS    furosemide  120 mg Oral BID    heparin (porcine)  5,000 Units Subcutaneous Q8H    insulin aspart U-100  5 Units Subcutaneous TIDWM    multivitamin  1 tablet Oral Daily    mupirocin  1 g Nasal BID    mycophenolate  1,000 mg Oral BID    predniSONE  20 mg Oral Daily    sevelamer carbonate  1,600 mg Oral TID WM    sodium bicarbonate  1,300 mg Oral BID    tacrolimus  6 mg Oral BID    timolol maleate 0.5%  1 drop Right Eye QHS    [START ON 12/9/2023] valGANciclovir  450 mg Oral Daily AM     Continuous Infusions:   glucagon (human recombinant)       PRN Meds:0.9%  NaCl infusion (for blood administration), sodium chloride 0.9%, [COMPLETED] calcium gluconate IVPB **AND** calcium gluconate IVPB, [COMPLETED] calcium gluconate IVPB **AND** calcium gluconate IVPB, dextrose 10%, [COMPLETED] dextrose 10% **AND** dextrose 10% **AND** [COMPLETED] insulin regular, diphenhydrAMINE, EPINEPHrine (PF), glucagon (human recombinant), glucose, glucose, glucose, hydrocortisone sodium succinate, insulin aspart U-100, midodrine, ondansetron, oxyCODONE, prochlorperazine, simethicone, sodium chloride 0.9%, sodium chloride 0.9%, sodium chloride 0.9%, sodium chloride 0.9%, traMADoL    Intake/Output - Last 3 Shifts         11/30 0700  12/01 0659 12/01 0700  12/02 0659 12/02 0700  12/03 0659    P.O.  1140 180    I.V. (mL/kg)       Other       IV Piggyback       Total Intake(mL/kg)  1140 (10.8) 180 (1.7)    Urine (mL/kg/hr) 35 (0) 50 (0)     Other 258      Blood       Total Output 293 50     Net -293 +1090 +180                    Review of Systems   Constitutional:  Negative for appetite change, chills, fatigue and fever.   HENT:  Negative for facial swelling  "and trouble swallowing.    Eyes:  Positive for visual disturbance.   Respiratory:  Negative for cough, chest tightness, shortness of breath, wheezing and stridor.    Cardiovascular:  Negative for chest pain, palpitations and leg swelling.   Gastrointestinal:  Negative for abdominal distention, abdominal pain, diarrhea, nausea and vomiting.   Genitourinary:  Positive for decreased urine volume and hematuria. Difficulty urinating: correa in place.  Musculoskeletal:  Negative for back pain and neck pain.   Skin:  Positive for wound. Negative for color change, pallor and rash.   Allergic/Immunologic: Positive for immunocompromised state.   Neurological:  Negative for dizziness, tremors, weakness and headaches.   Psychiatric/Behavioral:  Negative for agitation, behavioral problems, confusion and decreased concentration.       Objective:     Vital Signs (Most Recent):  Temp: 98.1 °F (36.7 °C) (12/02/23 1133)  Pulse: 87 (12/02/23 1133)  Resp: 20 (12/02/23 1133)  BP: (!) 148/70 (12/02/23 1133)  SpO2: 99 % (12/02/23 1133) Vital Signs (24h Range):  Temp:  [97.9 °F (36.6 °C)-98.8 °F (37.1 °C)] 98.1 °F (36.7 °C)  Pulse:  [81-94] 87  Resp:  [12-20] 20  SpO2:  [94 %-100 %] 99 %  BP: (120-172)/(57-73) 148/70     Weight: 105.3 kg (232 lb 4.1 oz)  Height: 5' 5" (165.1 cm)  Body mass index is 38.65 kg/m².     Physical Exam  Vitals and nursing note reviewed.   Constitutional:       General: She is awake. She is not in acute distress.     Appearance: She is obese. She is not toxic-appearing.   Eyes:      General: No scleral icterus.  Cardiovascular:      Rate and Rhythm: Normal rate and regular rhythm.      Pulses: Normal pulses.      Heart sounds: Normal heart sounds.   Pulmonary:      Effort: Pulmonary effort is normal. No respiratory distress.      Breath sounds: Examination of the right-lower field reveals decreased breath sounds. Examination of the left-lower field reveals decreased breath sounds. Decreased breath sounds present. " No wheezing or rales.   Abdominal:      General: Bowel sounds are decreased. There is distension.      Palpations: Abdomen is soft.      Tenderness: There is no abdominal tenderness. There is no guarding or rebound.          Comments: RLQ incision CDI with staples intact   Genitourinary:     Comments: Alexandra with blood tinged urine  Musculoskeletal:         General: Normal range of motion.      Right upper arm: Edema present.      Left upper arm: Edema present.      Cervical back: Neck supple.      Right lower leg: Edema present.      Left lower leg: Edema present.   Skin:     General: Skin is warm and dry.      Capillary Refill: Capillary refill takes 2 to 3 seconds.   Neurological:      Mental Status: She is alert and oriented to person, place, and time.   Psychiatric:         Attention and Perception: Attention normal.         Mood and Affect: Mood normal.         Speech: Speech normal.         Behavior: Behavior normal. Behavior is cooperative.          Laboratory:  CBC:   Recent Labs   Lab 11/30/23  1415 12/01/23  0604 12/02/23  0715   WBC 15.14* 8.46 7.52   RBC 2.70* 2.53* 2.13*   HGB 8.8* 8.1* 6.7*   HCT 27.0* 25.9* 21.0*   * 107* 108*   * 102* 99*   MCH 32.6* 32.0* 31.5*   MCHC 32.6 31.3* 31.9*     BMP:   Recent Labs   Lab 11/30/23  1415 12/01/23  0604 12/02/23  0715   * 195* 197*   * 134* 131*   K 4.8 5.3* 5.1    101 98   CO2 19* 20* 19*   BUN 24* 47* 83*   CREATININE 4.4* 6.2* 8.0*   CALCIUM 8.4* 8.1* 7.5*       Diagnostic Results:  US - Kidney: Results for orders placed during the hospital encounter of 11/29/23    US Transplant Kidney With Doppler    Narrative  EXAMINATION:  US TRANSPLANT KIDNEY WITH DOPPLER    CLINICAL HISTORY:  s/p POD 1 kidney tx;    TECHNIQUE:  Real time gray scale and doppler ultrasound was performed over the patient's renal allograft.    COMPARISON:  None    FINDINGS:  Renal allograft in the right lower quadrant.  The allograft measures 10.9 cm.  Satisfactory perfusion. No hydronephrosis.    Stent present.    Deep to the incision is an 11.2 x 3.8 x 2.4 cm fluid collection.    Bladder is not distended.      Vasculature:    Resistive indices are elevated and range from 0.82 to 1.0.    There are 2 main renal arteries on a patch.  Main renal artery peak systolic velocity: 141 and 101cm/sec with normal waveform.    Renal artery/iliac ratio: 0.51 and 0.36.    The main renal vein is patent.    Impression  Elevated resistive indices within the transplant, can be seen in the setting of edema or ATN.    Fluid collection deep to the incision.    This report was flagged in Epic as abnormal.      Electronically signed by: Vivian Lozada MD  Date:    11/30/2023  Time:    16:02

## 2023-12-03 LAB
ALBUMIN SERPL BCP-MCNC: 2.7 G/DL (ref 3.5–5.2)
ANION GAP SERPL CALC-SCNC: 13 MMOL/L (ref 8–16)
BASOPHILS # BLD AUTO: 0 K/UL (ref 0–0.2)
BASOPHILS NFR BLD: 0 % (ref 0–1.9)
BUN SERPL-MCNC: 59 MG/DL (ref 6–20)
CALCIUM SERPL-MCNC: 7.8 MG/DL (ref 8.7–10.5)
CHLORIDE SERPL-SCNC: 99 MMOL/L (ref 95–110)
CO2 SERPL-SCNC: 22 MMOL/L (ref 23–29)
CREAT SERPL-MCNC: 5.9 MG/DL (ref 0.5–1.4)
DIFFERENTIAL METHOD: ABNORMAL
EOSINOPHIL # BLD AUTO: 0 K/UL (ref 0–0.5)
EOSINOPHIL NFR BLD: 0.5 % (ref 0–8)
ERYTHROCYTE [DISTWIDTH] IN BLOOD BY AUTOMATED COUNT: 15.3 % (ref 11.5–14.5)
EST. GFR  (NO RACE VARIABLE): 8.4 ML/MIN/1.73 M^2
GLUCOSE SERPL-MCNC: 120 MG/DL (ref 70–110)
HCT VFR BLD AUTO: 24.4 % (ref 37–48.5)
HGB BLD-MCNC: 8 G/DL (ref 12–16)
IMM GRANULOCYTES # BLD AUTO: 0.04 K/UL (ref 0–0.04)
IMM GRANULOCYTES NFR BLD AUTO: 0.5 % (ref 0–0.5)
LYMPHOCYTES # BLD AUTO: 0.3 K/UL (ref 1–4.8)
LYMPHOCYTES NFR BLD: 3.5 % (ref 18–48)
MAGNESIUM SERPL-MCNC: 2.1 MG/DL (ref 1.6–2.6)
MCH RBC QN AUTO: 31.6 PG (ref 27–31)
MCHC RBC AUTO-ENTMCNC: 32.8 G/DL (ref 32–36)
MCV RBC AUTO: 96 FL (ref 82–98)
MONOCYTES # BLD AUTO: 1 K/UL (ref 0.3–1)
MONOCYTES NFR BLD: 12 % (ref 4–15)
NEUTROPHILS # BLD AUTO: 7.1 K/UL (ref 1.8–7.7)
NEUTROPHILS NFR BLD: 83.5 % (ref 38–73)
NRBC BLD-RTO: 0 /100 WBC
PHOSPHATE SERPL-MCNC: 3.6 MG/DL (ref 2.7–4.5)
PLATELET # BLD AUTO: 122 K/UL (ref 150–450)
PMV BLD AUTO: 11.8 FL (ref 9.2–12.9)
POCT GLUCOSE: 106 MG/DL (ref 70–110)
POCT GLUCOSE: 175 MG/DL (ref 70–110)
POCT GLUCOSE: 180 MG/DL (ref 70–110)
POCT GLUCOSE: 205 MG/DL (ref 70–110)
POTASSIUM SERPL-SCNC: 3.9 MMOL/L (ref 3.5–5.1)
RBC # BLD AUTO: 2.53 M/UL (ref 4–5.4)
SODIUM SERPL-SCNC: 134 MMOL/L (ref 136–145)
TACROLIMUS BLD-MCNC: 6.3 NG/ML (ref 5–15)
WBC # BLD AUTO: 8.55 K/UL (ref 3.9–12.7)

## 2023-12-03 PROCEDURE — 80197 ASSAY OF TACROLIMUS: CPT

## 2023-12-03 PROCEDURE — 20600001 HC STEP DOWN PRIVATE ROOM

## 2023-12-03 PROCEDURE — 63600175 PHARM REV CODE 636 W HCPCS: Performed by: INTERNAL MEDICINE

## 2023-12-03 PROCEDURE — 25000003 PHARM REV CODE 250

## 2023-12-03 PROCEDURE — 25000003 PHARM REV CODE 250: Performed by: PHYSICIAN ASSISTANT

## 2023-12-03 PROCEDURE — 83735 ASSAY OF MAGNESIUM: CPT

## 2023-12-03 PROCEDURE — 99233 SBSQ HOSP IP/OBS HIGH 50: CPT | Mod: ,,, | Performed by: NURSE PRACTITIONER

## 2023-12-03 PROCEDURE — 80069 RENAL FUNCTION PANEL: CPT

## 2023-12-03 PROCEDURE — 63600175 PHARM REV CODE 636 W HCPCS

## 2023-12-03 PROCEDURE — 25000003 PHARM REV CODE 250: Performed by: NURSE PRACTITIONER

## 2023-12-03 PROCEDURE — 25000003 PHARM REV CODE 250: Performed by: INTERNAL MEDICINE

## 2023-12-03 PROCEDURE — 25000003 PHARM REV CODE 250: Performed by: TRANSPLANT SURGERY

## 2023-12-03 PROCEDURE — 99232 SBSQ HOSP IP/OBS MODERATE 35: CPT | Mod: ,,, | Performed by: PHYSICIAN ASSISTANT

## 2023-12-03 PROCEDURE — 85025 COMPLETE CBC W/AUTO DIFF WBC: CPT

## 2023-12-03 PROCEDURE — 99233 PR SUBSEQUENT HOSPITAL CARE,LEVL III: ICD-10-PCS | Mod: ,,, | Performed by: NURSE PRACTITIONER

## 2023-12-03 PROCEDURE — 99232 PR SUBSEQUENT HOSPITAL CARE,LEVL II: ICD-10-PCS | Mod: ,,, | Performed by: PHYSICIAN ASSISTANT

## 2023-12-03 PROCEDURE — 82955 ASSAY OF G6PD ENZYME: CPT | Performed by: INTERNAL MEDICINE

## 2023-12-03 RX ORDER — TACROLIMUS 1 MG/1
7 CAPSULE ORAL 2 TIMES DAILY
Status: DISCONTINUED | OUTPATIENT
Start: 2023-12-03 | End: 2023-12-04 | Stop reason: HOSPADM

## 2023-12-03 RX ORDER — SODIUM CHLORIDE 9 MG/ML
INJECTION, SOLUTION INTRAVENOUS
Status: DISCONTINUED | OUTPATIENT
Start: 2023-12-04 | End: 2023-12-04 | Stop reason: HOSPADM

## 2023-12-03 RX ORDER — CALCIUM CARBONATE 200(500)MG
500 TABLET,CHEWABLE ORAL 2 TIMES DAILY PRN
Status: DISCONTINUED | OUTPATIENT
Start: 2023-12-03 | End: 2023-12-04 | Stop reason: HOSPADM

## 2023-12-03 RX ADMIN — SEVELAMER CARBONATE 1600 MG: 800 TABLET, FILM COATED ORAL at 09:12

## 2023-12-03 RX ADMIN — HEPARIN SODIUM 5000 UNITS: 5000 INJECTION INTRAVENOUS; SUBCUTANEOUS at 06:12

## 2023-12-03 RX ADMIN — THERA TABS 1 TABLET: TAB at 09:12

## 2023-12-03 RX ADMIN — DOCUSATE SODIUM 100 MG: 100 CAPSULE, LIQUID FILLED ORAL at 03:12

## 2023-12-03 RX ADMIN — BRIMONIDINE TARTRATE 1 DROP: 2 SOLUTION OPHTHALMIC at 08:12

## 2023-12-03 RX ADMIN — INSULIN ASPART 5 UNITS: 100 INJECTION, SOLUTION INTRAVENOUS; SUBCUTANEOUS at 01:12

## 2023-12-03 RX ADMIN — SEVELAMER CARBONATE 1600 MG: 800 TABLET, FILM COATED ORAL at 06:12

## 2023-12-03 RX ADMIN — ACETAMINOPHEN 650 MG: 325 TABLET ORAL at 06:12

## 2023-12-03 RX ADMIN — OXYCODONE HYDROCHLORIDE 5 MG: 5 TABLET ORAL at 06:12

## 2023-12-03 RX ADMIN — HEPARIN SODIUM 5000 UNITS: 5000 INJECTION INTRAVENOUS; SUBCUTANEOUS at 03:12

## 2023-12-03 RX ADMIN — ACETAMINOPHEN 650 MG: 325 TABLET ORAL at 08:12

## 2023-12-03 RX ADMIN — INSULIN ASPART 2 UNITS: 100 INJECTION, SOLUTION INTRAVENOUS; SUBCUTANEOUS at 06:12

## 2023-12-03 RX ADMIN — MUPIROCIN 1 G: 20 OINTMENT TOPICAL at 09:12

## 2023-12-03 RX ADMIN — ATROPINE SULFATE 1 DROP: 10 SOLUTION/ DROPS OPHTHALMIC at 09:12

## 2023-12-03 RX ADMIN — ATROPINE SULFATE 1 DROP: 10 SOLUTION/ DROPS OPHTHALMIC at 08:12

## 2023-12-03 RX ADMIN — TRAMADOL HYDROCHLORIDE 50 MG: 50 TABLET, COATED ORAL at 09:12

## 2023-12-03 RX ADMIN — PREDNISONE 20 MG: 20 TABLET ORAL at 09:12

## 2023-12-03 RX ADMIN — SODIUM BICARBONATE 1300 MG: 650 TABLET ORAL at 09:12

## 2023-12-03 RX ADMIN — MUPIROCIN 1 G: 20 OINTMENT TOPICAL at 08:12

## 2023-12-03 RX ADMIN — BRIMONIDINE TARTRATE 1 DROP: 2 SOLUTION OPHTHALMIC at 09:12

## 2023-12-03 RX ADMIN — TIMOLOL MALEATE 1 DROP: 5 SOLUTION OPHTHALMIC at 09:12

## 2023-12-03 RX ADMIN — Medication 1 TABLET: at 09:12

## 2023-12-03 RX ADMIN — MYCOPHENOLATE MOFETIL 1000 MG: 250 CAPSULE ORAL at 09:12

## 2023-12-03 RX ADMIN — SEVELAMER CARBONATE 1600 MG: 800 TABLET, FILM COATED ORAL at 01:12

## 2023-12-03 RX ADMIN — TACROLIMUS 7 MG: 1 CAPSULE ORAL at 06:12

## 2023-12-03 RX ADMIN — CALCIUM CARBONATE (ANTACID) CHEW TAB 500 MG 500 MG: 500 CHEW TAB at 08:12

## 2023-12-03 RX ADMIN — TIMOLOL MALEATE 1 DROP: 5 SOLUTION OPHTHALMIC at 08:12

## 2023-12-03 RX ADMIN — INSULIN ASPART 5 UNITS: 100 INJECTION, SOLUTION INTRAVENOUS; SUBCUTANEOUS at 06:12

## 2023-12-03 RX ADMIN — DOCUSATE SODIUM 100 MG: 100 CAPSULE, LIQUID FILLED ORAL at 09:12

## 2023-12-03 RX ADMIN — FUROSEMIDE 120 MG: 40 TABLET ORAL at 06:12

## 2023-12-03 RX ADMIN — TACROLIMUS 6 MG: 1 CAPSULE ORAL at 09:12

## 2023-12-03 RX ADMIN — INSULIN ASPART 5 UNITS: 100 INJECTION, SOLUTION INTRAVENOUS; SUBCUTANEOUS at 09:12

## 2023-12-03 RX ADMIN — TRAMADOL HYDROCHLORIDE 50 MG: 50 TABLET, COATED ORAL at 04:12

## 2023-12-03 RX ADMIN — HEPARIN SODIUM 5000 UNITS: 5000 INJECTION INTRAVENOUS; SUBCUTANEOUS at 08:12

## 2023-12-03 RX ADMIN — SODIUM BICARBONATE 1300 MG: 650 TABLET ORAL at 08:12

## 2023-12-03 RX ADMIN — ACETAMINOPHEN 650 MG: 325 TABLET ORAL at 03:12

## 2023-12-03 RX ADMIN — FAMOTIDINE 20 MG: 20 TABLET, FILM COATED ORAL at 08:12

## 2023-12-03 RX ADMIN — MYCOPHENOLATE MOFETIL 1000 MG: 250 CAPSULE ORAL at 08:12

## 2023-12-03 RX ADMIN — FUROSEMIDE 120 MG: 40 TABLET ORAL at 09:12

## 2023-12-03 RX ADMIN — DOCUSATE SODIUM 100 MG: 100 CAPSULE, LIQUID FILLED ORAL at 08:12

## 2023-12-03 RX ADMIN — BISACODYL 10 MG: 5 TABLET, COATED ORAL at 08:12

## 2023-12-03 NOTE — PROGRESS NOTES
[facility] - Transplant Stepdown  Kidney Transplant  Progress Note      Reason for Follow-up: Reassessment of Kidney Transplant - 11/29/2023  (#1) recipient and management of immunosuppression.    ORGAN: LEFT KIDNEY    Donor Type: Donation after Brain Death    Donor CMV Status: Positive  Donor HBcAB:Negative  Donor HCV Status:Negative  Donor HBV CAROLYN:   Donor HCV CAROLYN: Negative      Subjective:   History of Present Illness:  Mary Waller is a 48yr AAF with ESRD secondary to DM nephropathy and HTN. Other PMH includes CAD s/p PCI LAD (2019 at [facility]), and HLD. She started HD 4/20/18. She dialyzes on a MWF schedule, last HD 11/27. She reports occasion hypotension during or after treatments but is able to always complete HD. She has a RUE AV fistula for HD access. Native uop around 1 cup or less per day. .5kg. She is being admitted for kidney transplant surgery. She feels well in her usual state of health. She denies any recent illnesses or hospitalizations. Pre op labs and diagnostics studies pending.     Ms. Waller is a 48 y.o. year old female who is status post Kidney Transplant - 11/29/2023  (#1).  Her maintenance immunosuppression consists of:   Immunosuppressants (From admission, onward)      Start     Stop Route Frequency Ordered    12/01/23 1800  tacrolimus capsule 6 mg  (IP TXP KIDNEY POST-OP THYMO WITH PERIPHERAL PRECHECKED)         -- Oral 2 times daily 12/01/23 1321    11/29/23 1330  mycophenolate capsule 1,000 mg  (IP TXP KIDNEY POST-OP THYMO WITH PERIPHERAL PRECHECKED)         -- Oral 2 times daily 11/29/23 1220            Her post transplant course has been complicated by delayed graft function requiring dialysis.    Hospital Course:  Patient is now s/p DBD KTX 11/29 (CIT 15hrs, 2 arteries, 1 vein, 2 day correa, donor CMV +). Post op K 6.7. Patient given calcium gluc and had STAT HD with 1L of fluid removed. DGF expected based on increased CIT and donor factors including donor LC).      Interval  History:   NAEON  No complaints this AM  Alexandra with blood tinged urine but no clots removed on 12/2  HD 3.5 liters removed on 12/2, rec'd 1 unit of PRBC   Cr elevated and having minimal UOP consistent with DGF      Past Medical, Surgical, Family, and Social History:   Unchanged from H&P.    Scheduled Meds:   acetaminophen  650 mg Oral Q8H    atropine 1%  1 drop Left Eye BID    bisacodyL  10 mg Oral QHS    brimonidine 0.2%  1 drop Right Eye BID    docusate sodium  100 mg Oral TID    ergocalciferol  50,000 Units Oral Q7 Days    famotidine  20 mg Oral QHS    furosemide  120 mg Oral BID    heparin (porcine)  5,000 Units Subcutaneous Q8H    insulin aspart U-100  5 Units Subcutaneous TIDWM    Lmefol Ca-acetyl-meB12-algal  1 tablet Oral Daily    multivitamin  1 tablet Oral Daily    mupirocin  1 g Nasal BID    mycophenolate  1,000 mg Oral BID    predniSONE  20 mg Oral Daily    sevelamer carbonate  1,600 mg Oral TID WM    sodium bicarbonate  1,300 mg Oral BID    tacrolimus  6 mg Oral BID    timolol maleate 0.5%  1 drop Right Eye BID    [START ON 12/9/2023] valGANciclovir  450 mg Oral Daily AM     Continuous Infusions:   glucagon (human recombinant)       PRN Meds:0.9%  NaCl infusion (for blood administration), sodium chloride 0.9%, [COMPLETED] calcium gluconate IVPB **AND** calcium gluconate IVPB, [COMPLETED] calcium gluconate IVPB **AND** calcium gluconate IVPB, dextrose 10%, [COMPLETED] dextrose 10% **AND** dextrose 10% **AND** [COMPLETED] insulin regular, diphenhydrAMINE, EPINEPHrine (PF), glucagon (human recombinant), glucose, glucose, glucose, hydrocortisone sodium succinate, insulin aspart U-100, midodrine, ondansetron, oxyCODONE, prochlorperazine, simethicone, sodium chloride 0.9%, sodium chloride 0.9%, sodium chloride 0.9%, sodium chloride 0.9%, traMADoL    Intake/Output - Last 3 Shifts         12/01 0700  12/02 0659 12/02 0700  12/03 0659 12/03 0700  12/04 0659    P.O. 1140 1020     Blood  295     Other  300      "Total Intake(mL/kg) 1140 (10.8) 1615 (14.8)     Urine (mL/kg/hr) 50 (0) 125 (0)     Other  4005     Stool  0     Total Output 50 4130     Net +1090 -2515            Urine Occurrence  1 x     Stool Occurrence  1 x              Review of Systems   Constitutional:  Negative for appetite change, chills, fatigue and fever.   HENT:  Negative for facial swelling and trouble swallowing.    Eyes:  Positive for visual disturbance.   Respiratory:  Negative for cough, chest tightness, shortness of breath, wheezing and stridor.    Cardiovascular:  Negative for chest pain, palpitations and leg swelling.   Gastrointestinal:  Negative for abdominal distention, abdominal pain, diarrhea, nausea and vomiting.   Genitourinary:  Positive for decreased urine volume and hematuria. Difficulty urinating: correa in place.  Musculoskeletal:  Negative for back pain and neck pain.   Skin:  Positive for wound. Negative for color change, pallor and rash.   Allergic/Immunologic: Positive for immunocompromised state.   Neurological:  Negative for dizziness, tremors, weakness and headaches.   Psychiatric/Behavioral:  Negative for agitation, behavioral problems, confusion and decreased concentration.       Objective:     Vital Signs (Most Recent):  Temp: 97.5 °F (36.4 °C) (12/03/23 0733)  Pulse: 83 (12/03/23 0733)  Resp: 16 (12/03/23 0915)  BP: (!) 148/67 (12/03/23 0733)  SpO2: 98 % (12/03/23 0733) Vital Signs (24h Range):  Temp:  [97.5 °F (36.4 °C)-99 °F (37.2 °C)] 97.5 °F (36.4 °C)  Pulse:  [61-98] 83  Resp:  [15-20] 16  SpO2:  [98 %-100 %] 98 %  BP: (103-196)/(60-97) 148/67     Weight: 109.2 kg (240 lb 11.9 oz)  Height: 5' 5" (165.1 cm)  Body mass index is 40.06 kg/m².     Physical Exam  Vitals and nursing note reviewed.   Constitutional:       General: She is awake. She is not in acute distress.     Appearance: She is obese. She is not toxic-appearing.   Eyes:      General: No scleral icterus.  Cardiovascular:      Rate and Rhythm: Normal rate and " regular rhythm.      Pulses: Normal pulses.      Heart sounds: Normal heart sounds.   Pulmonary:      Effort: Pulmonary effort is normal. No respiratory distress.      Breath sounds: Examination of the right-lower field reveals decreased breath sounds. Examination of the left-lower field reveals decreased breath sounds. Decreased breath sounds present. No wheezing or rales.   Abdominal:      General: Bowel sounds are decreased. There is distension.      Palpations: Abdomen is soft.      Tenderness: There is no abdominal tenderness. There is no guarding or rebound.          Comments: RLQ incision CDI with staples intact   Genitourinary:     Comments: Alexandra with blood tinged urine  Musculoskeletal:         General: Normal range of motion.      Right upper arm: Edema present.      Left upper arm: Edema present.      Cervical back: Neck supple.      Right lower leg: Edema present.      Left lower leg: Edema present.   Skin:     General: Skin is warm and dry.      Capillary Refill: Capillary refill takes 2 to 3 seconds.   Neurological:      Mental Status: She is alert and oriented to person, place, and time.   Psychiatric:         Attention and Perception: Attention normal.         Mood and Affect: Mood normal.         Speech: Speech normal.         Behavior: Behavior normal. Behavior is cooperative.          Laboratory:  CBC:   Recent Labs   Lab 12/01/23  0604 12/02/23  0715 12/03/23  0640   WBC 8.46 7.52 8.55   RBC 2.53* 2.13* 2.53*   HGB 8.1* 6.7* 8.0*   HCT 25.9* 21.0* 24.4*   * 108* 122*   * 99* 96   MCH 32.0* 31.5* 31.6*   MCHC 31.3* 31.9* 32.8     BMP:   Recent Labs   Lab 12/01/23  0604 12/02/23  0715 12/03/23  0640   * 197* 120*   * 131* 134*   K 5.3* 5.1 3.9    98 99   CO2 20* 19* 22*   BUN 47* 83* 59*   CREATININE 6.2* 8.0* 5.9*   CALCIUM 8.1* 7.5* 7.8*       Diagnostic Results:  US - Kidney: Results for orders placed during the hospital encounter of 11/29/23    US Transplant  "Kidney With Doppler    Narrative  EXAMINATION:  US TRANSPLANT KIDNEY WITH DOPPLER    CLINICAL HISTORY:  s/p POD 1 kidney tx;    TECHNIQUE:  Real time gray scale and doppler ultrasound was performed over the patient's renal allograft.    COMPARISON:  None    FINDINGS:  Renal allograft in the right lower quadrant.  The allograft measures 10.9 cm. Satisfactory perfusion. No hydronephrosis.    Stent present.    Deep to the incision is an 11.2 x 3.8 x 2.4 cm fluid collection.    Bladder is not distended.      Vasculature:    Resistive indices are elevated and range from 0.82 to 1.0.    There are 2 main renal arteries on a patch.  Main renal artery peak systolic velocity: 141 and 101cm/sec with normal waveform.    Renal artery/iliac ratio: 0.51 and 0.36.    The main renal vein is patent.    Impression  Elevated resistive indices within the transplant, can be seen in the setting of edema or ATN.    Fluid collection deep to the incision.    This report was flagged in Epic as abnormal.      Electronically signed by: Vivian Lozada MD  Date:    11/30/2023  Time:    16:02  Assessment/Plan:     * S/P kidney transplant  -  s/p DBD KTX 11/29 (CIT 15hrs, 2 arteries, 1 vein, 2 day correa, donor CMV +, thmyo induction).   - Post op K 6.7. Patient given calcium gluc and had STAT HD with 1L of fluid removed.   - DGF expected based on increased CIT and donor factors including donor LC).  - persistent hyperkalemia requiring shifting preop and HD immediately postop; given Lokelma POD1  - d/c'd correa 12/2  - HD 12/2 - rec'd 1 unit PRBC after HD     Acute blood loss anemia  - expected post op  - H&H down trending but stable  - no overt s/s of bleeding  - repeat CBC stable  - KTX US ordered showed satisfactory flow, fluid collection deep in pelvis. Cont to  monitor       Long-term use of immunosuppressant medication  - see "prophylactic immunotherapy"      Hyperkalemia  -see "s/p kidney transplant"  - Abigailkelma 12/1/23 for k+ 5.3    Delayed " "graft function of kidney transplant due to ATN requiring acute dialysis  - see "s/p kidney transplant"    Adverse effect of corticosteroids  - Endocrine consulted; appreciate assistance       Prophylactic immunotherapy  Continue prograf, cellcept, steroids. Continue to monitor prograf level daily, monitor for toxic side effects, and adjust for therapeutic dose.         Thrombocytopenia, unspecified  - chronic, likely multifactorial  - monitor closely    Anemia of chronic disease  - chronic related to renal disease  - see "acute blood loss anemia"      Class 2 severe obesity due to excess calories with serious comorbidity and body mass index (BMI) of 38.0 to 38.9 in adult  - monitor        Coronary artery disease involving native coronary artery of native heart with angina pectoris  - h/o CAD with PCI to LAD   - hold ASA; restart when appropriate    ESRD (end stage renal disease)  - ESRD 2/2 DM and HTN  - Admit for kidney transplant surgery planned  at 7am with Dr. Moore  - Pre op labs and diagnostic studies pending, to be reviewed before surgery    Type 2 diabetes mellitus with chronic kidney disease on chronic dialysis  - Endocrine consulted; appreciate recs    Essential hypertension  - hold home meds for now. BP stable  - Midodrine w dialysis for BP support        Discharge Plannin-2 days   Medical decision making for this encounter includes review of pertinent labs and diagnostic studies, assessment and planning, discussions with consulting providers, discussion with patient/family, and participation in multidisciplinary rounds. Time spent caring for patient: 30 minutes    RIP Hurt  Kidney Transplant  [facility] - Transplant Step[facility]  "

## 2023-12-03 NOTE — PROGRESS NOTES
Late Entry  Seen in TOD yesterday afternoon, and weight verified x2 at 112.8, significantly above .5 kg.  Noted hypertensive; midodrine ordered prn in case BP drops to allow UF.  Start of dialysis tolerated well.

## 2023-12-03 NOTE — ASSESSMENT & PLAN NOTE
"- see "s/p kidney transplant"  " [FreeTextEntry1] : Pt is an 83 y/o F who presents for f/u HTN.  She has PMH non-obstructive CAD (cardiac cath about 2011 at [facility], Dr Ramirez, as per pt), HTN, HLD, NIDDM, ESPERANZA, IBS.  TTE 6/2017 shows normal LV function with mild TR/AI, min MR.  BP has been well controlled at home. She does not get regular exercise but is active and able to take care of her ADLs.  She denies any anginal symptoms at this time.\par \par

## 2023-12-03 NOTE — ASSESSMENT & PLAN NOTE
BG goal 140-180  T2DM.  S/p kidney txp. BG improving at or near goal.  Will continue scheduled insulin with steroids.    - Continue Novolog 5 units with meals.  - Continue Low Dose Correction Scale  - BG monitoring ac/hs    ** Please call Endocrine for any BG related issues **    Discharge plans: TBD

## 2023-12-03 NOTE — SUBJECTIVE & OBJECTIVE
"Interval HPI:   No acute events overnight. Patient in room 99494/76958 A. Blood glucose improving. BG at and above goal on current insulin regimen (SSI ). Steroid use- Pred 20.   2 Days Post-Op  Renal function- Abnormal -   Vasopressors-  None      Diet renal      Eatin%  Nausea: No  Hypoglycemia and intervention: No  Fever: No  TPN and/or TF: No    BP (!) 148/67   Pulse 83   Temp 97.5 °F (36.4 °C)   Resp 20   Ht 5' 5" (1.651 m)   Wt 109.2 kg (240 lb 11.9 oz)   SpO2 98%   Breastfeeding No   BMI 40.06 kg/m²     Labs Reviewed and Include    Recent Labs   Lab 23  0640   *   CALCIUM 7.8*   ALBUMIN 2.7*   *   K 3.9   CO2 22*   CL 99   BUN 59*   CREATININE 5.9*     Lab Results   Component Value Date    WBC 8.55 2023    HGB 8.0 (L) 2023    HCT 24.4 (L) 2023    MCV 96 2023     (L) 2023     No results for input(s): "TSH", "FREET4" in the last 168 hours.  Lab Results   Component Value Date    HGBA1C 5.7 10/04/2023       Nutritional status:   Body mass index is 40.06 kg/m².  Lab Results   Component Value Date    ALBUMIN 2.7 (L) 2023    ALBUMIN 2.6 (L) 2023    ALBUMIN 2.6 (L) 2023     No results found for: "PREALBUMIN"    Estimated Creatinine Clearance: 14.7 mL/min (A) (based on SCr of 5.9 mg/dL (H)).    Accu-Checks  Recent Labs     23  1732 23  2155 23  0855 23  1256 23  1735 23  2205 23  0930 23  1240 23  0027   POCTGLUCOSE 178* 217* 232* 198* 239* 305* 195* 251* 182* 180*       Current Medications and/or Treatments Impacting Glycemic Control  Immunotherapy:    Immunosuppressants           Stop Route Frequency     tacrolimus capsule 6 mg         -- Oral 2 times daily     mycophenolate capsule 1,000 mg         -- Oral 2 times daily          Steroids:   Hormones (From admission, onward)      Start     Stop Route Frequency Ordered    23 0900  predniSONE tablet 20 mg  " (IP TXP KIDNEY POST-OP THYMO WITH PERIPHERAL PRECHECKED)         -- Oral Daily 11/29/23 1220    11/29/23 1316  hydrocortisone sodium succinate injection 100 mg  (IP TXP KIDNEY POST-OP THYMO WITH PERIPHERAL PRECHECKED)         04/27/35 0516 IV Once as needed 11/29/23 1220          Pressors:    Autonomic Drugs (From admission, onward)      Start     Stop Route Frequency Ordered    12/01/23 0951  midodrine tablet 10 mg         -- Oral On Call Procedure 12/01/23 0951    11/29/23 1316  EPINEPHrine (PF) injection 1 mg  (IP TXP KIDNEY POST-OP THYMO WITH PERIPHERAL PRECHECKED)         04/27/35 0516 SubQ Once as needed 11/29/23 1220          Hyperglycemia/Diabetes Medications:   Antihyperglycemics (From admission, onward)      Start     Stop Route Frequency Ordered    12/02/23 0715  insulin aspart U-100 pen 5 Units         -- SubQ 3 times daily with meals 12/02/23 0702    11/29/23 1314  insulin aspart U-100 pen 0-5 Units  (INSULIN - LOW CORRECTION DOSE (Recommended for BMI <25, GFR<15 or on dialysis, Age > 70, type 1 diabetes, ESLD, severe CHF or patients on <70 units of insulin daily))         -- SubQ Before meals & nightly PRN 11/29/23 1219

## 2023-12-03 NOTE — ASSESSMENT & PLAN NOTE
-  s/p DBD KTX 11/29 (CIT 15hrs, 2 arteries, 1 vein, 2 day correa, donor CMV +, thmyo induction).   - Post op K 6.7. Patient given calcium gluc and had STAT HD with 1L of fluid removed.   - DGF expected based on increased CIT and donor factors including donor LC).  - persistent hyperkalemia requiring shifting preop and HD immediately postop; given Lokelma POD1  - d/c'd correa 12/2  - HD 12/2 - rec'd 1 unit PRBC after HD

## 2023-12-03 NOTE — PROGRESS NOTES
12/02/23 1815   Post-Hemodialysis Assessment   Rinseback Volume (mL) 250 mL   Blood Volume Processed (Liters) 73.1 L   Dialyzer Clearance Lightly streaked   Duration of Treatment 195 minutes   Additional Fluid Intake (mL) 300 mL   Total UF (mL) 4005 mL   Net Fluid Removal 3455   Patient Response to Treatment hypotensive last 30 mins of tX   Arterial bleeding stop time (min) 15 min   Venous bleeding stop time (min) 15 min   Post-Hemodialysis Comments see notes     HD TX complete. TX end with 15 mins remaining in TX due to asymptomatic hypotension. Pt AAO, VSS, NAD. Net removal 3455 ml. Hemostasis achieved. Report given to primary nurse. Awaiting transport to escort pt back to room via wheelchair.

## 2023-12-03 NOTE — SUBJECTIVE & OBJECTIVE
Subjective:   History of Present Illness:  Mary Waller is a 48yr AAF with ESRD secondary to DM nephropathy and HTN. Other PMH includes CAD s/p PCI LAD (2019 at [facility]), and HLD. She started HD 4/20/18. She dialyzes on a MWF schedule, last HD 11/27. She reports occasion hypotension during or after treatments but is able to always complete HD. She has a RUE AV fistula for HD access. Native uop around 1 cup or less per day. .5kg. She is being admitted for kidney transplant surgery. She feels well in her usual state of health. She denies any recent illnesses or hospitalizations. Pre op labs and diagnostics studies pending.     Ms. Waller is a 48 y.o. year old female who is status post Kidney Transplant - 11/29/2023  (#1).  Her maintenance immunosuppression consists of:   Immunosuppressants (From admission, onward)      Start     Stop Route Frequency Ordered    12/01/23 1800  tacrolimus capsule 6 mg  (IP TXP KIDNEY POST-OP THYMO WITH PERIPHERAL PRECHECKED)         -- Oral 2 times daily 12/01/23 1321    11/29/23 1330  mycophenolate capsule 1,000 mg  (IP TXP KIDNEY POST-OP THYMO WITH PERIPHERAL PRECHECKED)         -- Oral 2 times daily 11/29/23 1220            Her post transplant course has been complicated by delayed graft function requiring dialysis.    Hospital Course:  Patient is now s/p DBD KTX 11/29 (CIT 15hrs, 2 arteries, 1 vein, 2 day correa, donor CMV +). Post op K 6.7. Patient given calcium gluc and had STAT HD with 1L of fluid removed. DGF expected based on increased CIT and donor factors including donor LC).      Interval History:   NAEON  No complaints this AM  Correa with blood tinged urine but no clots removed on 12/2  HD 3.5 liters removed on 12/2, rec'd 1 unit of PRBC   Cr elevated and having minimal UOP consistent with DGF      Past Medical, Surgical, Family, and Social History:   Unchanged from H&P.    Scheduled Meds:   acetaminophen  650 mg Oral Q8H    atropine 1%  1 drop Left Eye BID     bisacodyL  10 mg Oral QHS    brimonidine 0.2%  1 drop Right Eye BID    docusate sodium  100 mg Oral TID    ergocalciferol  50,000 Units Oral Q7 Days    famotidine  20 mg Oral QHS    furosemide  120 mg Oral BID    heparin (porcine)  5,000 Units Subcutaneous Q8H    insulin aspart U-100  5 Units Subcutaneous TIDWM    Lmefol Ca-acetyl-meB12-algal  1 tablet Oral Daily    multivitamin  1 tablet Oral Daily    mupirocin  1 g Nasal BID    mycophenolate  1,000 mg Oral BID    predniSONE  20 mg Oral Daily    sevelamer carbonate  1,600 mg Oral TID WM    sodium bicarbonate  1,300 mg Oral BID    tacrolimus  6 mg Oral BID    timolol maleate 0.5%  1 drop Right Eye BID    [START ON 12/9/2023] valGANciclovir  450 mg Oral Daily AM     Continuous Infusions:   glucagon (human recombinant)       PRN Meds:0.9%  NaCl infusion (for blood administration), sodium chloride 0.9%, [COMPLETED] calcium gluconate IVPB **AND** calcium gluconate IVPB, [COMPLETED] calcium gluconate IVPB **AND** calcium gluconate IVPB, dextrose 10%, [COMPLETED] dextrose 10% **AND** dextrose 10% **AND** [COMPLETED] insulin regular, diphenhydrAMINE, EPINEPHrine (PF), glucagon (human recombinant), glucose, glucose, glucose, hydrocortisone sodium succinate, insulin aspart U-100, midodrine, ondansetron, oxyCODONE, prochlorperazine, simethicone, sodium chloride 0.9%, sodium chloride 0.9%, sodium chloride 0.9%, sodium chloride 0.9%, traMADoL    Intake/Output - Last 3 Shifts         12/01 0700  12/02 0659 12/02 0700  12/03 0659 12/03 0700  12/04 0659    P.O. 1140 1020     Blood  295     Other  300     Total Intake(mL/kg) 1140 (10.8) 1615 (14.8)     Urine (mL/kg/hr) 50 (0) 125 (0)     Other  4005     Stool  0     Total Output 50 4130     Net +1090 -2515            Urine Occurrence  1 x     Stool Occurrence  1 x              Review of Systems   Constitutional:  Negative for appetite change, chills, fatigue and fever.   HENT:  Negative for facial swelling and trouble swallowing.   "  Eyes:  Positive for visual disturbance.   Respiratory:  Negative for cough, chest tightness, shortness of breath, wheezing and stridor.    Cardiovascular:  Negative for chest pain, palpitations and leg swelling.   Gastrointestinal:  Negative for abdominal distention, abdominal pain, diarrhea, nausea and vomiting.   Genitourinary:  Positive for decreased urine volume and hematuria. Difficulty urinating: correa in place.  Musculoskeletal:  Negative for back pain and neck pain.   Skin:  Positive for wound. Negative for color change, pallor and rash.   Allergic/Immunologic: Positive for immunocompromised state.   Neurological:  Negative for dizziness, tremors, weakness and headaches.   Psychiatric/Behavioral:  Negative for agitation, behavioral problems, confusion and decreased concentration.       Objective:     Vital Signs (Most Recent):  Temp: 97.5 °F (36.4 °C) (12/03/23 0733)  Pulse: 83 (12/03/23 0733)  Resp: 16 (12/03/23 0915)  BP: (!) 148/67 (12/03/23 0733)  SpO2: 98 % (12/03/23 0733) Vital Signs (24h Range):  Temp:  [97.5 °F (36.4 °C)-99 °F (37.2 °C)] 97.5 °F (36.4 °C)  Pulse:  [61-98] 83  Resp:  [15-20] 16  SpO2:  [98 %-100 %] 98 %  BP: (103-196)/(60-97) 148/67     Weight: 109.2 kg (240 lb 11.9 oz)  Height: 5' 5" (165.1 cm)  Body mass index is 40.06 kg/m².     Physical Exam  Vitals and nursing note reviewed.   Constitutional:       General: She is awake. She is not in acute distress.     Appearance: She is obese. She is not toxic-appearing.   Eyes:      General: No scleral icterus.  Cardiovascular:      Rate and Rhythm: Normal rate and regular rhythm.      Pulses: Normal pulses.      Heart sounds: Normal heart sounds.   Pulmonary:      Effort: Pulmonary effort is normal. No respiratory distress.      Breath sounds: Examination of the right-lower field reveals decreased breath sounds. Examination of the left-lower field reveals decreased breath sounds. Decreased breath sounds present. No wheezing or rales. "   Abdominal:      General: Bowel sounds are decreased. There is distension.      Palpations: Abdomen is soft.      Tenderness: There is no abdominal tenderness. There is no guarding or rebound.          Comments: RLQ incision CDI with staples intact   Genitourinary:     Comments: Alexandra with blood tinged urine  Musculoskeletal:         General: Normal range of motion.      Right upper arm: Edema present.      Left upper arm: Edema present.      Cervical back: Neck supple.      Right lower leg: Edema present.      Left lower leg: Edema present.   Skin:     General: Skin is warm and dry.      Capillary Refill: Capillary refill takes 2 to 3 seconds.   Neurological:      Mental Status: She is alert and oriented to person, place, and time.   Psychiatric:         Attention and Perception: Attention normal.         Mood and Affect: Mood normal.         Speech: Speech normal.         Behavior: Behavior normal. Behavior is cooperative.          Laboratory:  CBC:   Recent Labs   Lab 12/01/23  0604 12/02/23  0715 12/03/23  0640   WBC 8.46 7.52 8.55   RBC 2.53* 2.13* 2.53*   HGB 8.1* 6.7* 8.0*   HCT 25.9* 21.0* 24.4*   * 108* 122*   * 99* 96   MCH 32.0* 31.5* 31.6*   MCHC 31.3* 31.9* 32.8     BMP:   Recent Labs   Lab 12/01/23  0604 12/02/23  0715 12/03/23  0640   * 197* 120*   * 131* 134*   K 5.3* 5.1 3.9    98 99   CO2 20* 19* 22*   BUN 47* 83* 59*   CREATININE 6.2* 8.0* 5.9*   CALCIUM 8.1* 7.5* 7.8*       Diagnostic Results:  US - Kidney: Results for orders placed during the hospital encounter of 11/29/23    US Transplant Kidney With Doppler    Narrative  EXAMINATION:  US TRANSPLANT KIDNEY WITH DOPPLER    CLINICAL HISTORY:  s/p POD 1 kidney tx;    TECHNIQUE:  Real time gray scale and doppler ultrasound was performed over the patient's renal allograft.    COMPARISON:  None    FINDINGS:  Renal allograft in the right lower quadrant.  The allograft measures 10.9 cm. Satisfactory perfusion. No  hydronephrosis.    Stent present.    Deep to the incision is an 11.2 x 3.8 x 2.4 cm fluid collection.    Bladder is not distended.      Vasculature:    Resistive indices are elevated and range from 0.82 to 1.0.    There are 2 main renal arteries on a patch.  Main renal artery peak systolic velocity: 141 and 101cm/sec with normal waveform.    Renal artery/iliac ratio: 0.51 and 0.36.    The main renal vein is patent.    Impression  Elevated resistive indices within the transplant, can be seen in the setting of edema or ATN.    Fluid collection deep to the incision.    This report was flagged in Epic as abnormal.      Electronically signed by: Vivian Lozada MD  Date:    11/30/2023  Time:    16:02

## 2023-12-03 NOTE — PLAN OF CARE
PLAN OF CARE NOTE     Fall bundle in place. Pt. Remained free from falls/rauma/injuries. No complaints of CP/ SOB/discomfort. VSS. A&Ox4. Tele, NSR. Pt. denies pain this shift. Pts' incision remains CDI, staples intact. Self-meds pulled by pts' mother, done so @ 100%. Total UOP post correa removal was 10 cc, w/ 1 urine occurrence. Pt. Did have a large BM this shift. Pt's received 1 RBC this shift. The POC reviewed w/ pt. & pts' mother, questions were answered & encouraged. No further concerns at this time.

## 2023-12-03 NOTE — PROGRESS NOTES
"[facility] - [facility]plant [facility]  Endocrinology  Progress Note    Admit Date: 2023     Reason for Consult: Management of T2DM, Hyperglycemia     Surgical Procedure and Date: Kidney Transplant on 23    Diabetes diagnosis year: in her 30s    Home Diabetes Medications:  Trulicity 3mg every 7 days     How often checking glucose at home?  Once daily     BG readings on regimen: 130s  Hypoglycemia on the regimen?  No  Missed doses on regimen?  No    Diabetes Complications include:     Diabetic nephropathy  , Diabetic chronic kidney disease     , and Diabetic retinopathy     Complicating diabetes co morbidities:   ESRD s/p kidney transplant, Glucocorticoid Use, HTN, CAD, HLD     HPI:   Patient is a 48 y.o. female with a diagnosis of ESRD secondary to DM nephropathy and HTN. Other PMH includes CAD s/p PCI LAD (2019 at [facility]), and HLD. Patient now presents for the above procedure(s). Endocrinology consulted for management of T2DM.         Interval HPI:   No acute events overnight. Patient in room 35679/97848 A. Blood glucose improving. BG at and above goal on current insulin regimen (SSI ). Steroid use- Pred 20.   2 Days Post-Op  Renal function- Abnormal -   Vasopressors-  None      Diet renal      Eatin%  Nausea: No  Hypoglycemia and intervention: No  Fever: No  TPN and/or TF: No    BP (!) 148/67   Pulse 83   Temp 97.5 °F (36.4 °C)   Resp 20   Ht 5' 5" (1.651 m)   Wt 109.2 kg (240 lb 11.9 oz)   SpO2 98%   Breastfeeding No   BMI 40.06 kg/m²     Labs Reviewed and Include    Recent Labs   Lab 23  0640   *   CALCIUM 7.8*   ALBUMIN 2.7*   *   K 3.9   CO2 22*   CL 99   BUN 59*   CREATININE 5.9*     Lab Results   Component Value Date    WBC 8.55 2023    HGB 8.0 (L) 2023    HCT 24.4 (L) 2023    MCV 96 2023     (L) 2023     No results for input(s): "TSH", "FREET4" in the last 168 hours.  Lab Results   Component Value Date    HGBA1C 5.7 10/04/2023 " "      Nutritional status:   Body mass index is 40.06 kg/m².  Lab Results   Component Value Date    ALBUMIN 2.7 (L) 12/03/2023    ALBUMIN 2.6 (L) 12/02/2023    ALBUMIN 2.6 (L) 12/01/2023     No results found for: "PREALBUMIN"    Estimated Creatinine Clearance: 14.7 mL/min (A) (based on SCr of 5.9 mg/dL (H)).    Accu-Checks  Recent Labs     11/30/23  1732 11/30/23  2155 12/01/23  0855 12/01/23  1256 12/01/23  1735 12/01/23  2205 12/02/23  0930 12/02/23  1240 12/02/23  2009 12/03/23  0027   POCTGLUCOSE 178* 217* 232* 198* 239* 305* 195* 251* 182* 180*       Current Medications and/or Treatments Impacting Glycemic Control  Immunotherapy:    Immunosuppressants           Stop Route Frequency     tacrolimus capsule 6 mg         -- Oral 2 times daily     mycophenolate capsule 1,000 mg         -- Oral 2 times daily          Steroids:   Hormones (From admission, onward)      Start     Stop Route Frequency Ordered    12/02/23 0900  predniSONE tablet 20 mg  (IP TXP KIDNEY POST-OP THYMO WITH PERIPHERAL PRECHECKED)         -- Oral Daily 11/29/23 1220    11/29/23 1316  hydrocortisone sodium succinate injection 100 mg  (IP TXP KIDNEY POST-OP THYMO WITH PERIPHERAL PRECHECKED)         04/27/35 0516 IV Once as needed 11/29/23 1220          Pressors:    Autonomic Drugs (From admission, onward)      Start     Stop Route Frequency Ordered    12/01/23 0951  midodrine tablet 10 mg         -- Oral On Call Procedure 12/01/23 0951    11/29/23 1316  EPINEPHrine (PF) injection 1 mg  (IP TXP KIDNEY POST-OP THYMO WITH PERIPHERAL PRECHECKED)         04/27/35 0516 SubQ Once as needed 11/29/23 1220          Hyperglycemia/Diabetes Medications:   Antihyperglycemics (From admission, onward)      Start     Stop Route Frequency Ordered    12/02/23 0715  insulin aspart U-100 pen 5 Units         -- SubQ 3 times daily with meals 12/02/23 0702    11/29/23 1314  insulin aspart U-100 pen 0-5 Units  (INSULIN - LOW CORRECTION DOSE (Recommended for BMI <25, " GFR<15 or on dialysis, Age > 70, type 1 diabetes, ESLD, severe CHF or patients on <70 units of insulin daily))         -- SubQ Before meals & nightly PRN 11/29/23 1216            ASSESSMENT and PLAN    Renal/  * S/P kidney transplant  Managed per primary team  Optimize BG control        Endocrine  Class 2 severe obesity due to excess calories with serious comorbidity and body mass index (BMI) of 38.0 to 38.9 in adult  Body mass index is 40.06 kg/m².  May increase insulin resistance.         Type 2 diabetes mellitus with chronic kidney disease on chronic dialysis  BG goal 140-180  T2DM.  S/p kidney txp. BG improving at or near goal.  Will continue scheduled insulin with steroids.    - Continue Novolog 5 units with meals.  - Continue Low Dose Correction Scale  - BG monitoring ac/hs    ** Please call Endocrine for any BG related issues **    Discharge plans: TBPATRICIA Pinto PA-C  Endocrinology  [facility] - Transplant Stepdown

## 2023-12-04 VITALS
TEMPERATURE: 98 F | SYSTOLIC BLOOD PRESSURE: 179 MMHG | BODY MASS INDEX: 40.5 KG/M2 | RESPIRATION RATE: 14 BRPM | HEIGHT: 65 IN | HEART RATE: 100 BPM | DIASTOLIC BLOOD PRESSURE: 73 MMHG | OXYGEN SATURATION: 99 % | WEIGHT: 243.06 LBS

## 2023-12-04 DIAGNOSIS — Z94.0 KIDNEY REPLACED BY TRANSPLANT: Primary | ICD-10-CM

## 2023-12-04 LAB
ALBUMIN SERPL BCP-MCNC: 2.8 G/DL (ref 3.5–5.2)
ANION GAP SERPL CALC-SCNC: 15 MMOL/L (ref 8–16)
BASOPHILS # BLD AUTO: 0 K/UL (ref 0–0.2)
BASOPHILS NFR BLD: 0 % (ref 0–1.9)
BUN SERPL-MCNC: 88 MG/DL (ref 6–20)
CALCIUM SERPL-MCNC: 7.7 MG/DL (ref 8.7–10.5)
CHLORIDE SERPL-SCNC: 97 MMOL/L (ref 95–110)
CO2 SERPL-SCNC: 21 MMOL/L (ref 23–29)
CREAT SERPL-MCNC: 8 MG/DL (ref 0.5–1.4)
DIFFERENTIAL METHOD: ABNORMAL
EOSINOPHIL # BLD AUTO: 0.1 K/UL (ref 0–0.5)
EOSINOPHIL NFR BLD: 0.5 % (ref 0–8)
ERYTHROCYTE [DISTWIDTH] IN BLOOD BY AUTOMATED COUNT: 14.8 % (ref 11.5–14.5)
EST. GFR  (NO RACE VARIABLE): 5.8 ML/MIN/1.73 M^2
GLUCOSE SERPL-MCNC: 134 MG/DL (ref 70–110)
HCT VFR BLD AUTO: 24.4 % (ref 37–48.5)
HGB BLD-MCNC: 7.9 G/DL (ref 12–16)
IMM GRANULOCYTES # BLD AUTO: 0.03 K/UL (ref 0–0.04)
IMM GRANULOCYTES NFR BLD AUTO: 0.3 % (ref 0–0.5)
LYMPHOCYTES # BLD AUTO: 0.2 K/UL (ref 1–4.8)
LYMPHOCYTES NFR BLD: 2.4 % (ref 18–48)
MAGNESIUM SERPL-MCNC: 2.3 MG/DL (ref 1.6–2.6)
MCH RBC QN AUTO: 31 PG (ref 27–31)
MCHC RBC AUTO-ENTMCNC: 32.4 G/DL (ref 32–36)
MCV RBC AUTO: 96 FL (ref 82–98)
MONOCYTES # BLD AUTO: 1 K/UL (ref 0.3–1)
MONOCYTES NFR BLD: 10.5 % (ref 4–15)
NEUTROPHILS # BLD AUTO: 8.2 K/UL (ref 1.8–7.7)
NEUTROPHILS NFR BLD: 86.3 % (ref 38–73)
NRBC BLD-RTO: 0 /100 WBC
PHOSPHATE SERPL-MCNC: 4 MG/DL (ref 2.7–4.5)
PLATELET # BLD AUTO: 131 K/UL (ref 150–450)
PMV BLD AUTO: 12 FL (ref 9.2–12.9)
POCT GLUCOSE: 123 MG/DL (ref 70–110)
POCT GLUCOSE: 129 MG/DL (ref 70–110)
POCT GLUCOSE: 160 MG/DL (ref 70–110)
POCT GLUCOSE: 169 MG/DL (ref 70–110)
POTASSIUM SERPL-SCNC: 4.4 MMOL/L (ref 3.5–5.1)
RBC # BLD AUTO: 2.55 M/UL (ref 4–5.4)
SODIUM SERPL-SCNC: 133 MMOL/L (ref 136–145)
TACROLIMUS BLD-MCNC: 5.8 NG/ML (ref 5–15)
WBC # BLD AUTO: 9.48 K/UL (ref 3.9–12.7)

## 2023-12-04 PROCEDURE — 99232 PR SUBSEQUENT HOSPITAL CARE,LEVL II: ICD-10-PCS | Mod: ,,, | Performed by: PHYSICIAN ASSISTANT

## 2023-12-04 PROCEDURE — 83735 ASSAY OF MAGNESIUM: CPT | Performed by: TRANSPLANT SURGERY

## 2023-12-04 PROCEDURE — 99232 SBSQ HOSP IP/OBS MODERATE 35: CPT | Mod: ,,, | Performed by: PHYSICIAN ASSISTANT

## 2023-12-04 PROCEDURE — 85025 COMPLETE CBC W/AUTO DIFF WBC: CPT

## 2023-12-04 PROCEDURE — 80100016 HC MAINTENANCE HEMODIALYSIS

## 2023-12-04 PROCEDURE — 99024 PR POST-OP FOLLOW-UP VISIT: ICD-10-PCS | Mod: ,,, | Performed by: NURSE PRACTITIONER

## 2023-12-04 PROCEDURE — 80069 RENAL FUNCTION PANEL: CPT | Performed by: TRANSPLANT SURGERY

## 2023-12-04 PROCEDURE — 63600175 PHARM REV CODE 636 W HCPCS: Performed by: INTERNAL MEDICINE

## 2023-12-04 PROCEDURE — 99024 POSTOP FOLLOW-UP VISIT: CPT | Mod: ,,, | Performed by: NURSE PRACTITIONER

## 2023-12-04 PROCEDURE — 25000003 PHARM REV CODE 250

## 2023-12-04 PROCEDURE — 80197 ASSAY OF TACROLIMUS: CPT | Performed by: TRANSPLANT SURGERY

## 2023-12-04 PROCEDURE — 63600175 PHARM REV CODE 636 W HCPCS

## 2023-12-04 PROCEDURE — 25000003 PHARM REV CODE 250: Performed by: NURSE PRACTITIONER

## 2023-12-04 PROCEDURE — 25000003 PHARM REV CODE 250: Performed by: INTERNAL MEDICINE

## 2023-12-04 RX ORDER — LANCETS
1 EACH MISCELLANEOUS 3 TIMES DAILY
Qty: 90 EACH | Refills: 11 | Status: SHIPPED | OUTPATIENT
Start: 2023-12-04

## 2023-12-04 RX ORDER — LANCETS
1 EACH MISCELLANEOUS 3 TIMES DAILY
Qty: 100 EACH | Refills: 11 | Status: SHIPPED | OUTPATIENT
Start: 2023-12-04 | End: 2023-12-04 | Stop reason: SDUPTHER

## 2023-12-04 RX ORDER — FUROSEMIDE 80 MG/1
120 TABLET ORAL 2 TIMES DAILY
Qty: 15 TABLET | Refills: 0 | Status: SHIPPED | OUTPATIENT
Start: 2023-12-04 | End: 2023-12-13 | Stop reason: ALTCHOICE

## 2023-12-04 RX ORDER — METOLAZONE 2.5 MG/1
10 TABLET ORAL DAILY
Status: DISCONTINUED | OUTPATIENT
Start: 2023-12-04 | End: 2023-12-04 | Stop reason: HOSPADM

## 2023-12-04 RX ORDER — INSULIN LISPRO 100 [IU]/ML
5 INJECTION, SOLUTION INTRAVENOUS; SUBCUTANEOUS
Qty: 9 ML | Refills: 11 | Status: SHIPPED | OUTPATIENT
Start: 2023-12-04 | End: 2024-12-03

## 2023-12-04 RX ORDER — L-MEFOL/A-CYST/MEB12/ALGAL OIL 6-600-2 MG
1 TABLET ORAL DAILY
Start: 2023-12-05

## 2023-12-04 RX ORDER — TACROLIMUS 1 MG/1
7 CAPSULE ORAL EVERY 12 HOURS
Qty: 420 CAPSULE | Refills: 11 | Status: SHIPPED | OUTPATIENT
Start: 2023-12-04 | End: 2023-12-19 | Stop reason: DRUGHIGH

## 2023-12-04 RX ORDER — INSULIN ASPART 100 [IU]/ML
5 INJECTION, SOLUTION INTRAVENOUS; SUBCUTANEOUS 3 TIMES DAILY
Qty: 15 ML | Refills: 2 | Status: SHIPPED | OUTPATIENT
Start: 2023-12-04 | End: 2023-12-04 | Stop reason: HOSPADM

## 2023-12-04 RX ORDER — SEVELAMER CARBONATE 800 MG/1
1600 TABLET, FILM COATED ORAL
Qty: 180 TABLET | Refills: 11 | Status: SHIPPED | OUTPATIENT
Start: 2023-12-04 | End: 2023-12-04 | Stop reason: HOSPADM

## 2023-12-04 RX ORDER — INSULIN PUMP SYRINGE, 3 ML
EACH MISCELLANEOUS
Qty: 1 EACH | Refills: 0 | Status: SHIPPED | OUTPATIENT
Start: 2023-12-04 | End: 2023-12-04 | Stop reason: SDUPTHER

## 2023-12-04 RX ORDER — SODIUM BICARBONATE 650 MG/1
1300 TABLET ORAL 2 TIMES DAILY
Qty: 120 TABLET | Refills: 11 | Status: ON HOLD | OUTPATIENT
Start: 2023-12-04 | End: 2023-12-23 | Stop reason: HOSPADM

## 2023-12-04 RX ORDER — METOLAZONE 10 MG/1
10 TABLET ORAL DAILY
Qty: 5 TABLET | Refills: 0 | Status: SHIPPED | OUTPATIENT
Start: 2023-12-04 | End: 2023-12-13 | Stop reason: ALTCHOICE

## 2023-12-04 RX ORDER — INSULIN PUMP SYRINGE, 3 ML
EACH MISCELLANEOUS
Qty: 1 EACH | Refills: 0 | Status: SHIPPED | OUTPATIENT
Start: 2023-12-04

## 2023-12-04 RX ORDER — PEN NEEDLE, DIABETIC 30 GX3/16"
1 NEEDLE, DISPOSABLE MISCELLANEOUS 3 TIMES DAILY
Qty: 100 EACH | Refills: 11 | Status: SHIPPED | OUTPATIENT
Start: 2023-12-04

## 2023-12-04 RX ADMIN — FUROSEMIDE 120 MG: 40 TABLET ORAL at 06:12

## 2023-12-04 RX ADMIN — FAMOTIDINE 20 MG: 20 TABLET, FILM COATED ORAL at 06:12

## 2023-12-04 RX ADMIN — TIMOLOL MALEATE 1 DROP: 5 SOLUTION OPHTHALMIC at 06:12

## 2023-12-04 RX ADMIN — INSULIN ASPART 5 UNITS: 100 INJECTION, SOLUTION INTRAVENOUS; SUBCUTANEOUS at 02:12

## 2023-12-04 RX ADMIN — HEPARIN SODIUM 5000 UNITS: 5000 INJECTION INTRAVENOUS; SUBCUTANEOUS at 06:12

## 2023-12-04 RX ADMIN — TACROLIMUS 7 MG: 1 CAPSULE ORAL at 08:12

## 2023-12-04 RX ADMIN — METOLAZONE 10 MG: 2.5 TABLET ORAL at 02:12

## 2023-12-04 RX ADMIN — ACETAMINOPHEN 650 MG: 325 TABLET ORAL at 02:12

## 2023-12-04 RX ADMIN — MUPIROCIN 1 G: 20 OINTMENT TOPICAL at 06:12

## 2023-12-04 RX ADMIN — BRIMONIDINE TARTRATE 1 DROP: 2 SOLUTION OPHTHALMIC at 06:12

## 2023-12-04 RX ADMIN — ACETAMINOPHEN 650 MG: 325 TABLET ORAL at 06:12

## 2023-12-04 RX ADMIN — ATROPINE SULFATE 1 DROP: 10 SOLUTION/ DROPS OPHTHALMIC at 06:12

## 2023-12-04 RX ADMIN — PREDNISONE 20 MG: 20 TABLET ORAL at 06:12

## 2023-12-04 RX ADMIN — MYCOPHENOLATE MOFETIL 1000 MG: 250 CAPSULE ORAL at 06:12

## 2023-12-04 RX ADMIN — MIDODRINE HYDROCHLORIDE 10 MG: 5 TABLET ORAL at 06:12

## 2023-12-04 NOTE — PLAN OF CARE
PLAN OF CARE NOTE     Fall bundle in place. Pt. Remained free from falls/rauma/injuries. No complaints of CP/ SOB/discomfort. VSS. A&Ox4. Tele, NSR. Pt. denies pain this shift. Pts' incision remains had scant serosanguinous drainage, cleansed w/iodine; dressing placed. Self-meds pulled by pts' mother, done so @ 100%. Total  cc. HD meds given due to pt. Due for HD this AM. The POC reviewed w/ pt. & pts' mother, questions were answered & encouraged. No further concerns at this time.

## 2023-12-04 NOTE — PROGRESS NOTES
Patient arrived in a wheelchair to dialysis unit.   Report received from primary nurse  VS's per dialysis Flowsheet.     Hemodialysis initiated using the following:     Dialysis Access: LUF      Needle size: 15G  Insertion with no complications.     Will Maintain telemetry and blood pressure monitoring throughout treatment.  Refer to dialysis flowsheet and MAR for details.

## 2023-12-04 NOTE — PROGRESS NOTES
Transplant Coordinator  11/29-12/4/23    Pt amditted for a cadaveric kidney transplant from a [facility] increased risk donor. ESRD 2/2 DM/HTN. KDPI 35%, SCD, Donor LC-up to ~3. Thymo induction, CMV D+,R- (MISMATCH)    Pt with DGF. Minimal UOP  Last in-pt HD 12/4/23 prior to d/c  Outpt unit switched to [facility] MWF 3pm    RLQ incision RYAN with staples  Alexandra removed    Labs 12/5 and 12/6. RTC 12/5 to meet with cooridnator/pharmD

## 2023-12-04 NOTE — PLAN OF CARE
Pt AAOx4. VSS, afebrile, on room air. Pain controlled w/ PRN Tramadol. Pt on renal diet, 0 BM/shift. Pt w/ good appetite eating >75% of meals.  cc/shift. Urine appears to be less bloody, laura in color. HD orders placed for 12/4. Blood glucose monitoring ACHS. Pt ambulating in rooms & hallways independently, ambulation encouraged. RLQ incision CDI w/ staples, painted w/ BD. Pt's mother pulled self-meds w/ 100% accuracy. Bed in lowest position, wheels locked, call light within pt reach. Pt updated on plan of care.

## 2023-12-04 NOTE — PROGRESS NOTES
"[facility] - [facility]  Endocrinology  Progress Note    Admit Date: 2023     Reason for Consult: Management of T2DM, Hyperglycemia     Surgical Procedure and Date: Kidney Transplant on 23    Diabetes diagnosis year: in her 30s    Home Diabetes Medications:  Trulicity 3mg every 7 days     How often checking glucose at home?  Once daily     BG readings on regimen: 130s  Hypoglycemia on the regimen?  No  Missed doses on regimen?  No    Diabetes Complications include:     Diabetic nephropathy  , Diabetic chronic kidney disease     , and Diabetic retinopathy     Complicating diabetes co morbidities:   ESRD s/p kidney transplant, Glucocorticoid Use, HTN, CAD, HLD     HPI:   Patient is a 48 y.o. female with a diagnosis of ESRD secondary to DM nephropathy and HTN. Other PMH includes CAD s/p PCI LAD (2019 at [facility]), and HLD. Patient now presents for the above procedure(s). Endocrinology consulted for management of T2DM.         Interval HPI:   No acute events overnight. Patient in room 75154/50174 A. Blood glucose improving. BG at and above goal on current insulin regimen (SSI ). Steroid use- Pred 20.   2 Days Post-Op  Renal function- Abnormal -   Vasopressors-  None      Diet renal      Eatin%  Nausea: No  Hypoglycemia and intervention: No  Fever: No  TPN and/or TF: No    BP (!) 130/51 (BP Location: Right arm, Patient Position: Lying)   Pulse 76   Temp 98.2 °F (36.8 °C) (Oral)   Resp 18   Ht 5' 5" (1.651 m)   Wt 110.2 kg (243 lb 0.9 oz)   SpO2 99%   Breastfeeding No   BMI 40.45 kg/m²     Labs Reviewed and Include    No results for input(s): "GLU", "CALCIUM", "ALBUMIN", "PROT", "NA", "K", "CO2", "CL", "BUN", "CREATININE", "ALKPHOS", "ALT", "AST", "BILITOT" in the last 24 hours.  Lab Results   Component Value Date    WBC 8.55 2023    HGB 8.0 (L) 2023    HCT 24.4 (L) 2023    MCV 96 2023     (L) 2023     No results for input(s): "TSH", "FREET4" in the last " "168 hours.  Lab Results   Component Value Date    HGBA1C 5.7 10/04/2023       Nutritional status:   Body mass index is 40.45 kg/m².  Lab Results   Component Value Date    ALBUMIN 2.7 (L) 12/03/2023    ALBUMIN 2.6 (L) 12/02/2023    ALBUMIN 2.6 (L) 12/01/2023     No results found for: "PREALBUMIN"    Estimated Creatinine Clearance: 14.7 mL/min (A) (based on SCr of 5.9 mg/dL (H)).    Accu-Checks  Recent Labs     12/01/23  1735 12/01/23  2205 12/02/23  0930 12/02/23  1240 12/02/23  2009 12/03/23  0027 12/03/23  0841 12/03/23  1243 12/03/23  1705 12/03/23  2249   POCTGLUCOSE 239* 305* 195* 251* 182* 180* 106 175* 205* 160*       Current Medications and/or Treatments Impacting Glycemic Control  Immunotherapy:    Immunosuppressants           Stop Route Frequency     tacrolimus capsule 7 mg         -- Oral 2 times daily     mycophenolate capsule 1,000 mg         -- Oral 2 times daily          Steroids:   Hormones (From admission, onward)      Start     Stop Route Frequency Ordered    12/02/23 0900  predniSONE tablet 20 mg  (IP TXP KIDNEY POST-OP THYMO WITH PERIPHERAL PRECHECKED)         -- Oral Daily 11/29/23 1220    11/29/23 1316  hydrocortisone sodium succinate injection 100 mg  (IP TXP KIDNEY POST-OP THYMO WITH PERIPHERAL PRECHECKED)         04/27/35 0516 IV Once as needed 11/29/23 1220          Pressors:    Autonomic Drugs (From admission, onward)      Start     Stop Route Frequency Ordered    12/01/23 0951  midodrine tablet 10 mg         -- Oral On Call Procedure 12/01/23 0951    11/29/23 1316  EPINEPHrine (PF) injection 1 mg  (IP TXP KIDNEY POST-OP THYMO WITH PERIPHERAL PRECHECKED)         04/27/35 0516 SubQ Once as needed 11/29/23 1220          Hyperglycemia/Diabetes Medications:   Antihyperglycemics (From admission, onward)      Start     Stop Route Frequency Ordered    12/02/23 0715  insulin aspart U-100 pen 5 Units         -- SubQ 3 times daily with meals 12/02/23 0702    11/29/23 1314  insulin aspart U-100 pen " 0-5 Units  (INSULIN - LOW CORRECTION DOSE (Recommended for BMI <25, GFR<15 or on dialysis, Age > 70, type 1 diabetes, ESLD, severe CHF or patients on <70 units of insulin daily))         -- SubQ Before meals & nightly PRN 11/29/23 0674            ASSESSMENT and PLAN    Renal/  * S/P kidney transplant  Managed per primary team  Optimize BG control        Endocrine  Class 2 severe obesity due to excess calories with serious comorbidity and body mass index (BMI) of 38.0 to 38.9 in adult  Body mass index is 40.45 kg/m².  May increase insulin resistance.         Type 2 diabetes mellitus with chronic kidney disease on chronic dialysis  BG goal 140-180  T2DM.  S/p kidney txp. BG improving at or near goal.  Will continue scheduled insulin with steroids.    - Continue Novolog 5 units with meals.  - Continue Low Dose Correction Scale  - BG monitoring ac/hs    ** Please call Endocrine for any BG related issues **    Discharge plans:   Notified by team of discharge plans today.  Would hold GLP-1 3 months post kidney txp given LC risk.  Will have her start on insulin regimen below  - Novolog 5 units with meals   - Add correction scale if needed.  Blood sugar 180 to 230 add 1 units  Blood sugar 231 to 280 add 2 units  Blood sugar 281 to 330 add 3 units  Blood sugar 331 to 380 add 4 units  Blood sugar greater than 380 add 5 units  -- Insurance approved diabetes testing supplies to check blood sugar 4 times a day (Before meals and at bedtime) and as needed.  - BD pen needles           Marcell Pinto PA-C  Endocrinology  [facility] - Transplant Stepdown

## 2023-12-04 NOTE — PROGRESS NOTES
Transplant Social Work Discharge Note:    Pt will discharge to patient's home under the care of Alejandra Javan, patient's mother, phone number [phone].     Patient reports readiness to discharge at this time. Patient [facility] a outpatient chair [facility] MWF 3pm. Per rounds today patient does not require additional referrals at time of discharge. Patient and caregiver report they will purchase a bedside commode from [employer] or [employer]. Patient and caregiver denied additional needs, or concerns at this time. Patient and caregiver agree to contact transplant team with needs, questions, or concerns as they arise.     Pt aware of, involved in, and coping well with this discharge plan. Pt did not have any concerns with the discharge plan at this time. SW remains available at [phone].    Mari Drew LMSW

## 2023-12-04 NOTE — PROGRESS NOTES
"DISCHARGE NOTE:    Mary Waller is a 48 y.o. 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      Past Medical History:   Diagnosis Date    Blind     Diabetes     ESRD (end stage renal disease) on dialysis     Hypertension     Right cataract        Hospital Course: Hospital course complicated by DGF.  Has HD chair MWF set up.  G6PD pending at time of discharge (sulfa allergy).  Will also discharge with Lasix and metolazone x5 days and will need to be reassessed.     Allergies:   Review of patient's allergies indicates:   Allergen Reactions    Codeine     Sulfa (sulfonamide antibiotics)        Patient Pharmacy: [facility]    Discharge Medications:     Medication List        START taking these medications      blood sugar diagnostic Strp  1 each by Misc.(Non-Drug; Combo Route) route 3 (three) times daily.     blood-glucose meter kit  USE TO TEST BLOOD GLUCOSE     famotidine 20 MG tablet  Commonly known as: PEPCID  Take 1 tablet (20 mg total) by mouth every evening.     insulin lispro 100 unit/mL pen  Inject 5 Units into the skin 3 (three) times daily with meals. Plus sliding scale: TDD=30 units     lancets Misc  1 each by Misc.(Non-Drug; Combo Route) route 3 (three) times daily.     Lmefol Ca-acetyl-meB12-algal 6 mg-600 mg- 2 mg-90.314 mg Tab  Commonly known as: CEREFOLIN NAC (ALGAL OIL)  Take 1 tablet by mouth once daily.  Start taking on: December 5, 2023     metOLazone 10 MG tablet  Commonly known as: ZAROXOLYN  Take 1 tablet (10 mg total) by mouth once daily. for 5 days     mycophenolate 250 mg Cap  Commonly known as: CELLCEPT  Take 4 capsules (1,000 mg total) by mouth 2 (two) times daily.     oxyCODONE 5 MG immediate release tablet  Commonly known as: ROXICODONE  Take 1 tablet (5 mg total) by mouth every 4 (four) hours as needed for Pain.     pen needle, diabetic 32 gauge x 5/32" Ndle  Commonly known as: EASY COMFORT " PEN NEEDLES  Inject 1 each into the skin 3 (three) times daily.     predniSONE 5 MG tablet  Commonly known as: DELTASONE  Take by mouth daily:  20mg 12/2-12/8, 15mg 12/9-12/15, 10mg 12/16-12/22, then 5mg daily beginning 12/23/2023     sodium bicarbonate 650 MG tablet  Take 2 tablets (1,300 mg total) by mouth 2 (two) times daily.     tacrolimus 1 MG Cap  Commonly known as: PROGRAF  Take 7 capsules (7 mg total) by mouth every 12 (twelve) hours.     valGANciclovir 450 mg Tab  Commonly known as: VALCYTE  Take 1 tablet (450 mg total) by mouth every Mon, Wed, Fri. STOP 5/29/24            CHANGE how you take these medications      furosemide 80 MG tablet  Commonly known as: LASIX  Take 1½ tablets (120 mg total) by mouth 2 (two) times daily. for 5 days  What changed:   how much to take  when to take this            CONTINUE taking these medications      atropine 1% 1 % Drop  Commonly known as: ISOPTO ATROPINE     COMBIGAN 0.2-0.5 % Drop  Generic drug: brimonidine-timoloL     rosuvastatin 40 MG Tab  Commonly known as: CRESTOR     VITAMIN D2 50,000 unit Cap  Generic drug: ergocalciferol            STOP taking these medications      aspirin 81 MG EC tablet  Commonly known as: ECOTRIN     docusate sodium 100 MG capsule  Commonly known as: COLACE     FISH OIL ORAL     l-methylfolate-b2-b6-b12 6-5-50-1 mg Tab  Commonly known as: CEREFOLIN     labetaloL 200 MG tablet  Commonly known as: NORMODYNE     patiromer calcium sorbitex 8.4 gram Pwpk  Commonly known as: VELTASSA     sevelamer carbonate 800 mg Tab  Commonly known as: RENVELA     TRULICITY 3 mg/0.5 mL pen injector  Generic drug: dulaglutide     VITAMIN B-12 500 MCG tablet  Generic drug: cyanocobalamin               Where to Get Your Medications        These medications were sent to [facility]  [address] [place] [address]      Hours: Mon-Fri 7a-7p, Sat-Sun 10a-4p Phone: [phone]   famotidine 20 MG tablet  furosemide 80 MG tablet  insulin  "lispro 100 unit/mL pen  metOLazone 10 MG tablet  mycophenolate 250 mg Cap  oxyCODONE 5 MG immediate release tablet  pen needle, diabetic 32 gauge x 5/32" Ndle  predniSONE 5 MG tablet  sodium bicarbonate 650 MG tablet  tacrolimus 1 MG Cap  valGANciclovir 450 mg Tab       These medications were sent to [id number] - [place] - [address] AT [address]  [address], [place] [address]      Hours: 24-hours Phone: [phone]   blood sugar diagnostic Strp  blood-glucose meter kit  lancets Misc       Information about where to get these medications is not yet available    Ask your nurse or doctor about these medications  Lmefol Ca-acetyl-meB12-algal 6 mg-600 mg- 2 mg-90.314 mg Tab          Pharmacy Interventions/Recommendations:  1) Transplant Immunosuppression: Induction Thymo, and maintenance tac/mmf/pred taper    2) Opportunistic Infection prophylaxis: PJP TBD; Valcyte x6 mo    3) Osteoporosis Prevention measures (liver txp): n/a    4) Patient Counseling/Education: Demonstrated the use of the BP cuff, thermometer.    5) Follow-Up/Discharge Needs:    -f/u on G6PD  - testing supplies from [facility]  -resume home aspirin 81 mg (indication?)    6) Patient Assistance Information: none    7) The following medications have been placed on HOLD and should be restarted in the outpatient setting (when appropriate): aspirin 81 mg    Mary and her caregiver verbalized their understanding and had the opportunity to ask questions.  "

## 2023-12-04 NOTE — NURSING
Pt met with transplant pharmacist who gave her an updated med card and reviewed meds with her. She met with transplant pharmacist who gave her supplies and reviewed follow up appts with her. AVS reviewed with patient. She voiced understanding of medications, follow up appts, what to call for, who to call, and [facility] on call number. She left via wheelchair with mother and sister.

## 2023-12-04 NOTE — SUBJECTIVE & OBJECTIVE
"Interval HPI:   No acute events overnight. Patient in room 67477/64556 A. Blood glucose improving. BG at and above goal on current insulin regimen (SSI ). Steroid use- Pred 20.   2 Days Post-Op  Renal function- Abnormal -   Vasopressors-  None      Diet renal      Eatin%  Nausea: No  Hypoglycemia and intervention: No  Fever: No  TPN and/or TF: No    BP (!) 130/51 (BP Location: Right arm, Patient Position: Lying)   Pulse 76   Temp 98.2 °F (36.8 °C) (Oral)   Resp 18   Ht 5' 5" (1.651 m)   Wt 110.2 kg (243 lb 0.9 oz)   SpO2 99%   Breastfeeding No   BMI 40.45 kg/m²     Labs Reviewed and Include    No results for input(s): "GLU", "CALCIUM", "ALBUMIN", "PROT", "NA", "K", "CO2", "CL", "BUN", "CREATININE", "ALKPHOS", "ALT", "AST", "BILITOT" in the last 24 hours.  Lab Results   Component Value Date    WBC 8.55 2023    HGB 8.0 (L) 2023    HCT 24.4 (L) 2023    MCV 96 2023     (L) 2023     No results for input(s): "TSH", "FREET4" in the last 168 hours.  Lab Results   Component Value Date    HGBA1C 5.7 10/04/2023       Nutritional status:   Body mass index is 40.45 kg/m².  Lab Results   Component Value Date    ALBUMIN 2.7 (L) 2023    ALBUMIN 2.6 (L) 2023    ALBUMIN 2.6 (L) 2023     No results found for: "PREALBUMIN"    Estimated Creatinine Clearance: 14.7 mL/min (A) (based on SCr of 5.9 mg/dL (H)).    Accu-Checks  Recent Labs     23  1735 23  2205 23  0930 23  1240 23  2009 23  0027 23  0841 23  1243 23  1705 23  2249   POCTGLUCOSE 239* 305* 195* 251* 182* 180* 106 175* 205* 160*       Current Medications and/or Treatments Impacting Glycemic Control  Immunotherapy:    Immunosuppressants           Stop Route Frequency     tacrolimus capsule 7 mg         -- Oral 2 times daily     mycophenolate capsule 1,000 mg         -- Oral 2 times daily          Steroids:   Hormones (From admission, onward)      " Start     Stop Route Frequency Ordered    12/02/23 0900  predniSONE tablet 20 mg  (IP TXP KIDNEY POST-OP THYMO WITH PERIPHERAL PRECHECKED)         -- Oral Daily 11/29/23 1220    11/29/23 1316  hydrocortisone sodium succinate injection 100 mg  (IP TXP KIDNEY POST-OP THYMO WITH PERIPHERAL PRECHECKED)         04/27/35 0516 IV Once as needed 11/29/23 1220          Pressors:    Autonomic Drugs (From admission, onward)      Start     Stop Route Frequency Ordered    12/01/23 0951  midodrine tablet 10 mg         -- Oral On Call Procedure 12/01/23 0951    11/29/23 1316  EPINEPHrine (PF) injection 1 mg  (IP TXP KIDNEY POST-OP THYMO WITH PERIPHERAL PRECHECKED)         04/27/35 0516 SubQ Once as needed 11/29/23 1220          Hyperglycemia/Diabetes Medications:   Antihyperglycemics (From admission, onward)      Start     Stop Route Frequency Ordered    12/02/23 0715  insulin aspart U-100 pen 5 Units         -- SubQ 3 times daily with meals 12/02/23 0702    11/29/23 1314  insulin aspart U-100 pen 0-5 Units  (INSULIN - LOW CORRECTION DOSE (Recommended for BMI <25, GFR<15 or on dialysis, Age > 70, type 1 diabetes, ESLD, severe CHF or patients on <70 units of insulin daily))         -- SubQ Before meals & nightly PRN 11/29/23 1219

## 2023-12-05 ENCOUNTER — CLINICAL SUPPORT (OUTPATIENT)
Dept: TRANSPLANT | Facility: CLINIC | Age: 48
End: 2023-12-05
Payer: MEDICARE

## 2023-12-05 ENCOUNTER — LAB VISIT (OUTPATIENT)
Dept: LAB | Facility: HOSPITAL | Age: 48
End: 2023-12-05
Payer: MEDICARE

## 2023-12-05 VITALS
HEART RATE: 84 BPM | HEIGHT: 65 IN | TEMPERATURE: 98 F | SYSTOLIC BLOOD PRESSURE: 170 MMHG | DIASTOLIC BLOOD PRESSURE: 70 MMHG | BODY MASS INDEX: 39.34 KG/M2 | WEIGHT: 236.13 LBS | RESPIRATION RATE: 18 BRPM

## 2023-12-05 DIAGNOSIS — Z94.0 KIDNEY REPLACED BY TRANSPLANT: ICD-10-CM

## 2023-12-05 LAB
ALBUMIN SERPL BCP-MCNC: 2.9 G/DL (ref 3.5–5.2)
ANION GAP SERPL CALC-SCNC: 13 MMOL/L (ref 8–16)
BASOPHILS # BLD AUTO: 0.02 K/UL (ref 0–0.2)
BASOPHILS NFR BLD: 0.2 % (ref 0–1.9)
BUN SERPL-MCNC: 56 MG/DL (ref 6–20)
CALCIUM SERPL-MCNC: 7.9 MG/DL (ref 8.7–10.5)
CHLORIDE SERPL-SCNC: 100 MMOL/L (ref 95–110)
CO2 SERPL-SCNC: 24 MMOL/L (ref 23–29)
CREAT SERPL-MCNC: 5.9 MG/DL (ref 0.5–1.4)
DIFFERENTIAL METHOD: ABNORMAL
EOSINOPHIL # BLD AUTO: 0.2 K/UL (ref 0–0.5)
EOSINOPHIL NFR BLD: 1.9 % (ref 0–8)
ERYTHROCYTE [DISTWIDTH] IN BLOOD BY AUTOMATED COUNT: 15.9 % (ref 11.5–14.5)
EST. GFR  (NO RACE VARIABLE): 8.4 ML/MIN/1.73 M^2
G6PD RBC-CCNT: 10 U/G HB (ref 8–11.9)
GLUCOSE SERPL-MCNC: 126 MG/DL (ref 70–110)
HCT VFR BLD AUTO: 23.4 % (ref 37–48.5)
HGB BLD-MCNC: 7.6 G/DL (ref 12–16)
IMM GRANULOCYTES # BLD AUTO: 0.1 K/UL (ref 0–0.04)
IMM GRANULOCYTES NFR BLD AUTO: 1.1 % (ref 0–0.5)
LYMPHOCYTES # BLD AUTO: 0.5 K/UL (ref 1–4.8)
LYMPHOCYTES NFR BLD: 5.1 % (ref 18–48)
MAGNESIUM SERPL-MCNC: 2.1 MG/DL (ref 1.6–2.6)
MCH RBC QN AUTO: 31.9 PG (ref 27–31)
MCHC RBC AUTO-ENTMCNC: 32.5 G/DL (ref 32–36)
MCV RBC AUTO: 98 FL (ref 82–98)
MONOCYTES # BLD AUTO: 1.2 K/UL (ref 0.3–1)
MONOCYTES NFR BLD: 13.9 % (ref 4–15)
NEUTROPHILS # BLD AUTO: 6.8 K/UL (ref 1.8–7.7)
NEUTROPHILS NFR BLD: 77.8 % (ref 38–73)
NRBC BLD-RTO: 0 /100 WBC
PHOSPHATE SERPL-MCNC: 3.5 MG/DL (ref 2.7–4.5)
PLATELET # BLD AUTO: 127 K/UL (ref 150–450)
PLATELET BLD QL SMEAR: ABNORMAL
PMV BLD AUTO: 12.5 FL (ref 9.2–12.9)
POTASSIUM SERPL-SCNC: 4.1 MMOL/L (ref 3.5–5.1)
RBC # BLD AUTO: 2.38 M/UL (ref 4–5.4)
SODIUM SERPL-SCNC: 137 MMOL/L (ref 136–145)
TACROLIMUS BLD-MCNC: 7.8 NG/ML (ref 5–15)
WBC # BLD AUTO: 8.77 K/UL (ref 3.9–12.7)

## 2023-12-05 PROCEDURE — 83735 ASSAY OF MAGNESIUM: CPT | Performed by: INTERNAL MEDICINE

## 2023-12-05 PROCEDURE — 99999 PR PBB SHADOW E&M-EST. PATIENT-LVL III: ICD-10-PCS | Mod: PBBFAC,,,

## 2023-12-05 PROCEDURE — 80197 ASSAY OF TACROLIMUS: CPT | Performed by: INTERNAL MEDICINE

## 2023-12-05 PROCEDURE — 36415 COLL VENOUS BLD VENIPUNCTURE: CPT | Performed by: INTERNAL MEDICINE

## 2023-12-05 PROCEDURE — 99213 OFFICE O/P EST LOW 20 MIN: CPT | Mod: PBBFAC

## 2023-12-05 PROCEDURE — 85025 COMPLETE CBC W/AUTO DIFF WBC: CPT | Performed by: INTERNAL MEDICINE

## 2023-12-05 PROCEDURE — 99999 PR PBB SHADOW E&M-EST. PATIENT-LVL III: CPT | Mod: PBBFAC,,,

## 2023-12-05 PROCEDURE — 80069 RENAL FUNCTION PANEL: CPT | Performed by: INTERNAL MEDICINE

## 2023-12-05 NOTE — PROGRESS NOTES
Clinic Note: First Return to Clinic Post-  Kidney Transplant    Mary Waller  is a 48 y.o. female  S/p LEFT KIDNEY   transplant on 11/29/2023 (Kidney) for Diabetes Mellitus - Type II.      Discharge Course (Issues/Concerns): Ms Waller is s/p kidney transplant from 11/29/23.  Post operative course complicated by DGF - discharged with HD MWF and 5 days of diuretics.  Pt presents to clinic today feeling well and without complaint.    Objective:   Vitals:    12/05/23 0918   BP: (!) 170/70   Pulse: 84   Resp: 18   Temp: 97.7 °F (36.5 °C)       Met with patient and her caregiver in the clinic to review current medication list.     Current Outpatient Medications   Medication Sig Dispense Refill    atropine 1% (ISOPTO ATROPINE) 1 % Drop Place 1 drop into the left eye every evening.      blood sugar diagnostic Strp 1 each by Misc.(Non-Drug; Combo Route) route 3 (three) times daily. 90 each 11    blood-glucose meter kit USE TO TEST BLOOD GLUCOSE 1 each 0    COMBIGAN 0.2-0.5 % Drop Place 1 drop into the right eye every evening.      ergocalciferol (VITAMIN D2) 50,000 unit Cap Take 50,000 Units by mouth every 7 days. Take 1 capsule by mouth weekly x 12 weeks, then decrease to once per month.      famotidine (PEPCID) 20 MG tablet Take 1 tablet (20 mg total) by mouth every evening. 30 tablet 0    furosemide (LASIX) 80 MG tablet Take 1½ tablets (120 mg total) by mouth 2 (two) times daily. for 5 days 15 tablet 0    insulin lispro 100 unit/mL pen Inject 5 Units into the skin 3 (three) times daily with meals. Plus sliding scale: TDD=30 units 9 mL 11    lancets Misc 1 each by Misc.(Non-Drug; Combo Route) route 3 (three) times daily. 90 each 11    Lmefol Ca-acetyl-meB12-algal (CEREFOLIN NAC, ALGAL OIL,) 6 mg-600 mg- 2 mg-90.314 mg Tab Take 1 tablet by mouth once daily.      metOLazone (ZAROXOLYN) 10 MG tablet Take 1 tablet (10 mg total) by mouth once daily. for 5 days 5 tablet 0    mycophenolate (CELLCEPT) 250 mg Cap Take 4 capsules  "(1,000 mg total) by mouth 2 (two) times daily. 240 capsule 11    oxyCODONE (ROXICODONE) 5 MG immediate release tablet Take 1 tablet (5 mg total) by mouth every 4 (four) hours as needed for Pain. 40 tablet 0    pen needle, diabetic (EASY COMFORT PEN NEEDLES) 32 gauge x 5/32" Ndle Inject 1 each into the skin 3 (three) times daily. 100 each 11    predniSONE (DELTASONE) 5 MG tablet Take by mouth daily:  20mg 12/2-12/8, 15mg 12/9-12/15, 10mg 12/16-12/22, then 5mg daily beginning 12/23/2023 70 tablet 11    rosuvastatin (CRESTOR) 40 MG Tab Take 40 mg by mouth every evening.      sodium bicarbonate 650 MG tablet Take 2 tablets (1,300 mg total) by mouth 2 (two) times daily. 120 tablet 11    tacrolimus (PROGRAF) 1 MG Cap Take 7 capsules (7 mg total) by mouth every 12 (twelve) hours. 420 capsule 11    valGANciclovir (VALCYTE) 450 mg Tab Take 1 tablet (450 mg total) by mouth every Mon, Wed, Fri. STOP 5/29/24 12 tablet 6     No current facility-administered medications for this visit.       Pharmacy Interventions/Recommendations:     1) Graft Function & Immunosuppression Issues: Thymo induction, FK 7/7, mmf, and prednisone taper    2) Opportunistic Infection prophylaxis:   PCP ppx: needs PJP prophy until 5/29/24 (sulfa allergy, G6PD pending)  CMV ppx: valcyte STOP 5/29/24  Fungal ppx: completed    3) Donor Serologies & Monitoring    Donor CMV Status: positive  Donor HCV Status: negative  Donor HBcAb: negative  Donor HBV CAROLYN: negative  Donor HCV CAROLYN: negative    4) Pain Management & Bowel Regimen: Pt requiring minimal pain meds - but has oxycodone if needed    5) Blood Pressure Management: SBP in 150's, HR in the 70-80's - pt previously on labetalol and asking about restarting as she has heart racing at night .  Labetalol held inpt due to hypotension with HD.  As BP still dropping towards the end of HD - will continue to hold labetalol for now and will monitor BP and HR    6) Blood Sugar Management & Follow-up: Glucoses " controlled with novolog.  Pt previously on trulicity prior to transplant.  Pt understands that a few months after transplant she can likely transition back to trulicty.  WIll need endo follow up.    7) Electrolyte Management: Labs today pending - pt asking about phos binder (not on blue card).  Given recent phos levels, will continue to hold phos binder.     8) Osteoporosis Prevention Strategy (Liver Transplant): n/a    9) OTHER medication follow-up (patient assistance, held medications, etc):   - Pt with sulfa allergy, G6PD pending - will need PJP prophy with either dapsone or atovaquone until 5/29/24  - Endo follow up   - Monitor BP/HR  and consider restarting labetalol when tolerated  - Dose adjust valcyte as appropriate when renal function improves    10) Reinforced medication education conducted in the hospital, including medication indications, dosing, administration, side effects, monitoring-- including timing of immunosuppressant levels.     Patient received their FIRST fill of medications from [facility].  Discussed the process for obtaining refills of medications, including verifying the dose and mailing address to have medications delivered.     Mary and her caregivers verbalized understanding and had the opportunity to ask questions.

## 2023-12-05 NOTE — PROGRESS NOTES
12/04/23 1243   Post-Hemodialysis Assessment   Rinseback Volume (mL) 250 mL   Blood Volume Processed (Liters) 82.2 L   Dialyzer Clearance Lightly streaked   Duration of Treatment 240 minutes   Additional Fluid Intake (mL) 350 mL   Total UF (mL) 4500 mL   Net Fluid Removal 4000   Patient Response to Treatment tolerated well   Post-Treatment Weight 106 kg (233 lb 11 oz)   Treatment Weight Change -3.9   Arterial bleeding stop time (min) 15 min   Venous bleeding stop time (min) 15 min   Post-Hemodialysis Comments see notes     HD TX complete. Pt tolerated well. Net removal 4000 ml. Pt AAO, VSS, hemostasis achieved. Report given to primary care nurse. Pt returned to room via wheelchair escorted by pt transport staff.

## 2023-12-06 ENCOUNTER — TELEPHONE (OUTPATIENT)
Dept: TRANSPLANT | Facility: CLINIC | Age: 48
End: 2023-12-06

## 2023-12-06 ENCOUNTER — LAB VISIT (OUTPATIENT)
Dept: LAB | Facility: HOSPITAL | Age: 48
End: 2023-12-06
Attending: STUDENT IN AN ORGANIZED HEALTH CARE EDUCATION/TRAINING PROGRAM
Payer: MEDICARE

## 2023-12-06 DIAGNOSIS — D63.1 ANEMIA OF RENAL DISEASE: ICD-10-CM

## 2023-12-06 DIAGNOSIS — Z94.0 KIDNEY REPLACED BY TRANSPLANT: ICD-10-CM

## 2023-12-06 DIAGNOSIS — Z91.89 PERSONAL HISTORY OF POISONING, PRESENTING HAZARDS TO HEALTH: ICD-10-CM

## 2023-12-06 DIAGNOSIS — Z94.0 KIDNEY REPLACED BY TRANSPLANT: Primary | ICD-10-CM

## 2023-12-06 DIAGNOSIS — N18.9 ANEMIA OF RENAL DISEASE: ICD-10-CM

## 2023-12-06 DIAGNOSIS — E61.1 LOW IRON: ICD-10-CM

## 2023-12-06 DIAGNOSIS — Z57.8 EMPLOYEE EXPOSURE TO BLOOD: ICD-10-CM

## 2023-12-06 LAB
ALBUMIN SERPL BCP-MCNC: 3 G/DL (ref 3.5–5.2)
ANION GAP SERPL CALC-SCNC: 14 MMOL/L (ref 8–16)
BASOPHILS # BLD AUTO: 0.01 K/UL (ref 0–0.2)
BASOPHILS NFR BLD: 0.1 % (ref 0–1.9)
BUN SERPL-MCNC: 78 MG/DL (ref 6–20)
CALCIUM SERPL-MCNC: 7.9 MG/DL (ref 8.7–10.5)
CHLORIDE SERPL-SCNC: 98 MMOL/L (ref 95–110)
CO2 SERPL-SCNC: 23 MMOL/L (ref 23–29)
CREAT SERPL-MCNC: 8.1 MG/DL (ref 0.5–1.4)
DIFFERENTIAL METHOD: ABNORMAL
EOSINOPHIL # BLD AUTO: 0.3 K/UL (ref 0–0.5)
EOSINOPHIL NFR BLD: 2.3 % (ref 0–8)
ERYTHROCYTE [DISTWIDTH] IN BLOOD BY AUTOMATED COUNT: 15 % (ref 11.5–14.5)
EST. GFR  (NO RACE VARIABLE): 6 ML/MIN/1.73 M^2
GLUCOSE SERPL-MCNC: 119 MG/DL (ref 70–110)
HCT VFR BLD AUTO: 26.5 % (ref 37–48.5)
HGB BLD-MCNC: 8.2 G/DL (ref 12–16)
IMM GRANULOCYTES # BLD AUTO: 0.04 K/UL (ref 0–0.04)
IMM GRANULOCYTES NFR BLD AUTO: 0.4 % (ref 0–0.5)
LYMPHOCYTES # BLD AUTO: 0.6 K/UL (ref 1–4.8)
LYMPHOCYTES NFR BLD: 4.9 % (ref 18–48)
MAGNESIUM SERPL-MCNC: 2.2 MG/DL (ref 1.6–2.6)
MCH RBC QN AUTO: 31.1 PG (ref 27–31)
MCHC RBC AUTO-ENTMCNC: 30.9 G/DL (ref 32–36)
MCV RBC AUTO: 100 FL (ref 82–98)
MONOCYTES # BLD AUTO: 1.5 K/UL (ref 0.3–1)
MONOCYTES NFR BLD: 13 % (ref 4–15)
NEUTROPHILS # BLD AUTO: 8.8 K/UL (ref 1.8–7.7)
NEUTROPHILS NFR BLD: 79.3 % (ref 38–73)
NRBC BLD-RTO: 0 /100 WBC
PHOSPHATE SERPL-MCNC: 4.1 MG/DL (ref 2.7–4.5)
PLATELET # BLD AUTO: 158 K/UL (ref 150–450)
PMV BLD AUTO: 10.4 FL (ref 9.2–12.9)
POTASSIUM SERPL-SCNC: 4.2 MMOL/L (ref 3.5–5.1)
RBC # BLD AUTO: 2.64 M/UL (ref 4–5.4)
SODIUM SERPL-SCNC: 135 MMOL/L (ref 136–145)
WBC # BLD AUTO: 11.14 K/UL (ref 3.9–12.7)

## 2023-12-06 PROCEDURE — 36415 COLL VENOUS BLD VENIPUNCTURE: CPT | Performed by: STUDENT IN AN ORGANIZED HEALTH CARE EDUCATION/TRAINING PROGRAM

## 2023-12-06 PROCEDURE — 85025 COMPLETE CBC W/AUTO DIFF WBC: CPT | Performed by: STUDENT IN AN ORGANIZED HEALTH CARE EDUCATION/TRAINING PROGRAM

## 2023-12-06 PROCEDURE — 80197 ASSAY OF TACROLIMUS: CPT | Performed by: STUDENT IN AN ORGANIZED HEALTH CARE EDUCATION/TRAINING PROGRAM

## 2023-12-06 PROCEDURE — 80069 RENAL FUNCTION PANEL: CPT | Performed by: STUDENT IN AN ORGANIZED HEALTH CARE EDUCATION/TRAINING PROGRAM

## 2023-12-06 PROCEDURE — 83735 ASSAY OF MAGNESIUM: CPT | Performed by: STUDENT IN AN ORGANIZED HEALTH CARE EDUCATION/TRAINING PROGRAM

## 2023-12-06 RX ORDER — ATOVAQUONE 750 MG/5ML
1500 SUSPENSION ORAL DAILY
Qty: 300 ML | Refills: 5 | Status: SHIPPED | OUTPATIENT
Start: 2023-12-06

## 2023-12-06 SDOH — SOCIAL DETERMINANTS OF HEALTH (SDOH): OCCUPATIONAL EXPOSURE TO OTHER RISK FACTORS: Z57.8

## 2023-12-06 NOTE — TELEPHONE ENCOUNTER
Spoke with patient this morning. Went to [facility]. Pt to resume HD starting Friday on [address].     ----- Message from Daya Larsen sent at 12/6/2023  8:06 AM CST -----  Regarding: Speak to staff  Contact: Mary  Regarding: Speak to staff        Name Of Caller: Mary        Contact Preference: [phone]             Nature of call:  pt is calling to speak to staff regarding, her appt for 3:30 today, want to know if it can be a virtual or pushed back, because she has dialysis, on today, Please advise. Requesting a call back.

## 2023-12-06 NOTE — TELEPHONE ENCOUNTER
Moved appt to Friday.     ----- Message from Daya Larsen sent at 12/6/2023  8:06 AM CST -----  Regarding: Speak to staff  Contact: Mary  Regarding: Speak to staff        Name Of Caller: Mary        Contact Preference: [phone]             Nature of call:  pt is calling to speak to staff regarding, her appt for 3:30 today, want to know if it can be a virtual or pushed back, because she has dialysis, on today, Please advise. Requesting a call back.

## 2023-12-06 NOTE — TELEPHONE ENCOUNTER
Message sent. ----- Message from iTffany Elmoer PharmD sent at 12/6/2023 10:16 AM CST -----  I sent an rx for atovaquone to [facility].  She has medicaid so I don't suspect copay issues and she can likely pick it up this afternoon when she is here for her appointment.     ----- Message -----  From: Emma Timmons RN  Sent: 12/6/2023  10:11 AM CST  To: [facility] Transplant Pharmacists      ----- Message -----  From: Kaci Fernández MD  Sent: 12/5/2023   5:13 PM CST  To: [facility] Post-Kidney Transplant Clinical    G6PD acceptable, but pt with significant anemia. Will confer with pharmD if can try atovaquone first while build up h/h.

## 2023-12-06 NOTE — PROGRESS NOTES
"  1ST NURSING VISIT POST DISCHARGE NOTE    1st RN appointment with Mary Waller post discharge 12/4/23 s/p kidney transplant 11/29/23.  Patient's mother accompanied her.  Patient reports none.  Patient says that she that she is sleeping well.  Incision intact with staples.  Patient has  no lines or correa .  Patient that she is able to explain daily incision care and showering instructions.  Reviewed I&O monitoring, measuring, and recording, and the need for hydration (i.e., at least 2 liters of water daily with minimal caffeine and no grapefruit products).  Medication list and rationale were reviewed.  Patient did bring blue medication card and medication bottles for review.  Patient reports that she has stopped Dulcolax and has not continued Colace.  Patient has had a bowel movement.  Patient expressed understanding of daily care including BID VS, medications, and I&O documentation.  Patient made aware of today's creatinine level: 6.8.  Patient aware that coordinator will review today's labs with a transplant physician and call the patient with any dose changes indicated.  Next lab appointment scheduled for tomorrow.  First post-operative transplant team appointment with labs scheduled for 12/6.    Using the Kidney Transplant Patient Reference Manual, the patient submitted her open book "Self-assessment of Kidney Transplant Patient Knowledge" test, which was completed in the transplant clinic this morning before 1st nursing visit.  This test includes questions regarding critical dose medications commonly used after kidney transplant, medication dosing and side effects, importance of timed lab draws, important signs and symptoms to report 24/7 immediately post-transplant as well as how to contact the transplant team 24/7.    Test Score: 20/25    After completing the test, the patient was given a copy of the Self-assessment Answer Key to reinforce accurate learning of test content.  Patient expressed her " understanding of the value of the information included in the self-assessment test.

## 2023-12-06 NOTE — PROGRESS NOTES
Kidney Post-Transplant Assessment    Referring Physician: Aries Bourne  Current Nephrologist: Aries Bourne    ORGAN: LEFT KIDNEY  Donor Type: donation after brain death  PHS Increased Risk: yes  Cold Ischemia: 903 mins  Induction Medications: thymo    Subjective:     CC:  Reassessment of renal allograft function and management of chronic immunosuppression.    HPI:  Ms. Waller is a 48 y.o. 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 Her most recent creatinine is 6.4.  She takes mycophenolate mofetil, prednisone, and tacrolimus for maintenance immunosuppression. Her post transplant course has been complicated by delayed graft function requiring dialysis.    Hospitalization/ ED visits    Interval HX:  Having palpitations on ambulation. In regular  rhythm on exam today      Intake 30oz  -400L but today thus far 700ml  BP 150s/80s  Peripheral edema 1+  Weight stable  Appetite good  Wound scant amount of drainage using dressing  fx assessment   Lab /diagnostic results reviewed with patient today.   All questions answered        Current Outpatient Medications:     atovaquone (MEPRON) 750 mg/5 mL Susp oral liquid, Take 10 mLs (1,500 mg total) by mouth once daily. STOP 5/29/24, Disp: 300 mL, Rfl: 5    atropine 1% (ISOPTO ATROPINE) 1 % Drop, Place 1 drop into the left eye every evening., Disp: , Rfl:     blood sugar diagnostic Strp, 1 each by Misc.(Non-Drug; Combo Route) route 3 (three) times daily., Disp: 90 each, Rfl: 11    blood-glucose meter kit, USE TO TEST BLOOD GLUCOSE, Disp: 1 each, Rfl: 0    COMBIGAN 0.2-0.5 % Drop, Place 1 drop into the right eye every evening., Disp: , Rfl:     ergocalciferol (VITAMIN D2) 50,000 unit Cap, Take 50,000 Units by mouth every 7 days. Take 1 capsule by mouth weekly x 12 weeks, then decrease to once per month., Disp: , Rfl:     famotidine (PEPCID) 20 MG tablet, Take 1 tablet (20 mg total) by mouth  "every evening., Disp: 30 tablet, Rfl: 0    furosemide (LASIX) 80 MG tablet, Take 1½ tablets (120 mg total) by mouth 2 (two) times daily. for 5 days, Disp: 15 tablet, Rfl: 0    insulin lispro 100 unit/mL pen, Inject 5 Units into the skin 3 (three) times daily with meals. Plus sliding scale: TDD=30 units, Disp: 9 mL, Rfl: 11    lancets Misc, 1 each by Misc.(Non-Drug; Combo Route) route 3 (three) times daily., Disp: 90 each, Rfl: 11    Lmefol Ca-acetyl-meB12-algal (CEREFOLIN NAC, ALGAL OIL,) 6 mg-600 mg- 2 mg-90.314 mg Tab, Take 1 tablet by mouth once daily., Disp: , Rfl:     metOLazone (ZAROXOLYN) 10 MG tablet, Take 1 tablet (10 mg total) by mouth once daily. for 5 days, Disp: 5 tablet, Rfl: 0    mycophenolate (CELLCEPT) 250 mg Cap, Take 4 capsules (1,000 mg total) by mouth 2 (two) times daily., Disp: 240 capsule, Rfl: 11    oxyCODONE (ROXICODONE) 5 MG immediate release tablet, Take 1 tablet (5 mg total) by mouth every 4 (four) hours as needed for Pain., Disp: 40 tablet, Rfl: 0    pen needle, diabetic (EASY COMFORT PEN NEEDLES) 32 gauge x 5/32" Ndle, Inject 1 each into the skin 3 (three) times daily., Disp: 100 each, Rfl: 11    predniSONE (DELTASONE) 5 MG tablet, Take by mouth daily:  20mg 12/2-12/8, 15mg 12/9-12/15, 10mg 12/16-12/22, then 5mg daily beginning 12/23/2023, Disp: 70 tablet, Rfl: 11    rosuvastatin (CRESTOR) 40 MG Tab, Take 40 mg by mouth every evening., Disp: , Rfl:     sodium bicarbonate 650 MG tablet, Take 2 tablets (1,300 mg total) by mouth 2 (two) times daily., Disp: 120 tablet, Rfl: 11    tacrolimus (PROGRAF) 1 MG Cap, Take 7 capsules (7 mg total) by mouth every 12 (twelve) hours., Disp: 420 capsule, Rfl: 11    valGANciclovir (VALCYTE) 450 mg Tab, Take 1 tablet (450 mg total) by mouth every Mon, Wed, Fri. STOP 5/29/24, Disp: 12 tablet, Rfl: 6  No current facility-administered medications for this visit.      Past Medical History:   Diagnosis Date    Blind     Diabetes     ESRD (end stage renal " "disease) on dialysis     Hypertension     Right cataract          Review of Systems   Constitutional:  Negative for appetite change, chills, fatigue and fever.   HENT:  Negative for trouble swallowing.    Eyes:  Positive for visual disturbance.   Respiratory:  Negative for cough, chest tightness, shortness of breath and wheezing.    Cardiovascular:  Positive for palpitations. Negative for chest pain and leg swelling.   Gastrointestinal:  Negative for abdominal pain, constipation, diarrhea and nausea.   Genitourinary:  Negative for difficulty urinating, frequency and urgency.   Musculoskeletal:  Negative for arthralgias and myalgias.   Skin:  Negative for rash.   Allergic/Immunologic: Positive for immunocompromised state.   Neurological:  Negative for dizziness, weakness, light-headedness and headaches.   Psychiatric/Behavioral:  Negative for sleep disturbance.        Objective:   Blood pressure (!) 161/74, pulse 97, temperature 97.3 °F (36.3 °C), temperature source Tympanic, resp. rate 18, height 5' 5" (1.651 m), weight 108.3 kg (238 lb 12.1 oz), SpO2 95 %.body mass index is 39.73 kg/m².    Physical Exam  Constitutional:       General: She is not in acute distress.     Appearance: She is well-developed. She is not diaphoretic.   Cardiovascular:      Rate and Rhythm: Normal rate and regular rhythm.      Heart sounds: Normal heart sounds.   Pulmonary:      Effort: Pulmonary effort is normal.      Breath sounds: Normal breath sounds.   Abdominal:      General: Bowel sounds are normal.      Palpations: Abdomen is soft.   Musculoskeletal:         General: No tenderness. Normal range of motion.   Skin:     General: Skin is warm and dry.      Findings: No rash.      Nails: There is no clubbing.   Neurological:      Mental Status: She is alert and oriented to person, place, and time.   Psychiatric:         Behavior: Behavior normal.         Labs:  Lab Results   Component Value Date    WBC 12.71 (H) 12/08/2023    HGB 8.0 (L) " "12/08/2023    HCT 26.2 (L) 12/08/2023     12/08/2023    K 3.9 12/08/2023    CL 99 12/08/2023    CO2 26 12/08/2023    BUN 73 (H) 12/08/2023    CREATININE 6.4 (H) 12/08/2023    EGFRNORACEVR 8 (A) 12/08/2023    CALCIUM 8.1 (L) 12/08/2023    PHOS 3.7 12/08/2023    MG 2.0 12/08/2023    ALBUMIN 3.1 (L) 12/08/2023    AST 7 (L) 11/29/2023    ALT 13 12/06/2023    UTPCR 0.64 (H) 12/08/2023     (H) 12/06/2023    TACROLIMUS 9.1 12/06/2023       No results found for: "EXTANC", "EXTWBC", "EXTSEGS", "EXTPLATELETS", "EXTHEMOGLOBI", "EXTHEMATOCRI", "EXTCREATININ", "EXTSODIUM", "EXTPOTASSIUM", "EXTBUN", "EXTCO2", "EXTCALCIUM", "EXTPHOSPHORU", "EXTGLUCOSE", "EXTALBUMIN", "EXTAST", "EXTALT", "EXTBILITOTAL", "EXTLIPASE", "EXTAMYLASE"    No results found for: "EXTCYCLOSLVL", "EXTSIROLIMUS", "EXTTACROLVL", "EXTPROTCRE", "EXTPTHINTACT", "EXTPROTEINUA", "EXTWBCUA", "EXTRBCUA"    Labs were reviewed with the patient    Assessment:     1. S/P kidney transplant    2. Delayed graft function of kidney transplant due to ATN requiring acute dialysis    3. Essential hypertension    4. Type 2 diabetes mellitus with chronic kidney disease on chronic dialysis, unspecified whether long term insulin use    5. Long-term use of immunosuppressant medication        Plan:   Discussed case with Dr. Thomas  Dialysis today  Cardiology evaluation for palpitations         1. CKD stage: will continue follow up as per our center guidelines. patient to continue close follow up with the local General nephrologist. Education provided in appropriate fluid intake, potassium intake. Continue with oral hydration.      2. Immunosuppression: Prograf trough pending , therapeutic target 8-10. Continue Prograf 7/7, MMF 1000 Mg BID, and Prednisone QD  Lab Results   Component Value Date    TACROLIMUS 9.1 12/06/2023    TACROLIMUS 7.8 12/05/2023    TACROLIMUS 5.8 12/04/2023   Will closely monitor for toxicities, education provided about adherence to medicines and need " to communicate any side effect to the transplant nurse or physician.      3. Allograft Function:stable at baseline for the patient. Continue follow up as per our guidelines and with the local General nephrologist. Communication will be sent today.  Lab Results   Component Value Date    CREATININE 6.4 (H) 12/08/2023    CREATININE 8.1 (H) 12/06/2023    CREATININE 7.62 (H) 12/06/2023     Lab Results   Component Value Date    LIPASE 144 (H) 08/07/2023       4. Hypertension management:  Continue with home blood pressure monitoring, low salt and healthy life discussed with the patient.  BP Readings from Last 3 Encounters:   12/08/23 (!) 161/74   12/08/23 (!) 161/74   12/06/23 (!) 200/84       5. Metabolic Bone Disease/Secondary Hyperparathyroidism:calcium and phosphorus level discussed with the patient, patient will continue follow up with the general nephrologist for management of metabolic bone disease calcium and phosphorus as per our center protocol. Will monitor PTH, Vit D level, calcium.   Lab Results   Component Value Date     (H) 12/06/2023    CALCIUM 8.1 (L) 12/08/2023    PHOS 3.7 12/08/2023    PHOS 4.1 12/06/2023    PHOS 4.2 12/06/2023       6. Electrolytes: reviewed with the patient, essentially within the normal range no need for acute changes today, will monitor as per our center guidelines.  Lab Results   Component Value Date     12/08/2023    K 3.9 12/08/2023    CL 99 12/08/2023    CO2 26 12/08/2023    CO2 23 12/06/2023    CO2 24 12/05/2023       7. Anemia: will continue monitoring as per our center guidelines. No indication for acute intervention today.  Lab Results   Component Value Date    WBC 12.71 (H) 12/08/2023    HGB 8.0 (L) 12/08/2023    HCT 26.2 (L) 12/08/2023     (H) 12/08/2023     12/08/2023       8.Proteinuria: will continue with pr/cr ratio as per our center guidelines  Lab Results   Component Value Date    PROTEINURINE 27 12/08/2023    CREATRANDUR 42.5 12/08/2023     "UTPCR 0.64 (H) 12/08/2023        9. BK virus infection screening: will continue with urine or blood PCR as per our guidelines to prevent BK virus viremia and allograft dysfunction  No results found for: "BKVIRUSDNAUR", "BKQUANTURINE", "BKVIRUSLOG", "BKVIRUSURINE", "BKVIRUSPCRQB"      10. Weight education: provided during the clinic visit.  Body mass index is 39.73 kg/m².     11.Patient safety education regarding immunosuppression including prophylaxis posttransplant for CMV, PCP : Education provided about vaccination and prevention of infections.    12.  Cytopenias: no significant cytopenias will monitor as per our guidelines. Medicine list reviewed including potential causes of drug-induced cytopenias  Lab Results   Component Value Date    WBC 12.71 (H) 12/08/2023    HGB 8.0 (L) 12/08/2023    HCT 26.2 (L) 12/08/2023     (H) 12/08/2023     12/08/2023       13. Post-transplant Prophylaxis; CMV Infection, PJP and Candida mucosistis and other indicated for this particular patient.   PCP PROPHYLAXIS: needs PJP prophy until 5/29/24 (G6PD pending) atovaquone due to low blood couts  CMV PROPHYLAXIS: Valcyte until 5/29/24     Exercise: reminded Juan Francisco of the importance of regular exercise for weight management, blood sugar and blood pressure management.  I also explained exercise has been shown to improve cardiovascular health, energy level, and sleep hygiene.  Lastly, I advised him that cardiovascular complications are leading cause of death for renal transplant recipients, and regular exercise can help lower this risk.    Follow-up:   Clinic: return to transplant clinic weekly for the first month after transplant; every 2 weeks during months 2-3; then at 6-, 9-, 12-, 18-, 24-, and 36- months post-transplant to reassess for complications from immunosuppression toxicity and monitor for rejection.  Annually thereafter.    Labs: since patient remains at high risk for rejection and drug-related complications " that warrant close monitoring, labs will be ordered as follows: continue twice weekly CBC, renal panel, and drug level for first month; then same labs once weekly through 3rd month post-transplant.  Urine for UA and protein/creatinine ratio monthly.  Serum BK - PCR at 1-, 3-, 6-, 9-, 12-, 18-, 24-, 36-, 48-, and 60 months post-transplant.  Hepatic panel at 1-, 2-, 3-, 6-, 9-, 12-, 18-, 24-, and 36- months post-transplant.    Justine Masterson NP       Education:   Material provided to the patient.  Patient reminded to call with any health changes since these can be early signs of significant complications.  Also, I advised the patient to be sure any new medications or changes of old medications are discussed with either a pharmacist or physician knowledgeable with transplant to avoid rejection/drug toxicity related to significant drug interactions.    Patient advised that it is recommended that all transplanted patients, and their close contacts and household members receive Covid vaccination.

## 2023-12-07 LAB — TACROLIMUS BLD-MCNC: 9.1 NG/ML (ref 5–15)

## 2023-12-08 ENCOUNTER — OFFICE VISIT (OUTPATIENT)
Dept: TRANSPLANT | Facility: CLINIC | Age: 48
End: 2023-12-08
Payer: MEDICARE

## 2023-12-08 ENCOUNTER — CLINICAL SUPPORT (OUTPATIENT)
Dept: TRANSPLANT | Facility: CLINIC | Age: 48
End: 2023-12-08
Payer: MEDICARE

## 2023-12-08 ENCOUNTER — LAB VISIT (OUTPATIENT)
Dept: LAB | Facility: HOSPITAL | Age: 48
End: 2023-12-08
Attending: TRANSPLANT SURGERY
Payer: MEDICARE

## 2023-12-08 VITALS
HEIGHT: 65 IN | DIASTOLIC BLOOD PRESSURE: 74 MMHG | OXYGEN SATURATION: 95 % | RESPIRATION RATE: 18 BRPM | DIASTOLIC BLOOD PRESSURE: 74 MMHG | TEMPERATURE: 97 F | SYSTOLIC BLOOD PRESSURE: 161 MMHG | SYSTOLIC BLOOD PRESSURE: 161 MMHG | WEIGHT: 238.75 LBS | HEIGHT: 65 IN | BODY MASS INDEX: 39.78 KG/M2 | RESPIRATION RATE: 18 BRPM | BODY MASS INDEX: 39.78 KG/M2 | HEART RATE: 97 BPM | WEIGHT: 238.75 LBS | HEART RATE: 97 BPM | OXYGEN SATURATION: 95 % | TEMPERATURE: 97 F

## 2023-12-08 DIAGNOSIS — Z99.2 TYPE 2 DIABETES MELLITUS WITH CHRONIC KIDNEY DISEASE ON CHRONIC DIALYSIS, UNSPECIFIED WHETHER LONG TERM INSULIN USE: ICD-10-CM

## 2023-12-08 DIAGNOSIS — E11.22 TYPE 2 DIABETES MELLITUS WITH CHRONIC KIDNEY DISEASE ON CHRONIC DIALYSIS, UNSPECIFIED WHETHER LONG TERM INSULIN USE: ICD-10-CM

## 2023-12-08 DIAGNOSIS — D63.8 ANEMIA OF CHRONIC DISEASE: Primary | ICD-10-CM

## 2023-12-08 DIAGNOSIS — R00.2 PALPITATIONS: ICD-10-CM

## 2023-12-08 DIAGNOSIS — Z99.2 DELAYED GRAFT FUNCTION OF KIDNEY TRANSPLANT DUE TO ATN REQUIRING ACUTE DIALYSIS: ICD-10-CM

## 2023-12-08 DIAGNOSIS — Z94.0 KIDNEY REPLACED BY TRANSPLANT: ICD-10-CM

## 2023-12-08 DIAGNOSIS — T86.19 DELAYED GRAFT FUNCTION OF KIDNEY TRANSPLANT DUE TO ATN REQUIRING ACUTE DIALYSIS: ICD-10-CM

## 2023-12-08 DIAGNOSIS — Z94.0 S/P KIDNEY TRANSPLANT: Primary | ICD-10-CM

## 2023-12-08 DIAGNOSIS — I10 ESSENTIAL HYPERTENSION: ICD-10-CM

## 2023-12-08 DIAGNOSIS — N18.6 TYPE 2 DIABETES MELLITUS WITH CHRONIC KIDNEY DISEASE ON CHRONIC DIALYSIS, UNSPECIFIED WHETHER LONG TERM INSULIN USE: ICD-10-CM

## 2023-12-08 DIAGNOSIS — Z79.60 LONG-TERM USE OF IMMUNOSUPPRESSANT MEDICATION: ICD-10-CM

## 2023-12-08 LAB
ALBUMIN SERPL BCP-MCNC: 3.1 G/DL (ref 3.5–5.2)
ANION GAP SERPL CALC-SCNC: 12 MMOL/L (ref 8–16)
BACTERIA #/AREA URNS HPF: ABNORMAL /HPF
BASOPHILS # BLD AUTO: 0.01 K/UL (ref 0–0.2)
BASOPHILS NFR BLD: 0.1 % (ref 0–1.9)
BILIRUB UR QL STRIP: NEGATIVE
BUN SERPL-MCNC: 73 MG/DL (ref 6–20)
CALCIUM SERPL-MCNC: 8.1 MG/DL (ref 8.7–10.5)
CHLORIDE SERPL-SCNC: 99 MMOL/L (ref 95–110)
CLARITY UR: ABNORMAL
CO2 SERPL-SCNC: 26 MMOL/L (ref 23–29)
COLOR UR: YELLOW
CREAT SERPL-MCNC: 6.4 MG/DL (ref 0.5–1.4)
CREAT UR-MCNC: 42.5 MG/DL (ref 15–325)
DIFFERENTIAL METHOD BLD: ABNORMAL
EOSINOPHIL # BLD AUTO: 0.2 K/UL (ref 0–0.5)
EOSINOPHIL NFR BLD: 1.3 % (ref 0–8)
ERYTHROCYTE [DISTWIDTH] IN BLOOD BY AUTOMATED COUNT: 15 % (ref 11.5–14.5)
EST. GFR  (NO RACE VARIABLE): 8 ML/MIN/1.73 M^2
GLUCOSE SERPL-MCNC: 130 MG/DL (ref 70–110)
GLUCOSE UR QL STRIP: NEGATIVE
HCT VFR BLD AUTO: 26.2 % (ref 37–48.5)
HGB BLD-MCNC: 8 G/DL (ref 12–16)
HGB UR QL STRIP: ABNORMAL
IMM GRANULOCYTES # BLD AUTO: 0.1 K/UL (ref 0–0.04)
IMM GRANULOCYTES NFR BLD AUTO: 0.8 % (ref 0–0.5)
KETONES UR QL STRIP: NEGATIVE
LEUKOCYTE ESTERASE UR QL STRIP: ABNORMAL
LYMPHOCYTES # BLD AUTO: 0.7 K/UL (ref 1–4.8)
LYMPHOCYTES NFR BLD: 5.4 % (ref 18–48)
MAGNESIUM SERPL-MCNC: 2 MG/DL (ref 1.6–2.6)
MCH RBC QN AUTO: 31.1 PG (ref 27–31)
MCHC RBC AUTO-ENTMCNC: 30.5 G/DL (ref 32–36)
MCV RBC AUTO: 102 FL (ref 82–98)
MICROSCOPIC COMMENT: ABNORMAL
MONOCYTES # BLD AUTO: 1.6 K/UL (ref 0.3–1)
MONOCYTES NFR BLD: 12.3 % (ref 4–15)
NEUTROPHILS # BLD AUTO: 10.2 K/UL (ref 1.8–7.7)
NEUTROPHILS NFR BLD: 80.1 % (ref 38–73)
NITRITE UR QL STRIP: NEGATIVE
NRBC BLD-RTO: 0 /100 WBC
PH UR STRIP: 5 [PH] (ref 5–8)
PHOSPHATE SERPL-MCNC: 3.7 MG/DL (ref 2.7–4.5)
PLATELET # BLD AUTO: 192 K/UL (ref 150–450)
PMV BLD AUTO: 10.8 FL (ref 9.2–12.9)
POTASSIUM SERPL-SCNC: 3.9 MMOL/L (ref 3.5–5.1)
PROT UR QL STRIP: ABNORMAL
PROT UR-MCNC: 27 MG/DL
PROT/CREAT UR: 0.64 MG/G{CREAT} (ref 0–0.2)
RBC # BLD AUTO: 2.57 M/UL (ref 4–5.4)
RBC #/AREA URNS HPF: 72 /HPF (ref 0–4)
SODIUM SERPL-SCNC: 137 MMOL/L (ref 136–145)
SP GR UR STRIP: 1.01 (ref 1–1.03)
SQUAMOUS #/AREA URNS HPF: 2 /HPF
URN SPEC COLLECT METH UR: ABNORMAL
UROBILINOGEN UR STRIP-ACNC: NEGATIVE EU/DL
WBC # BLD AUTO: 12.71 K/UL (ref 3.9–12.7)
WBC #/AREA URNS HPF: 10 /HPF (ref 0–5)
WBC CLUMPS URNS QL MICRO: ABNORMAL

## 2023-12-08 PROCEDURE — 87088 URINE BACTERIA CULTURE: CPT | Performed by: STUDENT IN AN ORGANIZED HEALTH CARE EDUCATION/TRAINING PROGRAM

## 2023-12-08 PROCEDURE — 99999 PR PBB SHADOW E&M-EST. PATIENT-LVL III: CPT | Mod: PBBFAC,,, | Performed by: NURSE PRACTITIONER

## 2023-12-08 PROCEDURE — 99999 PR PBB SHADOW E&M-EST. PATIENT-LVL III: CPT | Mod: PBBFAC,,,

## 2023-12-08 PROCEDURE — 99213 OFFICE O/P EST LOW 20 MIN: CPT | Mod: PBBFAC | Performed by: NURSE PRACTITIONER

## 2023-12-08 PROCEDURE — 85025 COMPLETE CBC W/AUTO DIFF WBC: CPT | Performed by: INTERNAL MEDICINE

## 2023-12-08 PROCEDURE — 99215 OFFICE O/P EST HI 40 MIN: CPT | Mod: S$PBB,,, | Performed by: NURSE PRACTITIONER

## 2023-12-08 PROCEDURE — 99999PBSHW PR PBB SHADOW TECHNICAL ONLY FILED TO HB: ICD-10-PCS | Mod: JZ,PBBFAC,,

## 2023-12-08 PROCEDURE — 96372 THER/PROPH/DIAG INJ SC/IM: CPT | Mod: PBBFAC

## 2023-12-08 PROCEDURE — 80197 ASSAY OF TACROLIMUS: CPT | Performed by: INTERNAL MEDICINE

## 2023-12-08 PROCEDURE — 99999PBSHW PR PBB SHADOW TECHNICAL ONLY FILED TO HB: Mod: JZ,PBBFAC,,

## 2023-12-08 PROCEDURE — 87077 CULTURE AEROBIC IDENTIFY: CPT | Performed by: STUDENT IN AN ORGANIZED HEALTH CARE EDUCATION/TRAINING PROGRAM

## 2023-12-08 PROCEDURE — 82570 ASSAY OF URINE CREATININE: CPT | Performed by: STUDENT IN AN ORGANIZED HEALTH CARE EDUCATION/TRAINING PROGRAM

## 2023-12-08 PROCEDURE — 81000 URINALYSIS NONAUTO W/SCOPE: CPT | Performed by: STUDENT IN AN ORGANIZED HEALTH CARE EDUCATION/TRAINING PROGRAM

## 2023-12-08 PROCEDURE — 87086 URINE CULTURE/COLONY COUNT: CPT | Performed by: STUDENT IN AN ORGANIZED HEALTH CARE EDUCATION/TRAINING PROGRAM

## 2023-12-08 PROCEDURE — 83735 ASSAY OF MAGNESIUM: CPT | Performed by: INTERNAL MEDICINE

## 2023-12-08 PROCEDURE — 99999 PR PBB SHADOW E&M-EST. PATIENT-LVL III: ICD-10-PCS | Mod: PBBFAC,,,

## 2023-12-08 PROCEDURE — 87186 SC STD MICRODIL/AGAR DIL: CPT | Performed by: STUDENT IN AN ORGANIZED HEALTH CARE EDUCATION/TRAINING PROGRAM

## 2023-12-08 PROCEDURE — 99215 PR OFFICE/OUTPT VISIT, EST, LEVL V, 40-54 MIN: ICD-10-PCS | Mod: S$PBB,,, | Performed by: NURSE PRACTITIONER

## 2023-12-08 PROCEDURE — 99999 PR PBB SHADOW E&M-EST. PATIENT-LVL III: ICD-10-PCS | Mod: PBBFAC,,, | Performed by: NURSE PRACTITIONER

## 2023-12-08 PROCEDURE — 80069 RENAL FUNCTION PANEL: CPT | Performed by: INTERNAL MEDICINE

## 2023-12-08 PROCEDURE — 36415 COLL VENOUS BLD VENIPUNCTURE: CPT | Performed by: INTERNAL MEDICINE

## 2023-12-08 RX ADMIN — EPOETIN ALFA-EPBX 10000 UNITS: 10000 INJECTION, SOLUTION INTRAVENOUS; SUBCUTANEOUS at 10:12

## 2023-12-08 NOTE — LETTER
December 8, 2023        Aries Bourne  [address]  [facility] [address]  Phone: [phone]  Fax: [phone]             [facility] 1st Fl  [address]  [facility] [address]  Phone: [phone]   Patient: Mary Waller   MR Number: [id number]   YOB: 1975   Date of Visit: 12/8/2023       Dear Dr. Aries Bourne    Thank you for referring Mary Waller to me for evaluation. Attached you will find relevant portions of my assessment and plan of care.    If you have questions, please do not hesitate to call me. I look forward to following aMry Waller along with you.    Sincerely,    Justine Masterson, NP    Enclosure    If you would like to receive this communication electronically, please contact [email or web address] or [phone] to request [employer] access.    [employer] is a tool which provides read-only access to select patient information with whom you have a relationship. Its easy to use and provides real time access to review your patients record including encounter summaries, notes, results, and demographic information.    If you feel you have received this communication in error or would no longer like to receive these types of communications, please e-mail [email or web address]

## 2023-12-08 NOTE — PROGRESS NOTES
STAPLE REMOVAL NOTE    Staples removed from {organ:76857} transplant incision, steri-strips applied with Benzoin per  {Surgeons:69346}'s order.  Patient tolerated procedure well.  Skin {skin:59449}.  Patient instructed to shower with back to water spray and to pat dry incision and to let the steri-strips wear off on their own.  Patient instructed to report any redness, warmth, or drainage from incision to transplant coordinators.  All questions answered.

## 2023-12-09 LAB — TACROLIMUS BLD-MCNC: 7.7 NG/ML (ref 5–15)

## 2023-12-11 ENCOUNTER — LAB VISIT (OUTPATIENT)
Dept: LAB | Facility: HOSPITAL | Age: 48
End: 2023-12-11
Attending: INTERNAL MEDICINE
Payer: MEDICARE

## 2023-12-11 ENCOUNTER — TELEPHONE (OUTPATIENT)
Dept: TRANSPLANT | Facility: CLINIC | Age: 48
End: 2023-12-11
Payer: MEDICARE

## 2023-12-11 DIAGNOSIS — Z94.0 KIDNEY REPLACED BY TRANSPLANT: ICD-10-CM

## 2023-12-11 LAB
ALBUMIN SERPL BCP-MCNC: 3.3 G/DL (ref 3.5–5.2)
ANION GAP SERPL CALC-SCNC: 12 MMOL/L (ref 8–16)
BACTERIA UR CULT: ABNORMAL
BASOPHILS # BLD AUTO: 0.02 K/UL (ref 0–0.2)
BASOPHILS NFR BLD: 0.2 % (ref 0–1.9)
BUN SERPL-MCNC: 80 MG/DL (ref 6–20)
CALCIUM SERPL-MCNC: 8 MG/DL (ref 8.7–10.5)
CHLORIDE SERPL-SCNC: 94 MMOL/L (ref 95–110)
CO2 SERPL-SCNC: 31 MMOL/L (ref 23–29)
CREAT SERPL-MCNC: 4.4 MG/DL (ref 0.5–1.4)
DIFFERENTIAL METHOD: ABNORMAL
EOSINOPHIL # BLD AUTO: 0.2 K/UL (ref 0–0.5)
EOSINOPHIL NFR BLD: 1.5 % (ref 0–8)
ERYTHROCYTE [DISTWIDTH] IN BLOOD BY AUTOMATED COUNT: 15.6 % (ref 11.5–14.5)
EST. GFR  (NO RACE VARIABLE): 12 ML/MIN/1.73 M^2
GLUCOSE SERPL-MCNC: 174 MG/DL (ref 70–110)
HCT VFR BLD AUTO: 26.3 % (ref 37–48.5)
HGB BLD-MCNC: 8.1 G/DL (ref 12–16)
IMM GRANULOCYTES # BLD AUTO: 0.07 K/UL (ref 0–0.04)
IMM GRANULOCYTES NFR BLD AUTO: 0.7 % (ref 0–0.5)
LYMPHOCYTES # BLD AUTO: 0.7 K/UL (ref 1–4.8)
LYMPHOCYTES NFR BLD: 6.8 % (ref 18–48)
MAGNESIUM SERPL-MCNC: 1.7 MG/DL (ref 1.6–2.6)
MCH RBC QN AUTO: 31.5 PG (ref 27–31)
MCHC RBC AUTO-ENTMCNC: 30.8 G/DL (ref 32–36)
MCV RBC AUTO: 102 FL (ref 82–98)
MONOCYTES # BLD AUTO: 0.9 K/UL (ref 0.3–1)
MONOCYTES NFR BLD: 8.7 % (ref 4–15)
NEUTROPHILS # BLD AUTO: 8.5 K/UL (ref 1.8–7.7)
NEUTROPHILS NFR BLD: 82.1 % (ref 38–73)
NRBC BLD-RTO: 0 /100 WBC
PHOSPHATE SERPL-MCNC: 4.3 MG/DL (ref 2.7–4.5)
PLATELET # BLD AUTO: 214 K/UL (ref 150–450)
PMV BLD AUTO: 10 FL (ref 9.2–12.9)
POTASSIUM SERPL-SCNC: 3.1 MMOL/L (ref 3.5–5.1)
RBC # BLD AUTO: 2.57 M/UL (ref 4–5.4)
SODIUM SERPL-SCNC: 137 MMOL/L (ref 136–145)
WBC # BLD AUTO: 10.3 K/UL (ref 3.9–12.7)

## 2023-12-11 PROCEDURE — 36415 COLL VENOUS BLD VENIPUNCTURE: CPT | Performed by: INTERNAL MEDICINE

## 2023-12-11 PROCEDURE — 80197 ASSAY OF TACROLIMUS: CPT | Performed by: INTERNAL MEDICINE

## 2023-12-11 PROCEDURE — 85025 COMPLETE CBC W/AUTO DIFF WBC: CPT | Performed by: INTERNAL MEDICINE

## 2023-12-11 PROCEDURE — 80069 RENAL FUNCTION PANEL: CPT | Performed by: INTERNAL MEDICINE

## 2023-12-11 PROCEDURE — 83735 ASSAY OF MAGNESIUM: CPT | Performed by: INTERNAL MEDICINE

## 2023-12-11 RX ORDER — CIPROFLOXACIN 500 MG/1
500 TABLET ORAL EVERY 12 HOURS
Qty: 14 TABLET | Refills: 0 | Status: SHIPPED | OUTPATIENT
Start: 2023-12-11 | End: 2023-12-18

## 2023-12-11 NOTE — TELEPHONE ENCOUNTER
D/w patient. NO HD. Cipro sent to [facility].       Randall Thomas Jr., MD  [facility] Post-Kidney Transplant Clinical  Treat with 7 days cipro 500 bid. No change to MMF. Please make sure not having fevers or other systemic symptoms.    ----- Message from Randall Thomas Jr., MD sent at 12/11/2023 11:46 AM CST -----  Hold dialysis  ----- Message -----  From: Emma Timmons RN  Sent: 12/11/2023  10:44 AM CST  To: Randall Thomas Jr., MD    [facility] Nursing Assessment:    I called pt and completed the following assessment prior to calling to review with MD, to determine need for Dialysis:    B/P: 146/79, 152/80, 148/75, 152/71, 129/72    Pulse: 92, 86, 81, 83, 87    Dry Weight/ Current Weight: 235 lbs, 232.6, 231.6    Swelling/ fluid retention: slight feet swelling at night.     SOB: denies    Intake: 1100 both days    Output: 1800, 2700    Any distress: denies    These should include the past 2 or more days since last assessed.

## 2023-12-11 NOTE — TELEPHONE ENCOUNTER
DGF Nursing Assessment:    I called pt and completed the following assessment prior to calling to review with MD, to determine need for Dialysis:    B/P: 146/79, 152/80, 148/75, 152/71, 129/72    Pulse: 92, 86, 81, 83, 87    Dry Weight/ Current Weight: 235 lbs, 232.6, 231.6    Swelling/ fluid retention: slight feet swelling at night.     SOB: denies    Intake: 1100 both days    Output: 1800, 2700    Any distress: denies    These should include the past 2 or more days since last assessed.

## 2023-12-12 LAB — TACROLIMUS BLD-MCNC: 7.5 NG/ML (ref 5–15)

## 2023-12-13 ENCOUNTER — TELEPHONE (OUTPATIENT)
Dept: TRANSPLANT | Facility: CLINIC | Age: 48
End: 2023-12-13
Payer: MEDICARE

## 2023-12-13 ENCOUNTER — LAB VISIT (OUTPATIENT)
Dept: LAB | Facility: HOSPITAL | Age: 48
End: 2023-12-13
Attending: STUDENT IN AN ORGANIZED HEALTH CARE EDUCATION/TRAINING PROGRAM
Payer: MEDICARE

## 2023-12-13 DIAGNOSIS — Z94.0 S/P KIDNEY TRANSPLANT: Primary | ICD-10-CM

## 2023-12-13 DIAGNOSIS — Z94.0 KIDNEY REPLACED BY TRANSPLANT: ICD-10-CM

## 2023-12-13 LAB
ALBUMIN SERPL BCP-MCNC: 3.3 G/DL (ref 3.5–5.2)
ANION GAP SERPL CALC-SCNC: 18 MMOL/L (ref 8–16)
BASOPHILS # BLD AUTO: 0.02 K/UL (ref 0–0.2)
BASOPHILS NFR BLD: 0.2 % (ref 0–1.9)
BILIRUB UR QL STRIP: NEGATIVE
BUN SERPL-MCNC: 80 MG/DL (ref 6–20)
CALCIUM SERPL-MCNC: 7.5 MG/DL (ref 8.7–10.5)
CHLORIDE SERPL-SCNC: 91 MMOL/L (ref 95–110)
CLARITY UR: ABNORMAL
CO2 SERPL-SCNC: 31 MMOL/L (ref 23–29)
COLOR UR: YELLOW
CREAT SERPL-MCNC: 3.8 MG/DL (ref 0.5–1.4)
CREAT UR-MCNC: 57 MG/DL (ref 15–325)
DIFFERENTIAL METHOD: ABNORMAL
EOSINOPHIL # BLD AUTO: 0.1 K/UL (ref 0–0.5)
EOSINOPHIL NFR BLD: 1 % (ref 0–8)
ERYTHROCYTE [DISTWIDTH] IN BLOOD BY AUTOMATED COUNT: 15.4 % (ref 11.5–14.5)
EST. GFR  (NO RACE VARIABLE): 14 ML/MIN/1.73 M^2
GLUCOSE SERPL-MCNC: 180 MG/DL (ref 70–110)
GLUCOSE UR QL STRIP: NEGATIVE
HCT VFR BLD AUTO: 26.4 % (ref 37–48.5)
HGB BLD-MCNC: 8.3 G/DL (ref 12–16)
HGB UR QL STRIP: ABNORMAL
IMM GRANULOCYTES # BLD AUTO: 0.06 K/UL (ref 0–0.04)
IMM GRANULOCYTES NFR BLD AUTO: 0.5 % (ref 0–0.5)
KETONES UR QL STRIP: NEGATIVE
LEUKOCYTE ESTERASE UR QL STRIP: NEGATIVE
LYMPHOCYTES # BLD AUTO: 0.6 K/UL (ref 1–4.8)
LYMPHOCYTES NFR BLD: 4.6 % (ref 18–48)
MAGNESIUM SERPL-MCNC: 1.4 MG/DL (ref 1.6–2.6)
MCH RBC QN AUTO: 31.9 PG (ref 27–31)
MCHC RBC AUTO-ENTMCNC: 31.4 G/DL (ref 32–36)
MCV RBC AUTO: 102 FL (ref 82–98)
MICROSCOPIC COMMENT: ABNORMAL
MONOCYTES # BLD AUTO: 0.6 K/UL (ref 0.3–1)
MONOCYTES NFR BLD: 5.2 % (ref 4–15)
NEUTROPHILS # BLD AUTO: 10.7 K/UL (ref 1.8–7.7)
NEUTROPHILS NFR BLD: 88.5 % (ref 38–73)
NITRITE UR QL STRIP: NEGATIVE
NON-SQ EPI CELLS #/AREA URNS HPF: 0 /HPF
NRBC BLD-RTO: 0 /100 WBC
PH UR STRIP: 5 [PH] (ref 5–8)
PHOSPHATE SERPL-MCNC: 4.5 MG/DL (ref 2.7–4.5)
PLATELET # BLD AUTO: 211 K/UL (ref 150–450)
PMV BLD AUTO: 9.7 FL (ref 9.2–12.9)
POTASSIUM SERPL-SCNC: 3 MMOL/L (ref 3.5–5.1)
PROT UR QL STRIP: ABNORMAL
PROT UR-MCNC: 18 MG/DL
PROT/CREAT UR: 0.32 MG/G{CREAT} (ref 0–0.2)
RBC # BLD AUTO: 2.6 M/UL (ref 4–5.4)
RBC #/AREA URNS HPF: 51 /HPF (ref 0–4)
SODIUM SERPL-SCNC: 140 MMOL/L (ref 136–145)
SP GR UR STRIP: 1.01 (ref 1–1.03)
SQUAMOUS #/AREA URNS HPF: 1 /HPF
URN SPEC COLLECT METH UR: ABNORMAL
UROBILINOGEN UR STRIP-ACNC: NEGATIVE EU/DL
WBC # BLD AUTO: 12.06 K/UL (ref 3.9–12.7)
WBC #/AREA URNS HPF: 6 /HPF (ref 0–5)
WBC CLUMPS URNS QL MICRO: ABNORMAL

## 2023-12-13 PROCEDURE — 84156 ASSAY OF PROTEIN URINE: CPT | Performed by: STUDENT IN AN ORGANIZED HEALTH CARE EDUCATION/TRAINING PROGRAM

## 2023-12-13 PROCEDURE — 80197 ASSAY OF TACROLIMUS: CPT | Performed by: STUDENT IN AN ORGANIZED HEALTH CARE EDUCATION/TRAINING PROGRAM

## 2023-12-13 PROCEDURE — 83735 ASSAY OF MAGNESIUM: CPT | Performed by: STUDENT IN AN ORGANIZED HEALTH CARE EDUCATION/TRAINING PROGRAM

## 2023-12-13 PROCEDURE — 85025 COMPLETE CBC W/AUTO DIFF WBC: CPT | Performed by: STUDENT IN AN ORGANIZED HEALTH CARE EDUCATION/TRAINING PROGRAM

## 2023-12-13 PROCEDURE — 36415 COLL VENOUS BLD VENIPUNCTURE: CPT | Performed by: STUDENT IN AN ORGANIZED HEALTH CARE EDUCATION/TRAINING PROGRAM

## 2023-12-13 PROCEDURE — 80069 RENAL FUNCTION PANEL: CPT | Performed by: STUDENT IN AN ORGANIZED HEALTH CARE EDUCATION/TRAINING PROGRAM

## 2023-12-13 PROCEDURE — 81000 URINALYSIS NONAUTO W/SCOPE: CPT | Performed by: STUDENT IN AN ORGANIZED HEALTH CARE EDUCATION/TRAINING PROGRAM

## 2023-12-13 RX ORDER — LANOLIN ALCOHOL/MO/W.PET/CERES
800 CREAM (GRAM) TOPICAL NIGHTLY
Qty: 60 TABLET | Refills: 11 | Status: SHIPPED | OUTPATIENT
Start: 2023-12-13 | End: 2023-12-20 | Stop reason: DRUGHIGH

## 2023-12-13 RX ORDER — POTASSIUM CHLORIDE 20 MEQ/1
40 TABLET, EXTENDED RELEASE ORAL NIGHTLY
Qty: 10 TABLET | Refills: 0 | Status: SHIPPED | OUTPATIENT
Start: 2023-12-13 | End: 2023-12-18

## 2023-12-13 NOTE — TELEPHONE ENCOUNTER
D/w patient-- holding diuretics.   Meds sent to pharmacy        Hold dialysis. Confirm done with furosemide and metolazone. Start magox 800mg nightly. Start Kcl 40meq nightly for 5 days. Tac pending

## 2023-12-13 NOTE — TELEPHONE ENCOUNTER
"Called for DGF assessment, "pt nowhere near her book"    Pt instructed to call RN back as soon as she gets home.     "

## 2023-12-14 ENCOUNTER — TELEPHONE (OUTPATIENT)
Dept: TRANSPLANT | Facility: CLINIC | Age: 48
End: 2023-12-14
Payer: MEDICARE

## 2023-12-14 LAB — TACROLIMUS BLD-MCNC: 8.2 NG/ML (ref 5–15)

## 2023-12-14 NOTE — TELEPHONE ENCOUNTER
Spoke to patient regarding BG. Pt stated she usually has her blood sugar managed by her PCP. PCP said she was willing to treat again but wanted to be okayed from txp persepective. D/w patient that trasnplant is ok with her PCP managing her BG.     ----- Message from Sowmya Ayala sent at 12/14/2023 11:21 AM CST -----  Regarding: PT returning call req to Nurse Carter  Contact:  [phone]  The patient called requesting to speak to Nurse Carter, Called yesterday regarding high blood sugars. Message left for PT to call Nurse Carter. Please return call at your convenience     No further information provided      Patient can be contacted @# [phone]

## 2023-12-15 ENCOUNTER — LAB VISIT (OUTPATIENT)
Dept: LAB | Facility: HOSPITAL | Age: 48
End: 2023-12-15
Attending: STUDENT IN AN ORGANIZED HEALTH CARE EDUCATION/TRAINING PROGRAM
Payer: MEDICARE

## 2023-12-15 DIAGNOSIS — Z94.0 KIDNEY REPLACED BY TRANSPLANT: Primary | ICD-10-CM

## 2023-12-15 DIAGNOSIS — Z94.0 KIDNEY REPLACED BY TRANSPLANT: ICD-10-CM

## 2023-12-15 LAB
ALBUMIN SERPL BCP-MCNC: 3.3 G/DL (ref 3.5–5.2)
ANION GAP SERPL CALC-SCNC: 14 MMOL/L (ref 8–16)
BASOPHILS # BLD AUTO: 0.02 K/UL (ref 0–0.2)
BASOPHILS NFR BLD: 0.2 % (ref 0–1.9)
BUN SERPL-MCNC: 86 MG/DL (ref 6–20)
CALCIUM SERPL-MCNC: 7 MG/DL (ref 8.7–10.5)
CHLORIDE SERPL-SCNC: 88 MMOL/L (ref 95–110)
CO2 SERPL-SCNC: 36 MMOL/L (ref 23–29)
CREAT SERPL-MCNC: 3.5 MG/DL (ref 0.5–1.4)
DIFFERENTIAL METHOD: ABNORMAL
EOSINOPHIL # BLD AUTO: 0.1 K/UL (ref 0–0.5)
EOSINOPHIL NFR BLD: 1.2 % (ref 0–8)
ERYTHROCYTE [DISTWIDTH] IN BLOOD BY AUTOMATED COUNT: 15.3 % (ref 11.5–14.5)
EST. GFR  (NO RACE VARIABLE): 16 ML/MIN/1.73 M^2
GLUCOSE SERPL-MCNC: 216 MG/DL (ref 70–110)
HCT VFR BLD AUTO: 27.8 % (ref 37–48.5)
HGB BLD-MCNC: 8.4 G/DL (ref 12–16)
IMM GRANULOCYTES # BLD AUTO: 0.07 K/UL (ref 0–0.04)
IMM GRANULOCYTES NFR BLD AUTO: 0.7 % (ref 0–0.5)
LYMPHOCYTES # BLD AUTO: 0.6 K/UL (ref 1–4.8)
LYMPHOCYTES NFR BLD: 5.9 % (ref 18–48)
MAGNESIUM SERPL-MCNC: 1.9 MG/DL (ref 1.6–2.6)
MCH RBC QN AUTO: 31.3 PG (ref 27–31)
MCHC RBC AUTO-ENTMCNC: 30.2 G/DL (ref 32–36)
MCV RBC AUTO: 104 FL (ref 82–98)
MONOCYTES # BLD AUTO: 0.7 K/UL (ref 0.3–1)
MONOCYTES NFR BLD: 6.3 % (ref 4–15)
NEUTROPHILS # BLD AUTO: 9.2 K/UL (ref 1.8–7.7)
NEUTROPHILS NFR BLD: 85.7 % (ref 38–73)
NRBC BLD-RTO: 0 /100 WBC
PHOSPHATE SERPL-MCNC: 4.7 MG/DL (ref 2.7–4.5)
PLATELET # BLD AUTO: 200 K/UL (ref 150–450)
PMV BLD AUTO: 10.5 FL (ref 9.2–12.9)
POTASSIUM SERPL-SCNC: 3 MMOL/L (ref 3.5–5.1)
RBC # BLD AUTO: 2.68 M/UL (ref 4–5.4)
SODIUM SERPL-SCNC: 138 MMOL/L (ref 136–145)
TACROLIMUS BLD-MCNC: 9 NG/ML (ref 5–15)
WBC # BLD AUTO: 10.7 K/UL (ref 3.9–12.7)

## 2023-12-15 PROCEDURE — 85025 COMPLETE CBC W/AUTO DIFF WBC: CPT | Performed by: STUDENT IN AN ORGANIZED HEALTH CARE EDUCATION/TRAINING PROGRAM

## 2023-12-15 PROCEDURE — 80069 RENAL FUNCTION PANEL: CPT | Performed by: STUDENT IN AN ORGANIZED HEALTH CARE EDUCATION/TRAINING PROGRAM

## 2023-12-15 PROCEDURE — 83735 ASSAY OF MAGNESIUM: CPT | Performed by: STUDENT IN AN ORGANIZED HEALTH CARE EDUCATION/TRAINING PROGRAM

## 2023-12-15 PROCEDURE — 80197 ASSAY OF TACROLIMUS: CPT | Performed by: STUDENT IN AN ORGANIZED HEALTH CARE EDUCATION/TRAINING PROGRAM

## 2023-12-15 PROCEDURE — 36415 COLL VENOUS BLD VENIPUNCTURE: CPT | Performed by: STUDENT IN AN ORGANIZED HEALTH CARE EDUCATION/TRAINING PROGRAM

## 2023-12-15 RX ORDER — CALCITRIOL 0.25 UG/1
0.25 CAPSULE ORAL DAILY
Qty: 30 CAPSULE | Refills: 5 | Status: SHIPPED | OUTPATIENT
Start: 2023-12-15 | End: 2024-02-21 | Stop reason: SDUPTHER

## 2023-12-15 NOTE — TELEPHONE ENCOUNTER
Received written orders from Dr. Thomas.  Spoke to patient and instructed to hold HD today.  Dialysis unit notified.  Instructed pt to start calcitriol 0.25 mcg daily.  Patient confirmed that she is taking Vit D weekly put has only now picked up the kcl 40 meq and started to take today.  Will repeat labs on Monday before making any further changes.  Patient verbalized understanding of all changes.       ----- Message from Randall Thomas Jr., MD sent at 12/15/2023 11:17 AM CST -----  Hold dialysis. Repeat assessment Monday hopefully for the last time. Start calcitriol 0.25 daily. Confirm still on vit d 21692 weekly. Please get serum renin and aldosterone on Monday. Confirm has been on Kcl 40 for 2 days and if so increase to 60meq daily. Check UPCR with Monday labs. Tac pending

## 2023-12-18 ENCOUNTER — LAB VISIT (OUTPATIENT)
Dept: LAB | Facility: HOSPITAL | Age: 48
End: 2023-12-18
Attending: STUDENT IN AN ORGANIZED HEALTH CARE EDUCATION/TRAINING PROGRAM
Payer: MEDICARE

## 2023-12-18 ENCOUNTER — TELEPHONE (OUTPATIENT)
Dept: TRANSPLANT | Facility: CLINIC | Age: 48
End: 2023-12-18
Payer: MEDICARE

## 2023-12-18 DIAGNOSIS — Z94.0 KIDNEY REPLACED BY TRANSPLANT: Primary | ICD-10-CM

## 2023-12-18 DIAGNOSIS — Z94.0 KIDNEY REPLACED BY TRANSPLANT: ICD-10-CM

## 2023-12-18 LAB
ALBUMIN SERPL BCP-MCNC: 3.3 G/DL (ref 3.5–5.2)
ANION GAP SERPL CALC-SCNC: 15 MMOL/L (ref 8–16)
BASOPHILS # BLD AUTO: 0.02 K/UL (ref 0–0.2)
BASOPHILS NFR BLD: 0.2 % (ref 0–1.9)
BUN SERPL-MCNC: 71 MG/DL (ref 6–20)
CALCIUM SERPL-MCNC: 7.2 MG/DL (ref 8.7–10.5)
CHLORIDE SERPL-SCNC: 89 MMOL/L (ref 95–110)
CO2 SERPL-SCNC: 37 MMOL/L (ref 23–29)
CREAT SERPL-MCNC: 3.1 MG/DL (ref 0.5–1.4)
DIFFERENTIAL METHOD: ABNORMAL
EOSINOPHIL # BLD AUTO: 0.1 K/UL (ref 0–0.5)
EOSINOPHIL NFR BLD: 1.3 % (ref 0–8)
ERYTHROCYTE [DISTWIDTH] IN BLOOD BY AUTOMATED COUNT: 15.1 % (ref 11.5–14.5)
EST. GFR  (NO RACE VARIABLE): 18 ML/MIN/1.73 M^2
GLUCOSE SERPL-MCNC: 184 MG/DL (ref 70–110)
HCT VFR BLD AUTO: 28.9 % (ref 37–48.5)
HGB BLD-MCNC: 8.7 G/DL (ref 12–16)
IMM GRANULOCYTES # BLD AUTO: 0.04 K/UL (ref 0–0.04)
IMM GRANULOCYTES NFR BLD AUTO: 0.5 % (ref 0–0.5)
LYMPHOCYTES # BLD AUTO: 0.6 K/UL (ref 1–4.8)
LYMPHOCYTES NFR BLD: 6.9 % (ref 18–48)
MAGNESIUM SERPL-MCNC: 1.6 MG/DL (ref 1.6–2.6)
MCH RBC QN AUTO: 31.6 PG (ref 27–31)
MCHC RBC AUTO-ENTMCNC: 30.1 G/DL (ref 32–36)
MCV RBC AUTO: 105 FL (ref 82–98)
MONOCYTES # BLD AUTO: 0.6 K/UL (ref 0.3–1)
MONOCYTES NFR BLD: 6.6 % (ref 4–15)
NEUTROPHILS # BLD AUTO: 7.3 K/UL (ref 1.8–7.7)
NEUTROPHILS NFR BLD: 84.5 % (ref 38–73)
NRBC BLD-RTO: 0 /100 WBC
PHOSPHATE SERPL-MCNC: 3.9 MG/DL (ref 2.7–4.5)
PLATELET # BLD AUTO: 170 K/UL (ref 150–450)
PMV BLD AUTO: 10.4 FL (ref 9.2–12.9)
POTASSIUM SERPL-SCNC: 3.2 MMOL/L (ref 3.5–5.1)
RBC # BLD AUTO: 2.75 M/UL (ref 4–5.4)
SODIUM SERPL-SCNC: 141 MMOL/L (ref 136–145)
WBC # BLD AUTO: 8.65 K/UL (ref 3.9–12.7)

## 2023-12-18 PROCEDURE — 84244 ASSAY OF RENIN: CPT | Performed by: STUDENT IN AN ORGANIZED HEALTH CARE EDUCATION/TRAINING PROGRAM

## 2023-12-18 PROCEDURE — 85025 COMPLETE CBC W/AUTO DIFF WBC: CPT | Performed by: STUDENT IN AN ORGANIZED HEALTH CARE EDUCATION/TRAINING PROGRAM

## 2023-12-18 PROCEDURE — 80197 ASSAY OF TACROLIMUS: CPT | Performed by: STUDENT IN AN ORGANIZED HEALTH CARE EDUCATION/TRAINING PROGRAM

## 2023-12-18 PROCEDURE — 80069 RENAL FUNCTION PANEL: CPT | Performed by: STUDENT IN AN ORGANIZED HEALTH CARE EDUCATION/TRAINING PROGRAM

## 2023-12-18 PROCEDURE — 82088 ASSAY OF ALDOSTERONE: CPT | Performed by: STUDENT IN AN ORGANIZED HEALTH CARE EDUCATION/TRAINING PROGRAM

## 2023-12-18 PROCEDURE — 83735 ASSAY OF MAGNESIUM: CPT | Performed by: STUDENT IN AN ORGANIZED HEALTH CARE EDUCATION/TRAINING PROGRAM

## 2023-12-18 NOTE — TELEPHONE ENCOUNTER
Attempted to call for DGF assessment with no answer. Will try again.     Orders below placed.     ----- Message from Randall Thomas Jr., MD sent at 12/18/2023 10:13 AM CST -----  Hold dialysis . Proceed with dsa, allosure and biopsy. Allosure has to be done before biopsy or 48hr after.

## 2023-12-18 NOTE — TELEPHONE ENCOUNTER
"Obtained DGF assessment from patient.       ----- Message from Jm Patel sent at 12/18/2023 12:53 PM CST -----  Consult/Advisory:      Name Of Caller: Self      Contact Preference?: [phone]      What is the nature of the call?: Returning call to Emma      Additional Notes:  "Thank you for all that you do for our patients"         "

## 2023-12-19 ENCOUNTER — TELEPHONE (OUTPATIENT)
Dept: INTERVENTIONAL RADIOLOGY/VASCULAR | Facility: CLINIC | Age: 48
End: 2023-12-19
Payer: MEDICARE

## 2023-12-19 ENCOUNTER — PROCEDURE VISIT (OUTPATIENT)
Dept: UROLOGY | Facility: CLINIC | Age: 48
DRG: 641 | End: 2023-12-19
Payer: MEDICARE

## 2023-12-19 VITALS
WEIGHT: 224.88 LBS | TEMPERATURE: 97 F | SYSTOLIC BLOOD PRESSURE: 129 MMHG | HEART RATE: 92 BPM | BODY MASS INDEX: 37.42 KG/M2 | DIASTOLIC BLOOD PRESSURE: 58 MMHG | RESPIRATION RATE: 18 BRPM

## 2023-12-19 DIAGNOSIS — Z94.0 KIDNEY REPLACED BY TRANSPLANT: Primary | ICD-10-CM

## 2023-12-19 DIAGNOSIS — T86.19 DELAYED GRAFT FUNCTION OF KIDNEY TRANSPLANT DUE TO ATN REQUIRING ACUTE DIALYSIS: ICD-10-CM

## 2023-12-19 DIAGNOSIS — Z94.0 S/P KIDNEY TRANSPLANT: ICD-10-CM

## 2023-12-19 DIAGNOSIS — Z99.2 DELAYED GRAFT FUNCTION OF KIDNEY TRANSPLANT DUE TO ATN REQUIRING ACUTE DIALYSIS: ICD-10-CM

## 2023-12-19 LAB — TACROLIMUS BLD-MCNC: 9.7 NG/ML (ref 5–15)

## 2023-12-19 PROCEDURE — 52310 PR CYSTOSCOPY,REMV CALCULUS,SIMPLE: ICD-10-PCS | Mod: S$PBB,,, | Performed by: UROLOGY

## 2023-12-19 PROCEDURE — 52310 CYSTOSCOPY AND TREATMENT: CPT | Mod: S$PBB,,, | Performed by: UROLOGY

## 2023-12-19 PROCEDURE — 52310 CYSTOSCOPY AND TREATMENT: CPT | Mod: PBBFAC | Performed by: UROLOGY

## 2023-12-19 RX ORDER — LIDOCAINE HYDROCHLORIDE 20 MG/ML
JELLY TOPICAL
Status: COMPLETED | OUTPATIENT
Start: 2023-12-19 | End: 2023-12-19

## 2023-12-19 RX ADMIN — LIDOCAINE HYDROCHLORIDE: 20 JELLY TOPICAL at 10:12

## 2023-12-19 NOTE — TELEPHONE ENCOUNTER
Spoke to pt on phone, Pt is scheduled on 1/4/2024 with arrival time of 1030am 2nd floor labs for IR procedure at  location.  Preop instructions given (NPO after midnight, MUST have a ride home, Nurse will call 1-2  days before to go over instructions and medications), pt verbally understood. Pt aware and confirmed, Thanks   
21-Apr-2021 20:04

## 2023-12-19 NOTE — TELEPHONE ENCOUNTER
Detailed VM left.   Labs tomorrow.     ----- Message from Randall Thomas Jr., MD sent at 12/19/2023  9:47 AM CST -----  Decrease tac from 7/7 to 6/6

## 2023-12-19 NOTE — PATIENT INSTRUCTIONS
What to Expect After a Cystoscopy with Stent Removal  For the next 24-48 hours, you may feel a mild burning when you urinate. This burning is normal and expected. Drink plenty of water to dilute the urine to help relieve the burning sensation. You may also see a small amount of blood in your urine after the procedure.    Unless you are already taking antibiotics, you may be given an antibiotic after the test to prevent infection.    Signs and Symptoms to Report  Call the [facility] at [phone] if you develop any of the following:  Fever of 101 degrees or higher  Chills or persistent bleeding  Inability to urinate    After hours or on weekends, you may reach a urology resident on call at this number: [phone].

## 2023-12-19 NOTE — PROCEDURES
CYSTOSCOPY W/ STENT REMOVAL    Date/Time: 12/19/2023 9:45 AM    Performed by: Brayden Painter MD  Authorized by: Ruben Decker Jr., MD  Preparation: Patient was prepped and draped in the usual sterile fashion.  Comments: After time out cysto performed, stent removed. Patient tolerated procedure well.

## 2023-12-20 ENCOUNTER — OFFICE VISIT (OUTPATIENT)
Dept: TRANSPLANT | Facility: CLINIC | Age: 48
DRG: 641 | End: 2023-12-20
Payer: MEDICARE

## 2023-12-20 VITALS
BODY MASS INDEX: 37.9 KG/M2 | WEIGHT: 227.5 LBS | HEIGHT: 65 IN | DIASTOLIC BLOOD PRESSURE: 73 MMHG | SYSTOLIC BLOOD PRESSURE: 171 MMHG | RESPIRATION RATE: 18 BRPM | HEART RATE: 101 BPM | OXYGEN SATURATION: 100 % | TEMPERATURE: 98 F

## 2023-12-20 DIAGNOSIS — Z94.0 S/P KIDNEY TRANSPLANT: ICD-10-CM

## 2023-12-20 DIAGNOSIS — Z79.899 ENCOUNTER FOR LONG-TERM (CURRENT) USE OF OTHER MEDICATIONS: ICD-10-CM

## 2023-12-20 DIAGNOSIS — Z94.0 KIDNEY REPLACED BY TRANSPLANT: Primary | ICD-10-CM

## 2023-12-20 DIAGNOSIS — Z51.81 ENCOUNTER FOR THERAPEUTIC DRUG MONITORING: ICD-10-CM

## 2023-12-20 PROCEDURE — 99999 PR PBB SHADOW E&M-EST. PATIENT-LVL IV: ICD-10-PCS | Mod: PBBFAC,,,

## 2023-12-20 PROCEDURE — 99215 PR OFFICE/OUTPT VISIT, EST, LEVL V, 40-54 MIN: ICD-10-PCS | Mod: 24,S$PBB,, | Performed by: TRANSPLANT SURGERY

## 2023-12-20 PROCEDURE — 99999 PR PBB SHADOW E&M-EST. PATIENT-LVL IV: CPT | Mod: PBBFAC,,,

## 2023-12-20 PROCEDURE — 99215 OFFICE O/P EST HI 40 MIN: CPT | Mod: 24,S$PBB,, | Performed by: TRANSPLANT SURGERY

## 2023-12-20 PROCEDURE — 99214 OFFICE O/P EST MOD 30 MIN: CPT | Mod: PBBFAC

## 2023-12-20 RX ORDER — TACROLIMUS 1 MG/1
6 CAPSULE ORAL EVERY 12 HOURS
Qty: 420 CAPSULE | Refills: 11 | Status: SHIPPED | OUTPATIENT
Start: 2023-12-20 | End: 2023-12-28 | Stop reason: DRUGHIGH

## 2023-12-20 NOTE — PROGRESS NOTES
Transplant Surgery  Kidney Transplant Recipient Follow-up    Referring Physician: Aries Bourne  Current Nephrologist: Aries Bourne    Subjective:     Chief Complaint: Mary Waller is a 48 y.o. year old Black or  female who is status post Kidney transplant performed on 11/29/2023.    ORGAN: LEFT KIDNEY   Disease Etiology: Diabetes Mellitus - Type II  Donor Type: Donation after Brain Death   Donor CMV Status: Positive   Donor HBcAB: Negative   Donor HCV Status: Negative     History of Present Illness: She reports no concerns.  From a transplant perspective, she reports normal urination.  Mary is here for management of her immunosuppression medication.  Mary states that her immunosuppression is being well tolerated.  Hypertension is not present.    External provider notes reviewed: Yes    Review of Systems   Constitutional:  Negative for chills and fever.   HENT:  Negative for facial swelling and sore throat.    Eyes:  Negative for redness and itching.   Respiratory:  Negative for cough and shortness of breath.    Cardiovascular:  Negative for chest pain and leg swelling.   Gastrointestinal:  Negative for abdominal distention and abdominal pain.   Endocrine: Negative for polydipsia and polyphagia.   Genitourinary:  Negative for dysuria and hematuria.   Musculoskeletal:  Negative for back pain and myalgias.   Skin:  Negative for pallor and rash.   Allergic/Immunologic: Positive for immunocompromised state. Negative for environmental allergies.   Neurological:  Negative for light-headedness and headaches.   Hematological:  Negative for adenopathy. Does not bruise/bleed easily.   Psychiatric/Behavioral:  Negative for agitation and confusion.        Objective:     Physical Exam  Constitutional:       General: She is not in acute distress.     Appearance: She is not diaphoretic.   HENT:      Head: Normocephalic and atraumatic.   Eyes:      Conjunctiva/sclera: Conjunctivae normal.      Pupils:  Pupils are equal, round, and reactive to light.   Cardiovascular:      Rate and Rhythm: Normal rate and regular rhythm.   Pulmonary:      Effort: Pulmonary effort is normal.      Breath sounds: Normal breath sounds.   Abdominal:      General: Bowel sounds are normal. There is no distension.      Palpations: Abdomen is soft.      Tenderness: There is no abdominal tenderness.   Musculoskeletal:         General: Normal range of motion.      Cervical back: Normal range of motion and neck supple.   Skin:     General: Skin is warm and dry.   Neurological:      Mental Status: She is alert and oriented to person, place, and time.   Psychiatric:         Behavior: Behavior normal.       Lab Results   Component Value Date    CREATININE 2.8 (H) 12/20/2023    BUN 62 (H) 12/20/2023     Lab Results   Component Value Date    WBC 9.19 12/20/2023    HGB 9.0 (L) 12/20/2023    HCT 30.0 (L) 12/20/2023    HCT 31 (L) 11/29/2023     (L) 12/20/2023     Lab Results   Component Value Date    TACROLIMUS 9.5 12/20/2023       Diagnostics:  The following labs have been reviewed: CBC  BMP  The following radiology images have been independently reviewed and interpreted:     Assessment and Plan:        S/P Kidney transplant.  Chronic immunosuppressive medications for rejection prophylaxis at target.  Plan: no adjustment needed.  Continue monitoring symptoms, labs and drug levels for drug-related toxicity and side effects.  Renal hypertension at target.  Remove staples    Additional testing to be completed according to the Kidney: Written Order Guideline for Kidney Transplant Follow-Up (KI-09)    Interpretation of tests and discussion of patient management involves all members of the multidisciplinary transplant team.  Patient advised that it is recommended that all transplant candidates, and their close contacts and household members receive Covid vaccination.  Follow-up: Patient reminded to call with any health changes, since these can be  early signs of significant complications.  Also, I advised the patient to be sure any new medications or changes of old medications are discussed with either a pharmacist, or physician knowledgeable with transplant to avoid rejection/drug toxicity related to significant drug interactions.    Bret Baig MD       [facility] Patient Status  Functional Status: 80% - Normal activity with effort: some symptoms of disease  Physical Capacity: Limited Mobility

## 2023-12-20 NOTE — TELEPHONE ENCOUNTER
----- Message from Randall Thomas Jr., MD sent at 12/20/2023  1:26 PM CST -----  Confirm not taking diuretic. Confirm taking mag and if so increase to 800 BID and start Kcl 40mg daily. Stop DGF assessments. Cancel dialysis chair. Repeat labs Friday with iron, tsat, and ferritin. Clinic apt in about 1 week.

## 2023-12-20 NOTE — PROGRESS NOTES
STAPLE REMOVAL NOTE    Staples removed from kidney transplant incision, steri-strips applied with Benzoin per Dr. Baig's order.  Patient tolerated procedure well.  Skin dry and intact.  Patient instructed to shower with back to water spray and to pat dry incision and to let the steri-strips wear off on their own.  Patient instructed to report any redness, warmth, or drainage from incision to transplant coordinators.  All questions answered.

## 2023-12-21 ENCOUNTER — HOSPITAL ENCOUNTER (INPATIENT)
Facility: HOSPITAL | Age: 48
LOS: 1 days | Discharge: HOME | DRG: 641 | End: 2023-12-23
Attending: EMERGENCY MEDICINE | Admitting: TRANSPLANT SURGERY
Payer: MEDICARE

## 2023-12-21 ENCOUNTER — NURSE TRIAGE (OUTPATIENT)
Dept: ADMINISTRATIVE | Facility: CLINIC | Age: 48
End: 2023-12-21
Payer: MEDICARE

## 2023-12-21 DIAGNOSIS — R00.2 PALPITATIONS: ICD-10-CM

## 2023-12-21 DIAGNOSIS — E87.6 HYPOKALEMIA: ICD-10-CM

## 2023-12-21 DIAGNOSIS — E66.01 CLASS 2 SEVERE OBESITY DUE TO EXCESS CALORIES WITH SERIOUS COMORBIDITY AND BODY MASS INDEX (BMI) OF 38.0 TO 38.9 IN ADULT: ICD-10-CM

## 2023-12-21 DIAGNOSIS — R00.2 PALPITATION: ICD-10-CM

## 2023-12-21 DIAGNOSIS — H40.9 GLAUCOMA, UNSPECIFIED GLAUCOMA TYPE, UNSPECIFIED LATERALITY: ICD-10-CM

## 2023-12-21 DIAGNOSIS — Z29.89 PROPHYLACTIC IMMUNOTHERAPY: ICD-10-CM

## 2023-12-21 DIAGNOSIS — R79.89 POSITIVE D DIMER: ICD-10-CM

## 2023-12-21 DIAGNOSIS — I10 ESSENTIAL HYPERTENSION: ICD-10-CM

## 2023-12-21 DIAGNOSIS — Z79.60 LONG-TERM USE OF IMMUNOSUPPRESSANT MEDICATION: ICD-10-CM

## 2023-12-21 DIAGNOSIS — R09.89 SUSPECTED PULMONARY EMBOLISM: ICD-10-CM

## 2023-12-21 DIAGNOSIS — Z79.4 TYPE 2 DIABETES MELLITUS WITH HYPERGLYCEMIA, WITH LONG-TERM CURRENT USE OF INSULIN: ICD-10-CM

## 2023-12-21 DIAGNOSIS — I26.99 ACUTE PULMONARY EMBOLISM, UNSPECIFIED PULMONARY EMBOLISM TYPE, UNSPECIFIED WHETHER ACUTE COR PULMONALE PRESENT: ICD-10-CM

## 2023-12-21 DIAGNOSIS — I26.99 PULMONARY EMBOLISM, UNSPECIFIED CHRONICITY, UNSPECIFIED PULMONARY EMBOLISM TYPE, UNSPECIFIED WHETHER ACUTE COR PULMONALE PRESENT: ICD-10-CM

## 2023-12-21 DIAGNOSIS — R79.89 ELEVATED TROPONIN: ICD-10-CM

## 2023-12-21 DIAGNOSIS — T38.0X5D ADVERSE EFFECT OF CORTICOSTEROIDS, SUBSEQUENT ENCOUNTER: ICD-10-CM

## 2023-12-21 DIAGNOSIS — D63.8 ANEMIA OF CHRONIC DISEASE: ICD-10-CM

## 2023-12-21 DIAGNOSIS — Z94.0 S/P KIDNEY TRANSPLANT: Primary | ICD-10-CM

## 2023-12-21 DIAGNOSIS — E11.65 TYPE 2 DIABETES MELLITUS WITH HYPERGLYCEMIA, WITH LONG-TERM CURRENT USE OF INSULIN: ICD-10-CM

## 2023-12-21 LAB
ALBUMIN SERPL BCP-MCNC: 3.5 G/DL (ref 3.5–5.2)
ALDOST SERPL-MCNC: 36.8 NG/DL
ALDOST/RENIN PLAS-RTO: 26.3 RATIO
ALLENS TEST: ABNORMAL
ALP SERPL-CCNC: 66 U/L (ref 55–135)
ALT SERPL W/O P-5'-P-CCNC: 13 U/L (ref 10–44)
ANION GAP SERPL CALC-SCNC: 15 MMOL/L (ref 8–16)
AST SERPL-CCNC: 15 U/L (ref 10–40)
B-OH-BUTYR BLD STRIP-SCNC: 0 MMOL/L (ref 0–0.5)
BASOPHILS # BLD AUTO: 0.02 K/UL (ref 0–0.2)
BASOPHILS NFR BLD: 0.2 % (ref 0–1.9)
BILIRUB SERPL-MCNC: 0.8 MG/DL (ref 0.1–1)
BILIRUB UR QL STRIP: NEGATIVE
BNP SERPL-MCNC: 48 PG/ML (ref 0–99)
BUN SERPL-MCNC: 65 MG/DL (ref 6–20)
CALCIUM SERPL-MCNC: 8.1 MG/DL (ref 8.7–10.5)
CHLORIDE SERPL-SCNC: 91 MMOL/L (ref 95–110)
CLARITY UR: CLEAR
CO2 SERPL-SCNC: 32 MMOL/L (ref 23–29)
COLOR UR: YELLOW
CREAT SERPL-MCNC: 2.6 MG/DL (ref 0.5–1.4)
CTP QC/QA: YES
CTP QC/QA: YES
DELSYS: ABNORMAL
DIFFERENTIAL METHOD: ABNORMAL
EOSINOPHIL # BLD AUTO: 0 K/UL (ref 0–0.5)
EOSINOPHIL NFR BLD: 0.3 % (ref 0–8)
ERYTHROCYTE [DISTWIDTH] IN BLOOD BY AUTOMATED COUNT: 14.6 % (ref 11.5–14.5)
EST. GFR  (NO RACE VARIABLE): 22 ML/MIN/1.73 M^2
GLUCOSE SERPL-MCNC: 251 MG/DL (ref 70–110)
GLUCOSE UR QL STRIP: ABNORMAL
HCO3 UR-SCNC: 39.1 MMOL/L (ref 24–28)
HCT VFR BLD AUTO: 31 % (ref 37–48.5)
HGB BLD-MCNC: 9.6 G/DL (ref 12–16)
HGB UR QL STRIP: ABNORMAL
IMM GRANULOCYTES # BLD AUTO: 0.03 K/UL (ref 0–0.04)
IMM GRANULOCYTES NFR BLD AUTO: 0.3 % (ref 0–0.5)
KETONES UR QL STRIP: NEGATIVE
LEUKOCYTE ESTERASE UR QL STRIP: NEGATIVE
LYMPHOCYTES # BLD AUTO: 0.4 K/UL (ref 1–4.8)
LYMPHOCYTES NFR BLD: 4.5 % (ref 18–48)
MAGNESIUM SERPL-MCNC: 1.6 MG/DL (ref 1.6–2.6)
MCH RBC QN AUTO: 31.2 PG (ref 27–31)
MCHC RBC AUTO-ENTMCNC: 31 G/DL (ref 32–36)
MCV RBC AUTO: 101 FL (ref 82–98)
MONOCYTES # BLD AUTO: 0.6 K/UL (ref 0.3–1)
MONOCYTES NFR BLD: 6.9 % (ref 4–15)
NEUTROPHILS # BLD AUTO: 8 K/UL (ref 1.8–7.7)
NEUTROPHILS NFR BLD: 87.8 % (ref 38–73)
NITRITE UR QL STRIP: NEGATIVE
NRBC BLD-RTO: 0 /100 WBC
PCO2 BLDA: 46.9 MMHG (ref 35–45)
PH SMN: 7.53 [PH] (ref 7.35–7.45)
PH UR STRIP: 7 [PH] (ref 5–8)
PHOSPHATE SERPL-MCNC: 3.9 MG/DL (ref 2.7–4.5)
PLATELET # BLD AUTO: 153 K/UL (ref 150–450)
PMV BLD AUTO: 11.7 FL (ref 9.2–12.9)
PO2 BLDA: 62 MMHG (ref 40–60)
POC BE: 15 MMOL/L
POC MOLECULAR INFLUENZA A AGN: NEGATIVE
POC MOLECULAR INFLUENZA B AGN: NEGATIVE
POC SATURATED O2: 93 % (ref 95–100)
POC TCO2: 41 MMOL/L (ref 24–29)
POCT GLUCOSE: 309 MG/DL (ref 70–110)
POTASSIUM SERPL-SCNC: 3.4 MMOL/L (ref 3.5–5.1)
PROT SERPL-MCNC: 8.9 G/DL (ref 6–8.4)
PROT UR QL STRIP: NEGATIVE
RBC # BLD AUTO: 3.08 M/UL (ref 4–5.4)
RENIN PLAS-CCNC: 1.4 NG/ML/HR
SAMPLE: ABNORMAL
SARS-COV-2 RDRP RESP QL NAA+PROBE: NEGATIVE
SITE: ABNORMAL
SODIUM SERPL-SCNC: 138 MMOL/L (ref 136–145)
SP GR UR STRIP: 1.01 (ref 1–1.03)
TROPONIN I SERPL DL<=0.01 NG/ML-MCNC: 0.07 NG/ML (ref 0–0.03)
TSH SERPL DL<=0.005 MIU/L-ACNC: 0.46 UIU/ML (ref 0.4–4)
URN SPEC COLLECT METH UR: ABNORMAL
UROBILINOGEN UR STRIP-ACNC: NEGATIVE EU/DL
WBC # BLD AUTO: 9.11 K/UL (ref 3.9–12.7)

## 2023-12-21 PROCEDURE — 85025 COMPLETE CBC W/AUTO DIFF WBC: CPT

## 2023-12-21 PROCEDURE — 84484 ASSAY OF TROPONIN QUANT: CPT

## 2023-12-21 PROCEDURE — 93005 ELECTROCARDIOGRAM TRACING: CPT

## 2023-12-21 PROCEDURE — 82962 GLUCOSE BLOOD TEST: CPT

## 2023-12-21 PROCEDURE — 84443 ASSAY THYROID STIM HORMONE: CPT

## 2023-12-21 PROCEDURE — 93010 ELECTROCARDIOGRAM REPORT: CPT | Mod: ,,, | Performed by: INTERNAL MEDICINE

## 2023-12-21 PROCEDURE — 80053 COMPREHEN METABOLIC PANEL: CPT

## 2023-12-21 PROCEDURE — 82803 BLOOD GASES ANY COMBINATION: CPT

## 2023-12-21 PROCEDURE — 99285 EMERGENCY DEPT VISIT HI MDM: CPT | Mod: 25

## 2023-12-21 PROCEDURE — 93010 EKG 12-LEAD: ICD-10-PCS | Mod: ,,, | Performed by: INTERNAL MEDICINE

## 2023-12-21 PROCEDURE — 99900035 HC TECH TIME PER 15 MIN (STAT)

## 2023-12-21 PROCEDURE — 84100 ASSAY OF PHOSPHORUS: CPT

## 2023-12-21 PROCEDURE — 83735 ASSAY OF MAGNESIUM: CPT

## 2023-12-21 PROCEDURE — 87635 SARS-COV-2 COVID-19 AMP PRB: CPT | Performed by: EMERGENCY MEDICINE

## 2023-12-21 PROCEDURE — 83880 ASSAY OF NATRIURETIC PEPTIDE: CPT

## 2023-12-21 PROCEDURE — 87502 INFLUENZA DNA AMP PROBE: CPT

## 2023-12-21 PROCEDURE — 96361 HYDRATE IV INFUSION ADD-ON: CPT

## 2023-12-21 PROCEDURE — 81003 URINALYSIS AUTO W/O SCOPE: CPT

## 2023-12-21 PROCEDURE — 25000003 PHARM REV CODE 250: Performed by: EMERGENCY MEDICINE

## 2023-12-21 PROCEDURE — 82010 KETONE BODYS QUAN: CPT | Performed by: EMERGENCY MEDICINE

## 2023-12-21 RX ORDER — FUROSEMIDE 80 MG/1
80 TABLET ORAL 2 TIMES DAILY
Status: ON HOLD | COMMUNITY
End: 2023-12-23 | Stop reason: HOSPADM

## 2023-12-21 RX ORDER — LANOLIN ALCOHOL/MO/W.PET/CERES
800 CREAM (GRAM) TOPICAL 2 TIMES DAILY
Qty: 120 TABLET | Refills: 11 | Status: SHIPPED | OUTPATIENT
Start: 2023-12-21 | End: 2024-12-21

## 2023-12-21 RX ORDER — POTASSIUM CHLORIDE 20 MEQ/1
40 TABLET, EXTENDED RELEASE ORAL DAILY
Qty: 60 TABLET | Refills: 11 | Status: ON HOLD | OUTPATIENT
Start: 2023-12-21 | End: 2023-12-23 | Stop reason: HOSPADM

## 2023-12-21 RX ADMIN — SODIUM CHLORIDE 1000 ML: 9 INJECTION, SOLUTION INTRAVENOUS at 09:12

## 2023-12-22 PROBLEM — E87.6 HYPOKALEMIA: Status: ACTIVE | Noted: 2023-12-22

## 2023-12-22 PROBLEM — Z79.4 TYPE 2 DIABETES MELLITUS WITH HYPERGLYCEMIA, WITH LONG-TERM CURRENT USE OF INSULIN: Status: ACTIVE | Noted: 2019-01-05

## 2023-12-22 PROBLEM — R79.89 ELEVATED TROPONIN: Status: ACTIVE | Noted: 2023-12-22

## 2023-12-22 PROBLEM — E11.65 TYPE 2 DIABETES MELLITUS WITH HYPERGLYCEMIA, WITH LONG-TERM CURRENT USE OF INSULIN: Status: ACTIVE | Noted: 2019-01-05

## 2023-12-22 PROBLEM — I26.99 PULMONARY EMBOLISM: Status: ACTIVE | Noted: 2023-12-22

## 2023-12-22 LAB
ALBUMIN SERPL BCP-MCNC: 3.1 G/DL (ref 3.5–5.2)
ANION GAP SERPL CALC-SCNC: 15 MMOL/L (ref 8–16)
ANION GAP SERPL CALC-SCNC: 19 MMOL/L (ref 8–16)
APTT PPP: 24 SEC (ref 21–32)
APTT PPP: 58.9 SEC (ref 21–32)
ASCENDING AORTA: 2.5 CM
AV INDEX (PROSTH): 0.72
AV MEAN GRADIENT: 14 MMHG
AV PEAK GRADIENT: 24 MMHG
AV VALVE AREA BY VELOCITY RATIO: 1.57 CM²
AV VALVE AREA: 1.89 CM²
AV VELOCITY RATIO: 0.6
BASOPHILS # BLD AUTO: 0.02 K/UL (ref 0–0.2)
BASOPHILS NFR BLD: 0.3 % (ref 0–1.9)
BSA FOR ECHO PROCEDURE: 2.19 M2
BUN SERPL-MCNC: 54 MG/DL (ref 6–20)
BUN SERPL-MCNC: 55 MG/DL (ref 6–20)
CALCIUM SERPL-MCNC: 7.5 MG/DL (ref 8.7–10.5)
CALCIUM SERPL-MCNC: 8.1 MG/DL (ref 8.7–10.5)
CHLORIDE SERPL-SCNC: 93 MMOL/L (ref 95–110)
CHLORIDE SERPL-SCNC: 94 MMOL/L (ref 95–110)
CO2 SERPL-SCNC: 28 MMOL/L (ref 23–29)
CO2 SERPL-SCNC: 33 MMOL/L (ref 23–29)
CREAT SERPL-MCNC: 2.1 MG/DL (ref 0.5–1.4)
CREAT SERPL-MCNC: 2.3 MG/DL (ref 0.5–1.4)
CV ECHO LV RWT: 0.47 CM
D DIMER PPP IA.FEU-MCNC: 3.3 MG/L FEU
DIFFERENTIAL METHOD: ABNORMAL
DOP CALC AO PEAK VEL: 2.44 M/S
DOP CALC AO VTI: 40.78 CM
DOP CALC LVOT AREA: 2.6 CM2
DOP CALC LVOT DIAMETER: 1.83 CM
DOP CALC LVOT PEAK VEL: 1.46 M/S
DOP CALC LVOT STROKE VOLUME: 77.11 CM3
DOP CALC MV VTI: 26.21 CM
DOP CALCLVOT PEAK VEL VTI: 29.33 CM
E WAVE DECELERATION TIME: 225.33 MSEC
E/A RATIO: 0.63
E/E' RATIO: 16.55 M/S
ECHO LV POSTERIOR WALL: 1.05 CM (ref 0.6–1.1)
EJECTION FRACTION: 70 %
EOSINOPHIL # BLD AUTO: 0.1 K/UL (ref 0–0.5)
EOSINOPHIL NFR BLD: 1.2 % (ref 0–8)
ERYTHROCYTE [DISTWIDTH] IN BLOOD BY AUTOMATED COUNT: 14.6 % (ref 11.5–14.5)
EST. GFR  (NO RACE VARIABLE): 25.9 ML/MIN/1.73 M^2
EST. GFR  (NO RACE VARIABLE): 28.9 ML/MIN/1.73 M^2
FRACTIONAL SHORTENING: 34 % (ref 28–44)
GLUCOSE SERPL-MCNC: 169 MG/DL (ref 70–110)
GLUCOSE SERPL-MCNC: 350 MG/DL (ref 70–110)
HCT VFR BLD AUTO: 29.1 % (ref 37–48.5)
HGB BLD-MCNC: 8.8 G/DL (ref 12–16)
IMM GRANULOCYTES # BLD AUTO: 0.02 K/UL (ref 0–0.04)
IMM GRANULOCYTES NFR BLD AUTO: 0.3 % (ref 0–0.5)
INR PPP: 1.1 (ref 0.8–1.2)
INTERVENTRICULAR SEPTUM: 1.02 CM (ref 0.6–1.1)
IVRT: 121.79 MSEC
LA MAJOR: 5.74 CM
LA MINOR: 5.55 CM
LA WIDTH: 4.22 CM
LEFT ATRIUM SIZE: 4 CM
LEFT ATRIUM VOLUME INDEX MOD: 32.7 ML/M2
LEFT ATRIUM VOLUME INDEX: 38.6 ML/M2
LEFT ATRIUM VOLUME MOD: 68.63 CM3
LEFT ATRIUM VOLUME: 80.97 CM3
LEFT INTERNAL DIMENSION IN SYSTOLE: 2.91 CM (ref 2.1–4)
LEFT VENTRICLE DIASTOLIC VOLUME INDEX: 42.72 ML/M2
LEFT VENTRICLE DIASTOLIC VOLUME: 89.72 ML
LEFT VENTRICLE MASS INDEX: 75 G/M2
LEFT VENTRICLE SYSTOLIC VOLUME INDEX: 15.4 ML/M2
LEFT VENTRICLE SYSTOLIC VOLUME: 32.41 ML
LEFT VENTRICULAR INTERNAL DIMENSION IN DIASTOLE: 4.44 CM (ref 3.5–6)
LEFT VENTRICULAR MASS: 157.32 G
LV LATERAL E/E' RATIO: 15.17 M/S
LV SEPTAL E/E' RATIO: 18.2 M/S
LYMPHOCYTES # BLD AUTO: 0.6 K/UL (ref 1–4.8)
LYMPHOCYTES NFR BLD: 8.3 % (ref 18–48)
MAGNESIUM SERPL-MCNC: 1.5 MG/DL (ref 1.6–2.6)
MCH RBC QN AUTO: 31.7 PG (ref 27–31)
MCHC RBC AUTO-ENTMCNC: 30.2 G/DL (ref 32–36)
MCV RBC AUTO: 105 FL (ref 82–98)
MONOCYTES # BLD AUTO: 0.5 K/UL (ref 0.3–1)
MONOCYTES NFR BLD: 8 % (ref 4–15)
MV MEAN GRADIENT: 4 MMHG
MV PEAK A VEL: 1.45 M/S
MV PEAK E VEL: 0.91 M/S
MV PEAK GRADIENT: 9 MMHG
MV STENOSIS PRESSURE HALF TIME: 65.35 MS
MV VALVE AREA BY CONTINUITY EQUATION: 2.94 CM2
MV VALVE AREA P 1/2 METHOD: 3.37 CM2
NEUTROPHILS # BLD AUTO: 5.6 K/UL (ref 1.8–7.7)
NEUTROPHILS NFR BLD: 81.9 % (ref 38–73)
NRBC BLD-RTO: 0 /100 WBC
PHOSPHATE SERPL-MCNC: 3.3 MG/DL (ref 2.7–4.5)
PHOSPHATE SERPL-MCNC: 3.5 MG/DL (ref 2.7–4.5)
PLATELET # BLD AUTO: 130 K/UL (ref 150–450)
PMV BLD AUTO: 12.4 FL (ref 9.2–12.9)
POCT GLUCOSE: 152 MG/DL (ref 70–110)
POCT GLUCOSE: 156 MG/DL (ref 70–110)
POCT GLUCOSE: 209 MG/DL (ref 70–110)
POCT GLUCOSE: 243 MG/DL (ref 70–110)
POCT GLUCOSE: 384 MG/DL (ref 70–110)
POTASSIUM SERPL-SCNC: 2.7 MMOL/L (ref 3.5–5.1)
POTASSIUM SERPL-SCNC: 4.6 MMOL/L (ref 3.5–5.1)
PROTHROMBIN TIME: 11.6 SEC (ref 9–12.5)
RA MAJOR: 3.69 CM
RA PRESSURE ESTIMATED: 3 MMHG
RA WIDTH: 3.32 CM
RBC # BLD AUTO: 2.78 M/UL (ref 4–5.4)
RIGHT VENTRICULAR END-DIASTOLIC DIMENSION: 2.96 CM
SINUS: 2.18 CM
SODIUM SERPL-SCNC: 141 MMOL/L (ref 136–145)
SODIUM SERPL-SCNC: 141 MMOL/L (ref 136–145)
STJ: 1.93 CM
TACROLIMUS BLD-MCNC: 6.5 NG/ML (ref 5–15)
TDI LATERAL: 0.06 M/S
TDI SEPTAL: 0.05 M/S
TDI: 0.06 M/S
TRICUSPID ANNULAR PLANE SYSTOLIC EXCURSION: 2.49 CM
TROPONIN I SERPL DL<=0.01 NG/ML-MCNC: 0.36 NG/ML (ref 0–0.03)
TROPONIN I SERPL DL<=0.01 NG/ML-MCNC: 0.41 NG/ML (ref 0–0.03)
TROPONIN I SERPL DL<=0.01 NG/ML-MCNC: 0.82 NG/ML (ref 0–0.03)
TROPONIN I SERPL DL<=0.01 NG/ML-MCNC: 0.86 NG/ML (ref 0–0.03)
WBC # BLD AUTO: 6.78 K/UL (ref 3.9–12.7)
Z-SCORE OF LEFT VENTRICULAR DIMENSION IN END DIASTOLE: -3.86
Z-SCORE OF LEFT VENTRICULAR DIMENSION IN END SYSTOLE: -2.49

## 2023-12-22 PROCEDURE — 85379 FIBRIN DEGRADATION QUANT: CPT | Performed by: EMERGENCY MEDICINE

## 2023-12-22 PROCEDURE — 83735 ASSAY OF MAGNESIUM: CPT

## 2023-12-22 PROCEDURE — 99233 PR SUBSEQUENT HOSPITAL CARE,LEVL III: ICD-10-PCS | Mod: ,,,

## 2023-12-22 PROCEDURE — 36415 COLL VENOUS BLD VENIPUNCTURE: CPT

## 2023-12-22 PROCEDURE — 93010 EKG 12-LEAD: ICD-10-PCS | Mod: ,,, | Performed by: INTERNAL MEDICINE

## 2023-12-22 PROCEDURE — 99223 PR INITIAL HOSPITAL CARE,LEVL III: ICD-10-PCS | Mod: 24,AI,,

## 2023-12-22 PROCEDURE — 80069 RENAL FUNCTION PANEL: CPT

## 2023-12-22 PROCEDURE — 84484 ASSAY OF TROPONIN QUANT: CPT | Mod: 91

## 2023-12-22 PROCEDURE — 85025 COMPLETE CBC W/AUTO DIFF WBC: CPT | Performed by: EMERGENCY MEDICINE

## 2023-12-22 PROCEDURE — 80048 BASIC METABOLIC PNL TOTAL CA: CPT

## 2023-12-22 PROCEDURE — 25000003 PHARM REV CODE 250

## 2023-12-22 PROCEDURE — 25000003 PHARM REV CODE 250: Performed by: EMERGENCY MEDICINE

## 2023-12-22 PROCEDURE — 99233 SBSQ HOSP IP/OBS HIGH 50: CPT | Mod: ,,,

## 2023-12-22 PROCEDURE — 84100 ASSAY OF PHOSPHORUS: CPT

## 2023-12-22 PROCEDURE — 63600175 PHARM REV CODE 636 W HCPCS

## 2023-12-22 PROCEDURE — 85730 THROMBOPLASTIN TIME PARTIAL: CPT | Mod: 91 | Performed by: TRANSPLANT SURGERY

## 2023-12-22 PROCEDURE — 96374 THER/PROPH/DIAG INJ IV PUSH: CPT

## 2023-12-22 PROCEDURE — 63600175 PHARM REV CODE 636 W HCPCS: Performed by: EMERGENCY MEDICINE

## 2023-12-22 PROCEDURE — 99222 PR INITIAL HOSPITAL CARE,LEVL II: ICD-10-PCS | Mod: ,,, | Performed by: NURSE PRACTITIONER

## 2023-12-22 PROCEDURE — 84484 ASSAY OF TROPONIN QUANT: CPT | Performed by: EMERGENCY MEDICINE

## 2023-12-22 PROCEDURE — 85730 THROMBOPLASTIN TIME PARTIAL: CPT | Performed by: EMERGENCY MEDICINE

## 2023-12-22 PROCEDURE — 99223 1ST HOSP IP/OBS HIGH 75: CPT | Mod: 24,AI,,

## 2023-12-22 PROCEDURE — 80197 ASSAY OF TACROLIMUS: CPT

## 2023-12-22 PROCEDURE — 99222 1ST HOSP IP/OBS MODERATE 55: CPT | Mod: ,,, | Performed by: NURSE PRACTITIONER

## 2023-12-22 PROCEDURE — 85610 PROTHROMBIN TIME: CPT | Performed by: EMERGENCY MEDICINE

## 2023-12-22 PROCEDURE — 63600175 PHARM REV CODE 636 W HCPCS: Performed by: NURSE PRACTITIONER

## 2023-12-22 PROCEDURE — 93010 ELECTROCARDIOGRAM REPORT: CPT | Mod: ,,, | Performed by: INTERNAL MEDICINE

## 2023-12-22 PROCEDURE — 93005 ELECTROCARDIOGRAM TRACING: CPT

## 2023-12-22 PROCEDURE — 20600001 HC STEP DOWN PRIVATE ROOM

## 2023-12-22 RX ORDER — ATROPINE SULFATE 10 MG/ML
1 SOLUTION/ DROPS OPHTHALMIC NIGHTLY
Status: DISCONTINUED | OUTPATIENT
Start: 2023-12-22 | End: 2023-12-23 | Stop reason: HOSPADM

## 2023-12-22 RX ORDER — HEPARIN SODIUM 5000 [USP'U]/ML
5000 INJECTION, SOLUTION INTRAVENOUS; SUBCUTANEOUS EVERY 8 HOURS
Status: DISCONTINUED | OUTPATIENT
Start: 2023-12-22 | End: 2023-12-23 | Stop reason: HOSPADM

## 2023-12-22 RX ORDER — HEPARIN SODIUM 1000 [USP'U]/ML
5000 INJECTION, SOLUTION INTRAVENOUS; SUBCUTANEOUS 3 TIMES DAILY
Status: DISCONTINUED | OUTPATIENT
Start: 2023-12-22 | End: 2023-12-22

## 2023-12-22 RX ORDER — INSULIN ASPART 100 [IU]/ML
0-5 INJECTION, SOLUTION INTRAVENOUS; SUBCUTANEOUS
Status: DISCONTINUED | OUTPATIENT
Start: 2023-12-22 | End: 2023-12-23 | Stop reason: HOSPADM

## 2023-12-22 RX ORDER — IBUPROFEN 200 MG
24 TABLET ORAL
Status: DISCONTINUED | OUTPATIENT
Start: 2023-12-22 | End: 2023-12-23 | Stop reason: HOSPADM

## 2023-12-22 RX ORDER — MYCOPHENOLATE MOFETIL 250 MG/1
1000 CAPSULE ORAL 2 TIMES DAILY
Status: DISCONTINUED | OUTPATIENT
Start: 2023-12-22 | End: 2023-12-23 | Stop reason: HOSPADM

## 2023-12-22 RX ORDER — CALCIUM GLUCONATE 20 MG/ML
1 INJECTION, SOLUTION INTRAVENOUS ONCE
Status: COMPLETED | OUTPATIENT
Start: 2023-12-22 | End: 2023-12-22

## 2023-12-22 RX ORDER — LANOLIN ALCOHOL/MO/W.PET/CERES
800 CREAM (GRAM) TOPICAL 2 TIMES DAILY
Status: DISCONTINUED | OUTPATIENT
Start: 2023-12-22 | End: 2023-12-23 | Stop reason: HOSPADM

## 2023-12-22 RX ORDER — TALC
6 POWDER (GRAM) TOPICAL NIGHTLY PRN
Status: DISCONTINUED | OUTPATIENT
Start: 2023-12-22 | End: 2023-12-23 | Stop reason: HOSPADM

## 2023-12-22 RX ORDER — MAGNESIUM SULFATE HEPTAHYDRATE 40 MG/ML
2 INJECTION, SOLUTION INTRAVENOUS ONCE
Status: COMPLETED | OUTPATIENT
Start: 2023-12-22 | End: 2023-12-22

## 2023-12-22 RX ORDER — GLUCAGON 1 MG
1 KIT INJECTION
Status: DISCONTINUED | OUTPATIENT
Start: 2023-12-22 | End: 2023-12-23 | Stop reason: HOSPADM

## 2023-12-22 RX ORDER — CALCITRIOL 0.25 UG/1
0.25 CAPSULE ORAL DAILY
Status: DISCONTINUED | OUTPATIENT
Start: 2023-12-22 | End: 2023-12-23 | Stop reason: HOSPADM

## 2023-12-22 RX ORDER — INSULIN ASPART 100 [IU]/ML
5 INJECTION, SOLUTION INTRAVENOUS; SUBCUTANEOUS
Status: DISCONTINUED | OUTPATIENT
Start: 2023-12-22 | End: 2023-12-23 | Stop reason: HOSPADM

## 2023-12-22 RX ORDER — CALCIUM CARBONATE 200(500)MG
1000 TABLET,CHEWABLE ORAL 2 TIMES DAILY PRN
Status: DISCONTINUED | OUTPATIENT
Start: 2023-12-22 | End: 2023-12-23 | Stop reason: HOSPADM

## 2023-12-22 RX ORDER — SODIUM CHLORIDE 0.9 % (FLUSH) 0.9 %
10 SYRINGE (ML) INJECTION EVERY 8 HOURS PRN
Status: DISCONTINUED | OUTPATIENT
Start: 2023-12-22 | End: 2023-12-23 | Stop reason: HOSPADM

## 2023-12-22 RX ORDER — HEPARIN SODIUM,PORCINE/D5W 25000/250
0-40 INTRAVENOUS SOLUTION INTRAVENOUS CONTINUOUS
Status: DISCONTINUED | OUTPATIENT
Start: 2023-12-22 | End: 2023-12-22

## 2023-12-22 RX ORDER — POTASSIUM CHLORIDE 20 MEQ/1
40 TABLET, EXTENDED RELEASE ORAL
Status: COMPLETED | OUTPATIENT
Start: 2023-12-22 | End: 2023-12-22

## 2023-12-22 RX ORDER — ASPIRIN 325 MG
325 TABLET ORAL
Status: COMPLETED | OUTPATIENT
Start: 2023-12-22 | End: 2023-12-22

## 2023-12-22 RX ORDER — ATOVAQUONE 750 MG/5ML
1500 SUSPENSION ORAL DAILY
Status: DISCONTINUED | OUTPATIENT
Start: 2023-12-22 | End: 2023-12-23 | Stop reason: HOSPADM

## 2023-12-22 RX ORDER — POTASSIUM CHLORIDE 20 MEQ/1
40 TABLET, EXTENDED RELEASE ORAL DAILY
Status: DISCONTINUED | OUTPATIENT
Start: 2023-12-22 | End: 2023-12-23 | Stop reason: HOSPADM

## 2023-12-22 RX ORDER — ACETAMINOPHEN 325 MG/1
650 TABLET ORAL EVERY 6 HOURS PRN
Status: DISCONTINUED | OUTPATIENT
Start: 2023-12-22 | End: 2023-12-23 | Stop reason: HOSPADM

## 2023-12-22 RX ORDER — PREDNISONE 10 MG/1
10 TABLET ORAL DAILY
Status: DISCONTINUED | OUTPATIENT
Start: 2023-12-22 | End: 2023-12-23

## 2023-12-22 RX ORDER — ATORVASTATIN CALCIUM 40 MG/1
80 TABLET, FILM COATED ORAL NIGHTLY
Status: DISCONTINUED | OUTPATIENT
Start: 2023-12-22 | End: 2023-12-23 | Stop reason: HOSPADM

## 2023-12-22 RX ORDER — IBUPROFEN 200 MG
16 TABLET ORAL
Status: DISCONTINUED | OUTPATIENT
Start: 2023-12-22 | End: 2023-12-23 | Stop reason: HOSPADM

## 2023-12-22 RX ORDER — FAMOTIDINE 20 MG/1
20 TABLET, FILM COATED ORAL NIGHTLY
Status: DISCONTINUED | OUTPATIENT
Start: 2023-12-22 | End: 2023-12-23 | Stop reason: HOSPADM

## 2023-12-22 RX ORDER — ONDANSETRON 8 MG/1
8 TABLET, ORALLY DISINTEGRATING ORAL EVERY 6 HOURS PRN
Status: DISCONTINUED | OUTPATIENT
Start: 2023-12-22 | End: 2023-12-23 | Stop reason: HOSPADM

## 2023-12-22 RX ORDER — VALGANCICLOVIR 450 MG/1
450 TABLET, FILM COATED ORAL
Status: DISCONTINUED | OUTPATIENT
Start: 2023-12-22 | End: 2023-12-23 | Stop reason: HOSPADM

## 2023-12-22 RX ORDER — ENOXAPARIN SODIUM 100 MG/ML
1 INJECTION SUBCUTANEOUS ONCE
Status: DISCONTINUED | OUTPATIENT
Start: 2023-12-22 | End: 2023-12-22

## 2023-12-22 RX ORDER — POTASSIUM CHLORIDE 7.45 MG/ML
10 INJECTION INTRAVENOUS
Status: DISPENSED | OUTPATIENT
Start: 2023-12-22 | End: 2023-12-22

## 2023-12-22 RX ORDER — POTASSIUM CHLORIDE 20 MEQ/1
40 TABLET, EXTENDED RELEASE ORAL 3 TIMES DAILY
Status: DISCONTINUED | OUTPATIENT
Start: 2023-12-22 | End: 2023-12-22

## 2023-12-22 RX ADMIN — Medication 800 MG: at 08:12

## 2023-12-22 RX ADMIN — MYCOPHENOLATE MOFETIL 1000 MG: 250 CAPSULE ORAL at 08:12

## 2023-12-22 RX ADMIN — INSULIN ASPART 1 UNITS: 100 INJECTION, SOLUTION INTRAVENOUS; SUBCUTANEOUS at 09:12

## 2023-12-22 RX ADMIN — MAGNESIUM SULFATE HEPTAHYDRATE 2 G: 40 INJECTION, SOLUTION INTRAVENOUS at 12:12

## 2023-12-22 RX ADMIN — INSULIN ASPART 5 UNITS: 100 INJECTION, SOLUTION INTRAVENOUS; SUBCUTANEOUS at 01:12

## 2023-12-22 RX ADMIN — TACROLIMUS 6 MG: 5 CAPSULE ORAL at 09:12

## 2023-12-22 RX ADMIN — INSULIN ASPART 2 UNITS: 100 INJECTION, SOLUTION INTRAVENOUS; SUBCUTANEOUS at 01:12

## 2023-12-22 RX ADMIN — CALCIUM CARBONATE (ANTACID) CHEW TAB 500 MG 1000 MG: 500 CHEW TAB at 04:12

## 2023-12-22 RX ADMIN — POTASSIUM CHLORIDE 10 MEQ: 7.46 INJECTION, SOLUTION INTRAVENOUS at 02:12

## 2023-12-22 RX ADMIN — ATORVASTATIN CALCIUM 80 MG: 40 TABLET, FILM COATED ORAL at 09:12

## 2023-12-22 RX ADMIN — CALCITRIOL CAPSULES 0.25 MCG 0.25 MCG: 0.25 CAPSULE ORAL at 08:12

## 2023-12-22 RX ADMIN — PREDNISONE 10 MG: 10 TABLET ORAL at 08:12

## 2023-12-22 RX ADMIN — INSULIN ASPART 5 UNITS: 100 INJECTION, SOLUTION INTRAVENOUS; SUBCUTANEOUS at 04:12

## 2023-12-22 RX ADMIN — POTASSIUM CHLORIDE 40 MEQ: 1500 TABLET, EXTENDED RELEASE ORAL at 08:12

## 2023-12-22 RX ADMIN — TACROLIMUS 6 MG: 5 CAPSULE ORAL at 08:12

## 2023-12-22 RX ADMIN — HEPARIN SODIUM 18 UNITS/KG/HR: 10000 INJECTION, SOLUTION INTRAVENOUS at 03:12

## 2023-12-22 RX ADMIN — POTASSIUM CHLORIDE 40 MEQ: 1500 TABLET, EXTENDED RELEASE ORAL at 12:12

## 2023-12-22 RX ADMIN — ASPIRIN 325 MG ORAL TABLET 325 MG: 325 PILL ORAL at 02:12

## 2023-12-22 RX ADMIN — HEPARIN SODIUM 5000 UNITS: 5000 INJECTION INTRAVENOUS; SUBCUTANEOUS at 09:12

## 2023-12-22 RX ADMIN — CALCIUM GLUCONATE 1 G: 20 INJECTION, SOLUTION INTRAVENOUS at 12:12

## 2023-12-22 RX ADMIN — MYCOPHENOLATE MOFETIL 1000 MG: 250 CAPSULE ORAL at 09:12

## 2023-12-22 RX ADMIN — POTASSIUM CHLORIDE 40 MEQ: 1500 TABLET, EXTENDED RELEASE ORAL at 06:12

## 2023-12-22 RX ADMIN — Medication 800 MG: at 09:12

## 2023-12-22 RX ADMIN — MAGNESIUM SULFATE HEPTAHYDRATE 2 G: 40 INJECTION, SOLUTION INTRAVENOUS at 08:12

## 2023-12-22 RX ADMIN — VALGANCICLOVIR HYDROCHLORIDE 450 MG: 450 TABLET ORAL at 04:12

## 2023-12-22 RX ADMIN — FAMOTIDINE 20 MG: 20 TABLET, FILM COATED ORAL at 09:12

## 2023-12-22 RX ADMIN — ATOVAQUONE 1500 MG: 750 SUSPENSION ORAL at 08:12

## 2023-12-22 NOTE — ASSESSMENT & PLAN NOTE
- s/p DBD KTX 11/29 (CIT 15hrs, 2 arteries, 1 vein, 2 day correa, donor CMV +).   - Post op course with DGF which has now resolved.  - Cr trending down, 2.6 at OSH; 2.4 today   - monitor intake and output  - cont to monitor with daily BMP

## 2023-12-22 NOTE — SUBJECTIVE & OBJECTIVE
Subjective:   History of Present Illness:  Mary Waller is a 48 y.o. female with a Pmhx of DM, HTN, ESRD s/p DBD KTX 11/29 (CIT 15hrs, 2 arteries, 1 vein, 2 day correa, donor CMV +). Post op course with DGF which has now resolved. She presented to an OSH for evaluation of palpitations. Patient reports she felt her heart racing yesterday while she was walking at home. She notes she was taken off of her labetalol on November 29, 2023, and has not taken it since. She was tachycardic when arriving to OSH but symptoms resolved. OSH labs notable for Troponin of trending from 0.074 to 0.415. Elevated D-dimer 3.3. Given recent surgery concern for possible pulmonary emboli or DVT as cause of the patient's palpitations. Due to recent surgery and recovering renal function, VQ scan ordered in place of CTA OSH (not done). Edwin LE US obtained at OSH which was negative for DVT. CXR at OSH with no acute intrathoracic abnormalities. Patient received 325mg ASA and was started on a heparin gtt at OSH.  Decision made for patient to be transferred to [facility] [place] care. Plan to continue heparin gtt, obtain VQ scan, trend troponin, and monitor closely. Plan discussed with Dr. Ortiz.     Hospital Course:  NAEON. Patient reports feeling sleepy but well this morning. VQ scan completed 12/22 with result of low probability of PE. Troponin is starting to down trend. DVT study negative. Echocardiogram without right heart strain or other acute abnormality, EF 65-70%, normal sys/sabiha function. EKG without arrhythmia, positive for prolonged QT. As PE and DVT have been r/o, will dc heparin gtt. Several electrolyte abnormalities noted, azeem K 2.7. Will replace PO and IV prn. VSS. Potential dc if labs are stable tomorrow.           Past Medical, Surgical, Family, and Social History:   Unchanged from H&P.    Scheduled Meds:   atorvastatin  80 mg Oral QHS    atovaquone  1,500 mg Oral Daily    atropine 1%  1 drop Left Eye QHS    calcitRIOL  0.25  mcg Oral Daily    famotidine  20 mg Oral QHS    heparin (porcine)  5,000 Units Subcutaneous Q8H    insulin aspart U-100  5 Units Subcutaneous TIDWM    magnesium oxide  800 mg Oral BID    mycophenolate  1,000 mg Oral BID    potassium chloride SA  40 mEq Oral Daily    potassium chloride  40 mEq Oral Q4H    predniSONE  10 mg Oral Daily    tacrolimus  6 mg Oral BID    valGANciclovir  450 mg Oral Every Mon, Wed, Fri     Continuous Infusions:  PRN Meds:acetaminophen, dextrose 10%, dextrose 10%, glucagon (human recombinant), glucose, glucose, insulin aspart U-100, melatonin, ondansetron, sodium chloride 0.9%    Intake/Output - Last 3 Shifts         12/20 0700 12/21 0659 12/21 0700 12/22 0659 12/22 0700 12/23 0659    I.V. (mL/kg)  81.4 (0.8) 16.6 (0.2)    IV Piggyback  1060     Total Intake(mL/kg)  1141.4 (10.9) 16.6 (0.2)    Urine (mL/kg/hr)  50     Stool  0     Total Output  50     Net  +1091.4 +16.6           Urine Occurrence  1 x     Stool Occurrence  0 x              Review of Systems   Constitutional:  Positive for fatigue. Negative for appetite change, chills and fever.   HENT:  Negative for trouble swallowing.    Eyes:  Positive for visual disturbance (chronic).   Respiratory:  Negative for cough and shortness of breath.    Cardiovascular:  Negative for chest pain, palpitations and leg swelling.   Gastrointestinal:  Negative for abdominal distention, abdominal pain, diarrhea, nausea and vomiting.   Genitourinary:  Negative for decreased urine volume, difficulty urinating and frequency.   Musculoskeletal:  Negative for back pain and neck pain.   Skin:  Positive for wound.   Allergic/Immunologic: Positive for immunocompromised state.   Neurological:  Negative for dizziness, tremors, weakness and headaches.   Psychiatric/Behavioral:  Negative for confusion.       Objective:     Vital Signs (Most Recent):  Temp: 98.2 °F (36.8 °C) (12/22/23 0756)  Pulse: 89 (12/22/23 1347)  Resp: 16 (12/22/23 0756)  BP: 132/77  "(12/22/23 1347)  SpO2: 100 % (12/22/23 0756) Vital Signs (24h Range):  Temp:  [98 °F (36.7 °C)-98.7 °F (37.1 °C)] 98.2 °F (36.8 °C)  Pulse:  [] 89  Resp:  [16-20] 16  SpO2:  [99 %-100 %] 100 %  BP: (132-205)/(65-81) 132/77     Weight: 104.3 kg (230 lb)  Height: 5' 5" (165.1 cm)  Body mass index is 38.27 kg/m².     Physical Exam  Vitals and nursing note reviewed.   Constitutional:       General: She is awake. She is not in acute distress.     Appearance: She is obese. She is not ill-appearing.   HENT:      Head: Normocephalic.      Mouth/Throat:      Mouth: Mucous membranes are moist.   Eyes:      General: No scleral icterus.  Cardiovascular:      Rate and Rhythm: Normal rate and regular rhythm.      Pulses: Normal pulses.      Heart sounds: Normal heart sounds.   Pulmonary:      Effort: Pulmonary effort is normal. No respiratory distress.      Breath sounds: No wheezing or rales.   Abdominal:      General: Bowel sounds are normal. There is no distension.      Palpations: Abdomen is soft.      Tenderness: There is no abdominal tenderness. There is no guarding or rebound.          Comments: RLQ incision with small amount of serous-sang drainage from lower medial area of incision; steri strips in place   Musculoskeletal:         General: Normal range of motion.      Right upper arm: Edema present.      Left upper arm: Edema present.      Cervical back: Normal range of motion.      Right lower leg: No edema.      Left lower leg: No edema.   Skin:     General: Skin is warm and dry.      Capillary Refill: Capillary refill takes 2 to 3 seconds.   Neurological:      Mental Status: She is alert and oriented to person, place, and time.   Psychiatric:         Attention and Perception: Attention normal.         Mood and Affect: Mood normal.         Speech: Speech normal.         Behavior: Behavior normal. Behavior is cooperative.         Thought Content: Thought content normal.         Judgment: Judgment normal.        "   Laboratory:  CBC:   Recent Labs   Lab 12/20/23  0927 12/21/23 2130 12/22/23  0614   WBC 9.19 9.11 6.78   RBC 2.90* 3.08* 2.78*   HGB 9.0* 9.6* 8.8*   HCT 30.0* 31.0* 29.1*   * 153 130*   * 101* 105*   MCH 31.0 31.2* 31.7*   MCHC 30.0* 31.0* 30.2*     CMP:   Recent Labs   Lab 12/18/23  0828 12/20/23  0927 12/21/23 2130 12/22/23  0614   * 159* 251* 169*   CALCIUM 7.2* 7.8* 8.1* 7.5*   ALBUMIN 3.3* 3.4* 3.5  --    PROT  --   --  8.9*  --     140 138 141   K 3.2* 3.0* 3.4* 2.7*   CO2 37* 39* 32* 33*   CL 89* 89* 91* 93*   BUN 71* 62* 65* 54*   CREATININE 3.1* 2.8* 2.6* 2.3*   ALKPHOS  --   --  66  --    ALT  --   --  13  --    AST  --   --  15  --      Labs within the past 24 hours have been reviewed.    Diagnostic Results:  Echocardiogram    Left Ventricle: The left ventricle is normal in size. Normal wall thickness. There is concentric remodeling. Normal wall motion. There is normal systolic function with a visually estimated ejection fraction of 65 - 70%. Ejection fraction by visual approximation is 70%.    Right Ventricle: Normal right ventricular cavity size. Wall thickness is normal. Right ventricle wall motion  is normal. Systolic function is normal.    Aortic Valve: There is moderate aortic valve sclerosis. Moderately restricted motion. There is mild stenosis. Aortic valve area by VTI is 1.89 cm². Aortic valve peak velocity is 2.44 m/s. Mean gradient is 14 mmHg. The dimensionless index is 0.72. There is mild-moderate to moderate ( at least 2+) aortic regurgitation.    Mitral Valve: There is severe mitral annular calcification present. There is normal leaflet mobility. There is a large calcified lesion present on the posterior leaflet consistent with old healed vegetation vs caseous necrosis. There is at most trivial  stenosis. The mean pressure gradient across the mitral valve is 4 mmHg at a heart rate of  bpm. There is no significant regurgitation.    IVC/SVC: Normal venous  pressure at 3 mmHg.    Pericardium: There is a trivial posterior effusion.     NM Lung Scan Ventilation Perfusion [[id number]] Resulted: 12/22/23 1003   Order Status: Completed Updated: 12/22/23 1006   Narrative:     EXAMINATION:  NM LUNG VENTILATION AND PERFUSION IMAGING    CLINICAL HISTORY:  Pulmonary embolism (PE) suspected, positive D-dimer;    TECHNIQUE:  43.3 mCi of Tc-99m-DTPA were placed in the nebulizer. Following the inhalation Tc-99m-DTPA in aerosol and the subsequent IV administration of 5.18 mCi of Tc-99m-MAA, multiple images of the thorax were obtained in various projections.    COMPARISON:  Chest radiograph 12/21/2023.    FINDINGS:  Perfusion: Adequate distribution of radiotracer throughout the bilateral lungs.  No moderate or large wedge-shaped defect extending towards the periphery.    Ventilation: Adequate distribution of radiotracer throughout the bilateral lungs.  No significant defect.   Impression:       This represents a low probability of pulmonary embolism.    Electronically signed by resident: Sadiq London  Date: 12/22/2023  Time: 09:54    Electronically signed by: Pavel Saldivar  Date: 12/22/2023  Time: 10:03   US Lower Extremity Veins Bilateral [[id number]] Resulted: 12/22/23 0446   Order Status: Completed Updated: 12/22/23 0448   Narrative:     EXAMINATION:  US LOWER EXTREMITY VEINS BILATERAL    CLINICAL HISTORY:  Other specified abnormal findings of blood chemistry    TECHNIQUE:  Duplex and color flow Doppler and dynamic compression was performed of the bilateral lower extremity veins was performed.    COMPARISON:  None    FINDINGS:  Right thigh veins: The iliac, common femoral, femoral, popliteal, upper greater saphenous, and deep femoral veins are patent and free of thrombus. The veins are normally compressible and have normal phasic flow and augmentation response.    Right calf veins: The visualized calf veins are patent.    Left thigh veins: The iliac, common femoral, femoral,  popliteal, upper greater saphenous, and deep femoral veins are patent and free of thrombus. The veins are normally compressible and have normal phasic flow and augmentation response.    Left calf veins: The visualized calf veins are patent.    Miscellaneous: None   Impression:       No evidence of deep venous thrombosis in either lower extremity.      Electronically signed by: Hudson Sullivan MD  Date: 12/22/2023  Time: 04:46

## 2023-12-22 NOTE — ASSESSMENT & PLAN NOTE
BG goal 140-180    Start Novolog 5 units TID with meals   Will consider low dose basal insulin   Low Dose Correction Scale  BG monitoring ac/hs    ** Please call Endocrine for any BG related issues **    Discharge plans: TBD

## 2023-12-22 NOTE — ASSESSMENT & PLAN NOTE
- s/p DBD KTX 11/29 (CIT 15hrs, 2 arteries, 1 vein, 2 day correa, donor CMV +).   - Post op course with DGF which has now resolved.  - Cr trending down, 2.6 at OSH  - monitor intake and output  - cont to monitor with daily BMP

## 2023-12-22 NOTE — ASSESSMENT & PLAN NOTE
- K has been decreased on several outpatient labs   - 2.7 today   - replacing PO and IV   - will check this afternoon for progress   - possible cause of palpitations?  - monitor with renal panel, should get outpatient labs at [facility] azeem as renal function cont to improve

## 2023-12-22 NOTE — ASSESSMENT & PLAN NOTE
- suspected   -She presented to an OSH for evaluation of palpitations. Patient reports she felt her heart racing yesterday while she was walking at home. She notes she was taken off of her labetalol on November 29, 2023, and has not taken it since.   - She was tachycardic when arriving to OSH but symptoms resolved (HR 80-90s).  - OSH labs notable for Troponin of trending from 0.074 to 0.415. Elevated D-dimer 3.3. Trend troponin q6 hrs  - Given recent surgery concern for possible pulmonary emboli or DVT as cause of the patient's palpitations.   - Due to recent surgery and recovering renal function, VQ scan ordered in place of CTA OSH (not done).   - Edwin LE US obtained at OSH which was negative for DVT.   - CXR at OSH with no acute intrathoracic abnormalities.  - Patient received 325mg ASA and was started on a heparin gtt at OSH.   - Decision made for patient to be transferred to [facility] for further care.  - tele ordered  - Plan to continue heparin gtt, obtain VQ scan, trend troponin, and monitor closely.

## 2023-12-22 NOTE — DISCHARGE SUMMARY
[facility] - Transplant Stepdown  Kidney Transplant  Discharge Summary    Patient Name: Mary Waller  MRN: [id number]  Admission Date: 12/21/2023  Hospital Length of Stay: 0 days  Discharge Date and Time:  12/23/2023 12:24 PM  Attending Physician: Bony Swanson, *   Discharging Provider: Sangita Garcia, NP-C  Primary Care Provider: Bryan Murray MD    HPI:   Mary Waller is a 48 y.o. female with a Pmhx of DM, HTN, ESRD s/p DBD KTX 11/29 (CIT 15hrs, 2 arteries, 1 vein, 2 day correa, donor CMV +). Post op course with DGF which has now resolved. She presented to an OSH for evaluation of palpitations. Patient reports she felt her heart racing yesterday while she was walking at home. She notes she was taken off of her labetalol on November 29, 2023, and has not taken it since. She was tachycardic when arriving to OSH but symptoms resolved. OSH labs notable for Troponin of trending from 0.074 to 0.415. Elevated D-dimer 3.3. Given recent surgery concern for possible pulmonary emboli or DVT as cause of the patient's palpitations. Due to recent surgery and recovering renal function, VQ scan ordered in place of CTA OSH (not done). Edwin LE US obtained at OSH which was negative for DVT. CXR at OSH with no acute intrathoracic abnormalities. Patient received 325mg ASA and was started on a heparin gtt at OSH.  Decision made for patient to be transferred to [facility] for further care. Plan to continue heparin gtt, obtain VQ scan, trend troponin, and monitor closely. Plan discussed with Dr. Ortiz.     * No surgery found *     Hospital Course:    Initially admitted on heparin gtt for concern of PE. DVT BLE US negative for clot, VQ scan with low probability of PE. ECHO and EKG without acute abnormality, no right heart strain. Troponin trending down. PE and DVT were essentially r/o, heparin gtt dc'd. Several electrolyte abnormalities noted on TSU admit, azeem K 2.7. Replaced electrolytes PO and IV as needed.  Electrolytes now stable and WNL. Plan to hold Lasix on discharge.    Lower portion/suprapubic part of incision w minimal serosang drainage, NO odor noted or signs of infection. Steri strips w dried drainage. No fluid expressed w assessment. No edema or erythema noted. Pt states area drains occasionally worse w movement. Pt is afebrile.     Patient ready and stable for discharge at this time. On day of discharge, patient without hypoxia, HR and BP WNL, no SOB, CP, fever, etc. F/u labs 12/26/23. F/u clinic appointment with Justine Masterson on 1/8/2024. Patient understands discharge instruction and importance of follow up.        Goals of Care Treatment Preferences:  Code Status: Full Code      Final Active Diagnoses:    Diagnosis Date Noted POA    PRINCIPAL PROBLEM:  Hypokalemia [E87.6] 12/22/2023 Yes    Pulmonary embolism [I26.99] 12/22/2023 Yes    Elevated troponin [R79.89] 12/22/2023 Yes    Long-term use of immunosuppressant medication [Z79.60] 11/30/2023 Not Applicable    Prophylactic immunotherapy [Z29.89] 11/29/2023 Not Applicable    S/P kidney transplant [Z94.0] 11/29/2023 Not Applicable    Adverse effect of corticosteroids [T38.0X5A] 11/29/2023 Yes    Anemia of chronic disease [D63.8] 11/28/2023 Yes    Class 2 severe obesity due to excess calories with serious comorbidity and body mass index (BMI) of 38.0 to 38.9 in adult [E66.01, Z68.38] 10/10/2023 Not Applicable    Glaucoma [H40.9] 01/05/2019 Yes    Essential hypertension [I10] 01/05/2019 Yes    Type 2 diabetes mellitus with hyperglycemia, with long-term current use of insulin [E11.65, Z79.4] 01/05/2019 Not Applicable      Problems Resolved During this Admission:       Treatments: see hospital course        Pending Diagnostic Studies:       Procedure Component Value Units Date/Time    Renal Function Panel [[id number]]     Order Status: Sent Lab Status: No result     Specimen: Blood           Significant Diagnostic Studies: Labs: CMP   Recent Labs   Lab  "12/21/23 2130 12/22/23  0614 12/22/23  1524 12/23/23  0621    141 141 142   K 3.4* 2.7* 4.6 3.9   CL 91* 93* 94* 98   CO2 32* 33* 28 32*   * 169* 350* 207*   BUN 65* 54* 55* 48*   CREATININE 2.6* 2.3* 2.1* 2.0*   CALCIUM 8.1* 7.5* 8.1* 8.6*   PROT 8.9*  --   --   --    ALBUMIN 3.5  --  3.1* 2.9*   BILITOT 0.8  --   --   --    ALKPHOS 66  --   --   --    AST 15  --   --   --    ALT 13  --   --   --    ANIONGAP 15 15 19* 12   CBC   Recent Labs   Lab 12/21/23 2130 12/22/23  0614   WBC 9.11 6.78   HGB 9.6* 8.8*   HCT 31.0* 29.1*    130*   INR   Lab Results   Component Value Date    INR 1.1 12/22/2023    INR 1.0 11/29/2023    INR 1.0 08/17/2023   and All labs within the past 24 hours have been reviewed    Discharged Condition: fair    Disposition: home    Follow Up: see hospital course    Patient Instructions:   No discharge procedures on file.    Medications:  Reconciled Home Medications:      Medication List        START taking these medications      insulin glargine 100 units/mL SubQ pen  Commonly known as: LANTUS SOLOSTAR U-100 INSULIN  10-14 units daily as direct by endocrinology            CHANGE how you take these medications      valGANciclovir 450 mg Tab  Commonly known as: VALCYTE  Take 1 tablet (450 mg total) by mouth once daily. STOP 5/29/24  What changed: when to take this            CONTINUE taking these medications      atovaquone 750 mg/5 mL Susp oral liquid  Commonly known as: MEPRON  Take 10 mLs (1,500 mg total) by mouth once daily. STOP 5/29/24     atropine 1% 1 % Drop  Commonly known as: ISOPTO ATROPINE  Place 1 drop into the left eye every evening.     BD ULTRA-FINE MIRIAM PEN NEEDLE 32 gauge x 5/32" Ndle  Generic drug: pen needle, diabetic  Inject 1 each into the skin 3 (three) times daily.     blood sugar diagnostic Strp  1 each by Misc.(Non-Drug; Combo Route) route 3 (three) times daily.     blood-glucose meter kit  USE TO TEST BLOOD GLUCOSE     calcitRIOL 0.25 MCG " Cap  Commonly known as: ROCALTROL  Take 1 capsule (0.25 mcg total) by mouth once daily.     COMBIGAN 0.2-0.5 % Drop  Generic drug: brimonidine-timoloL  Place 1 drop into the right eye every evening.     famotidine 20 MG tablet  Commonly known as: PEPCID  Take 1 tablet (20 mg total) by mouth every evening.     HumaLOG KwikPen Insulin 100 unit/mL pen  Generic drug: insulin lispro  Inject 5 Units into the skin 3 (three) times daily with meals. Plus sliding scale: TDD=30 units     lancets Misc  1 each by Misc.(Non-Drug; Combo Route) route 3 (three) times daily.     Lmefol Ca-acetyl-meB12-algal 6 mg-600 mg- 2 mg-90.314 mg Tab  Commonly known as: CEREFOLIN NAC (ALGAL OIL)  Take 1 tablet by mouth once daily.     magnesium oxide 400 mg (241.3 mg magnesium) tablet  Commonly known as: MAG-OX  Take 2 tablets (800 mg total) by mouth 2 (two) times daily.     mycophenolate 250 mg Cap  Commonly known as: CELLCEPT  Take 4 capsules (1,000 mg total) by mouth 2 (two) times daily.     predniSONE 5 MG tablet  Commonly known as: DELTASONE  Take by mouth daily:  20mg 12/2-12/8, 15mg 12/9-12/15, 10mg 12/16-12/22, then 5mg daily beginning 12/23/2023     rosuvastatin 40 MG Tab  Commonly known as: CRESTOR  Take 40 mg by mouth every evening.     tacrolimus 1 MG Cap  Commonly known as: PROGRAF  Take 6 capsules (6 mg total) by mouth every 12 (twelve) hours.     VITAMIN D2 50,000 unit Cap  Generic drug: ergocalciferol  Take 50,000 Units by mouth every 7 days. Take 1 capsule by mouth weekly x 12 weeks, then decrease to once per month.            STOP taking these medications      furosemide 80 MG tablet  Commonly known as: LASIX     potassium chloride SA 20 MEQ tablet  Commonly known as: LITTLE-DUR,KLOR-CON     sodium bicarbonate 650 MG tablet              Time spent caring for patient (Greater than 1/2 spent in direct face-to-face contact): > 30 minutes    ANGELES RaviC  Kidney Transplant  [address] - Transplant Stepdown

## 2023-12-22 NOTE — ED PROVIDER NOTES
Encounter Date: 12/21/2023    SCRIBE #1 NOTE: I, Ang Aleman, am scribing for, and in the presence of,  Bret Su MD.       History     Chief Complaint   Patient presents with    Palpitations     Was taken off labetalol, nov 29 for kidney transplant and has not started back on medicine. Pt reports feeling heart race since 29.      Mary Waller is a 48 y.o. female with a Pmhx of DM, HTN, ESRD s/p renal transplant on 11/29/2023, who presents to the ED for evaluation of palpitations tonight. Patient reports she felt her heart racing today while she was walking at home. She notes she was taken off of her labetalol on November 29, 2023, and has not taken it since. No medications taken PTA. No alleviating or exacerbating factors noted. Denies cough, congestion, sore throat, abdominal pain, N/V/D, dysuria, hematuria, melena, hematochezia, rash, dyspnea, dizziness, lightheadedness, or other associated symptoms. Denies EtOH, tobacco, or other illicit drug usage.    The history is provided by the patient. No  was used.     Review of patient's allergies indicates:   Allergen Reactions    Codeine     Sulfa (sulfonamide antibiotics)      Past Medical History:   Diagnosis Date    Blind     Diabetes     ESRD (end stage renal disease) on dialysis     Hypertension     Right cataract      Past Surgical History:   Procedure Laterality Date    AV FISTULA PLACEMENT      EYE SURGERY      KIDNEY TRANSPLANT N/A 11/29/2023    Procedure: TRANSPLANT, KIDNEY;  Surgeon: Torri Moore MD;  Location: [facility] OR [address] [place] [address];  Service: Transplant;  Laterality: N/A;  Kidney in Room @ 0714  Out of Ice @ 0943  Reperfusion @ 1011     Family History   Problem Relation Age of Onset    Diabetes Mother     Hyperlipidemia Mother     Diabetes Father     Hyperlipidemia Father     Diabetes Sister      Social History     Tobacco Use    Smoking status: Never    Smokeless tobacco: Never   Substance Use Topics    Alcohol use: No     Drug use: No     Review of Systems   Constitutional:  Negative for chills and fever.   HENT:  Negative for sore throat.    Respiratory:  Negative for cough and shortness of breath.    Cardiovascular:  Positive for palpitations. Negative for chest pain.   Gastrointestinal:  Negative for abdominal pain, blood in stool, diarrhea, nausea and vomiting.   Genitourinary:  Negative for dysuria and hematuria.   Musculoskeletal:  Negative for back pain.   Skin:  Negative for rash.   Neurological:  Negative for dizziness, weakness, light-headedness and headaches.   Psychiatric/Behavioral:  Negative for confusion.        Physical Exam     Initial Vitals [12/21/23 1955]   BP Pulse Resp Temp SpO2   (!) 205/81 (!) 117 20 98.7 °F (37.1 °C) 100 %      MAP       --         Physical Exam    Nursing note and vitals reviewed.  Constitutional: She appears well-developed and well-nourished.   HENT:   Head: Normocephalic and atraumatic.   Eyes: Conjunctivae are normal. Pupils are equal, round, and reactive to light.   Neck: Neck supple.   Normal range of motion.  Cardiovascular:  Normal rate, regular rhythm and normal heart sounds.           Pulses:       Dorsalis pedis pulses are 2+ on the right side and 2+ on the left side.   AV fistula to left upper extremity with palpable thrill present.    Pulmonary/Chest: Breath sounds normal.   Abdominal: Abdomen is soft. She exhibits no distension.   Incision to right abdominal wall with no surrounding erythema, induration, purulence, drainage, and that is mildly tenderness to palpation.  There is no rebound and no guarding.   Musculoskeletal:         General: Normal range of motion.      Cervical back: Normal range of motion and neck supple.     Neurological: She is alert and oriented to person, place, and time. GCS score is 15. GCS eye subscore is 4. GCS verbal subscore is 5. GCS motor subscore is 6.   No facial asymmetry.    Skin: Skin is warm and dry. Capillary refill takes less than 2 seconds.    No wounds on feet noted.   Psychiatric: She has a normal mood and affect. Her behavior is normal. Judgment and thought content normal.         ED Course   Critical Care    Date/Time: 12/22/2023 2:07 AM    Performed by: Bret Su MD  Authorized by: Bret Su MD  Direct patient critical care time: 15 minutes  Ordering / reviewing critical care time: 10 minutes  Documentation critical care time: 10 minutes  Consulting other physicians critical care time: 5 minutes  Total critical care time (exclusive of procedural time) : 40 minutes  Critical care time was exclusive of separately billable procedures and treating other patients and teaching time.  Critical care was necessary to treat or prevent imminent or life-threatening deterioration of the following conditions: circulatory failure.  Critical care was time spent personally by me on the following activities: blood draw for specimens, development of treatment plan with patient or surrogate, obtaining history from patient or surrogate, ordering and performing treatments and interventions, ordering and review of laboratory studies, ordering and review of radiographic studies, pulse oximetry, re-evaluation of patient's condition, examination of patient and evaluation of patient's response to treatment.  Comments: Patient being started on IV anticoagulation for possible pulmonary emboli.        Labs Reviewed   CBC W/ AUTO DIFFERENTIAL - Abnormal; Notable for the following components:       Result Value    RBC 3.08 (*)     Hemoglobin 9.6 (*)     Hematocrit 31.0 (*)      (*)     MCH 31.2 (*)     MCHC 31.0 (*)     RDW 14.6 (*)     Gran # (ANC) 8.0 (*)     Lymph # 0.4 (*)     Gran % 87.8 (*)     Lymph % 4.5 (*)     All other components within normal limits   COMPREHENSIVE METABOLIC PANEL - Abnormal; Notable for the following components:    Potassium 3.4 (*)     Chloride 91 (*)     CO2 32 (*)     Glucose 251 (*)     BUN 65 (*)     Creatinine 2.6 (*)      Calcium 8.1 (*)     Total Protein 8.9 (*)     eGFR 22 (*)     All other components within normal limits   URINALYSIS, REFLEX TO URINE CULTURE - Abnormal; Notable for the following components:    Glucose, UA Trace (*)     Occult Blood UA Trace (*)     All other components within normal limits    Narrative:     Specimen Source->Urine   TROPONIN I - Abnormal; Notable for the following components:    Troponin I 0.074 (*)     All other components within normal limits   D DIMER, QUANTITATIVE - Abnormal; Notable for the following components:    D-Dimer 3.30 (*)     All other components within normal limits   TROPONIN I - Abnormal; Notable for the following components:    Troponin I 0.415 (*)     All other components within normal limits   POCT GLUCOSE - Abnormal; Notable for the following components:    POCT Glucose 309 (*)     All other components within normal limits   ISTAT PROCEDURE - Abnormal; Notable for the following components:    POC PH 7.529 (*)     POC PCO2 46.9 (*)     POC PO2 62 (*)     POC HCO3 39.1 (*)     POC BE 15 (*)     POC TCO2 41 (*)     All other components within normal limits   POCT GLUCOSE - Abnormal; Notable for the following components:    POCT Glucose 152 (*)     All other components within normal limits   B-TYPE NATRIURETIC PEPTIDE   MAGNESIUM   PHOSPHORUS   TSH   BETA - HYDROXYBUTYRATE, SERUM   PHOSPHORUS   MAGNESIUM   TSH   APTT   PROTIME-INR   CBC W/ AUTO DIFFERENTIAL   PROTIME-INR   APTT   SARS-COV-2 RDRP GENE   POCT INFLUENZA A/B MOLECULAR   POCT GLUCOSE MONITORING CONTINUOUS          Imaging Results              X-Ray Chest AP Portable (Final result)  Result time 12/21/23 20:25:41      Final result by Pily Esparza MD (12/21/23 20:25:41)                   Impression:      No acute intrathoracic abnormality detected.  No significant change.      Electronically signed by: Pily Esparza  Date:    12/21/2023  Time:    20:25               Narrative:    EXAMINATION:  AP PORTABLE  CHEST    CLINICAL HISTORY:  Shortness of breath    TECHNIQUE:  AP portable chest radiograph was submitted.    COMPARISON:  11/29/2023    FINDINGS:  AP portable chest radiograph demonstrates a cardiac silhouette within normal limits.  There is no focal consolidation, pneumothorax, or pleural effusion.  A vascular axillary stent is seen projecting over the left clavicle again.                                       Medications   aspirin tablet 325 mg (has no administration in time range)   heparin 25,000 units in dextrose 5% (100 units/ml) IV bolus from bag INITIAL BOLUS (has no administration in time range)   heparin 25,000 units in dextrose 5% 250 mL (100 units/mL) infusion HIGH INTENSITY nomogram - [facility] (has no administration in time range)   heparin 25,000 units in dextrose 5% (100 units/ml) IV bolus from bag - ADDITIONAL PRN BOLUS - 60 units/kg (has no administration in time range)   heparin 25,000 units in dextrose 5% (100 units/ml) IV bolus from bag - ADDITIONAL PRN BOLUS - 30 units/kg (has no administration in time range)   sodium chloride 0.9% bolus 1,000 mL 1,000 mL (0 mLs Intravenous Stopped 12/21/23 0949)     Medical Decision Making    48-year-old female presenting for palpitations.  Patient had renal transplant 1 month prior.  Well healing incision over the right abdomen.  Patient denies any abdominal pain nausea lightheadedness dizziness chest pain dyspnea.  Patient was tachycardic on arrival but symptoms did resolve.  Blood pressure appears appropriate.  Troponin of trending from 0.074 to 0.415.  Elevated D-dimer 3.3.  Given recent surgery concern for possible pulmonary emboli or DVT as cause of the patient's palpitations.  Patient is started on temporary heparin drip.  No thrombocytopenia.  No recent bleeding reported.  Case was discussed with renal transplant team.      Patient also has hyperglycemia 300.  Patient received 1 L IV fluids.  No sign of acidosis.      Differential diagnosis includes volume  depletion, pulmonary emboli, ACS, cardiomyopathy    Amount and/or Complexity of Data Reviewed  Labs: ordered. Decision-making details documented in ED Course.  Radiology: ordered.  ECG/medicine tests:  Decision-making details documented in ED Course.    Risk  OTC drugs.  Prescription drug management.            Scribe Attestation:   Scribe #1: I performed the above scribed service and the documentation accurately describes the services I performed. I attest to the accuracy of the note.        ED Course as of 12/22/23 0210   u Dec 21, 2023   2116 EKG 12-lead  Time 7:51 p.m.     Rate 112, sinus, regular rhythm, normal axis  , QRS 94 QTC of 524.  No ST elevation or depression.  No T-wave inversion no hyperacute T-waves.  No Q-waves present     Sinus tachycardia with LVH. [JM]   2141 Hemoglobin(!): 9.6  Appears to be at patient's baseline. [JM]   2208 Magnesium : 1.6 [JM]   2208 Creatinine(!): 2.6  Appears to be improving. [JM]   2331 Troponin I(!): 0.074  No previous elevation.  Patient denied any chest pain or dyspnea.  Plan for repeat troponin. [JM]   2332 Renal transplant, Dr. SANTANA    Discussed the case with the renal transplant physician including labs, symptoms, resolving symptoms, vital signs.  Can call tomorrow morning if workup is negative.  [JM]   Fri Dec 22, 2023   0136 Troponin I(!): 0.415 [JM]   0136 D-Dimer(!): 3.30  Patient can not receive contrast.  Patient will need a V/Q scan. [JM]   0146 Platelet Count: 153 [JM]   0146 Hemoglobin(!): 9.6  The patient will be started on heparin until VQ scan can be done.       Calling back renal transplant team with the elevated troponin elevated D-dimer.  I would like the patient admitted.  Patient may be better suited where her transplant team is available. [JM]   0204 Troponin I(!): 0.415  Up trending troponin.    I discussed starting heparin with the patient.  Patient understands that she is being transferred to [facility] for renal transplant.    A  consult was placed back out to [facility] for renal transplant team.  She has been accepted to an inpatient bed.  Notify that we are starting heparin until possible PE clarified. [JM]      ED Course User Index  [JM] Bret Su MD                       I, Bret Su, personally performed the services described in this documentation. All medical record entries made by the scribe were at my direction and in my presence. I have reviewed the chart and agree that the record reflects my personal performance and is accurate and complete.      Clinical Impression:  Final diagnoses:  [R00.2] Palpitations  [R79.89] Positive D dimer  [R79.89] Elevated troponin (Primary)          ED Disposition Condition    Transfer to Another Facility Stable                Bret Su MD  12/22/23 0995

## 2023-12-22 NOTE — HPI
Reason for Consult: Management of T2DM, Hyperglycemia     Surgical Procedure and Date: Kidney Transplant 11/29/23     Diabetes diagnosis year: in her 30s     Home Diabetes Medications:    -Lantus 10 units daily   -Novolog 5 units with meals   -Add correction scale if needed.  Blood sugar 180 to 230 add 1 units  Blood sugar 231 to 280 add 2 units  Blood sugar 281 to 330 add 3 units  Blood sugar 331 to 380 add 4 units  Blood sugar greater than 380 add 5 units    Lab Results   Component Value Date    HGBA1C 5.7 10/04/2023       How often checking glucose at home?  4x daily     BG readings on regimen: variable low 100s up to 400  Hypoglycemia on the regimen?  No  Missed doses on regimen?  No    Diabetes Complications include:     Diabetic nephropathy  , Diabetic chronic kidney disease     , and Diabetic retinopathy      Complicating diabetes co morbidities:   ESRD s/p kidney transplant, Glucocorticoid Use, HTN, CAD, HLD     HPI:   Patient is a 48 y.o. female with a diagnosis of DM, HTN, ESRD s/p DBD KTX 11/29 (CIT 15hrs, 2 arteries, 1 vein, 2 day correa, donor CMV +). Post op course with DGF which has now resolved. She presented to an OSH for evaluation of palpitations. Patient reports she felt her heart racing yesterday while she was walking at home. She notes she was taken off of her labetalol on November 29, 2023, and has not taken it since. She was tachycardic when arriving to OSH but symptoms resolved. OSH labs notable for Troponin of trending from 0.074 to 0.415. Elevated D-dimer 3.3. Given recent surgery concern for possible pulmonary emboli or DVT as cause of the patient's palpitations. Due to recent surgery and recovering renal function, VQ scan ordered in place of CTA OSH (not done). Edwin LE US obtained at OSH which was negative for DVT. CXR at OSH with no acute intrathoracic abnormalities. Patient received 325mg ASA and was started on a heparin gtt at OSH.  Decision made for patient to be transferred to [facility] for  further care. Plan to continue heparin gtt, obtain VQ scan, trend troponin, and monitor closely. Endocrinology consulted for management of T2DM.

## 2023-12-22 NOTE — TELEPHONE ENCOUNTER
[place]    PCP:  Dr. Bryan Murray    S/P Kidney Transplant on 11/29.  She reports Labetalol discontinued which she was taking to help with HR.  C/O tachycardia (VA: 106-117), bounding HR, and difficulty breathing.  Denies CP and fever.  Per protocol, care advised is go to the ED now.  BPA 16 used.  Pt VU.  Advised to call for worsening/questions/concerns.  VU.    Reason for Disposition   Difficulty breathing    Additional Information   Negative: Passed out (i.e., lost consciousness, collapsed and was not responding)   Negative: Shock suspected (e.g., cold/pale/clammy skin, too weak to stand, low BP, rapid pulse)   Negative: Difficult to awaken or acting confused (e.g., disoriented, slurred speech)   Negative: Visible sweat on face or sweat dripping down face   Negative: Unable to walk, or can only walk with assistance (e.g., requires support)   Negative: [1] Received SHOCK from implantable cardiac defibrillator AND [2] persisting symptoms (i.e., palpitations, lightheadedness)   Negative: [1] Dizziness, lightheadedness, or weakness AND [2] heart beating very rapidly (e.g., > 140 / minute)   Negative: [1] Dizziness, lightheadedness, or weakness AND [2] heart beating very slowly (e.g., < 50 / minute)   Negative: Sounds like a life-threatening emergency to the triager    Protocols used: Heart Rate and Heartbeat [id number]

## 2023-12-22 NOTE — PROGRESS NOTES
Transplant Social Work Potential Weekend Admit/Discharge Note     Met with patient and mother to assess needs. Patient is a 48 y.o. single female, admitted for:    Palpitations [R00.2]  Positive D dimer [R79.89]  Elevated troponin [R79.89]  Suspected pulmonary embolism [R09.89]       Patient admitted from emergency room on 12/21/2023 .  At this time, patient presents as alert and oriented x 4, pleasant, good eye contact, well groomed, recall good, concentration/judgement good, average intelligence, calm, communicative, cooperative, and asking and answering questions appropriately.  At this time, patients caregiver presents as alert and oriented x 4, pleasant, good eye contact, well groomed, recall good, concentration/judgement good, average intelligence, calm, communicative, cooperative, and asking and answering questions appropriately.    Household/Family Systems     Patient resides with patient's mother, at [address] [place] [address] [place] [address].  Support system includes her mother Alejandra Edouard ([phone]), and her sister Susie De Souza ([phone]).  Patient does not have dependents that are need of being cared for.     Patients primary caregiver is self and Alejandra Edouard, patients mother, phone number [phone].  Confirmed patients contact information is [phone] (home);   Telephone Information:   Mobile [phone]   .    During admission, patient's caregiver plans to stay in patient's room.  Confirmed patient and patients caregivers do have access to reliable transportation.    Cognitive Status/Learning     Patient reports reading ability as college and states patient does not have difficulty with N/A.  Patient reports patient learns best by hands on learning.   Needed: No.   Highest education level: Attended College/Technical School    Vocation/Disability   .  Working for Income: No  If no, reason not working: Disability  Patient is disabled.     Adherence     Patient reports a  high level of adherence to patients health care regimen.  Adherence counseling and education provided. Patient verbalizes understanding.    Substance Use    Patient reports the following substance usage.    Tobacco: none, patient denies any use.  Alcohol: none, patient denies any use.  Illicit Drugs/Non-prescribed Medications: none, patient denies any use.  Patient states clear understanding of the potential impact of substance use.  Substance abstinence/cessation counseling, education and resources provided and reviewed.     Services Utilizing/ADLS    Infusion Service: Prior to admission, patient utilizing? no  Home Health: Prior to admission, patient utilizing? no  DME: Prior to admission, no  Pulmonary/Cardiac Rehab: Prior to admission, no  Dialysis:  Prior to admission, no  Transplant Specialty Pharmacy:  Prior to admission, yes; [facility].    Prior to admission, patient reports patient was independent with ADLS and was not driving.  Patient reports patient is able to care for self at this time..  Patient indicates a willingness to care for self once medically cleared to do so.    Insurance/Medications    Insured by   Payer/Plan Subscr  Sex Relation Sub. Ins. ID Effective Group Num   1. [place]ARE - [place]* JEANNE PHILLIP CHA* 1975 Female Self [id number]                                    [address]   2. [place] JEANNE PHILLIP CHA* 1975 Female Self [id number]* [id number]                                    [address] [address]      Primary Insurance (for UNOS reporting): Public Insurance - Medicare FFS (Fee For Service)  Secondary Insurance (for UNOS reporting): Public Insurance - Medicaid    Patient reports patient is able to obtain and afford medications at this time and at time of discharge.    Living Will/Healthcare Power of     Patient states patient does not have a LW and/or HCPA.   provided education regarding LW and HCPA and the completion of forms.    Coping/Mental  Health    Patient is coping adequately with the aid of  family members and friends.  Patient denies mental health difficulties. Patient denied needing or wanting resources at this time. Patient and caregiver agree to contact transplant team with needs, questions, or concerns as they arise.     Discharge Planning    At time of discharge, patient plans to return to patient's home under the care of Alejandra Edouard.  Patients mother will transport patient.  Per rounds today, expected discharge date has not been medically determined at this time. Patient and patients caregiver  verbalize understanding and are involved in treatment planning and discharge process.    Additional Concerns    Patient's caretaker denies additional needs and/or concerns at this time.  providing ongoing psychosocial support, education, resources and d/c planning as needed.  SW remains available.  remains available. Patient's caregiver verbalizes understanding and agreement with information reviewed,  availability and how to access available resources as needed. Patient denies additional needs and/or concerns at this time. Patient verbalizes understanding and agreement with information reviewed, social work availability, and how to access available resources as needed.

## 2023-12-22 NOTE — HPI
Mary Waller is a 48 y.o. female with a Pmhx of DM, HTN, ESRD s/p DBD KTX 11/29 (CIT 15hrs, 2 arteries, 1 vein, 2 day correa, donor CMV +). Post op course with DGF which has now resolved. She presented to an OSH for evaluation of palpitations. Patient reports she felt her heart racing yesterday while she was walking at home. She notes she was taken off of her labetalol on November 29, 2023, and has not taken it since. She was tachycardic when arriving to OSH but symptoms resolved. OSH labs notable for Troponin of trending from 0.074 to 0.415. Elevated D-dimer 3.3. Given recent surgery concern for possible pulmonary emboli or DVT as cause of the patient's palpitations. Due to recent surgery and recovering renal function, VQ scan ordered in place of CTA OSH (not done). Edwin LE US obtained at OSH which was negative for DVT. CXR at OSH with no acute intrathoracic abnormalities. Patient received 325mg ASA and was started on a heparin gtt at OSH.  Decision made for patient to be transferred to [facility] for further care. Plan to continue heparin gtt, obtain VQ scan, trend troponin, and monitor closely. Plan discussed with Dr. Ortiz.

## 2023-12-22 NOTE — ASSESSMENT & PLAN NOTE
- suspected after presenting with c/o palpitations at OSH; was tachycardic on admit   - taken off labetolol at time of txp and not restarted 2/2 hypotension during HD   - troponin was increasing, d dimer elevated; no CTA 2/2 recently recovering renal function  - got 325 ASA and heparin gtt started   - DVT US LE negative, VQ scan with low probability for PE   - ECHO and EKG without acute abnormality, no right heart strain or continued tachycardia  - ruled out with DVT LE US and VQ scan   - heparin gtt dc'd  - no SOB, CP, hypoxia, tachycardia, fever, right heart strain, etc   - palpitations possibly from lytes disturbances? Sidecar with cardiology who recs outpatient f/u with possible loop recorder x 1 month

## 2023-12-22 NOTE — CONSULTS
[facility] Transplant Stepdown  Endocrinology  Diabetes Consult Note    Consult Requested by: Bony Swanson, *   Reason for admit: Pulmonary embolism    HISTORY OF PRESENT ILLNESS:  Reason for Consult: Management of T2DM, Hyperglycemia     Surgical Procedure and Date: Kidney Transplant 11/29/23     Diabetes diagnosis year: in her 30s     Home Diabetes Medications:    -Lantus 10 units daily   -Novolog 5 units with meals   -Add correction scale if needed.  Blood sugar 180 to 230 add 1 units  Blood sugar 231 to 280 add 2 units  Blood sugar 281 to 330 add 3 units  Blood sugar 331 to 380 add 4 units  Blood sugar greater than 380 add 5 units    Lab Results   Component Value Date    HGBA1C 5.7 10/04/2023       How often checking glucose at home?  4x daily     BG readings on regimen: variable low 100s up to 400  Hypoglycemia on the regimen?  No  Missed doses on regimen?  No    Diabetes Complications include:     Diabetic nephropathy  , Diabetic chronic kidney disease     , and Diabetic retinopathy      Complicating diabetes co morbidities:   ESRD s/p kidney transplant, Glucocorticoid Use, HTN, CAD, HLD     HPI:   Patient is a 48 y.o. female with a diagnosis of DM, HTN, ESRD s/p DBD KTX 11/29 (CIT 15hrs, 2 arteries, 1 vein, 2 day correa, donor CMV +). Post op course with DGF which has now resolved. She presented to an OSH for evaluation of palpitations. Patient reports she felt her heart racing yesterday while she was walking at home. She notes she was taken off of her labetalol on November 29, 2023, and has not taken it since. She was tachycardic when arriving to OSH but symptoms resolved. OSH labs notable for Troponin of trending from 0.074 to 0.415. Elevated D-dimer 3.3. Given recent surgery concern for possible pulmonary emboli or DVT as cause of the patient's palpitations. Due to recent surgery and recovering renal function, VQ scan ordered in place of CTA OSH (not done). Edwin LE US obtained at OSH which was  "negative for DVT. CXR at OSH with no acute intrathoracic abnormalities. Patient received 325mg ASA and was started on a heparin gtt at OSH.  Decision made for patient to be transferred to [facility] [place] [facility] for further care. Plan to continue heparin gtt, obtain VQ scan, trend troponin, and monitor closely. Endocrinology consulted for management of T2DM.         Interval HPI:   Overnight events: Remains in TSU. BG within goal ranges at time of consult. Receiving Prednisone 10 mg daily. Creatinine 2.3. Diet renal    Eatin%  Nausea: No  Hypoglycemia and intervention: No  Fever: No  TPN and/or TF: No  If yes, type of TF/TPN and rate: n/a    /77 (BP Location: Right leg, Patient Position: Lying)   Pulse 89   Temp 98.2 °F (36.8 °C) (Oral)   Resp 16   Ht 5' 5" (1.651 m)   Wt 104.4 kg (230 lb 2.6 oz)   SpO2 100%   Breastfeeding No   BMI 38.30 kg/m²     Labs Reviewed and Include    Recent Labs   Lab 23  0614   * 169*   CALCIUM 8.1* 7.5*   ALBUMIN 3.5  --    PROT 8.9*  --     141   K 3.4* 2.7*   CO2 32* 33*   CL 91* 93*   BUN 65* 54*   CREATININE 2.6* 2.3*   ALKPHOS 66  --    ALT 13  --    AST 15  --    BILITOT 0.8  --      Lab Results   Component Value Date    WBC 6.78 2023    HGB 8.8 (L) 2023    HCT 29.1 (L) 2023     (H) 2023     (L) 2023     Recent Labs   Lab 23   TSH 0.459     Lab Results   Component Value Date    HGBA1C 5.7 10/04/2023       Nutritional status:   Body mass index is 38.3 kg/m².  Lab Results   Component Value Date    ALBUMIN 3.5 2023    ALBUMIN 3.4 (L) 2023    ALBUMIN 3.3 (L) 2023     No results found for: "PREALBUMIN"    Estimated Creatinine Clearance: 36.7 mL/min (A) (based on SCr of 2.3 mg/dL (H)).    Accu-Checks  Recent Labs     23  0204 23  0834   POCTGLUCOSE 309* 152* 156*       Current Medications and/or Treatments Impacting Glycemic Control  Immunotherapy:  " "  Immunosuppressants           Stop Route Frequency     mycophenolate capsule 1,000 mg         -- Oral 2 times daily     tacrolimus capsule 6 mg         -- Oral 2 times daily          Steroids:   Hormones (From admission, onward)      Start     Stop Route Frequency Ordered    12/22/23 0900  predniSONE tablet 10 mg         -- Oral Daily 12/22/23 0447    12/22/23 0536  melatonin tablet 6 mg         -- Oral Nightly PRN 12/22/23 0447          Pressors:    Autonomic Drugs (From admission, onward)      None          Hyperglycemia/Diabetes Medications:   Antihyperglycemics (From admission, onward)      Start     Stop Route Frequency Ordered    12/22/23 1245  insulin aspart U-100 pen 5 Units         -- SubQ 3 times daily with meals 12/22/23 1230    12/22/23 1053  insulin aspart U-100 pen 0-5 Units         -- SubQ Before meals & nightly PRN 12/22/23 0953            Labs Reviewed and Include   Recent Labs   Lab 12/21/23 2130 12/22/23  0614   * 169*   CALCIUM 8.1* 7.5*   ALBUMIN 3.5  --    PROT 8.9*  --     141   K 3.4* 2.7*   CO2 32* 33*   CL 91* 93*   BUN 65* 54*   CREATININE 2.6* 2.3*   ALKPHOS 66  --    ALT 13  --    AST 15  --    BILITOT 0.8  --      Lab Results   Component Value Date    WBC 6.78 12/22/2023    HGB 8.8 (L) 12/22/2023    HCT 29.1 (L) 12/22/2023     (H) 12/22/2023     (L) 12/22/2023     Recent Labs   Lab 12/21/23 2130   TSH 0.459     Lab Results   Component Value Date    HGBA1C 5.7 10/04/2023       Nutritional status:   Body mass index is 38.3 kg/m².  Lab Results   Component Value Date    ALBUMIN 3.5 12/21/2023    ALBUMIN 3.4 (L) 12/20/2023    ALBUMIN 3.3 (L) 12/18/2023     No results found for: "PREALBUMIN"    Estimated Creatinine Clearance: 36.7 mL/min (A) (based on SCr of 2.3 mg/dL (H)).    Accu-Checks  Recent Labs     12/21/23 2000 12/22/23  0204 12/22/23  0834   POCTGLUCOSE 309* 152* 156*        ASSESSMENT and PLAN    Renal/  S/P kidney transplant  Managed per primary " team  Avoid hypoglycemia        Immunology/Multi System  Prophylactic immunotherapy  May increase insulin resistance.         Hematology  * Pulmonary embolism  Managed per primary team          Endocrine  Class 2 severe obesity due to excess calories with serious comorbidity and body mass index (BMI) of 38.0 to 38.9 in adult  Body mass index is 38.3 kg/m².  May increase insulin resistance.         Type 2 diabetes mellitus with chronic kidney disease on chronic dialysis  BG goal 140-180    Start Novolog 5 units TID with meals   Will consider low dose basal insulin   Low Dose Correction Scale  BG monitoring ac/hs    ** Please call Endocrine for any BG related issues **    Discharge plans: TBD      Other  Adverse effect of corticosteroids  Glucocorticoids markedly increase glucoses. Expect steroid taper to help with glucose control.           Plan discussed with patient at bedside.     Flores Crook NP  Endocrinology  [facility] - Trans[facility] Step[facility]

## 2023-12-22 NOTE — SUBJECTIVE & OBJECTIVE
Subjective:     Chief Complaint/Reason for Admission: suspected pulmonary embolism    History of Present Illness:  Mary Waller is a 48 y.o. female with a Pmhx of DM, HTN, ESRD s/p DBD KTX 11/29 (CIT 15hrs, 2 arteries, 1 vein, 2 day correa, donor CMV +). Post op course with DGF which has now resolved. She presented to an OSH for evaluation of palpitations. Patient reports she felt her heart racing yesterday while she was walking at home. She notes she was taken off of her labetalol on November 29, 2023, and has not taken it since. She was tachycardic when arriving to OSH but symptoms resolved. OSH labs notable for Troponin of trending from 0.074 to 0.415. Elevated D-dimer 3.3. Given recent surgery concern for possible pulmonary emboli or DVT as cause of the patient's palpitations. Due to recent surgery and recovering renal function, VQ scan ordered in place of CTA OSH (not done). Edwin LE US obtained at OSH which was negative for DVT. CXR at OSH with no acute intrathoracic abnormalities. Patient received 325mg ASA and was started on a heparin gtt at OSH.  Decision made for patient to be transferred to [facility] for further care. Plan to continue heparin gtt, obtain VQ scan, trend troponin, and monitor closely. Plan discussed with Dr. Ortiz.        PTA Medications   Medication Sig    atovaquone (MEPRON) 750 mg/5 mL Susp oral liquid Take 10 mLs (1,500 mg total) by mouth once daily. STOP 5/29/24    atropine 1% (ISOPTO ATROPINE) 1 % Drop Place 1 drop into the left eye every evening.    blood sugar diagnostic Strp 1 each by Misc.(Non-Drug; Combo Route) route 3 (three) times daily.    blood-glucose meter kit USE TO TEST BLOOD GLUCOSE    calcitRIOL (ROCALTROL) 0.25 MCG Cap Take 1 capsule (0.25 mcg total) by mouth once daily.    COMBIGAN 0.2-0.5 % Drop Place 1 drop into the right eye every evening.    ergocalciferol (VITAMIN D2) 50,000 unit Cap Take 50,000 Units by mouth every 7 days. Take 1 capsule by mouth weekly x 12  "weeks, then decrease to once per month.    famotidine (PEPCID) 20 MG tablet Take 1 tablet (20 mg total) by mouth every evening.    furosemide (LASIX) 80 MG tablet Take 80 mg by mouth 2 (two) times daily.    insulin lispro 100 unit/mL pen Inject 5 Units into the skin 3 (three) times daily with meals. Plus sliding scale: TDD=30 units    lancets Misc 1 each by Misc.(Non-Drug; Combo Route) route 3 (three) times daily.    Lmefol Ca-acetyl-meB12-algal (CEREFOLIN NAC, ALGAL OIL,) 6 mg-600 mg- 2 mg-90.314 mg Tab Take 1 tablet by mouth once daily.    magnesium oxide (MAG-OX) 400 mg (241.3 mg magnesium) tablet Take 2 tablets (800 mg total) by mouth 2 (two) times daily.    mycophenolate (CELLCEPT) 250 mg Cap Take 4 capsules (1,000 mg total) by mouth 2 (two) times daily.    pen needle, diabetic (EASY COMFORT PEN NEEDLES) 32 gauge x 5/32" Ndle Inject 1 each into the skin 3 (three) times daily.    potassium chloride SA (K-DUR,KLOR-CON) 20 MEQ tablet Take 2 tablets (40 mEq total) by mouth once daily.    predniSONE (DELTASONE) 5 MG tablet Take by mouth daily:  20mg 12/2-12/8, 15mg 12/9-12/15, 10mg 12/16-12/22, then 5mg daily beginning 12/23/2023    rosuvastatin (CRESTOR) 40 MG Tab Take 40 mg by mouth every evening.    sodium bicarbonate 650 MG tablet Take 2 tablets (1,300 mg total) by mouth 2 (two) times daily.    tacrolimus (PROGRAF) 1 MG Cap Take 6 capsules (6 mg total) by mouth every 12 (twelve) hours.    valGANciclovir (VALCYTE) 450 mg Tab Take 1 tablet (450 mg total) by mouth every Mon, Wed, Fri. STOP 5/29/24       Review of patient's allergies indicates:   Allergen Reactions    Codeine     Sulfa (sulfonamide antibiotics)        Past Medical History:   Diagnosis Date    Blind     Diabetes     ESRD (end stage renal disease) on dialysis     Hypertension     Right cataract      Past Surgical History:   Procedure Laterality Date    AV FISTULA PLACEMENT      EYE SURGERY      KIDNEY TRANSPLANT N/A 11/29/2023    Procedure: " TRANSPLANT, KIDNEY;  Surgeon: Torri Moore MD;  Location: [facility] OR [address] [place] [address];  Service: Transplant;  Laterality: N/A;  Kidney in Room @ 0714  Out of Ice @ 0943  Reperfusion @ 1011     Family History       Problem Relation (Age of Onset)    Diabetes Mother, Father, Sister    Hyperlipidemia Mother, Father          Tobacco Use    Smoking status: Never    Smokeless tobacco: Never   Substance and Sexual Activity    Alcohol use: No    Drug use: No    Sexual activity: Not Currently        Review of Systems   Constitutional:  Negative for appetite change, chills, fatigue and fever.   HENT:  Negative for facial swelling and trouble swallowing.    Eyes:  Positive for visual disturbance.   Respiratory:  Negative for cough, chest tightness, shortness of breath, wheezing and stridor.    Cardiovascular:  Negative for chest pain, palpitations and leg swelling.   Gastrointestinal:  Negative for abdominal distention, abdominal pain, diarrhea, nausea and vomiting.   Genitourinary:  Negative for decreased urine volume and difficulty urinating.   Musculoskeletal:  Negative for back pain and neck pain.   Skin:  Positive for wound. Negative for color change, pallor and rash.   Allergic/Immunologic: Positive for immunocompromised state.   Neurological:  Negative for dizziness, tremors, weakness and headaches.   Psychiatric/Behavioral:  Negative for agitation, behavioral problems, confusion and decreased concentration.      Objective:     Vital Signs (Most Recent):  Temp: 98.7 °F (37.1 °C) (12/22/23 0403)  Pulse: 90 (12/22/23 0403)  Resp: 18 (12/22/23 0402)  BP: (!) 156/67 (12/22/23 0403)  SpO2: 100 % (12/22/23 0232)     Weight: 102.1 kg (225 lb)  Body mass index is 37.44 kg/m².      Physical Exam  Vitals and nursing note reviewed.   Constitutional:       General: She is awake. She is not in acute distress.     Appearance: She is obese. She is not toxic-appearing.   Eyes:      General: No scleral icterus.  Cardiovascular:      Rate and  Rhythm: Normal rate and regular rhythm.      Pulses: Normal pulses.      Heart sounds: Normal heart sounds.   Pulmonary:      Effort: Pulmonary effort is normal. No respiratory distress.      Breath sounds: Examination of the right-lower field reveals decreased breath sounds. Examination of the left-lower field reveals decreased breath sounds. Decreased breath sounds present. No wheezing or rales.   Abdominal:      General: Bowel sounds are decreased. There is no distension.      Palpations: Abdomen is soft.      Tenderness: There is no abdominal tenderness. There is no guarding or rebound.          Comments: RLQ incision with small amount of serous-sang drainage from lower medial area of incision; steri strips in place   Musculoskeletal:         General: Normal range of motion.      Right upper arm: Edema present.      Left upper arm: Edema present.      Cervical back: Neck supple.      Right lower leg: Edema present.      Left lower leg: Edema present.   Skin:     General: Skin is warm and dry.      Capillary Refill: Capillary refill takes 2 to 3 seconds.   Neurological:      Mental Status: She is alert and oriented to person, place, and time.   Psychiatric:         Attention and Perception: Attention normal.         Mood and Affect: Mood normal.         Speech: Speech normal.         Behavior: Behavior normal. Behavior is cooperative.         Thought Content: Thought content normal.         Judgment: Judgment normal.          Laboratory  CBC:   Recent Labs   Lab 12/18/23  0828 12/20/23  0927 12/21/23  2130   WBC 8.65 9.19 9.11   RBC 2.75* 2.90* 3.08*   HGB 8.7* 9.0* 9.6*   HCT 28.9* 30.0* 31.0*    143* 153   * 103* 101*   MCH 31.6* 31.0 31.2*   MCHC 30.1* 30.0* 31.0*     CMP:   Recent Labs   Lab 12/18/23  0828 12/20/23  0927 12/21/23  2130   * 159* 251*   CALCIUM 7.2* 7.8* 8.1*   ALBUMIN 3.3* 3.4* 3.5   PROT  --   --  8.9*    140 138   K 3.2* 3.0* 3.4*   CO2 37* 39* 32*   CL 89* 89* 91*    BUN 71* 62* 65*   CREATININE 3.1* 2.8* 2.6*   ALKPHOS  --   --  66   ALT  --   --  13   AST  --   --  15     Coagulation:   Recent Labs   Lab 12/22/23  0250   APTT 24.0     Labs within the past 24 hours have been reviewed.    Diagnostic Results:  Chest X-Ray: EXAMINATION:  AP PORTABLE CHEST     CLINICAL HISTORY:  Shortness of breath     TECHNIQUE:  AP portable chest radiograph was submitted.     COMPARISON:  11/29/2023     FINDINGS:  AP portable chest radiograph demonstrates a cardiac silhouette within normal limits.  There is no focal consolidation, pneumothorax, or pleural effusion.  A vascular axillary stent is seen projecting over the left clavicle again.     Impression:     No acute intrathoracic abnormality detected.  No significant change.        Electronically signed by: Pily Esparza  Date:                                            12/21/2023  Time:                                           20:25      US - Kidney: Results for orders placed during the hospital encounter of 11/29/23    US Transplant Kidney With Doppler    Narrative  EXAMINATION:  US TRANSPLANT KIDNEY WITH DOPPLER    CLINICAL HISTORY:  s/p POD 1 kidney tx;    TECHNIQUE:  Real time gray scale and doppler ultrasound was performed over the patient's renal allograft.    COMPARISON:  None    FINDINGS:  Renal allograft in the right lower quadrant.  The allograft measures 10.9 cm. Satisfactory perfusion. No hydronephrosis.    Stent present.    Deep to the incision is an 11.2 x 3.8 x 2.4 cm fluid collection.    Bladder is not distended.      Vasculature:    Resistive indices are elevated and range from 0.82 to 1.0.    There are 2 main renal arteries on a patch.  Main renal artery peak systolic velocity: 141 and 101cm/sec with normal waveform.    Renal artery/iliac ratio: 0.51 and 0.36.    The main renal vein is patent.    Impression  Elevated resistive indices within the transplant, can be seen in the setting of edema or ATN.    Fluid collection  deep to the incision.    This report was flagged in Epic as abnormal.      Electronically signed by: Vivian Lozada MD  Date:    11/30/2023  Time:    16:02    Patient was SARS-CoV-2 /COVID-19 tested with negative results.

## 2023-12-22 NOTE — SUBJECTIVE & OBJECTIVE
"Interval HPI:   Overnight events: Remains in TSU. BG within goal ranges at time of consult. Receiving Prednisone 10 mg daily. Creatinine 2.3. Diet renal    Eatin%  Nausea: No  Hypoglycemia and intervention: No  Fever: No  TPN and/or TF: No  If yes, type of TF/TPN and rate: n/a    /77 (BP Location: Right leg, Patient Position: Lying)   Pulse 89   Temp 98.2 °F (36.8 °C) (Oral)   Resp 16   Ht 5' 5" (1.651 m)   Wt 104.4 kg (230 lb 2.6 oz)   SpO2 100%   Breastfeeding No   BMI 38.30 kg/m²     Labs Reviewed and Include    Recent Labs   Lab 23  0614   * 169*   CALCIUM 8.1* 7.5*   ALBUMIN 3.5  --    PROT 8.9*  --     141   K 3.4* 2.7*   CO2 32* 33*   CL 91* 93*   BUN 65* 54*   CREATININE 2.6* 2.3*   ALKPHOS 66  --    ALT 13  --    AST 15  --    BILITOT 0.8  --      Lab Results   Component Value Date    WBC 6.78 2023    HGB 8.8 (L) 2023    HCT 29.1 (L) 2023     (H) 2023     (L) 2023     Recent Labs   Lab 23   TSH 0.459     Lab Results   Component Value Date    HGBA1C 5.7 10/04/2023       Nutritional status:   Body mass index is 38.3 kg/m².  Lab Results   Component Value Date    ALBUMIN 3.5 2023    ALBUMIN 3.4 (L) 2023    ALBUMIN 3.3 (L) 2023     No results found for: "PREALBUMIN"    Estimated Creatinine Clearance: 36.7 mL/min (A) (based on SCr of 2.3 mg/dL (H)).    Accu-Checks  Recent Labs     23  0204 23  0834   POCTGLUCOSE 309* 152* 156*       Current Medications and/or Treatments Impacting Glycemic Control  Immunotherapy:    Immunosuppressants           Stop Route Frequency     mycophenolate capsule 1,000 mg         -- Oral 2 times daily     tacrolimus capsule 6 mg         -- Oral 2 times daily          Steroids:   Hormones (From admission, onward)      Start     Stop Route Frequency Ordered    23 0900  predniSONE tablet 10 mg         -- Oral Daily 23 0097 "    12/22/23 0536  melatonin tablet 6 mg         -- Oral Nightly PRN 12/22/23 6847          Pressors:    Autonomic Drugs (From admission, onward)      None          Hyperglycemia/Diabetes Medications:   Antihyperglycemics (From admission, onward)      Start     Stop Route Frequency Ordered    12/22/23 1245  insulin aspart U-100 pen 5 Units         -- SubQ 3 times daily with meals 12/22/23 1230    12/22/23 1053  insulin aspart U-100 pen 0-5 Units         -- SubQ Before meals & nightly PRN 12/22/23 0963

## 2023-12-22 NOTE — PLAN OF CARE
- Patient arrived to U room 30849 this morning ~0500, brought by EMS from [facility].  - Patient presents with heart palpitations (resolved) - cardiac workup at [facility] revealed elevated troponins and elevated D-dimer.  - Patient arrived with heparin drip infusing at 18 Units/kg/hr. Heparin drip continued under KTS orders. aPTT (6 hour level) to be checked ~0945.  - Patient is on bedside cardiac monitoring pending availability of portable boxes.  - Ultrasound of BLE at previous hospital was negative for DVT.  - Stat priority V-Q scan ordered. Patient is unable to have contrast for CTA due to recent kidney transplant.  - RLQ kidney transplant incision has steri-strips and fresh ABD pad in place. Incision has some sanguineous drainage - assessed by NP. Soiled steri-strips changed by NP.  - Patient denies pain. She is independent and ambulatory. Patient is blind in her left eye.  - Mother at bedside.  - Patient is currently NPO.

## 2023-12-22 NOTE — PROGRESS NOTES
[facility]  Kidney Transplant  Progress Note      Reason for Follow-up: Reassessment of Kidney Transplant - 11/29/2023  (#1) recipient and management of immunosuppression.    ORGAN: LEFT KIDNEY    Donor Type: Donation after Brain Death    Donor CMV Status: Positive  Donor HBcAB:Negative  Donor HCV Status:Negative  Donor HBV CAROLYN:   Donor HCV CAROLYN: Negative      Subjective:   History of Present Illness:  Mary Waller is a 48 y.o. female with a Pmhx of DM, HTN, ESRD s/p DBD KTX 11/29 (CIT 15hrs, 2 arteries, 1 vein, 2 day correa, donor CMV +). Post op course with DGF which has now resolved. She presented to an OSH for evaluation of palpitations. Patient reports she felt her heart racing yesterday while she was walking at home. She notes she was taken off of her labetalol on November 29, 2023, and has not taken it since. She was tachycardic when arriving to OSH but symptoms resolved. OSH labs notable for Troponin of trending from 0.074 to 0.415. Elevated D-dimer 3.3. Given recent surgery concern for possible pulmonary emboli or DVT as cause of the patient's palpitations. Due to recent surgery and recovering renal function, VQ scan ordered in place of CTA OSH (not done). Edwin LE US obtained at OSH which was negative for DVT. CXR at OSH with no acute intrathoracic abnormalities. Patient received 325mg ASA and was started on a heparin gtt at OSH.  Decision made for patient to be transferred to [facility] for further care. Plan to continue heparin gtt, obtain VQ scan, trend troponin, and monitor closely. Plan discussed with Dr. Ortiz.     Hospital Course:  NAEON. Patient reports feeling sleepy but well this morning. VQ scan completed 12/22 with result of low probability of PE. Troponin is starting to down trend. DVT study negative. Echocardiogram without right heart strain or other acute abnormality, EF 65-70%, normal sys/sabiha function. EKG without arrhythmia, positive for prolonged QT. As PE and DVT have  been r/o, will dc heparin gtt. Several electrolyte abnormalities noted, azeem K 2.7. Will replace PO and IV prn. VSS. Potential dc if labs are stable tomorrow.           Past Medical, Surgical, Family, and Social History:   Unchanged from H&P.    Scheduled Meds:   atorvastatin  80 mg Oral QHS    atovaquone  1,500 mg Oral Daily    atropine 1%  1 drop Left Eye QHS    calcitRIOL  0.25 mcg Oral Daily    famotidine  20 mg Oral QHS    heparin (porcine)  5,000 Units Subcutaneous Q8H    insulin aspart U-100  5 Units Subcutaneous TIDWM    magnesium oxide  800 mg Oral BID    mycophenolate  1,000 mg Oral BID    potassium chloride SA  40 mEq Oral Daily    potassium chloride  40 mEq Oral Q4H    predniSONE  10 mg Oral Daily    tacrolimus  6 mg Oral BID    valGANciclovir  450 mg Oral Every Mon, Wed, Fri     Continuous Infusions:  PRN Meds:acetaminophen, dextrose 10%, dextrose 10%, glucagon (human recombinant), glucose, glucose, insulin aspart U-100, melatonin, ondansetron, sodium chloride 0.9%    Intake/Output - Last 3 Shifts         12/20 0700  12/21 0659 12/21 0700 12/22 0659 12/22 0700  12/23 0659    I.V. (mL/kg)  81.4 (0.8) 16.6 (0.2)    IV Piggyback  1060     Total Intake(mL/kg)  1141.4 (10.9) 16.6 (0.2)    Urine (mL/kg/hr)  50     Stool  0     Total Output  50     Net  +1091.4 +16.6           Urine Occurrence  1 x     Stool Occurrence  0 x              Review of Systems   Constitutional:  Positive for fatigue. Negative for appetite change, chills and fever.   HENT:  Negative for trouble swallowing.    Eyes:  Positive for visual disturbance (chronic).   Respiratory:  Negative for cough and shortness of breath.    Cardiovascular:  Negative for chest pain, palpitations and leg swelling.   Gastrointestinal:  Negative for abdominal distention, abdominal pain, diarrhea, nausea and vomiting.   Genitourinary:  Negative for decreased urine volume, difficulty urinating and frequency.   Musculoskeletal:  Negative for back pain and neck  "pain.   Skin:  Positive for wound.   Allergic/Immunologic: Positive for immunocompromised state.   Neurological:  Negative for dizziness, tremors, weakness and headaches.   Psychiatric/Behavioral:  Negative for confusion.       Objective:     Vital Signs (Most Recent):  Temp: 98.2 °F (36.8 °C) (12/22/23 0756)  Pulse: 89 (12/22/23 1347)  Resp: 16 (12/22/23 0756)  BP: 132/77 (12/22/23 1347)  SpO2: 100 % (12/22/23 0756) Vital Signs (24h Range):  Temp:  [98 °F (36.7 °C)-98.7 °F (37.1 °C)] 98.2 °F (36.8 °C)  Pulse:  [] 89  Resp:  [16-20] 16  SpO2:  [99 %-100 %] 100 %  BP: (132-205)/(65-81) 132/77     Weight: 104.3 kg (230 lb)  Height: 5' 5" (165.1 cm)  Body mass index is 38.27 kg/m².     Physical Exam  Vitals and nursing note reviewed.   Constitutional:       General: She is awake. She is not in acute distress.     Appearance: She is obese. She is not ill-appearing.   HENT:      Head: Normocephalic.      Mouth/Throat:      Mouth: Mucous membranes are moist.   Eyes:      General: No scleral icterus.  Cardiovascular:      Rate and Rhythm: Normal rate and regular rhythm.      Pulses: Normal pulses.      Heart sounds: Normal heart sounds.   Pulmonary:      Effort: Pulmonary effort is normal. No respiratory distress.      Breath sounds: No wheezing or rales.   Abdominal:      General: Bowel sounds are normal. There is no distension.      Palpations: Abdomen is soft.      Tenderness: There is no abdominal tenderness. There is no guarding or rebound.          Comments: RLQ incision with small amount of serous-sang drainage from lower medial area of incision; steri strips in place   Musculoskeletal:         General: Normal range of motion.      Right upper arm: Edema present.      Left upper arm: Edema present.      Cervical back: Normal range of motion.      Right lower leg: No edema.      Left lower leg: No edema.   Skin:     General: Skin is warm and dry.      Capillary Refill: Capillary refill takes 2 to 3 seconds. "   Neurological:      Mental Status: She is alert and oriented to person, place, and time.   Psychiatric:         Attention and Perception: Attention normal.         Mood and Affect: Mood normal.         Speech: Speech normal.         Behavior: Behavior normal. Behavior is cooperative.         Thought Content: Thought content normal.         Judgment: Judgment normal.          Laboratory:  CBC:   Recent Labs   Lab 12/20/23 0927 12/21/23 2130 12/22/23  0614   WBC 9.19 9.11 6.78   RBC 2.90* 3.08* 2.78*   HGB 9.0* 9.6* 8.8*   HCT 30.0* 31.0* 29.1*   * 153 130*   * 101* 105*   MCH 31.0 31.2* 31.7*   MCHC 30.0* 31.0* 30.2*     CMP:   Recent Labs   Lab 12/18/23 0828 12/20/23 0927 12/21/23 2130 12/22/23  0614   * 159* 251* 169*   CALCIUM 7.2* 7.8* 8.1* 7.5*   ALBUMIN 3.3* 3.4* 3.5  --    PROT  --   --  8.9*  --     140 138 141   K 3.2* 3.0* 3.4* 2.7*   CO2 37* 39* 32* 33*   CL 89* 89* 91* 93*   BUN 71* 62* 65* 54*   CREATININE 3.1* 2.8* 2.6* 2.3*   ALKPHOS  --   --  66  --    ALT  --   --  13  --    AST  --   --  15  --      Labs within the past 24 hours have been reviewed.    Diagnostic Results:  Echocardiogram    Left Ventricle: The left ventricle is normal in size. Normal wall thickness. There is concentric remodeling. Normal wall motion. There is normal systolic function with a visually estimated ejection fraction of 65 - 70%. Ejection fraction by visual approximation is 70%.    Right Ventricle: Normal right ventricular cavity size. Wall thickness is normal. Right ventricle wall motion  is normal. Systolic function is normal.    Aortic Valve: There is moderate aortic valve sclerosis. Moderately restricted motion. There is mild stenosis. Aortic valve area by VTI is 1.89 cm². Aortic valve peak velocity is 2.44 m/s. Mean gradient is 14 mmHg. The dimensionless index is 0.72. There is mild-moderate to moderate ( at least 2+) aortic regurgitation.    Mitral Valve: There is severe mitral annular  calcification present. There is normal leaflet mobility. There is a large calcified lesion present on the posterior leaflet consistent with old healed vegetation vs caseous necrosis. There is at most trivial  stenosis. The mean pressure gradient across the mitral valve is 4 mmHg at a heart rate of  bpm. There is no significant regurgitation.    IVC/SVC: Normal venous pressure at 3 mmHg.    Pericardium: There is a trivial posterior effusion.     NM Lung Scan Ventilation Perfusion [[id number]] Resulted: 12/22/23 1003   Order Status: Completed Updated: 12/22/23 1006   Narrative:     EXAMINATION:  NM LUNG VENTILATION AND PERFUSION IMAGING    CLINICAL HISTORY:  Pulmonary embolism (PE) suspected, positive D-dimer;    TECHNIQUE:  43.3 mCi of Tc-99m-DTPA were placed in the nebulizer. Following the inhalation Tc-99m-DTPA in aerosol and the subsequent IV administration of 5.18 mCi of Tc-99m-MAA, multiple images of the thorax were obtained in various projections.    COMPARISON:  Chest radiograph 12/21/2023.    FINDINGS:  Perfusion: Adequate distribution of radiotracer throughout the bilateral lungs.  No moderate or large wedge-shaped defect extending towards the periphery.    Ventilation: Adequate distribution of radiotracer throughout the bilateral lungs.  No significant defect.   Impression:       This represents a low probability of pulmonary embolism.    Electronically signed by resident: Sadiq London  Date: 12/22/2023  Time: 09:54    Electronically signed by: Pavel Saldivar  Date: 12/22/2023  Time: 10:03   US Lower Extremity Veins Bilateral [[id number]] Resulted: 12/22/23 0446   Order Status: Completed Updated: 12/22/23 0448   Narrative:     EXAMINATION:  US LOWER EXTREMITY VEINS BILATERAL    CLINICAL HISTORY:  Other specified abnormal findings of blood chemistry    TECHNIQUE:  Duplex and color flow Doppler and dynamic compression was performed of the bilateral lower extremity veins was  "performed.    COMPARISON:  None    FINDINGS:  Right thigh veins: The iliac, common femoral, femoral, popliteal, upper greater saphenous, and deep femoral veins are patent and free of thrombus. The veins are normally compressible and have normal phasic flow and augmentation response.    Right calf veins: The visualized calf veins are patent.    Left thigh veins: The iliac, common femoral, femoral, popliteal, upper greater saphenous, and deep femoral veins are patent and free of thrombus. The veins are normally compressible and have normal phasic flow and augmentation response.    Left calf veins: The visualized calf veins are patent.    Miscellaneous: None   Impression:       No evidence of deep venous thrombosis in either lower extremity.      Electronically signed by: Hudson Sullivan MD  Date: 12/22/2023  Time: 04:46     Assessment/Plan:     * Hypokalemia  - K has been decreased on several outpatient labs   - 2.7 today   - replacing PO and IV   - will check this afternoon for progress   - possible cause of palpitations?  - monitor with renal panel, should get outpatient labs at dc azeem as renal function cont to improve       Elevated troponin  - see "PE"    Pulmonary embolism  - suspected after presenting with c/o palpitations at OSH; was tachycardic on admit   - taken off labetolol at time of txp and not restarted 2/2 hypotension during HD   - troponin was increasing, d dimer elevated; no CTA 2/2 recently recovering renal function  - got 325 ASA and heparin gtt started   - DVT US LE negative, VQ scan with low probability for PE   - ECHO and EKG without acute abnormality, no right heart strain or continued tachycardia  - ruled out with DVT LE US and VQ scan   - heparin gtt dc'd  - no SOB, CP, hypoxia, tachycardia, fever, right heart strain, etc   - palpitations possibly from lytes disturbances? Sidecar with cardiology who recs outpatient f/u with possible loop recorder x 1 month    Long-term use of immunosuppressant " medication  - see prophylactic immunotherapy       Adverse effect of corticosteroids  - endocrine consulted, appreciate assistance      Prophylactic immunotherapy    Continue prograf, cellcept, steroids. Continue to monitor prograf level daily, monitor for toxic side effects, and adjust for therapeutic dose.        S/P kidney transplant  - s/p DBD KTX 11/29 (CIT 15hrs, 2 arteries, 1 vein, 2 day correa, donor CMV +).   - Post op course with DGF which has now resolved.  - Cr trending down, 2.6 at OSH; 2.4 today   - monitor intake and output  - cont to monitor with daily BMP    Anemia of chronic disease  - h/h stable but low   - monitor with CBC      Class 2 severe obesity due to excess calories with serious comorbidity and body mass index (BMI) of 38.0 to 38.9 in adult        Glaucoma  - cont home eye drops       Type 2 diabetes mellitus with hyperglycemia, with long-term current use of insulin  - endocrine consulted, appreciate assistance     Essential hypertension  - cont home meds          Discharge Planning:  Not yet stable for dc. If lytes ok and have no further symptoms, poss dc tomorrow.     Medical decision making for this encounter includes review of pertinent labs and diagnostic studies, assessment and planning, discussions with consulting providers, discussion with patient/family, and participation in multidisciplinary rounds. Time spent caring for patient: 45 minutes    Maria Del Carmen Weiner PA-C  Kidney Transplant  [facility]

## 2023-12-22 NOTE — HOSPITAL COURSE
ABHILASH. Patient reports feeling sleepy but well this morning. VQ scan completed 12/22 with result of low probability of PE. Troponin is starting to down trend. DVT study negative. Echocardiogram without right heart strain or other acute abnormality, EF 65-70%, normal sys/sabiha function. EKG without arrhythmia, positive for prolonged QT. As PE and DVT have been r/o, will dc heparin gtt. Several electrolyte abnormalities noted, azeem K 2.7. Will replace PO and IV prn. VSS. Potential dc if labs are stable tomorrow.

## 2023-12-22 NOTE — HPI
Mary Waller is a 48 y.o. female with a Pmhx of DM, HTN, ESRD s/p DBD KTX 11/29 (CIT 15hrs, 2 arteries, 1 vein, 2 day correa, donor CMV +). Post op course with DGF which has now resolved. She presented to an OSH for evaluation of palpitations. Patient reports she felt her heart racing yesterday while she was walking at home. She notes she was taken off of her labetalol on November 29, 2023, and has not taken it since. She was tachycardic when arriving to OSH but symptoms resolved. OSH labs notable for Troponin of trending from 0.074 to 0.415. Elevated D-dimer 3.3. Given recent surgery concern for possible pulmonary emboli or DVT as cause of the patient's palpitations. Due to recent surgery and recovering renal function, VQ scan ordered in place of CTA OSH (not done). Edwin LE US obtained at OSH which was negative for DVT. CXR at OSH with no acute intrathoracic abnormalities. Patient received 325mg ASA and was started on a heparin gtt at OSH.  Decision made for [place] [facility]. Plan to continue heparin gtt, obtain VQ scan, trend troponin, and monitor closely. Plan discussed with Dr. Ortiz.

## 2023-12-22 NOTE — H&P
[facility]  Kidney Transplant  H&P      Subjective:     Chief Complaint/Reason for Admission: suspected pulmonary embolism    History of Present Illness:  Mary Waller is a 48 y.o. female with a Pmhx of DM, HTN, ESRD s/p DBD KTX 11/29 (CIT 15hrs, 2 arteries, 1 vein, 2 day correa, donor CMV +). Post op course with DGF which has now resolved. She presented to an OSH for evaluation of palpitations. Patient reports she felt her heart racing yesterday while she was walking at home. She notes she was taken off of her labetalol on November 29, 2023, and has not taken it since. She was tachycardic when arriving to OSH but symptoms resolved. OSH labs notable for Troponin of trending from 0.074 to 0.415. Elevated D-dimer 3.3. Given recent surgery concern for possible pulmonary emboli or DVT as cause of the patient's palpitations. Due to recent surgery and recovering renal function, VQ scan ordered in place of CTA OSH (not done). Edwin LE US obtained at OSH which was negative for DVT. CXR at OSH with no acute intrathoracic abnormalities. Patient received 325mg ASA and was started on a heparin gtt at OSH.  Decision made for patient to be transferred to [facility] for further care. Plan to continue heparin gtt, obtain VQ scan, trend troponin, and monitor closely. Plan discussed with Dr. Ortiz.        PTA Medications   Medication Sig    atovaquone (MEPRON) 750 mg/5 mL Susp oral liquid Take 10 mLs (1,500 mg total) by mouth once daily. STOP 5/29/24    atropine 1% (ISOPTO ATROPINE) 1 % Drop Place 1 drop into the left eye every evening.    blood sugar diagnostic Strp 1 each by Misc.(Non-Drug; Combo Route) route 3 (three) times daily.    blood-glucose meter kit USE TO TEST BLOOD GLUCOSE    calcitRIOL (ROCALTROL) 0.25 MCG Cap Take 1 capsule (0.25 mcg total) by mouth once daily.    COMBIGAN 0.2-0.5 % Drop Place 1 drop into the right eye every evening.    ergocalciferol (VITAMIN D2) 50,000 unit Cap Take 50,000 Units by  "mouth every 7 days. Take 1 capsule by mouth weekly x 12 weeks, then decrease to once per month.    famotidine (PEPCID) 20 MG tablet Take 1 tablet (20 mg total) by mouth every evening.    furosemide (LASIX) 80 MG tablet Take 80 mg by mouth 2 (two) times daily.    insulin lispro 100 unit/mL pen Inject 5 Units into the skin 3 (three) times daily with meals. Plus sliding scale: TDD=30 units    lancets Misc 1 each by Misc.(Non-Drug; Combo Route) route 3 (three) times daily.    Lmefol Ca-acetyl-meB12-algal (CEREFOLIN NAC, ALGAL OIL,) 6 mg-600 mg- 2 mg-90.314 mg Tab Take 1 tablet by mouth once daily.    magnesium oxide (MAG-OX) 400 mg (241.3 mg magnesium) tablet Take 2 tablets (800 mg total) by mouth 2 (two) times daily.    mycophenolate (CELLCEPT) 250 mg Cap Take 4 capsules (1,000 mg total) by mouth 2 (two) times daily.    pen needle, diabetic (EASY COMFORT PEN NEEDLES) 32 gauge x 5/32" Ndle Inject 1 each into the skin 3 (three) times daily.    potassium chloride SA (K-DUR,KLOR-CON) 20 MEQ tablet Take 2 tablets (40 mEq total) by mouth once daily.    predniSONE (DELTASONE) 5 MG tablet Take by mouth daily:  20mg 12/2-12/8, 15mg 12/9-12/15, 10mg 12/16-12/22, then 5mg daily beginning 12/23/2023    rosuvastatin (CRESTOR) 40 MG Tab Take 40 mg by mouth every evening.    sodium bicarbonate 650 MG tablet Take 2 tablets (1,300 mg total) by mouth 2 (two) times daily.    tacrolimus (PROGRAF) 1 MG Cap Take 6 capsules (6 mg total) by mouth every 12 (twelve) hours.    valGANciclovir (VALCYTE) 450 mg Tab Take 1 tablet (450 mg total) by mouth every Mon, Wed, Fri. STOP 5/29/24       Review of patient's allergies indicates:   Allergen Reactions    Codeine     Sulfa (sulfonamide antibiotics)        Past Medical History:   Diagnosis Date    Blind     Diabetes     ESRD (end stage renal disease) on dialysis     Hypertension     Right cataract      Past Surgical History:   Procedure Laterality Date    AV FISTULA PLACEMENT      EYE SURGERY      " KIDNEY TRANSPLANT N/A 11/29/2023    Procedure: TRANSPLANT, KIDNEY;  Surgeon: Torri Moore MD;  Location: [facility] OR [address] [place] [address];  Service: Transplant;  Laterality: N/A;  Kidney in Room @ 0714  Out of Ice @ 0943  Reperfusion @ 1011     Family History       Problem Relation (Age of Onset)    Diabetes Mother, Father, Sister    Hyperlipidemia Mother, Father          Tobacco Use    Smoking status: Never    Smokeless tobacco: Never   Substance and Sexual Activity    Alcohol use: No    Drug use: No    Sexual activity: Not Currently        Review of Systems   Constitutional:  Negative for appetite change, chills, fatigue and fever.   HENT:  Negative for facial swelling and trouble swallowing.    Eyes:  Positive for visual disturbance.   Respiratory:  Negative for cough, chest tightness, shortness of breath, wheezing and stridor.    Cardiovascular:  Negative for chest pain, palpitations and leg swelling.   Gastrointestinal:  Negative for abdominal distention, abdominal pain, diarrhea, nausea and vomiting.   Genitourinary:  Negative for decreased urine volume and difficulty urinating.   Musculoskeletal:  Negative for back pain and neck pain.   Skin:  Positive for wound. Negative for color change, pallor and rash.   Allergic/Immunologic: Positive for immunocompromised state.   Neurological:  Negative for dizziness, tremors, weakness and headaches.   Psychiatric/Behavioral:  Negative for agitation, behavioral problems, confusion and decreased concentration.      Objective:     Vital Signs (Most Recent):  Temp: 98.7 °F (37.1 °C) (12/22/23 0403)  Pulse: 90 (12/22/23 0403)  Resp: 18 (12/22/23 0402)  BP: (!) 156/67 (12/22/23 0403)  SpO2: 100 % (12/22/23 0232)     Weight: 102.1 kg (225 lb)  Body mass index is 37.44 kg/m².      Physical Exam  Vitals and nursing note reviewed.   Constitutional:       General: She is awake. She is not in acute distress.     Appearance: She is obese. She is not toxic-appearing.   Eyes:      General: No  scleral icterus.  Cardiovascular:      Rate and Rhythm: Normal rate and regular rhythm.      Pulses: Normal pulses.      Heart sounds: Normal heart sounds.   Pulmonary:      Effort: Pulmonary effort is normal. No respiratory distress.      Breath sounds: Examination of the right-lower field reveals decreased breath sounds. Examination of the left-lower field reveals decreased breath sounds. Decreased breath sounds present. No wheezing or rales.   Abdominal:      General: Bowel sounds are decreased. There is no distension.      Palpations: Abdomen is soft.      Tenderness: There is no abdominal tenderness. There is no guarding or rebound.          Comments: RLQ incision with small amount of serous-sang drainage from lower medial area of incision; steri strips in place   Musculoskeletal:         General: Normal range of motion.      Right upper arm: Edema present.      Left upper arm: Edema present.      Cervical back: Neck supple.      Right lower leg: Edema present.      Left lower leg: Edema present.   Skin:     General: Skin is warm and dry.      Capillary Refill: Capillary refill takes 2 to 3 seconds.   Neurological:      Mental Status: She is alert and oriented to person, place, and time.   Psychiatric:         Attention and Perception: Attention normal.         Mood and Affect: Mood normal.         Speech: Speech normal.         Behavior: Behavior normal. Behavior is cooperative.         Thought Content: Thought content normal.         Judgment: Judgment normal.          Laboratory  CBC:   Recent Labs   Lab 12/18/23  0828 12/20/23  0927 12/21/23  2130   WBC 8.65 9.19 9.11   RBC 2.75* 2.90* 3.08*   HGB 8.7* 9.0* 9.6*   HCT 28.9* 30.0* 31.0*    143* 153   * 103* 101*   MCH 31.6* 31.0 31.2*   MCHC 30.1* 30.0* 31.0*     CMP:   Recent Labs   Lab 12/18/23  0828 12/20/23  0927 12/21/23  2130   * 159* 251*   CALCIUM 7.2* 7.8* 8.1*   ALBUMIN 3.3* 3.4* 3.5   PROT  --   --  8.9*    140 138   K  3.2* 3.0* 3.4*   CO2 37* 39* 32*   CL 89* 89* 91*   BUN 71* 62* 65*   CREATININE 3.1* 2.8* 2.6*   ALKPHOS  --   --  66   ALT  --   --  13   AST  --   --  15     Coagulation:   Recent Labs   Lab 12/22/23  0250   APTT 24.0     Labs within the past 24 hours have been reviewed.    Diagnostic Results:  Chest X-Ray: EXAMINATION:  AP PORTABLE CHEST     CLINICAL HISTORY:  Shortness of breath     TECHNIQUE:  AP portable chest radiograph was submitted.     COMPARISON:  11/29/2023     FINDINGS:  AP portable chest radiograph demonstrates a cardiac silhouette within normal limits.  There is no focal consolidation, pneumothorax, or pleural effusion.  A vascular axillary stent is seen projecting over the left clavicle again.     Impression:     No acute intrathoracic abnormality detected.  No significant change.        Electronically signed by: Pily Esparza  Date:                                            12/21/2023  Time:                                           20:25      US - Kidney: Results for orders placed during the hospital encounter of 11/29/23    US Transplant Kidney With Doppler    Narrative  EXAMINATION:  US TRANSPLANT KIDNEY WITH DOPPLER    CLINICAL HISTORY:  s/p POD 1 kidney tx;    TECHNIQUE:  Real time gray scale and doppler ultrasound was performed over the patient's renal allograft.    COMPARISON:  None    FINDINGS:  Renal allograft in the right lower quadrant.  The allograft measures 10.9 cm. Satisfactory perfusion. No hydronephrosis.    Stent present.    Deep to the incision is an 11.2 x 3.8 x 2.4 cm fluid collection.    Bladder is not distended.      Vasculature:    Resistive indices are elevated and range from 0.82 to 1.0.    There are 2 main renal arteries on a patch.  Main renal artery peak systolic velocity: 141 and 101cm/sec with normal waveform.    Renal artery/iliac ratio: 0.51 and 0.36.    The main renal vein is patent.    Impression  Elevated resistive indices within the transplant, can be seen in  the setting of edema or ATN.    Fluid collection deep to the incision.    This report was flagged in Epic as abnormal.      Electronically signed by: Vivian Lozada MD  Date:    11/30/2023  Time:    16:02    Patient was SARS-CoV-2 /COVID-19 tested with negative results.   Assessment/Plan:     Ophtho  Glaucoma  - cont home eye drops      Cardiac/Vascular  Elevated troponin  - see pulmonary embolism      Essential hypertension  - cont home meds        Renal/  S/P kidney transplant  - s/p DBD KTX 11/29 (CIT 15hrs, 2 arteries, 1 vein, 2 day correa, donor CMV +).   - Post op course with DGF which has now resolved.  - Cr trending down, 2.6 at OSH  - monitor intake and output  - cont to monitor with daily BMP      Immunology/Multi System  Prophylactic immunotherapy  Continue prograf, cellcept, steroids. Continue to monitor prograf level daily, monitor for toxic side effects, and adjust for therapeutic dose.         Hematology  * Pulmonary embolism  - suspected   -She presented to an OSH for evaluation of palpitations. Patient reports she felt her heart racing yesterday while she was walking at home. She notes she was taken off of her labetalol on November 29, 2023, and has not taken it since.   - She was tachycardic when arriving to OSH but symptoms resolved (HR 80-90s).  - OSH labs notable for Troponin of trending from 0.074 to 0.415. Elevated D-dimer 3.3. Trend troponin q6 hrs  - Given recent surgery concern for possible pulmonary emboli or DVT as cause of the patient's palpitations.   - Due to recent surgery and recovering renal function, VQ scan ordered in place of CTA OSH (not done).   - Edwin LE US obtained at OSH which was negative for DVT.   - CXR at OSH with no acute intrathoracic abnormalities.  - Patient received 325mg ASA and was started on a heparin gtt at OSH.   - Decision made [place] [facility] [place]  - tele ordered  - Plan to continue heparin gtt, obtain VQ scan, trend  troponin, and monitor closely.     Oncology  Anemia of chronic disease  - H&H stable  -cont to monitor with daily cbc        Palliative Care  Long-term use of immunosuppressant medication  - see prophylactic immunotherapy             Discharge Planning:  Not a candidate at this time    Medical decision making for this encounter includes review of pertinent labs and diagnostic studies, assessment and planning, discussions with consulting providers, discussion with patient/family, and participation in multidisciplinary rounds. Time spent caring for patient: 45 minutes    Tracy Raymundo NP  Kidney Transplant  [facility]

## 2023-12-22 NOTE — PLAN OF CARE
Patient is Aox4. OOB independently. Mother at bedside. Renal diet added. VSS. Telel NSR. BM x2 today. ACHS continued, Aspart 5 units at meal time and slideing scale 0/5 units corrected ordered. ECO cmpleted at bedside. VQ scan completed. K+ am draw 2.7, Afternoon draw K+ 4.6. Bed in lowest setting, locked, 2x rails up, call light within reach.

## 2023-12-23 ENCOUNTER — PATIENT MESSAGE (OUTPATIENT)
Dept: ENDOCRINOLOGY | Facility: HOSPITAL | Age: 48
End: 2023-12-23
Payer: MEDICARE

## 2023-12-23 VITALS
TEMPERATURE: 98 F | WEIGHT: 230 LBS | BODY MASS INDEX: 38.32 KG/M2 | OXYGEN SATURATION: 99 % | DIASTOLIC BLOOD PRESSURE: 63 MMHG | HEIGHT: 65 IN | RESPIRATION RATE: 18 BRPM | SYSTOLIC BLOOD PRESSURE: 140 MMHG | HEART RATE: 82 BPM

## 2023-12-23 PROBLEM — R00.2 PALPITATION: Status: ACTIVE | Noted: 2023-12-23

## 2023-12-23 LAB
ALBUMIN SERPL BCP-MCNC: 2.9 G/DL (ref 3.5–5.2)
ANION GAP SERPL CALC-SCNC: 12 MMOL/L (ref 8–16)
BUN SERPL-MCNC: 48 MG/DL (ref 6–20)
CALCIUM SERPL-MCNC: 8.6 MG/DL (ref 8.7–10.5)
CHLORIDE SERPL-SCNC: 98 MMOL/L (ref 95–110)
CO2 SERPL-SCNC: 32 MMOL/L (ref 23–29)
CREAT SERPL-MCNC: 2 MG/DL (ref 0.5–1.4)
EST. GFR  (NO RACE VARIABLE): 30.6 ML/MIN/1.73 M^2
GLUCOSE SERPL-MCNC: 207 MG/DL (ref 70–110)
MAGNESIUM SERPL-MCNC: 2.4 MG/DL (ref 1.6–2.6)
PHOSPHATE SERPL-MCNC: 2.9 MG/DL (ref 2.7–4.5)
POCT GLUCOSE: 206 MG/DL (ref 70–110)
POCT GLUCOSE: 248 MG/DL (ref 70–110)
POTASSIUM SERPL-SCNC: 3.9 MMOL/L (ref 3.5–5.1)
SODIUM SERPL-SCNC: 142 MMOL/L (ref 136–145)
TACROLIMUS BLD-MCNC: 7.4 NG/ML (ref 5–15)
TROPONIN I SERPL DL<=0.01 NG/ML-MCNC: 0.51 NG/ML (ref 0–0.03)

## 2023-12-23 PROCEDURE — 25000003 PHARM REV CODE 250

## 2023-12-23 PROCEDURE — 63600175 PHARM REV CODE 636 W HCPCS

## 2023-12-23 PROCEDURE — 25000003 PHARM REV CODE 250: Performed by: NURSE PRACTITIONER

## 2023-12-23 PROCEDURE — 83735 ASSAY OF MAGNESIUM: CPT

## 2023-12-23 PROCEDURE — 63600175 PHARM REV CODE 636 W HCPCS: Performed by: SURGERY

## 2023-12-23 PROCEDURE — 99232 PR SUBSEQUENT HOSPITAL CARE,LEVL II: ICD-10-PCS | Mod: ,,, | Performed by: NURSE PRACTITIONER

## 2023-12-23 PROCEDURE — 99239 HOSP IP/OBS DSCHRG MGMT >30: CPT | Mod: ,,, | Performed by: NURSE PRACTITIONER

## 2023-12-23 PROCEDURE — 99232 SBSQ HOSP IP/OBS MODERATE 35: CPT | Mod: ,,, | Performed by: NURSE PRACTITIONER

## 2023-12-23 PROCEDURE — 99239 PR HOSPITAL DISCHARGE DAY,>30 MIN: ICD-10-PCS | Mod: ,,, | Performed by: NURSE PRACTITIONER

## 2023-12-23 PROCEDURE — 36415 COLL VENOUS BLD VENIPUNCTURE: CPT

## 2023-12-23 PROCEDURE — 80197 ASSAY OF TACROLIMUS: CPT

## 2023-12-23 PROCEDURE — 80069 RENAL FUNCTION PANEL: CPT

## 2023-12-23 PROCEDURE — 84484 ASSAY OF TROPONIN QUANT: CPT

## 2023-12-23 RX ORDER — VALGANCICLOVIR 450 MG/1
450 TABLET, FILM COATED ORAL DAILY
Qty: 30 TABLET | Refills: 5 | Status: SHIPPED | OUTPATIENT
Start: 2023-12-23 | End: 2024-06-21

## 2023-12-23 RX ORDER — INSULIN GLARGINE 100 [IU]/ML
INJECTION, SOLUTION SUBCUTANEOUS
Qty: 6 ML | Refills: 11 | Status: SHIPPED | OUTPATIENT
Start: 2023-12-23

## 2023-12-23 RX ORDER — PREDNISONE 5 MG/1
5 TABLET ORAL DAILY
Status: DISCONTINUED | OUTPATIENT
Start: 2023-12-23 | End: 2023-12-23 | Stop reason: HOSPADM

## 2023-12-23 RX ADMIN — MYCOPHENOLATE MOFETIL 1000 MG: 250 CAPSULE ORAL at 08:12

## 2023-12-23 RX ADMIN — INSULIN DETEMIR 10 UNITS: 100 INJECTION, SOLUTION SUBCUTANEOUS at 10:12

## 2023-12-23 RX ADMIN — HEPARIN SODIUM 5000 UNITS: 5000 INJECTION INTRAVENOUS; SUBCUTANEOUS at 06:12

## 2023-12-23 RX ADMIN — INSULIN ASPART 2 UNITS: 100 INJECTION, SOLUTION INTRAVENOUS; SUBCUTANEOUS at 08:12

## 2023-12-23 RX ADMIN — CALCITRIOL CAPSULES 0.25 MCG 0.25 MCG: 0.25 CAPSULE ORAL at 08:12

## 2023-12-23 RX ADMIN — INSULIN ASPART 2 UNITS: 100 INJECTION, SOLUTION INTRAVENOUS; SUBCUTANEOUS at 12:12

## 2023-12-23 RX ADMIN — INSULIN ASPART 5 UNITS: 100 INJECTION, SOLUTION INTRAVENOUS; SUBCUTANEOUS at 08:12

## 2023-12-23 RX ADMIN — Medication 800 MG: at 08:12

## 2023-12-23 RX ADMIN — POTASSIUM CHLORIDE 40 MEQ: 1500 TABLET, EXTENDED RELEASE ORAL at 08:12

## 2023-12-23 RX ADMIN — TACROLIMUS 6 MG: 5 CAPSULE ORAL at 08:12

## 2023-12-23 RX ADMIN — PREDNISONE 5 MG: 10 TABLET ORAL at 08:12

## 2023-12-23 RX ADMIN — ATOVAQUONE 1500 MG: 750 SUSPENSION ORAL at 10:12

## 2023-12-23 RX ADMIN — INSULIN ASPART 5 UNITS: 100 INJECTION, SOLUTION INTRAVENOUS; SUBCUTANEOUS at 12:12

## 2023-12-23 RX ADMIN — ONDANSETRON 8 MG: 8 TABLET, ORALLY DISINTEGRATING ORAL at 08:12

## 2023-12-23 RX ADMIN — CALCIUM CARBONATE (ANTACID) CHEW TAB 500 MG 1000 MG: 500 CHEW TAB at 08:12

## 2023-12-23 NOTE — PROGRESS NOTES
"Discharge Medication Note:    Hospital Course: READMIT s/p KT 11/29. Admitted d/t concern for PE. Started on heparin drip with subsequent VQ scan showing low probability for PE and heparin drip d/c'd. No hypoxia, vitals WNL on discharge. Patient without edema per nephrologist - discontinue diuretics and K supplement. Patient advised if notices swelling to contact coordinator.     Met with Mary Waller and caregivers at discharge to review discharge medications. Patient did not bring medication blue card and ready to be discharged. Provided AVS with highlights on changes and went through complete medication list with family and patient.          Medication List        START taking these medications      insulin glargine 100 units/mL SubQ pen  Commonly known as: LANTUS SOLOSTAR U-100 INSULIN  10-14 units daily as direct by endocrinology            CHANGE how you take these medications      valGANciclovir 450 mg Tab  Commonly known as: VALCYTE  Take 1 tablet (450 mg total) by mouth once daily. STOP 5/29/24  What changed: when to take this            CONTINUE taking these medications      atovaquone 750 mg/5 mL Susp oral liquid  Commonly known as: MEPRON  Take 10 mLs (1,500 mg total) by mouth once daily. STOP 5/29/24     atropine 1% 1 % Drop  Commonly known as: ISOPTO ATROPINE     BD ULTRA-FINE MIRIAM PEN NEEDLE 32 gauge x 5/32" Ndle  Generic drug: pen needle, diabetic  Inject 1 each into the skin 3 (three) times daily.     blood sugar diagnostic Strp  1 each by Misc.(Non-Drug; Combo Route) route 3 (three) times daily.     blood-glucose meter kit  USE TO TEST BLOOD GLUCOSE     calcitRIOL 0.25 MCG Cap  Commonly known as: ROCALTROL  Take 1 capsule (0.25 mcg total) by mouth once daily.     COMBIGAN 0.2-0.5 % Drop  Generic drug: brimonidine-timoloL     famotidine 20 MG tablet  Commonly known as: PEPCID  Take 1 tablet (20 mg total) by mouth every evening.     HumaLOG KwikPen Insulin 100 unit/mL pen  Generic drug: " insulin lispro  Inject 5 Units into the skin 3 (three) times daily with meals. Plus sliding scale: TDD=30 units     lancets Misc  1 each by Misc.(Non-Drug; Combo Route) route 3 (three) times daily.     Lmefol Ca-acetyl-meB12-algal 6 mg-600 mg- 2 mg-90.314 mg Tab  Commonly known as: CEREFOLIN NAC (ALGAL OIL)  Take 1 tablet by mouth once daily.     magnesium oxide 400 mg (241.3 mg magnesium) tablet  Commonly known as: MAG-OX  Take 2 tablets (800 mg total) by mouth 2 (two) times daily.     mycophenolate 250 mg Cap  Commonly known as: CELLCEPT  Take 4 capsules (1,000 mg total) by mouth 2 (two) times daily.     predniSONE 5 MG tablet  Commonly known as: DELTASONE  Take by mouth daily:  20mg 12/2-12/8, 15mg 12/9-12/15, 10mg 12/16-12/22, then 5mg daily beginning 12/23/2023     rosuvastatin 40 MG Tab  Commonly known as: CRESTOR     tacrolimus 1 MG Cap  Commonly known as: PROGRAF  Take 6 capsules (6 mg total) by mouth every 12 (twelve) hours.     VITAMIN D2 50,000 unit Cap  Generic drug: ergocalciferol            STOP taking these medications      furosemide 80 MG tablet  Commonly known as: LASIX     potassium chloride SA 20 MEQ tablet  Commonly known as: K-DURKLOR-CON     sodium bicarbonate 650 MG tablet               Where to Get Your Medications        These medications were sent to [facility] [address], [place] [address]      Hours: Mon-Fri 7a-7p, Sat-Sun 10a-4p Phone: [phone]   insulin glargine 100 units/mL SubQ pen  valGANciclovir 450 mg Tab        The following medications have been placed on HOLD and should be restarted in the outpatient setting (when appropriate): Diuretics if patient experiences edema  - advised patient to call if notices swelling    Mary Waller and caregivers verbalized understanding and had the opportunity to ask questions.

## 2023-12-23 NOTE — PROGRESS NOTES
Patient is Aox4. Ambulates independently. Mother at bedside. Incision dressing changed and is CDI. VSS. PIV's removed. Discharge papers printed and discussed, they had no further questions. Pharmacy brought update med list. Family brought her off unit via wheelchair.

## 2023-12-23 NOTE — PROGRESS NOTES
[facility] Trans[facility]  Endocrinology  Progress Note    Admit Date: 12/21/2023     Reason for Consult: Management of T2DM, Hyperglycemia     Surgical Procedure and Date: Kidney Transplant 11/29/23     Diabetes diagnosis year: in her 30s     Home Diabetes Medications:    -Lantus 10 units daily   -Novolog 5 units with meals   -Add correction scale if needed.  Blood sugar 180 to 230 add 1 units  Blood sugar 231 to 280 add 2 units  Blood sugar 281 to 330 add 3 units  Blood sugar 331 to 380 add 4 units  Blood sugar greater than 380 add 5 units    Lab Results   Component Value Date    HGBA1C 5.7 10/04/2023       How often checking glucose at home?  4x daily     BG readings on regimen: variable low 100s up to 400  Hypoglycemia on the regimen?  No  Missed doses on regimen?  No    Diabetes Complications include:     Diabetic nephropathy  , Diabetic chronic kidney disease     , and Diabetic retinopathy      Complicating diabetes co morbidities:   ESRD s/p kidney transplant, Glucocorticoid Use, HTN, CAD, HLD     HPI:   Patient is a 48 y.o. female with a diagnosis of DM, HTN, ESRD s/p DBD KTX 11/29 (CIT 15hrs, 2 arteries, 1 vein, 2 day correa, donor CMV +). Post op course with DGF which has now resolved. She presented to an OSH for evaluation of palpitations. Patient reports she felt her heart racing yesterday while she was walking at home. She notes she was taken off of her labetalol on November 29, 2023, and has not taken it since. She was tachycardic when arriving to OSH but symptoms resolved. OSH labs notable for Troponin of trending from 0.074 to 0.415. Elevated D-dimer 3.3. Given recent surgery concern for possible pulmonary emboli or DVT as cause of the patient's palpitations. Due to recent surgery and recovering renal function, VQ scan ordered in place of CTA OSH (not done). Edwin LE US obtained at OSH which was negative for DVT. CXR at OSH with no acute intrathoracic abnormalities. Patient received 325mg ASA and  "was started on a heparin gtt at OSH.  Decision made for patient to be transferred to [facility] [place]. Plan to continue heparin gtt, obtain VQ scan, trend troponin, and monitor closely. Endocrinology consulted for management of T2DM.         Interval HPI:   Overnight events: Remains in TSU. BG above goal ranges on current SQ insulin regimen. Prednisone tapered to 5 mg daily. Creatinine 2.0. Diet renal    Eatin%  Nausea: No  Hypoglycemia and intervention: No  Fever: No  TPN and/or TF: No  If yes, type of TF/TPN and rate: n/a    /64   Pulse 95   Temp 97.6 °F (36.4 °C)   Resp 19   Ht 5' 5" (1.651 m)   Wt 104.3 kg (230 lb)   SpO2 100%   Breastfeeding No   BMI 38.27 kg/m²     Labs Reviewed and Include    Recent Labs   Lab 23  0621   *   CALCIUM 8.6*   ALBUMIN 2.9*      K 3.9   CO2 32*   CL 98   BUN 48*   CREATININE 2.0*     Lab Results   Component Value Date    WBC 6.78 2023    HGB 8.8 (L) 2023    HCT 29.1 (L) 2023     (H) 2023     (L) 2023     Recent Labs   Lab 23  2130   TSH 0.459     Lab Results   Component Value Date    HGBA1C 5.7 10/04/2023       Nutritional status:   Body mass index is 38.27 kg/m².  Lab Results   Component Value Date    ALBUMIN 2.9 (L) 2023    ALBUMIN 3.1 (L) 2023    ALBUMIN 3.5 2023     No results found for: "PREALBUMIN"    Estimated Creatinine Clearance: 42.1 mL/min (A) (based on SCr of 2 mg/dL (H)).    Accu-Checks  Recent Labs     23  2000 23  0204 23  0834 23  1215 23  1655 23  2107 23  0837   POCTGLUCOSE 309* 152* 156* 209* 384* 243* 206*       Current Medications and/or Treatments Impacting Glycemic Control  Immunotherapy:    Immunosuppressants           Stop Route Frequency     mycophenolate capsule 1,000 mg         -- Oral 2 times daily     tacrolimus capsule 6 mg         -- Oral 2 times daily          Steroids:   Hormones (From " admission, onward)      Start     Stop Route Frequency Ordered    12/23/23 0900  predniSONE tablet 5 mg         -- Oral Daily 12/23/23 0736    12/22/23 0536  melatonin tablet 6 mg         -- Oral Nightly PRN 12/22/23 0447          Pressors:    Autonomic Drugs (From admission, onward)      None          Hyperglycemia/Diabetes Medications:   Antihyperglycemics (From admission, onward)      Start     Stop Route Frequency Ordered    12/23/23 1000  insulin detemir U-100 (Levemir) pen 10 Units         -- SubQ Daily 12/23/23 0856    12/22/23 1245  insulin aspart U-100 pen 5 Units         -- SubQ 3 times daily with meals 12/22/23 1230    12/22/23 1053  insulin aspart U-100 pen 0-5 Units         -- SubQ Before meals & nightly PRN 12/22/23 0953            ASSESSMENT and PLAN    Renal/  * Hypokalemia  Managed per primary team  Avoid hypoglycemia        S/P kidney transplant  Managed per primary team  Avoid hypoglycemia        Immunology/Multi System  Prophylactic immunotherapy  May increase insulin resistance.         Hematology  Pulmonary embolism  Managed per primary team          Endocrine  Class 2 severe obesity due to excess calories with serious comorbidity and body mass index (BMI) of 38.0 to 38.9 in adult  Body mass index is 38.27 kg/m².  May increase insulin resistance.         Type 2 diabetes mellitus with hyperglycemia, with long-term current use of insulin  BG goal 140-180    Start Levemir 10 units daily (fasting BG above goal ranges)   Novolog 5 units TID with meals   Low Dose Correction Scale  BG monitoring ac/hs    ** Please call Endocrine for any BG related issues **    Discharge plans:   Resume home regimen and follow up in 1-2 weeks. Keep BG logs and document BG 4x daily. Notify [facility] endocrine clinic for any BG <80 or consistently > 200.       Other  Adverse effect of corticosteroids  Glucocorticoids markedly increase glucoses. Expect steroid taper to help with glucose control.           Flores Crook,  NP  Endocrinology  [facility] - Transplant Stepdown

## 2023-12-23 NOTE — SUBJECTIVE & OBJECTIVE
"Interval HPI:   Overnight events: Remains in TSU. BG above goal ranges on current SQ insulin regimen. Prednisone tapered to 5 mg daily. Creatinine 2.0. Diet renal    Eatin%  Nausea: No  Hypoglycemia and intervention: No  Fever: No  TPN and/or TF: No  If yes, type of TF/TPN and rate: n/a    /64   Pulse 95   Temp 97.6 °F (36.4 °C)   Resp 19   Ht 5' 5" (1.651 m)   Wt 104.3 kg (230 lb)   SpO2 100%   Breastfeeding No   BMI 38.27 kg/m²     Labs Reviewed and Include    Recent Labs   Lab 23  0621   *   CALCIUM 8.6*   ALBUMIN 2.9*      K 3.9   CO2 32*   CL 98   BUN 48*   CREATININE 2.0*     Lab Results   Component Value Date    WBC 6.78 2023    HGB 8.8 (L) 2023    HCT 29.1 (L) 2023     (H) 2023     (L) 2023     Recent Labs   Lab 23  2130   TSH 0.459     Lab Results   Component Value Date    HGBA1C 5.7 10/04/2023       Nutritional status:   Body mass index is 38.27 kg/m².  Lab Results   Component Value Date    ALBUMIN 2.9 (L) 2023    ALBUMIN 3.1 (L) 2023    ALBUMIN 3.5 2023     No results found for: "PREALBUMIN"    Estimated Creatinine Clearance: 42.1 mL/min (A) (based on SCr of 2 mg/dL (H)).    Accu-Checks  Recent Labs     23  2000 23  0204 23  0834 23  1215 23  1655 23  2107 23  0837   POCTGLUCOSE 309* 152* 156* 209* 384* 243* 206*       Current Medications and/or Treatments Impacting Glycemic Control  Immunotherapy:    Immunosuppressants           Stop Route Frequency     mycophenolate capsule 1,000 mg         -- Oral 2 times daily     tacrolimus capsule 6 mg         -- Oral 2 times daily          Steroids:   Hormones (From admission, onward)      Start     Stop Route Frequency Ordered    23 0900  predniSONE tablet 5 mg         -- Oral Daily 23 0736    23 0536  melatonin tablet 6 mg         -- Oral Nightly PRN 23 0447          Pressors:    Autonomic " Drugs (From admission, onward)      None          Hyperglycemia/Diabetes Medications:   Antihyperglycemics (From admission, onward)      Start     Stop Route Frequency Ordered    12/23/23 1000  insulin detemir U-100 (Levemir) pen 10 Units         -- SubQ Daily 12/23/23 0856    12/22/23 1245  insulin aspart U-100 pen 5 Units         -- SubQ 3 times daily with meals 12/22/23 1230    12/22/23 1053  insulin aspart U-100 pen 0-5 Units         -- SubQ Before meals & nightly PRN 12/22/23 3090

## 2023-12-23 NOTE — ASSESSMENT & PLAN NOTE
BG goal 140-180    Start Levemir 10 units daily (fasting BG above goal ranges)   Novolog 5 units TID with meals   Low Dose Correction Scale  BG monitoring ac/hs    ** Please call Endocrine for any BG related issues **    Discharge plans:   Resume home regimen and follow up in 1-2 weeks. Keep BG logs and document BG 4x daily. Notify [facility] endocrine clinic for any BG <80 or consistently > 200.

## 2023-12-26 ENCOUNTER — TELEPHONE (OUTPATIENT)
Dept: TRANSPLANT | Facility: CLINIC | Age: 48
End: 2023-12-26
Payer: MEDICARE

## 2023-12-26 ENCOUNTER — PATIENT MESSAGE (OUTPATIENT)
Dept: TRANSPLANT | Facility: CLINIC | Age: 48
End: 2023-12-26
Payer: MEDICARE

## 2023-12-26 ENCOUNTER — LAB VISIT (OUTPATIENT)
Dept: LAB | Facility: HOSPITAL | Age: 48
End: 2023-12-26
Attending: STUDENT IN AN ORGANIZED HEALTH CARE EDUCATION/TRAINING PROGRAM
Payer: MEDICARE

## 2023-12-26 DIAGNOSIS — Z94.0 KIDNEY REPLACED BY TRANSPLANT: ICD-10-CM

## 2023-12-26 LAB
ALBUMIN SERPL BCP-MCNC: 3.1 G/DL (ref 3.5–5.2)
ANION GAP SERPL CALC-SCNC: 7 MMOL/L (ref 8–16)
BACTERIA #/AREA URNS HPF: NORMAL /HPF
BASOPHILS # BLD AUTO: 0.02 K/UL (ref 0–0.2)
BASOPHILS NFR BLD: 0.4 % (ref 0–1.9)
BILIRUB UR QL STRIP: NEGATIVE
BUN SERPL-MCNC: 30 MG/DL (ref 6–20)
CALCIUM SERPL-MCNC: 9.3 MG/DL (ref 8.7–10.5)
CHLORIDE SERPL-SCNC: 110 MMOL/L (ref 95–110)
CLARITY UR: CLEAR
CO2 SERPL-SCNC: 24 MMOL/L (ref 23–29)
COLOR UR: YELLOW
CREAT SERPL-MCNC: 1.7 MG/DL (ref 0.5–1.4)
CREAT UR-MCNC: 89.4 MG/DL (ref 15–325)
DIFFERENTIAL METHOD: ABNORMAL
EOSINOPHIL # BLD AUTO: 0.2 K/UL (ref 0–0.5)
EOSINOPHIL NFR BLD: 2.8 % (ref 0–8)
ERYTHROCYTE [DISTWIDTH] IN BLOOD BY AUTOMATED COUNT: 14.4 % (ref 11.5–14.5)
EST. GFR  (NO RACE VARIABLE): 37 ML/MIN/1.73 M^2
GLUCOSE SERPL-MCNC: 87 MG/DL (ref 70–110)
GLUCOSE UR QL STRIP: ABNORMAL
HCT VFR BLD AUTO: 27.5 % (ref 37–48.5)
HGB BLD-MCNC: 8.2 G/DL (ref 12–16)
HGB UR QL STRIP: NEGATIVE
HYALINE CASTS #/AREA URNS LPF: 0 /LPF
IMM GRANULOCYTES # BLD AUTO: 0.03 K/UL (ref 0–0.04)
IMM GRANULOCYTES NFR BLD AUTO: 0.6 % (ref 0–0.5)
KETONES UR QL STRIP: NEGATIVE
LEUKOCYTE ESTERASE UR QL STRIP: NEGATIVE
LYMPHOCYTES # BLD AUTO: 0.6 K/UL (ref 1–4.8)
LYMPHOCYTES NFR BLD: 11.7 % (ref 18–48)
MAGNESIUM SERPL-MCNC: 2 MG/DL (ref 1.6–2.6)
MCH RBC QN AUTO: 31.2 PG (ref 27–31)
MCHC RBC AUTO-ENTMCNC: 29.8 G/DL (ref 32–36)
MCV RBC AUTO: 105 FL (ref 82–98)
MICROSCOPIC COMMENT: NORMAL
MONOCYTES # BLD AUTO: 0.6 K/UL (ref 0.3–1)
MONOCYTES NFR BLD: 10.8 % (ref 4–15)
NEUTROPHILS # BLD AUTO: 3.9 K/UL (ref 1.8–7.7)
NEUTROPHILS NFR BLD: 73.7 % (ref 38–73)
NITRITE UR QL STRIP: NEGATIVE
NRBC BLD-RTO: 0 /100 WBC
PH UR STRIP: 6 [PH] (ref 5–8)
PHOSPHATE SERPL-MCNC: 2.2 MG/DL (ref 2.7–4.5)
PLATELET # BLD AUTO: 144 K/UL (ref 150–450)
PMV BLD AUTO: 11.2 FL (ref 9.2–12.9)
POTASSIUM SERPL-SCNC: 4 MMOL/L (ref 3.5–5.1)
PROT UR QL STRIP: ABNORMAL
PROT UR-MCNC: 34 MG/DL
PROT/CREAT UR: 0.38 MG/G{CREAT} (ref 0–0.2)
RBC # BLD AUTO: 2.63 M/UL (ref 4–5.4)
RBC #/AREA URNS HPF: 0 /HPF (ref 0–4)
SODIUM SERPL-SCNC: 141 MMOL/L (ref 136–145)
SP GR UR STRIP: 1.02 (ref 1–1.03)
URN SPEC COLLECT METH UR: ABNORMAL
UROBILINOGEN UR STRIP-ACNC: NEGATIVE EU/DL
WBC # BLD AUTO: 5.3 K/UL (ref 3.9–12.7)
WBC #/AREA URNS HPF: 1 /HPF (ref 0–5)
YEAST URNS QL MICRO: NORMAL

## 2023-12-26 PROCEDURE — 82570 ASSAY OF URINE CREATININE: CPT | Performed by: STUDENT IN AN ORGANIZED HEALTH CARE EDUCATION/TRAINING PROGRAM

## 2023-12-26 PROCEDURE — 81000 URINALYSIS NONAUTO W/SCOPE: CPT | Performed by: STUDENT IN AN ORGANIZED HEALTH CARE EDUCATION/TRAINING PROGRAM

## 2023-12-26 PROCEDURE — 83735 ASSAY OF MAGNESIUM: CPT | Performed by: STUDENT IN AN ORGANIZED HEALTH CARE EDUCATION/TRAINING PROGRAM

## 2023-12-26 PROCEDURE — 85025 COMPLETE CBC W/AUTO DIFF WBC: CPT | Performed by: STUDENT IN AN ORGANIZED HEALTH CARE EDUCATION/TRAINING PROGRAM

## 2023-12-26 PROCEDURE — 80197 ASSAY OF TACROLIMUS: CPT | Performed by: STUDENT IN AN ORGANIZED HEALTH CARE EDUCATION/TRAINING PROGRAM

## 2023-12-26 PROCEDURE — 36415 COLL VENOUS BLD VENIPUNCTURE: CPT | Performed by: STUDENT IN AN ORGANIZED HEALTH CARE EDUCATION/TRAINING PROGRAM

## 2023-12-26 PROCEDURE — 80069 RENAL FUNCTION PANEL: CPT | Performed by: STUDENT IN AN ORGANIZED HEALTH CARE EDUCATION/TRAINING PROGRAM

## 2023-12-26 NOTE — TELEPHONE ENCOUNTER
Voicemail left for patient as well as message sent to patient via [facility] to assess patient's symptoms. Awaiting call back or reply.      ----- Message from Gurdeep Williamson sent at 12/26/2023 10:57 AM CST -----  Regarding: call back  Pt call to speak with Emma in regards to her retaining water requesting call back    Call

## 2023-12-27 LAB — TACROLIMUS BLD-MCNC: 5.7 NG/ML (ref 5–15)

## 2023-12-28 ENCOUNTER — LAB VISIT (OUTPATIENT)
Dept: LAB | Facility: HOSPITAL | Age: 48
End: 2023-12-28
Attending: STUDENT IN AN ORGANIZED HEALTH CARE EDUCATION/TRAINING PROGRAM
Payer: MEDICARE

## 2023-12-28 DIAGNOSIS — Z94.0 KIDNEY REPLACED BY TRANSPLANT: ICD-10-CM

## 2023-12-28 DIAGNOSIS — Z57.8 EMPLOYEE EXPOSURE TO BLOOD: ICD-10-CM

## 2023-12-28 DIAGNOSIS — Z94.0 S/P KIDNEY TRANSPLANT: ICD-10-CM

## 2023-12-28 DIAGNOSIS — Z91.89 PERSONAL HISTORY OF POISONING, PRESENTING HAZARDS TO HEALTH: ICD-10-CM

## 2023-12-28 LAB
ALBUMIN SERPL BCP-MCNC: 3.4 G/DL (ref 3.5–5.2)
ALBUMIN SERPL BCP-MCNC: 3.4 G/DL (ref 3.5–5.2)
ALP SERPL-CCNC: 61 U/L (ref 55–135)
ALT SERPL W/O P-5'-P-CCNC: 10 U/L (ref 10–44)
ANION GAP SERPL CALC-SCNC: 9 MMOL/L (ref 8–16)
AST SERPL-CCNC: 9 U/L (ref 10–40)
BASOPHILS # BLD AUTO: 0.02 K/UL (ref 0–0.2)
BASOPHILS NFR BLD: 0.4 % (ref 0–1.9)
BILIRUB DIRECT SERPL-MCNC: 0.2 MG/DL (ref 0.1–0.3)
BILIRUB SERPL-MCNC: 0.5 MG/DL (ref 0.1–1)
BUN SERPL-MCNC: 39 MG/DL (ref 6–20)
CALCIUM SERPL-MCNC: 9.2 MG/DL (ref 8.7–10.5)
CHLORIDE SERPL-SCNC: 104 MMOL/L (ref 95–110)
CO2 SERPL-SCNC: 28 MMOL/L (ref 23–29)
CREAT SERPL-MCNC: 1.7 MG/DL (ref 0.5–1.4)
DIFFERENTIAL METHOD BLD: ABNORMAL
EOSINOPHIL # BLD AUTO: 0.1 K/UL (ref 0–0.5)
EOSINOPHIL NFR BLD: 1.8 % (ref 0–8)
ERYTHROCYTE [DISTWIDTH] IN BLOOD BY AUTOMATED COUNT: 14.6 % (ref 11.5–14.5)
EST. GFR  (NO RACE VARIABLE): 37.2 ML/MIN/1.73 M^2
GLUCOSE SERPL-MCNC: 112 MG/DL (ref 70–110)
HCT VFR BLD AUTO: 28.4 % (ref 37–48.5)
HGB BLD-MCNC: 8.7 G/DL (ref 12–16)
IMM GRANULOCYTES # BLD AUTO: 0.04 K/UL (ref 0–0.04)
IMM GRANULOCYTES NFR BLD AUTO: 0.7 % (ref 0–0.5)
LYMPHOCYTES # BLD AUTO: 0.6 K/UL (ref 1–4.8)
LYMPHOCYTES NFR BLD: 11.6 % (ref 18–48)
MAGNESIUM SERPL-MCNC: 1.7 MG/DL (ref 1.6–2.6)
MCH RBC QN AUTO: 31.3 PG (ref 27–31)
MCHC RBC AUTO-ENTMCNC: 30.6 G/DL (ref 32–36)
MCV RBC AUTO: 102 FL (ref 82–98)
MONOCYTES # BLD AUTO: 0.5 K/UL (ref 0.3–1)
MONOCYTES NFR BLD: 9.6 % (ref 4–15)
NEUTROPHILS # BLD AUTO: 4.1 K/UL (ref 1.8–7.7)
NEUTROPHILS NFR BLD: 75.9 % (ref 38–73)
NRBC BLD-RTO: 0 /100 WBC
PHOSPHATE SERPL-MCNC: 3 MG/DL (ref 2.7–4.5)
PLATELET # BLD AUTO: 167 K/UL (ref 150–450)
PMV BLD AUTO: 12 FL (ref 9.2–12.9)
POTASSIUM SERPL-SCNC: 3.8 MMOL/L (ref 3.5–5.1)
PROT SERPL-MCNC: 7.6 G/DL (ref 6–8.4)
RBC # BLD AUTO: 2.78 M/UL (ref 4–5.4)
SODIUM SERPL-SCNC: 141 MMOL/L (ref 136–145)
TACROLIMUS BLD-MCNC: 5.6 NG/ML (ref 5–15)
WBC # BLD AUTO: 5.43 K/UL (ref 3.9–12.7)

## 2023-12-28 PROCEDURE — 87517 HEPATITIS B DNA QUANT: CPT | Performed by: STUDENT IN AN ORGANIZED HEALTH CARE EDUCATION/TRAINING PROGRAM

## 2023-12-28 PROCEDURE — 83735 ASSAY OF MAGNESIUM: CPT | Performed by: STUDENT IN AN ORGANIZED HEALTH CARE EDUCATION/TRAINING PROGRAM

## 2023-12-28 PROCEDURE — 85025 COMPLETE CBC W/AUTO DIFF WBC: CPT | Performed by: STUDENT IN AN ORGANIZED HEALTH CARE EDUCATION/TRAINING PROGRAM

## 2023-12-28 PROCEDURE — 36415 COLL VENOUS BLD VENIPUNCTURE: CPT | Performed by: STUDENT IN AN ORGANIZED HEALTH CARE EDUCATION/TRAINING PROGRAM

## 2023-12-28 PROCEDURE — 86833 HLA CLASS II HIGH DEFIN QUAL: CPT | Mod: PO | Performed by: STUDENT IN AN ORGANIZED HEALTH CARE EDUCATION/TRAINING PROGRAM

## 2023-12-28 PROCEDURE — 87536 HIV-1 QUANT&REVRSE TRNSCRPJ: CPT | Performed by: STUDENT IN AN ORGANIZED HEALTH CARE EDUCATION/TRAINING PROGRAM

## 2023-12-28 PROCEDURE — 84075 ASSAY ALKALINE PHOSPHATASE: CPT | Performed by: STUDENT IN AN ORGANIZED HEALTH CARE EDUCATION/TRAINING PROGRAM

## 2023-12-28 PROCEDURE — 86977 RBC SERUM PRETX INCUBJ/INHIB: CPT | Mod: 59,PO | Performed by: STUDENT IN AN ORGANIZED HEALTH CARE EDUCATION/TRAINING PROGRAM

## 2023-12-28 PROCEDURE — 82247 BILIRUBIN TOTAL: CPT | Performed by: STUDENT IN AN ORGANIZED HEALTH CARE EDUCATION/TRAINING PROGRAM

## 2023-12-28 PROCEDURE — 87799 DETECT AGENT NOS DNA QUANT: CPT | Performed by: STUDENT IN AN ORGANIZED HEALTH CARE EDUCATION/TRAINING PROGRAM

## 2023-12-28 PROCEDURE — 80197 ASSAY OF TACROLIMUS: CPT | Performed by: STUDENT IN AN ORGANIZED HEALTH CARE EDUCATION/TRAINING PROGRAM

## 2023-12-28 PROCEDURE — 80069 RENAL FUNCTION PANEL: CPT | Performed by: STUDENT IN AN ORGANIZED HEALTH CARE EDUCATION/TRAINING PROGRAM

## 2023-12-28 PROCEDURE — 86832 HLA CLASS I HIGH DEFIN QUAL: CPT | Mod: PO | Performed by: STUDENT IN AN ORGANIZED HEALTH CARE EDUCATION/TRAINING PROGRAM

## 2023-12-28 PROCEDURE — 87522 HEPATITIS C REVRS TRNSCRPJ: CPT | Performed by: STUDENT IN AN ORGANIZED HEALTH CARE EDUCATION/TRAINING PROGRAM

## 2023-12-28 RX ORDER — FAMOTIDINE 20 MG/1
20 TABLET, FILM COATED ORAL NIGHTLY
Qty: 30 TABLET | Refills: 0 | Status: CANCELLED | OUTPATIENT
Start: 2023-12-28 | End: 2024-01-27

## 2023-12-28 RX ORDER — TACROLIMUS 1 MG/1
7 CAPSULE ORAL EVERY 12 HOURS
Qty: 420 CAPSULE | Refills: 11 | Status: SHIPPED | OUTPATIENT
Start: 2023-12-28 | End: 2024-01-30 | Stop reason: DRUGHIGH

## 2023-12-28 SDOH — SOCIAL DETERMINANTS OF HEALTH (SDOH): OCCUPATIONAL EXPOSURE TO OTHER RISK FACTORS: Z57.8

## 2023-12-28 NOTE — TELEPHONE ENCOUNTER
Called and d/w patient but no answer. Left VM and will send my chart message----- Message from Randall Thomas Jr., MD sent at 12/28/2023  1:11 PM CST -----  Increase tac from 6/6 to 7/7. Other labs acceptable.

## 2023-12-29 LAB
HCV RNA SERPL QL NAA+PROBE: NOT DETECTED
HCV RNA SPEC NAA+PROBE-ACNC: NOT DETECTED IU/ML
HEPATITIS B VIRUS DNA: NORMAL
HEPATITIS B VIRUS PCR, QUANT: NOT DETECTED IU/ML
HIV1 RNA # SERPL NAA+PROBE: NOT DETECTED COPIES/ML
HIV1 RNA SERPL QL NAA+PROBE: NOT DETECTED

## 2024-01-02 ENCOUNTER — LAB VISIT (OUTPATIENT)
Dept: LAB | Facility: HOSPITAL | Age: 49
End: 2024-01-02
Attending: STUDENT IN AN ORGANIZED HEALTH CARE EDUCATION/TRAINING PROGRAM
Payer: MEDICARE

## 2024-01-02 ENCOUNTER — TELEPHONE (OUTPATIENT)
Dept: INTERVENTIONAL RADIOLOGY/VASCULAR | Facility: HOSPITAL | Age: 49
End: 2024-01-02
Payer: MEDICARE

## 2024-01-02 DIAGNOSIS — Z94.0 KIDNEY REPLACED BY TRANSPLANT: ICD-10-CM

## 2024-01-02 LAB
ALBUMIN SERPL BCP-MCNC: 3.4 G/DL (ref 3.5–5.2)
ANION GAP SERPL CALC-SCNC: 12 MMOL/L (ref 8–16)
BASOPHILS # BLD AUTO: 0.02 K/UL (ref 0–0.2)
BASOPHILS NFR BLD: 0.3 % (ref 0–1.9)
BKV DNA SERPL NAA+PROBE-ACNC: <125 COPIES/ML
BKV DNA SERPL NAA+PROBE-LOG#: <2.1 LOG (10) COPIES/ML
BKV DNA SERPL QL NAA+PROBE: NOT DETECTED
BUN SERPL-MCNC: 35 MG/DL (ref 6–20)
CALCIUM SERPL-MCNC: 9.1 MG/DL (ref 8.7–10.5)
CHLORIDE SERPL-SCNC: 98 MMOL/L (ref 95–110)
CO2 SERPL-SCNC: 32 MMOL/L (ref 23–29)
CREAT SERPL-MCNC: 1.8 MG/DL (ref 0.5–1.4)
DIFFERENTIAL METHOD BLD: ABNORMAL
EOSINOPHIL # BLD AUTO: 0.1 K/UL (ref 0–0.5)
EOSINOPHIL NFR BLD: 0.8 % (ref 0–8)
ERYTHROCYTE [DISTWIDTH] IN BLOOD BY AUTOMATED COUNT: 14.5 % (ref 11.5–14.5)
EST. GFR  (NO RACE VARIABLE): 34.8 ML/MIN/1.73 M^2
GLUCOSE SERPL-MCNC: 125 MG/DL (ref 70–110)
HCT VFR BLD AUTO: 29.8 % (ref 37–48.5)
HGB BLD-MCNC: 9.5 G/DL (ref 12–16)
IMM GRANULOCYTES # BLD AUTO: 0.02 K/UL (ref 0–0.04)
IMM GRANULOCYTES NFR BLD AUTO: 0.3 % (ref 0–0.5)
LYMPHOCYTES # BLD AUTO: 0.6 K/UL (ref 1–4.8)
LYMPHOCYTES NFR BLD: 9.1 % (ref 18–48)
MAGNESIUM SERPL-MCNC: 1.5 MG/DL (ref 1.6–2.6)
MCH RBC QN AUTO: 31.8 PG (ref 27–31)
MCHC RBC AUTO-ENTMCNC: 31.9 G/DL (ref 32–36)
MCV RBC AUTO: 100 FL (ref 82–98)
MONOCYTES # BLD AUTO: 0.7 K/UL (ref 0.3–1)
MONOCYTES NFR BLD: 9.9 % (ref 4–15)
NEUTROPHILS # BLD AUTO: 5.2 K/UL (ref 1.8–7.7)
NEUTROPHILS NFR BLD: 79.6 % (ref 38–73)
NRBC BLD-RTO: 0 /100 WBC
PHOSPHATE SERPL-MCNC: 3.2 MG/DL (ref 2.7–4.5)
PLATELET # BLD AUTO: 159 K/UL (ref 150–450)
PMV BLD AUTO: 11.3 FL (ref 9.2–12.9)
POTASSIUM SERPL-SCNC: 3.4 MMOL/L (ref 3.5–5.1)
RBC # BLD AUTO: 2.99 M/UL (ref 4–5.4)
SODIUM SERPL-SCNC: 142 MMOL/L (ref 136–145)
TACROLIMUS BLD-MCNC: 8 NG/ML (ref 5–15)
WBC # BLD AUTO: 6.56 K/UL (ref 3.9–12.7)

## 2024-01-02 PROCEDURE — 80197 ASSAY OF TACROLIMUS: CPT | Performed by: STUDENT IN AN ORGANIZED HEALTH CARE EDUCATION/TRAINING PROGRAM

## 2024-01-02 PROCEDURE — 36415 COLL VENOUS BLD VENIPUNCTURE: CPT | Performed by: STUDENT IN AN ORGANIZED HEALTH CARE EDUCATION/TRAINING PROGRAM

## 2024-01-02 PROCEDURE — 80069 RENAL FUNCTION PANEL: CPT | Performed by: STUDENT IN AN ORGANIZED HEALTH CARE EDUCATION/TRAINING PROGRAM

## 2024-01-02 PROCEDURE — 85025 COMPLETE CBC W/AUTO DIFF WBC: CPT | Performed by: STUDENT IN AN ORGANIZED HEALTH CARE EDUCATION/TRAINING PROGRAM

## 2024-01-02 PROCEDURE — 83735 ASSAY OF MAGNESIUM: CPT | Performed by: STUDENT IN AN ORGANIZED HEALTH CARE EDUCATION/TRAINING PROGRAM

## 2024-01-03 LAB
CLASS I ANTIBODY COMMENTS - LUMINEX: NORMAL
CLASS II ANTIBODY COMMENTS - LUMINEX: NORMAL
DSA1 TESTING DATE: NORMAL
DSA12 TESTING DATE: NORMAL
DSA2 TESTING DATE: NORMAL
SERUM COLLECTION DT - LUMINEX CLASS I: NORMAL
SERUM COLLECTION DT - LUMINEX CLASS II: NORMAL

## 2024-01-04 ENCOUNTER — TELEPHONE (OUTPATIENT)
Dept: TRANSPLANT | Facility: CLINIC | Age: 49
End: 2024-01-04
Payer: MEDICARE

## 2024-01-04 ENCOUNTER — HOSPITAL ENCOUNTER (OUTPATIENT)
Dept: INTERVENTIONAL RADIOLOGY/VASCULAR | Facility: HOSPITAL | Age: 49
Discharge: HOME | End: 2024-01-04
Attending: STUDENT IN AN ORGANIZED HEALTH CARE EDUCATION/TRAINING PROGRAM
Payer: MEDICARE

## 2024-01-04 DIAGNOSIS — Z94.0 KIDNEY REPLACED BY TRANSPLANT: Primary | ICD-10-CM

## 2024-01-04 LAB — POCT GLUCOSE: 180 MG/DL (ref 70–110)

## 2024-01-04 PROCEDURE — 50200 RENAL BIOPSY PERQ: CPT

## 2024-01-04 PROCEDURE — 99152 MOD SED SAME PHYS/QHP 5/>YRS: CPT

## 2024-01-04 PROCEDURE — A4215 STERILE NEEDLE: HCPCS

## 2024-01-04 PROCEDURE — 76942 ECHO GUIDE FOR BIOPSY: CPT | Mod: 26,,, | Performed by: RADIOLOGY

## 2024-01-04 PROCEDURE — 99152 MOD SED SAME PHYS/QHP 5/>YRS: CPT | Mod: ,,, | Performed by: RADIOLOGY

## 2024-01-04 PROCEDURE — 50200 RENAL BIOPSY PERQ: CPT | Mod: RT,,, | Performed by: RADIOLOGY

## 2024-01-04 PROCEDURE — 82962 GLUCOSE BLOOD TEST: CPT

## 2024-01-04 PROCEDURE — 63600175 PHARM REV CODE 636 W HCPCS: Performed by: RADIOLOGY

## 2024-01-04 PROCEDURE — 76942 ECHO GUIDE FOR BIOPSY: CPT | Mod: TC

## 2024-01-04 RX ORDER — FENTANYL CITRATE 50 UG/ML
INJECTION, SOLUTION INTRAMUSCULAR; INTRAVENOUS
Status: COMPLETED | OUTPATIENT
Start: 2024-01-04 | End: 2024-01-04

## 2024-01-04 RX ORDER — MIDAZOLAM HYDROCHLORIDE 1 MG/ML
INJECTION INTRAMUSCULAR; INTRAVENOUS
Status: COMPLETED | OUTPATIENT
Start: 2024-01-04 | End: 2024-01-04

## 2024-01-04 RX ORDER — LIDOCAINE HYDROCHLORIDE 10 MG/ML
1 INJECTION, SOLUTION EPIDURAL; INFILTRATION; INTRACAUDAL; PERINEURAL ONCE
Status: DISCONTINUED | OUTPATIENT
Start: 2024-01-04 | End: 2024-01-05 | Stop reason: HOSPADM

## 2024-01-04 RX ORDER — SODIUM CHLORIDE 9 MG/ML
INJECTION, SOLUTION INTRAVENOUS CONTINUOUS
Status: DISCONTINUED | OUTPATIENT
Start: 2024-01-04 | End: 2024-01-05 | Stop reason: HOSPADM

## 2024-01-04 RX ORDER — HYDRALAZINE HYDROCHLORIDE 20 MG/ML
INJECTION INTRAMUSCULAR; INTRAVENOUS
Status: COMPLETED | OUTPATIENT
Start: 2024-01-04 | End: 2024-01-04

## 2024-01-04 RX ADMIN — FENTANYL CITRATE 25 MCG: 50 INJECTION, SOLUTION INTRAMUSCULAR; INTRAVENOUS at 01:01

## 2024-01-04 RX ADMIN — MIDAZOLAM HYDROCHLORIDE 1 MG: 2 INJECTION, SOLUTION INTRAMUSCULAR; INTRAVENOUS at 01:01

## 2024-01-04 RX ADMIN — MIDAZOLAM HYDROCHLORIDE 0.5 MG: 2 INJECTION, SOLUTION INTRAMUSCULAR; INTRAVENOUS at 01:01

## 2024-01-04 RX ADMIN — HYDRALAZINE HYDROCHLORIDE 10 MG: 20 INJECTION INTRAMUSCULAR; INTRAVENOUS at 01:01

## 2024-01-04 RX ADMIN — FENTANYL CITRATE 50 MCG: 50 INJECTION, SOLUTION INTRAMUSCULAR; INTRAVENOUS at 01:01

## 2024-01-04 NOTE — NURSING
Pt and mother given discharge instructions and all questions where addressed.  Phone numbers to clinic and the resident on call provided for any other questions.

## 2024-01-04 NOTE — PLAN OF CARE
Kidney biopsy completed per MD. Pt tolerated procedure well. VSS. Site dressing CDI. Pt will recover in MPU for 2 hours then DC home.

## 2024-01-04 NOTE — PLAN OF CARE
Pt arrived to  for scheduled kidney biopsy. Pt oriented to unit and staff, Pt safely transferred from stretcher to procedural table. Fall risk reviewed and comfort measures utilized with interventions. Safety strap applied, position pillows to minimize pressure points. Blankets applied. Pt prepped and draped utilizing standard sterile technique. Patient placed on continuous monitoring, as required by sedation policy. Timeouts implemented utilizing standard universal time-out per department and facility policy. CRNA to remain at bedside with continuous monitoring. Pt resting comfortably. Denies pain/discomfort. Will continue to monitor. See flow sheets for monitoring, medication administration, and updates. patient verbalizes understanding.

## 2024-01-04 NOTE — TELEPHONE ENCOUNTER
Denies s/s of UTI----- Message from Michelle Ragsdale sent at 1/4/2024  4:25 PM CST -----  Regarding: Returning a Missed Call      Caller:     Mary      Returning call to:   Emma       Caller can be reached @:   [phone]       Nature of the call:   Pt doesn't know what the call was in regards to. No additional information provided.

## 2024-01-04 NOTE — DISCHARGE INSTRUCTIONS
For scheduling: Call Ondina at [phone]    For questions or concerns call: LUIS MON[facility]FRI 8 AM- 5PM [phone]. Radiology resident on call [phone].    For immediate concerns that are not emergent, you may call our radiology clinic at: [phone]

## 2024-01-04 NOTE — H&P
Radiology History & Physical      SUBJECTIVE:     Chief Complaint: kidney transplant    History of Present Illness:  Mary Waller is a 48 y.o. female Pmhx of DM, HTN, ESRD s/p renal transplant 11/29/23, post op course c/o delayed graft function which has resolved. Pt presents for biopsy of renal allograft to evaluate for graft function.         Past Medical History:   Diagnosis Date    Blind     Diabetes     ESRD (end stage renal disease) on dialysis     Hypertension     Right cataract      Past Surgical History:   Procedure Laterality Date    AV FISTULA PLACEMENT      EYE SURGERY      KIDNEY TRANSPLANT N/A 11/29/2023    Procedure: TRANSPLANT, KIDNEY;  Surgeon: Torri Moore MD;  Location: [facility] OR [address] [place] [address];  Service: Transplant;  Laterality: N/A;  Kidney in Room @ 0714  Out of Ice @ 0943  Reperfusion @ 1011       Home Meds:   Prior to Admission medications    Medication Sig Start Date End Date Taking? Authorizing Provider   atovaquone (MEPRON) 750 mg/5 mL Susp oral liquid Take 10 mLs (1,500 mg total) by mouth once daily. STOP 5/29/24 12/6/23  Yes Kaci Fernández MD   atropine 1% (ISOPTO ATROPINE) 1 % Drop Place 1 drop into the left eye every evening. 8/6/23  Yes Provider, Historical   blood sugar diagnostic Strp 1 each by Misc.(Non-Drug; Combo Route) route 3 (three) times daily. 12/4/23  Yes Jevon Jain MD   blood-glucose meter kit USE TO TEST BLOOD GLUCOSE 12/4/23  Yes Jevon Jain MD   calcitRIOL (ROCALTROL) 0.25 MCG Cap Take 1 capsule (0.25 mcg total) by mouth once daily. 12/15/23  Yes Randall Thomas Jr., MD   COMBIGAN 0.2-0.5 % Drop Place 1 drop into the right eye every evening. 7/11/23  Yes Provider, Historical   ergocalciferol (VITAMIN D2) 50,000 unit Cap Take 50,000 Units by mouth every 7 days. Take 1 capsule by mouth weekly x 12 weeks, then decrease to once per month. 11/21/23 2/29/24 Yes Aries Bourne MD   famotidine (PEPCID) 20 MG tablet Take 1 tablet (20 mg  "total) by mouth every evening. 11/29/23 1/4/24 Yes Kaci Fernández MD   insulin (LANTUS SOLOSTAR U-100 INSULIN) glargine 100 units/mL SubQ pen 10-14 units daily as direct by endocrinology 12/23/23  Yes Kaci Fernández MD   insulin lispro 100 unit/mL pen Inject 5 Units into the skin 3 (three) times daily with meals. Plus sliding scale: TDD=30 units 12/4/23 12/3/24 Yes Jevon Jain MD   lancets Misc 1 each by Misc.(Non-Drug; Combo Route) route 3 (three) times daily. 12/4/23  Yes Jevon Jain MD   Lmefol Ca-acetyl-meB12-algal (CEREFOLIN NAC, ALGAL OIL,) 6 mg-600 mg- 2 mg-90.314 mg Tab Take 1 tablet by mouth once daily. 12/5/23  Yes Jevon Jain MD   magnesium oxide (MAG-OX) 400 mg (241.3 mg magnesium) tablet Take 2 tablets (800 mg total) by mouth 2 (two) times daily. 12/21/23 12/21/24 Yes Randall Thomas Jr., MD   mycophenolate (CELLCEPT) 250 mg Cap Take 4 capsules (1,000 mg total) by mouth 2 (two) times daily. 12/1/23  Yes Kaci Fernández MD   pen needle, diabetic (EASY COMFORT PEN NEEDLES) 32 gauge x 5/32" Ndle Inject 1 each into the skin 3 (three) times daily. 12/4/23  Yes Jevon Jain MD   predniSONE (DELTASONE) 5 MG tablet Take by mouth daily:  20mg 12/2-12/8, 15mg 12/9-12/15, 10mg 12/16-12/22, then 5mg daily beginning 12/23/2023 12/2/23  Yes Torri Moore MD   rosuvastatin (CRESTOR) 40 MG Tab Take 40 mg by mouth every evening. 7/11/23  Yes Provider, Historical   tacrolimus (PROGRAF) 1 MG Cap Take 7 capsules (7 mg total) by mouth every 12 (twelve) hours. 12/28/23  Yes Randall Thomas Jr., MD   valGANciclovir (VALCYTE) 450 mg Tab Take 1 tablet (450 mg total) by mouth once daily. STOP 5/29/24 12/23/23 6/21/24 Yes Sangita Garcia NP   amLODIPine (NORVASC) 10 MG tablet Take 10 mg by mouth once daily.  9/1/21  Provider, Historical   hydrALAZINE (APRESOLINE) 50 MG tablet Take 100 mg by mouth every 8 (eight) hours.   9/1/21  Provider, Historical "   losartan (COZAAR) 100 MG tablet Take 100 mg by mouth once daily.  9/1/21  Provider, Historical     Anticoagulants/Antiplatelets: no anticoagulation    Allergies:   Review of patient's allergies indicates:   Allergen Reactions    Codeine     Sulfa (sulfonamide antibiotics)      Sedation History:  no adverse reactions    Review of Systems:   Hematological: no known coagulopathies  Respiratory: no shortness of breath  Cardiovascular: no chest pain  Gastrointestinal: no abdominal pain  Genito-Urinary: no dysuria  Musculoskeletal: negative  Neurological: negative         OBJECTIVE:     Vital Signs (Most Recent)       Physical Exam:  ASA: 2  Mallampati: 2    General: no acute distress  Mental Status: alert and oriented to person, place and time  HEENT: normocephalic, atraumatic  Chest: unlabored breathing  Abdomen: nondistended       Laboratory  Lab Results   Component Value Date    INR 1.1 12/22/2023       Lab Results   Component Value Date    WBC 6.56 01/02/2024    HGB 9.5 (L) 01/02/2024    HCT 29.8 (L) 01/02/2024     (H) 01/02/2024     01/02/2024      Lab Results   Component Value Date     (H) 01/02/2024     01/02/2024    K 3.4 (L) 01/02/2024    CL 98 01/02/2024    CO2 32 (H) 01/02/2024    BUN 35 (H) 01/02/2024    CREATININE 1.8 (H) 01/02/2024    CALCIUM 9.1 01/02/2024    MG 1.5 (L) 01/02/2024    ALT 10 12/28/2023    AST 9 (L) 12/28/2023    ALBUMIN 3.4 (L) 01/02/2024    BILITOT 0.5 12/28/2023    BILIDIR 0.2 12/28/2023       ASSESSMENT/PLAN:     Sedation Plan: Up to moderate   Patient will undergo biopsy of renal allograft.        Jasmin Constantino MD  Radiology PGY V

## 2024-01-04 NOTE — TELEPHONE ENCOUNTER
Called patient and left message. ----- Message from Randall Thomas Jr., MD sent at 1/3/2024  4:26 PM CST -----  Please ask patient if she has symptoms and if so please ask ID ideal treatment if needed.

## 2024-01-05 VITALS
WEIGHT: 225.88 LBS | RESPIRATION RATE: 20 BRPM | BODY MASS INDEX: 37.63 KG/M2 | HEART RATE: 90 BPM | HEIGHT: 65 IN | SYSTOLIC BLOOD PRESSURE: 166 MMHG | OXYGEN SATURATION: 96 % | TEMPERATURE: 98 F | DIASTOLIC BLOOD PRESSURE: 78 MMHG

## 2024-01-05 NOTE — PROGRESS NOTES
Kidney Post-Transplant Assessment    Referring Physician: Aries Bourne  Current Nephrologist: Aries Bourne    ORGAN: LEFT KIDNEY  Donor Type: donation after brain death  PHS Increased Risk: yes  Cold Ischemia: 903 mins  Induction Medications: thymo    Subjective:     CC:  Reassessment of renal allograft function and management of chronic immunosuppression.    HPI:  Ms. Waller is a 48 y.o. 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 Her most recent creatinine is 1.6  She takes mycophenolate mofetil, prednisone, and tacrolimus for maintenance immunosuppression. Her post transplant course has been complicated by delayed graft function requiring dialysis.    Hospitalization/ ED visits  12/2023 Tachycardia; neg PEor MI    Interval HX:  Mild improvement in Cr biopsy on 1/5/23  Not on K+ supplement-- ordered today  Reports about 4lbs over her dry weight    Intake 80-100oz  UOP 80-100oz  BP 130s/70s  Peripheral edema none  Appetite good  Incision healed  fx assessment   Lab /diagnostic results reviewed with patient today.   All questions answered        Current Outpatient Medications:     atovaquone (MEPRON) 750 mg/5 mL Susp oral liquid, Take 10 mLs (1,500 mg total) by mouth once daily. STOP 5/29/24, Disp: 300 mL, Rfl: 5    atropine 1% (ISOPTO ATROPINE) 1 % Drop, Place 1 drop into the left eye every evening., Disp: , Rfl:     blood sugar diagnostic Strp, 1 each by Misc.(Non-Drug; Combo Route) route 3 (three) times daily., Disp: 90 each, Rfl: 11    blood-glucose meter kit, USE TO TEST BLOOD GLUCOSE, Disp: 1 each, Rfl: 0    calcitRIOL (ROCALTROL) 0.25 MCG Cap, Take 1 capsule (0.25 mcg total) by mouth once daily., Disp: 30 capsule, Rfl: 5    COMBIGAN 0.2-0.5 % Drop, Place 1 drop into the right eye every evening., Disp: , Rfl:     ergocalciferol (VITAMIN D2) 50,000 unit Cap, Take 50,000 Units by mouth every 7 days. Take 1 capsule by mouth weekly x  "12 weeks, then decrease to once per month., Disp: , Rfl:     famotidine (PEPCID) 20 MG tablet, Take 1 tablet (20 mg total) by mouth every evening., Disp: 30 tablet, Rfl: 0    insulin (LANTUS SOLOSTAR U-100 INSULIN) glargine 100 units/mL SubQ pen, 10-14 units daily as direct by endocrinology, Disp: 6 mL, Rfl: 11    insulin lispro 100 unit/mL pen, Inject 5 Units into the skin 3 (three) times daily with meals. Plus sliding scale: TDD=30 units, Disp: 9 mL, Rfl: 11    lancets Misc, 1 each by Misc.(Non-Drug; Combo Route) route 3 (three) times daily., Disp: 90 each, Rfl: 11    Lmefol Ca-acetyl-meB12-algal (CEREFOLIN NAC, ALGAL OIL,) 6 mg-600 mg- 2 mg-90.314 mg Tab, Take 1 tablet by mouth once daily., Disp: , Rfl:     magnesium oxide (MAG-OX) 400 mg (241.3 mg magnesium) tablet, Take 2 tablets (800 mg total) by mouth 2 (two) times daily., Disp: 120 tablet, Rfl: 11    mycophenolate (CELLCEPT) 250 mg Cap, Take 4 capsules (1,000 mg total) by mouth 2 (two) times daily., Disp: 240 capsule, Rfl: 11    pen needle, diabetic (EASY COMFORT PEN NEEDLES) 32 gauge x 5/32" Ndle, Inject 1 each into the skin 3 (three) times daily., Disp: 100 each, Rfl: 11    predniSONE (DELTASONE) 5 MG tablet, Take by mouth daily:  20mg 12/2-12/8, 15mg 12/9-12/15, 10mg 12/16-12/22, then 5mg daily beginning 12/23/2023, Disp: 70 tablet, Rfl: 11    rosuvastatin (CRESTOR) 40 MG Tab, Take 40 mg by mouth every evening., Disp: , Rfl:     tacrolimus (PROGRAF) 1 MG Cap, Take 7 capsules (7 mg total) by mouth every 12 (twelve) hours., Disp: 420 capsule, Rfl: 11    valGANciclovir (VALCYTE) 450 mg Tab, Take 1 tablet (450 mg total) by mouth once daily. STOP 5/29/24, Disp: 30 tablet, Rfl: 5      Past Medical History:   Diagnosis Date    Blind     Diabetes     ESRD (end stage renal disease) on dialysis     Hypertension     Right cataract          Review of Systems   Constitutional:  Negative for appetite change, chills, fatigue and fever.   HENT:  Negative for trouble " "swallowing.    Eyes:  Positive for visual disturbance.   Respiratory:  Negative for cough, chest tightness, shortness of breath and wheezing.    Cardiovascular:  Negative for chest pain, palpitations and leg swelling.   Gastrointestinal:  Negative for abdominal pain, constipation, diarrhea and nausea.   Genitourinary:  Negative for difficulty urinating, frequency and urgency.   Musculoskeletal:  Negative for arthralgias and myalgias.   Skin:  Negative for rash.   Allergic/Immunologic: Positive for immunocompromised state.   Neurological:  Negative for dizziness, weakness, light-headedness and headaches.   Psychiatric/Behavioral:  Negative for sleep disturbance.        Objective:   Blood pressure (!) 166/74, pulse 86, temperature 97.7 °F (36.5 °C), temperature source Temporal, resp. rate 18, height 5' 5" (1.651 m), weight 102.9 kg (226 lb 13.7 oz), last menstrual period 12/06/2023, SpO2 99 %.body mass index is 37.75 kg/m².    Physical Exam  Constitutional:       General: She is not in acute distress.     Appearance: She is well-developed. She is not diaphoretic.   Cardiovascular:      Rate and Rhythm: Normal rate and regular rhythm.      Heart sounds: Normal heart sounds.   Pulmonary:      Effort: Pulmonary effort is normal.      Breath sounds: Normal breath sounds.   Abdominal:      General: Bowel sounds are normal.      Palpations: Abdomen is soft.   Musculoskeletal:         General: No tenderness. Normal range of motion.   Skin:     General: Skin is warm and dry.      Findings: No rash.      Nails: There is no clubbing.   Neurological:      Mental Status: She is alert and oriented to person, place, and time.   Psychiatric:         Behavior: Behavior normal.         Labs:  Lab Results   Component Value Date    WBC 6.56 01/02/2024    HGB 9.5 (L) 01/02/2024    HCT 29.8 (L) 01/02/2024     01/04/2024    K 3.2 (L) 01/04/2024    CL 96 01/04/2024    CO2 32 (H) 01/04/2024    BUN 38 (H) 01/04/2024    CREATININE 1.6 " "(H) 01/04/2024    EGFRNORACEVR 40.0 (A) 01/04/2024    CALCIUM 8.9 01/04/2024    PHOS 3.2 01/02/2024    MG 1.5 (L) 01/02/2024    ALBUMIN 3.3 (L) 01/04/2024    AST 9 (L) 01/04/2024    ALT 12 01/04/2024    UTPCR 0.27 (H) 01/02/2024     (H) 12/06/2023    TACROLIMUS 8.0 01/02/2024       No results found for: "EXTANC", "EXTWBC", "EXTSEGS", "EXTPLATELETS", "EXTHEMOGLOBI", "EXTHEMATOCRI", "EXTCREATININ", "EXTSODIUM", "EXTPOTASSIUM", "EXTBUN", "EXTCO2", "EXTCALCIUM", "EXTPHOSPHORU", "EXTGLUCOSE", "EXTALBUMIN", "EXTAST", "EXTALT", "EXTBILITOTAL", "EXTLIPASE", "EXTAMYLASE"    No results found for: "EXTCYCLOSLVL", "EXTSIROLIMUS", "EXTTACROLVL", "EXTPROTCRE", "EXTPTHINTACT", "EXTPROTEINUA", "EXTWBCUA", "EXTRBCUA"    Labs were reviewed with the patient    Assessment:     1. S/P kidney transplant    2. Delayed graft function of kidney transplant due to ATN requiring acute dialysis    3. Essential hypertension    4. Type 2 diabetes mellitus with hyperglycemia, with long-term current use of insulin        Plan:   Started on Kcl 20 mEq BID  Increase diet with K+ rich foods  Pending today's labs  Continue current IS medications       1. CKD stage: will continue follow up as per our center guidelines. patient to continue close follow up with the local General nephrologist. Education provided in appropriate fluid intake, potassium intake. Continue with oral hydration.      2. Immunosuppression: Prograf trough 8 , therapeutic target 8-10. Continue Prograf 7/7, MMF 1000 Mg BID, and Prednisone QD  Lab Results   Component Value Date    TACROLIMUS 8.0 01/02/2024    TACROLIMUS 5.6 12/28/2023    TACROLIMUS 5.7 12/26/2023   Will closely monitor for toxicities, education provided about adherence to medicines and need to communicate any side effect to the transplant nurse or physician.      3. Allograft Function:stable at baseline for the patient. Continue follow up as per our guidelines and with the local General nephrologist. " Communication will be sent today.  Lab Results   Component Value Date    CREATININE 1.6 (H) 01/04/2024    CREATININE 1.8 (H) 01/02/2024    CREATININE 1.7 (H) 12/28/2023     Lab Results   Component Value Date    LIPASE 144 (H) 08/07/2023       4. Hypertension management:  Continue with home blood pressure monitoring, low salt and healthy life discussed with the patient.  BP Readings from Last 3 Encounters:   01/08/24 (!) 166/74   01/04/24 (!) 166/78   12/23/23 (!) 140/63       5. Metabolic Bone Disease/Secondary Hyperparathyroidism:calcium and phosphorus level discussed with the patient, patient will continue follow up with the general nephrologist for management of metabolic bone disease calcium and phosphorus as per our center protocol. Will monitor PTH, Vit D level, calcium.   Lab Results   Component Value Date     (H) 12/06/2023    CALCIUM 8.9 01/04/2024    PHOS 3.2 01/02/2024    PHOS 3.0 12/28/2023    PHOS 2.2 (L) 12/26/2023       6. Electrolytes: reviewed with the patient, essentially within the normal range no need for acute changes today, will monitor as per our center guidelines.  Lab Results   Component Value Date     01/04/2024    K 3.2 (L) 01/04/2024    CL 96 01/04/2024    CO2 32 (H) 01/04/2024    CO2 32 (H) 01/02/2024    CO2 28 12/28/2023       7. Anemia: will continue monitoring as per our center guidelines. No indication for acute intervention today.  Lab Results   Component Value Date    WBC 6.56 01/02/2024    HGB 9.5 (L) 01/02/2024    HCT 29.8 (L) 01/02/2024     (H) 01/02/2024     01/02/2024       8.Proteinuria: will continue with pr/cr ratio as per our center guidelines  Lab Results   Component Value Date    PROTEINURINE 8 01/02/2024    CREATRANDUR 30.0 01/02/2024    UTPCR 0.27 (H) 01/02/2024    UTPCR 0.32 (H) 12/28/2023    UTPCR 0.38 (H) 12/26/2023        9. BK virus infection screening: will continue with urine or blood PCR as per our guidelines to prevent BK virus  viremia and allograft dysfunction  Lab Results   Component Value Date    BKVIRUSPCRQB <125 12/28/2023         10. Weight education: provided during the clinic visit.  Body mass index is 37.75 kg/m².     11.Patient safety education regarding immunosuppression including prophylaxis posttransplant for CMV, PCP : Education provided about vaccination and prevention of infections.    12.  Cytopenias: no significant cytopenias will monitor as per our guidelines. Medicine list reviewed including potential causes of drug-induced cytopenias  Lab Results   Component Value Date    WBC 6.56 01/02/2024    HGB 9.5 (L) 01/02/2024    HCT 29.8 (L) 01/02/2024     (H) 01/02/2024     01/02/2024       13. Post-transplant Prophylaxis; CMV Infection, PJP and Candida mucosistis and other indicated for this particular patient.   PCP PROPHYLAXIS: needs PJP prophy until 5/29/24 (G6PD pending) atovaquone due to low blood couts  CMV PROPHYLAXIS: Valcyte until 5/29/24     Exercise: reminded Juan Francisco of the importance of regular exercise for weight management, blood sugar and blood pressure management.  I also explained exercise has been shown to improve cardiovascular health, energy level, and sleep hygiene.  Lastly, I advised him that cardiovascular complications are leading cause of death for renal transplant recipients, and regular exercise can help lower this risk.    Follow-up:   Clinic: return to transplant clinic weekly for the first month after transplant; every 2 weeks during months 2-3; then at 6-, 9-, 12-, 18-, 24-, and 36- months post-transplant to reassess for complications from immunosuppression toxicity and monitor for rejection.  Annually thereafter.    Labs: since patient remains at high risk for rejection and drug-related complications that warrant close monitoring, labs will be ordered as follows: continue twice weekly CBC, renal panel, and drug level for first month; then same labs once weekly through 3rd month  post-transplant.  Urine for UA and protein/creatinine ratio monthly.  Serum BK - PCR at 1-, 3-, 6-, 9-, 12-, 18-, 24-, 36-, 48-, and 60 months post-transplant.  Hepatic panel at 1-, 2-, 3-, 6-, 9-, 12-, 18-, 24-, and 36- months post-transplant.    Justine Masterson NP       Education:   Material provided to the patient.  Patient reminded to call with any health changes since these can be early signs of significant complications.  Also, I advised the patient to be sure any new medications or changes of old medications are discussed with either a pharmacist or physician knowledgeable with transplant to avoid rejection/drug toxicity related to significant drug interactions.    Patient advised that it is recommended that all transplanted patients, and their close contacts and household members receive Covid vaccination.

## 2024-01-08 ENCOUNTER — OFFICE VISIT (OUTPATIENT)
Dept: TRANSPLANT | Facility: CLINIC | Age: 49
End: 2024-01-08
Payer: MEDICARE

## 2024-01-08 ENCOUNTER — CLINICAL SUPPORT (OUTPATIENT)
Dept: ENDOCRINOLOGY | Facility: CLINIC | Age: 49
End: 2024-01-08
Payer: MEDICARE

## 2024-01-08 ENCOUNTER — LAB VISIT (OUTPATIENT)
Dept: LAB | Facility: HOSPITAL | Age: 49
End: 2024-01-08
Payer: MEDICARE

## 2024-01-08 VITALS
OXYGEN SATURATION: 99 % | BODY MASS INDEX: 37.8 KG/M2 | WEIGHT: 226.88 LBS | HEART RATE: 86 BPM | TEMPERATURE: 98 F | RESPIRATION RATE: 18 BRPM | HEIGHT: 65 IN | DIASTOLIC BLOOD PRESSURE: 74 MMHG | SYSTOLIC BLOOD PRESSURE: 166 MMHG

## 2024-01-08 DIAGNOSIS — E11.65 TYPE 2 DIABETES MELLITUS WITH HYPERGLYCEMIA, WITH LONG-TERM CURRENT USE OF INSULIN: ICD-10-CM

## 2024-01-08 DIAGNOSIS — Z99.2 DELAYED GRAFT FUNCTION OF KIDNEY TRANSPLANT DUE TO ATN REQUIRING ACUTE DIALYSIS: ICD-10-CM

## 2024-01-08 DIAGNOSIS — Z94.0 S/P KIDNEY TRANSPLANT: Primary | ICD-10-CM

## 2024-01-08 DIAGNOSIS — Z79.4 TYPE 2 DIABETES MELLITUS WITH HYPERGLYCEMIA, WITH LONG-TERM CURRENT USE OF INSULIN: Primary | ICD-10-CM

## 2024-01-08 DIAGNOSIS — Z79.4 TYPE 2 DIABETES MELLITUS WITH HYPERGLYCEMIA, WITH LONG-TERM CURRENT USE OF INSULIN: ICD-10-CM

## 2024-01-08 DIAGNOSIS — T86.19 DELAYED GRAFT FUNCTION OF KIDNEY TRANSPLANT DUE TO ATN REQUIRING ACUTE DIALYSIS: ICD-10-CM

## 2024-01-08 DIAGNOSIS — I10 ESSENTIAL HYPERTENSION: ICD-10-CM

## 2024-01-08 DIAGNOSIS — E11.65 TYPE 2 DIABETES MELLITUS WITH HYPERGLYCEMIA, WITH LONG-TERM CURRENT USE OF INSULIN: Primary | ICD-10-CM

## 2024-01-08 DIAGNOSIS — Z94.0 KIDNEY REPLACED BY TRANSPLANT: ICD-10-CM

## 2024-01-08 LAB
ALBUMIN SERPL BCP-MCNC: 3.5 G/DL (ref 3.5–5.2)
ANION GAP SERPL CALC-SCNC: 13 MMOL/L (ref 8–16)
BASOPHILS # BLD AUTO: 0.02 K/UL (ref 0–0.2)
BASOPHILS NFR BLD: 0.3 % (ref 0–1.9)
BUN SERPL-MCNC: 32 MG/DL (ref 6–20)
CALCIUM SERPL-MCNC: 9.4 MG/DL (ref 8.7–10.5)
CHLORIDE SERPL-SCNC: 100 MMOL/L (ref 95–110)
CO2 SERPL-SCNC: 30 MMOL/L (ref 23–29)
CREAT SERPL-MCNC: 1.8 MG/DL (ref 0.5–1.4)
DIFFERENTIAL METHOD BLD: ABNORMAL
EOSINOPHIL # BLD AUTO: 0 K/UL (ref 0–0.5)
EOSINOPHIL NFR BLD: 0.6 % (ref 0–8)
ERYTHROCYTE [DISTWIDTH] IN BLOOD BY AUTOMATED COUNT: 14.3 % (ref 11.5–14.5)
EST. GFR  (NO RACE VARIABLE): 34.8 ML/MIN/1.73 M^2
GLUCOSE SERPL-MCNC: 69 MG/DL (ref 70–110)
HCT VFR BLD AUTO: 31.8 % (ref 37–48.5)
HGB BLD-MCNC: 10.1 G/DL (ref 12–16)
IMM GRANULOCYTES # BLD AUTO: 0.02 K/UL (ref 0–0.04)
IMM GRANULOCYTES NFR BLD AUTO: 0.3 % (ref 0–0.5)
LYMPHOCYTES # BLD AUTO: 0.6 K/UL (ref 1–4.8)
LYMPHOCYTES NFR BLD: 9.8 % (ref 18–48)
MAGNESIUM SERPL-MCNC: 1.5 MG/DL (ref 1.6–2.6)
MCH RBC QN AUTO: 30.9 PG (ref 27–31)
MCHC RBC AUTO-ENTMCNC: 31.8 G/DL (ref 32–36)
MCV RBC AUTO: 97 FL (ref 82–98)
MONOCYTES # BLD AUTO: 0.4 K/UL (ref 0.3–1)
MONOCYTES NFR BLD: 6.2 % (ref 4–15)
NEUTROPHILS # BLD AUTO: 5.2 K/UL (ref 1.8–7.7)
NEUTROPHILS NFR BLD: 82.8 % (ref 38–73)
NRBC BLD-RTO: 0 /100 WBC
PHOSPHATE SERPL-MCNC: 2.8 MG/DL (ref 2.7–4.5)
PLATELET # BLD AUTO: 155 K/UL (ref 150–450)
PMV BLD AUTO: 11.3 FL (ref 9.2–12.9)
POTASSIUM SERPL-SCNC: 3.2 MMOL/L (ref 3.5–5.1)
RBC # BLD AUTO: 3.27 M/UL (ref 4–5.4)
SODIUM SERPL-SCNC: 143 MMOL/L (ref 136–145)
TACROLIMUS BLD-MCNC: 8.6 NG/ML (ref 5–15)
WBC # BLD AUTO: 6.33 K/UL (ref 3.9–12.7)

## 2024-01-08 PROCEDURE — 85025 COMPLETE CBC W/AUTO DIFF WBC: CPT | Performed by: STUDENT IN AN ORGANIZED HEALTH CARE EDUCATION/TRAINING PROGRAM

## 2024-01-08 PROCEDURE — 36415 COLL VENOUS BLD VENIPUNCTURE: CPT | Performed by: STUDENT IN AN ORGANIZED HEALTH CARE EDUCATION/TRAINING PROGRAM

## 2024-01-08 PROCEDURE — 80197 ASSAY OF TACROLIMUS: CPT | Performed by: STUDENT IN AN ORGANIZED HEALTH CARE EDUCATION/TRAINING PROGRAM

## 2024-01-08 PROCEDURE — 99215 OFFICE O/P EST HI 40 MIN: CPT | Mod: S$PBB,,, | Performed by: NURSE PRACTITIONER

## 2024-01-08 PROCEDURE — 83735 ASSAY OF MAGNESIUM: CPT | Performed by: STUDENT IN AN ORGANIZED HEALTH CARE EDUCATION/TRAINING PROGRAM

## 2024-01-08 PROCEDURE — 99214 OFFICE O/P EST MOD 30 MIN: CPT | Mod: PBBFAC | Performed by: NURSE PRACTITIONER

## 2024-01-08 PROCEDURE — 99213 OFFICE O/P EST LOW 20 MIN: CPT | Mod: 95,,, | Performed by: INTERNAL MEDICINE

## 2024-01-08 PROCEDURE — 80069 RENAL FUNCTION PANEL: CPT | Performed by: STUDENT IN AN ORGANIZED HEALTH CARE EDUCATION/TRAINING PROGRAM

## 2024-01-08 PROCEDURE — 99999 PR PBB SHADOW E&M-EST. PATIENT-LVL IV: CPT | Mod: PBBFAC,,, | Performed by: NURSE PRACTITIONER

## 2024-01-08 RX ORDER — POTASSIUM CHLORIDE 20 MEQ/1
20 TABLET, EXTENDED RELEASE ORAL 2 TIMES DAILY
Qty: 60 TABLET | Refills: 3 | Status: SHIPPED | OUTPATIENT
Start: 2024-01-08 | End: 2024-01-24 | Stop reason: DRUGHIGH

## 2024-01-08 NOTE — PROGRESS NOTES
Subjective:      Patient ID: Mary Waller is a 48 y.o. female.    Chief Complaint:  No chief complaint on file.    The patient location is: home  The chief complaint leading to consultation is: T2DM     Visit type: audiovisual      Face to Face time with patient: 20 minutes   30 minutes of total time spent on the encounter, which includes face to face time and non-face to face time preparing to see the patient (eg, review of tests), Obtaining and/or reviewing separately obtained history, Documenting clinical information in the electronic or other health record, Independently interpreting results (not separately reported) and communicating results to the patient/family/caregiver, or Care coordination (not separately reported).     Each patient to whom he or she provides medical services by telemedicine is:  (1) informed of the relationship between the physician and patient and the respective role of any other health care provider with respect to management of the patient; and (2) notified that he or she may decline to receive medical services by telemedicine and may withdraw from such care at any time.      History of Present Illness  This is a follow up visit after recent hospitalization  Endocrinology was consulted for management of hyperglycemia:    Discharge date: 12/23/23  Discharge regimen specific to hyperglycemia:   -Lantus 10 units daily   -Novolog 5 units with meals   -Add correction scale if needed.  Blood sugar 180 to 230 add 1 units  Blood sugar 231 to 280 add 2 units  Blood sugar 281 to 330 add 3 units  Blood sugar 331 to 380 add 4 units  Blood sugar greater than 380 add 5 units    Glucocorticoid: none   Date of transplant? Kidney Transplant 11/29/23     Was able to fill prescription for medications and supplies     Etiology of the hyperglycemia: known T2DM     Diabetes was diagnosed: in her 30s   Known complications:  Diabetic nephropathy, Diabetic chronic kidney disease,  and Diabetic  retinopathy      Complicating diabetes co morbidities:   ESRD s/p kidney transplant, Glucocorticoid Use, HTN, CAD, HLD         Current regimen:  -Lantus 10 units daily   -Novolog 5 units with meals   -Add correction scale if needed.  Blood sugar 180 to 230 add 1 units  Blood sugar 231 to 280 add 2 units  Blood sugar 281 to 330 add 3 units  Blood sugar 331 to 380 add 4 units  Blood sugar greater than 380 add 5 units    Missed doses?  None     Prior medications not tolerated or Failed treatments: none        # times a day testing 4x daily   Log reviewed: none provided     No log provided - patient reported to be:     Pre breakfast - 74, 124, 74,139, 98, 97, 128, 170   Pre lunch - 246, 277,293,180, 221, 240, 180, 239  Pre dinner - 307, 284, 208, 248, 337, 271, 208   Qhs - 182, 208, 272, 201, 119, 171, 191     Hypoglycemic event? None   If yes, needed assistance? no  Hypoglycemia unawareness  n/a     Typical meals:   Breakfast - eggs, deluca, bagel, breakfast sausage, oatmeal   Lunch - salad, burger   Dinner -meat, veggies, sandwiches   Snacks - no       Education - last visit: yes     No Polyuria polydipsia nocturia or vision changes    With regards to reason for admit:  Doing better       Review of Systems   Constitutional:  Negative for appetite change.   Gastrointestinal:  Negative for nausea and vomiting.   Endocrine: Negative for polydipsia and polyuria.   Neurological:  Negative for dizziness, weakness and headaches.       Objective:   Physical Exam    There is no height or weight on file to calculate BMI.    Lab Review:   Lab Results   Component Value Date    HGBA1C 5.7 10/04/2023     Lab Results   Component Value Date    CHOL 98 (L) 08/17/2023    HDL 30 (L) 08/17/2023    LDLCALC 47.0 (L) 08/17/2023    TRIG 105 08/17/2023    CHOLHDL 30.6 08/17/2023     Lab Results   Component Value Date     01/08/2024    K 3.2 (L) 01/08/2024     01/08/2024    CO2 30 (H) 01/08/2024    GLU 69 (L) 01/08/2024    BUN 32  (H) 01/08/2024    CREATININE 1.8 (H) 01/08/2024    CALCIUM 9.4 01/08/2024    PROT 7.8 01/04/2024    ALBUMIN 3.5 01/08/2024    BILITOT 0.5 01/04/2024    ALKPHOS 69 01/04/2024    AST 9 (L) 01/04/2024    ALT 12 01/04/2024    ANIONGAP 13 01/08/2024    ESTGFRAFRICA 11 (A) 12/29/2019    EGFRNONAA 9 (A) 12/29/2019    TSH 0.459 12/21/2023       Assessment and Plan     Problem List Items Addressed This Visit          Endocrine    Type 2 diabetes mellitus with hyperglycemia, with long-term current use of insulin - Primary    Current Assessment & Plan     Reviewed goals of therapy are to get the best control we can without hypoglycemia      Medication changes:   Increase Novolog to 7 units TID with meals in addition to the following correction scale   Blood sugar 180 to 230 add 1 units  Blood sugar 231 to 280 add 2 units  Blood sugar 281 to 330 add 3 units  Blood sugar 331 to 380 add 4 units  Blood sugar greater than 380 add 5 units       Reviewed patient's current insulin regimen. Clarified proper insulin dose and timing in relation to meals, etc. Insulin injection sites and proper rotation instructed.       -Advised frequent self blood glucose monitoring.  Patient encouraged to document glucose results and bring them to every clinic visit      -Hypoglycemia precautions discussed. Instructed on precautions before driving.      -Close adherence to lifestyle changes recommended.      -Periodic follow ups for eye evaluations, foot care and dental care suggested.    rtc in 1 month (patient will be following up with PCP on 1/22 per patient's request for  continued DM management)

## 2024-01-08 NOTE — ASSESSMENT & PLAN NOTE
Reviewed goals of therapy are to get the best control we can without hypoglycemia      Medication changes:   Increase Novolog to 7 units TID with meals in addition to the following correction scale   Blood sugar 180 to 230 add 1 units  Blood sugar 231 to 280 add 2 units  Blood sugar 281 to 330 add 3 units  Blood sugar 331 to 380 add 4 units  Blood sugar greater than 380 add 5 units       Reviewed patient's current insulin regimen. Clarified proper insulin dose and timing in relation to meals, etc. Insulin injection sites and proper rotation instructed.       -Advised frequent self blood glucose monitoring.  Patient encouraged to document glucose results and bring them to every clinic visit      -Hypoglycemia precautions discussed. Instructed on precautions before driving.      -Close adherence to lifestyle changes recommended.      -Periodic follow ups for eye evaluations, foot care and dental care suggested.    rtc in 1 month (patient will be following up with PCP on 1/22 per patient's request for  continued DM management)

## 2024-01-08 NOTE — LETTER
January 8, 2024        Aries Bourne  [address]  [facility] [address]  Phone: [phone]  Fax: [phone]             [facility] 1st Fl  [address]  [facility] [address]  Phone: [phone]   Patient: Mary Waller   MR Number: [id number]   YOB: 1975   Date of Visit: 1/8/2024       Dear Dr. Aries Bourne    Thank you for referring Mary Waller to me for evaluation. Attached you will find relevant portions of my assessment and plan of care.    If you have questions, please do not hesitate to call me. I look forward to following Mary Waller along with you.    Sincerely,    Justine Masterson, NP    Enclosure    If you would like to receive this communication electronically, please contact [email or web address] or [phone] to request [facility] access.    [facility] is a tool which provides read-only access to select patient information with whom you have a relationship. Its easy to use and provides real time access to review your patients record including encounter summaries, notes, results, and demographic information.    If you feel you have received this communication in error or would no longer like to receive these types of communications, please e-mail [email or web address]

## 2024-01-09 ENCOUNTER — OFFICE VISIT (OUTPATIENT)
Dept: CARDIOLOGY | Facility: CLINIC | Age: 49
End: 2024-01-09
Payer: MEDICARE

## 2024-01-09 VITALS
OXYGEN SATURATION: 94 % | SYSTOLIC BLOOD PRESSURE: 198 MMHG | BODY MASS INDEX: 37.87 KG/M2 | WEIGHT: 227.31 LBS | HEIGHT: 65 IN | HEART RATE: 114 BPM | DIASTOLIC BLOOD PRESSURE: 86 MMHG

## 2024-01-09 DIAGNOSIS — Z79.60 LONG-TERM USE OF IMMUNOSUPPRESSANT MEDICATION: ICD-10-CM

## 2024-01-09 DIAGNOSIS — N18.6 TYPE 2 DIABETES MELLITUS WITH CHRONIC KIDNEY DISEASE ON CHRONIC DIALYSIS, UNSPECIFIED WHETHER LONG TERM INSULIN USE: ICD-10-CM

## 2024-01-09 DIAGNOSIS — I25.119 ATHEROSCLEROSIS OF NATIVE CORONARY ARTERY OF NATIVE HEART WITH ANGINA PECTORIS: ICD-10-CM

## 2024-01-09 DIAGNOSIS — E66.01 CLASS 2 SEVERE OBESITY DUE TO EXCESS CALORIES WITH SERIOUS COMORBIDITY AND BODY MASS INDEX (BMI) OF 38.0 TO 38.9 IN ADULT: ICD-10-CM

## 2024-01-09 DIAGNOSIS — E11.22 TYPE 2 DIABETES MELLITUS WITH CHRONIC KIDNEY DISEASE ON CHRONIC DIALYSIS, UNSPECIFIED WHETHER LONG TERM INSULIN USE: ICD-10-CM

## 2024-01-09 DIAGNOSIS — I10 ESSENTIAL HYPERTENSION: ICD-10-CM

## 2024-01-09 DIAGNOSIS — R00.2 PALPITATIONS: ICD-10-CM

## 2024-01-09 DIAGNOSIS — T86.19 DELAYED GRAFT FUNCTION OF KIDNEY TRANSPLANT DUE TO ATN REQUIRING ACUTE DIALYSIS: ICD-10-CM

## 2024-01-09 DIAGNOSIS — Z99.2 DELAYED GRAFT FUNCTION OF KIDNEY TRANSPLANT DUE TO ATN REQUIRING ACUTE DIALYSIS: ICD-10-CM

## 2024-01-09 DIAGNOSIS — Z99.2 TYPE 2 DIABETES MELLITUS WITH CHRONIC KIDNEY DISEASE ON CHRONIC DIALYSIS, UNSPECIFIED WHETHER LONG TERM INSULIN USE: ICD-10-CM

## 2024-01-09 PROBLEM — I26.99 PULMONARY EMBOLISM: Status: RESOLVED | Noted: 2023-12-22 | Resolved: 2024-01-09

## 2024-01-09 PROCEDURE — 99215 OFFICE O/P EST HI 40 MIN: CPT | Mod: PBBFAC | Performed by: INTERNAL MEDICINE

## 2024-01-09 PROCEDURE — 93010 ELECTROCARDIOGRAM REPORT: CPT | Mod: S$PBB,,, | Performed by: INTERNAL MEDICINE

## 2024-01-09 PROCEDURE — 99214 OFFICE O/P EST MOD 30 MIN: CPT | Mod: S$PBB,GC,, | Performed by: INTERNAL MEDICINE

## 2024-01-09 PROCEDURE — 99999 PR PBB SHADOW E&M-EST. PATIENT-LVL V: CPT | Mod: PBBFAC,GC,, | Performed by: INTERNAL MEDICINE

## 2024-01-09 PROCEDURE — 93005 ELECTROCARDIOGRAM TRACING: CPT | Mod: PBBFAC | Performed by: INTERNAL MEDICINE

## 2024-01-09 NOTE — Clinical Note
Yasmine Fletcher, we want to start her back on Labetalol or Verapamil for HTN & palpitations. Just wanted to ok with y'all before starting back because looks like she's been hypertensive this last few visits since transplant. Just let me know, thanks.

## 2024-01-09 NOTE — PROGRESS NOTES
Cardiology Clinic Note  Reason for Visit: Cardiac Risk Stratification  Referring Physician: Justine Masterson NP    HPI:   Problem List:  ESRD s/p renal transplant 11/29/23  CAD s/p PCI (2019 at [facility])  HTN  DM2  HLD      Patient first seen for cardiac risk stratification for possible kidney transplantation. Since transplant she was taken off Labetalol for some reason. She has been having palpitations when exerting herself or bending over frequently. They typically last minutes before easing off spontaneously. No angina or heart failure. Admitted at [facility] 3 weeks ago for this. Work-up revealed sinus tachycardia, but no clear source identified. K 2.7 and Cr 1.6-1.8, but no other metabolic derangement noted off from baseline. No syncope, falls. No bleeding. BP elevated, but not being treated.       ROS:    Constitution: Negative for fever, chills, weight loss or gain.   HENT: Negative for sore throat, rhinorrhea, or headache.  Eyes: Negative for blurred or double vision.   Cardiovascular: See above  Pulmonary: Negative for SOB   Gastrointestinal: Negative for abdominal pain, nausea, vomiting, or diarrhea.   : Negative for dysuria.   Neurological: Negative for focal weakness or sensory changes.  PMH:     Past Medical History:   Diagnosis Date    Blind     Diabetes     ESRD (end stage renal disease) on dialysis     Hypertension     Right cataract      Past Surgical History:   Procedure Laterality Date    AV FISTULA PLACEMENT      EYE SURGERY      KIDNEY TRANSPLANT N/A 11/29/2023    Procedure: TRANSPLANT, KIDNEY;  Surgeon: Torri Moore MD;  Location: [facility] OR [address] [place] [address];  Service: Transplant;  Laterality: N/A;  Kidney in Room @ 0714  Out of Ice @ 0943  Reperfusion @ 1011     Allergies:     Review of patient's allergies indicates:   Allergen Reactions    Codeine Itching    Codeine sulfate Itching    Sulfa (sulfonamide antibiotics)     Sulfur Other (See Comments)     Reaction not specified.     Medications:  "    Current Outpatient Medications on File Prior to Visit   Medication Sig Dispense Refill    atovaquone (MEPRON) 750 mg/5 mL Susp oral liquid Take 10 mLs (1,500 mg total) by mouth once daily. STOP 5/29/24 300 mL 5    atropine 1% (ISOPTO ATROPINE) 1 % Drop Place 1 drop into the left eye every evening.      blood sugar diagnostic Strp 1 each by Misc.(Non-Drug; Combo Route) route 3 (three) times daily. 90 each 11    blood-glucose meter kit USE TO TEST BLOOD GLUCOSE 1 each 0    calcitRIOL (ROCALTROL) 0.25 MCG Cap Take 1 capsule (0.25 mcg total) by mouth once daily. 30 capsule 5    COMBIGAN 0.2-0.5 % Drop Place 1 drop into the right eye every evening.      ergocalciferol (VITAMIN D2) 50,000 unit Cap Take 50,000 Units by mouth every 7 days. Take 1 capsule by mouth weekly x 12 weeks, then decrease to once per month.      famotidine (PEPCID) 20 MG tablet Take 1 tablet (20 mg total) by mouth every evening. 30 tablet 0    insulin (LANTUS SOLOSTAR U-100 INSULIN) glargine 100 units/mL SubQ pen 10-14 units daily as direct by endocrinology 6 mL 11    insulin lispro 100 unit/mL pen Inject 5 Units into the skin 3 (three) times daily with meals. Plus sliding scale: TDD=30 units 9 mL 11    lancets Misc 1 each by Misc.(Non-Drug; Combo Route) route 3 (three) times daily. 90 each 11    Lmefol Ca-acetyl-meB12-algal (CEREFOLIN NAC, ALGAL OIL,) 6 mg-600 mg- 2 mg-90.314 mg Tab Take 1 tablet by mouth once daily.      magnesium oxide (MAG-OX) 400 mg (241.3 mg magnesium) tablet Take 2 tablets (800 mg total) by mouth 2 (two) times daily. 120 tablet 11    mycophenolate (CELLCEPT) 250 mg Cap Take 4 capsules (1,000 mg total) by mouth 2 (two) times daily. 240 capsule 11    pen needle, diabetic (EASY COMFORT PEN NEEDLES) 32 gauge x 5/32" Ndle Inject 1 each into the skin 3 (three) times daily. 100 each 11    potassium chloride SA (K-DUR,KLOR-CON) 20 MEQ tablet Take 1 tablet (20 mEq total) by mouth 2 (two) times daily. 60 tablet 3    rosuvastatin " "(CRESTOR) 40 MG Tab Take 40 mg by mouth every evening.      tacrolimus (PROGRAF) 1 MG Cap Take 7 capsules (7 mg total) by mouth every 12 (twelve) hours. 420 capsule 11    valGANciclovir (VALCYTE) 450 mg Tab Take 1 tablet (450 mg total) by mouth once daily. STOP 5/29/24 30 tablet 5    predniSONE (DELTASONE) 5 MG tablet Take by mouth daily:  20mg 12/2-12/8, 15mg 12/9-12/15, 10mg 12/16-12/22, then 5mg daily beginning 12/23/2023 (Patient not taking: Reported on 1/9/2024) 70 tablet 11    [DISCONTINUED] amLODIPine (NORVASC) 10 MG tablet Take 10 mg by mouth once daily.      [DISCONTINUED] hydrALAZINE (APRESOLINE) 50 MG tablet Take 100 mg by mouth every 8 (eight) hours.       [DISCONTINUED] losartan (COZAAR) 100 MG tablet Take 100 mg by mouth once daily.       No current facility-administered medications on file prior to visit.     Social History:     Social History     Tobacco Use    Smoking status: Never    Smokeless tobacco: Never   Substance Use Topics    Alcohol use: No     Family History:     Family History   Problem Relation Age of Onset    Diabetes Mother     Hyperlipidemia Mother     Diabetes Father     Hyperlipidemia Father     Diabetes Sister      Physical Exam:   BP (!) 198/86   Pulse (!) 114   Ht 5' 5" (1.651 m)   Wt 103.1 kg (227 lb 4.7 oz)   LMP 12/06/2023 (Approximate)   SpO2 (!) 94%   BMI 37.82 kg/m²        Physical Exam   Constitutional: She is oriented to person, place, and time.   HENT:   Head: Normocephalic.   Mouth/Throat: Mucous membranes are moist.   Eyes:   Blind in L eye   Cardiovascular: Normal rate and regular rhythm.   Murmur heard.  Harsh midsystolic murmur is present with a grade of 3/6 at the upper right sternal border radiating to the neck.  AVF in LUE with thrill/bruit   Pulmonary/Chest: Effort normal. No respiratory distress.   Abdominal: Soft. Normal appearance.   Musculoskeletal:      Cervical back: Neck supple.      Right lower leg: No edema.      Left lower leg: No edema. "   Neurological: She is alert and oriented to person, place, and time.   Skin: Skin is warm.        Labs:     Lab Results   Component Value Date     01/08/2024    K 3.2 (L) 01/08/2024     01/08/2024     12/06/2023    CO2 30 (H) 01/08/2024    BUN 32 (H) 01/08/2024    CREATININE 1.8 (H) 01/08/2024    ANIONGAP 13 01/08/2024     Lab Results   Component Value Date    HGBA1C 5.7 10/04/2023     Lab Results   Component Value Date    BNP 48 12/21/2023     (H) 05/21/2018    Lab Results   Component Value Date    WBC 6.33 01/08/2024    HGB 10.1 (L) 01/08/2024    HCT 31.8 (L) 01/08/2024    HCT 31 (L) 11/29/2023     01/08/2024    GRAN 5.2 01/08/2024    GRAN 82.8 (H) 01/08/2024     Lab Results   Component Value Date    CHOL 98 (L) 08/17/2023    HDL 30 (L) 08/17/2023    LDLCALC 47.0 (L) 08/17/2023    TRIG 105 08/17/2023          Imaging:   TTE 1/6/2019  Normal left ventricular systolic function. The estimated ejection fraction is 65%  Markedly calcified mitral annulus. Echodensity under the posterior mitral valve leaflet. Cannot rule out vegetation.  Concentric left ventricular remodeling.  Severe left atrial enlargement.  Indeterminate left ventricular diastolic function.  Normal right ventricular systolic function.  There is right ventricular hypertrophy.  The estimated PA systolic pressure is 12 mm Hg  Normal central venous pressure (3 mm Hg).  Trivial circumferential pericardial effusion.    JEROME 1/8/2019    Normal left ventricular systolic function. The estimated ejection fraction is 65%  Normal right ventricular systolic function.  Normal LV diastolic function.  Presence of large round calcified mass attached to the posterior MV leaflet with central echolucent space measuring 1.2 x 0.9 cm that is consistent with caseous necrosis, a variant of Mitral annular calcification. An old healed vegetation cannot be ruled out. There are also small nodular calcium deposits seen on the posterior MV  leaflet.  Limited JEROME due to patient developing hypoxia, pulmonic not evaluated on this study.  No gross abnormality of the Aortic (trileaflet) and TV valves seen.    Assessment:      1. Delayed graft function of kidney transplant due to ATN requiring acute dialysis  Ambulatory referral/consult to Cardiology      2. Essential hypertension  Ambulatory referral/consult to Cardiology      3. Type 2 diabetes mellitus with chronic kidney disease on chronic dialysis, unspecified whether long term insulin use  Ambulatory referral/consult to Cardiology      4. Long-term use of immunosuppressant medication  Ambulatory referral/consult to Cardiology      5. Palpitations  Ambulatory referral/consult to Cardiology    IN OFFICE EKG 12-LEAD (to Muse)      6. Class 2 severe obesity due to excess calories with serious comorbidity and body mass index (BMI) of 38.0 to 38.9 in adult        7. Atherosclerosis of native coronary artery of native heart with angina pectoris                Plan:     EKG with sinus tachycardia, LAE, LVH today; no clear trigger for tachycardia  Would be in favor of treating tachycardia and HTN with Labetalol or Verapamil with Clonidine PRN  Will reach out to transplant nephrology to coordinate BP mgmt  Can consider Holter monitor if palpitations not improving with medical mgmt    RTC 3 months or sooner PRN.    Discussed case with MD Bryan Oliver MD PGY6  Cardiovascular Medicine Fellow  [facility]  Pager: [phone]

## 2024-01-10 ENCOUNTER — PATIENT MESSAGE (OUTPATIENT)
Dept: CARDIOLOGY | Facility: CLINIC | Age: 49
End: 2024-01-10
Payer: MEDICARE

## 2024-01-10 LAB
FINAL PATHOLOGIC DIAGNOSIS: NORMAL
GROSS: NORMAL
Lab: NORMAL

## 2024-01-10 RX ORDER — LABETALOL 200 MG/1
400 TABLET, FILM COATED ORAL EVERY 12 HOURS
Qty: 360 TABLET | Refills: 3 | Status: SHIPPED | OUTPATIENT
Start: 2024-01-10 | End: 2024-02-21 | Stop reason: SDUPTHER

## 2024-01-12 ENCOUNTER — TELEPHONE (OUTPATIENT)
Dept: TRANSPLANT | Facility: CLINIC | Age: 49
End: 2024-01-12
Payer: MEDICARE

## 2024-01-12 NOTE — TELEPHONE ENCOUNTER
Pt asked if she had ot fill survey out. Advised pt it was up to her if it is a survey.     ----- Message from Michelle Ragsdale sent at 1/12/2024  3:15 PM CST -----  Regarding: Questions      Name Of Caller:     Mary      Contact Preference:     [phone]      Nature of call:   Pt calling to speak with Emma. She has some questions about a survey she needs to complete with Endocrinology.

## 2024-01-17 ENCOUNTER — LAB VISIT (OUTPATIENT)
Dept: LAB | Facility: HOSPITAL | Age: 49
End: 2024-01-17
Attending: STUDENT IN AN ORGANIZED HEALTH CARE EDUCATION/TRAINING PROGRAM
Payer: MEDICARE

## 2024-01-17 DIAGNOSIS — Z94.0 KIDNEY REPLACED BY TRANSPLANT: ICD-10-CM

## 2024-01-17 LAB
ALBUMIN SERPL BCP-MCNC: 3.4 G/DL (ref 3.5–5.2)
ANION GAP SERPL CALC-SCNC: 10 MMOL/L (ref 8–16)
BACTERIA #/AREA URNS HPF: NORMAL /HPF
BASOPHILS # BLD AUTO: 0.03 K/UL (ref 0–0.2)
BASOPHILS NFR BLD: 0.5 % (ref 0–1.9)
BILIRUB UR QL STRIP: NEGATIVE
BUN SERPL-MCNC: 42 MG/DL (ref 6–20)
CALCIUM SERPL-MCNC: 9.4 MG/DL (ref 8.7–10.5)
CHLORIDE SERPL-SCNC: 99 MMOL/L (ref 95–110)
CLARITY UR: CLEAR
CO2 SERPL-SCNC: 32 MMOL/L (ref 23–29)
COLOR UR: YELLOW
CREAT SERPL-MCNC: 2.2 MG/DL (ref 0.5–1.4)
DIFFERENTIAL METHOD BLD: ABNORMAL
EOSINOPHIL # BLD AUTO: 0 K/UL (ref 0–0.5)
EOSINOPHIL NFR BLD: 0.5 % (ref 0–8)
ERYTHROCYTE [DISTWIDTH] IN BLOOD BY AUTOMATED COUNT: 14.2 % (ref 11.5–14.5)
EST. GFR  (NO RACE VARIABLE): 27 ML/MIN/1.73 M^2
GLUCOSE SERPL-MCNC: 118 MG/DL (ref 70–110)
GLUCOSE UR QL STRIP: NEGATIVE
HCT VFR BLD AUTO: 32.6 % (ref 37–48.5)
HGB BLD-MCNC: 10.1 G/DL (ref 12–16)
HGB UR QL STRIP: ABNORMAL
HYALINE CASTS #/AREA URNS LPF: 0 /LPF
IMM GRANULOCYTES # BLD AUTO: 0.02 K/UL (ref 0–0.04)
IMM GRANULOCYTES NFR BLD AUTO: 0.3 % (ref 0–0.5)
KETONES UR QL STRIP: NEGATIVE
LEUKOCYTE ESTERASE UR QL STRIP: NEGATIVE
LYMPHOCYTES # BLD AUTO: 0.5 K/UL (ref 1–4.8)
LYMPHOCYTES NFR BLD: 9.3 % (ref 18–48)
MAGNESIUM SERPL-MCNC: 1.6 MG/DL (ref 1.6–2.6)
MCH RBC QN AUTO: 31.3 PG (ref 27–31)
MCHC RBC AUTO-ENTMCNC: 31 G/DL (ref 32–36)
MCV RBC AUTO: 101 FL (ref 82–98)
MICROSCOPIC COMMENT: NORMAL
MONOCYTES # BLD AUTO: 0.3 K/UL (ref 0.3–1)
MONOCYTES NFR BLD: 5.7 % (ref 4–15)
NEUTROPHILS # BLD AUTO: 4.9 K/UL (ref 1.8–7.7)
NEUTROPHILS NFR BLD: 83.7 % (ref 38–73)
NITRITE UR QL STRIP: NEGATIVE
NON-SQ EPI CELLS #/AREA URNS HPF: 0 /HPF
NRBC BLD-RTO: 0 /100 WBC
PH UR STRIP: 6 [PH] (ref 5–8)
PHOSPHATE SERPL-MCNC: 2.9 MG/DL (ref 2.7–4.5)
PLATELET # BLD AUTO: 142 K/UL (ref 150–450)
PMV BLD AUTO: 11.9 FL (ref 9.2–12.9)
POTASSIUM SERPL-SCNC: 3.8 MMOL/L (ref 3.5–5.1)
PROT UR QL STRIP: ABNORMAL
RBC # BLD AUTO: 3.23 M/UL (ref 4–5.4)
RBC #/AREA URNS HPF: 0 /HPF (ref 0–4)
SODIUM SERPL-SCNC: 141 MMOL/L (ref 136–145)
SP GR UR STRIP: 1.01 (ref 1–1.03)
SQUAMOUS #/AREA URNS HPF: 0 /HPF
URN SPEC COLLECT METH UR: ABNORMAL
UROBILINOGEN UR STRIP-ACNC: NEGATIVE EU/DL
WBC # BLD AUTO: 5.82 K/UL (ref 3.9–12.7)
WBC #/AREA URNS HPF: 1 /HPF (ref 0–5)
WBC CLUMPS URNS QL MICRO: NORMAL
YEAST URNS QL MICRO: NORMAL

## 2024-01-17 PROCEDURE — 36415 COLL VENOUS BLD VENIPUNCTURE: CPT | Performed by: STUDENT IN AN ORGANIZED HEALTH CARE EDUCATION/TRAINING PROGRAM

## 2024-01-17 PROCEDURE — 80197 ASSAY OF TACROLIMUS: CPT | Performed by: STUDENT IN AN ORGANIZED HEALTH CARE EDUCATION/TRAINING PROGRAM

## 2024-01-17 PROCEDURE — 80069 RENAL FUNCTION PANEL: CPT | Performed by: STUDENT IN AN ORGANIZED HEALTH CARE EDUCATION/TRAINING PROGRAM

## 2024-01-17 PROCEDURE — 81000 URINALYSIS NONAUTO W/SCOPE: CPT | Performed by: STUDENT IN AN ORGANIZED HEALTH CARE EDUCATION/TRAINING PROGRAM

## 2024-01-17 PROCEDURE — 83735 ASSAY OF MAGNESIUM: CPT | Performed by: STUDENT IN AN ORGANIZED HEALTH CARE EDUCATION/TRAINING PROGRAM

## 2024-01-17 PROCEDURE — 87086 URINE CULTURE/COLONY COUNT: CPT | Performed by: STUDENT IN AN ORGANIZED HEALTH CARE EDUCATION/TRAINING PROGRAM

## 2024-01-17 PROCEDURE — 85025 COMPLETE CBC W/AUTO DIFF WBC: CPT | Performed by: STUDENT IN AN ORGANIZED HEALTH CARE EDUCATION/TRAINING PROGRAM

## 2024-01-18 LAB — TACROLIMUS BLD-MCNC: 8.5 NG/ML (ref 5–15)

## 2024-01-19 LAB — BACTERIA UR CULT: NORMAL

## 2024-01-22 ENCOUNTER — LAB VISIT (OUTPATIENT)
Dept: LAB | Facility: HOSPITAL | Age: 49
End: 2024-01-22
Attending: STUDENT IN AN ORGANIZED HEALTH CARE EDUCATION/TRAINING PROGRAM
Payer: MEDICARE

## 2024-01-22 DIAGNOSIS — Z94.0 KIDNEY REPLACED BY TRANSPLANT: ICD-10-CM

## 2024-01-22 LAB
ALBUMIN SERPL BCP-MCNC: 3.4 G/DL (ref 3.5–5.2)
ANION GAP SERPL CALC-SCNC: 12 MMOL/L (ref 8–16)
BASOPHILS # BLD AUTO: 0.01 K/UL (ref 0–0.2)
BASOPHILS NFR BLD: 0.1 % (ref 0–1.9)
BILIRUB UR QL STRIP: NEGATIVE
BUN SERPL-MCNC: 43 MG/DL (ref 6–20)
CALCIUM SERPL-MCNC: 9.5 MG/DL (ref 8.7–10.5)
CHLORIDE SERPL-SCNC: 99 MMOL/L (ref 95–110)
CLARITY UR: CLEAR
CO2 SERPL-SCNC: 30 MMOL/L (ref 23–29)
COLOR UR: COLORLESS
CREAT SERPL-MCNC: 1.8 MG/DL (ref 0.5–1.4)
DIFFERENTIAL METHOD BLD: ABNORMAL
EOSINOPHIL # BLD AUTO: 0 K/UL (ref 0–0.5)
EOSINOPHIL NFR BLD: 0.6 % (ref 0–8)
ERYTHROCYTE [DISTWIDTH] IN BLOOD BY AUTOMATED COUNT: 14.3 % (ref 11.5–14.5)
EST. GFR  (NO RACE VARIABLE): 35 ML/MIN/1.73 M^2
GLUCOSE SERPL-MCNC: 124 MG/DL (ref 70–110)
GLUCOSE UR QL STRIP: NEGATIVE
HCT VFR BLD AUTO: 32.7 % (ref 37–48.5)
HGB BLD-MCNC: 9.9 G/DL (ref 12–16)
HGB UR QL STRIP: NEGATIVE
IMM GRANULOCYTES # BLD AUTO: 0.03 K/UL (ref 0–0.04)
IMM GRANULOCYTES NFR BLD AUTO: 0.4 % (ref 0–0.5)
KETONES UR QL STRIP: NEGATIVE
LEUKOCYTE ESTERASE UR QL STRIP: NEGATIVE
LYMPHOCYTES # BLD AUTO: 0.6 K/UL (ref 1–4.8)
LYMPHOCYTES NFR BLD: 8.3 % (ref 18–48)
MAGNESIUM SERPL-MCNC: 1.6 MG/DL (ref 1.6–2.6)
MCH RBC QN AUTO: 30.5 PG (ref 27–31)
MCHC RBC AUTO-ENTMCNC: 30.3 G/DL (ref 32–36)
MCV RBC AUTO: 101 FL (ref 82–98)
MONOCYTES # BLD AUTO: 0.4 K/UL (ref 0.3–1)
MONOCYTES NFR BLD: 5.2 % (ref 4–15)
NEUTROPHILS # BLD AUTO: 6 K/UL (ref 1.8–7.7)
NEUTROPHILS NFR BLD: 85.4 % (ref 38–73)
NITRITE UR QL STRIP: NEGATIVE
NRBC BLD-RTO: 0 /100 WBC
PH UR STRIP: 6 [PH] (ref 5–8)
PHOSPHATE SERPL-MCNC: 3.4 MG/DL (ref 2.7–4.5)
PLATELET # BLD AUTO: 143 K/UL (ref 150–450)
PMV BLD AUTO: 11.3 FL (ref 9.2–12.9)
POTASSIUM SERPL-SCNC: 3.3 MMOL/L (ref 3.5–5.1)
PROT UR QL STRIP: ABNORMAL
RBC # BLD AUTO: 3.25 M/UL (ref 4–5.4)
SODIUM SERPL-SCNC: 141 MMOL/L (ref 136–145)
SP GR UR STRIP: 1.01 (ref 1–1.03)
TACROLIMUS BLD-MCNC: 9.1 NG/ML (ref 5–15)
URN SPEC COLLECT METH UR: ABNORMAL
UROBILINOGEN UR STRIP-ACNC: NEGATIVE EU/DL
WBC # BLD AUTO: 6.97 K/UL (ref 3.9–12.7)

## 2024-01-22 PROCEDURE — 85025 COMPLETE CBC W/AUTO DIFF WBC: CPT | Performed by: STUDENT IN AN ORGANIZED HEALTH CARE EDUCATION/TRAINING PROGRAM

## 2024-01-22 PROCEDURE — 80197 ASSAY OF TACROLIMUS: CPT | Performed by: STUDENT IN AN ORGANIZED HEALTH CARE EDUCATION/TRAINING PROGRAM

## 2024-01-22 PROCEDURE — 36415 COLL VENOUS BLD VENIPUNCTURE: CPT | Performed by: STUDENT IN AN ORGANIZED HEALTH CARE EDUCATION/TRAINING PROGRAM

## 2024-01-22 PROCEDURE — 80069 RENAL FUNCTION PANEL: CPT | Performed by: STUDENT IN AN ORGANIZED HEALTH CARE EDUCATION/TRAINING PROGRAM

## 2024-01-22 PROCEDURE — 81003 URINALYSIS AUTO W/O SCOPE: CPT | Performed by: STUDENT IN AN ORGANIZED HEALTH CARE EDUCATION/TRAINING PROGRAM

## 2024-01-22 PROCEDURE — 87086 URINE CULTURE/COLONY COUNT: CPT | Performed by: STUDENT IN AN ORGANIZED HEALTH CARE EDUCATION/TRAINING PROGRAM

## 2024-01-22 PROCEDURE — 83735 ASSAY OF MAGNESIUM: CPT | Performed by: STUDENT IN AN ORGANIZED HEALTH CARE EDUCATION/TRAINING PROGRAM

## 2024-01-22 NOTE — PROGRESS NOTES
Kidney Post-Transplant Assessment    Referring Physician: Aries Bourne  Current Nephrologist: Aries Bourne    ORGAN: LEFT KIDNEY  Donor Type: donation after brain death  PHS Increased Risk: yes  Cold Ischemia: 903 mins  Induction Medications: thymo    Subjective:     CC:  Reassessment of renal allograft function and management of chronic immunosuppression.    HPI:  Ms. Waller is a 49 y.o. 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 Her most recent creatinine is 1.6  She takes mycophenolate mofetil, prednisone, and tacrolimus for maintenance immunosuppression. Her post transplant course has been complicated by delayed graft function requiring dialysis.    Hospitalization/ ED visits  12/2023 Tachycardia; neg PEor MI    Interval HX:  Mild improvement in Cr biopsy on 1/5/23  Not on K+ supplement-- ordered today  Reports about 2kg increase frome yessenia weight. Feels that her swelling in located in her abd, now leg swelling unless standing for long time. Last echo without HF.     Intake 2-2.5L  UOP 2.5L  -130s/70  Peripheral edema none  Appetite good  Incision healed  fx assessment   Lab /diagnostic results reviewed with patient today.   All questions answered        Current Outpatient Medications:     atovaquone (MEPRON) 750 mg/5 mL Susp oral liquid, Take 10 mLs (1,500 mg total) by mouth once daily. STOP 5/29/24, Disp: 300 mL, Rfl: 5    atropine 1% (ISOPTO ATROPINE) 1 % Drop, Place 1 drop into the left eye every evening., Disp: , Rfl:     blood sugar diagnostic Strp, 1 each by Misc.(Non-Drug; Combo Route) route 3 (three) times daily., Disp: 90 each, Rfl: 11    blood-glucose meter kit, USE TO TEST BLOOD GLUCOSE, Disp: 1 each, Rfl: 0    calcitRIOL (ROCALTROL) 0.25 MCG Cap, Take 1 capsule (0.25 mcg total) by mouth once daily., Disp: 30 capsule, Rfl: 5    COMBIGAN 0.2-0.5 % Drop, Place 1 drop into the right eye every evening., Disp: , Rfl:  "    ergocalciferol (VITAMIN D2) 50,000 unit Cap, Take 50,000 Units by mouth every 7 days. Take 1 capsule by mouth weekly x 12 weeks, then decrease to once per month., Disp: , Rfl:     insulin (LANTUS SOLOSTAR U-100 INSULIN) glargine 100 units/mL SubQ pen, 10-14 units daily as direct by endocrinology, Disp: 6 mL, Rfl: 11    insulin lispro 100 unit/mL pen, Inject 5 Units into the skin 3 (three) times daily with meals. Plus sliding scale: TDD=30 units, Disp: 9 mL, Rfl: 11    labetaloL (NORMODYNE) 200 MG tablet, Take 2 tablets (400 mg total) by mouth every 12 (twelve) hours., Disp: 360 tablet, Rfl: 3    lancets Misc, 1 each by Misc.(Non-Drug; Combo Route) route 3 (three) times daily., Disp: 90 each, Rfl: 11    Lmefol Ca-acetyl-meB12-algal (CEREFOLIN NAC, ALGAL OIL,) 6 mg-600 mg- 2 mg-90.314 mg Tab, Take 1 tablet by mouth once daily., Disp: , Rfl:     magnesium oxide (MAG-OX) 400 mg (241.3 mg magnesium) tablet, Take 2 tablets (800 mg total) by mouth 2 (two) times daily., Disp: 120 tablet, Rfl: 11    mycophenolate (CELLCEPT) 250 mg Cap, Take 4 capsules (1,000 mg total) by mouth 2 (two) times daily., Disp: 240 capsule, Rfl: 11    pen needle, diabetic (EASY COMFORT PEN NEEDLES) 32 gauge x 5/32" Ndle, Inject 1 each into the skin 3 (three) times daily., Disp: 100 each, Rfl: 11    potassium chloride SA (K-DUR,KLOR-CON) 20 MEQ tablet, Take 2 tablets (40 mEq total) by mouth 2 (two) times daily., Disp: 120 tablet, Rfl: 11    predniSONE (DELTASONE) 5 MG tablet, Take by mouth daily:  20mg 12/2-12/8, 15mg 12/9-12/15, 10mg 12/16-12/22, then 5mg daily beginning 12/23/2023 (Patient taking differently: Take by mouth daily:  20mg 12/2-12/8, 15mg 12/9-12/15, 10mg 12/16-12/22, then 5mg daily beginning 12/23/2023), Disp: 70 tablet, Rfl: 11    rosuvastatin (CRESTOR) 40 MG Tab, Take 40 mg by mouth every evening., Disp: , Rfl:     tacrolimus (PROGRAF) 1 MG Cap, Take 7 capsules (7 mg total) by mouth every 12 (twelve) hours., Disp: 420 capsule, " "Rfl: 11    valGANciclovir (VALCYTE) 450 mg Tab, Take 1 tablet (450 mg total) by mouth once daily. STOP 5/29/24, Disp: 30 tablet, Rfl: 5    famotidine (PEPCID) 20 MG tablet, Take 1 tablet (20 mg total) by mouth every evening., Disp: 30 tablet, Rfl: 0    furosemide (LASIX) 20 MG tablet, Take 1 tablet (20 mg total) by mouth 2 (two) times daily., Disp: 60 tablet, Rfl: 3      Past Medical History:   Diagnosis Date    Blind     Diabetes     ESRD (end stage renal disease) on dialysis     Hypertension     Right cataract          Review of Systems   Constitutional:  Negative for appetite change, chills, fatigue and fever.   HENT:  Negative for trouble swallowing.    Eyes:  Positive for visual disturbance.   Respiratory:  Negative for cough, chest tightness, shortness of breath and wheezing.    Cardiovascular:  Negative for chest pain, palpitations and leg swelling.   Gastrointestinal:  Negative for abdominal pain, constipation, diarrhea and nausea.   Genitourinary:  Negative for difficulty urinating, frequency and urgency.   Musculoskeletal:  Negative for arthralgias and myalgias.   Skin:  Negative for rash.   Allergic/Immunologic: Positive for immunocompromised state.   Neurological:  Negative for dizziness, weakness, light-headedness and headaches.   Psychiatric/Behavioral:  Negative for sleep disturbance.        Objective:   Blood pressure (!) 143/96, pulse 87, resp. rate 18, height 5' 5.5" (1.664 m), weight 104.4 kg (230 lb 2.6 oz), last menstrual period 12/06/2023, SpO2 100 %.body mass index is 37.72 kg/m².    Physical Exam  Constitutional:       General: She is not in acute distress.     Appearance: She is well-developed. She is not diaphoretic.   Cardiovascular:      Rate and Rhythm: Normal rate and regular rhythm.      Heart sounds: Normal heart sounds.   Pulmonary:      Effort: Pulmonary effort is normal.      Breath sounds: Normal breath sounds.   Abdominal:      General: Bowel sounds are normal.      Palpations: " "Abdomen is soft.   Musculoskeletal:         General: No tenderness. Normal range of motion.   Skin:     General: Skin is warm and dry.      Findings: No rash.      Nails: There is no clubbing.   Neurological:      Mental Status: She is alert and oriented to person, place, and time.   Psychiatric:         Behavior: Behavior normal.         Labs:  Lab Results   Component Value Date    WBC 6.97 01/22/2024    HGB 9.9 (L) 01/22/2024    HCT 32.7 (L) 01/22/2024     01/22/2024    K 3.3 (L) 01/22/2024    CL 99 01/22/2024    CO2 30 (H) 01/22/2024    BUN 43 (H) 01/22/2024    CREATININE 1.8 (H) 01/22/2024    EGFRNORACEVR 35 (A) 01/22/2024    CALCIUM 9.5 01/22/2024    PHOS 3.4 01/22/2024    MG 1.6 01/22/2024    ALBUMIN 3.4 (L) 01/22/2024    AST 9 (L) 01/04/2024    ALT 12 01/04/2024    UTPCR 0.65 (H) 01/08/2024     (H) 12/06/2023    TACROLIMUS 9.1 01/22/2024       No results found for: "EXTANC", "EXTWBC", "EXTSEGS", "EXTPLATELETS", "EXTHEMOGLOBI", "EXTHEMATOCRI", "EXTCREATININ", "EXTSODIUM", "EXTPOTASSIUM", "EXTBUN", "EXTCO2", "EXTCALCIUM", "EXTPHOSPHORU", "EXTGLUCOSE", "EXTALBUMIN", "EXTAST", "EXTALT", "EXTBILITOTAL", "EXTLIPASE", "EXTAMYLASE"    No results found for: "EXTCYCLOSLVL", "EXTSIROLIMUS", "EXTTACROLVL", "EXTPROTCRE", "EXTPTHINTACT", "EXTPROTEINUA", "EXTWBCUA", "EXTRBCUA"    Labs were reviewed with the patient    Assessment:     1. S/P kidney transplant    2. Delayed graft function of kidney transplant due to ATN requiring acute dialysis    3. Essential hypertension    4. Type 2 diabetes mellitus with hyperglycemia, with long-term current use of insulin    5. Long-term use of immunosuppressant medication        Plan:   Hypokalemia, Kcl was increased to 40 mEq BID  Mild swelling wih 4kg weight increase, started on lasix 20mg daily. Continue current Kcl supplement and reviewed high K+ foods.   Continue current IS medications       1. CKD stage: will continue follow up as per our center guidelines. patient to " continue close follow up with the local General nephrologist. Education provided in appropriate fluid intake, potassium intake. Continue with oral hydration.      2. Immunosuppression: Prograf trough 8 , therapeutic target 8-10. Continue Prograf 7/7, MMF 1000 Mg BID, and Prednisone QD  Lab Results   Component Value Date    TACROLIMUS 9.1 01/22/2024    TACROLIMUS 8.5 01/17/2024    TACROLIMUS 8.6 01/08/2024   Will closely monitor for toxicities, education provided about adherence to medicines and need to communicate any side effect to the transplant nurse or physician.      3. Allograft Function:stable at baseline for the patient. Continue follow up as per our guidelines and with the local General nephrologist. Communication will be sent today.  Lab Results   Component Value Date    CREATININE 1.8 (H) 01/22/2024    CREATININE 2.2 (H) 01/17/2024    CREATININE 1.8 (H) 01/08/2024     Lab Results   Component Value Date    LIPASE 144 (H) 08/07/2023      Latest Reference Range & Units POD 54,  Kidney-Post 1 Year  01/22/24   Creatinine 0.5 - 1.4 mg/dL 1.8 (H)   eGFR >60 mL/min/1.73 m^2 35 !   Glucose 70 - 110 mg/dL 124 (H)   (H): Data is abnormally high  !: Data is abnormal    4. Hypertension management:  Continue with home blood pressure monitoring, low salt and healthy life discussed with the patient.  BP Readings from Last 3 Encounters:   01/24/24 (!) 143/96   01/09/24 (!) 198/86   01/08/24 (!) 166/74       5. Metabolic Bone Disease/Secondary Hyperparathyroidism:calcium and phosphorus level discussed with the patient, patient will continue follow up with the general nephrologist for management of metabolic bone disease calcium and phosphorus as per our center protocol. Will monitor PTH, Vit D level, calcium.   Lab Results   Component Value Date     (H) 12/06/2023    CALCIUM 9.5 01/22/2024    PHOS 3.4 01/22/2024    PHOS 2.9 01/17/2024    PHOS 2.8 01/08/2024       6. Electrolytes: reviewed with the patient,  essentially within the normal range no need for acute changes today, will monitor as per our center guidelines.  Lab Results   Component Value Date     01/22/2024    K 3.3 (L) 01/22/2024    CL 99 01/22/2024    CO2 30 (H) 01/22/2024    CO2 32 (H) 01/17/2024    CO2 30 (H) 01/08/2024       7. Anemia: will continue monitoring as per our center guidelines. No indication for acute intervention today.  Lab Results   Component Value Date    WBC 6.97 01/22/2024    HGB 9.9 (L) 01/22/2024    HCT 32.7 (L) 01/22/2024     (H) 01/22/2024     (L) 01/22/2024       8.Proteinuria: will continue with pr/cr ratio as per our center guidelines  Lab Results   Component Value Date    PROTEINURINE 50 (H) 01/08/2024    CREATRANDUR 77.0 01/08/2024    UTPCR 0.65 (H) 01/08/2024    UTPCR 0.27 (H) 01/02/2024    UTPCR 0.32 (H) 12/28/2023        9. BK virus infection screening: will continue with urine or blood PCR as per our guidelines to prevent BK virus viremia and allograft dysfunction  Lab Results   Component Value Date    BKVIRUSPCRQB <125 12/28/2023         10. Weight education: provided during the clinic visit.  Body mass index is 37.72 kg/m².     11.Patient safety education regarding immunosuppression including prophylaxis posttransplant for CMV, PCP : Education provided about vaccination and prevention of infections.    12.  Cytopenias: no significant cytopenias will monitor as per our guidelines. Medicine list reviewed including potential causes of drug-induced cytopenias  Lab Results   Component Value Date    WBC 6.97 01/22/2024    HGB 9.9 (L) 01/22/2024    HCT 32.7 (L) 01/22/2024     (H) 01/22/2024     (L) 01/22/2024       13. Post-transplant Prophylaxis; CMV Infection, PJP and Candida mucosistis and other indicated for this particular patient.   PCP PROPHYLAXIS: needs PJP prophy until 5/29/24 (G6PD pending) atovaquone due to low blood couts  CMV PROPHYLAXIS: Valcyte until 5/29/24     Exercise: reminded  Juan Francisco of the importance of regular exercise for weight management, blood sugar and blood pressure management.  I also explained exercise has been shown to improve cardiovascular health, energy level, and sleep hygiene.  Lastly, I advised him that cardiovascular complications are leading cause of death for renal transplant recipients, and regular exercise can help lower this risk.    Follow-up:   Clinic: return to transplant clinic weekly for the first month after transplant; every 2 weeks during months 2-3; then at 6-, 9-, 12-, 18-, 24-, and 36- months post-transplant to reassess for complications from immunosuppression toxicity and monitor for rejection.  Annually thereafter.    Labs: since patient remains at high risk for rejection and drug-related complications that warrant close monitoring, labs will be ordered as follows: continue twice weekly CBC, renal panel, and drug level for first month; then same labs once weekly through 3rd month post-transplant.  Urine for UA and protein/creatinine ratio monthly.  Serum BK - PCR at 1-, 3-, 6-, 9-, 12-, 18-, 24-, 36-, 48-, and 60 months post-transplant.  Hepatic panel at 1-, 2-, 3-, 6-, 9-, 12-, 18-, 24-, and 36- months post-transplant.    Justine Masterson NP       Education:   Material provided to the patient.  Patient reminded to call with any health changes since these can be early signs of significant complications.  Also, I advised the patient to be sure any new medications or changes of old medications are discussed with either a pharmacist or physician knowledgeable with transplant to avoid rejection/drug toxicity related to significant drug interactions.    Patient advised that it is recommended that all transplanted patients, and their close contacts and household members receive Covid vaccination.

## 2024-01-23 RX ORDER — POTASSIUM CHLORIDE 20 MEQ/1
40 TABLET, EXTENDED RELEASE ORAL 2 TIMES DAILY
Qty: 120 TABLET | Refills: 11 | Status: SHIPPED | OUTPATIENT
Start: 2024-01-23 | End: 2024-02-21 | Stop reason: SDUPTHER

## 2024-01-23 NOTE — TELEPHONE ENCOUNTER
----- Message from Emma Timmons RN sent at 1/22/2024  5:15 PM CST -----    ----- Message -----  From: Randall Thomas Jr., MD  Sent: 1/22/2024  10:40 AM CST  To: [facility] Post-Kidney Transplant Clinical    Please confirm not on diuretic and start kcl 20 daily. Tac pending

## 2024-01-23 NOTE — TELEPHONE ENCOUNTER
Spoke to pt regarding orders below. Pt verbalized understanding.   Appt with rajan tomorrow.     Randall Thomas Jr., MD Del Buono, Amanda, RN  Thank you. Increase to kcl 40 bid.              ----- Message from Randall Thomas Jr., MD sent at 1/23/2024  3:10 PM CST -----  Thank you. Increase to kcl 40 bid.  ----- Message -----  From: Emma Timmons, RN  Sent: 1/23/2024   2:46 PM CST  To: Randall Thomas Jr., MD    She is taking KCL 20 meq twice daily. No diuretic

## 2024-01-24 ENCOUNTER — OFFICE VISIT (OUTPATIENT)
Dept: TRANSPLANT | Facility: CLINIC | Age: 49
End: 2024-01-24
Payer: MEDICARE

## 2024-01-24 ENCOUNTER — TELEPHONE (OUTPATIENT)
Dept: TRANSPLANT | Facility: CLINIC | Age: 49
End: 2024-01-24

## 2024-01-24 VITALS
HEART RATE: 87 BPM | BODY MASS INDEX: 36.99 KG/M2 | OXYGEN SATURATION: 100 % | WEIGHT: 230.19 LBS | DIASTOLIC BLOOD PRESSURE: 96 MMHG | SYSTOLIC BLOOD PRESSURE: 143 MMHG | RESPIRATION RATE: 18 BRPM | HEIGHT: 66 IN

## 2024-01-24 DIAGNOSIS — T86.19 DELAYED GRAFT FUNCTION OF KIDNEY TRANSPLANT DUE TO ATN REQUIRING ACUTE DIALYSIS: ICD-10-CM

## 2024-01-24 DIAGNOSIS — E11.65 TYPE 2 DIABETES MELLITUS WITH HYPERGLYCEMIA, WITH LONG-TERM CURRENT USE OF INSULIN: ICD-10-CM

## 2024-01-24 DIAGNOSIS — Z94.0 S/P KIDNEY TRANSPLANT: Primary | ICD-10-CM

## 2024-01-24 DIAGNOSIS — Z99.2 DELAYED GRAFT FUNCTION OF KIDNEY TRANSPLANT DUE TO ATN REQUIRING ACUTE DIALYSIS: ICD-10-CM

## 2024-01-24 DIAGNOSIS — Z79.4 TYPE 2 DIABETES MELLITUS WITH HYPERGLYCEMIA, WITH LONG-TERM CURRENT USE OF INSULIN: ICD-10-CM

## 2024-01-24 DIAGNOSIS — I10 ESSENTIAL HYPERTENSION: ICD-10-CM

## 2024-01-24 DIAGNOSIS — Z79.60 LONG-TERM USE OF IMMUNOSUPPRESSANT MEDICATION: ICD-10-CM

## 2024-01-24 LAB — BACTERIA UR CULT: NORMAL

## 2024-01-24 PROCEDURE — 99214 OFFICE O/P EST MOD 30 MIN: CPT | Mod: PBBFAC | Performed by: NURSE PRACTITIONER

## 2024-01-24 PROCEDURE — 99999 PR PBB SHADOW E&M-EST. PATIENT-LVL IV: CPT | Mod: PBBFAC,,, | Performed by: NURSE PRACTITIONER

## 2024-01-24 PROCEDURE — 99215 OFFICE O/P EST HI 40 MIN: CPT | Mod: S$PBB,,, | Performed by: NURSE PRACTITIONER

## 2024-01-24 RX ORDER — FUROSEMIDE 20 MG/1
20 TABLET ORAL 2 TIMES DAILY
Qty: 60 TABLET | Refills: 3 | Status: SHIPPED | OUTPATIENT
Start: 2024-01-24 | End: 2024-02-16

## 2024-01-24 NOTE — LETTER
January 24, 2024        Aries Bourne  [address]  [facility] [address]  Phone: [phone]  Fax: [phone]             [facility] 1st Fl  [address]  [facility] [address]  Phone: [phone]   Patient: Mary Waller   MR Number: [id number]   YOB: 1975   Date of Visit: 1/24/2024       Dear Dr. Aries Bourne    Thank you for referring Mary Waller to me for evaluation. Attached you will find relevant portions of my assessment and plan of care.    If you have questions, please do not hesitate to call me. I look forward to following Mary Waller along with you.    Sincerely,    Justine Masterson, NP    Enclosure    If you would like to receive this communication electronically, please contact [email or web address] or [phone] to request [employer] access.    [employer] is a tool which provides read-only access to select patient information with whom you have a relationship. Its easy to use and provides real time access to review your patients record including encounter summaries, notes, results, and demographic information.    If you feel you have received this communication in error or would no longer like to receive these types of communications, please e-mail [email or web address]

## 2024-01-24 NOTE — PROCEDURES
Procedure Note    Procedure: cystoscopy with JJ stent removal  Preprocedure Dx: s/p/ renal transplant  Postprocedure Dx: same  Surgeon: Raman Painter MD  Anesthesia: 2 % urojet lidocaine jelly used for local analgesia.  Consent obtained, risks and benefits were explained. A timeout was performed.  Patient was positioned, prepped and draped in a sterile fashion with betadine.  The cystoscope was advanced per urethra into the bladder. The stent was visualized and removed without difficulty.  Patient tolerated the procedure well.  F/U with transplant.

## 2024-01-24 NOTE — TELEPHONE ENCOUNTER
Return call to patient, states issue has already been addressed.  ----- Message from Malcolm Sierra RN sent at 1/24/2024  3:05 PM CST -----  Regarding: FW: call back    ----- Message -----  From: Gurdeep Williamson  Sent: 1/24/2024  11:06 AM CST  To: [facility] Post-Kidney Transplant Clinical  Subject: call back                                        Pt call to speak with coordinator in regards to her RX being sent to [facility] she would like it to stop pt states she uses [facility] requesting call back    Call

## 2024-01-26 ENCOUNTER — TELEPHONE (OUTPATIENT)
Dept: VASCULAR SURGERY | Facility: CLINIC | Age: 49
End: 2024-01-26
Payer: MEDICARE

## 2024-01-26 NOTE — TELEPHONE ENCOUNTER
"Spoke with the pt in reference to appt needing to be rescheduled due to provider out of the clinic.Pt verbalized "since the appt was made I received my kidney and I'm no longer on dialysis".Appts cancelled per pt request.  "

## 2024-01-29 ENCOUNTER — LAB VISIT (OUTPATIENT)
Dept: LAB | Facility: HOSPITAL | Age: 49
End: 2024-01-29
Attending: STUDENT IN AN ORGANIZED HEALTH CARE EDUCATION/TRAINING PROGRAM
Payer: MEDICARE

## 2024-01-29 DIAGNOSIS — Z94.0 KIDNEY REPLACED BY TRANSPLANT: ICD-10-CM

## 2024-01-29 LAB
ALBUMIN SERPL BCP-MCNC: 3.5 G/DL (ref 3.5–5.2)
ALBUMIN SERPL BCP-MCNC: 3.5 G/DL (ref 3.5–5.2)
ALP SERPL-CCNC: 55 U/L (ref 55–135)
ALT SERPL W/O P-5'-P-CCNC: 9 U/L (ref 10–44)
ANION GAP SERPL CALC-SCNC: 9 MMOL/L (ref 8–16)
AST SERPL-CCNC: 9 U/L (ref 10–40)
BACTERIA #/AREA URNS HPF: ABNORMAL /HPF
BASOPHILS # BLD AUTO: 0.02 K/UL (ref 0–0.2)
BASOPHILS NFR BLD: 0.4 % (ref 0–1.9)
BILIRUB DIRECT SERPL-MCNC: 0.2 MG/DL (ref 0.1–0.3)
BILIRUB SERPL-MCNC: 0.4 MG/DL (ref 0.1–1)
BILIRUB UR QL STRIP: NEGATIVE
BUN SERPL-MCNC: 40 MG/DL (ref 6–20)
CALCIUM SERPL-MCNC: 9.9 MG/DL (ref 8.7–10.5)
CHLORIDE SERPL-SCNC: 105 MMOL/L (ref 95–110)
CLARITY UR: CLEAR
CO2 SERPL-SCNC: 26 MMOL/L (ref 23–29)
COLOR UR: YELLOW
CREAT SERPL-MCNC: 1.9 MG/DL (ref 0.5–1.4)
DIFFERENTIAL METHOD BLD: ABNORMAL
EOSINOPHIL # BLD AUTO: 0.1 K/UL (ref 0–0.5)
EOSINOPHIL NFR BLD: 1 % (ref 0–8)
ERYTHROCYTE [DISTWIDTH] IN BLOOD BY AUTOMATED COUNT: 14.1 % (ref 11.5–14.5)
EST. GFR  (NO RACE VARIABLE): 32 ML/MIN/1.73 M^2
GLUCOSE SERPL-MCNC: 96 MG/DL (ref 70–110)
GLUCOSE UR QL STRIP: NEGATIVE
GRAN CASTS #/AREA URNS LPF: 1 /LPF
HCT VFR BLD AUTO: 32.6 % (ref 37–48.5)
HGB BLD-MCNC: 9.9 G/DL (ref 12–16)
HGB UR QL STRIP: ABNORMAL
HYALINE CASTS #/AREA URNS LPF: 0 /LPF
IMM GRANULOCYTES # BLD AUTO: 0.02 K/UL (ref 0–0.04)
IMM GRANULOCYTES NFR BLD AUTO: 0.4 % (ref 0–0.5)
KETONES UR QL STRIP: NEGATIVE
LEUKOCYTE ESTERASE UR QL STRIP: NEGATIVE
LYMPHOCYTES # BLD AUTO: 0.6 K/UL (ref 1–4.8)
LYMPHOCYTES NFR BLD: 12.7 % (ref 18–48)
MAGNESIUM SERPL-MCNC: 1.6 MG/DL (ref 1.6–2.6)
MCH RBC QN AUTO: 30.6 PG (ref 27–31)
MCHC RBC AUTO-ENTMCNC: 30.4 G/DL (ref 32–36)
MCV RBC AUTO: 101 FL (ref 82–98)
MICROSCOPIC COMMENT: ABNORMAL
MONOCYTES # BLD AUTO: 0.4 K/UL (ref 0.3–1)
MONOCYTES NFR BLD: 7.5 % (ref 4–15)
NEUTROPHILS # BLD AUTO: 3.8 K/UL (ref 1.8–7.7)
NEUTROPHILS NFR BLD: 78 % (ref 38–73)
NITRITE UR QL STRIP: NEGATIVE
NRBC BLD-RTO: 0 /100 WBC
PH UR STRIP: 6 [PH] (ref 5–8)
PHOSPHATE SERPL-MCNC: 3.6 MG/DL (ref 2.7–4.5)
PLATELET # BLD AUTO: 140 K/UL (ref 150–450)
PMV BLD AUTO: 11.2 FL (ref 9.2–12.9)
POTASSIUM SERPL-SCNC: 3.9 MMOL/L (ref 3.5–5.1)
PROT SERPL-MCNC: 7.7 G/DL (ref 6–8.4)
PROT UR QL STRIP: ABNORMAL
RBC # BLD AUTO: 3.24 M/UL (ref 4–5.4)
RBC #/AREA URNS HPF: 1 /HPF (ref 0–4)
SODIUM SERPL-SCNC: 140 MMOL/L (ref 136–145)
SP GR UR STRIP: 1.01 (ref 1–1.03)
SQUAMOUS #/AREA URNS HPF: 1 /HPF
URN SPEC COLLECT METH UR: ABNORMAL
UROBILINOGEN UR STRIP-ACNC: NEGATIVE EU/DL
WBC # BLD AUTO: 4.82 K/UL (ref 3.9–12.7)
WBC #/AREA URNS HPF: 2 /HPF (ref 0–5)
WBC CLUMPS URNS QL MICRO: ABNORMAL

## 2024-01-29 PROCEDURE — 36415 COLL VENOUS BLD VENIPUNCTURE: CPT | Performed by: STUDENT IN AN ORGANIZED HEALTH CARE EDUCATION/TRAINING PROGRAM

## 2024-01-29 PROCEDURE — 80197 ASSAY OF TACROLIMUS: CPT | Performed by: STUDENT IN AN ORGANIZED HEALTH CARE EDUCATION/TRAINING PROGRAM

## 2024-01-29 PROCEDURE — 87799 DETECT AGENT NOS DNA QUANT: CPT | Performed by: STUDENT IN AN ORGANIZED HEALTH CARE EDUCATION/TRAINING PROGRAM

## 2024-01-29 PROCEDURE — 83735 ASSAY OF MAGNESIUM: CPT | Performed by: STUDENT IN AN ORGANIZED HEALTH CARE EDUCATION/TRAINING PROGRAM

## 2024-01-29 PROCEDURE — 87086 URINE CULTURE/COLONY COUNT: CPT | Performed by: STUDENT IN AN ORGANIZED HEALTH CARE EDUCATION/TRAINING PROGRAM

## 2024-01-29 PROCEDURE — 84075 ASSAY ALKALINE PHOSPHATASE: CPT | Performed by: STUDENT IN AN ORGANIZED HEALTH CARE EDUCATION/TRAINING PROGRAM

## 2024-01-29 PROCEDURE — 85025 COMPLETE CBC W/AUTO DIFF WBC: CPT | Performed by: STUDENT IN AN ORGANIZED HEALTH CARE EDUCATION/TRAINING PROGRAM

## 2024-01-29 PROCEDURE — 80069 RENAL FUNCTION PANEL: CPT | Performed by: STUDENT IN AN ORGANIZED HEALTH CARE EDUCATION/TRAINING PROGRAM

## 2024-01-29 PROCEDURE — 81000 URINALYSIS NONAUTO W/SCOPE: CPT | Performed by: STUDENT IN AN ORGANIZED HEALTH CARE EDUCATION/TRAINING PROGRAM

## 2024-01-30 DIAGNOSIS — Z94.0 S/P KIDNEY TRANSPLANT: ICD-10-CM

## 2024-01-30 LAB — TACROLIMUS BLD-MCNC: 13.5 NG/ML (ref 5–15)

## 2024-01-30 RX ORDER — TACROLIMUS 1 MG/1
5 CAPSULE ORAL EVERY 12 HOURS
Qty: 420 CAPSULE | Refills: 11 | Status: SHIPPED | OUTPATIENT
Start: 2024-01-30 | End: 2024-05-07 | Stop reason: DRUGHIGH

## 2024-01-30 NOTE — TELEPHONE ENCOUNTER
Denies any illness. Took meds at 0830. Meds adjusted. Labs Friday.     ----- Message from Randall Thomas Jr., MD sent at 1/30/2024  9:21 AM CST -----  Ask about new meds, diarrhea, sick symptoms. Confirm true 12h trough. If true and negative ROS, decrease tac from 7/7 to 5/5 and repeat tac at the end of this week. Other labs acceptable

## 2024-01-31 LAB — BACTERIA UR CULT: NORMAL

## 2024-01-31 RX ORDER — ERGOCALCIFEROL 1.25 MG/1
50000 CAPSULE ORAL
Qty: 12 CAPSULE | Refills: 3 | Status: SHIPPED | OUTPATIENT
Start: 2024-01-31 | End: 2025-01-31

## 2024-02-01 LAB
BKV DNA SERPL NAA+PROBE-ACNC: <125 COPIES/ML
BKV DNA SERPL NAA+PROBE-LOG#: <2.1 LOG (10) COPIES/ML
BKV DNA SERPL QL NAA+PROBE: NOT DETECTED

## 2024-02-02 ENCOUNTER — LAB VISIT (OUTPATIENT)
Dept: LAB | Facility: HOSPITAL | Age: 49
End: 2024-02-02
Attending: STUDENT IN AN ORGANIZED HEALTH CARE EDUCATION/TRAINING PROGRAM
Payer: MEDICARE

## 2024-02-02 DIAGNOSIS — Z94.0 KIDNEY REPLACED BY TRANSPLANT: ICD-10-CM

## 2024-02-02 LAB
BACTERIA #/AREA URNS HPF: ABNORMAL /HPF
BILIRUB UR QL STRIP: NEGATIVE
CLARITY UR: CLEAR
COLOR UR: YELLOW
GLUCOSE UR QL STRIP: NEGATIVE
GRAN CASTS #/AREA URNS LPF: 1 /LPF
HGB UR QL STRIP: ABNORMAL
HYALINE CASTS #/AREA URNS LPF: 0 /LPF
KETONES UR QL STRIP: NEGATIVE
LEUKOCYTE ESTERASE UR QL STRIP: NEGATIVE
MICROSCOPIC COMMENT: ABNORMAL
NITRITE UR QL STRIP: NEGATIVE
PH UR STRIP: 6 [PH] (ref 5–8)
PROT UR QL STRIP: ABNORMAL
RBC #/AREA URNS HPF: 0 /HPF (ref 0–4)
SP GR UR STRIP: 1.01 (ref 1–1.03)
SQUAMOUS #/AREA URNS HPF: 0 /HPF
URN SPEC COLLECT METH UR: ABNORMAL
UROBILINOGEN UR STRIP-ACNC: NEGATIVE EU/DL
WBC #/AREA URNS HPF: 0 /HPF (ref 0–5)

## 2024-02-02 PROCEDURE — 81000 URINALYSIS NONAUTO W/SCOPE: CPT | Performed by: STUDENT IN AN ORGANIZED HEALTH CARE EDUCATION/TRAINING PROGRAM

## 2024-02-02 PROCEDURE — 36415 COLL VENOUS BLD VENIPUNCTURE: CPT | Performed by: STUDENT IN AN ORGANIZED HEALTH CARE EDUCATION/TRAINING PROGRAM

## 2024-02-02 PROCEDURE — 87086 URINE CULTURE/COLONY COUNT: CPT | Performed by: STUDENT IN AN ORGANIZED HEALTH CARE EDUCATION/TRAINING PROGRAM

## 2024-02-02 PROCEDURE — 80197 ASSAY OF TACROLIMUS: CPT | Performed by: STUDENT IN AN ORGANIZED HEALTH CARE EDUCATION/TRAINING PROGRAM

## 2024-02-03 LAB — TACROLIMUS BLD-MCNC: 7.6 NG/ML (ref 5–15)

## 2024-02-04 LAB — BACTERIA UR CULT: NORMAL

## 2024-02-06 ENCOUNTER — LAB VISIT (OUTPATIENT)
Dept: LAB | Facility: HOSPITAL | Age: 49
End: 2024-02-06
Attending: STUDENT IN AN ORGANIZED HEALTH CARE EDUCATION/TRAINING PROGRAM
Payer: MEDICARE

## 2024-02-06 DIAGNOSIS — Z94.0 KIDNEY REPLACED BY TRANSPLANT: ICD-10-CM

## 2024-02-06 LAB
ALBUMIN SERPL BCP-MCNC: 3.6 G/DL (ref 3.5–5.2)
ANION GAP SERPL CALC-SCNC: 7 MMOL/L (ref 8–16)
BACTERIA #/AREA URNS HPF: NORMAL /HPF
BASOPHILS # BLD AUTO: 0.02 K/UL (ref 0–0.2)
BASOPHILS NFR BLD: 0.3 % (ref 0–1.9)
BILIRUB UR QL STRIP: NEGATIVE
BUN SERPL-MCNC: 29 MG/DL (ref 6–20)
CALCIUM SERPL-MCNC: 9.8 MG/DL (ref 8.7–10.5)
CHLORIDE SERPL-SCNC: 106 MMOL/L (ref 95–110)
CLARITY UR: CLEAR
CO2 SERPL-SCNC: 24 MMOL/L (ref 23–29)
COLOR UR: YELLOW
CREAT SERPL-MCNC: 1.5 MG/DL (ref 0.5–1.4)
DIFFERENTIAL METHOD BLD: ABNORMAL
EOSINOPHIL # BLD AUTO: 0 K/UL (ref 0–0.5)
EOSINOPHIL NFR BLD: 0.6 % (ref 0–8)
ERYTHROCYTE [DISTWIDTH] IN BLOOD BY AUTOMATED COUNT: 13.7 % (ref 11.5–14.5)
EST. GFR  (NO RACE VARIABLE): 43 ML/MIN/1.73 M^2
GLUCOSE SERPL-MCNC: 142 MG/DL (ref 70–110)
GLUCOSE UR QL STRIP: NEGATIVE
HCT VFR BLD AUTO: 35.3 % (ref 37–48.5)
HGB BLD-MCNC: 10.8 G/DL (ref 12–16)
HGB UR QL STRIP: ABNORMAL
HYALINE CASTS #/AREA URNS LPF: 1 /LPF
IMM GRANULOCYTES # BLD AUTO: 0.07 K/UL (ref 0–0.04)
IMM GRANULOCYTES NFR BLD AUTO: 1 % (ref 0–0.5)
KETONES UR QL STRIP: NEGATIVE
LEUKOCYTE ESTERASE UR QL STRIP: NEGATIVE
LYMPHOCYTES # BLD AUTO: 0.5 K/UL (ref 1–4.8)
LYMPHOCYTES NFR BLD: 7.4 % (ref 18–48)
MAGNESIUM SERPL-MCNC: 1.8 MG/DL (ref 1.6–2.6)
MCH RBC QN AUTO: 31.1 PG (ref 27–31)
MCHC RBC AUTO-ENTMCNC: 30.6 G/DL (ref 32–36)
MCV RBC AUTO: 102 FL (ref 82–98)
MICROSCOPIC COMMENT: NORMAL
MONOCYTES # BLD AUTO: 0.9 K/UL (ref 0.3–1)
MONOCYTES NFR BLD: 12.9 % (ref 4–15)
NEUTROPHILS # BLD AUTO: 5.5 K/UL (ref 1.8–7.7)
NEUTROPHILS NFR BLD: 77.8 % (ref 38–73)
NITRITE UR QL STRIP: NEGATIVE
NRBC BLD-RTO: 0 /100 WBC
PH UR STRIP: 6 [PH] (ref 5–8)
PHOSPHATE SERPL-MCNC: 2.9 MG/DL (ref 2.7–4.5)
PLATELET # BLD AUTO: 145 K/UL (ref 150–450)
PMV BLD AUTO: 11.7 FL (ref 9.2–12.9)
POTASSIUM SERPL-SCNC: 4.3 MMOL/L (ref 3.5–5.1)
PROT UR QL STRIP: ABNORMAL
RBC # BLD AUTO: 3.47 M/UL (ref 4–5.4)
RBC #/AREA URNS HPF: 0 /HPF (ref 0–4)
SODIUM SERPL-SCNC: 137 MMOL/L (ref 136–145)
SP GR UR STRIP: 1.02 (ref 1–1.03)
SQUAMOUS #/AREA URNS HPF: 2 /HPF
URN SPEC COLLECT METH UR: ABNORMAL
UROBILINOGEN UR STRIP-ACNC: NEGATIVE EU/DL
WBC # BLD AUTO: 7.07 K/UL (ref 3.9–12.7)
WBC #/AREA URNS HPF: 1 /HPF (ref 0–5)

## 2024-02-06 PROCEDURE — 83735 ASSAY OF MAGNESIUM: CPT | Performed by: STUDENT IN AN ORGANIZED HEALTH CARE EDUCATION/TRAINING PROGRAM

## 2024-02-06 PROCEDURE — 80069 RENAL FUNCTION PANEL: CPT | Performed by: STUDENT IN AN ORGANIZED HEALTH CARE EDUCATION/TRAINING PROGRAM

## 2024-02-06 PROCEDURE — 80197 ASSAY OF TACROLIMUS: CPT | Performed by: STUDENT IN AN ORGANIZED HEALTH CARE EDUCATION/TRAINING PROGRAM

## 2024-02-06 PROCEDURE — 36415 COLL VENOUS BLD VENIPUNCTURE: CPT | Performed by: STUDENT IN AN ORGANIZED HEALTH CARE EDUCATION/TRAINING PROGRAM

## 2024-02-06 PROCEDURE — 81000 URINALYSIS NONAUTO W/SCOPE: CPT | Performed by: STUDENT IN AN ORGANIZED HEALTH CARE EDUCATION/TRAINING PROGRAM

## 2024-02-06 PROCEDURE — 85025 COMPLETE CBC W/AUTO DIFF WBC: CPT | Performed by: STUDENT IN AN ORGANIZED HEALTH CARE EDUCATION/TRAINING PROGRAM

## 2024-02-06 PROCEDURE — 87086 URINE CULTURE/COLONY COUNT: CPT | Performed by: STUDENT IN AN ORGANIZED HEALTH CARE EDUCATION/TRAINING PROGRAM

## 2024-02-07 LAB — TACROLIMUS BLD-MCNC: 6.2 NG/ML (ref 5–15)

## 2024-02-08 LAB — BACTERIA UR CULT: NORMAL

## 2024-02-12 ENCOUNTER — LAB VISIT (OUTPATIENT)
Dept: LAB | Facility: HOSPITAL | Age: 49
End: 2024-02-12
Attending: STUDENT IN AN ORGANIZED HEALTH CARE EDUCATION/TRAINING PROGRAM
Payer: MEDICARE

## 2024-02-12 DIAGNOSIS — Z94.0 KIDNEY REPLACED BY TRANSPLANT: ICD-10-CM

## 2024-02-12 LAB
ALBUMIN SERPL BCP-MCNC: 3.5 G/DL (ref 3.5–5.2)
ANION GAP SERPL CALC-SCNC: 13 MMOL/L (ref 8–16)
BASOPHILS # BLD AUTO: 0.01 K/UL (ref 0–0.2)
BASOPHILS NFR BLD: 0.2 % (ref 0–1.9)
BILIRUB UR QL STRIP: NEGATIVE
BUN SERPL-MCNC: 28 MG/DL (ref 6–20)
CALCIUM SERPL-MCNC: 9.3 MG/DL (ref 8.7–10.5)
CHLORIDE SERPL-SCNC: 105 MMOL/L (ref 95–110)
CLARITY UR: CLEAR
CO2 SERPL-SCNC: 22 MMOL/L (ref 23–29)
COLOR UR: COLORLESS
CREAT SERPL-MCNC: 1.7 MG/DL (ref 0.5–1.4)
DIFFERENTIAL METHOD BLD: ABNORMAL
EOSINOPHIL # BLD AUTO: 0 K/UL (ref 0–0.5)
EOSINOPHIL NFR BLD: 0.6 % (ref 0–8)
ERYTHROCYTE [DISTWIDTH] IN BLOOD BY AUTOMATED COUNT: 13.8 % (ref 11.5–14.5)
EST. GFR  (NO RACE VARIABLE): 37 ML/MIN/1.73 M^2
GLUCOSE SERPL-MCNC: 119 MG/DL (ref 70–110)
GLUCOSE UR QL STRIP: NEGATIVE
HCT VFR BLD AUTO: 34.7 % (ref 37–48.5)
HGB BLD-MCNC: 10.4 G/DL (ref 12–16)
HGB UR QL STRIP: ABNORMAL
IMM GRANULOCYTES # BLD AUTO: 0.09 K/UL (ref 0–0.04)
IMM GRANULOCYTES NFR BLD AUTO: 1.8 % (ref 0–0.5)
KETONES UR QL STRIP: NEGATIVE
LEUKOCYTE ESTERASE UR QL STRIP: NEGATIVE
LYMPHOCYTES # BLD AUTO: 0.6 K/UL (ref 1–4.8)
LYMPHOCYTES NFR BLD: 12.1 % (ref 18–48)
MAGNESIUM SERPL-MCNC: 1.7 MG/DL (ref 1.6–2.6)
MCH RBC QN AUTO: 30.1 PG (ref 27–31)
MCHC RBC AUTO-ENTMCNC: 30 G/DL (ref 32–36)
MCV RBC AUTO: 101 FL (ref 82–98)
MONOCYTES # BLD AUTO: 0.7 K/UL (ref 0.3–1)
MONOCYTES NFR BLD: 13.3 % (ref 4–15)
NEUTROPHILS # BLD AUTO: 3.5 K/UL (ref 1.8–7.7)
NEUTROPHILS NFR BLD: 72 % (ref 38–73)
NITRITE UR QL STRIP: NEGATIVE
NRBC BLD-RTO: 0 /100 WBC
PH UR STRIP: 6 [PH] (ref 5–8)
PHOSPHATE SERPL-MCNC: 2.5 MG/DL (ref 2.7–4.5)
PLATELET # BLD AUTO: 124 K/UL (ref 150–450)
PMV BLD AUTO: 11.7 FL (ref 9.2–12.9)
POTASSIUM SERPL-SCNC: 4 MMOL/L (ref 3.5–5.1)
PROT UR QL STRIP: ABNORMAL
RBC # BLD AUTO: 3.45 M/UL (ref 4–5.4)
SODIUM SERPL-SCNC: 140 MMOL/L (ref 136–145)
SP GR UR STRIP: 1.01 (ref 1–1.03)
URN SPEC COLLECT METH UR: ABNORMAL
UROBILINOGEN UR STRIP-ACNC: NEGATIVE EU/DL
WBC # BLD AUTO: 4.87 K/UL (ref 3.9–12.7)

## 2024-02-12 PROCEDURE — 80069 RENAL FUNCTION PANEL: CPT | Performed by: STUDENT IN AN ORGANIZED HEALTH CARE EDUCATION/TRAINING PROGRAM

## 2024-02-12 PROCEDURE — 81003 URINALYSIS AUTO W/O SCOPE: CPT | Performed by: STUDENT IN AN ORGANIZED HEALTH CARE EDUCATION/TRAINING PROGRAM

## 2024-02-12 PROCEDURE — 85025 COMPLETE CBC W/AUTO DIFF WBC: CPT | Performed by: STUDENT IN AN ORGANIZED HEALTH CARE EDUCATION/TRAINING PROGRAM

## 2024-02-12 PROCEDURE — 36415 COLL VENOUS BLD VENIPUNCTURE: CPT | Performed by: STUDENT IN AN ORGANIZED HEALTH CARE EDUCATION/TRAINING PROGRAM

## 2024-02-12 PROCEDURE — 80197 ASSAY OF TACROLIMUS: CPT | Performed by: STUDENT IN AN ORGANIZED HEALTH CARE EDUCATION/TRAINING PROGRAM

## 2024-02-12 PROCEDURE — 83735 ASSAY OF MAGNESIUM: CPT | Performed by: STUDENT IN AN ORGANIZED HEALTH CARE EDUCATION/TRAINING PROGRAM

## 2024-02-12 NOTE — PROGRESS NOTES
Kidney Post-Transplant Assessment    Referring Physician: Aries Bourne  Current Nephrologist: Aries Bourne    ORGAN: LEFT KIDNEY  Donor Type: donation after brain death  PHS Increased Risk: yes  Cold Ischemia: 903 mins  Induction Medications: thymo    Subjective:     CC:  Reassessment of renal allograft function and management of chronic immunosuppression.    HPI:  Ms. Waller is a 49 y.o. 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 Her most recent creatinine is 1.6  She takes mycophenolate mofetil, prednisone, and tacrolimus for maintenance immunosuppression. Her post transplant course has been complicated by delayed graft function requiring dialysis.    Hospitalization/ ED visits  12/2023 Tachycardia; neg PEor MI    Interval HX:    Weight has not improved since starting lasix. Feels that her swelling in located in her abd, now leg swelling unless standing for long time. Last echo without HF. Last TXP US with fluid collection    Intake 2L  UOP 2L  -130s/70  Peripheral edema none  Appetite good  Incision healed  fx assessment   Lab /diagnostic results reviewed with patient today.   All questions answered        Current Outpatient Medications:     atovaquone (MEPRON) 750 mg/5 mL Susp oral liquid, Take 10 mLs (1,500 mg total) by mouth once daily. STOP 5/29/24, Disp: 300 mL, Rfl: 5    atropine 1% (ISOPTO ATROPINE) 1 % Drop, Place 1 drop into the left eye every evening., Disp: , Rfl:     blood sugar diagnostic Strp, 1 each by Misc.(Non-Drug; Combo Route) route 3 (three) times daily., Disp: 90 each, Rfl: 11    blood-glucose meter kit, USE TO TEST BLOOD GLUCOSE, Disp: 1 each, Rfl: 0    calcitRIOL (ROCALTROL) 0.25 MCG Cap, Take 1 capsule (0.25 mcg total) by mouth once daily., Disp: 30 capsule, Rfl: 5    COMBIGAN 0.2-0.5 % Drop, Place 1 drop into the right eye every evening., Disp: , Rfl:     ergocalciferol (VITAMIN D2) 50,000 unit Cap,  "Take 1 capsule (50,000 Units total) by mouth every 7 days. Take 1 capsule by mouth weekly x 12 weeks, then decrease to once per month., Disp: 12 capsule, Rfl: 3    insulin (LANTUS SOLOSTAR U-100 INSULIN) glargine 100 units/mL SubQ pen, 10-14 units daily as direct by endocrinology, Disp: 6 mL, Rfl: 11    insulin lispro 100 unit/mL pen, Inject 5 Units into the skin 3 (three) times daily with meals. Plus sliding scale: TDD=30 units, Disp: 9 mL, Rfl: 11    labetaloL (NORMODYNE) 200 MG tablet, Take 2 tablets (400 mg total) by mouth every 12 (twelve) hours. (Patient taking differently: Take 200 mg by mouth Daily.), Disp: 360 tablet, Rfl: 3    lancets Misc, 1 each by Misc.(Non-Drug; Combo Route) route 3 (three) times daily., Disp: 90 each, Rfl: 11    Lmefol Ca-acetyl-meB12-algal (CEREFOLIN NAC, ALGAL OIL,) 6 mg-600 mg- 2 mg-90.314 mg Tab, Take 1 tablet by mouth once daily., Disp: , Rfl:     magnesium oxide (MAG-OX) 400 mg (241.3 mg magnesium) tablet, Take 2 tablets (800 mg total) by mouth 2 (two) times daily., Disp: 120 tablet, Rfl: 11    mycophenolate (CELLCEPT) 250 mg Cap, Take 4 capsules (1,000 mg total) by mouth 2 (two) times daily., Disp: 240 capsule, Rfl: 11    pen needle, diabetic (EASY COMFORT PEN NEEDLES) 32 gauge x 5/32" Ndle, Inject 1 each into the skin 3 (three) times daily., Disp: 100 each, Rfl: 11    predniSONE (DELTASONE) 5 MG tablet, Take by mouth daily:  20mg 12/2-12/8, 15mg 12/9-12/15, 10mg 12/16-12/22, then 5mg daily beginning 12/23/2023 (Patient taking differently: Take by mouth daily:  20mg 12/2-12/8, 15mg 12/9-12/15, 10mg 12/16-12/22, then 5mg daily beginning 12/23/2023), Disp: 70 tablet, Rfl: 11    rosuvastatin (CRESTOR) 40 MG Tab, Take 40 mg by mouth every evening., Disp: , Rfl:     tacrolimus (PROGRAF) 1 MG Cap, Take 5 capsules (5 mg total) by mouth every 12 (twelve) hours., Disp: 420 capsule, Rfl: 11    TRULICITY 0.75 mg/0.5 mL pen injector, Inject into the skin., Disp: , Rfl:     valGANciclovir " "(VALCYTE) 450 mg Tab, Take 1 tablet (450 mg total) by mouth once daily. STOP 5/29/24, Disp: 30 tablet, Rfl: 5    famotidine (PEPCID) 20 MG tablet, Take 1 tablet (20 mg total) by mouth every evening. (Patient not taking: Reported on 2/16/2024), Disp: 30 tablet, Rfl: 0    furosemide (LASIX) 20 MG tablet, Take 2 tablets (40 mg total) by mouth once daily., Disp: 60 tablet, Rfl: 3    potassium chloride SA (K-DUR,KLOR-CON) 20 MEQ tablet, Take 2 tablets (40 mEq total) by mouth 2 (two) times daily. (Patient not taking: Reported on 2/16/2024), Disp: 120 tablet, Rfl: 11      Past Medical History:   Diagnosis Date    Blind     Diabetes     ESRD (end stage renal disease) on dialysis     Hypertension     Right cataract          Review of Systems   Constitutional:  Negative for appetite change, chills, fatigue and fever.   HENT:  Negative for trouble swallowing.    Eyes:  Positive for visual disturbance.   Respiratory:  Negative for cough, chest tightness, shortness of breath and wheezing.    Cardiovascular:  Negative for chest pain, palpitations and leg swelling.   Gastrointestinal:  Negative for abdominal pain, constipation, diarrhea and nausea.   Genitourinary:  Negative for difficulty urinating, frequency and urgency.   Musculoskeletal:  Negative for arthralgias and myalgias.   Skin:  Negative for rash.   Allergic/Immunologic: Positive for immunocompromised state.   Neurological:  Negative for dizziness, weakness, light-headedness and headaches.   Psychiatric/Behavioral:  Negative for sleep disturbance.        Objective:   Blood pressure (!) 149/65, pulse 95, temperature 97.3 °F (36.3 °C), temperature source Temporal, resp. rate (!) 24, height 5' 5" (1.651 m), weight 105.9 kg (233 lb 7.5 oz), SpO2 100 %.body mass index is 38.85 kg/m².    Physical Exam  Constitutional:       General: She is not in acute distress.     Appearance: She is well-developed. She is not diaphoretic.   Cardiovascular:      Rate and Rhythm: Normal rate " "and regular rhythm.      Heart sounds: Normal heart sounds.   Pulmonary:      Effort: Pulmonary effort is normal.      Breath sounds: Normal breath sounds.   Abdominal:      General: Bowel sounds are normal.      Palpations: Abdomen is soft.   Musculoskeletal:         General: No tenderness. Normal range of motion.   Skin:     General: Skin is warm and dry.      Findings: No rash.      Nails: There is no clubbing.   Neurological:      Mental Status: She is alert and oriented to person, place, and time.   Psychiatric:         Behavior: Behavior normal.         Labs:  Lab Results   Component Value Date    WBC 4.87 02/12/2024    HGB 10.4 (L) 02/12/2024    HCT 34.7 (L) 02/12/2024     02/12/2024    K 4.0 02/12/2024     02/12/2024    CO2 22 (L) 02/12/2024    BUN 28 (H) 02/12/2024    CREATININE 1.7 (H) 02/12/2024    EGFRNORACEVR 37 (A) 02/12/2024    CALCIUM 9.3 02/12/2024    PHOS 2.5 (L) 02/12/2024    MG 1.7 02/12/2024    ALBUMIN 3.5 02/12/2024    AST 9 (L) 01/29/2024    ALT 9 (L) 01/29/2024    UTPCR 0.65 (H) 01/08/2024     (H) 12/06/2023    TACROLIMUS 9.2 02/12/2024       No results found for: "EXTANC", "EXTWBC", "EXTSEGS", "EXTPLATELETS", "EXTHEMOGLOBI", "EXTHEMATOCRI", "EXTCREATININ", "EXTSODIUM", "EXTPOTASSIUM", "EXTBUN", "EXTCO2", "EXTCALCIUM", "EXTPHOSPHORU", "EXTGLUCOSE", "EXTALBUMIN", "EXTAST", "EXTALT", "EXTBILITOTAL", "EXTLIPASE", "EXTAMYLASE"    No results found for: "EXTCYCLOSLVL", "EXTSIROLIMUS", "EXTTACROLVL", "EXTPROTCRE", "EXTPTHINTACT", "EXTPROTEINUA", "EXTWBCUA", "EXTRBCUA"    Labs were reviewed with the patient    Assessment:     1. S/P kidney transplant    2. Essential hypertension    3. Delayed graft function of kidney transplant due to ATN requiring acute dialysis    4. Type 2 diabetes mellitus with hyperglycemia, with long-term current use of insulin    5. Long-term use of immunosuppressant medication        Plan:   Changed lasix from 20mg BID to 40mg Daily  Obtain TXP kidney US, " want to obtain on the [facility]   Continue current IS medications       1. CKD stage: will continue follow up as per our center guidelines. patient to continue close follow up with the local General nephrologist. Education provided in appropriate fluid intake, potassium intake. Continue with oral hydration.      2. Immunosuppression: Prograf trough 9.2 , therapeutic target 8-10. Continue Prograf 5/5, MMF 1000 Mg BID, and Prednisone QD  Lab Results   Component Value Date    TACROLIMUS 9.2 02/12/2024    TACROLIMUS 6.2 02/06/2024    TACROLIMUS 7.6 02/02/2024   Will closely monitor for toxicities, education provided about adherence to medicines and need to communicate any side effect to the transplant nurse or physician.      3. Allograft Function:stable at baseline for the patient. Continue follow up as per our guidelines and with the local General nephrologist. Communication will be sent today.  Lab Results   Component Value Date    CREATININE 1.7 (H) 02/12/2024    CREATININE 1.5 (H) 02/06/2024    CREATININE 1.9 (H) 01/29/2024     Lab Results   Component Value Date    LIPASE 144 (H) 08/07/2023      Latest Reference Range & Units POD 75,  Kidney-Post 1 Year  02/12/24   Creatinine 0.5 - 1.4 mg/dL 1.7 (H)   eGFR >60 mL/min/1.73 m^2 37 !   Glucose 70 - 110 mg/dL 119 (H)   (H): Data is abnormally high  !: Data is abnormal    4. Hypertension management:  Continue with home blood pressure monitoring, low salt and healthy life discussed with the patient.  BP Readings from Last 3 Encounters:   02/16/24 (!) 149/65   01/24/24 (!) 143/96   01/09/24 (!) 198/86       5. Metabolic Bone Disease/Secondary Hyperparathyroidism:calcium and phosphorus level discussed with the patient, patient will continue follow up with the general nephrologist for management of metabolic bone disease calcium and phosphorus as per our center protocol. Will monitor PTH, Vit D level, calcium.   Lab Results   Component Value Date     (H) 12/06/2023     CALCIUM 9.3 02/12/2024    PHOS 2.5 (L) 02/12/2024    PHOS 2.9 02/06/2024    PHOS 3.6 01/29/2024       6. Electrolytes: reviewed with the patient, essentially within the normal range no need for acute changes today, will monitor as per our center guidelines.  Lab Results   Component Value Date     02/12/2024    K 4.0 02/12/2024     02/12/2024    CO2 22 (L) 02/12/2024    CO2 24 02/06/2024    CO2 26 01/29/2024       7. Anemia: will continue monitoring as per our center guidelines. No indication for acute intervention today.  Lab Results   Component Value Date    WBC 4.87 02/12/2024    HGB 10.4 (L) 02/12/2024    HCT 34.7 (L) 02/12/2024     (H) 02/12/2024     (L) 02/12/2024       8.Proteinuria: will continue with pr/cr ratio as per our center guidelines  Lab Results   Component Value Date    PROTEINURINE 50 (H) 01/08/2024    CREATRANDUR 77.0 01/08/2024    UTPCR 0.65 (H) 01/08/2024    UTPCR 0.27 (H) 01/02/2024    UTPCR 0.32 (H) 12/28/2023        9. BK virus infection screening: will continue with urine or blood PCR as per our guidelines to prevent BK virus viremia and allograft dysfunction  Lab Results   Component Value Date    BKVIRUSPCRQB <125 01/29/2024         10. Weight education: provided during the clinic visit.  Body mass index is 38.85 kg/m².     11.Patient safety education regarding immunosuppression including prophylaxis posttransplant for CMV, PCP : Education provided about vaccination and prevention of infections.    12.  Cytopenias: no significant cytopenias will monitor as per our guidelines. Medicine list reviewed including potential causes of drug-induced cytopenias  Lab Results   Component Value Date    WBC 4.87 02/12/2024    HGB 10.4 (L) 02/12/2024    HCT 34.7 (L) 02/12/2024     (H) 02/12/2024     (L) 02/12/2024       13. Post-transplant Prophylaxis; CMV Infection, PJP and Candida mucosistis and other indicated for this particular patient.   PCP PROPHYLAXIS: needs  PJP prophy until 5/29/24 (G6PD pending) atovaquone due to low blood couts  CMV PROPHYLAXIS: Valcyte until 5/29/24     Exercise: reminded Juan Francisco of the importance of regular exercise for weight management, blood sugar and blood pressure management.  I also explained exercise has been shown to improve cardiovascular health, energy level, and sleep hygiene.  Lastly, I advised him that cardiovascular complications are leading cause of death for renal transplant recipients, and regular exercise can help lower this risk.    Follow-up:   Clinic: return to transplant clinic weekly for the first month after transplant; every 2 weeks during months 2-3; then at 6-, 9-, 12-, 18-, 24-, and 36- months post-transplant to reassess for complications from immunosuppression toxicity and monitor for rejection.  Annually thereafter.    Labs: since patient remains at high risk for rejection and drug-related complications that warrant close monitoring, labs will be ordered as follows: continue twice weekly CBC, renal panel, and drug level for first month; then same labs once weekly through 3rd month post-transplant.  Urine for UA and protein/creatinine ratio monthly.  Serum BK - PCR at 1-, 3-, 6-, 9-, 12-, 18-, 24-, 36-, 48-, and 60 months post-transplant.  Hepatic panel at 1-, 2-, 3-, 6-, 9-, 12-, 18-, 24-, and 36- months post-transplant.    Justine Masterson NP       Education:   Material provided to the patient.  Patient reminded to call with any health changes since these can be early signs of significant complications.  Also, I advised the patient to be sure any new medications or changes of old medications are discussed with either a pharmacist or physician knowledgeable with transplant to avoid rejection/drug toxicity related to significant drug interactions.    Patient advised that it is recommended that all transplanted patients, and their close contacts and household members receive Covid vaccination.

## 2024-02-13 LAB — TACROLIMUS BLD-MCNC: 9.2 NG/ML (ref 5–15)

## 2024-02-16 ENCOUNTER — OFFICE VISIT (OUTPATIENT)
Dept: TRANSPLANT | Facility: CLINIC | Age: 49
End: 2024-02-16
Payer: MEDICARE

## 2024-02-16 VITALS
WEIGHT: 233.44 LBS | OXYGEN SATURATION: 100 % | HEIGHT: 65 IN | DIASTOLIC BLOOD PRESSURE: 65 MMHG | TEMPERATURE: 97 F | SYSTOLIC BLOOD PRESSURE: 149 MMHG | BODY MASS INDEX: 38.89 KG/M2 | HEART RATE: 95 BPM | RESPIRATION RATE: 24 BRPM

## 2024-02-16 DIAGNOSIS — Z94.0 S/P KIDNEY TRANSPLANT: Primary | ICD-10-CM

## 2024-02-16 DIAGNOSIS — I10 ESSENTIAL HYPERTENSION: ICD-10-CM

## 2024-02-16 DIAGNOSIS — E11.65 TYPE 2 DIABETES MELLITUS WITH HYPERGLYCEMIA, WITH LONG-TERM CURRENT USE OF INSULIN: ICD-10-CM

## 2024-02-16 DIAGNOSIS — T86.19 DELAYED GRAFT FUNCTION OF KIDNEY TRANSPLANT DUE TO ATN REQUIRING ACUTE DIALYSIS: ICD-10-CM

## 2024-02-16 DIAGNOSIS — Z79.60 LONG-TERM USE OF IMMUNOSUPPRESSANT MEDICATION: ICD-10-CM

## 2024-02-16 DIAGNOSIS — Z79.4 TYPE 2 DIABETES MELLITUS WITH HYPERGLYCEMIA, WITH LONG-TERM CURRENT USE OF INSULIN: ICD-10-CM

## 2024-02-16 DIAGNOSIS — Z99.2 DELAYED GRAFT FUNCTION OF KIDNEY TRANSPLANT DUE TO ATN REQUIRING ACUTE DIALYSIS: ICD-10-CM

## 2024-02-16 PROCEDURE — 99999 PR PBB SHADOW E&M-EST. PATIENT-LVL IV: CPT | Mod: PBBFAC,,, | Performed by: NURSE PRACTITIONER

## 2024-02-16 PROCEDURE — 99215 OFFICE O/P EST HI 40 MIN: CPT | Mod: S$PBB,,, | Performed by: NURSE PRACTITIONER

## 2024-02-16 PROCEDURE — 99214 OFFICE O/P EST MOD 30 MIN: CPT | Mod: PBBFAC | Performed by: NURSE PRACTITIONER

## 2024-02-16 RX ORDER — FUROSEMIDE 20 MG/1
40 TABLET ORAL DAILY
Qty: 60 TABLET | Refills: 3
Start: 2024-02-16 | End: 2025-02-15

## 2024-02-16 RX ORDER — DULAGLUTIDE 0.75 MG/.5ML
INJECTION, SOLUTION SUBCUTANEOUS
COMMUNITY

## 2024-02-16 NOTE — LETTER
February 16, 2024        Aries Bourne  [address]  [facility] [address]  Phone: [phone]  Fax: [phone]             [facility] 1st Fl  [address]  [facility] [address]  Phone: [phone]   Patient: Mary Waller   MR Number: [id number]   YOB: 1975   Date of Visit: 2/16/2024       Dear Dr. Aries Bourne    Thank you for referring Mary Waller to me for evaluation. Attached you will find relevant portions of my assessment and plan of care.    If you have questions, please do not hesitate to call me. I look forward to following Mary Waller along with you.    Sincerely,    Justine Masterson, NP    Enclosure    If you would like to receive this communication electronically, please contact [email or web address] or [phone] to request [employer] Link access.    [employer] Link is a tool which provides read-only access to select patient information with whom you have a relationship. Its easy to use and provides real time access to review your patients record including encounter summaries, notes, results, and demographic information.    If you feel you have received this communication in error or would no longer like to receive these types of communications, please e-mail [email or web address]

## 2024-02-19 ENCOUNTER — LAB VISIT (OUTPATIENT)
Dept: LAB | Facility: HOSPITAL | Age: 49
End: 2024-02-19
Attending: STUDENT IN AN ORGANIZED HEALTH CARE EDUCATION/TRAINING PROGRAM
Payer: MEDICARE

## 2024-02-19 DIAGNOSIS — Z94.0 KIDNEY REPLACED BY TRANSPLANT: ICD-10-CM

## 2024-02-19 LAB
ALBUMIN SERPL BCP-MCNC: 3.5 G/DL (ref 3.5–5.2)
ANION GAP SERPL CALC-SCNC: 10 MMOL/L (ref 8–16)
BACTERIA #/AREA URNS HPF: NORMAL /HPF
BASOPHILS # BLD AUTO: ABNORMAL K/UL (ref 0–0.2)
BASOPHILS NFR BLD: 0 % (ref 0–1.9)
BILIRUB UR QL STRIP: NEGATIVE
BUN SERPL-MCNC: 32 MG/DL (ref 6–20)
CALCIUM SERPL-MCNC: 9.6 MG/DL (ref 8.7–10.5)
CHLORIDE SERPL-SCNC: 104 MMOL/L (ref 95–110)
CLARITY UR: CLEAR
CO2 SERPL-SCNC: 26 MMOL/L (ref 23–29)
COLOR UR: YELLOW
CREAT SERPL-MCNC: 1.6 MG/DL (ref 0.5–1.4)
DIFFERENTIAL METHOD BLD: ABNORMAL
EOSINOPHIL # BLD AUTO: ABNORMAL K/UL (ref 0–0.5)
EOSINOPHIL NFR BLD: 6 % (ref 0–8)
ERYTHROCYTE [DISTWIDTH] IN BLOOD BY AUTOMATED COUNT: 13.7 % (ref 11.5–14.5)
EST. GFR  (NO RACE VARIABLE): 40 ML/MIN/1.73 M^2
GLUCOSE SERPL-MCNC: 128 MG/DL (ref 70–110)
GLUCOSE UR QL STRIP: NEGATIVE
HCT VFR BLD AUTO: 35.2 % (ref 37–48.5)
HGB BLD-MCNC: 10.9 G/DL (ref 12–16)
HGB UR QL STRIP: NEGATIVE
HYALINE CASTS #/AREA URNS LPF: 1 /LPF
IMM GRANULOCYTES # BLD AUTO: ABNORMAL K/UL (ref 0–0.04)
IMM GRANULOCYTES NFR BLD AUTO: ABNORMAL % (ref 0–0.5)
KETONES UR QL STRIP: NEGATIVE
LEUKOCYTE ESTERASE UR QL STRIP: NEGATIVE
LYMPHOCYTES # BLD AUTO: ABNORMAL K/UL (ref 1–4.8)
LYMPHOCYTES NFR BLD: 12 % (ref 18–48)
MAGNESIUM SERPL-MCNC: 1.7 MG/DL (ref 1.6–2.6)
MCH RBC QN AUTO: 31.6 PG (ref 27–31)
MCHC RBC AUTO-ENTMCNC: 31 G/DL (ref 32–36)
MCV RBC AUTO: 102 FL (ref 82–98)
MICROSCOPIC COMMENT: NORMAL
MONOCYTES # BLD AUTO: ABNORMAL K/UL (ref 0.3–1)
MONOCYTES NFR BLD: 5 % (ref 4–15)
NEUTROPHILS NFR BLD: 73 % (ref 38–73)
NEUTS BAND NFR BLD MANUAL: 4 %
NITRITE UR QL STRIP: NEGATIVE
NRBC BLD-RTO: 0 /100 WBC
PH UR STRIP: 7 [PH] (ref 5–8)
PHOSPHATE SERPL-MCNC: 2.8 MG/DL (ref 2.7–4.5)
PLATELET # BLD AUTO: 143 K/UL (ref 150–450)
PLATELET BLD QL SMEAR: ABNORMAL
PMV BLD AUTO: 11.3 FL (ref 9.2–12.9)
POTASSIUM SERPL-SCNC: 4.1 MMOL/L (ref 3.5–5.1)
PROT UR QL STRIP: ABNORMAL
RBC # BLD AUTO: 3.45 M/UL (ref 4–5.4)
RBC #/AREA URNS HPF: 0 /HPF (ref 0–4)
SODIUM SERPL-SCNC: 140 MMOL/L (ref 136–145)
SP GR UR STRIP: 1.02 (ref 1–1.03)
URN SPEC COLLECT METH UR: ABNORMAL
UROBILINOGEN UR STRIP-ACNC: NEGATIVE EU/DL
WBC # BLD AUTO: 4.38 K/UL (ref 3.9–12.7)
WBC #/AREA URNS HPF: 0 /HPF (ref 0–5)

## 2024-02-19 PROCEDURE — 80197 ASSAY OF TACROLIMUS: CPT | Performed by: STUDENT IN AN ORGANIZED HEALTH CARE EDUCATION/TRAINING PROGRAM

## 2024-02-19 PROCEDURE — 36415 COLL VENOUS BLD VENIPUNCTURE: CPT | Performed by: STUDENT IN AN ORGANIZED HEALTH CARE EDUCATION/TRAINING PROGRAM

## 2024-02-19 PROCEDURE — 81000 URINALYSIS NONAUTO W/SCOPE: CPT | Performed by: STUDENT IN AN ORGANIZED HEALTH CARE EDUCATION/TRAINING PROGRAM

## 2024-02-19 PROCEDURE — 85007 BL SMEAR W/DIFF WBC COUNT: CPT | Performed by: STUDENT IN AN ORGANIZED HEALTH CARE EDUCATION/TRAINING PROGRAM

## 2024-02-19 PROCEDURE — 87086 URINE CULTURE/COLONY COUNT: CPT | Performed by: STUDENT IN AN ORGANIZED HEALTH CARE EDUCATION/TRAINING PROGRAM

## 2024-02-19 PROCEDURE — 85027 COMPLETE CBC AUTOMATED: CPT | Performed by: STUDENT IN AN ORGANIZED HEALTH CARE EDUCATION/TRAINING PROGRAM

## 2024-02-19 PROCEDURE — 83735 ASSAY OF MAGNESIUM: CPT | Performed by: STUDENT IN AN ORGANIZED HEALTH CARE EDUCATION/TRAINING PROGRAM

## 2024-02-19 PROCEDURE — 80069 RENAL FUNCTION PANEL: CPT | Performed by: STUDENT IN AN ORGANIZED HEALTH CARE EDUCATION/TRAINING PROGRAM

## 2024-02-20 ENCOUNTER — TELEPHONE (OUTPATIENT)
Dept: TRANSPLANT | Facility: CLINIC | Age: 49
End: 2024-02-20
Payer: MEDICARE

## 2024-02-20 DIAGNOSIS — Z94.0 S/P KIDNEY TRANSPLANT: Primary | ICD-10-CM

## 2024-02-20 LAB — TACROLIMUS BLD-MCNC: 8.7 NG/ML (ref 5–15)

## 2024-02-20 NOTE — TELEPHONE ENCOUNTER
Spoke to pt regarding her BP. Stated she is still experiencing FVO despite increasing Lasix. Has gain 5-7 lbs int he last few days. Awaiting orders from Dr. Thomas.       ----- Message from Sowmya Ayala sent at 2/20/2024  1:16 PM CST -----  Regarding: fluid retention - please advise asap pt req  Contact:  [phone]  The patient called requesting to speak to Nurse. States she called yesterday and left message but didn't get a reply. PT states she is retaining a lot of fluid even after bumping Furosemide up to 40 mg and still not helping. Please call to advise. Patient is very uncomfortable.  No further information provided    Script   [id number] - [place] - [address] AT [facility] [address]  [address] [address]  Phone: [phone] Fax: [phone]      Patient can be contacted @# [phone]

## 2024-02-21 DIAGNOSIS — Z79.4 TYPE 2 DIABETES MELLITUS WITH HYPERGLYCEMIA, WITH LONG-TERM CURRENT USE OF INSULIN: Primary | ICD-10-CM

## 2024-02-21 DIAGNOSIS — E11.65 TYPE 2 DIABETES MELLITUS WITH HYPERGLYCEMIA, WITH LONG-TERM CURRENT USE OF INSULIN: Primary | ICD-10-CM

## 2024-02-21 DIAGNOSIS — Z94.0 KIDNEY REPLACED BY TRANSPLANT: ICD-10-CM

## 2024-02-21 LAB — BACTERIA UR CULT: NORMAL

## 2024-02-21 RX ORDER — LABETALOL 200 MG/1
400 TABLET, FILM COATED ORAL EVERY 12 HOURS
Qty: 360 TABLET | Refills: 3 | Status: SHIPPED | OUTPATIENT
Start: 2024-02-21 | End: 2024-03-08

## 2024-02-22 ENCOUNTER — HOSPITAL ENCOUNTER (OUTPATIENT)
Dept: RADIOLOGY | Facility: HOSPITAL | Age: 49
Discharge: HOME | End: 2024-02-22
Attending: NURSE PRACTITIONER
Payer: MEDICARE

## 2024-02-22 DIAGNOSIS — Z94.0 S/P KIDNEY TRANSPLANT: ICD-10-CM

## 2024-02-22 PROCEDURE — 76776 US EXAM K TRANSPL W/DOPPLER: CPT | Mod: 26,,, | Performed by: RADIOLOGY

## 2024-02-22 PROCEDURE — 76776 US EXAM K TRANSPL W/DOPPLER: CPT | Mod: TC

## 2024-02-22 RX ORDER — POTASSIUM CHLORIDE 20 MEQ/1
40 TABLET, EXTENDED RELEASE ORAL 2 TIMES DAILY
Qty: 120 TABLET | Refills: 11 | Status: SHIPPED | OUTPATIENT
Start: 2024-02-22 | End: 2025-02-22

## 2024-02-22 RX ORDER — CALCITRIOL 0.25 UG/1
0.25 CAPSULE ORAL DAILY
Qty: 30 CAPSULE | Refills: 5 | Status: SHIPPED | OUTPATIENT
Start: 2024-02-22

## 2024-02-22 NOTE — TELEPHONE ENCOUNTER
----- Message from Justine Masterson NP sent at 2/22/2024 11:02 AM CST -----  Dr. Thomas,  I obtained an US on Ms. Waller for ongoing and increase abd swelling since she had history of fluid collection on last US. The fluid collection has resolved but Persistently elevated renal resistive indices though improved from 11/30/2023 examination. No tardus parvus waveform. Do you want a repeat in US in 1 month to monitor?

## 2024-02-22 NOTE — TELEPHONE ENCOUNTER
----- Message from Gurdeep Williamson sent at 2/22/2024 10:33 AM CST -----  Regarding: call back  Pt call to speak with Emma in regards to her getting a DEXCOM requesting call back    Call

## 2024-02-23 ENCOUNTER — TELEPHONE (OUTPATIENT)
Dept: ENDOCRINOLOGY | Facility: CLINIC | Age: 49
End: 2024-02-23
Payer: MEDICARE

## 2024-02-23 NOTE — TELEPHONE ENCOUNTER
----- Message from Jacklyn Floyd sent at 2024  4:34 PM CST -----  Regarding: No Availability (referral in chart)  Contact: Pt @ [phone]  Pt called in to schedule an appt; no available appts in Epic. Pt is asking for a call back soon to schedule. Thanks.         Reason appt not schedule: no availability          Patient's DX: E11.65,Z79.4 (ICD-10-CM) - Type 2 diabetes mellitus with hyperglycemia, with long-term current use of insulin         Patient requesting call back or [facility] [phone]

## 2024-02-26 ENCOUNTER — OFFICE VISIT (OUTPATIENT)
Dept: TRANSPLANT | Facility: CLINIC | Age: 49
End: 2024-02-26
Payer: MEDICARE

## 2024-02-26 VITALS
TEMPERATURE: 98 F | DIASTOLIC BLOOD PRESSURE: 79 MMHG | BODY MASS INDEX: 37.28 KG/M2 | OXYGEN SATURATION: 98 % | SYSTOLIC BLOOD PRESSURE: 183 MMHG | HEIGHT: 66 IN | WEIGHT: 231.94 LBS | HEART RATE: 91 BPM

## 2024-02-26 DIAGNOSIS — I10 ESSENTIAL HYPERTENSION: ICD-10-CM

## 2024-02-26 DIAGNOSIS — Z79.4 TYPE 2 DIABETES MELLITUS WITH HYPERGLYCEMIA, WITH LONG-TERM CURRENT USE OF INSULIN: ICD-10-CM

## 2024-02-26 DIAGNOSIS — Z79.60 LONG-TERM USE OF IMMUNOSUPPRESSANT MEDICATION: ICD-10-CM

## 2024-02-26 DIAGNOSIS — Z94.0 S/P KIDNEY TRANSPLANT: Primary | ICD-10-CM

## 2024-02-26 DIAGNOSIS — E11.65 TYPE 2 DIABETES MELLITUS WITH HYPERGLYCEMIA, WITH LONG-TERM CURRENT USE OF INSULIN: ICD-10-CM

## 2024-02-26 PROCEDURE — 99999 PR PBB SHADOW E&M-EST. PATIENT-LVL IV: CPT | Mod: PBBFAC,,, | Performed by: NURSE PRACTITIONER

## 2024-02-26 PROCEDURE — 99214 OFFICE O/P EST MOD 30 MIN: CPT | Mod: PBBFAC | Performed by: NURSE PRACTITIONER

## 2024-02-26 PROCEDURE — 99215 OFFICE O/P EST HI 40 MIN: CPT | Mod: S$PBB,,, | Performed by: NURSE PRACTITIONER

## 2024-02-26 NOTE — PROGRESS NOTES
Kidney Post-Transplant Assessment    Referring Physician: Aries Bourne  Current Nephrologist: Aries Bourne    ORGAN: LEFT KIDNEY  Donor Type: donation after brain death  PHS Increased Risk: yes  Cold Ischemia: 903 mins  Induction Medications: thymo    Subjective:     CC:  Reassessment of renal allograft function and management of chronic immunosuppression.    HPI:  Ms. Waller is a 49 y.o. 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 Her most recent creatinine is 1.6  She takes mycophenolate mofetil, prednisone, and tacrolimus for maintenance immunosuppression. Her post transplant course has been complicated by delayed graft function requiring dialysis.    Hospitalization/ ED visits  None recent    Interval HX:    Weight has not improved since starting lasix. Still feels that her abd is swelling. Her weight prior to transplant was 232lbs. Reports her dry weight was 223lbs. Has been on lasix for a few weeks but reporting is not helping. Weight her log is no higher than 232. Today 230#. Reviewed food intake.   Again last echo was acceptable  Most recent renal US with resolved fluid collection   Has not taken her BP medication yet today. Reports typically takes it around mid-day.    Intake 2-2.5L  UOP 2-2.5L  -130s/70  Peripheral edema none  Appetite good  Incision healed  fx assessment   Lab /diagnostic results reviewed with patient today.   All questions answered        Current Outpatient Medications:     atovaquone (MEPRON) 750 mg/5 mL Susp oral liquid, Take 10 mLs (1,500 mg total) by mouth once daily. STOP 5/29/24, Disp: 300 mL, Rfl: 5    atropine 1% (ISOPTO ATROPINE) 1 % Drop, Place 1 drop into the left eye every evening., Disp: , Rfl:     blood sugar diagnostic Strp, 1 each by Misc.(Non-Drug; Combo Route) route 3 (three) times daily., Disp: 90 each, Rfl: 11    blood-glucose meter kit, USE TO TEST BLOOD GLUCOSE, Disp: 1 each,  "Rfl: 0    calcitRIOL (ROCALTROL) 0.25 MCG Cap, Take 1 capsule (0.25 mcg total) by mouth once daily., Disp: 30 capsule, Rfl: 5    COMBIGAN 0.2-0.5 % Drop, Place 1 drop into the right eye every evening., Disp: , Rfl:     ergocalciferol (VITAMIN D2) 50,000 unit Cap, Take 1 capsule (50,000 Units total) by mouth every 7 days. Take 1 capsule by mouth weekly x 12 weeks, then decrease to once per month., Disp: 12 capsule, Rfl: 3    furosemide (LASIX) 20 MG tablet, Take 2 tablets (40 mg total) by mouth once daily., Disp: 60 tablet, Rfl: 3    insulin (LANTUS SOLOSTAR U-100 INSULIN) glargine 100 units/mL SubQ pen, 10-14 units daily as direct by endocrinology, Disp: 6 mL, Rfl: 11    insulin lispro 100 unit/mL pen, Inject 5 Units into the skin 3 (three) times daily with meals. Plus sliding scale: TDD=30 units, Disp: 9 mL, Rfl: 11    labetaloL (NORMODYNE) 200 MG tablet, Take 2 tablets (400 mg total) by mouth every 12 (twelve) hours., Disp: 360 tablet, Rfl: 3    lancets Misc, 1 each by Misc.(Non-Drug; Combo Route) route 3 (three) times daily., Disp: 90 each, Rfl: 11    Lmefol Ca-acetyl-meB12-algal (CEREFOLIN NAC, ALGAL OIL,) 6 mg-600 mg- 2 mg-90.314 mg Tab, Take 1 tablet by mouth once daily., Disp: , Rfl:     magnesium oxide (MAG-OX) 400 mg (241.3 mg magnesium) tablet, Take 2 tablets (800 mg total) by mouth 2 (two) times daily., Disp: 120 tablet, Rfl: 11    mycophenolate (CELLCEPT) 250 mg Cap, Take 4 capsules (1,000 mg total) by mouth 2 (two) times daily., Disp: 240 capsule, Rfl: 11    pen needle, diabetic (EASY COMFORT PEN NEEDLES) 32 gauge x 5/32" Ndle, Inject 1 each into the skin 3 (three) times daily., Disp: 100 each, Rfl: 11    potassium chloride SA (K-DUR,KLOR-CON) 20 MEQ tablet, Take 2 tablets (40 mEq total) by mouth 2 (two) times daily., Disp: 120 tablet, Rfl: 11    predniSONE (DELTASONE) 5 MG tablet, Take by mouth daily:  20mg 12/2-12/8, 15mg 12/9-12/15, 10mg 12/16-12/22, then 5mg daily beginning 12/23/2023 (Patient " "taking differently: Take by mouth daily:  20mg 12/2-12/8, 15mg 12/9-12/15, 10mg 12/16-12/22, then 5mg daily beginning 12/23/2023), Disp: 70 tablet, Rfl: 11    rosuvastatin (CRESTOR) 40 MG Tab, Take 40 mg by mouth every evening., Disp: , Rfl:     tacrolimus (PROGRAF) 1 MG Cap, Take 5 capsules (5 mg total) by mouth every 12 (twelve) hours., Disp: 420 capsule, Rfl: 11    TRULICITY 0.75 mg/0.5 mL pen injector, Inject into the skin., Disp: , Rfl:     valGANciclovir (VALCYTE) 450 mg Tab, Take 1 tablet (450 mg total) by mouth once daily. STOP 5/29/24, Disp: 30 tablet, Rfl: 5    famotidine (PEPCID) 20 MG tablet, Take 1 tablet (20 mg total) by mouth every evening. (Patient not taking: Reported on 2/16/2024), Disp: 30 tablet, Rfl: 0      Past Medical History:   Diagnosis Date    Blind     Diabetes     ESRD (end stage renal disease) on dialysis     Hypertension     Right cataract          Review of Systems   Constitutional:  Negative for appetite change, chills, fatigue and fever.   HENT:  Negative for trouble swallowing.    Eyes:  Positive for visual disturbance.   Respiratory:  Negative for cough, chest tightness, shortness of breath and wheezing.    Cardiovascular:  Negative for chest pain, palpitations and leg swelling.   Gastrointestinal:  Negative for abdominal pain, constipation, diarrhea and nausea.   Genitourinary:  Negative for difficulty urinating, frequency and urgency.   Musculoskeletal:  Negative for arthralgias and myalgias.   Skin:  Negative for rash.   Allergic/Immunologic: Positive for immunocompromised state.   Neurological:  Negative for dizziness, weakness, light-headedness and headaches.   Psychiatric/Behavioral:  Negative for sleep disturbance.        Objective:   Blood pressure (!) 183/79, pulse 91, temperature 98 °F (36.7 °C), temperature source Temporal, height 5' 5.5" (1.664 m), weight 105.2 kg (231 lb 14.8 oz), SpO2 98 %.body mass index is 38.01 kg/m².    Physical Exam  Constitutional:       " "General: She is not in acute distress.     Appearance: She is well-developed. She is not diaphoretic.   Cardiovascular:      Rate and Rhythm: Normal rate and regular rhythm.      Heart sounds: Normal heart sounds.   Pulmonary:      Effort: Pulmonary effort is normal.      Breath sounds: Normal breath sounds.   Abdominal:      General: Bowel sounds are normal.      Palpations: Abdomen is soft.   Musculoskeletal:         General: No tenderness. Normal range of motion.   Skin:     General: Skin is warm and dry.      Findings: No rash.      Nails: There is no clubbing.   Neurological:      Mental Status: She is alert and oriented to person, place, and time.   Psychiatric:         Behavior: Behavior normal.         Labs:  Lab Results   Component Value Date    WBC 4.38 02/19/2024    HGB 10.9 (L) 02/19/2024    HCT 35.2 (L) 02/19/2024     02/19/2024    K 4.1 02/19/2024     02/19/2024    CO2 26 02/19/2024    BUN 32 (H) 02/19/2024    CREATININE 1.6 (H) 02/19/2024    EGFRNORACEVR 40 (A) 02/19/2024    CALCIUM 9.6 02/19/2024    PHOS 2.8 02/19/2024    MG 1.7 02/19/2024    ALBUMIN 3.5 02/19/2024    AST 9 (L) 01/29/2024    ALT 9 (L) 01/29/2024    UTPCR 0.65 (H) 01/08/2024     (H) 12/06/2023    TACROLIMUS 8.7 02/19/2024       No results found for: "EXTANC", "EXTWBC", "EXTSEGS", "EXTPLATELETS", "EXTHEMOGLOBI", "EXTHEMATOCRI", "EXTCREATININ", "EXTSODIUM", "EXTPOTASSIUM", "EXTBUN", "EXTCO2", "EXTCALCIUM", "EXTPHOSPHORU", "EXTGLUCOSE", "EXTALBUMIN", "EXTAST", "EXTALT", "EXTBILITOTAL", "EXTLIPASE", "EXTAMYLASE"    No results found for: "EXTCYCLOSLVL", "EXTSIROLIMUS", "EXTTACROLVL", "EXTPROTCRE", "EXTPTHINTACT", "EXTPROTEINUA", "EXTWBCUA", "EXTRBCUA"    Labs were reviewed with the patient    Assessment:     1. S/P kidney transplant    2. Essential hypertension    3. Type 2 diabetes mellitus with hyperglycemia, with long-term current use of insulin    4. Long-term use of immunosuppressant medication        Plan:   Please " "change next appointment from me to Dr. Thomas due to ongoing "abdominal swelling" complain. Weight currently stable and not rising.   Continue current IS medications       1. CKD stage: will continue follow up as per our center guidelines. patient to continue close follow up with the local General nephrologist. Education provided in appropriate fluid intake, potassium intake. Continue with oral hydration.      2. Immunosuppression: Prograf trough 8.7 , therapeutic target 8-10. Continue Prograf 5/5, MMF 1000 Mg BID, and Prednisone QD  Lab Results   Component Value Date    TACROLIMUS 8.7 02/19/2024    TACROLIMUS 9.2 02/12/2024    TACROLIMUS 6.2 02/06/2024   Will closely monitor for toxicities, education provided about adherence to medicines and need to communicate any side effect to the transplant nurse or physician.      3. Allograft Function:stable at baseline for the patient. Continue follow up as per our guidelines and with the local General nephrologist. Communication will be sent today.  Lab Results   Component Value Date    CREATININE 1.6 (H) 02/19/2024    CREATININE 1.7 (H) 02/12/2024    CREATININE 1.5 (H) 02/06/2024     Lab Results   Component Value Date    LIPASE 144 (H) 08/07/2023      Latest Reference Range & Units POD 82,  Kidney-Post 1 Year  02/19/24   Creatinine 0.5 - 1.4 mg/dL 1.6 (H)   eGFR >60 mL/min/1.73 m^2 40 !   Glucose 70 - 110 mg/dL 128 (H)       4. Hypertension management:  Continue with home blood pressure monitoring, low salt and healthy life discussed with the patient.  BP Readings from Last 3 Encounters:   02/26/24 (!) 183/79   02/16/24 (!) 149/65   01/24/24 (!) 143/96       5. Metabolic Bone Disease/Secondary Hyperparathyroidism:calcium and phosphorus level discussed with the patient, patient will continue follow up with the general nephrologist for management of metabolic bone disease calcium and phosphorus as per our center protocol. Will monitor PTH, Vit D level, calcium.   Lab " Results   Component Value Date     (H) 12/06/2023    CALCIUM 9.6 02/19/2024    PHOS 2.8 02/19/2024    PHOS 2.5 (L) 02/12/2024    PHOS 2.9 02/06/2024       6. Electrolytes: reviewed with the patient, essentially within the normal range no need for acute changes today, will monitor as per our center guidelines.  Lab Results   Component Value Date     02/19/2024    K 4.1 02/19/2024     02/19/2024    CO2 26 02/19/2024    CO2 22 (L) 02/12/2024    CO2 24 02/06/2024       7. Anemia: will continue monitoring as per our center guidelines. No indication for acute intervention today.  Lab Results   Component Value Date    WBC 4.38 02/19/2024    HGB 10.9 (L) 02/19/2024    HCT 35.2 (L) 02/19/2024     (H) 02/19/2024     (L) 02/19/2024       8.Proteinuria: will continue with pr/cr ratio as per our center guidelines  Lab Results   Component Value Date    PROTEINURINE 50 (H) 01/08/2024    CREATRANDUR 77.0 01/08/2024    UTPCR 0.65 (H) 01/08/2024    UTPCR 0.27 (H) 01/02/2024    UTPCR 0.32 (H) 12/28/2023        9. BK virus infection screening: will continue with urine or blood PCR as per our guidelines to prevent BK virus viremia and allograft dysfunction  Lab Results   Component Value Date    BKVIRUSPCRQB <125 01/29/2024         10. Weight education: provided during the clinic visit.  Body mass index is 38.01 kg/m².     11.Patient safety education regarding immunosuppression including prophylaxis posttransplant for CMV, PCP : Education provided about vaccination and prevention of infections.    12.  Cytopenias: no significant cytopenias will monitor as per our guidelines. Medicine list reviewed including potential causes of drug-induced cytopenias  Lab Results   Component Value Date    WBC 4.38 02/19/2024    HGB 10.9 (L) 02/19/2024    HCT 35.2 (L) 02/19/2024     (H) 02/19/2024     (L) 02/19/2024       13. Post-transplant Prophylaxis; CMV Infection, PJP and Candida mucosistis and other  indicated for this particular patient.   PCP PROPHYLAXIS: needs PJP prophy until 5/29/24 (G6PD pending) atovaquone due to low blood couts  CMV PROPHYLAXIS: Valcyte until 5/29/24     Exercise: reminded Juan Francisco of the importance of regular exercise for weight management, blood sugar and blood pressure management.  I also explained exercise has been shown to improve cardiovascular health, energy level, and sleep hygiene.  Lastly, I advised him that cardiovascular complications are leading cause of death for renal transplant recipients, and regular exercise can help lower this risk.    Follow-up:   Clinic: return to transplant clinic weekly for the first month after transplant; every 2 weeks during months 2-3; then at 6-, 9-, 12-, 18-, 24-, and 36- months post-transplant to reassess for complications from immunosuppression toxicity and monitor for rejection.  Annually thereafter.    Labs: since patient remains at high risk for rejection and drug-related complications that warrant close monitoring, labs will be ordered as follows: continue twice weekly CBC, renal panel, and drug level for first month; then same labs once weekly through 3rd month post-transplant.  Urine for UA and protein/creatinine ratio monthly.  Serum BK - PCR at 1-, 3-, 6-, 9-, 12-, 18-, 24-, 36-, 48-, and 60 months post-transplant.  Hepatic panel at 1-, 2-, 3-, 6-, 9-, 12-, 18-, 24-, and 36- months post-transplant.    Justine Masterson NP       Education:   Material provided to the patient.  Patient reminded to call with any health changes since these can be early signs of significant complications.  Also, I advised the patient to be sure any new medications or changes of old medications are discussed with either a pharmacist or physician knowledgeable with transplant to avoid rejection/drug toxicity related to significant drug interactions.    Patient advised that it is recommended that all transplanted patients, and their close contacts and  household members receive Covid vaccination.

## 2024-02-26 NOTE — LETTER
February 26, 2024        Aries Bourne  [address]  [facility] [address]  Phone: [phone]  Fax: [phone]             [facility] 1st Fl  [address]  [facility] [address]  Phone: [phone]   Patient: Mary Waller   MR Number: [id number]   YOB: 1975   Date of Visit: 2/26/2024       Dear Dr. Aries Bourne    Thank you for referring Mary Waller to me for evaluation. Attached you will find relevant portions of my assessment and plan of care.    If you have questions, please do not hesitate to call me. I look forward to following Mary Waller along with you.    Sincerely,    Justine Masterson, NP    Enclosure    If you would like to receive this communication electronically, please contact [email or web address] or [phone] to request [employer] access.    [employer] is a tool which provides read-only access to select patient information with whom you have a relationship. Its easy to use and provides real time access to review your patients record including encounter summaries, notes, results, and demographic information.    If you feel you have received this communication in error or would no longer like to receive these types of communications, please e-mail [email or web address]

## 2024-02-29 ENCOUNTER — PATIENT MESSAGE (OUTPATIENT)
Dept: TRANSPLANT | Facility: CLINIC | Age: 49
End: 2024-02-29
Payer: MEDICARE

## 2024-02-29 DIAGNOSIS — Z94.0 S/P KIDNEY TRANSPLANT: Primary | ICD-10-CM

## 2024-03-08 ENCOUNTER — OFFICE VISIT (OUTPATIENT)
Dept: TRANSPLANT | Facility: CLINIC | Age: 49
End: 2024-03-08
Payer: MEDICARE

## 2024-03-08 VITALS
HEIGHT: 65 IN | HEART RATE: 88 BPM | BODY MASS INDEX: 38.53 KG/M2 | SYSTOLIC BLOOD PRESSURE: 201 MMHG | WEIGHT: 231.25 LBS | TEMPERATURE: 97 F | DIASTOLIC BLOOD PRESSURE: 81 MMHG | OXYGEN SATURATION: 100 %

## 2024-03-08 DIAGNOSIS — N25.81 SECONDARY HYPERPARATHYROIDISM: ICD-10-CM

## 2024-03-08 DIAGNOSIS — Z79.4 TYPE 2 DIABETES MELLITUS WITH HYPERGLYCEMIA, WITH LONG-TERM CURRENT USE OF INSULIN: ICD-10-CM

## 2024-03-08 DIAGNOSIS — I10 ESSENTIAL HYPERTENSION: ICD-10-CM

## 2024-03-08 DIAGNOSIS — Z99.2 DELAYED GRAFT FUNCTION OF KIDNEY TRANSPLANT DUE TO ATN REQUIRING ACUTE DIALYSIS: ICD-10-CM

## 2024-03-08 DIAGNOSIS — Z79.899 IMMUNODEFICIENCY DUE TO LONG TERM DRUG THERAPY: ICD-10-CM

## 2024-03-08 DIAGNOSIS — Z79.60 LONG-TERM USE OF IMMUNOSUPPRESSANT MEDICATION: ICD-10-CM

## 2024-03-08 DIAGNOSIS — E11.65 TYPE 2 DIABETES MELLITUS WITH HYPERGLYCEMIA, WITH LONG-TERM CURRENT USE OF INSULIN: ICD-10-CM

## 2024-03-08 DIAGNOSIS — T86.19 DELAYED GRAFT FUNCTION OF KIDNEY TRANSPLANT DUE TO ATN REQUIRING ACUTE DIALYSIS: ICD-10-CM

## 2024-03-08 DIAGNOSIS — D63.8 ANEMIA OF CHRONIC DISEASE: ICD-10-CM

## 2024-03-08 DIAGNOSIS — N18.32 STAGE 3B CHRONIC KIDNEY DISEASE: ICD-10-CM

## 2024-03-08 DIAGNOSIS — Z94.0 S/P KIDNEY TRANSPLANT: Primary | ICD-10-CM

## 2024-03-08 DIAGNOSIS — E66.01 CLASS 2 SEVERE OBESITY DUE TO EXCESS CALORIES WITH SERIOUS COMORBIDITY AND BODY MASS INDEX (BMI) OF 38.0 TO 38.9 IN ADULT: ICD-10-CM

## 2024-03-08 DIAGNOSIS — D84.821 IMMUNODEFICIENCY DUE TO LONG TERM DRUG THERAPY: ICD-10-CM

## 2024-03-08 DIAGNOSIS — Z29.89 PROPHYLACTIC IMMUNOTHERAPY: ICD-10-CM

## 2024-03-08 PROBLEM — E87.5 HYPERKALEMIA: Status: RESOLVED | Noted: 2023-11-29 | Resolved: 2024-03-08

## 2024-03-08 PROBLEM — D62 ACUTE BLOOD LOSS ANEMIA: Status: RESOLVED | Noted: 2023-11-30 | Resolved: 2024-03-08

## 2024-03-08 PROBLEM — Z01.818 PRE-TRANSPLANT EVALUATION FOR KIDNEY TRANSPLANT: Status: RESOLVED | Noted: 2023-08-17 | Resolved: 2024-03-08

## 2024-03-08 PROBLEM — B95.61 STAPHYLOCOCCUS AUREUS BACTEREMIA: Status: RESOLVED | Noted: 2019-01-06 | Resolved: 2024-03-08

## 2024-03-08 PROBLEM — R78.81 STAPHYLOCOCCUS AUREUS BACTEREMIA: Status: RESOLVED | Noted: 2019-01-06 | Resolved: 2024-03-08

## 2024-03-08 PROBLEM — E87.6 HYPOKALEMIA: Status: RESOLVED | Noted: 2023-12-22 | Resolved: 2024-03-08

## 2024-03-08 PROCEDURE — 99999 PR PBB SHADOW E&M-EST. PATIENT-LVL V: CPT | Mod: PBBFAC,,, | Performed by: STUDENT IN AN ORGANIZED HEALTH CARE EDUCATION/TRAINING PROGRAM

## 2024-03-08 PROCEDURE — 99215 OFFICE O/P EST HI 40 MIN: CPT | Mod: S$PBB,,, | Performed by: STUDENT IN AN ORGANIZED HEALTH CARE EDUCATION/TRAINING PROGRAM

## 2024-03-08 PROCEDURE — G2211 COMPLEX E/M VISIT ADD ON: HCPCS | Mod: S$PBB,,, | Performed by: STUDENT IN AN ORGANIZED HEALTH CARE EDUCATION/TRAINING PROGRAM

## 2024-03-08 PROCEDURE — 99215 OFFICE O/P EST HI 40 MIN: CPT | Mod: PBBFAC | Performed by: STUDENT IN AN ORGANIZED HEALTH CARE EDUCATION/TRAINING PROGRAM

## 2024-03-08 RX ORDER — LABETALOL 200 MG/1
200 TABLET, FILM COATED ORAL EVERY 12 HOURS
Qty: 180 TABLET | Refills: 3 | Status: SHIPPED | OUTPATIENT
Start: 2024-03-08 | End: 2025-03-08

## 2024-03-08 NOTE — LETTER
March 8, 2024        Aries Bourne  [address]  [facility] [address]  Phone: [phone]  Fax: [phone]             [facility] 1st Fl  [address]  [facility] [address]  Phone: [phone]   Patient: Mary Waller   MR Number: [id number]   YOB: 1975   Date of Visit: 3/8/2024       Dear Dr. Aries Bourne    Thank you for referring Mary Waller to me for evaluation. Attached you will find relevant portions of my assessment and plan of care.    If you have questions, please do not hesitate to call me. I look forward to following Mary Waller along with you.    Sincerely,    Randall Thomas Jr., MD    Enclosure    If you would like to receive this communication electronically, please contact [email or web address] or [phone] to request [employer] access.    [employer] is a tool which provides read-only access to select patient information with whom you have a relationship. Its easy to use and provides real time access to review your patients record including encounter summaries, notes, results, and demographic information.    If you feel you have received this communication in error or would no longer like to receive these types of communications, please e-mail [email or web address]

## 2024-03-08 NOTE — PROGRESS NOTES
Post-Transplant Assessment    Referring Physician: Aries Bourne  Current Nephrologist: Aries Bourne    ORGAN: LEFT KIDNEY  Donor Type: donation after brain death  PHS Increased Risk: yes  Cold Ischemia: 903 mins  Induction Medications: thymo    Chief Complaint   Patient presents with    Kidney Transplant Reassessment     Presents to the clinic today for medical conditions listed below.  Problem Noted   Stage 3b Chronic Kidney Disease 3/8/2024   Secondary Hyperparathyroidism 3/8/2024    On calcitriol, ergo, mag     Immunodeficiency Due to Long Term Drug Therapy 3/8/2024    On tac, MMF, pred  Monitoring for toxicity     S/P Kidney Transplant 11/29/2023    ESKD from DM and HTN; HD LUE AVF 4/2018; still urinates  DBDKT 11/29/23 KDPI 35 CIT 15 CMV+ cPRA 0 DSA unable to determine. OR not notable. DGF.     12/20/23 DSA 0  12/28/23 DSA 0    12/20/23 cfDNA 0.23    1/4/23 bx 5% IF 7 gloms, htn     Anemia of Chronic Disease 11/28/2023    Not currently on iv iron or epo     Hypokalemia (Resolved) 12/22/2023   Acute Blood Loss Anemia (Resolved) 11/30/2023   Hyperkalemia (Resolved) 11/29/2023   Pre-Transplant Evaluation for Kidney Transplant (Resolved) 8/17/2023   Bacteremia Associated With Intravascular Line (Resolved)    Staphylococcus Aureus Bacteremia (Resolved) 1/6/2019     Today:  History of Present Illness    Patient presents today for fluid retention concerns.    Patient expresses concerns about noticeable weight fluctuations primarily around the stomach and lower abdomen. She reports increasing weight from 228 to 232 lbs within a day, and states this swelling and associated discomfort reminds her of her time on dialysis. She denies any discomfort in her knees and ankles, and refutes any accumulation of fluid in her abdomen, suggesting that swelling is confined to the skin and tissues.    She continues her medication regimen which includes twice-daily intake of Lasix 40 mg for fluid management. Patient  experiences frequent urination post morning dose. She also uses Labetalol for her blood pressure control, which is usually taken around lunchtime.    Patient remains on the lowest dose of Trulicity for diabetes. In spite of having adverse effects with Ozempic in the past including dizziness, vomiting, and early satiety, patient is amenable for reconsidering medications in the same class such as Ozempic or Mounjaro due to potential benefits in aiding weight loss and reducing proteinuria. Her blood sugar control is satisfactory with current medication.    She is on Prograf, Cellcept, ValAcyclovir, and Bactrim for post-transplant care.    She reports a daily weight measurement, noting fluctuations between 228 and 232 lbs. Her current medication treatment plan does not cause any discomfort or issues.  ROS:  Constitutional: +weight gain, +dizziness  Gastrointestinal: +vomiting  Genitourinary: +frequency         Physical Exam :  Vitals:    03/08/24 1001   BP: (!) 201/81   Pulse: 88   Temp: 97.3 °F (36.3 °C)     Physical Exam    General: Well-developed.  Cardiovascular: Regular rate. Regular rhythm. + Murmur  Lungs: Normal respiratory effort. No respiratory distress.  Extremities: No edema lower extremities. Legs appear normal. LUE AVF  Psychiatric: Alert.        Last 3 sets of metabolic panel, blood count, drug levels reviewed     Assessment & Plan    N18.6 End stage renal disease  E11.9 Type 2 diabetes mellitus without complications  I10 Essential (primary) hypertension  R60.9 Edema, unspecified  Z94.0 Kidney transplant status  HYPERTENSION AND HEART RATE MANAGEMENT:  - Instructed the patient to maintain the current regimen of labetalol twice daily and Lasix 40mg twice daily, emphasizing the importance of consistent medication for stable blood pressure and heart rate.  - Advised the patient to monitor blood pressure levels vigilantly, ensuring they do not fall below 105-110.  - Anticipated potential discontinuation of  "Lasix within the next two months, contingent on patient progress.  KIDNEY TRANSPLANT MONITORING:  - Scheduled labs for the patient within 1-2 weeks, including a test for donor-specific antibodies (DSA) and a DNA test, AlloSure, to detect potential kidney rejection.  - Planned to repeat the AlloSure test every three months before discontinuation.  WEIGHT MANAGEMENT AND FLUID RETENTION:  - Informed the patient about the commonality of fluid retention around the abdomen, assuring that it may improve over time as the body adjusts to urine output.  - Explained that Ozempic, and Mounjaro belong to the same category of medication, all of which have data supporting weight loss.  - Emphasized the importance of reporting any significant changes in weight or blood pressure.  DIABETES AND KIDNEY DISEASE MANAGEMENT:  - Noted the potential of tacrolimus to exacerbate diabetes and the possibility that a functioning kidney may necessitate insulin.  - Suggested reconsideration of Ozempic and Mounjaro due to their demonstrated efficacy in reducing proteinuria and slowing the progression of kidney disease.         Immediate changes:    DSA, Allosure, UA, UPCR with next set of labs  Cont furo 40 bid for now  Stop taking labetalol around noon and take 200 BID    1. CKD stage 3b:   Will continue follow up as per our center guidelines. patient to continue close follow up with the local General nephrologist. Education provided in appropriate fluid intake, potassium intake. Continue with oral hydration.    2. Immunosuppression:   Will closely monitor for toxicities, education provided about adherence to medicines and need to communicate any side effect to the transplant nurse or physician.  Lab Results   Component Value Date    TACROLIMUS 8.7 02/19/2024    TACROLIMUS 9.2 02/12/2024    TACROLIMUS 6.2 02/06/2024     No results found for: "CYCLOSPORINE"    3. Allograft Function:  Stable at baseline for the patient. Continue follow up as per " our guidelines and with the local General nephrologist. Communication will be sent today.  Lab Results   Component Value Date    CREATININE 1.6 (H) 02/19/2024    CREATININE 1.7 (H) 02/12/2024    CREATININE 1.5 (H) 02/06/2024     Lab Results   Component Value Date    LIPASE 144 (H) 08/07/2023       4. Hypertension management:    Continue with home blood pressure monitoring, low salt and healthy life discussed with the patient    5. Metabolic Bone Disease/Secondary Hyperparathyroidism:   Calcium and phosphorus level discussed with the patient, patient will continue follow up with the general nephrologist for management of metabolic bone disease. Calcium and phosphorus as per our center protocol.  Lab Results   Component Value Date     (H) 12/06/2023    CALCIUM 9.6 02/19/2024    PHOS 2.8 02/19/2024    PHOS 2.5 (L) 02/12/2024    PHOS 2.9 02/06/2024       6. Electrolytes:   Reviewed with the patient, essentially within the normal range no need for acute changes today, will monitor as per our center guidelines.  Lab Results   Component Value Date     02/19/2024    K 4.1 02/19/2024     02/19/2024    CO2 26 02/19/2024    CO2 22 (L) 02/12/2024    CO2 24 02/06/2024       7. Anemia:   Will continue monitoring as per our center guidelines. No indication for acute intervention today.  Lab Results   Component Value Date    WBC 4.38 02/19/2024    HGB 10.9 (L) 02/19/2024    HCT 35.2 (L) 02/19/2024     (H) 02/19/2024     (L) 02/19/2024       8.Proteinuria:   Will continue with pr/cr ratio as per our center guidelines  Lab Results   Component Value Date    PROTEINURINE 50 (H) 01/08/2024    CREATRANDUR 77.0 01/08/2024    UTPCR 0.65 (H) 01/08/2024    UTPCR 0.27 (H) 01/02/2024    UTPCR 0.32 (H) 12/28/2023        9. BK virus infection screening:   Will continue with urine or blood PCR as per our guidelines to prevent BK virus viremia and allograft dysfunction  Lab Results   Component Value Date     BKVIRUSPCRQB <125 01/29/2024         10. Weight education:   Provided during the clinic visit.  Body mass index is 38.48 kg/m².     11.Patient safety education regarding immunosuppression including prophylaxis posttransplant for CMV, PCP :   Education provided about vaccination and prevention of infections.    12.  Cytopenias:   No significant cytopenias will monitor as per our guidelines. Medicine list reviewed including potential causes of drug-induced cytopenias  Lab Results   Component Value Date    WBC 4.38 02/19/2024    HGB 10.9 (L) 02/19/2024    HCT 35.2 (L) 02/19/2024     (H) 02/19/2024     (L) 02/19/2024       13. Post-transplant Prophylaxis: CMV Infection, PJP and Candida mucosistis and other indicated for this particular patient.     I spent a total of 57 minutes on the day of the visit.      No orders of the defined types were placed in this encounter.    Medications Discontinued During This Encounter   Medication Reason    labetaloL (NORMODYNE) 200 MG tablet       Future Appointments   Date Time Provider Department Center   3/23/2024  8:45 AM [facility] O[facility]US[facility]   4/9/2024 10:30 AM Bryan Rosa MD [facility] CARDIO [address]   4/11/2024  8:30 AM Cecelia Dasilva, APRN,ANP-C [facility] ENDOCRN [place]       Follow-up:   Clinic: return to transplant clinic weekly for the first month after transplant; every 2 weeks during months 2-3; then at 6-, 9-, 12-, 18-, 24-, and 36- months post-transplant to reassess for complications from immunosuppression toxicity and monitor for rejection.  Annually thereafter.    Labs: since patient remains at high risk for rejection and drug-related complications that warrant close monitoring, labs will be ordered as follows: continue twice weekly CBC, renal panel, and drug level for first month; then same labs once weekly through 3rd month post-transplant.  Urine for UA and protein/creatinine ratio monthly.  Serum BK - PCR at 1-, 3-, 6-, 9-,  12-, 18-, 24-, 36- 48-, and 60 months post-transplant.  Hepatic panel at 1-, 2-, 3-, 6-, 9-, 12-, 18-, 24-, and 36- months post-transplant.    Randall Thomas Jr., MD       Education:   Material provided to the patient.  Patient reminded to call with any health changes since these can be early signs of significant complications.  Also, I advised the patient to be sure any new medications or changes of old medications are discussed with either a pharmacist or physician knowledgeable with transplant to avoid rejection/drug toxicity related to significant drug interactions.    Patient advised that it is recommended that all transplanted patients, and their close contacts and household members receive Covid vaccination.    Visit today included increased complexity associated with the care of the episodic problem kidney transplant addressed and managing the longitudinal care of the patient due to the serious and/or complex managed problem(s) kidney transplant, CKD, HTN, secondary hyperparathyroidism, and anemia of chronic disease.    UNOS Patient Status  Functional Status: 80% - Normal activity with effort: some symptoms of disease  Physical Capacity: No Limitations

## 2024-03-23 ENCOUNTER — HOSPITAL ENCOUNTER (OUTPATIENT)
Dept: RADIOLOGY | Facility: HOSPITAL | Age: 49
Discharge: HOME | End: 2024-03-23
Attending: STUDENT IN AN ORGANIZED HEALTH CARE EDUCATION/TRAINING PROGRAM
Payer: MEDICARE

## 2024-03-23 DIAGNOSIS — Z94.0 S/P KIDNEY TRANSPLANT: ICD-10-CM

## 2024-03-23 PROCEDURE — 76776 US EXAM K TRANSPL W/DOPPLER: CPT | Mod: 26,,, | Performed by: RADIOLOGY

## 2024-03-23 PROCEDURE — 76776 US EXAM K TRANSPL W/DOPPLER: CPT | Mod: TC

## 2024-04-01 ENCOUNTER — LAB VISIT (OUTPATIENT)
Dept: LAB | Facility: HOSPITAL | Age: 49
End: 2024-04-01
Attending: STUDENT IN AN ORGANIZED HEALTH CARE EDUCATION/TRAINING PROGRAM
Payer: MEDICARE

## 2024-04-01 DIAGNOSIS — Z94.0 KIDNEY REPLACED BY TRANSPLANT: ICD-10-CM

## 2024-04-01 LAB
ALBUMIN SERPL BCP-MCNC: 3.5 G/DL (ref 3.5–5.2)
ANION GAP SERPL CALC-SCNC: 13 MMOL/L (ref 8–16)
BASOPHILS # BLD AUTO: ABNORMAL K/UL (ref 0–0.2)
BASOPHILS NFR BLD: 0 % (ref 0–1.9)
BUN SERPL-MCNC: 32 MG/DL (ref 6–20)
CALCIUM SERPL-MCNC: 10 MG/DL (ref 8.7–10.5)
CHLORIDE SERPL-SCNC: 104 MMOL/L (ref 95–110)
CO2 SERPL-SCNC: 21 MMOL/L (ref 23–29)
CREAT SERPL-MCNC: 2.2 MG/DL (ref 0.5–1.4)
DIFFERENTIAL METHOD BLD: ABNORMAL
EOSINOPHIL # BLD AUTO: ABNORMAL K/UL (ref 0–0.5)
EOSINOPHIL NFR BLD: 6 % (ref 0–8)
ERYTHROCYTE [DISTWIDTH] IN BLOOD BY AUTOMATED COUNT: 13.3 % (ref 11.5–14.5)
EST. GFR  (NO RACE VARIABLE): 27 ML/MIN/1.73 M^2
GLUCOSE SERPL-MCNC: 124 MG/DL (ref 70–110)
HCT VFR BLD AUTO: 38.4 % (ref 37–48.5)
HGB BLD-MCNC: 11.9 G/DL (ref 12–16)
IMM GRANULOCYTES # BLD AUTO: ABNORMAL K/UL (ref 0–0.04)
IMM GRANULOCYTES NFR BLD AUTO: ABNORMAL % (ref 0–0.5)
LYMPHOCYTES # BLD AUTO: ABNORMAL K/UL (ref 1–4.8)
LYMPHOCYTES NFR BLD: 23 % (ref 18–48)
MAGNESIUM SERPL-MCNC: 1.9 MG/DL (ref 1.6–2.6)
MCH RBC QN AUTO: 30.1 PG (ref 27–31)
MCHC RBC AUTO-ENTMCNC: 31 G/DL (ref 32–36)
MCV RBC AUTO: 97 FL (ref 82–98)
METAMYELOCYTES NFR BLD MANUAL: 1 %
MONOCYTES # BLD AUTO: ABNORMAL K/UL (ref 0.3–1)
MONOCYTES NFR BLD: 16 % (ref 4–15)
NEUTROPHILS NFR BLD: 41 % (ref 38–73)
NEUTS BAND NFR BLD MANUAL: 13 %
NRBC BLD-RTO: 0 /100 WBC
PHOSPHATE SERPL-MCNC: 2.4 MG/DL (ref 2.7–4.5)
PLATELET # BLD AUTO: 132 K/UL (ref 150–450)
PLATELET BLD QL SMEAR: ABNORMAL
PMV BLD AUTO: 10.7 FL (ref 9.2–12.9)
POTASSIUM SERPL-SCNC: 3.7 MMOL/L (ref 3.5–5.1)
RBC # BLD AUTO: 3.95 M/UL (ref 4–5.4)
SODIUM SERPL-SCNC: 138 MMOL/L (ref 136–145)
TACROLIMUS BLD-MCNC: 9.9 NG/ML (ref 5–15)
WBC # BLD AUTO: 2.08 K/UL (ref 3.9–12.7)

## 2024-04-01 PROCEDURE — 80069 RENAL FUNCTION PANEL: CPT | Performed by: STUDENT IN AN ORGANIZED HEALTH CARE EDUCATION/TRAINING PROGRAM

## 2024-04-01 PROCEDURE — 83735 ASSAY OF MAGNESIUM: CPT | Performed by: STUDENT IN AN ORGANIZED HEALTH CARE EDUCATION/TRAINING PROGRAM

## 2024-04-01 PROCEDURE — 36415 COLL VENOUS BLD VENIPUNCTURE: CPT | Performed by: STUDENT IN AN ORGANIZED HEALTH CARE EDUCATION/TRAINING PROGRAM

## 2024-04-01 PROCEDURE — 85027 COMPLETE CBC AUTOMATED: CPT | Performed by: STUDENT IN AN ORGANIZED HEALTH CARE EDUCATION/TRAINING PROGRAM

## 2024-04-01 PROCEDURE — 85007 BL SMEAR W/DIFF WBC COUNT: CPT | Performed by: STUDENT IN AN ORGANIZED HEALTH CARE EDUCATION/TRAINING PROGRAM

## 2024-04-01 PROCEDURE — 80197 ASSAY OF TACROLIMUS: CPT | Performed by: STUDENT IN AN ORGANIZED HEALTH CARE EDUCATION/TRAINING PROGRAM

## 2024-04-03 DIAGNOSIS — Z94.0 S/P KIDNEY TRANSPLANT: Primary | ICD-10-CM

## 2024-04-08 NOTE — PROGRESS NOTES
Cardiology Clinic Note  Reason for Visit: Cardiac Risk Stratification  Referring Physician: Justine Masterson NP    HPI:   Problem List:  ESRD s/p renal transplant 11/29/23  CAD s/p PCI (2019 at [facility])  HTN  DM2  HLD      Presenting today for follow-up. Last seen 1/2024 two months after renal transplant. Started back on Labetalol since last visit with better HR and BP. She keeps a diligent log of her HR and BP at home. HR 80-90s and -140s. No angina or heart failure symptoms. No bleeding or falls. Only taking Labetalol once daily though most days because she says her bP has been low. No low BP documented in her journal (<100/60).        ROS:    Constitution: Negative for fever, chills, weight loss or gain.   HENT: Negative for sore throat, rhinorrhea, or headache.  Eyes: Negative for blurred or double vision.   Cardiovascular: See above  Pulmonary: Negative for SOB   Gastrointestinal: Negative for abdominal pain, nausea, vomiting, or diarrhea.   : Negative for dysuria.   Neurological: Negative for focal weakness or sensory changes.  PMH:     Past Medical History:   Diagnosis Date    Acute blood loss anemia 11/30/2023    Bacteremia associated with intravascular line     Blind     Diabetes     ESRD (end stage renal disease) on dialysis     Hyperkalemia 11/29/2023    Hypertension     Hypokalemia 12/22/2023    Pre-transplant evaluation for kidney transplant 08/17/2023    Right cataract     Staphylococcus aureus bacteremia 01/06/2019     Past Surgical History:   Procedure Laterality Date    AV FISTULA PLACEMENT      EYE SURGERY      KIDNEY TRANSPLANT N/A 11/29/2023    Procedure: TRANSPLANT, KIDNEY;  Surgeon: Torri Moore MD;  Location: [facility] OR [address] [place] [address];  Service: Transplant;  Laterality: N/A;  Kidney in Room @ 0714  Out of Ice @ 0943  Reperfusion @ 1011     Allergies:     Review of patient's allergies indicates:   Allergen Reactions    Codeine Itching    Codeine sulfate Itching    Sulfa (sulfonamide  antibiotics)     Sulfur Other (See Comments)     Reaction not specified.     Medications:     Current Outpatient Medications on File Prior to Visit   Medication Sig Dispense Refill    atovaquone (MEPRON) 750 mg/5 mL Susp oral liquid Take 10 mLs (1,500 mg total) by mouth once daily. STOP 5/29/24 300 mL 5    atropine 1% (ISOPTO ATROPINE) 1 % Drop Place 1 drop into the left eye every evening.      blood sugar diagnostic Strp 1 each by Misc.(Non-Drug; Combo Route) route 3 (three) times daily. 90 each 11    blood-glucose meter kit USE TO TEST BLOOD GLUCOSE 1 each 0    calcitRIOL (ROCALTROL) 0.25 MCG Cap Take 1 capsule (0.25 mcg total) by mouth once daily. 30 capsule 5    COMBIGAN 0.2-0.5 % Drop Place 1 drop into the right eye every evening.      ergocalciferol (VITAMIN D2) 50,000 unit Cap Take 1 capsule (50,000 Units total) by mouth every 7 days. Take 1 capsule by mouth weekly x 12 weeks, then decrease to once per month. 12 capsule 3    furosemide (LASIX) 20 MG tablet Take 2 tablets (40 mg total) by mouth once daily. 60 tablet 3    insulin (LANTUS SOLOSTAR U-100 INSULIN) glargine 100 units/mL SubQ pen 10-14 units daily as direct by endocrinology 6 mL 11    insulin lispro 100 unit/mL pen Inject 5 Units into the skin 3 (three) times daily with meals. Plus sliding scale: TDD=30 units 9 mL 11    labetaloL (NORMODYNE) 200 MG tablet Take 1 tablet (200 mg total) by mouth every 12 (twelve) hours. 180 tablet 3    lancets Misc 1 each by Misc.(Non-Drug; Combo Route) route 3 (three) times daily. 90 each 11    Lmefol Ca-acetyl-meB12-algal (CEREFOLIN NAC, ALGAL OIL,) 6 mg-600 mg- 2 mg-90.314 mg Tab Take 1 tablet by mouth once daily.      magnesium oxide (MAG-OX) 400 mg (241.3 mg magnesium) tablet Take 2 tablets (800 mg total) by mouth 2 (two) times daily. 120 tablet 11    mycophenolate (CELLCEPT) 250 mg Cap Take 4 capsules (1,000 mg total) by mouth 2 (two) times daily. 240 capsule 11    pen needle, diabetic (EASY COMFORT PEN NEEDLES)  "32 gauge x 5/32" Ndle Inject 1 each into the skin 3 (three) times daily. 100 each 11    potassium chloride SA (K-DUR,KLOR-CON) 20 MEQ tablet Take 2 tablets (40 mEq total) by mouth 2 (two) times daily. 120 tablet 11    predniSONE (DELTASONE) 5 MG tablet Take by mouth daily:  20mg 12/2-12/8, 15mg 12/9-12/15, 10mg 12/16-12/22, then 5mg daily beginning 12/23/2023 (Patient taking differently: Take by mouth daily:  20mg 12/2-12/8, 15mg 12/9-12/15, 10mg 12/16-12/22, then 5mg daily beginning 12/23/2023) 70 tablet 11    rosuvastatin (CRESTOR) 40 MG Tab Take 40 mg by mouth every evening.      tacrolimus (PROGRAF) 1 MG Cap Take 5 capsules (5 mg total) by mouth every 12 (twelve) hours. 420 capsule 11    TRULICITY 0.75 mg/0.5 mL pen injector Inject into the skin.      valGANciclovir (VALCYTE) 450 mg Tab Take 1 tablet (450 mg total) by mouth once daily. STOP 5/29/24 30 tablet 5    famotidine (PEPCID) 20 MG tablet Take 1 tablet (20 mg total) by mouth every evening. (Patient not taking: Reported on 2/16/2024) 30 tablet 0    [DISCONTINUED] amLODIPine (NORVASC) 10 MG tablet Take 10 mg by mouth once daily.      [DISCONTINUED] hydrALAZINE (APRESOLINE) 50 MG tablet Take 100 mg by mouth every 8 (eight) hours.       [DISCONTINUED] losartan (COZAAR) 100 MG tablet Take 100 mg by mouth once daily.       No current facility-administered medications on file prior to visit.     Social History:     Social History     Tobacco Use    Smoking status: Never    Smokeless tobacco: Never   Substance Use Topics    Alcohol use: No     Family History:     Family History   Problem Relation Age of Onset    Diabetes Mother     Hyperlipidemia Mother     Diabetes Father     Hyperlipidemia Father     Diabetes Sister      Physical Exam:   /68   Pulse 92   Ht 5' 5" (1.651 m)   Wt 102.6 kg (226 lb 3.1 oz)   SpO2 100%   BMI 37.64 kg/m²        Physical Exam   Constitutional: She is oriented to person, place, and time.   HENT:   Head: Normocephalic. "   Mouth/Throat: Mucous membranes are moist.   Eyes:   Blind in L eye   Cardiovascular: Normal rate and regular rhythm.   Murmur heard.  Harsh midsystolic murmur is present with a grade of 3/6 at the upper right sternal border radiating to the neck.  AVF in LUE with thrill/bruit   Pulmonary/Chest: Effort normal. No respiratory distress.   Abdominal: Soft. Normal appearance.   Musculoskeletal:      Cervical back: Neck supple.      Right lower leg: No edema.      Left lower leg: No edema.   Neurological: She is alert and oriented to person, place, and time.   Skin: Skin is warm.        Labs:     Lab Results   Component Value Date     04/01/2024    K 3.7 04/01/2024     04/01/2024     12/06/2023    CO2 21 (L) 04/01/2024    BUN 32 (H) 04/01/2024    CREATININE 2.2 (H) 04/01/2024    ANIONGAP 13 04/01/2024     Lab Results   Component Value Date    HGBA1C 5.7 10/04/2023     Lab Results   Component Value Date    BNP 48 12/21/2023     (H) 05/21/2018    Lab Results   Component Value Date    WBC 2.08 (L) 04/01/2024    HGB 11.9 (L) 04/01/2024    HCT 38.4 04/01/2024    HCT 31 (L) 11/29/2023     (L) 04/01/2024    GRAN 41.0 04/01/2024    GRAN 3.5 02/12/2024     Lab Results   Component Value Date    CHOL 98 (L) 08/17/2023    HDL 30 (L) 08/17/2023    LDLCALC 47.0 (L) 08/17/2023    TRIG 105 08/17/2023          Imaging:   TTE 1/6/2019  Normal left ventricular systolic function. The estimated ejection fraction is 65%  Markedly calcified mitral annulus. Echodensity under the posterior mitral valve leaflet. Cannot rule out vegetation.  Concentric left ventricular remodeling.  Severe left atrial enlargement.  Indeterminate left ventricular diastolic function.  Normal right ventricular systolic function.  There is right ventricular hypertrophy.  The estimated PA systolic pressure is 12 mm Hg  Normal central venous pressure (3 mm Hg).  Trivial circumferential pericardial effusion.    JEROME 1/8/2019    Normal left  ventricular systolic function. The estimated ejection fraction is 65%  Normal right ventricular systolic function.  Normal LV diastolic function.  Presence of large round calcified mass attached to the posterior MV leaflet with central echolucent space measuring 1.2 x 0.9 cm that is consistent with caseous necrosis, a variant of Mitral annular calcification. An old healed vegetation cannot be ruled out. There are also small nodular calcium deposits seen on the posterior MV leaflet.  Limited JEROME due to patient developing hypoxia, pulmonic not evaluated on this study.  No gross abnormality of the Aortic (trileaflet) and TV valves seen.    Assessment:      1. S/P kidney transplant        2. Coronary artery disease involving native coronary artery of native heart with angina pectoris        3. Essential hypertension        4. Aortic valve insufficiency, etiology of cardiac valve disease unspecified                  Plan:     BP controlled; continue Labetalol (educated to take BID, not daily) and continuing home monitoring  Follow-up with Nephrology as s/p renal transplant  Repeat TTE in 1-2 years given mild-moderate AI seen last year  Continue ASA/statin for Hx of PCI  Continue home DM2 regimen    RTC 1 year or sooner PRN.    Discussed case with MD Bryan Jang MD PGY6  Cardiovascular Medicine Fellow  [facility]  Pager: [phone]

## 2024-04-09 ENCOUNTER — LAB VISIT (OUTPATIENT)
Dept: LAB | Facility: HOSPITAL | Age: 49
End: 2024-04-09
Attending: STUDENT IN AN ORGANIZED HEALTH CARE EDUCATION/TRAINING PROGRAM
Payer: MEDICARE

## 2024-04-09 ENCOUNTER — OFFICE VISIT (OUTPATIENT)
Dept: CARDIOLOGY | Facility: CLINIC | Age: 49
End: 2024-04-09
Payer: MEDICARE

## 2024-04-09 VITALS
DIASTOLIC BLOOD PRESSURE: 68 MMHG | BODY MASS INDEX: 37.69 KG/M2 | HEIGHT: 65 IN | WEIGHT: 226.19 LBS | HEART RATE: 92 BPM | OXYGEN SATURATION: 100 % | SYSTOLIC BLOOD PRESSURE: 138 MMHG

## 2024-04-09 DIAGNOSIS — I25.119 CORONARY ARTERY DISEASE INVOLVING NATIVE CORONARY ARTERY OF NATIVE HEART WITH ANGINA PECTORIS: ICD-10-CM

## 2024-04-09 DIAGNOSIS — I35.1 AORTIC VALVE INSUFFICIENCY, ETIOLOGY OF CARDIAC VALVE DISEASE UNSPECIFIED: ICD-10-CM

## 2024-04-09 DIAGNOSIS — Z94.0 KIDNEY REPLACED BY TRANSPLANT: ICD-10-CM

## 2024-04-09 DIAGNOSIS — Z94.0 S/P KIDNEY TRANSPLANT: Primary | ICD-10-CM

## 2024-04-09 DIAGNOSIS — I10 ESSENTIAL HYPERTENSION: ICD-10-CM

## 2024-04-09 PROCEDURE — 99999 PR PBB SHADOW E&M-EST. PATIENT-LVL V: CPT | Mod: PBBFAC,GC,, | Performed by: INTERNAL MEDICINE

## 2024-04-09 PROCEDURE — 86833 HLA CLASS II HIGH DEFIN QUAL: CPT | Mod: PO | Performed by: STUDENT IN AN ORGANIZED HEALTH CARE EDUCATION/TRAINING PROGRAM

## 2024-04-09 PROCEDURE — 86832 HLA CLASS I HIGH DEFIN QUAL: CPT | Mod: PO | Performed by: STUDENT IN AN ORGANIZED HEALTH CARE EDUCATION/TRAINING PROGRAM

## 2024-04-09 PROCEDURE — 99215 OFFICE O/P EST HI 40 MIN: CPT | Mod: PBBFAC,25 | Performed by: INTERNAL MEDICINE

## 2024-04-09 PROCEDURE — 99214 OFFICE O/P EST MOD 30 MIN: CPT | Mod: S$PBB,GC,, | Performed by: INTERNAL MEDICINE

## 2024-04-09 PROCEDURE — 86977 RBC SERUM PRETX INCUBJ/INHIB: CPT | Mod: 59,PO | Performed by: STUDENT IN AN ORGANIZED HEALTH CARE EDUCATION/TRAINING PROGRAM

## 2024-04-09 PROCEDURE — 36415 COLL VENOUS BLD VENIPUNCTURE: CPT | Performed by: STUDENT IN AN ORGANIZED HEALTH CARE EDUCATION/TRAINING PROGRAM

## 2024-04-11 LAB
ALLOSURE COMMENT: NORMAL
ALLOSURE SCORE KIDNEY: 0.19 %
INFORMATION: NORMAL

## 2024-04-29 ENCOUNTER — LAB VISIT (OUTPATIENT)
Dept: LAB | Facility: HOSPITAL | Age: 49
End: 2024-04-29
Attending: STUDENT IN AN ORGANIZED HEALTH CARE EDUCATION/TRAINING PROGRAM
Payer: MEDICARE

## 2024-04-29 DIAGNOSIS — Z94.0 KIDNEY REPLACED BY TRANSPLANT: ICD-10-CM

## 2024-04-29 LAB — MAGNESIUM SERPL-MCNC: 1.9 MG/DL (ref 1.6–2.6)

## 2024-04-29 PROCEDURE — 36415 COLL VENOUS BLD VENIPUNCTURE: CPT | Performed by: STUDENT IN AN ORGANIZED HEALTH CARE EDUCATION/TRAINING PROGRAM

## 2024-04-29 PROCEDURE — 83735 ASSAY OF MAGNESIUM: CPT | Performed by: STUDENT IN AN ORGANIZED HEALTH CARE EDUCATION/TRAINING PROGRAM

## 2024-05-03 ENCOUNTER — TELEPHONE (OUTPATIENT)
Dept: TRANSPLANT | Facility: CLINIC | Age: 49
End: 2024-05-03
Payer: MEDICARE

## 2024-05-03 ENCOUNTER — LAB VISIT (OUTPATIENT)
Dept: LAB | Facility: HOSPITAL | Age: 49
End: 2024-05-03
Attending: STUDENT IN AN ORGANIZED HEALTH CARE EDUCATION/TRAINING PROGRAM
Payer: MEDICARE

## 2024-05-03 DIAGNOSIS — Z94.0 S/P KIDNEY TRANSPLANT: Primary | ICD-10-CM

## 2024-05-03 DIAGNOSIS — N18.9 ANEMIA OF RENAL DISEASE: ICD-10-CM

## 2024-05-03 DIAGNOSIS — E56.9 VITAMIN DEFICIENCY: ICD-10-CM

## 2024-05-03 DIAGNOSIS — D63.1 ANEMIA OF RENAL DISEASE: ICD-10-CM

## 2024-05-03 DIAGNOSIS — Z94.0 KIDNEY REPLACED BY TRANSPLANT: ICD-10-CM

## 2024-05-03 DIAGNOSIS — D81.819 BIOTIN-DEPENDENT CARBOXYLASE DEFICIENCY, UNSPECIFIED: ICD-10-CM

## 2024-05-03 LAB
ALBUMIN SERPL BCP-MCNC: 3.5 G/DL (ref 3.5–5.2)
ANION GAP SERPL CALC-SCNC: 13 MMOL/L (ref 8–16)
ANISOCYTOSIS BLD QL SMEAR: SLIGHT
BASOPHILS # BLD AUTO: ABNORMAL K/UL (ref 0–0.2)
BASOPHILS NFR BLD: 0 % (ref 0–1.9)
BUN SERPL-MCNC: 25 MG/DL (ref 6–20)
CALCIUM SERPL-MCNC: 10.2 MG/DL (ref 8.7–10.5)
CHLORIDE SERPL-SCNC: 101 MMOL/L (ref 95–110)
CO2 SERPL-SCNC: 24 MMOL/L (ref 23–29)
CREAT SERPL-MCNC: 1.9 MG/DL (ref 0.5–1.4)
DIFFERENTIAL METHOD BLD: ABNORMAL
EOSINOPHIL # BLD AUTO: ABNORMAL K/UL (ref 0–0.5)
EOSINOPHIL NFR BLD: 5 % (ref 0–8)
ERYTHROCYTE [DISTWIDTH] IN BLOOD BY AUTOMATED COUNT: 13.5 % (ref 11.5–14.5)
EST. GFR  (NO RACE VARIABLE): 32 ML/MIN/1.73 M^2
GLUCOSE SERPL-MCNC: 121 MG/DL (ref 70–110)
HCT VFR BLD AUTO: 37.1 % (ref 37–48.5)
HGB BLD-MCNC: 11.2 G/DL (ref 12–16)
IMM GRANULOCYTES # BLD AUTO: ABNORMAL K/UL (ref 0–0.04)
IMM GRANULOCYTES NFR BLD AUTO: ABNORMAL % (ref 0–0.5)
LYMPHOCYTES # BLD AUTO: ABNORMAL K/UL (ref 1–4.8)
LYMPHOCYTES NFR BLD: 19 % (ref 18–48)
MAGNESIUM SERPL-MCNC: 1.9 MG/DL (ref 1.6–2.6)
MCH RBC QN AUTO: 29.6 PG (ref 27–31)
MCHC RBC AUTO-ENTMCNC: 30.2 G/DL (ref 32–36)
MCV RBC AUTO: 98 FL (ref 82–98)
MONOCYTES # BLD AUTO: ABNORMAL K/UL (ref 0.3–1)
MONOCYTES NFR BLD: 21 % (ref 4–15)
MYELOCYTES NFR BLD MANUAL: 5 %
NEUTROPHILS NFR BLD: 36 % (ref 38–73)
NEUTS BAND NFR BLD MANUAL: 14 %
NRBC BLD-RTO: 0 /100 WBC
PHOSPHATE SERPL-MCNC: 2.6 MG/DL (ref 2.7–4.5)
PLATELET # BLD AUTO: 122 K/UL (ref 150–450)
PLATELET BLD QL SMEAR: ABNORMAL
PMV BLD AUTO: 11.3 FL (ref 9.2–12.9)
POTASSIUM SERPL-SCNC: 4.1 MMOL/L (ref 3.5–5.1)
RBC # BLD AUTO: 3.79 M/UL (ref 4–5.4)
SODIUM SERPL-SCNC: 138 MMOL/L (ref 136–145)
WBC # BLD AUTO: 1.41 K/UL (ref 3.9–12.7)

## 2024-05-03 PROCEDURE — 83735 ASSAY OF MAGNESIUM: CPT | Performed by: STUDENT IN AN ORGANIZED HEALTH CARE EDUCATION/TRAINING PROGRAM

## 2024-05-03 PROCEDURE — 85007 BL SMEAR W/DIFF WBC COUNT: CPT | Performed by: STUDENT IN AN ORGANIZED HEALTH CARE EDUCATION/TRAINING PROGRAM

## 2024-05-03 PROCEDURE — 36415 COLL VENOUS BLD VENIPUNCTURE: CPT | Performed by: STUDENT IN AN ORGANIZED HEALTH CARE EDUCATION/TRAINING PROGRAM

## 2024-05-03 PROCEDURE — 85027 COMPLETE CBC AUTOMATED: CPT | Performed by: STUDENT IN AN ORGANIZED HEALTH CARE EDUCATION/TRAINING PROGRAM

## 2024-05-03 PROCEDURE — 80197 ASSAY OF TACROLIMUS: CPT | Performed by: STUDENT IN AN ORGANIZED HEALTH CARE EDUCATION/TRAINING PROGRAM

## 2024-05-03 PROCEDURE — 80069 RENAL FUNCTION PANEL: CPT | Performed by: STUDENT IN AN ORGANIZED HEALTH CARE EDUCATION/TRAINING PROGRAM

## 2024-05-03 RX ORDER — MYCOPHENOLATE MOFETIL 250 MG/1
500 CAPSULE ORAL 2 TIMES DAILY
Qty: 240 CAPSULE | Refills: 11 | Status: SHIPPED | OUTPATIENT
Start: 2024-05-03 | End: 2024-05-06

## 2024-05-03 NOTE — PROGRESS NOTES
Kidney Post-Transplant Assessment    Referring Physician: Aries Bourne  Current Nephrologist: Aries Bourne    ORGAN: LEFT KIDNEY  Donor Type: donation after brain death  PHS Increased Risk: yes  Cold Ischemia: 903 mins  Induction Medications: thymo    Subjective:   The patient location is: home  The chief complaint leading to consultation is: post transplant follow-up and immunosuppression management     Visit type: audiovisual    Face to Face time with patient: 20 minutes of total time spent on the encounter, which includes face to face time and non-face to face time preparing to see the patient (eg, review of tests), Obtaining and/or reviewing separately obtained history, Documenting clinical information in the electronic or other health record, Independently interpreting results (not separately reported) and communicating results to the patient/family/caregiver, or Care coordination (not separately reported).       Each patient to whom he or she provides medical services by telemedicine is:  (1) informed of the relationship between the physician and patient and the respective role of any other health care provider with respect to management of the patient; and (2) notified that he or she may decline to receive medical services by telemedicine and may withdraw from such care at any time.      CC:  Reassessment of renal allograft function and management of chronic immunosuppression.    HPI:  Ms. Waller is a 49 y.o. 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 Her most recent creatinine is 2.1  She takes mycophenolate mofetil, prednisone, and tacrolimus for maintenance immunosuppression. Her post transplant course has been complicated by delayed graft function requiring dialysis.    Hospitalization/ ED visits  None recent    Interval HX:  Reports is back down to 219# and feeling much better. No sick symptoms. Reviewed WBCs.  Given  neutropenia precautions. Reviewed changes in MMF dose. .    Intake 2-2.5L  UOP 2-2.5L  -130s/70  Peripheral edema none  Appetite good  fx assessment   Lab /diagnostic results reviewed with patient today.   All questions answered        Current Outpatient Medications:     atovaquone (MEPRON) 750 mg/5 mL Susp oral liquid, Take 10 mLs (1,500 mg total) by mouth once daily. STOP 5/29/24, Disp: 300 mL, Rfl: 5    atropine 1% (ISOPTO ATROPINE) 1 % Drop, Place 1 drop into the left eye every evening., Disp: , Rfl:     blood sugar diagnostic Strp, 1 each by Misc.(Non-Drug; Combo Route) route 3 (three) times daily., Disp: 90 each, Rfl: 11    blood-glucose meter kit, USE TO TEST BLOOD GLUCOSE, Disp: 1 each, Rfl: 0    calcitRIOL (ROCALTROL) 0.25 MCG Cap, Take 1 capsule (0.25 mcg total) by mouth once daily., Disp: 30 capsule, Rfl: 5    COMBIGAN 0.2-0.5 % Drop, Place 1 drop into the right eye every evening., Disp: , Rfl:     ergocalciferol (VITAMIN D2) 50,000 unit Cap, Take 1 capsule (50,000 Units total) by mouth every 7 days. Take 1 capsule by mouth weekly x 12 weeks, then decrease to once per month., Disp: 12 capsule, Rfl: 3    famotidine (PEPCID) 20 MG tablet, Take 1 tablet (20 mg total) by mouth every evening. (Patient not taking: Reported on 2/16/2024), Disp: 30 tablet, Rfl: 0    furosemide (LASIX) 20 MG tablet, Take 2 tablets (40 mg total) by mouth once daily., Disp: 60 tablet, Rfl: 3    insulin (LANTUS SOLOSTAR U-100 INSULIN) glargine 100 units/mL SubQ pen, 10-14 units daily as direct by endocrinology, Disp: 6 mL, Rfl: 11    insulin lispro 100 unit/mL pen, Inject 5 Units into the skin 3 (three) times daily with meals. Plus sliding scale: TDD=30 units, Disp: 9 mL, Rfl: 11    labetaloL (NORMODYNE) 200 MG tablet, Take 1 tablet (200 mg total) by mouth every 12 (twelve) hours., Disp: 180 tablet, Rfl: 3    lancets Misc, 1 each by Misc.(Non-Drug; Combo Route) route 3 (three) times daily., Disp: 90 each, Rfl: 11    Lmefol  "Ca-acetyl-meB12-algal (CEREFOLIN NAC, ALGAL OIL,) 6 mg-600 mg- 2 mg-90.314 mg Tab, Take 1 tablet by mouth once daily., Disp: , Rfl:     magnesium oxide (MAG-OX) 400 mg (241.3 mg magnesium) tablet, Take 2 tablets (800 mg total) by mouth 2 (two) times daily., Disp: 120 tablet, Rfl: 11    mycophenolate (CELLCEPT) 250 mg Cap, Take 4 capsules (1,000 mg total) by mouth 2 (two) times daily., Disp: 240 capsule, Rfl: 11    pen needle, diabetic (EASY COMFORT PEN NEEDLES) 32 gauge x 5/32" Ndle, Inject 1 each into the skin 3 (three) times daily., Disp: 100 each, Rfl: 11    potassium chloride SA (K-DUR,KLOR-CON) 20 MEQ tablet, Take 2 tablets (40 mEq total) by mouth 2 (two) times daily., Disp: 120 tablet, Rfl: 11    predniSONE (DELTASONE) 5 MG tablet, Take by mouth daily:  20mg 12/2-12/8, 15mg 12/9-12/15, 10mg 12/16-12/22, then 5mg daily beginning 12/23/2023, Disp: 70 tablet, Rfl: 11    rosuvastatin (CRESTOR) 40 MG Tab, Take 40 mg by mouth every evening., Disp: , Rfl:     tacrolimus (PROGRAF) 1 MG Cap, Take 5 capsules (5 mg total) by mouth every 12 (twelve) hours., Disp: 420 capsule, Rfl: 11    TRULICITY 0.75 mg/0.5 mL pen injector, Inject into the skin., Disp: , Rfl:     valGANciclovir (VALCYTE) 450 mg Tab, Take 1 tablet (450 mg total) by mouth once daily. STOP 5/29/24, Disp: 30 tablet, Rfl: 5      Past Medical History:   Diagnosis Date    Acute blood loss anemia 11/30/2023    Bacteremia associated with intravascular line     Blind     Diabetes     ESRD (end stage renal disease) on dialysis     Hyperkalemia 11/29/2023    Hypertension     Hypokalemia 12/22/2023    Pre-transplant evaluation for kidney transplant 08/17/2023    Right cataract     Staphylococcus aureus bacteremia 01/06/2019         Review of Systems   Constitutional:  Negative for appetite change, chills, fatigue and fever.   HENT:  Negative for trouble swallowing.    Eyes:  Positive for visual disturbance.   Respiratory:  Negative for cough, chest tightness, " "shortness of breath and wheezing.    Cardiovascular:  Negative for chest pain, palpitations and leg swelling.   Gastrointestinal:  Negative for abdominal pain, constipation, diarrhea and nausea.   Genitourinary:  Negative for difficulty urinating, frequency and urgency.   Musculoskeletal:  Negative for arthralgias and myalgias.   Skin:  Negative for rash.   Allergic/Immunologic: Positive for immunocompromised state.   Neurological:  Negative for dizziness, weakness, light-headedness and headaches.   Psychiatric/Behavioral:  Negative for sleep disturbance.        Objective:   There were no vitals taken for this visit.body mass index is unknown because there is no height or weight on file.    Physical Exam    Labs:  Lab Results   Component Value Date    WBC 1.41 (LL) 05/03/2024    HGB 11.2 (L) 05/03/2024    HCT 37.1 05/03/2024     05/03/2024    K 4.1 05/03/2024     05/03/2024    CO2 24 05/03/2024    BUN 25 (H) 05/03/2024    CREATININE 1.9 (H) 05/03/2024    EGFRNORACEVR 32 (A) 05/03/2024    CALCIUM 10.2 05/03/2024    PHOS 2.6 (L) 05/03/2024    MG 1.9 05/03/2024    ALBUMIN 3.5 05/03/2024    AST 9 (L) 01/29/2024    ALT 9 (L) 01/29/2024    UTPCR 0.65 (H) 01/08/2024     (H) 12/06/2023    TACROLIMUS 9.9 04/01/2024       No results found for: "EXTANC", "EXTWBC", "EXTSEGS", "EXTPLATELETS", "EXTHEMOGLOBI", "EXTHEMATOCRI", "EXTCREATININ", "EXTSODIUM", "EXTPOTASSIUM", "EXTBUN", "EXTCO2", "EXTCALCIUM", "EXTPHOSPHORU", "EXTGLUCOSE", "EXTALBUMIN", "EXTAST", "EXTALT", "EXTBILITOTAL", "EXTLIPASE", "EXTAMYLASE"    No results found for: "EXTCYCLOSLVL", "EXTSIROLIMUS", "EXTTACROLVL", "EXTPROTCRE", "EXTPTHINTACT", "EXTPROTEINUA", "EXTWBCUA", "EXTRBCUA"    Labs were reviewed with the patient    Assessment:     1. S/P kidney transplant    2. Stage 3b chronic kidney disease    3. Essential hypertension    4. Type 2 diabetes mellitus with hyperglycemia, with long-term current use of insulin    5. Long-term use of " immunosuppressant medication        Plan:     Neutropenia--- MMF reduced to 250mg   Pending CMV PCR  Repeat labs on Thursday      1. CKD stage: will continue follow up as per our center guidelines. patient to continue close follow up with the local General nephrologist. Education provided in appropriate fluid intake, potassium intake. Continue with oral hydration.      2. Immunosuppression: Prograf trough 8.2 , therapeutic target 8-10. Continue Prograf 5/5,  Mg BID, and Prednisone QD  Lab Results   Component Value Date    TACROLIMUS 9.9 04/01/2024    TACROLIMUS 8.7 02/19/2024    TACROLIMUS 9.2 02/12/2024   Will closely monitor for toxicities, education provided about adherence to medicines and need to communicate any side effect to the transplant nurse or physician.      3. Allograft Function:stable at baseline for the patient. Continue follow up as per our guidelines and with the local General nephrologist. Communication will be sent today.  Lab Results   Component Value Date    CREATININE 1.9 (H) 05/03/2024    CREATININE 2.2 (H) 04/01/2024    CREATININE 1.6 (H) 02/19/2024     Lab Results   Component Value Date    LIPASE 144 (H) 08/07/2023      Latest Reference Range & Units 5mo 1wk,  Kidney-Post 1 Year  05/06/24   Creatinine 0.5 - 1.4 mg/dL 2.1 (H)   eGFR >60 mL/min/1.73 m^2 29 !   Glucose 70 - 110 mg/dL 161 (H)       4. Hypertension management:  Continue with home blood pressure monitoring, low salt and healthy life discussed with the patient.  BP Readings from Last 3 Encounters:   04/09/24 138/68   03/08/24 (!) 201/81   02/26/24 (!) 183/79       5. Metabolic Bone Disease/Secondary Hyperparathyroidism:calcium and phosphorus level discussed with the patient, patient will continue follow up with the general nephrologist for management of metabolic bone disease calcium and phosphorus as per our center protocol. Will monitor PTH, Vit D level, calcium.   Lab Results   Component Value Date     (H)  12/06/2023    CALCIUM 10.2 05/03/2024    PHOS 2.6 (L) 05/03/2024    PHOS 2.4 (L) 04/01/2024    PHOS 2.8 02/19/2024       6. Electrolytes: reviewed with the patient, essentially within the normal range no need for acute changes today, will monitor as per our center guidelines.  Lab Results   Component Value Date     05/03/2024    K 4.1 05/03/2024     05/03/2024    CO2 24 05/03/2024    CO2 21 (L) 04/01/2024    CO2 26 02/19/2024       7. Anemia: will continue monitoring as per our center guidelines. No indication for acute intervention today.  Lab Results   Component Value Date    WBC 1.41 (LL) 05/03/2024    HGB 11.2 (L) 05/03/2024    HCT 37.1 05/03/2024    MCV 98 05/03/2024     (L) 05/03/2024       8.Proteinuria: will continue with pr/cr ratio as per our center guidelines  Lab Results   Component Value Date    PROTEINURINE 50 (H) 01/08/2024    CREATRANDUR 77.0 01/08/2024    UTPCR 0.65 (H) 01/08/2024    UTPCR 0.27 (H) 01/02/2024    UTPCR 0.32 (H) 12/28/2023        9. BK virus infection screening: will continue with urine or blood PCR as per our guidelines to prevent BK virus viremia and allograft dysfunction  Lab Results   Component Value Date    BKVIRUSPCRQB <125 01/29/2024         10. Weight education: provided during the clinic visit.  There is no height or weight on file to calculate BMI.     11.Patient safety education regarding immunosuppression including prophylaxis posttransplant for CMV, PCP : Education provided about vaccination and prevention of infections.    12.  Cytopenias: no significant cytopenias will monitor as per our guidelines. Medicine list reviewed including potential causes of drug-induced cytopenias  Lab Results   Component Value Date    WBC 1.41 (LL) 05/03/2024    HGB 11.2 (L) 05/03/2024    HCT 37.1 05/03/2024    MCV 98 05/03/2024     (L) 05/03/2024       13. Post-transplant Prophylaxis; CMV Infection, PJP and Candida mucosistis and other indicated for this particular  patient.   PCP PROPHYLAXIS: needs PJP prophy until 5/29/24 (G6PD pending) atovaquone due to low blood couts  CMV PROPHYLAXIS: Valcyte until 5/29/24     Exercise: reminded Juan Francisco of the importance of regular exercise for weight management, blood sugar and blood pressure management.  I also explained exercise has been shown to improve cardiovascular health, energy level, and sleep hygiene.  Lastly, I advised him that cardiovascular complications are leading cause of death for renal transplant recipients, and regular exercise can help lower this risk.    Follow-up:   Clinic: return to transplant clinic weekly for the first month after transplant; every 2 weeks during months 2-3; then at 6-, 9-, 12-, 18-, 24-, and 36- months post-transplant to reassess for complications from immunosuppression toxicity and monitor for rejection.  Annually thereafter.    Labs: since patient remains at high risk for rejection and drug-related complications that warrant close monitoring, labs will be ordered as follows: continue twice weekly CBC, renal panel, and drug level for first month; then same labs once weekly through 3rd month post-transplant.  Urine for UA and protein/creatinine ratio monthly.  Serum BK - PCR at 1-, 3-, 6-, 9-, 12-, 18-, 24-, 36-, 48-, and 60 months post-transplant.  Hepatic panel at 1-, 2-, 3-, 6-, 9-, 12-, 18-, 24-, and 36- months post-transplant.    Justine Masterson NP       Education:   Material provided to the patient.  Patient reminded to call with any health changes since these can be early signs of significant complications.  Also, I advised the patient to be sure any new medications or changes of old medications are discussed with either a pharmacist or physician knowledgeable with transplant to avoid rejection/drug toxicity related to significant drug interactions.    Patient advised that it is recommended that all transplanted patients, and their close contacts and household members receive Covid  vaccination.

## 2024-05-03 NOTE — TELEPHONE ENCOUNTER
Incoming call from [facility] hematology lab, Critical WBC 1.41.Diff still pending.  All appropriate personnel has been notified.

## 2024-05-03 NOTE — TELEPHONE ENCOUNTER
Spoke to pt. Requesting to send message to portal.  Labs Monday    ----- Message from Emma Timmons RN sent at 5/3/2024  1:24 PM CDT -----    ----- Message -----  From: Randall Thomas Jr., MD  Sent: 5/3/2024  12:58 PM CDT  To: [facility] Post-Kidney Transplant Clinical    Please ask about sick symptoms. Reduce MMF to 500/500. Check CMV, BK, b12, folate, copper, tac trough, UA, renal function panel, CBC with diff on Monday. If she has any symptoms, she needs to be evaluated.

## 2024-05-04 LAB — TACROLIMUS BLD-MCNC: 8.2 NG/ML (ref 5–15)

## 2024-05-06 ENCOUNTER — OFFICE VISIT (OUTPATIENT)
Dept: TRANSPLANT | Facility: CLINIC | Age: 49
End: 2024-05-06
Payer: MEDICARE

## 2024-05-06 ENCOUNTER — LAB VISIT (OUTPATIENT)
Dept: LAB | Facility: HOSPITAL | Age: 49
End: 2024-05-06
Attending: STUDENT IN AN ORGANIZED HEALTH CARE EDUCATION/TRAINING PROGRAM
Payer: MEDICARE

## 2024-05-06 ENCOUNTER — PATIENT MESSAGE (OUTPATIENT)
Dept: TRANSPLANT | Facility: CLINIC | Age: 49
End: 2024-05-06
Payer: MEDICARE

## 2024-05-06 ENCOUNTER — TELEPHONE (OUTPATIENT)
Dept: TRANSPLANT | Facility: CLINIC | Age: 49
End: 2024-05-06

## 2024-05-06 DIAGNOSIS — E56.9 VITAMIN DEFICIENCY: ICD-10-CM

## 2024-05-06 DIAGNOSIS — E11.65 TYPE 2 DIABETES MELLITUS WITH HYPERGLYCEMIA, WITH LONG-TERM CURRENT USE OF INSULIN: ICD-10-CM

## 2024-05-06 DIAGNOSIS — Z79.60 LONG-TERM USE OF IMMUNOSUPPRESSANT MEDICATION: ICD-10-CM

## 2024-05-06 DIAGNOSIS — N18.9 ANEMIA OF RENAL DISEASE: ICD-10-CM

## 2024-05-06 DIAGNOSIS — E61.1 LOW IRON: ICD-10-CM

## 2024-05-06 DIAGNOSIS — Z94.0 S/P KIDNEY TRANSPLANT: Primary | ICD-10-CM

## 2024-05-06 DIAGNOSIS — I10 ESSENTIAL HYPERTENSION: ICD-10-CM

## 2024-05-06 DIAGNOSIS — Z94.0 S/P KIDNEY TRANSPLANT: ICD-10-CM

## 2024-05-06 DIAGNOSIS — N18.32 STAGE 3B CHRONIC KIDNEY DISEASE: ICD-10-CM

## 2024-05-06 DIAGNOSIS — D63.1 ANEMIA OF RENAL DISEASE: ICD-10-CM

## 2024-05-06 DIAGNOSIS — D81.819 BIOTIN-DEPENDENT CARBOXYLASE DEFICIENCY, UNSPECIFIED: ICD-10-CM

## 2024-05-06 DIAGNOSIS — Z79.4 TYPE 2 DIABETES MELLITUS WITH HYPERGLYCEMIA, WITH LONG-TERM CURRENT USE OF INSULIN: ICD-10-CM

## 2024-05-06 DIAGNOSIS — Z94.0 KIDNEY REPLACED BY TRANSPLANT: ICD-10-CM

## 2024-05-06 LAB
ALBUMIN SERPL BCP-MCNC: 3.6 G/DL (ref 3.5–5.2)
ANION GAP SERPL CALC-SCNC: 11 MMOL/L (ref 8–16)
ANISOCYTOSIS BLD QL SMEAR: SLIGHT
BASOPHILS NFR BLD: 0 % (ref 0–1.9)
BUN SERPL-MCNC: 28 MG/DL (ref 6–20)
CALCIUM SERPL-MCNC: 10 MG/DL (ref 8.7–10.5)
CHLORIDE SERPL-SCNC: 100 MMOL/L (ref 95–110)
CO2 SERPL-SCNC: 26 MMOL/L (ref 23–29)
CREAT SERPL-MCNC: 2.1 MG/DL (ref 0.5–1.4)
DIFFERENTIAL METHOD BLD: ABNORMAL
EOSINOPHIL NFR BLD: 0 % (ref 0–8)
ERYTHROCYTE [DISTWIDTH] IN BLOOD BY AUTOMATED COUNT: 13.3 % (ref 11.5–14.5)
EST. GFR  (NO RACE VARIABLE): 29 ML/MIN/1.73 M^2
FERRITIN SERPL-MCNC: 2565 NG/ML (ref 20–300)
FOLATE SERPL-MCNC: 19 NG/ML (ref 4–24)
GLUCOSE SERPL-MCNC: 161 MG/DL (ref 70–110)
HCT VFR BLD AUTO: 36.6 % (ref 37–48.5)
HGB BLD-MCNC: 11.4 G/DL (ref 12–16)
IMM GRANULOCYTES # BLD AUTO: ABNORMAL K/UL (ref 0–0.04)
IMM GRANULOCYTES NFR BLD AUTO: ABNORMAL % (ref 0–0.5)
IRON SERPL-MCNC: 76 UG/DL (ref 30–160)
LYMPHOCYTES NFR BLD: 16 % (ref 18–48)
MAGNESIUM SERPL-MCNC: 2.1 MG/DL (ref 1.6–2.6)
MCH RBC QN AUTO: 29.8 PG (ref 27–31)
MCHC RBC AUTO-ENTMCNC: 31.1 G/DL (ref 32–36)
MCV RBC AUTO: 96 FL (ref 82–98)
MONOCYTES NFR BLD: 4 % (ref 4–15)
NEUTROPHILS NFR BLD: 64 % (ref 38–73)
NEUTS BAND NFR BLD MANUAL: 16 %
NRBC BLD-RTO: 0 /100 WBC
PHOSPHATE SERPL-MCNC: 2.9 MG/DL (ref 2.7–4.5)
PLATELET # BLD AUTO: 128 K/UL (ref 150–450)
PLATELET BLD QL SMEAR: ABNORMAL
PMV BLD AUTO: 10.2 FL (ref 9.2–12.9)
POTASSIUM SERPL-SCNC: 3.3 MMOL/L (ref 3.5–5.1)
RBC # BLD AUTO: 3.83 M/UL (ref 4–5.4)
SATURATED IRON: 31 % (ref 20–50)
SODIUM SERPL-SCNC: 137 MMOL/L (ref 136–145)
TOTAL IRON BINDING CAPACITY: 249 UG/DL (ref 250–450)
TRANSFERRIN SERPL-MCNC: 168 MG/DL (ref 200–375)
VIT B12 SERPL-MCNC: >2000 PG/ML (ref 210–950)
WBC # BLD AUTO: 1.36 K/UL (ref 3.9–12.7)

## 2024-05-06 PROCEDURE — 83540 ASSAY OF IRON: CPT | Performed by: STUDENT IN AN ORGANIZED HEALTH CARE EDUCATION/TRAINING PROGRAM

## 2024-05-06 PROCEDURE — 82525 ASSAY OF COPPER: CPT | Performed by: STUDENT IN AN ORGANIZED HEALTH CARE EDUCATION/TRAINING PROGRAM

## 2024-05-06 PROCEDURE — 80197 ASSAY OF TACROLIMUS: CPT | Performed by: STUDENT IN AN ORGANIZED HEALTH CARE EDUCATION/TRAINING PROGRAM

## 2024-05-06 PROCEDURE — 83735 ASSAY OF MAGNESIUM: CPT | Performed by: STUDENT IN AN ORGANIZED HEALTH CARE EDUCATION/TRAINING PROGRAM

## 2024-05-06 PROCEDURE — 85007 BL SMEAR W/DIFF WBC COUNT: CPT | Performed by: STUDENT IN AN ORGANIZED HEALTH CARE EDUCATION/TRAINING PROGRAM

## 2024-05-06 PROCEDURE — 82728 ASSAY OF FERRITIN: CPT | Performed by: STUDENT IN AN ORGANIZED HEALTH CARE EDUCATION/TRAINING PROGRAM

## 2024-05-06 PROCEDURE — 80069 RENAL FUNCTION PANEL: CPT | Performed by: STUDENT IN AN ORGANIZED HEALTH CARE EDUCATION/TRAINING PROGRAM

## 2024-05-06 PROCEDURE — 82607 VITAMIN B-12: CPT | Performed by: STUDENT IN AN ORGANIZED HEALTH CARE EDUCATION/TRAINING PROGRAM

## 2024-05-06 PROCEDURE — 82746 ASSAY OF FOLIC ACID SERUM: CPT | Performed by: STUDENT IN AN ORGANIZED HEALTH CARE EDUCATION/TRAINING PROGRAM

## 2024-05-06 PROCEDURE — 99214 OFFICE O/P EST MOD 30 MIN: CPT | Mod: 95,,, | Performed by: NURSE PRACTITIONER

## 2024-05-06 PROCEDURE — 87799 DETECT AGENT NOS DNA QUANT: CPT | Performed by: STUDENT IN AN ORGANIZED HEALTH CARE EDUCATION/TRAINING PROGRAM

## 2024-05-06 PROCEDURE — 85027 COMPLETE CBC AUTOMATED: CPT | Performed by: STUDENT IN AN ORGANIZED HEALTH CARE EDUCATION/TRAINING PROGRAM

## 2024-05-06 RX ORDER — MYCOPHENOLATE MOFETIL 250 MG/1
250 CAPSULE ORAL 2 TIMES DAILY
Start: 2024-05-06 | End: 2024-05-17 | Stop reason: SDUPTHER

## 2024-05-06 NOTE — TELEPHONE ENCOUNTER
----- Message from Randall Thomas Jr., MD sent at 5/6/2024 12:53 PM CDT -----  Please repeat UA and add serum CK to next labs. Odd to have no RBC but 2+ occult blood in urine. Can be myoglobin.

## 2024-05-06 NOTE — LETTER
May 6, 2024        Aries Bourne  [address]  [facility] [address]  Phone: [phone]  Fax: [phone]             [facility] 1st Fl  [address]  [facility] [address]  Phone: [phone]   Patient: Mary Waller   MR Number: [id number]   YOB: 1975   Date of Visit: 5/6/2024       Dear Dr. Aries Bourne    Thank you for referring Mary Waller to me for evaluation. Attached you will find relevant portions of my assessment and plan of care.    If you have questions, please do not hesitate to call me. I look forward to following Mary Waller along with you.    Sincerely,    Justine Masterson, NP    Enclosure    If you would like to receive this communication electronically, please contact [email or web address] or [phone] to request [employer] access.    [employer] is a tool which provides read-only access to select patient information with whom you have a relationship. Its easy to use and provides real time access to review your patients record including encounter summaries, notes, results, and demographic information.    If you feel you have received this communication in error or would no longer like to receive these types of communications, please e-mail [email or web address]

## 2024-05-06 NOTE — Clinical Note
Patient is asking if she needs Neupogen. It looks like her ANC is right at 1000. No sick symptoms. Reports back down to pre transplant weight. He K+ was low. She is on lasix and KCL not sure if you want to give and extra dose of KCl?

## 2024-05-07 DIAGNOSIS — Z94.0 S/P KIDNEY TRANSPLANT: ICD-10-CM

## 2024-05-07 LAB — TACROLIMUS BLD-MCNC: 3.7 NG/ML (ref 5–15)

## 2024-05-07 RX ORDER — TACROLIMUS 1 MG/1
CAPSULE ORAL
Qty: 420 CAPSULE | Refills: 11 | Status: SHIPPED | OUTPATIENT
Start: 2024-05-07

## 2024-05-07 NOTE — TELEPHONE ENCOUNTER
Message sent. Labs thurs    ----- Message from Randall Thomas Jr., MD sent at 5/7/2024  8:50 AM CDT -----  Please confirm true tac trough and if so, increase dose from 5/5 to 7/6 and repeat at the end of the wek

## 2024-05-08 LAB
BKV DNA SERPL NAA+PROBE-ACNC: <125 COPIES/ML
BKV DNA SERPL NAA+PROBE-LOG#: <2.1 LOG (10) COPIES/ML
BKV DNA SERPL QL NAA+PROBE: NOT DETECTED
CMV DNA SPEC QL NAA+PROBE: NORMAL
CYTOMEGALOVIRUS PCR, QUANT: NOT DETECTED IU/ML

## 2024-05-09 ENCOUNTER — DOCUMENTATION ONLY (OUTPATIENT)
Dept: TRANSPLANT | Facility: CLINIC | Age: 49
End: 2024-05-09
Payer: MEDICARE

## 2024-05-09 ENCOUNTER — TELEPHONE (OUTPATIENT)
Dept: TRANSPLANT | Facility: CLINIC | Age: 49
End: 2024-05-09
Payer: MEDICARE

## 2024-05-09 ENCOUNTER — CLINICAL SUPPORT (OUTPATIENT)
Dept: TRANSPLANT | Facility: CLINIC | Age: 49
End: 2024-05-09
Payer: MEDICARE

## 2024-05-09 VITALS
BODY MASS INDEX: 37.25 KG/M2 | HEART RATE: 92 BPM | SYSTOLIC BLOOD PRESSURE: 197 MMHG | TEMPERATURE: 97 F | WEIGHT: 223.56 LBS | OXYGEN SATURATION: 100 % | DIASTOLIC BLOOD PRESSURE: 81 MMHG | HEIGHT: 65 IN

## 2024-05-09 DIAGNOSIS — Z79.899 IMMUNODEFICIENCY DUE TO LONG TERM DRUG THERAPY: Primary | ICD-10-CM

## 2024-05-09 DIAGNOSIS — D84.821 IMMUNODEFICIENCY DUE TO LONG TERM DRUG THERAPY: Primary | ICD-10-CM

## 2024-05-09 LAB — COPPER SERPL-MCNC: 1161 UG/L (ref 810–1990)

## 2024-05-09 PROCEDURE — 99999PBSHW PR PBB SHADOW TECHNICAL ONLY FILED TO HB: Mod: JZ,JB,PBBFAC,JG

## 2024-05-09 PROCEDURE — 96372 THER/PROPH/DIAG INJ SC/IM: CPT | Mod: PBBFAC

## 2024-05-09 PROCEDURE — 99999 PR PBB SHADOW E&M-EST. PATIENT-LVL II: CPT | Mod: PBBFAC,,,

## 2024-05-09 RX ADMIN — FILGRASTIM-SNDZ 480 MCG: 480 INJECTION, SOLUTION INTRAVENOUS; SUBCUTANEOUS at 12:05

## 2024-05-09 NOTE — TELEPHONE ENCOUNTER
Patient aware of need for injections x3.   Labs Monday    ----- Message from Eileen Hung sent at 5/9/2024 10:21 AM CDT -----  Regarding: Results  Contact: [phone]  CONSULT/ADVISORY    Name of Caller:  JEANNE PHILLIP [[id number]]    Contact Preference: [phone]    Nature of Call:  Pt is requesting a call back to discuss her lab results.

## 2024-05-09 NOTE — NURSING
Zarxio 480 mcg given per MD order to abdomen. Patient tolerated with no complaints. Patient instructed on possible side effects of this medication including pain. Verbalized understanding.

## 2024-05-09 NOTE — TELEPHONE ENCOUNTER
Patient aware of orders below.   Labs on Monday    ----- Message from Randall Thomas Jr., MD sent at 5/9/2024  9:59 AM CDT -----  Confirm no sick symptoms and if none, start GCSF daily for 3 days. Repeat CBC, CMP, mag, phos Monday. Tac pending

## 2024-05-10 ENCOUNTER — CLINICAL SUPPORT (OUTPATIENT)
Dept: TRANSPLANT | Facility: CLINIC | Age: 49
End: 2024-05-10
Payer: MEDICARE

## 2024-05-10 VITALS
BODY MASS INDEX: 37.18 KG/M2 | DIASTOLIC BLOOD PRESSURE: 69 MMHG | OXYGEN SATURATION: 100 % | HEIGHT: 65 IN | TEMPERATURE: 98 F | RESPIRATION RATE: 17 BRPM | HEART RATE: 98 BPM | WEIGHT: 223.13 LBS | SYSTOLIC BLOOD PRESSURE: 156 MMHG

## 2024-05-10 DIAGNOSIS — Z94.0 S/P KIDNEY TRANSPLANT: Primary | ICD-10-CM

## 2024-05-10 DIAGNOSIS — Z79.899 IMMUNODEFICIENCY DUE TO LONG TERM DRUG THERAPY: Primary | ICD-10-CM

## 2024-05-10 DIAGNOSIS — D84.821 IMMUNODEFICIENCY DUE TO LONG TERM DRUG THERAPY: Primary | ICD-10-CM

## 2024-05-10 PROCEDURE — 96372 THER/PROPH/DIAG INJ SC/IM: CPT | Mod: PBBFAC

## 2024-05-10 PROCEDURE — 99999 PR PBB SHADOW E&M-EST. PATIENT-LVL III: CPT | Mod: PBBFAC,,,

## 2024-05-10 PROCEDURE — 99999PBSHW PR PBB SHADOW TECHNICAL ONLY FILED TO HB: Mod: JZ,JB,PBBFAC,JG

## 2024-05-10 RX ORDER — CEFPODOXIME PROXETIL 100 MG/1
200 TABLET, FILM COATED ORAL DAILY
Qty: 20 TABLET | Refills: 0 | Status: SHIPPED | OUTPATIENT
Start: 2024-05-10 | End: 2024-05-20

## 2024-05-10 RX ADMIN — FILGRASTIM-SNDZ 480 MCG: 480 INJECTION, SOLUTION INTRAVENOUS; SUBCUTANEOUS at 02:05

## 2024-05-10 NOTE — PROGRESS NOTES
Zarxio 480 mcg given per MD order to LUQ abdomen. Patient tolerated with no complaints. Patient instructed on possible side effects of this medication including pain. Verbalized understanding.

## 2024-05-10 NOTE — TELEPHONE ENCOUNTER
Pt aware of medication to  from pharmacy   Labs again Monday  Will HOLD MMF until Wednesday.     ----- Message from Randall Thomas Jr., MD sent at 5/10/2024  8:49 AM CDT -----  Start cefpodoxime 200mg daily for 10 days and hold MMF for 5 days for positive urine culture and leukopenia.

## 2024-05-12 DIAGNOSIS — Z94.0 S/P KIDNEY TRANSPLANT: ICD-10-CM

## 2024-05-13 ENCOUNTER — LAB VISIT (OUTPATIENT)
Dept: LAB | Facility: HOSPITAL | Age: 49
End: 2024-05-13
Attending: STUDENT IN AN ORGANIZED HEALTH CARE EDUCATION/TRAINING PROGRAM
Payer: MEDICARE

## 2024-05-13 DIAGNOSIS — Z94.0 KIDNEY REPLACED BY TRANSPLANT: ICD-10-CM

## 2024-05-13 LAB
ALBUMIN SERPL BCP-MCNC: 3.4 G/DL (ref 3.5–5.2)
ANION GAP SERPL CALC-SCNC: 12 MMOL/L (ref 8–16)
BASOPHILS # BLD AUTO: 0.02 K/UL (ref 0–0.2)
BASOPHILS NFR BLD: 0.5 % (ref 0–1.9)
BUN SERPL-MCNC: 38 MG/DL (ref 6–20)
CALCIUM SERPL-MCNC: 9.6 MG/DL (ref 8.7–10.5)
CHLORIDE SERPL-SCNC: 101 MMOL/L (ref 95–110)
CMV DNA SPEC QL NAA+PROBE: NORMAL
CO2 SERPL-SCNC: 24 MMOL/L (ref 23–29)
CREAT SERPL-MCNC: 2.2 MG/DL (ref 0.5–1.4)
CYTOMEGALOVIRUS PCR, QUANT: NOT DETECTED IU/ML
DIFFERENTIAL METHOD BLD: ABNORMAL
EOSINOPHIL # BLD AUTO: 0.1 K/UL (ref 0–0.5)
EOSINOPHIL NFR BLD: 1.6 % (ref 0–8)
ERYTHROCYTE [DISTWIDTH] IN BLOOD BY AUTOMATED COUNT: 13.5 % (ref 11.5–14.5)
EST. GFR  (NO RACE VARIABLE): 27.1 ML/MIN/1.73 M^2
GLUCOSE SERPL-MCNC: 142 MG/DL (ref 70–110)
HCT VFR BLD AUTO: 37.1 % (ref 37–48.5)
HGB BLD-MCNC: 11.1 G/DL (ref 12–16)
IMM GRANULOCYTES # BLD AUTO: 0.12 K/UL (ref 0–0.04)
IMM GRANULOCYTES NFR BLD AUTO: 3.2 % (ref 0–0.5)
LYMPHOCYTES # BLD AUTO: 0.5 K/UL (ref 1–4.8)
LYMPHOCYTES NFR BLD: 14.5 % (ref 18–48)
MAGNESIUM SERPL-MCNC: 1.9 MG/DL (ref 1.6–2.6)
MCH RBC QN AUTO: 30 PG (ref 27–31)
MCHC RBC AUTO-ENTMCNC: 29.9 G/DL (ref 32–36)
MCV RBC AUTO: 100 FL (ref 82–98)
MONOCYTES # BLD AUTO: 0.8 K/UL (ref 0.3–1)
MONOCYTES NFR BLD: 22.5 % (ref 4–15)
NEUTROPHILS # BLD AUTO: 2.2 K/UL (ref 1.8–7.7)
NEUTROPHILS NFR BLD: 57.7 % (ref 38–73)
NRBC BLD-RTO: 0 /100 WBC
PHOSPHATE SERPL-MCNC: 2.7 MG/DL (ref 2.7–4.5)
PLATELET # BLD AUTO: 122 K/UL (ref 150–450)
PMV BLD AUTO: 11.3 FL (ref 9.2–12.9)
POTASSIUM SERPL-SCNC: 3.3 MMOL/L (ref 3.5–5.1)
RBC # BLD AUTO: 3.7 M/UL (ref 4–5.4)
SODIUM SERPL-SCNC: 137 MMOL/L (ref 136–145)
WBC # BLD AUTO: 3.73 K/UL (ref 3.9–12.7)

## 2024-05-13 PROCEDURE — 36415 COLL VENOUS BLD VENIPUNCTURE: CPT | Performed by: STUDENT IN AN ORGANIZED HEALTH CARE EDUCATION/TRAINING PROGRAM

## 2024-05-13 PROCEDURE — 83735 ASSAY OF MAGNESIUM: CPT | Performed by: STUDENT IN AN ORGANIZED HEALTH CARE EDUCATION/TRAINING PROGRAM

## 2024-05-13 PROCEDURE — 85025 COMPLETE CBC W/AUTO DIFF WBC: CPT | Performed by: STUDENT IN AN ORGANIZED HEALTH CARE EDUCATION/TRAINING PROGRAM

## 2024-05-13 PROCEDURE — 80069 RENAL FUNCTION PANEL: CPT | Performed by: STUDENT IN AN ORGANIZED HEALTH CARE EDUCATION/TRAINING PROGRAM

## 2024-05-13 RX ORDER — LANOLIN ALCOHOL/MO/W.PET/CERES
800 CREAM (GRAM) TOPICAL 2 TIMES DAILY
Qty: 120 TABLET | Refills: 11 | Status: SHIPPED | OUTPATIENT
Start: 2024-05-13 | End: 2025-05-14

## 2024-05-17 DIAGNOSIS — Z94.0 S/P KIDNEY TRANSPLANT: ICD-10-CM

## 2024-05-17 RX ORDER — MYCOPHENOLATE MOFETIL 250 MG/1
250 CAPSULE ORAL 2 TIMES DAILY
Qty: 60 CAPSULE | Refills: 11 | Status: SHIPPED | OUTPATIENT
Start: 2024-05-17

## 2024-05-27 DIAGNOSIS — Z94.0 S/P KIDNEY TRANSPLANT: Primary | ICD-10-CM

## 2024-05-29 ENCOUNTER — LAB VISIT (OUTPATIENT)
Dept: LAB | Facility: HOSPITAL | Age: 49
End: 2024-05-29
Attending: STUDENT IN AN ORGANIZED HEALTH CARE EDUCATION/TRAINING PROGRAM
Payer: MEDICARE

## 2024-05-29 DIAGNOSIS — Z94.0 KIDNEY REPLACED BY TRANSPLANT: ICD-10-CM

## 2024-05-29 LAB
ALBUMIN SERPL BCP-MCNC: 3.6 G/DL (ref 3.5–5.2)
ANION GAP SERPL CALC-SCNC: 11 MMOL/L (ref 8–16)
BASOPHILS # BLD AUTO: 0.03 K/UL (ref 0–0.2)
BASOPHILS NFR BLD: 0.6 % (ref 0–1.9)
BUN SERPL-MCNC: 35 MG/DL (ref 6–20)
CALCIUM SERPL-MCNC: 10.2 MG/DL (ref 8.7–10.5)
CHLORIDE SERPL-SCNC: 101 MMOL/L (ref 95–110)
CO2 SERPL-SCNC: 27 MMOL/L (ref 23–29)
CREAT SERPL-MCNC: 2.3 MG/DL (ref 0.5–1.4)
DIFFERENTIAL METHOD BLD: ABNORMAL
EOSINOPHIL # BLD AUTO: 0.1 K/UL (ref 0–0.5)
EOSINOPHIL NFR BLD: 1 % (ref 0–8)
ERYTHROCYTE [DISTWIDTH] IN BLOOD BY AUTOMATED COUNT: 14.4 % (ref 11.5–14.5)
EST. GFR  (NO RACE VARIABLE): 26 ML/MIN/1.73 M^2
GLUCOSE SERPL-MCNC: 120 MG/DL (ref 70–110)
HCT VFR BLD AUTO: 38.4 % (ref 37–48.5)
HGB BLD-MCNC: 11.4 G/DL (ref 12–16)
IMM GRANULOCYTES # BLD AUTO: 0.03 K/UL (ref 0–0.04)
IMM GRANULOCYTES NFR BLD AUTO: 0.6 % (ref 0–0.5)
LYMPHOCYTES # BLD AUTO: 0.6 K/UL (ref 1–4.8)
LYMPHOCYTES NFR BLD: 12.4 % (ref 18–48)
MAGNESIUM SERPL-MCNC: 2 MG/DL (ref 1.6–2.6)
MCH RBC QN AUTO: 30.5 PG (ref 27–31)
MCHC RBC AUTO-ENTMCNC: 29.7 G/DL (ref 32–36)
MCV RBC AUTO: 103 FL (ref 82–98)
MONOCYTES # BLD AUTO: 0.3 K/UL (ref 0.3–1)
MONOCYTES NFR BLD: 5.2 % (ref 4–15)
NEUTROPHILS # BLD AUTO: 4.1 K/UL (ref 1.8–7.7)
NEUTROPHILS NFR BLD: 80.2 % (ref 38–73)
NRBC BLD-RTO: 0 /100 WBC
PHOSPHATE SERPL-MCNC: 3 MG/DL (ref 2.7–4.5)
PLATELET # BLD AUTO: 123 K/UL (ref 150–450)
PMV BLD AUTO: 9.9 FL (ref 9.2–12.9)
POTASSIUM SERPL-SCNC: 3.6 MMOL/L (ref 3.5–5.1)
RBC # BLD AUTO: 3.74 M/UL (ref 4–5.4)
SODIUM SERPL-SCNC: 139 MMOL/L (ref 136–145)
WBC # BLD AUTO: 5.15 K/UL (ref 3.9–12.7)

## 2024-05-29 PROCEDURE — 83735 ASSAY OF MAGNESIUM: CPT | Performed by: STUDENT IN AN ORGANIZED HEALTH CARE EDUCATION/TRAINING PROGRAM

## 2024-05-29 PROCEDURE — 80197 ASSAY OF TACROLIMUS: CPT | Performed by: STUDENT IN AN ORGANIZED HEALTH CARE EDUCATION/TRAINING PROGRAM

## 2024-05-29 PROCEDURE — 85025 COMPLETE CBC W/AUTO DIFF WBC: CPT | Performed by: STUDENT IN AN ORGANIZED HEALTH CARE EDUCATION/TRAINING PROGRAM

## 2024-05-29 PROCEDURE — 80069 RENAL FUNCTION PANEL: CPT | Performed by: STUDENT IN AN ORGANIZED HEALTH CARE EDUCATION/TRAINING PROGRAM

## 2024-05-29 PROCEDURE — 87799 DETECT AGENT NOS DNA QUANT: CPT | Performed by: STUDENT IN AN ORGANIZED HEALTH CARE EDUCATION/TRAINING PROGRAM

## 2024-05-30 LAB — TACROLIMUS BLD-MCNC: 8.6 NG/ML (ref 5–15)

## 2024-06-20 ENCOUNTER — LAB VISIT (OUTPATIENT)
Dept: LAB | Facility: HOSPITAL | Age: 49
End: 2024-06-20
Attending: STUDENT IN AN ORGANIZED HEALTH CARE EDUCATION/TRAINING PROGRAM
Payer: MEDICARE

## 2024-06-20 DIAGNOSIS — Z94.0 KIDNEY REPLACED BY TRANSPLANT: ICD-10-CM

## 2024-06-20 LAB
ALBUMIN SERPL BCP-MCNC: 3.4 G/DL (ref 3.5–5.2)
ANION GAP SERPL CALC-SCNC: 11 MMOL/L (ref 8–16)
BASOPHILS # BLD AUTO: 0.02 K/UL (ref 0–0.2)
BASOPHILS NFR BLD: 0.3 % (ref 0–1.9)
BUN SERPL-MCNC: 27 MG/DL (ref 6–20)
CALCIUM SERPL-MCNC: 9.9 MG/DL (ref 8.7–10.5)
CHLORIDE SERPL-SCNC: 101 MMOL/L (ref 95–110)
CO2 SERPL-SCNC: 27 MMOL/L (ref 23–29)
CREAT SERPL-MCNC: 1.6 MG/DL (ref 0.5–1.4)
DIFFERENTIAL METHOD BLD: ABNORMAL
EOSINOPHIL # BLD AUTO: 0.1 K/UL (ref 0–0.5)
EOSINOPHIL NFR BLD: 0.9 % (ref 0–8)
ERYTHROCYTE [DISTWIDTH] IN BLOOD BY AUTOMATED COUNT: 14.4 % (ref 11.5–14.5)
EST. GFR  (NO RACE VARIABLE): 40 ML/MIN/1.73 M^2
GLUCOSE SERPL-MCNC: 124 MG/DL (ref 70–110)
HCT VFR BLD AUTO: 37.1 % (ref 37–48.5)
HGB BLD-MCNC: 11.3 G/DL (ref 12–16)
IMM GRANULOCYTES # BLD AUTO: 0.06 K/UL (ref 0–0.04)
IMM GRANULOCYTES NFR BLD AUTO: 0.8 % (ref 0–0.5)
LYMPHOCYTES # BLD AUTO: 0.7 K/UL (ref 1–4.8)
LYMPHOCYTES NFR BLD: 8.7 % (ref 18–48)
MAGNESIUM SERPL-MCNC: 1.7 MG/DL (ref 1.6–2.6)
MCH RBC QN AUTO: 31 PG (ref 27–31)
MCHC RBC AUTO-ENTMCNC: 30.5 G/DL (ref 32–36)
MCV RBC AUTO: 102 FL (ref 82–98)
MONOCYTES # BLD AUTO: 1.5 K/UL (ref 0.3–1)
MONOCYTES NFR BLD: 18.9 % (ref 4–15)
NEUTROPHILS # BLD AUTO: 5.6 K/UL (ref 1.8–7.7)
NEUTROPHILS NFR BLD: 70.4 % (ref 38–73)
NRBC BLD-RTO: 0 /100 WBC
PHOSPHATE SERPL-MCNC: 2.9 MG/DL (ref 2.7–4.5)
PLATELET # BLD AUTO: 123 K/UL (ref 150–450)
PMV BLD AUTO: 11.3 FL (ref 9.2–12.9)
POTASSIUM SERPL-SCNC: 3.8 MMOL/L (ref 3.5–5.1)
RBC # BLD AUTO: 3.64 M/UL (ref 4–5.4)
SODIUM SERPL-SCNC: 139 MMOL/L (ref 136–145)
WBC # BLD AUTO: 7.97 K/UL (ref 3.9–12.7)

## 2024-06-20 PROCEDURE — 80069 RENAL FUNCTION PANEL: CPT | Performed by: STUDENT IN AN ORGANIZED HEALTH CARE EDUCATION/TRAINING PROGRAM

## 2024-06-20 PROCEDURE — 80197 ASSAY OF TACROLIMUS: CPT | Performed by: STUDENT IN AN ORGANIZED HEALTH CARE EDUCATION/TRAINING PROGRAM

## 2024-06-20 PROCEDURE — 83735 ASSAY OF MAGNESIUM: CPT | Performed by: STUDENT IN AN ORGANIZED HEALTH CARE EDUCATION/TRAINING PROGRAM

## 2024-06-20 PROCEDURE — 36415 COLL VENOUS BLD VENIPUNCTURE: CPT | Performed by: STUDENT IN AN ORGANIZED HEALTH CARE EDUCATION/TRAINING PROGRAM

## 2024-06-20 PROCEDURE — 85025 COMPLETE CBC W/AUTO DIFF WBC: CPT | Performed by: STUDENT IN AN ORGANIZED HEALTH CARE EDUCATION/TRAINING PROGRAM

## 2024-06-21 DIAGNOSIS — Z94.0 S/P KIDNEY TRANSPLANT: ICD-10-CM

## 2024-06-21 DIAGNOSIS — R73.09 ABNORMAL GLUCOSE: Primary | ICD-10-CM

## 2024-06-21 LAB — TACROLIMUS BLD-MCNC: 9.7 NG/ML (ref 5–15)

## 2024-06-21 RX ORDER — MYCOPHENOLATE MOFETIL 250 MG/1
500 CAPSULE ORAL 2 TIMES DAILY
Qty: 120 CAPSULE | Refills: 11 | Status: SHIPPED | OUTPATIENT
Start: 2024-06-21 | End: 2025-06-21

## 2024-06-21 RX ORDER — MYCOPHENOLATE MOFETIL 250 MG/1
500 CAPSULE ORAL 2 TIMES DAILY
Qty: 120 CAPSULE | Refills: 11 | OUTPATIENT
Start: 2024-06-21 | End: 2025-06-21

## 2024-06-21 RX ORDER — MYCOPHENOLATE MOFETIL 250 MG/1
500 CAPSULE ORAL 2 TIMES DAILY
Qty: 120 CAPSULE | Refills: 11 | Status: SHIPPED | OUTPATIENT
Start: 2024-06-21 | End: 2024-06-21 | Stop reason: SDUPTHER

## 2024-06-21 NOTE — TELEPHONE ENCOUNTER
Patient repeated back and voice a understanding of orders.  Patient to repeat labs here on 7/11.  ----- Message from Randall Thomas Jr., MD sent at 6/21/2024  8:50 AM CDT -----  Increase MMF back to 500/500. Check A1c, allosure, DSA with labs in 1-4 weeks.

## 2024-06-25 DIAGNOSIS — Z94.0 S/P KIDNEY TRANSPLANT: ICD-10-CM

## 2024-06-25 RX ORDER — MYCOPHENOLATE MOFETIL 250 MG/1
500 CAPSULE ORAL 2 TIMES DAILY
Qty: 120 CAPSULE | Refills: 11 | Status: SHIPPED | OUTPATIENT
Start: 2024-06-25 | End: 2025-06-25

## 2024-07-11 ENCOUNTER — LAB VISIT (OUTPATIENT)
Dept: LAB | Facility: HOSPITAL | Age: 49
End: 2024-07-11
Attending: STUDENT IN AN ORGANIZED HEALTH CARE EDUCATION/TRAINING PROGRAM
Payer: MEDICARE

## 2024-07-11 DIAGNOSIS — R73.09 ABNORMAL GLUCOSE: ICD-10-CM

## 2024-07-11 DIAGNOSIS — Z94.0 S/P KIDNEY TRANSPLANT: ICD-10-CM

## 2024-07-11 DIAGNOSIS — Z94.0 KIDNEY REPLACED BY TRANSPLANT: ICD-10-CM

## 2024-07-11 LAB
ALBUMIN SERPL BCP-MCNC: 3.4 G/DL (ref 3.5–5.2)
ANION GAP SERPL CALC-SCNC: 10 MMOL/L (ref 8–16)
ANISOCYTOSIS BLD QL SMEAR: SLIGHT
BASOPHILS # BLD AUTO: ABNORMAL K/UL (ref 0–0.2)
BASOPHILS NFR BLD: 0 % (ref 0–1.9)
BUN SERPL-MCNC: 28 MG/DL (ref 6–20)
CALCIUM SERPL-MCNC: 9.8 MG/DL (ref 8.7–10.5)
CHLORIDE SERPL-SCNC: 100 MMOL/L (ref 95–110)
CO2 SERPL-SCNC: 28 MMOL/L (ref 23–29)
CREAT SERPL-MCNC: 1.5 MG/DL (ref 0.5–1.4)
DIFFERENTIAL METHOD BLD: ABNORMAL
EOSINOPHIL # BLD AUTO: ABNORMAL K/UL (ref 0–0.5)
EOSINOPHIL NFR BLD: 2 % (ref 0–8)
ERYTHROCYTE [DISTWIDTH] IN BLOOD BY AUTOMATED COUNT: 13.7 % (ref 11.5–14.5)
EST. GFR  (NO RACE VARIABLE): 43 ML/MIN/1.73 M^2
ESTIMATED AVG GLUCOSE: 151 MG/DL (ref 68–131)
GLUCOSE SERPL-MCNC: 114 MG/DL (ref 70–110)
HBA1C MFR BLD: 6.9 % (ref 4–5.6)
HCT VFR BLD AUTO: 36.1 % (ref 37–48.5)
HGB BLD-MCNC: 11 G/DL (ref 12–16)
HYPOCHROMIA BLD QL SMEAR: ABNORMAL
IMM GRANULOCYTES # BLD AUTO: ABNORMAL K/UL (ref 0–0.04)
IMM GRANULOCYTES NFR BLD AUTO: ABNORMAL % (ref 0–0.5)
LYMPHOCYTES # BLD AUTO: ABNORMAL K/UL (ref 1–4.8)
LYMPHOCYTES NFR BLD: 8 % (ref 18–48)
MCH RBC QN AUTO: 31.7 PG (ref 27–31)
MCHC RBC AUTO-ENTMCNC: 30.5 G/DL (ref 32–36)
MCV RBC AUTO: 104 FL (ref 82–98)
MONOCYTES # BLD AUTO: ABNORMAL K/UL (ref 0.3–1)
MONOCYTES NFR BLD: 9 % (ref 4–15)
NEUTROPHILS NFR BLD: 81 % (ref 38–73)
NRBC BLD-RTO: 0 /100 WBC
OVALOCYTES BLD QL SMEAR: ABNORMAL
PHOSPHATE SERPL-MCNC: 3 MG/DL (ref 2.7–4.5)
PLATELET # BLD AUTO: 125 K/UL (ref 150–450)
PMV BLD AUTO: 11.7 FL (ref 9.2–12.9)
POIKILOCYTOSIS BLD QL SMEAR: SLIGHT
POLYCHROMASIA BLD QL SMEAR: ABNORMAL
POTASSIUM SERPL-SCNC: 4.2 MMOL/L (ref 3.5–5.1)
RBC # BLD AUTO: 3.47 M/UL (ref 4–5.4)
SODIUM SERPL-SCNC: 138 MMOL/L (ref 136–145)
SPHEROCYTES BLD QL SMEAR: ABNORMAL
TACROLIMUS BLD-MCNC: 8.9 NG/ML (ref 5–15)
WBC # BLD AUTO: 5 K/UL (ref 3.9–12.7)

## 2024-07-11 PROCEDURE — 36415 COLL VENOUS BLD VENIPUNCTURE: CPT | Performed by: STUDENT IN AN ORGANIZED HEALTH CARE EDUCATION/TRAINING PROGRAM

## 2024-07-11 PROCEDURE — 80197 ASSAY OF TACROLIMUS: CPT | Performed by: STUDENT IN AN ORGANIZED HEALTH CARE EDUCATION/TRAINING PROGRAM

## 2024-07-11 PROCEDURE — 85027 COMPLETE CBC AUTOMATED: CPT | Performed by: STUDENT IN AN ORGANIZED HEALTH CARE EDUCATION/TRAINING PROGRAM

## 2024-07-11 PROCEDURE — 80069 RENAL FUNCTION PANEL: CPT | Performed by: STUDENT IN AN ORGANIZED HEALTH CARE EDUCATION/TRAINING PROGRAM

## 2024-07-11 PROCEDURE — 85007 BL SMEAR W/DIFF WBC COUNT: CPT | Performed by: STUDENT IN AN ORGANIZED HEALTH CARE EDUCATION/TRAINING PROGRAM

## 2024-07-11 PROCEDURE — 86832 HLA CLASS I HIGH DEFIN QUAL: CPT | Performed by: STUDENT IN AN ORGANIZED HEALTH CARE EDUCATION/TRAINING PROGRAM

## 2024-07-11 PROCEDURE — 83036 HEMOGLOBIN GLYCOSYLATED A1C: CPT | Performed by: STUDENT IN AN ORGANIZED HEALTH CARE EDUCATION/TRAINING PROGRAM

## 2024-07-11 PROCEDURE — 86977 RBC SERUM PRETX INCUBJ/INHIB: CPT | Mod: 59 | Performed by: STUDENT IN AN ORGANIZED HEALTH CARE EDUCATION/TRAINING PROGRAM

## 2024-07-11 PROCEDURE — 86833 HLA CLASS II HIGH DEFIN QUAL: CPT | Performed by: STUDENT IN AN ORGANIZED HEALTH CARE EDUCATION/TRAINING PROGRAM

## 2024-07-13 LAB
ALLOSURE COMMENT: NORMAL
ALLOSURE SCORE KIDNEY: 0.15 %
INFORMATION: NORMAL

## 2024-07-29 ENCOUNTER — LAB VISIT (OUTPATIENT)
Dept: LAB | Facility: HOSPITAL | Age: 49
End: 2024-07-29
Attending: STUDENT IN AN ORGANIZED HEALTH CARE EDUCATION/TRAINING PROGRAM
Payer: MEDICARE

## 2024-07-29 DIAGNOSIS — Z94.0 KIDNEY REPLACED BY TRANSPLANT: ICD-10-CM

## 2024-07-29 LAB
ALBUMIN SERPL BCP-MCNC: 3.4 G/DL (ref 3.5–5.2)
ANION GAP SERPL CALC-SCNC: 11 MMOL/L (ref 8–16)
ANISOCYTOSIS BLD QL SMEAR: SLIGHT
BASOPHILS # BLD AUTO: ABNORMAL K/UL (ref 0–0.2)
BASOPHILS NFR BLD: 3 % (ref 0–1.9)
BUN SERPL-MCNC: 35 MG/DL (ref 6–20)
CALCIUM SERPL-MCNC: 9.6 MG/DL (ref 8.7–10.5)
CHLORIDE SERPL-SCNC: 101 MMOL/L (ref 95–110)
CO2 SERPL-SCNC: 25 MMOL/L (ref 23–29)
CREAT SERPL-MCNC: 1.7 MG/DL (ref 0.5–1.4)
DIFFERENTIAL METHOD BLD: ABNORMAL
EOSINOPHIL # BLD AUTO: ABNORMAL K/UL (ref 0–0.5)
EOSINOPHIL NFR BLD: 3 % (ref 0–8)
ERYTHROCYTE [DISTWIDTH] IN BLOOD BY AUTOMATED COUNT: 13.3 % (ref 11.5–14.5)
EST. GFR  (NO RACE VARIABLE): 37 ML/MIN/1.73 M^2
GLUCOSE SERPL-MCNC: 101 MG/DL (ref 70–110)
HCT VFR BLD AUTO: 38.8 % (ref 37–48.5)
HGB BLD-MCNC: 11.5 G/DL (ref 12–16)
IMM GRANULOCYTES # BLD AUTO: ABNORMAL K/UL (ref 0–0.04)
IMM GRANULOCYTES NFR BLD AUTO: ABNORMAL % (ref 0–0.5)
LYMPHOCYTES # BLD AUTO: ABNORMAL K/UL (ref 1–4.8)
LYMPHOCYTES NFR BLD: 22 % (ref 18–48)
MAGNESIUM SERPL-MCNC: 1.7 MG/DL (ref 1.6–2.6)
MCH RBC QN AUTO: 30 PG (ref 27–31)
MCHC RBC AUTO-ENTMCNC: 29.6 G/DL (ref 32–36)
MCV RBC AUTO: 101 FL (ref 82–98)
MONOCYTES # BLD AUTO: ABNORMAL K/UL (ref 0.3–1)
MONOCYTES NFR BLD: 15 % (ref 4–15)
NEUTROPHILS # BLD AUTO: ABNORMAL K/UL (ref 1.8–7.7)
NEUTROPHILS NFR BLD: 56 % (ref 38–73)
NEUTS BAND NFR BLD MANUAL: 1 %
NRBC BLD-RTO: 0 /100 WBC
PHOSPHATE SERPL-MCNC: 2.9 MG/DL (ref 2.7–4.5)
PLATELET # BLD AUTO: 143 K/UL (ref 150–450)
PLATELET BLD QL SMEAR: ABNORMAL
PMV BLD AUTO: 11.2 FL (ref 9.2–12.9)
POTASSIUM SERPL-SCNC: 3.6 MMOL/L (ref 3.5–5.1)
RBC # BLD AUTO: 3.83 M/UL (ref 4–5.4)
SODIUM SERPL-SCNC: 137 MMOL/L (ref 136–145)
WBC # BLD AUTO: 3.67 K/UL (ref 3.9–12.7)

## 2024-07-29 PROCEDURE — 85027 COMPLETE CBC AUTOMATED: CPT | Performed by: STUDENT IN AN ORGANIZED HEALTH CARE EDUCATION/TRAINING PROGRAM

## 2024-07-29 PROCEDURE — 80069 RENAL FUNCTION PANEL: CPT | Performed by: STUDENT IN AN ORGANIZED HEALTH CARE EDUCATION/TRAINING PROGRAM

## 2024-07-29 PROCEDURE — 80197 ASSAY OF TACROLIMUS: CPT | Performed by: STUDENT IN AN ORGANIZED HEALTH CARE EDUCATION/TRAINING PROGRAM

## 2024-07-29 PROCEDURE — 83735 ASSAY OF MAGNESIUM: CPT | Performed by: STUDENT IN AN ORGANIZED HEALTH CARE EDUCATION/TRAINING PROGRAM

## 2024-07-29 PROCEDURE — 85007 BL SMEAR W/DIFF WBC COUNT: CPT | Performed by: STUDENT IN AN ORGANIZED HEALTH CARE EDUCATION/TRAINING PROGRAM

## 2024-07-29 PROCEDURE — 36415 COLL VENOUS BLD VENIPUNCTURE: CPT | Performed by: STUDENT IN AN ORGANIZED HEALTH CARE EDUCATION/TRAINING PROGRAM

## 2024-07-30 LAB — TACROLIMUS BLD-MCNC: 9.6 NG/ML (ref 5–15)

## 2024-08-05 ENCOUNTER — OFFICE VISIT (OUTPATIENT)
Dept: TRANSPLANT | Facility: CLINIC | Age: 49
End: 2024-08-05
Payer: MEDICARE

## 2024-08-05 VITALS
OXYGEN SATURATION: 100 % | SYSTOLIC BLOOD PRESSURE: 174 MMHG | DIASTOLIC BLOOD PRESSURE: 72 MMHG | RESPIRATION RATE: 16 BRPM | TEMPERATURE: 97 F | HEART RATE: 88 BPM | WEIGHT: 233.94 LBS | HEIGHT: 65 IN | BODY MASS INDEX: 38.98 KG/M2

## 2024-08-05 DIAGNOSIS — Z79.60 LONG-TERM USE OF IMMUNOSUPPRESSANT MEDICATION: ICD-10-CM

## 2024-08-05 DIAGNOSIS — N18.32 STAGE 3B CHRONIC KIDNEY DISEASE: ICD-10-CM

## 2024-08-05 DIAGNOSIS — I10 ESSENTIAL HYPERTENSION: ICD-10-CM

## 2024-08-05 DIAGNOSIS — Z94.0 S/P KIDNEY TRANSPLANT: Primary | ICD-10-CM

## 2024-08-05 DIAGNOSIS — Z79.4 TYPE 2 DIABETES MELLITUS WITH HYPERGLYCEMIA, WITH LONG-TERM CURRENT USE OF INSULIN: ICD-10-CM

## 2024-08-05 DIAGNOSIS — Z91.89 AT RISK FOR OPPORTUNISTIC INFECTIONS: ICD-10-CM

## 2024-08-05 DIAGNOSIS — E11.65 TYPE 2 DIABETES MELLITUS WITH HYPERGLYCEMIA, WITH LONG-TERM CURRENT USE OF INSULIN: ICD-10-CM

## 2024-08-05 PROCEDURE — 99215 OFFICE O/P EST HI 40 MIN: CPT | Mod: S$PBB,,, | Performed by: NURSE PRACTITIONER

## 2024-08-05 PROCEDURE — 99214 OFFICE O/P EST MOD 30 MIN: CPT | Mod: PBBFAC | Performed by: NURSE PRACTITIONER

## 2024-08-05 PROCEDURE — 99999 PR PBB SHADOW E&M-EST. PATIENT-LVL IV: CPT | Mod: PBBFAC,,, | Performed by: NURSE PRACTITIONER

## 2024-08-26 ENCOUNTER — LAB VISIT (OUTPATIENT)
Dept: LAB | Facility: HOSPITAL | Age: 49
End: 2024-08-26
Attending: STUDENT IN AN ORGANIZED HEALTH CARE EDUCATION/TRAINING PROGRAM
Payer: MEDICARE

## 2024-08-26 DIAGNOSIS — Z94.0 KIDNEY REPLACED BY TRANSPLANT: ICD-10-CM

## 2024-08-26 LAB
ALBUMIN SERPL BCP-MCNC: 3.4 G/DL (ref 3.5–5.2)
ANION GAP SERPL CALC-SCNC: 11 MMOL/L (ref 8–16)
BASOPHILS # BLD AUTO: 0.02 K/UL (ref 0–0.2)
BASOPHILS NFR BLD: 0.3 % (ref 0–1.9)
BUN SERPL-MCNC: 26 MG/DL (ref 6–20)
CALCIUM SERPL-MCNC: 9.9 MG/DL (ref 8.7–10.5)
CHLORIDE SERPL-SCNC: 100 MMOL/L (ref 95–110)
CO2 SERPL-SCNC: 27 MMOL/L (ref 23–29)
CREAT SERPL-MCNC: 1.7 MG/DL (ref 0.5–1.4)
DIFFERENTIAL METHOD BLD: ABNORMAL
EOSINOPHIL # BLD AUTO: 0.1 K/UL (ref 0–0.5)
EOSINOPHIL NFR BLD: 1.6 % (ref 0–8)
ERYTHROCYTE [DISTWIDTH] IN BLOOD BY AUTOMATED COUNT: 13.7 % (ref 11.5–14.5)
EST. GFR  (NO RACE VARIABLE): 37 ML/MIN/1.73 M^2
GLUCOSE SERPL-MCNC: 127 MG/DL (ref 70–110)
HCT VFR BLD AUTO: 38.8 % (ref 37–48.5)
HGB BLD-MCNC: 12.1 G/DL (ref 12–16)
IMM GRANULOCYTES # BLD AUTO: 0.01 K/UL (ref 0–0.04)
IMM GRANULOCYTES NFR BLD AUTO: 0.1 % (ref 0–0.5)
LYMPHOCYTES # BLD AUTO: 0.8 K/UL (ref 1–4.8)
LYMPHOCYTES NFR BLD: 11.9 % (ref 18–48)
MAGNESIUM SERPL-MCNC: 1.6 MG/DL (ref 1.6–2.6)
MCH RBC QN AUTO: 31.1 PG (ref 27–31)
MCHC RBC AUTO-ENTMCNC: 31.2 G/DL (ref 32–36)
MCV RBC AUTO: 100 FL (ref 82–98)
MONOCYTES # BLD AUTO: 0.8 K/UL (ref 0.3–1)
MONOCYTES NFR BLD: 11.9 % (ref 4–15)
NEUTROPHILS # BLD AUTO: 5 K/UL (ref 1.8–7.7)
NEUTROPHILS NFR BLD: 74.2 % (ref 38–73)
NRBC BLD-RTO: 0 /100 WBC
PHOSPHATE SERPL-MCNC: 2.8 MG/DL (ref 2.7–4.5)
PLATELET # BLD AUTO: 121 K/UL (ref 150–450)
PMV BLD AUTO: 11.1 FL (ref 9.2–12.9)
POTASSIUM SERPL-SCNC: 3.9 MMOL/L (ref 3.5–5.1)
RBC # BLD AUTO: 3.89 M/UL (ref 4–5.4)
SODIUM SERPL-SCNC: 138 MMOL/L (ref 136–145)
WBC # BLD AUTO: 6.74 K/UL (ref 3.9–12.7)

## 2024-08-26 PROCEDURE — 87799 DETECT AGENT NOS DNA QUANT: CPT | Performed by: STUDENT IN AN ORGANIZED HEALTH CARE EDUCATION/TRAINING PROGRAM

## 2024-08-26 PROCEDURE — 85025 COMPLETE CBC W/AUTO DIFF WBC: CPT | Performed by: STUDENT IN AN ORGANIZED HEALTH CARE EDUCATION/TRAINING PROGRAM

## 2024-08-26 PROCEDURE — 80069 RENAL FUNCTION PANEL: CPT | Performed by: STUDENT IN AN ORGANIZED HEALTH CARE EDUCATION/TRAINING PROGRAM

## 2024-08-26 PROCEDURE — 83735 ASSAY OF MAGNESIUM: CPT | Performed by: STUDENT IN AN ORGANIZED HEALTH CARE EDUCATION/TRAINING PROGRAM

## 2024-08-26 PROCEDURE — 80197 ASSAY OF TACROLIMUS: CPT | Performed by: STUDENT IN AN ORGANIZED HEALTH CARE EDUCATION/TRAINING PROGRAM

## 2024-08-27 DIAGNOSIS — Z94.0 S/P KIDNEY TRANSPLANT: ICD-10-CM

## 2024-08-27 LAB — TACROLIMUS BLD-MCNC: 11.3 NG/ML (ref 5–15)

## 2024-08-27 RX ORDER — TACROLIMUS 1 MG/1
CAPSULE ORAL
Qty: 420 CAPSULE | Refills: 11 | Status: SHIPPED | OUTPATIENT
Start: 2024-08-27

## 2024-08-27 NOTE — TELEPHONE ENCOUNTER
Message sent. ----- Message from Randall Thomas Jr., MD sent at 8/27/2024 12:43 PM CDT -----  Tac high. Decrease dose from 7/6 to 5/5 and repeat in 2 weeks

## 2024-08-29 LAB
BKV DNA SERPL NAA+PROBE-ACNC: <125 COPIES/ML
BKV DNA SERPL NAA+PROBE-LOG#: <2.1 LOG (10) COPIES/ML
BKV DNA SERPL QL NAA+PROBE: DETECTED

## 2024-09-09 ENCOUNTER — LAB VISIT (OUTPATIENT)
Dept: LAB | Facility: HOSPITAL | Age: 49
End: 2024-09-09
Attending: STUDENT IN AN ORGANIZED HEALTH CARE EDUCATION/TRAINING PROGRAM
Payer: MEDICARE

## 2024-09-09 DIAGNOSIS — Z94.0 KIDNEY REPLACED BY TRANSPLANT: ICD-10-CM

## 2024-09-09 DIAGNOSIS — Z94.0 S/P KIDNEY TRANSPLANT: ICD-10-CM

## 2024-09-09 LAB
BACTERIA #/AREA URNS HPF: ABNORMAL /HPF
BILIRUB UR QL STRIP: NEGATIVE
CLARITY UR: CLEAR
COLOR UR: YELLOW
CREAT UR-MCNC: 55.6 MG/DL (ref 15–325)
GLUCOSE UR QL STRIP: NEGATIVE
HGB UR QL STRIP: ABNORMAL
KETONES UR QL STRIP: NEGATIVE
LEUKOCYTE ESTERASE UR QL STRIP: NEGATIVE
MICROSCOPIC COMMENT: ABNORMAL
NITRITE UR QL STRIP: NEGATIVE
PH UR STRIP: 5 [PH] (ref 5–8)
PROT UR QL STRIP: ABNORMAL
PROT UR-MCNC: 13 MG/DL
PROT/CREAT UR: 0.23 MG/G{CREAT} (ref 0–0.2)
RBC #/AREA URNS HPF: 26 /HPF (ref 0–4)
SP GR UR STRIP: 1.01 (ref 1–1.03)
SQUAMOUS #/AREA URNS HPF: 1 /HPF
URN SPEC COLLECT METH UR: ABNORMAL
UROBILINOGEN UR STRIP-ACNC: NEGATIVE EU/DL
WBC #/AREA URNS HPF: 0 /HPF (ref 0–5)

## 2024-09-09 PROCEDURE — 80197 ASSAY OF TACROLIMUS: CPT | Performed by: STUDENT IN AN ORGANIZED HEALTH CARE EDUCATION/TRAINING PROGRAM

## 2024-09-09 PROCEDURE — 87799 DETECT AGENT NOS DNA QUANT: CPT | Performed by: STUDENT IN AN ORGANIZED HEALTH CARE EDUCATION/TRAINING PROGRAM

## 2024-09-09 PROCEDURE — 81000 URINALYSIS NONAUTO W/SCOPE: CPT | Performed by: STUDENT IN AN ORGANIZED HEALTH CARE EDUCATION/TRAINING PROGRAM

## 2024-09-09 PROCEDURE — 82570 ASSAY OF URINE CREATININE: CPT | Performed by: STUDENT IN AN ORGANIZED HEALTH CARE EDUCATION/TRAINING PROGRAM

## 2024-09-10 DIAGNOSIS — Z94.0 S/P KIDNEY TRANSPLANT: ICD-10-CM

## 2024-09-10 LAB — TACROLIMUS BLD-MCNC: 12.2 NG/ML (ref 5–15)

## 2024-09-10 RX ORDER — TACROLIMUS 1 MG/1
CAPSULE ORAL
Qty: 420 CAPSULE | Refills: 11 | Status: SHIPPED | OUTPATIENT
Start: 2024-09-10

## 2024-09-10 NOTE — TELEPHONE ENCOUNTER
Message sent. Labs 9/16    ----- Message from Randall Thomas Jr., MD sent at 9/10/2024 12:14 PM CDT -----  If true tac trough, decrease dose from 5/5 to 3/3 and repeat in 1 week.

## 2024-09-12 DIAGNOSIS — Z94.0 KIDNEY REPLACED BY TRANSPLANT: ICD-10-CM

## 2024-09-12 RX ORDER — CALCITRIOL 0.25 UG/1
0.25 CAPSULE ORAL DAILY
Qty: 30 CAPSULE | Refills: 5 | Status: SHIPPED | OUTPATIENT
Start: 2024-09-12

## 2024-09-30 ENCOUNTER — TELEPHONE (OUTPATIENT)
Dept: TRANSPLANT | Facility: CLINIC | Age: 49
End: 2024-09-30
Payer: MEDICARE

## 2024-09-30 ENCOUNTER — LAB VISIT (OUTPATIENT)
Dept: LAB | Facility: HOSPITAL | Age: 49
End: 2024-09-30
Attending: STUDENT IN AN ORGANIZED HEALTH CARE EDUCATION/TRAINING PROGRAM
Payer: MEDICARE

## 2024-09-30 DIAGNOSIS — Z94.0 KIDNEY REPLACED BY TRANSPLANT: ICD-10-CM

## 2024-09-30 LAB
ALBUMIN SERPL BCP-MCNC: 3.5 G/DL (ref 3.5–5.2)
ANION GAP SERPL CALC-SCNC: 10 MMOL/L (ref 8–16)
BASOPHILS # BLD AUTO: 0.02 K/UL (ref 0–0.2)
BASOPHILS NFR BLD: 0.3 % (ref 0–1.9)
BUN SERPL-MCNC: 23 MG/DL (ref 6–20)
CALCIUM SERPL-MCNC: 9.7 MG/DL (ref 8.7–10.5)
CHLORIDE SERPL-SCNC: 103 MMOL/L (ref 95–110)
CO2 SERPL-SCNC: 26 MMOL/L (ref 23–29)
CREAT SERPL-MCNC: 1.5 MG/DL (ref 0.5–1.4)
DIFFERENTIAL METHOD BLD: ABNORMAL
EOSINOPHIL # BLD AUTO: 0.1 K/UL (ref 0–0.5)
EOSINOPHIL NFR BLD: 1.2 % (ref 0–8)
ERYTHROCYTE [DISTWIDTH] IN BLOOD BY AUTOMATED COUNT: 13.8 % (ref 11.5–14.5)
EST. GFR  (NO RACE VARIABLE): 43 ML/MIN/1.73 M^2
GLUCOSE SERPL-MCNC: 137 MG/DL (ref 70–110)
HCT VFR BLD AUTO: 38.1 % (ref 37–48.5)
HGB BLD-MCNC: 11.5 G/DL (ref 12–16)
IMM GRANULOCYTES # BLD AUTO: 0.03 K/UL (ref 0–0.04)
IMM GRANULOCYTES NFR BLD AUTO: 0.4 % (ref 0–0.5)
LYMPHOCYTES # BLD AUTO: 0.7 K/UL (ref 1–4.8)
LYMPHOCYTES NFR BLD: 10.4 % (ref 18–48)
MAGNESIUM SERPL-MCNC: 1.8 MG/DL (ref 1.6–2.6)
MCH RBC QN AUTO: 30 PG (ref 27–31)
MCHC RBC AUTO-ENTMCNC: 30.2 G/DL (ref 32–36)
MCV RBC AUTO: 100 FL (ref 82–98)
MONOCYTES # BLD AUTO: 1 K/UL (ref 0.3–1)
MONOCYTES NFR BLD: 14.3 % (ref 4–15)
NEUTROPHILS # BLD AUTO: 4.9 K/UL (ref 1.8–7.7)
NEUTROPHILS NFR BLD: 73.4 % (ref 38–73)
NRBC BLD-RTO: 0 /100 WBC
PHOSPHATE SERPL-MCNC: 2.9 MG/DL (ref 2.7–4.5)
PLATELET # BLD AUTO: 129 K/UL (ref 150–450)
PMV BLD AUTO: 11.1 FL (ref 9.2–12.9)
POTASSIUM SERPL-SCNC: 4.1 MMOL/L (ref 3.5–5.1)
RBC # BLD AUTO: 3.83 M/UL (ref 4–5.4)
SODIUM SERPL-SCNC: 139 MMOL/L (ref 136–145)
WBC # BLD AUTO: 6.71 K/UL (ref 3.9–12.7)

## 2024-09-30 PROCEDURE — 36415 COLL VENOUS BLD VENIPUNCTURE: CPT | Performed by: STUDENT IN AN ORGANIZED HEALTH CARE EDUCATION/TRAINING PROGRAM

## 2024-09-30 PROCEDURE — 80069 RENAL FUNCTION PANEL: CPT | Performed by: STUDENT IN AN ORGANIZED HEALTH CARE EDUCATION/TRAINING PROGRAM

## 2024-09-30 PROCEDURE — 80197 ASSAY OF TACROLIMUS: CPT | Performed by: STUDENT IN AN ORGANIZED HEALTH CARE EDUCATION/TRAINING PROGRAM

## 2024-09-30 PROCEDURE — 83735 ASSAY OF MAGNESIUM: CPT | Performed by: STUDENT IN AN ORGANIZED HEALTH CARE EDUCATION/TRAINING PROGRAM

## 2024-09-30 PROCEDURE — 85025 COMPLETE CBC W/AUTO DIFF WBC: CPT | Performed by: STUDENT IN AN ORGANIZED HEALTH CARE EDUCATION/TRAINING PROGRAM

## 2024-09-30 NOTE — TELEPHONE ENCOUNTER
Spoke to patient, stated she did not increase her FK to 3/3 until today. Will let Dr. Thomas know labs from today do not reflect the FK change.labs sched for 10/14----- Message from Daya sent at 9/30/2024  8:04 AM CDT -----  Regarding: Consult/Advisory  Contact: Mary  Consult/Advisory     Name Of Caller:Mary Waller          Contact Preference:[phone] (Mobile)       Nature of call:pt is calling in regards to a medication change that was made with her Prograf, states she had to reschedule her lab, and would like to speak with coordinator. Please advise.

## 2024-10-01 DIAGNOSIS — Z94.0 S/P KIDNEY TRANSPLANT: ICD-10-CM

## 2024-10-01 LAB — TACROLIMUS BLD-MCNC: 7.9 NG/ML (ref 5–15)

## 2024-10-01 RX ORDER — TACROLIMUS 1 MG/1
CAPSULE ORAL
Qty: 420 CAPSULE | Refills: 11 | Status: SHIPPED | OUTPATIENT
Start: 2024-10-01

## 2024-10-01 NOTE — TELEPHONE ENCOUNTER
Back to 2mg BID which is what this FK level was.     ----- Message from Randall Thomas MD sent at 10/1/2024  7:58 AM CDT -----  Tac acceptable.

## 2024-10-09 DIAGNOSIS — Z94.0 S/P KIDNEY TRANSPLANT: ICD-10-CM

## 2024-10-09 RX ORDER — FUROSEMIDE 80 MG/1
120 TABLET ORAL 2 TIMES DAILY
Qty: 15 TABLET | Refills: 0 | Status: CANCELLED | OUTPATIENT
Start: 2024-10-09 | End: 2024-10-14

## 2024-10-11 DIAGNOSIS — Z94.0 S/P KIDNEY TRANSPLANT: ICD-10-CM

## 2024-10-14 ENCOUNTER — LAB VISIT (OUTPATIENT)
Dept: LAB | Facility: HOSPITAL | Age: 49
End: 2024-10-14
Attending: STUDENT IN AN ORGANIZED HEALTH CARE EDUCATION/TRAINING PROGRAM
Payer: MEDICARE

## 2024-10-14 DIAGNOSIS — Z94.0 KIDNEY REPLACED BY TRANSPLANT: ICD-10-CM

## 2024-10-14 PROCEDURE — 80197 ASSAY OF TACROLIMUS: CPT | Performed by: STUDENT IN AN ORGANIZED HEALTH CARE EDUCATION/TRAINING PROGRAM

## 2024-10-14 PROCEDURE — 36415 COLL VENOUS BLD VENIPUNCTURE: CPT | Performed by: STUDENT IN AN ORGANIZED HEALTH CARE EDUCATION/TRAINING PROGRAM

## 2024-10-15 DIAGNOSIS — Z94.0 S/P KIDNEY TRANSPLANT: ICD-10-CM

## 2024-10-15 LAB — TACROLIMUS BLD-MCNC: 3.1 NG/ML (ref 5–15)

## 2024-10-15 RX ORDER — TACROLIMUS 1 MG/1
CAPSULE ORAL
Qty: 420 CAPSULE | Refills: 11 | Status: SHIPPED | OUTPATIENT
Start: 2024-10-15

## 2024-10-15 RX ORDER — FUROSEMIDE 20 MG/1
40 TABLET ORAL DAILY
Start: 2024-10-15 | End: 2024-10-17 | Stop reason: SDUPTHER

## 2024-10-15 RX ORDER — FUROSEMIDE 20 MG/1
20 TABLET ORAL 2 TIMES DAILY
Qty: 60 TABLET | Refills: 3 | OUTPATIENT
Start: 2024-10-15

## 2024-10-15 NOTE — TELEPHONE ENCOUNTER
Message sent.   ----- Message from Randall Thomas MD sent at 10/15/2024  1:09 PM CDT -----  Tac low. Please confirm true tac trough and if so, increase tac from 2/2 to 3/3 and repeat in 1 week.

## 2024-10-15 NOTE — TELEPHONE ENCOUNTER
"Refill sent to new pharmacy.     ----- Message from Gurdeep sent at 10/15/2024 12:03 PM CDT -----  Regarding: call back  Consult/Advisory:        Name Of Caller: Self     Contact Preference?:[phone]     What is the nature of the call?: Calling to speak w/Emma in regards to clarification on her furosemide (LASIX) 20 MG tablet requesting call back     Additional Notes:  "Thank you for all that you do for our patients"  "

## 2024-10-17 RX ORDER — FUROSEMIDE 80 MG/1
120 TABLET ORAL 2 TIMES DAILY
Qty: 15 TABLET | Refills: 0 | OUTPATIENT
Start: 2024-10-17 | End: 2024-10-22

## 2024-10-17 RX ORDER — FUROSEMIDE 20 MG/1
40 TABLET ORAL DAILY
Start: 2024-10-17 | End: 2024-10-17 | Stop reason: SDUPTHER

## 2024-10-17 NOTE — TELEPHONE ENCOUNTER
----- Message from Carissa sent at 10/17/2024  4:55 PM CDT -----  Regarding: Refill  Contact: Pt  [phone]            Name of Pharmacy:     [id number] [place] [address] [facility]  [address]  [place] [address]  Phone: [phone] Fax: [phone]       Name Of caller: Mary      Name of Medication:  furosemide (LASIX) 20 MG tablet    Comments/advisory: Requesting a call back to get updated status on refill

## 2024-10-18 DIAGNOSIS — Z94.0 S/P KIDNEY TRANSPLANT: Primary | ICD-10-CM

## 2024-10-18 RX ORDER — FUROSEMIDE 20 MG/1
40 TABLET ORAL DAILY
Start: 2024-10-18 | End: 2024-10-18 | Stop reason: SDUPTHER

## 2024-10-18 RX ORDER — FUROSEMIDE 20 MG/1
40 TABLET ORAL DAILY
Qty: 60 TABLET | Refills: 11 | Status: SHIPPED | OUTPATIENT
Start: 2024-10-18 | End: 2025-10-18

## 2024-10-18 NOTE — TELEPHONE ENCOUNTER
Rx changed from no print to print. ----- Message from Greg sent at 10/18/2024  9:09 AM CDT -----  Regarding: Consult/Advisory  Contact: [phone]  Consult/Advisory     Name Of Caller: Pt         Contact Preference: [phone]    Nature of call: Pt calling to follow up on refill request for RX  furosemide (LASIX) 20 MG tablet. Script was not E-Scribed to [facility] pharmacy       [id number] - [place] [address] [facility] [address]  [facility] [place] [address]  Phone: [phone] Fax: [phone]

## 2024-10-28 ENCOUNTER — LAB VISIT (OUTPATIENT)
Dept: LAB | Facility: HOSPITAL | Age: 49
End: 2024-10-28
Attending: STUDENT IN AN ORGANIZED HEALTH CARE EDUCATION/TRAINING PROGRAM
Payer: MEDICARE

## 2024-10-28 DIAGNOSIS — Z94.0 KIDNEY REPLACED BY TRANSPLANT: ICD-10-CM

## 2024-10-28 LAB
ALBUMIN SERPL BCP-MCNC: 3.7 G/DL (ref 3.5–5.2)
ANION GAP SERPL CALC-SCNC: 7 MMOL/L (ref 8–16)
BASOPHILS # BLD AUTO: 0.02 K/UL (ref 0–0.2)
BASOPHILS NFR BLD: 0.3 % (ref 0–1.9)
BUN SERPL-MCNC: 21 MG/DL (ref 6–20)
CALCIUM SERPL-MCNC: 9.8 MG/DL (ref 8.7–10.5)
CHLORIDE SERPL-SCNC: 103 MMOL/L (ref 95–110)
CO2 SERPL-SCNC: 28 MMOL/L (ref 23–29)
CREAT SERPL-MCNC: 1.5 MG/DL (ref 0.5–1.4)
DIFFERENTIAL METHOD BLD: ABNORMAL
EOSINOPHIL # BLD AUTO: 0.1 K/UL (ref 0–0.5)
EOSINOPHIL NFR BLD: 1.6 % (ref 0–8)
ERYTHROCYTE [DISTWIDTH] IN BLOOD BY AUTOMATED COUNT: 14.2 % (ref 11.5–14.5)
EST. GFR  (NO RACE VARIABLE): 43 ML/MIN/1.73 M^2
GLUCOSE SERPL-MCNC: 126 MG/DL (ref 70–110)
HCT VFR BLD AUTO: 39.4 % (ref 37–48.5)
HGB BLD-MCNC: 11.8 G/DL (ref 12–16)
IMM GRANULOCYTES # BLD AUTO: 0.02 K/UL (ref 0–0.04)
IMM GRANULOCYTES NFR BLD AUTO: 0.3 % (ref 0–0.5)
LYMPHOCYTES # BLD AUTO: 0.6 K/UL (ref 1–4.8)
LYMPHOCYTES NFR BLD: 9.9 % (ref 18–48)
MAGNESIUM SERPL-MCNC: 2 MG/DL (ref 1.6–2.6)
MCH RBC QN AUTO: 29.3 PG (ref 27–31)
MCHC RBC AUTO-ENTMCNC: 29.9 G/DL (ref 32–36)
MCV RBC AUTO: 98 FL (ref 82–98)
MONOCYTES # BLD AUTO: 0.8 K/UL (ref 0.3–1)
MONOCYTES NFR BLD: 12.6 % (ref 4–15)
NEUTROPHILS # BLD AUTO: 4.7 K/UL (ref 1.8–7.7)
NEUTROPHILS NFR BLD: 75.3 % (ref 38–73)
NRBC BLD-RTO: 0 /100 WBC
PHOSPHATE SERPL-MCNC: 3 MG/DL (ref 2.7–4.5)
PLATELET # BLD AUTO: 137 K/UL (ref 150–450)
PMV BLD AUTO: 11 FL (ref 9.2–12.9)
POTASSIUM SERPL-SCNC: 4.2 MMOL/L (ref 3.5–5.1)
RBC # BLD AUTO: 4.03 M/UL (ref 4–5.4)
SODIUM SERPL-SCNC: 138 MMOL/L (ref 136–145)
WBC # BLD AUTO: 6.26 K/UL (ref 3.9–12.7)

## 2024-10-28 PROCEDURE — 83735 ASSAY OF MAGNESIUM: CPT | Performed by: STUDENT IN AN ORGANIZED HEALTH CARE EDUCATION/TRAINING PROGRAM

## 2024-10-28 PROCEDURE — 80069 RENAL FUNCTION PANEL: CPT | Performed by: STUDENT IN AN ORGANIZED HEALTH CARE EDUCATION/TRAINING PROGRAM

## 2024-10-28 PROCEDURE — 85025 COMPLETE CBC W/AUTO DIFF WBC: CPT | Performed by: STUDENT IN AN ORGANIZED HEALTH CARE EDUCATION/TRAINING PROGRAM

## 2024-10-28 PROCEDURE — 80197 ASSAY OF TACROLIMUS: CPT | Performed by: STUDENT IN AN ORGANIZED HEALTH CARE EDUCATION/TRAINING PROGRAM

## 2024-10-28 PROCEDURE — 36415 COLL VENOUS BLD VENIPUNCTURE: CPT | Performed by: STUDENT IN AN ORGANIZED HEALTH CARE EDUCATION/TRAINING PROGRAM

## 2024-10-29 DIAGNOSIS — Z94.0 S/P KIDNEY TRANSPLANT: ICD-10-CM

## 2024-10-29 LAB — TACROLIMUS BLD-MCNC: 3 NG/ML (ref 5–15)

## 2024-10-29 RX ORDER — TACROLIMUS 1 MG/1
CAPSULE ORAL
Qty: 420 CAPSULE | Refills: 11 | Status: SHIPPED | OUTPATIENT
Start: 2024-10-29

## 2024-11-04 ENCOUNTER — LAB VISIT (OUTPATIENT)
Dept: LAB | Facility: HOSPITAL | Age: 49
End: 2024-11-04
Attending: STUDENT IN AN ORGANIZED HEALTH CARE EDUCATION/TRAINING PROGRAM
Payer: MEDICARE

## 2024-11-04 DIAGNOSIS — Z94.0 KIDNEY REPLACED BY TRANSPLANT: ICD-10-CM

## 2024-11-04 PROCEDURE — 36415 COLL VENOUS BLD VENIPUNCTURE: CPT | Performed by: STUDENT IN AN ORGANIZED HEALTH CARE EDUCATION/TRAINING PROGRAM

## 2024-11-04 PROCEDURE — 80197 ASSAY OF TACROLIMUS: CPT | Performed by: STUDENT IN AN ORGANIZED HEALTH CARE EDUCATION/TRAINING PROGRAM

## 2024-11-05 ENCOUNTER — TELEPHONE (OUTPATIENT)
Dept: TRANSPLANT | Facility: CLINIC | Age: 49
End: 2024-11-05
Payer: MEDICARE

## 2024-11-05 DIAGNOSIS — Z94.0 S/P KIDNEY TRANSPLANT: Primary | ICD-10-CM

## 2024-11-05 LAB — TACROLIMUS BLD-MCNC: 3.2 NG/ML (ref 5–15)

## 2024-11-05 NOTE — TELEPHONE ENCOUNTER
Pt did not make the previous dose change. No messages were received 2/2 phone being shut off.pt aware of change.     Labs Monday,      ----- Message from Randall Thomas MD sent at 11/5/2024 12:48 PM CST -----  Can't explain why her tac is not increasing. If true tac trough, increase dose from 4/3 to 5/4 and repeat in 1 week. Other labs acceptable.

## 2024-11-11 ENCOUNTER — LAB VISIT (OUTPATIENT)
Dept: LAB | Facility: HOSPITAL | Age: 49
End: 2024-11-11
Attending: STUDENT IN AN ORGANIZED HEALTH CARE EDUCATION/TRAINING PROGRAM
Payer: MEDICARE

## 2024-11-11 DIAGNOSIS — Z94.0 S/P KIDNEY TRANSPLANT: ICD-10-CM

## 2024-11-11 DIAGNOSIS — Z94.0 KIDNEY REPLACED BY TRANSPLANT: ICD-10-CM

## 2024-11-11 PROCEDURE — 36415 COLL VENOUS BLD VENIPUNCTURE: CPT | Performed by: STUDENT IN AN ORGANIZED HEALTH CARE EDUCATION/TRAINING PROGRAM

## 2024-11-11 PROCEDURE — 86665 EPSTEIN-BARR CAPSID VCA: CPT | Performed by: STUDENT IN AN ORGANIZED HEALTH CARE EDUCATION/TRAINING PROGRAM

## 2024-11-11 PROCEDURE — 86480 TB TEST CELL IMMUN MEASURE: CPT | Performed by: STUDENT IN AN ORGANIZED HEALTH CARE EDUCATION/TRAINING PROGRAM

## 2024-11-11 PROCEDURE — 80197 ASSAY OF TACROLIMUS: CPT | Performed by: STUDENT IN AN ORGANIZED HEALTH CARE EDUCATION/TRAINING PROGRAM

## 2024-11-12 ENCOUNTER — OFFICE VISIT (OUTPATIENT)
Facility: CLINIC | Age: 49
End: 2024-11-12
Payer: MEDICARE

## 2024-11-12 VITALS
BODY MASS INDEX: 37.51 KG/M2 | OXYGEN SATURATION: 100 % | HEART RATE: 108 BPM | SYSTOLIC BLOOD PRESSURE: 128 MMHG | TEMPERATURE: 98 F | HEIGHT: 66 IN | WEIGHT: 233.38 LBS | RESPIRATION RATE: 18 BRPM | DIASTOLIC BLOOD PRESSURE: 84 MMHG

## 2024-11-12 DIAGNOSIS — Z01.419 WOMEN'S ANNUAL ROUTINE GYNECOLOGICAL EXAMINATION: Primary | ICD-10-CM

## 2024-11-12 DIAGNOSIS — E11.59 TYPE 2 DIABETES MELLITUS WITH OTHER CIRCULATORY COMPLICATION, WITHOUT LONG-TERM CURRENT USE OF INSULIN: ICD-10-CM

## 2024-11-12 LAB
EBV VCA IGG SER QL IA: POSITIVE
GAMMA INTERFERON BACKGROUND BLD IA-ACNC: 0 IU/ML
M TB IFN-G CD4+ BCKGRND COR BLD-ACNC: 0.03 IU/ML
M TB IFN-G CD4+ BCKGRND COR BLD-ACNC: 0.04 IU/ML
MITOGEN IGNF BCKGRD COR BLD-ACNC: 0.6 IU/ML
TACROLIMUS BLD-MCNC: 7.3 NG/ML (ref 5–15)
TB GOLD PLUS: NEGATIVE

## 2024-11-12 PROCEDURE — 99999 PR PBB SHADOW E&M-EST. PATIENT-LVL V: CPT | Mod: PBBFAC,,,

## 2024-11-12 PROCEDURE — 99204 OFFICE O/P NEW MOD 45 MIN: CPT | Mod: S$GLB,,,

## 2024-11-12 PROCEDURE — 3008F BODY MASS INDEX DOCD: CPT | Mod: CPTII,S$GLB,,

## 2024-11-12 PROCEDURE — 1160F RVW MEDS BY RX/DR IN RCRD: CPT | Mod: CPTII,S$GLB,,

## 2024-11-12 PROCEDURE — 3079F DIAST BP 80-89 MM HG: CPT | Mod: CPTII,S$GLB,,

## 2024-11-12 PROCEDURE — 3074F SYST BP LT 130 MM HG: CPT | Mod: CPTII,S$GLB,,

## 2024-11-12 PROCEDURE — 1159F MED LIST DOCD IN RCRD: CPT | Mod: CPTII,S$GLB,,

## 2024-11-12 PROCEDURE — 3066F NEPHROPATHY DOC TX: CPT | Mod: CPTII,S$GLB,,

## 2024-11-12 PROCEDURE — 3044F HG A1C LEVEL LT 7.0%: CPT | Mod: CPTII,S$GLB,,

## 2024-11-12 RX ORDER — VALGANCICLOVIR 450 MG/1
TABLET, FILM COATED ORAL
COMMUNITY

## 2024-11-12 NOTE — PROGRESS NOTES
SUBJECTIVE     Chief Complaint   Patient presents with    Establish Care       HPI  Mary Waller is a 49 y.o. female with multiple medical diagnoses as listed in the medical history and problem list that presents for evaluation for establishing care.     History of Present Illness    CHIEF COMPLAINT:  Ms. Waller also presents for a referral to an OB/GYN for an annual women's wellness visit.    HPI:  Ms. Waller previously received care at [facility] but transitioned to [facility] after [facility] closed, including dialysis when required. Dr. Decker replaced Dr. South as her primary care physician. Her last appointment with Dr. Decker was in August, with a follow-up scheduled for next week or the end of the month.    Ms. Waller reports difficulties in establishing care with a female OB/GYN doctor. She expresses a preference for centralized medical care for convenience.    Regarding diabetes management, the patient is currently taking Trulicity 4.5mg and Jardiance. She reports fluctuating blood sugar, with the highest reading at 250 mg/dL and the lowest at 56 mg/dL during sleep. She notes more significant glucose metabolism during sleep compared to daytime activity. When her blood sugar dropped, she consumed juice to elevate it. Ms. Waller reports that Trulicity has eliminated her need for insulin, specifically Lantus.    Ms. Waller denies any other concerns or issues since her last visit with Dr. Mccall.    MEDICATIONS:  Ms. Waller is on Trulicity 4.5 mg and Jardiance for diabetes management.    MEDICAL HISTORY:  Ms. Waller has a history of diabetes and dialysis. An annual Women's Wellness visit is to be scheduled for the patient.    TEST RESULTS:  Ms. Waller reports home glucose readings, with the highest being 250 and the lowest 56.      ROS:  General: -fever, -chills, -fatigue, -weight gain, -weight loss  Eyes: -vision changes, -redness, -discharge  ENT: -ear pain, -nasal congestion, -sore throat  Cardiovascular:  -chest pain, -palpitations, -lower extremity edema  Respiratory: -cough, -shortness of breath  Gastrointestinal: -abdominal pain, -nausea, -vomiting, -diarrhea, -constipation, -blood in stool  Genitourinary: -dysuria, -hematuria, -frequency  Musculoskeletal: -joint pain, -muscle pain  Skin: -rash, -lesion  Neurological: -headache, -dizziness, -numbness, -tingling  Psychiatric: -anxiety, -depression, -sleep difficulty         PAST MEDICAL HISTORY:  Past Medical History:   Diagnosis Date    Acute blood loss anemia 11/30/2023    Bacteremia associated with intravascular line     Blind     Diabetes     ESRD (end stage renal disease) on dialysis     Hyperkalemia 11/29/2023    Hypertension     Hypokalemia 12/22/2023    Pre-transplant evaluation for kidney transplant 08/17/2023    Right cataract     Staphylococcus aureus bacteremia 01/06/2019       PAST SURGICAL HISTORY:  Past Surgical History:   Procedure Laterality Date    AV FISTULA PLACEMENT      EYE SURGERY      KIDNEY TRANSPLANT N/A 11/29/2023    Procedure: TRANSPLANT, KIDNEY;  Surgeon: Torri Moore MD;  Location: [facility] OR [address] [place] [address];  Service: Transplant;  Laterality: N/A;  Kidney in Room @ 0714  Out of Ice @ 0943  Reperfusion @ 1011       SOCIAL HISTORY:  Social History     Socioeconomic History    Marital status: Single     Spouse name: Bert Edouard   Tobacco Use    Smoking status: Never    Smokeless tobacco: Never   Substance and Sexual Activity    Alcohol use: No    Drug use: No    Sexual activity: Not Currently   Social History Narrative     Verified demographics, appt times, lab work including HIV and Hepatitis which is required for transplantation. Informed patient that a caregiver is required and to bring a light snack. Assessed for  needs. Noted patient does not require . Updated in Epic:  health/surgical history, allergies, and family history. Patient states understanding , given opportunity to ask questions and all questions  answered. Reminded patient to refer to appointment slips and education information sent previously.       Social Drivers of Health     Financial Resource Strain: Medium Risk (1/8/2024)    Overall Financial Resource Strain (CARDIA)     Difficulty of Paying Living Expenses: Somewhat hard   Food Insecurity: No Food Insecurity (1/8/2024)    Hunger Vital Sign     Worried About Running Out of Food in the Last Year: Never true     Ran Out of Food in the Last Year: Never true   Transportation Needs: No Transportation Needs (1/8/2024)    PRAPARE - Transportation     Lack of Transportation (Medical): No     Lack of Transportation (Non-Medical): No   Physical Activity: Sufficiently Active (1/8/2024)    Exercise Vital Sign     Days of Exercise per Week: 7 days     Minutes of Exercise per Session: 30 min   Stress: No Stress Concern Present (1/8/2024)    [employer] [place] [employer] - Occupational Stress Questionnaire     Feeling of Stress : Not at all   Housing Stability: Low Risk  (1/8/2024)    Housing Stability Vital Sign     Unable to Pay for Housing in the Last Year: No     Number of Places Lived in the Last Year: 1     Unstable Housing in the Last Year: No       FAMILY HISTORY:  Family History   Problem Relation Name Age of Onset    Diabetes Mother      Hyperlipidemia Mother      Diabetes Father      Hyperlipidemia Father      Diabetes Sister         ALLERGIES AND MEDICATIONS: updated and reviewed.  Review of patient's allergies indicates:   Allergen Reactions    Codeine Itching    Codeine sulfate Itching    Sulfa (sulfonamide antibiotics)     Sulfur Other (See Comments)     Reaction not specified.     Current Outpatient Medications   Medication Sig Dispense Refill    atropine 1% (ISOPTO ATROPINE) 1 % Drop Place 2 drops into the left eye 2 (two) times a day. 2 mL 6    atropine 1% (ISOPTO ATROPINE) 1 % Drop Place 1 drop into the left eye 2 (two) times a day. 5 mL 10    blood sugar diagnostic Strp 1 each by  "Misc.(Non-Drug; Combo Route) route 3 (three) times daily. 90 each 11    blood-glucose meter kit USE TO TEST BLOOD GLUCOSE 1 each 0    blood-glucose sensor (DEXCOM G7 SENSOR MISC) as directed      calcitRIOL (ROCALTROL) 0.25 MCG Cap Take 1 capsule (0.25 mcg total) by mouth once daily. 30 capsule 5    COMBIGAN 0.2-0.5 % Drop Place 1 drop into the right eye every evening.      dulaglutide (TRULICITY) 4.5 mg/0.5 mL pen injector 1 syringe Subcutaneous Once a week 30 days 4 Pen 3    ergocalciferol (VITAMIN D2) 50,000 unit Cap Take 1 capsule (50,000 Units total) by mouth every 7 days. Take 1 capsule by mouth weekly x 12 weeks, then decrease to once per month. 12 capsule 3    furosemide (LASIX) 20 MG tablet Take 2 tablets (40 mg total) by mouth once daily. 60 tablet 11    insulin glargine U-100, Lantus, (LANTUS SOLOSTAR U-100 INSULIN) 100 unit/mL (3 mL) SubQ InPn pen 10-14 units daily as direct by endocrinology 6 mL 11    labetaloL (NORMODYNE) 200 MG tablet Take 1 tablet (200 mg total) by mouth every 12 (twelve) hours. 180 tablet 3    lancets 17 gauge [facility] USE 1 LANCET IN VITRO TWICE A DAY for 30 DAYS      lancets Misc 1 each by Misc.(Non-Drug; Combo Route) route 3 (three) times daily. 90 each 11    Lmefol Ca-acetyl-meB12-algal (CEREFOLIN NAC, ALGAL OIL,) 6 mg-600 mg- 2 mg-90.314 mg Tab Take 1 tablet by mouth once daily.      magnesium oxide (MAG-OX) 400 mg (241.3 mg magnesium) tablet Take 2 tablets (800 mg total) by mouth 2 (two) times daily. 120 tablet 11    mycophenolate (CELLCEPT) 250 mg Cap Take 2 capsules (500 mg total) by mouth 2 (two) times daily. 120 capsule 11    pen needle, diabetic (EASY COMFORT PEN NEEDLES) 32 gauge x 5/32" Ndle Inject 1 each into the skin 3 (three) times daily. 100 each 11    potassium chloride SA (K-DUR,KLOR-CON) 20 MEQ tablet Take 2 tablets (40 mEq total) by mouth 2 (two) times daily. 120 tablet 11    predniSONE (DELTASONE) 5 MG tablet Take by mouth daily:  20mg 12/2-12/8, 15mg 12/9-12/15, " "10mg 12/16-12/22, then 5mg daily beginning 12/23/2023 70 tablet 11    rosuvastatin (CRESTOR) 40 MG Tab Take 40 mg by mouth every evening.      tacrolimus (PROGRAF) 1 MG Cap Take 4 capsules (4 mg total) by mouth every morning AND 3 capsules (3 mg total) every evening. 420 capsule 11    valGANciclovir (VALCYTE) 450 mg Tab 1 tablet Orally Once a day       No current facility-administered medications for this visit.       OBJECTIVE     Physical Exam  Vitals:    11/12/24 1109   BP: 128/84   Pulse: 108   Resp: 18   Temp: 97.5 °F (36.4 °C)    Body mass index is 38.24 kg/m².  Weight: 105.8 kg (233 lb 5.7 oz)   Height: 5' 5.5" (166.4 cm)     Physical Exam  Constitutional:       Appearance: Normal appearance.   HENT:      Right Ear: Tympanic membrane normal. There is no impacted cerumen.      Left Ear: Tympanic membrane normal. There is no impacted cerumen.      Nose: Nose normal. No congestion or rhinorrhea.      Mouth/Throat:      Mouth: Mucous membranes are moist.      Pharynx: Oropharynx is clear.   Eyes:      General:         Right eye: No discharge.         Left eye: No discharge.      Conjunctiva/sclera: Conjunctivae normal.   Cardiovascular:      Rate and Rhythm: Normal rate and regular rhythm.      Pulses: Normal pulses.      Heart sounds: Normal heart sounds.   Pulmonary:      Effort: Pulmonary effort is normal. No respiratory distress.      Breath sounds: Normal breath sounds. No wheezing.   Chest:      Chest wall: No tenderness.   Abdominal:      General: Bowel sounds are normal. There is no distension.      Palpations: Abdomen is soft.      Tenderness: There is no abdominal tenderness.   Musculoskeletal:         General: No swelling, tenderness or signs of injury. Normal range of motion.      Cervical back: Normal range of motion. No rigidity or tenderness.      Right lower leg: No edema.      Left lower leg: No edema.   Skin:     General: Skin is warm and dry.      Coloration: Skin is not pale.      Findings: No " erythema, lesion or rash.   Neurological:      Mental Status: She is alert and oriented to person, place, and time.   Psychiatric:         Mood and Affect: Mood normal.         Behavior: Behavior normal.           Health Maintenance         Date Due Completion Date    Cervical Cancer Screening Never done ---    Pneumococcal Vaccines (Age 0-64) (1 of 2 - PCV) Never done ---    Foot Exam Never done ---    TETANUS VACCINE Never done ---    Mammogram Never done ---    Eye Exam 12/29/2020 12/29/2019    COVID-19 Vaccine (3 - Moderna risk series) 05/12/2021 4/14/2021    Colorectal Cancer Screening Never done ---    Lipid Panel 08/17/2024 8/17/2023    Influenza Vaccine (1) 09/01/2024 9/29/2023    Hemoglobin A1c 01/11/2025 7/11/2024    RSV Vaccine (Age 60+ and Pregnant patients) (1 - 1-dose 75+ series) 01/19/2052 ---              ASSESSMENT     49 y.o. female with     1. Women's annual routine gynecological examination    2. Type 2 diabetes mellitus with other circulatory complication, without long-term current use of insulin        PLAN:     1. Women's annual routine gynecological examination  Due; Addressing  - Assessed patient's need for OB/GYN referral for annual women's wellness visit  - Ambulatory referral/consult to Gynecology; Future    2. Type 2 diabetes mellitus with other circulatory complication, without long-term current use of insulin  New; Addressing   -Reviewed current diabetes management, including Trulicity and Jardiance regimen  - Noted patient's report of home blood sugar ranging from 56 to 250  - Considered patient's transition from insulin to oral medications for diabetes management  Instructed patient to continued Trulicity 4.5 mg for diabetes management.  Instructed patient to continued Jardiance for diabetes management.    OB/GYN REFERRAL:  Referred patient to OB/GYN for annual women's wellness visit.    FOLLOW UP:  Follow up with provider if unable to get appointment with Dr. Decker or if any other  issues arise.  Contact the office if any issues arise.         RTC in 4 weeks if unable to get in with PCP     Brayden Oseguera DNP, APRN, FNP-BC  [facility]  11/20/2024 11:21 AM

## 2024-11-13 RX ORDER — DIPHENHYDRAMINE HYDROCHLORIDE 50 MG/ML
50 INJECTION INTRAMUSCULAR; INTRAVENOUS ONCE AS NEEDED
OUTPATIENT
Start: 2024-11-18

## 2024-11-13 RX ORDER — EPINEPHRINE 0.3 MG/.3ML
0.3 INJECTION SUBCUTANEOUS ONCE AS NEEDED
OUTPATIENT
Start: 2024-11-18

## 2024-11-20 ENCOUNTER — TELEPHONE (OUTPATIENT)
Dept: TRANSPLANT | Facility: HOSPITAL | Age: 49
End: 2024-11-20
Payer: MEDICARE

## 2024-11-20 NOTE — TELEPHONE ENCOUNTER
We already discussed switching to belatacept. Called patient to reeducate her on reason for switching to belatacept. Risks, benefits and patient has agreed to make the switch.    Randall Thomas Jr.

## 2024-11-21 ENCOUNTER — TELEPHONE (OUTPATIENT)
Dept: TRANSPLANT | Facility: CLINIC | Age: 49
End: 2024-11-21
Payer: MEDICARE

## 2024-11-21 DIAGNOSIS — Z94.0 S/P KIDNEY TRANSPLANT: ICD-10-CM

## 2024-11-21 NOTE — TELEPHONE ENCOUNTER
"Will need to r/s appt bc pt needs to be seen for her yearly     Labs Monday    Will f/u with sánchez regarding PA needed for [facility] and prednisone    Gurdeep Williamson [facility]-Kidney Transplant Clinical  Caller: Unspecified (Today,  8:26 AM)  Consult/Advisory:        Name Of Caller: Self     Contact Preference?:[phone]     What is the nature of the call?: Calling to speak w/ Emma in regards to her RX for predniSONE (DELTASONE) 5 MG tablet pt states she received a letter stating  that she needs a PA for the RX requesting call back     Additional Notes:  "Thank you for all that you do for our patients"    ----- Message from Rebecca Delcid sent at 11/20/2024  5:26 PM CST -----  Regarding: FW: Consult/Advisory  Contact: :Mary Waller    ----- Message -----  From: Shira Miner  Sent: 11/20/2024   3:47 PM CST  To: [facility]-Kidney Transplant Clinical  Subject: Consult/Advisory                                    Consult/Advisory     Name Of Caller:Mary Waller         Contact Preference:[phone]      Nature of call:Patient is calling to speak to Emma about appts that she has on 11/25. Requesting a  call back  "

## 2024-11-25 ENCOUNTER — LAB VISIT (OUTPATIENT)
Dept: LAB | Facility: HOSPITAL | Age: 49
End: 2024-11-25
Attending: STUDENT IN AN ORGANIZED HEALTH CARE EDUCATION/TRAINING PROGRAM
Payer: MEDICARE

## 2024-11-25 ENCOUNTER — INFUSION (OUTPATIENT)
Dept: INFUSION THERAPY | Facility: HOSPITAL | Age: 49
End: 2024-11-25
Payer: MEDICARE

## 2024-11-25 VITALS
SYSTOLIC BLOOD PRESSURE: 140 MMHG | RESPIRATION RATE: 20 BRPM | DIASTOLIC BLOOD PRESSURE: 78 MMHG | WEIGHT: 229.25 LBS | TEMPERATURE: 98 F | HEIGHT: 66 IN | HEART RATE: 91 BPM | BODY MASS INDEX: 36.84 KG/M2

## 2024-11-25 DIAGNOSIS — Z94.0 S/P KIDNEY TRANSPLANT: ICD-10-CM

## 2024-11-25 DIAGNOSIS — Z94.0 KIDNEY REPLACED BY TRANSPLANT: ICD-10-CM

## 2024-11-25 DIAGNOSIS — Z94.0 S/P KIDNEY TRANSPLANT: Primary | ICD-10-CM

## 2024-11-25 LAB
ALBUMIN SERPL BCP-MCNC: 3.7 G/DL (ref 3.5–5.2)
ANION GAP SERPL CALC-SCNC: 9 MMOL/L (ref 8–16)
BASOPHILS # BLD AUTO: 0.02 K/UL (ref 0–0.2)
BASOPHILS NFR BLD: 0.3 % (ref 0–1.9)
BUN SERPL-MCNC: 20 MG/DL (ref 6–20)
CALCIUM SERPL-MCNC: 9.7 MG/DL (ref 8.7–10.5)
CHLORIDE SERPL-SCNC: 101 MMOL/L (ref 95–110)
CO2 SERPL-SCNC: 29 MMOL/L (ref 23–29)
CREAT SERPL-MCNC: 1.4 MG/DL (ref 0.5–1.4)
DIFFERENTIAL METHOD BLD: ABNORMAL
EOSINOPHIL # BLD AUTO: 0.1 K/UL (ref 0–0.5)
EOSINOPHIL NFR BLD: 1.4 % (ref 0–8)
ERYTHROCYTE [DISTWIDTH] IN BLOOD BY AUTOMATED COUNT: 14 % (ref 11.5–14.5)
EST. GFR  (NO RACE VARIABLE): 46.7 ML/MIN/1.73 M^2
GLUCOSE SERPL-MCNC: 146 MG/DL (ref 70–110)
HCT VFR BLD AUTO: 40.8 % (ref 37–48.5)
HGB BLD-MCNC: 12.2 G/DL (ref 12–16)
IMM GRANULOCYTES # BLD AUTO: 0.01 K/UL (ref 0–0.04)
IMM GRANULOCYTES NFR BLD AUTO: 0.2 % (ref 0–0.5)
LYMPHOCYTES # BLD AUTO: 0.7 K/UL (ref 1–4.8)
LYMPHOCYTES NFR BLD: 11.6 % (ref 18–48)
MAGNESIUM SERPL-MCNC: 2 MG/DL (ref 1.6–2.6)
MCH RBC QN AUTO: 29.6 PG (ref 27–31)
MCHC RBC AUTO-ENTMCNC: 29.9 G/DL (ref 32–36)
MCV RBC AUTO: 99 FL (ref 82–98)
MONOCYTES # BLD AUTO: 0.8 K/UL (ref 0.3–1)
MONOCYTES NFR BLD: 12.4 % (ref 4–15)
NEUTROPHILS # BLD AUTO: 4.7 K/UL (ref 1.8–7.7)
NEUTROPHILS NFR BLD: 74.1 % (ref 38–73)
NRBC BLD-RTO: 0 /100 WBC
PHOSPHATE SERPL-MCNC: 3.2 MG/DL (ref 2.7–4.5)
PLATELET # BLD AUTO: 161 K/UL (ref 150–450)
PMV BLD AUTO: 11.9 FL (ref 9.2–12.9)
POTASSIUM SERPL-SCNC: 3.9 MMOL/L (ref 3.5–5.1)
RBC # BLD AUTO: 4.12 M/UL (ref 4–5.4)
SODIUM SERPL-SCNC: 139 MMOL/L (ref 136–145)
TACROLIMUS BLD-MCNC: 5.4 NG/ML (ref 5–15)
WBC # BLD AUTO: 6.3 K/UL (ref 3.9–12.7)

## 2024-11-25 PROCEDURE — 85025 COMPLETE CBC W/AUTO DIFF WBC: CPT | Performed by: STUDENT IN AN ORGANIZED HEALTH CARE EDUCATION/TRAINING PROGRAM

## 2024-11-25 PROCEDURE — 25000003 PHARM REV CODE 250: Performed by: STUDENT IN AN ORGANIZED HEALTH CARE EDUCATION/TRAINING PROGRAM

## 2024-11-25 PROCEDURE — 80069 RENAL FUNCTION PANEL: CPT | Performed by: STUDENT IN AN ORGANIZED HEALTH CARE EDUCATION/TRAINING PROGRAM

## 2024-11-25 PROCEDURE — 87799 DETECT AGENT NOS DNA QUANT: CPT | Performed by: STUDENT IN AN ORGANIZED HEALTH CARE EDUCATION/TRAINING PROGRAM

## 2024-11-25 PROCEDURE — 96365 THER/PROPH/DIAG IV INF INIT: CPT

## 2024-11-25 PROCEDURE — 80197 ASSAY OF TACROLIMUS: CPT | Performed by: STUDENT IN AN ORGANIZED HEALTH CARE EDUCATION/TRAINING PROGRAM

## 2024-11-25 PROCEDURE — 63600175 PHARM REV CODE 636 W HCPCS: Mod: JG | Performed by: STUDENT IN AN ORGANIZED HEALTH CARE EDUCATION/TRAINING PROGRAM

## 2024-11-25 PROCEDURE — 83735 ASSAY OF MAGNESIUM: CPT | Performed by: STUDENT IN AN ORGANIZED HEALTH CARE EDUCATION/TRAINING PROGRAM

## 2024-11-25 RX ORDER — DIPHENHYDRAMINE HYDROCHLORIDE 50 MG/ML
50 INJECTION INTRAMUSCULAR; INTRAVENOUS ONCE AS NEEDED
OUTPATIENT
Start: 2024-12-09

## 2024-11-25 RX ORDER — TACROLIMUS 1 MG/1
CAPSULE ORAL
Qty: 420 CAPSULE | Refills: 11 | Status: SHIPPED | OUTPATIENT
Start: 2024-11-25

## 2024-11-25 RX ORDER — DIPHENHYDRAMINE HYDROCHLORIDE 50 MG/ML
50 INJECTION INTRAMUSCULAR; INTRAVENOUS ONCE AS NEEDED
Status: DISCONTINUED | OUTPATIENT
Start: 2024-11-25 | End: 2024-11-25 | Stop reason: HOSPADM

## 2024-11-25 RX ORDER — EPINEPHRINE 0.3 MG/.3ML
0.3 INJECTION SUBCUTANEOUS ONCE AS NEEDED
Status: DISCONTINUED | OUTPATIENT
Start: 2024-11-25 | End: 2024-11-25 | Stop reason: HOSPADM

## 2024-11-25 RX ORDER — EPINEPHRINE 0.3 MG/.3ML
0.3 INJECTION SUBCUTANEOUS ONCE AS NEEDED
OUTPATIENT
Start: 2024-12-09

## 2024-11-25 RX ADMIN — DEXTROSE MONOHYDRATE 525 MG: 50 INJECTION, SOLUTION INTRAVENOUS at 11:11

## 2024-11-25 NOTE — PLAN OF CARE
Be[atacept infused via Rt arm  AC over 30 minutes  IV removed, pt. Left infusion suite ambulatory.

## 2024-11-25 NOTE — TELEPHONE ENCOUNTER
Message sent.   Labs Monday    ----- Message from Randall Thomas MD sent at 11/25/2024 12:14 PM CST -----  Twyla start today. If true tac trough, please increase dose from 4/3 to 4/4 and repeat in 2 weeks. Other labs acceptable.

## 2024-12-02 ENCOUNTER — LAB VISIT (OUTPATIENT)
Dept: LAB | Facility: HOSPITAL | Age: 49
End: 2024-12-02
Attending: STUDENT IN AN ORGANIZED HEALTH CARE EDUCATION/TRAINING PROGRAM
Payer: MEDICARE

## 2024-12-02 DIAGNOSIS — Z94.0 KIDNEY REPLACED BY TRANSPLANT: ICD-10-CM

## 2024-12-02 PROCEDURE — 36415 COLL VENOUS BLD VENIPUNCTURE: CPT | Performed by: STUDENT IN AN ORGANIZED HEALTH CARE EDUCATION/TRAINING PROGRAM

## 2024-12-02 PROCEDURE — 80197 ASSAY OF TACROLIMUS: CPT | Performed by: STUDENT IN AN ORGANIZED HEALTH CARE EDUCATION/TRAINING PROGRAM

## 2024-12-03 ENCOUNTER — DOCUMENTATION ONLY (OUTPATIENT)
Dept: TRANSPLANT | Facility: CLINIC | Age: 49
End: 2024-12-03
Payer: MEDICARE

## 2024-12-03 LAB — TACROLIMUS BLD-MCNC: 8.3 NG/ML (ref 5–15)

## 2024-12-03 NOTE — NURSING
Belatacept (Nulojix) Note:    Mary Waller  is a 49 y.o. female  S/p LEFT KIDNEY     transplant on 11/29/2023 (Kidney) for Diabetes Mellitus - Type II.      Patient was evaluated for belatacept conversion and deemed to be a suitable candidate.      Current Outpatient Medications   Medication Sig Dispense Refill    atropine 1% (ISOPTO ATROPINE) 1 % Drop Place 2 drops into the left eye 2 (two) times a day. 2 mL 6    atropine 1% (ISOPTO ATROPINE) 1 % Drop Place 1 drop into the left eye 2 (two) times a day. 5 mL 10    blood sugar diagnostic Strp 1 each by Misc.(Non-Drug; Combo Route) route 3 (three) times daily. 90 each 11    blood-glucose meter kit USE TO TEST BLOOD GLUCOSE 1 each 0    blood-glucose sensor (DEXCOM G7 SENSOR MISC) as directed      calcitRIOL (ROCALTROL) 0.25 MCG Cap Take 1 capsule (0.25 mcg total) by mouth once daily. 30 capsule 5    COMBIGAN 0.2-0.5 % Drop Place 1 drop into the right eye every evening.      dulaglutide (TRULICITY) 4.5 mg/0.5 mL pen injector 1 syringe Subcutaneous Once a week 30 days 4 Pen 3    ergocalciferol (VITAMIN D2) 50,000 unit Cap Take 1 capsule (50,000 Units total) by mouth every 7 days. Take 1 capsule by mouth weekly x 12 weeks, then decrease to once per month. 12 capsule 3    furosemide (LASIX) 20 MG tablet Take 2 tablets (40 mg total) by mouth once daily. 60 tablet 11    insulin glargine U-100, Lantus, (LANTUS SOLOSTAR U-100 INSULIN) 100 unit/mL (3 mL) SubQ InPn pen 10-14 units daily as direct by endocrinology 6 mL 11    labetaloL (NORMODYNE) 200 MG tablet Take 1 tablet (200 mg total) by mouth every 12 (twelve) hours. 180 tablet 3    lancets 17 gauge Misc USE 1 LANCET IN VITRO TWICE A DAY for 30 DAYS      lancets Misc 1 each by Misc.(Non-Drug; Combo Route) route 3 (three) times daily. 90 each 11    Lmefol Ca-acetyl-meB12-algal (CEREFOLIN NAC, ALGAL OIL,) 6 mg-600 mg- 2 mg-90.314 mg Tab Take 1 tablet by mouth once daily.      magnesium oxide (MAG-OX) 400 mg (241.3 mg  "magnesium) tablet Take 2 tablets (800 mg total) by mouth 2 (two) times daily. 120 tablet 11    mycophenolate (CELLCEPT) 250 mg Cap Take 2 capsules (500 mg total) by mouth 2 (two) times daily. 120 capsule 11    pen needle, diabetic (EASY COMFORT PEN NEEDLES) 32 gauge x 5/32" Ndle Inject 1 each into the skin 3 (three) times daily. 100 each 11    potassium chloride SA (K-DUR,KLOR-CON) 20 MEQ tablet Take 2 tablets (40 mEq total) by mouth 2 (two) times daily. 120 tablet 11    predniSONE (DELTASONE) 5 MG tablet Take by mouth daily:  20mg 12/2-12/8, 15mg 12/9-12/15, 10mg 12/16-12/22, then 5mg daily beginning 12/23/2023 70 tablet 11    rosuvastatin (CRESTOR) 40 MG Tab Take 40 mg by mouth every evening.      tacrolimus (PROGRAF) 1 MG Cap Take 4 capsules (4 mg total) by mouth every morning AND 4 capsules (4 mg total) every evening. 420 capsule 11    valGANciclovir (VALCYTE) 450 mg Tab 1 tablet Orally Once a day       No current facility-administered medications for this visit.           Assessment/Plan:    1) Current Immunosuppression: Tacrolimus 4/4, MMF 500mg BID, and Pred 5mg daily  Reason for conversion: Erratic Tacrolimus levels       2) EBV IgG status: Positive      Quantiferon Gold Status: Negative (date: 11/11/24)     3) Benefits verification:   Copay Estimate: $0  Patient receives drug from [employer] Access Support? No     4) Immunosuppression Plan:     Belatacept conversion with CNI withdrawal    Location/Infusion Provider: [facility]    Day 1   (12/10/24)   belatacept 5 mg/kg, continue tacrolimus and mycophenolate per protocol  Day 14 (12/24/24)       belatacept 5 mg/kg, continue mycophenolate per protocol, decrease tacrolimus by 50%  Day 28 (1/7/25)       belatacept 5 mg/kg, continue mycophenolate per protocol, decrease tacrolimus by an additional 50%  Day 42 (1/21/25)       belatacept 5 mg/kg, continue mycophenolate per protocol,   Day 56 (2/4/25)       belatacept 5 mg/kg, continue mycophenolate per protocol, (discontinue " tacrolimus )  Day 84 (3/4/25)       belatacept 5 mg/kg EVERY 28 DAYS, continue mycophenolate per protocol    Plan has been discussed with Mary who verbalized understanding and had the opportunity to ask questions.

## 2024-12-05 ENCOUNTER — OFFICE VISIT (OUTPATIENT)
Dept: OBSTETRICS AND GYNECOLOGY | Facility: CLINIC | Age: 49
End: 2024-12-05
Payer: MEDICARE

## 2024-12-05 VITALS
WEIGHT: 231.69 LBS | SYSTOLIC BLOOD PRESSURE: 175 MMHG | BODY MASS INDEX: 38.6 KG/M2 | HEIGHT: 65 IN | DIASTOLIC BLOOD PRESSURE: 81 MMHG

## 2024-12-05 DIAGNOSIS — Z30.09 ENCOUNTER FOR COUNSELING REGARDING CONTRACEPTION: ICD-10-CM

## 2024-12-05 DIAGNOSIS — Z01.419 WOMEN'S ANNUAL ROUTINE GYNECOLOGICAL EXAMINATION: Primary | ICD-10-CM

## 2024-12-05 DIAGNOSIS — Z12.31 ENCOUNTER FOR SCREENING MAMMOGRAM FOR MALIGNANT NEOPLASM OF BREAST: ICD-10-CM

## 2024-12-05 PROCEDURE — 99999 PR PBB SHADOW E&M-EST. PATIENT-LVL V: CPT | Mod: PBBFAC,,, | Performed by: STUDENT IN AN ORGANIZED HEALTH CARE EDUCATION/TRAINING PROGRAM

## 2024-12-05 RX ORDER — NORETHINDRONE 0.35 MG/1
1 TABLET ORAL DAILY
Qty: 84 TABLET | Refills: 3 | Status: SHIPPED | OUTPATIENT
Start: 2024-12-05 | End: 2025-12-05

## 2024-12-05 NOTE — PROGRESS NOTES
Subjective     Patient ID: Mary Waller is a 49 y.o. female.    Chief Complaint:  Annual Exam (Est. Care, no complaints)      History of Present Illness  48 yo G0 presents for WWE and establish care    LMP 11/10/24, not currently SA but would like to get on birth control. Declines STI screening today  Periods resumed a few months ago after her transplant. Last month was lighter than usual but still about monthly at the moment     Not aware of recent abnormal paps but history of HPV in the past   Denies abnormal mammogram  Reports normal mammogram in  and was told to repeat in 10 years         GYN & OB History  Patient's last menstrual period was 11/10/2024 (approximate).   Date of Last Pap: 2024    OB History    Para Term  AB Living   0 0 0 0 0 0   SAB IAB Ectopic Multiple Live Births   0 0 0 0 0       Review of Systems  Review of Systems   All other systems reviewed and are negative.         Objective   Physical Exam:   Constitutional: She appears well-developed and well-nourished.    HENT:   Head: Normocephalic and atraumatic.    Eyes: EOM are normal.      Pulmonary/Chest: Effort normal.        Abdominal: Soft.     Genitourinary:    Vagina, uterus, right adnexa and left adnexa normal.      Pelvic exam was performed with patient in the lithotomy position.   The external female genitalia was normal.   Genitalia hair distrobution normal .   There is no lesion on the right labia. There is no lesion on the left labia. Cervix is normal.    Genitourinary Comments: Female chaperone present for duration of exam             Musculoskeletal: Normal range of motion.       Neurological: She is alert.    Skin: Skin is warm and dry.    Psychiatric: She has a normal mood and affect.            Assessment and Plan     1. Women's annual routine gynecological examination    2. Encounter for screening mammogram for malignant neoplasm of breast    3. Encounter for counseling regarding contraception         Plan:  1. Women's annual routine gynecological examination (Primary)  - Pap and HPV done today.  - Screening tests as ordered.  - Diet and exercise encouraged.  Reviewed ASCCP Pap guidelines and screening recommendations.    Counseling: Contraception:progestin-only pill  Smoking  Health Screens: Mammography  Colonoscopy:discussed  Health Maintenance reviewed  Perimenopause/Menopause  - Ambulatory referral/consult to Gynecology  - Mammo Digital Screening Bilat; Future  - Liquid-Based Pap Smear, Screening  - HPV High Risk Genotypes, PCR    2. Encounter for screening mammogram for malignant neoplasm of breast  - Mammo Digital Screening Bilat; Future    3. Encounter for counseling regarding contraception  - reviewed medical comorbidities and CDC MEC, POP Cat 2 for solid organ transplant, T2DM with vascular complications, HTN.   - no hyperkalemia on most recent renal panels, consider checking after starting POP  - norethindrone (MICRONOR) 0.35 mg tablet; Take 1 tablet (0.35 mg total) by mouth once daily.  Dispense: 84 tablet; Refill: 3      Follow up in 1 year for WWE or sooner as needed    Trevor Payne III, MD  [facility] - OB GYN   [address]  [place] [id number] [phone]

## 2024-12-10 ENCOUNTER — TELEPHONE (OUTPATIENT)
Dept: TRANSPLANT | Facility: CLINIC | Age: 49
End: 2024-12-10
Payer: MEDICARE

## 2024-12-10 ENCOUNTER — INFUSION (OUTPATIENT)
Dept: INFUSION THERAPY | Facility: HOSPITAL | Age: 49
End: 2024-12-10
Payer: MEDICARE

## 2024-12-10 VITALS
WEIGHT: 235 LBS | RESPIRATION RATE: 20 BRPM | TEMPERATURE: 99 F | HEIGHT: 65 IN | HEART RATE: 79 BPM | SYSTOLIC BLOOD PRESSURE: 148 MMHG | BODY MASS INDEX: 39.15 KG/M2 | DIASTOLIC BLOOD PRESSURE: 71 MMHG

## 2024-12-10 DIAGNOSIS — Z94.0 S/P KIDNEY TRANSPLANT: Primary | ICD-10-CM

## 2024-12-10 PROCEDURE — 63600175 PHARM REV CODE 636 W HCPCS: Mod: JZ,JG | Performed by: STUDENT IN AN ORGANIZED HEALTH CARE EDUCATION/TRAINING PROGRAM

## 2024-12-10 PROCEDURE — 25000003 PHARM REV CODE 250: Performed by: STUDENT IN AN ORGANIZED HEALTH CARE EDUCATION/TRAINING PROGRAM

## 2024-12-10 PROCEDURE — 96365 THER/PROPH/DIAG IV INF INIT: CPT

## 2024-12-10 RX ORDER — DIPHENHYDRAMINE HYDROCHLORIDE 50 MG/ML
50 INJECTION INTRAMUSCULAR; INTRAVENOUS ONCE AS NEEDED
OUTPATIENT
Start: 2024-12-23

## 2024-12-10 RX ORDER — EPINEPHRINE 0.3 MG/.3ML
0.3 INJECTION SUBCUTANEOUS ONCE AS NEEDED
Status: DISCONTINUED | OUTPATIENT
Start: 2024-12-10 | End: 2024-12-10 | Stop reason: HOSPADM

## 2024-12-10 RX ORDER — DIPHENHYDRAMINE HYDROCHLORIDE 50 MG/ML
50 INJECTION INTRAMUSCULAR; INTRAVENOUS ONCE AS NEEDED
Status: DISCONTINUED | OUTPATIENT
Start: 2024-12-10 | End: 2024-12-10 | Stop reason: HOSPADM

## 2024-12-10 RX ORDER — EPINEPHRINE 0.3 MG/.3ML
0.3 INJECTION SUBCUTANEOUS ONCE AS NEEDED
OUTPATIENT
Start: 2024-12-23

## 2024-12-10 RX ADMIN — DEXTROSE 537.5 MG: 50 INJECTION, SOLUTION INTRAVENOUS at 11:12

## 2024-12-10 NOTE — TELEPHONE ENCOUNTER
Pt not realizing BROMFENAC CHEMA was her eye drops-will contact her eye drZain     ----- Message from [employer] sent at 12/10/2024 10:59 AM CST -----  Regarding: Consult/Advisory  Contact: Mary Waller     Consult/Advisory     Name Of Caller:Mary Waller         Contact Preference:[phone] (home) [phone] (work)      Nature of call:Patient is calling to speak to Emma with questions. No other info given. Requesting a call back

## 2024-12-11 DIAGNOSIS — Z94.0 S/P KIDNEY TRANSPLANT: ICD-10-CM

## 2024-12-11 RX ORDER — PREDNISONE 5 MG/1
5 TABLET ORAL DAILY
Qty: 30 TABLET | Refills: 11 | Status: SHIPPED | OUTPATIENT
Start: 2024-12-11

## 2024-12-20 ENCOUNTER — LAB VISIT (OUTPATIENT)
Dept: LAB | Facility: HOSPITAL | Age: 49
End: 2024-12-20
Attending: STUDENT IN AN ORGANIZED HEALTH CARE EDUCATION/TRAINING PROGRAM
Payer: MEDICARE

## 2024-12-20 DIAGNOSIS — Z94.0 KIDNEY REPLACED BY TRANSPLANT: ICD-10-CM

## 2024-12-20 LAB
ALBUMIN SERPL BCP-MCNC: 3.6 G/DL (ref 3.5–5.2)
ANION GAP SERPL CALC-SCNC: 11 MMOL/L (ref 8–16)
BASOPHILS # BLD AUTO: 0.02 K/UL (ref 0–0.2)
BASOPHILS NFR BLD: 0.3 % (ref 0–1.9)
BUN SERPL-MCNC: 22 MG/DL (ref 6–20)
CALCIUM SERPL-MCNC: 9.6 MG/DL (ref 8.7–10.5)
CHLORIDE SERPL-SCNC: 101 MMOL/L (ref 95–110)
CO2 SERPL-SCNC: 26 MMOL/L (ref 23–29)
CREAT SERPL-MCNC: 1.4 MG/DL (ref 0.5–1.4)
DIFFERENTIAL METHOD BLD: ABNORMAL
EOSINOPHIL # BLD AUTO: 0.1 K/UL (ref 0–0.5)
EOSINOPHIL NFR BLD: 1.1 % (ref 0–8)
ERYTHROCYTE [DISTWIDTH] IN BLOOD BY AUTOMATED COUNT: 13.8 % (ref 11.5–14.5)
EST. GFR  (NO RACE VARIABLE): 47 ML/MIN/1.73 M^2
GLUCOSE SERPL-MCNC: 174 MG/DL (ref 70–110)
HCT VFR BLD AUTO: 40.8 % (ref 37–48.5)
HGB BLD-MCNC: 11.9 G/DL (ref 12–16)
IMM GRANULOCYTES # BLD AUTO: 0.02 K/UL (ref 0–0.04)
IMM GRANULOCYTES NFR BLD AUTO: 0.3 % (ref 0–0.5)
LYMPHOCYTES # BLD AUTO: 0.7 K/UL (ref 1–4.8)
LYMPHOCYTES NFR BLD: 9.2 % (ref 18–48)
MCH RBC QN AUTO: 28.8 PG (ref 27–31)
MCHC RBC AUTO-ENTMCNC: 29.2 G/DL (ref 32–36)
MCV RBC AUTO: 99 FL (ref 82–98)
MONOCYTES # BLD AUTO: 0.8 K/UL (ref 0.3–1)
MONOCYTES NFR BLD: 10.9 % (ref 4–15)
NEUTROPHILS # BLD AUTO: 5.9 K/UL (ref 1.8–7.7)
NEUTROPHILS NFR BLD: 78.2 % (ref 38–73)
NRBC BLD-RTO: 0 /100 WBC
PHOSPHATE SERPL-MCNC: 3 MG/DL (ref 2.7–4.5)
PLATELET # BLD AUTO: 148 K/UL (ref 150–450)
PMV BLD AUTO: 10.9 FL (ref 9.2–12.9)
POTASSIUM SERPL-SCNC: 3.8 MMOL/L (ref 3.5–5.1)
RBC # BLD AUTO: 4.13 M/UL (ref 4–5.4)
SODIUM SERPL-SCNC: 138 MMOL/L (ref 136–145)
WBC # BLD AUTO: 7.54 K/UL (ref 3.9–12.7)

## 2024-12-20 PROCEDURE — 80069 RENAL FUNCTION PANEL: CPT | Performed by: STUDENT IN AN ORGANIZED HEALTH CARE EDUCATION/TRAINING PROGRAM

## 2024-12-20 PROCEDURE — 85025 COMPLETE CBC W/AUTO DIFF WBC: CPT | Performed by: STUDENT IN AN ORGANIZED HEALTH CARE EDUCATION/TRAINING PROGRAM

## 2024-12-20 PROCEDURE — 36415 COLL VENOUS BLD VENIPUNCTURE: CPT | Performed by: STUDENT IN AN ORGANIZED HEALTH CARE EDUCATION/TRAINING PROGRAM

## 2024-12-20 PROCEDURE — 80197 ASSAY OF TACROLIMUS: CPT | Performed by: STUDENT IN AN ORGANIZED HEALTH CARE EDUCATION/TRAINING PROGRAM

## 2024-12-21 LAB — TACROLIMUS BLD-MCNC: 6 NG/ML (ref 5–15)

## 2024-12-23 ENCOUNTER — INFUSION (OUTPATIENT)
Dept: INFUSION THERAPY | Facility: HOSPITAL | Age: 49
End: 2024-12-23
Attending: STUDENT IN AN ORGANIZED HEALTH CARE EDUCATION/TRAINING PROGRAM
Payer: MEDICARE

## 2024-12-23 VITALS
RESPIRATION RATE: 20 BRPM | DIASTOLIC BLOOD PRESSURE: 78 MMHG | HEART RATE: 91 BPM | WEIGHT: 229.75 LBS | SYSTOLIC BLOOD PRESSURE: 181 MMHG | BODY MASS INDEX: 38.28 KG/M2 | TEMPERATURE: 99 F | HEIGHT: 65 IN

## 2024-12-23 DIAGNOSIS — Z94.0 S/P KIDNEY TRANSPLANT: Primary | ICD-10-CM

## 2024-12-23 PROCEDURE — 63600175 PHARM REV CODE 636 W HCPCS: Mod: JG | Performed by: STUDENT IN AN ORGANIZED HEALTH CARE EDUCATION/TRAINING PROGRAM

## 2024-12-23 PROCEDURE — 25000003 PHARM REV CODE 250: Performed by: STUDENT IN AN ORGANIZED HEALTH CARE EDUCATION/TRAINING PROGRAM

## 2024-12-23 PROCEDURE — 96365 THER/PROPH/DIAG IV INF INIT: CPT

## 2024-12-23 RX ORDER — EPINEPHRINE 0.3 MG/.3ML
0.3 INJECTION SUBCUTANEOUS ONCE AS NEEDED
Status: DISCONTINUED | OUTPATIENT
Start: 2024-12-23 | End: 2024-12-23 | Stop reason: HOSPADM

## 2024-12-23 RX ORDER — DIPHENHYDRAMINE HYDROCHLORIDE 50 MG/ML
50 INJECTION INTRAMUSCULAR; INTRAVENOUS ONCE AS NEEDED
OUTPATIENT
Start: 2025-01-06

## 2024-12-23 RX ORDER — DIPHENHYDRAMINE HYDROCHLORIDE 50 MG/ML
50 INJECTION INTRAMUSCULAR; INTRAVENOUS ONCE AS NEEDED
Status: DISCONTINUED | OUTPATIENT
Start: 2024-12-23 | End: 2024-12-23 | Stop reason: HOSPADM

## 2024-12-23 RX ORDER — EPINEPHRINE 0.3 MG/.3ML
0.3 INJECTION SUBCUTANEOUS ONCE AS NEEDED
OUTPATIENT
Start: 2025-01-06

## 2024-12-23 RX ADMIN — DEXTROSE 525 MG: 50 INJECTION, SOLUTION INTRAVENOUS at 12:12

## 2024-12-27 ENCOUNTER — HOSPITAL ENCOUNTER (OUTPATIENT)
Dept: RADIOLOGY | Facility: HOSPITAL | Age: 49
Discharge: HOME | End: 2024-12-27
Attending: STUDENT IN AN ORGANIZED HEALTH CARE EDUCATION/TRAINING PROGRAM
Payer: MEDICARE

## 2024-12-27 DIAGNOSIS — Z12.31 ENCOUNTER FOR SCREENING MAMMOGRAM FOR MALIGNANT NEOPLASM OF BREAST: ICD-10-CM

## 2024-12-27 DIAGNOSIS — Z01.419 WOMEN'S ANNUAL ROUTINE GYNECOLOGICAL EXAMINATION: ICD-10-CM

## 2024-12-27 PROCEDURE — 77067 SCR MAMMO BI INCL CAD: CPT | Mod: TC

## 2025-01-06 ENCOUNTER — INFUSION (OUTPATIENT)
Dept: INFUSION THERAPY | Facility: HOSPITAL | Age: 50
End: 2025-01-06
Payer: MEDICARE

## 2025-01-06 VITALS
TEMPERATURE: 99 F | HEART RATE: 64 BPM | WEIGHT: 234.13 LBS | SYSTOLIC BLOOD PRESSURE: 178 MMHG | RESPIRATION RATE: 20 BRPM | DIASTOLIC BLOOD PRESSURE: 79 MMHG | BODY MASS INDEX: 39.01 KG/M2 | HEIGHT: 65 IN

## 2025-01-06 DIAGNOSIS — Z94.0 S/P KIDNEY TRANSPLANT: Primary | ICD-10-CM

## 2025-01-06 PROCEDURE — 96365 THER/PROPH/DIAG IV INF INIT: CPT

## 2025-01-06 PROCEDURE — 63600175 PHARM REV CODE 636 W HCPCS: Mod: TB | Performed by: STUDENT IN AN ORGANIZED HEALTH CARE EDUCATION/TRAINING PROGRAM

## 2025-01-06 PROCEDURE — 25000003 PHARM REV CODE 250: Performed by: STUDENT IN AN ORGANIZED HEALTH CARE EDUCATION/TRAINING PROGRAM

## 2025-01-06 RX ORDER — DIPHENHYDRAMINE HYDROCHLORIDE 50 MG/ML
50 INJECTION INTRAMUSCULAR; INTRAVENOUS ONCE AS NEEDED
OUTPATIENT
Start: 2025-01-20

## 2025-01-06 RX ORDER — EPINEPHRINE 0.3 MG/.3ML
0.3 INJECTION SUBCUTANEOUS ONCE AS NEEDED
Status: DISCONTINUED | OUTPATIENT
Start: 2025-01-06 | End: 2025-01-06 | Stop reason: HOSPADM

## 2025-01-06 RX ORDER — DIPHENHYDRAMINE HYDROCHLORIDE 50 MG/ML
50 INJECTION INTRAMUSCULAR; INTRAVENOUS ONCE AS NEEDED
Status: DISCONTINUED | OUTPATIENT
Start: 2025-01-06 | End: 2025-01-06 | Stop reason: HOSPADM

## 2025-01-06 RX ORDER — EPINEPHRINE 0.3 MG/.3ML
0.3 INJECTION SUBCUTANEOUS ONCE AS NEEDED
OUTPATIENT
Start: 2025-01-20

## 2025-01-06 RX ADMIN — DEXTROSE 525 MG: 50 INJECTION, SOLUTION INTRAVENOUS at 01:01

## 2025-01-09 ENCOUNTER — TELEPHONE (OUTPATIENT)
Dept: OBSTETRICS AND GYNECOLOGY | Facility: CLINIC | Age: 50
End: 2025-01-09
Payer: MEDICARE

## 2025-01-09 NOTE — TELEPHONE ENCOUNTER
----- Message from Trevor Payne MD sent at 1/9/2025  9:36 AM CST -----  Please let Mary Waller know her pap screening was normal, negative for any pre-cancerous changes. I recommend repeating in 3 years

## 2025-01-14 ENCOUNTER — TELEPHONE (OUTPATIENT)
Dept: HEPATOLOGY | Facility: CLINIC | Age: 50
End: 2025-01-14
Payer: MEDICARE

## 2025-01-14 NOTE — TELEPHONE ENCOUNTER
"Patient accepted appointment on 1/21/25 as scheduled      ----- Message from Gurdeep sent at 12/16/2024  9:25 AM CST -----  Regarding: call back  Consult/Advisory:        Name Of Caller: Self     Contact Preference?:[phone]     What is the nature of the call?: Calling to speak w/  someone in regards to rescheduling her appt requesting call back       Additional Notes:  "Thank you for all that you do for our patients"  "

## 2025-01-17 ENCOUNTER — TELEPHONE (OUTPATIENT)
Dept: INFUSION THERAPY | Facility: HOSPITAL | Age: 50
End: 2025-01-17
Payer: MEDICARE

## 2025-01-17 ENCOUNTER — TELEPHONE (OUTPATIENT)
Dept: TRANSPLANT | Facility: CLINIC | Age: 50
End: 2025-01-17
Payer: MEDICARE

## 2025-01-17 DIAGNOSIS — Z94.0 S/P KIDNEY TRANSPLANT: ICD-10-CM

## 2025-01-17 NOTE — TELEPHONE ENCOUNTER
Called patient to discuss rescheduling her 1/21/25 lab appointment in anticipation of inclement weather expected on that date. The patient's belatacept infusion appointment had already been moved from 1/21 to 1/23; however, the 11:30 am timing of that 11/23 appointment, which is based on the patient's transportation availability, does not coincide with the time she needs to have medication lab work drawn, which is around 0830. The patient asked if she could have lab work drawn on Friday, 1/24 at an [facility] location. Spoke with patient's coordinator Emma Odronez and she said the lab work could be drawn on 1/24. Notified patient that her lab appointment has been rebooked to 1/24 at 0830 at her preferred [facility] location. The patient verbalized her understanding.

## 2025-01-17 NOTE — TELEPHONE ENCOUNTER
Patient called to reschedule  her 1/21/2025 appointment due to expected bad weather. With patient approval infusion appointment rebooked to 1/23/2025

## 2025-01-19 DIAGNOSIS — Z94.0 S/P KIDNEY TRANSPLANT: ICD-10-CM

## 2025-01-19 RX ORDER — LANOLIN ALCOHOL/MO/W.PET/CERES
800 CREAM (GRAM) TOPICAL 2 TIMES DAILY
Qty: 120 TABLET | Refills: 0 | OUTPATIENT
Start: 2025-01-19 | End: 2026-01-20

## 2025-01-19 RX ORDER — LANOLIN ALCOHOL/MO/W.PET/CERES
800 CREAM (GRAM) TOPICAL 2 TIMES DAILY
Qty: 120 TABLET | Refills: 0 | Status: CANCELLED | OUTPATIENT
Start: 2025-01-19 | End: 2026-01-20

## 2025-01-21 ENCOUNTER — TELEPHONE (OUTPATIENT)
Dept: INFUSION THERAPY | Facility: HOSPITAL | Age: 50
End: 2025-01-21
Payer: MEDICARE

## 2025-01-21 NOTE — TELEPHONE ENCOUNTER
Called patient to discuss rescheduling her belatacept infusion appointment, currently scheduled on 1/23/25, due to inclement weather. Offered to reschedule the appointment to either Friday, 1/24 or Saturday, 1/25. The patient requested a Saturday appointment. Offered the 1000 am slot, and the patient accepted. Reminded her about her fasting lab appointment scheduled on Friday, 1/24 at the [facility]. The patient verbalized the correct information about her appointments and all questions at this time were answered to her satisfaction.

## 2025-01-25 ENCOUNTER — INFUSION (OUTPATIENT)
Dept: INFUSION THERAPY | Facility: HOSPITAL | Age: 50
End: 2025-01-25
Payer: MEDICARE

## 2025-01-25 VITALS
OXYGEN SATURATION: 99 % | WEIGHT: 234.81 LBS | BODY MASS INDEX: 39.12 KG/M2 | HEIGHT: 65 IN | HEART RATE: 92 BPM | TEMPERATURE: 99 F | RESPIRATION RATE: 18 BRPM

## 2025-01-25 DIAGNOSIS — Z94.0 S/P KIDNEY TRANSPLANT: Primary | ICD-10-CM

## 2025-01-25 PROCEDURE — 25000003 PHARM REV CODE 250: Performed by: STUDENT IN AN ORGANIZED HEALTH CARE EDUCATION/TRAINING PROGRAM

## 2025-01-25 PROCEDURE — 63600175 PHARM REV CODE 636 W HCPCS: Mod: JZ,TB | Performed by: STUDENT IN AN ORGANIZED HEALTH CARE EDUCATION/TRAINING PROGRAM

## 2025-01-25 PROCEDURE — 96365 THER/PROPH/DIAG IV INF INIT: CPT

## 2025-01-25 RX ORDER — EPINEPHRINE 0.3 MG/.3ML
0.3 INJECTION SUBCUTANEOUS ONCE AS NEEDED
Status: DISCONTINUED | OUTPATIENT
Start: 2025-01-25 | End: 2025-01-25 | Stop reason: HOSPADM

## 2025-01-25 RX ORDER — EPINEPHRINE 0.3 MG/.3ML
0.3 INJECTION SUBCUTANEOUS ONCE AS NEEDED
OUTPATIENT
Start: 2025-02-22

## 2025-01-25 RX ORDER — DIPHENHYDRAMINE HYDROCHLORIDE 50 MG/ML
50 INJECTION INTRAMUSCULAR; INTRAVENOUS ONCE AS NEEDED
OUTPATIENT
Start: 2025-02-22

## 2025-01-25 RX ORDER — DIPHENHYDRAMINE HYDROCHLORIDE 50 MG/ML
50 INJECTION INTRAMUSCULAR; INTRAVENOUS ONCE AS NEEDED
Status: DISCONTINUED | OUTPATIENT
Start: 2025-01-25 | End: 2025-01-25 | Stop reason: HOSPADM

## 2025-01-25 RX ADMIN — DEXTROSE MONOHYDRATE 537.5 MG: 50 INJECTION, SOLUTION INTRAVENOUS at 10:01

## 2025-01-28 ENCOUNTER — LAB VISIT (OUTPATIENT)
Dept: LAB | Facility: HOSPITAL | Age: 50
End: 2025-01-28
Attending: STUDENT IN AN ORGANIZED HEALTH CARE EDUCATION/TRAINING PROGRAM
Payer: MEDICARE

## 2025-01-28 DIAGNOSIS — Z94.0 KIDNEY REPLACED BY TRANSPLANT: ICD-10-CM

## 2025-01-28 LAB
ALBUMIN SERPL BCP-MCNC: 3.5 G/DL (ref 3.5–5.2)
ANION GAP SERPL CALC-SCNC: 10 MMOL/L (ref 8–16)
BASOPHILS # BLD AUTO: 0.02 K/UL (ref 0–0.2)
BASOPHILS NFR BLD: 0.3 % (ref 0–1.9)
BUN SERPL-MCNC: 27 MG/DL (ref 6–20)
CALCIUM SERPL-MCNC: 10 MG/DL (ref 8.7–10.5)
CHLORIDE SERPL-SCNC: 103 MMOL/L (ref 95–110)
CO2 SERPL-SCNC: 25 MMOL/L (ref 23–29)
CREAT SERPL-MCNC: 1.4 MG/DL (ref 0.5–1.4)
DIFFERENTIAL METHOD BLD: ABNORMAL
EOSINOPHIL # BLD AUTO: 0.1 K/UL (ref 0–0.5)
EOSINOPHIL NFR BLD: 0.8 % (ref 0–8)
ERYTHROCYTE [DISTWIDTH] IN BLOOD BY AUTOMATED COUNT: 14.2 % (ref 11.5–14.5)
EST. GFR  (NO RACE VARIABLE): 46 ML/MIN/1.73 M^2
GLUCOSE SERPL-MCNC: 104 MG/DL (ref 70–110)
HCT VFR BLD AUTO: 41.5 % (ref 37–48.5)
HGB BLD-MCNC: 12.2 G/DL (ref 12–16)
IMM GRANULOCYTES # BLD AUTO: 0.01 K/UL (ref 0–0.04)
IMM GRANULOCYTES NFR BLD AUTO: 0.1 % (ref 0–0.5)
LYMPHOCYTES # BLD AUTO: 0.8 K/UL (ref 1–4.8)
LYMPHOCYTES NFR BLD: 10.4 % (ref 18–48)
MCH RBC QN AUTO: 28.6 PG (ref 27–31)
MCHC RBC AUTO-ENTMCNC: 29.4 G/DL (ref 32–36)
MCV RBC AUTO: 97 FL (ref 82–98)
MONOCYTES # BLD AUTO: 0.8 K/UL (ref 0.3–1)
MONOCYTES NFR BLD: 11.4 % (ref 4–15)
NEUTROPHILS # BLD AUTO: 5.5 K/UL (ref 1.8–7.7)
NEUTROPHILS NFR BLD: 77 % (ref 38–73)
NRBC BLD-RTO: 0 /100 WBC
PHOSPHATE SERPL-MCNC: 2.6 MG/DL (ref 2.7–4.5)
PLATELET # BLD AUTO: 158 K/UL (ref 150–450)
PMV BLD AUTO: 11.2 FL (ref 9.2–12.9)
POTASSIUM SERPL-SCNC: 3.7 MMOL/L (ref 3.5–5.1)
RBC # BLD AUTO: 4.27 M/UL (ref 4–5.4)
SODIUM SERPL-SCNC: 138 MMOL/L (ref 136–145)
WBC # BLD AUTO: 7.2 K/UL (ref 3.9–12.7)

## 2025-01-28 PROCEDURE — 87799 DETECT AGENT NOS DNA QUANT: CPT | Performed by: STUDENT IN AN ORGANIZED HEALTH CARE EDUCATION/TRAINING PROGRAM

## 2025-01-28 PROCEDURE — 85025 COMPLETE CBC W/AUTO DIFF WBC: CPT | Performed by: STUDENT IN AN ORGANIZED HEALTH CARE EDUCATION/TRAINING PROGRAM

## 2025-01-28 PROCEDURE — 36415 COLL VENOUS BLD VENIPUNCTURE: CPT | Performed by: STUDENT IN AN ORGANIZED HEALTH CARE EDUCATION/TRAINING PROGRAM

## 2025-01-28 PROCEDURE — 80069 RENAL FUNCTION PANEL: CPT | Performed by: STUDENT IN AN ORGANIZED HEALTH CARE EDUCATION/TRAINING PROGRAM

## 2025-01-28 PROCEDURE — 80197 ASSAY OF TACROLIMUS: CPT | Performed by: STUDENT IN AN ORGANIZED HEALTH CARE EDUCATION/TRAINING PROGRAM

## 2025-01-29 LAB — TACROLIMUS BLD-MCNC: 6.9 NG/ML (ref 5–15)

## 2025-02-04 DIAGNOSIS — Z94.0 S/P KIDNEY TRANSPLANT: ICD-10-CM

## 2025-02-04 RX ORDER — LANOLIN ALCOHOL/MO/W.PET/CERES
800 CREAM (GRAM) TOPICAL 2 TIMES DAILY
Qty: 120 TABLET | Refills: 0 | OUTPATIENT
Start: 2025-02-04 | End: 2026-02-05

## 2025-02-13 DIAGNOSIS — Z94.0 KIDNEY REPLACED BY TRANSPLANT: ICD-10-CM

## 2025-02-13 DIAGNOSIS — Z94.0 S/P KIDNEY TRANSPLANT: ICD-10-CM

## 2025-02-13 RX ORDER — CALCITRIOL 0.25 UG/1
0.25 CAPSULE ORAL DAILY
Qty: 30 CAPSULE | Refills: 5 | Status: CANCELLED | OUTPATIENT
Start: 2025-02-13

## 2025-02-13 RX ORDER — ERGOCALCIFEROL 1.25 MG/1
50000 CAPSULE ORAL
Qty: 12 CAPSULE | Refills: 3 | Status: CANCELLED | OUTPATIENT
Start: 2025-02-13 | End: 2026-02-14

## 2025-02-13 RX ORDER — LANOLIN ALCOHOL/MO/W.PET/CERES
800 CREAM (GRAM) TOPICAL 2 TIMES DAILY
Qty: 120 TABLET | Refills: 0 | Status: CANCELLED | OUTPATIENT
Start: 2025-02-13 | End: 2026-02-14

## 2025-02-14 RX ORDER — LANOLIN ALCOHOL/MO/W.PET/CERES
800 CREAM (GRAM) TOPICAL 2 TIMES DAILY
Qty: 120 TABLET | Refills: 0 | Status: SHIPPED | OUTPATIENT
Start: 2025-02-14 | End: 2026-02-15

## 2025-02-14 RX ORDER — ERGOCALCIFEROL 1.25 MG/1
50000 CAPSULE ORAL
Qty: 12 CAPSULE | Refills: 3 | Status: SHIPPED | OUTPATIENT
Start: 2025-02-14 | End: 2026-02-15

## 2025-02-14 RX ORDER — CALCITRIOL 0.25 UG/1
0.25 CAPSULE ORAL DAILY
Qty: 30 CAPSULE | Refills: 5 | Status: SHIPPED | OUTPATIENT
Start: 2025-02-14

## 2025-02-20 NOTE — LETTER
2023    Mary Waller  [address] [place] [address] [place] [address]    Dear Mary Waller:  MRN: [id number]    Congratulations! On 2023, you were placed on  the waiting list for a  donor transplant.  Your candidacy for kidney transplant is based on the following criteria: Diabetes Mellitus type II.    Your transplant coordinator while on the waiting list is Do Tenorio RN. They can be reached at [phone] or [phone] with any questions.      What to do now?    Ask your living donors to call and begin testing  Give your donors our phone number, [phone]  Make sure your donors have your name and date of birth when they call  You will get transplanted much faster if you have a living donor    Have your blood sent to our Transplant Lab every month  If you are on dialysis - our Transplant Lab will work with your dialysis unit to send your blood every month  If you are not on dialysis   If you live near an [facility] lab, we will schedule you to have blood drawn every month  If you do not live near an [facility] lab, you will be sent blood kits in the mail. You will need to take a kit to your local lab or doctor to have your blood drawn every month and mail to the Transplant Lab.     Call us with ANY CHANGES  Phone numbers - we must be able to reach you anytime of the day or night when a kidney is available  Address  Insurance coverage  Dialysis unit or kidney doctor  [place]: if you have surgery, stay in the hospital, have to get blood, or have an infection    Review your Kidney/Kidney-Pancreas Transplant Guide   This will give you detailed information about what happens when  you are on the waiting list   you are called when a kidney is available    The [facility] has a transplant surgeon and physician available 365 days a year, 24 hours a day to coordinate organ acceptance, procurement, surgical placement and to address urgent patient care issues.  You will be   OB Admission H&P    Assessment/Plan    Kim Larson is a 27 y.o.  at 38w4d, CHUY: 3/2/2025, by Last Menstrual Period, who is admitted for Labor.    Plan   -Admit to L&D, consented  -T&S, CBC, and Syphilis  -Epidural at patient request  -Recheck as clinically indicated by maternal or fetal status    Fetal Status  -NST reactive, reassuring   -Presentation vertex based on vaginal exam  -EFW 49%ile by US 25  -GBS positive, for PCN    Pregnancy Problems:  No glucose testing  Rubella equivocal  HSV--on suppression, no lesions on exam      Subjective   Pt presents to triage with complaint of increased contractions tonight, reports she is feeling them with increasing intensity every 4-5 minutes. Was 2cm in the office earlier today when her membranes were stripped. Denies leaking, no bleeding, good movement.    Prenatal Provider LOG    OB History    Para Term  AB Living   4 2 2 0 1 0   SAB IAB Ectopic Multiple Live Births   1 0 0 0 0      # Outcome Date GA Lbr Gino/2nd Weight Sex Type Anes PTL Lv   4 Current            3 SAB 2024           2 Term 23           1 Term 01/10/21               No past surgical history on file.    Social History     Tobacco Use    Smoking status: Former     Types: Cigarettes    Smokeless tobacco: Never   Substance Use Topics    Alcohol use: Not Currently     Comment: socially       No Known Allergies    Medications Prior to Admission   Medication Sig Dispense Refill Last Dose/Taking    prenatal vitamin, iron-folic, (prenatal vit no.130-iron-folic) 27 mg iron-800 mcg folic acid tablet Take 1 tablet by mouth once daily. 90 tablet 0 Past Week    valACYclovir (Valtrex) 500 mg tablet Take 1 tablet (500 mg) by mouth 2 times a day.   2025    dicyclomine (Bentyl) 10 mg capsule Take 1 capsule (10 mg) by mouth 4 times a day as needed (Pain). (Patient not taking: Reported on 2025) 20 tablet 0 More than a month     Objective     Last Vitals  Temp Pulse Resp BP MAP  O2 Sat   36.7 °C (98.1 °F) 77 20 113/58 79 97 %     Blood Pressures         2/20/2025  0125 2/20/2025  0127          BP: 113/58 --               Physical Exam  General: NAD, mood appropriate  Cardiopulmonary: warm and well perfused, breathing comfortably on room air  Abdomen: Gravid, non-tender  Extremities: Symmetric  Cervix: 4 /70 /-1, changed from 2/70/-1 on observation  No lesions on exam       Fetal Monitoring  Baseline: 120 bpm, Variability: moderate,  Accelerations: present and Decelerations: none  Uterine Activity: q5 minutes  Interpretation: Category 1           notified in writing of any changes to our Transplant Centers staffing plan that would impact your ability to receive a transplant.    Attached is a letter from the [employer] Organ [employer]aring (UNOS). It describes the services and information offered to patients by UN[employer] and the Organ Procurement and Transplant Network. We look forward to working with you while on the waiting list.     We would like to inform you of an important OPTN (Organ Procurement and Transplant Network) Policy change that may affect the waiting time for some candidates on our waiting list.     Waiting time is important in identifying who receives offers for kidneys. A long wait time may increase your chance of getting an offer. Wait time is based on a test called eGFR that tells how well your kidneys are working. Wait time could also be based on how long you have been on dialysis. Government and health officials have changed the way this test is used. Before this year, hospitals used an eGFR that would include your race. For Black or  Americans, this eGFR could have shown that their kidneys were working better than they were.    Because of this change, we are looking at everyones record and assessing waiting time for people who are eligible. We will be reviewing everyones medical records and will contact you if you are eligible.     Who can I talk to if I have a question?  You can contact us if you have questions or send a message through [facility].     Please give us time to answer your questions. We are working on this for many patients.    How can I learn more about eGFR and this policy change?  Go to OPTN website > Patients > Kidney > FAQ: Understanding race-neutral eGFR calculations  Full URL: https://optn.transplant.hrsa.gov/patients/by-organ/kidney/understanding-the-proposal-to-require-race-neutral-egfr-calculations/  Call the Organ Procurement and Transplantation Network (OPTN) toll-free patient services line at  2-835-867-2325    Congratulations,    Your Transplant Partner  [facility]   [facility] [place] [address]  [phone]  lh/enclosure  CC: Aries Bourne MD         [facility]                                                     The Organ Procurement and Transplantation Network   Toll-free patient services line: [phone]  Your resource for organ transplant information      Staffed 8:30 am - 5:00 pm ET Monday - Friday   Leave a message 24/7 to receive a call back    The Organ Procurement and Transplantation Network (OPTN) is the national transplant system. It makes the policies that decide how donated organs are matched to patients waiting for a transplant. The OPTN:    Makes sure donated organs get matched to people on the transplant waiting list  Tells people about the donation and transplant processes  Makes sure that the public knows about the need for more organ and tissue donations    The OPTN has a free patient services line that you can call to:  Get more information about:   o Organ donation and organ transplants   o Donation and transplant policies  Get an information kit with:   o A list of transplant hospitals   o Waiting list information  Talk about any questions you may have about your transplant hospital or organ procurement organization. The staff will do their best to help you or point you to others who may help.  Find out how you can volunteer with the OPTN and help shape transplant policy    The patient services line number is: 5-453-343-1356    Patient services line staff CANNOT answer questions about your own medical care, including:  Waiting list status  Test results  Medical records  You will need to call your transplant hospital for this information.    The following websites have more information about transplantation and donation:  OPTN: https://optn.transplant.[facility].gov/  For potential living donors and transplant  recipients:   o Living with transplant: https://www.transplantliving.org/   o Living donation process: https://optn.transplant.hrsa.gov/living-donation/     o Financial assistance: https://www.livingdonorassistance.org/  Transplantation data: https://www.srtr.org/  Organ donation: https://www.organdonor.gov/    Volunteer with the OPTN: https://optn.transplant.hrsa.gov/get-involved

## 2025-02-24 ENCOUNTER — INFUSION (OUTPATIENT)
Dept: INFUSION THERAPY | Facility: HOSPITAL | Age: 50
End: 2025-02-24
Payer: MEDICARE

## 2025-02-24 VITALS
WEIGHT: 233 LBS | HEIGHT: 65 IN | SYSTOLIC BLOOD PRESSURE: 172 MMHG | OXYGEN SATURATION: 100 % | DIASTOLIC BLOOD PRESSURE: 71 MMHG | BODY MASS INDEX: 38.82 KG/M2 | HEART RATE: 101 BPM | TEMPERATURE: 98 F | RESPIRATION RATE: 18 BRPM

## 2025-02-24 DIAGNOSIS — Z94.0 S/P KIDNEY TRANSPLANT: Primary | ICD-10-CM

## 2025-02-24 PROCEDURE — 25000003 PHARM REV CODE 250: Performed by: STUDENT IN AN ORGANIZED HEALTH CARE EDUCATION/TRAINING PROGRAM

## 2025-02-24 PROCEDURE — 96365 THER/PROPH/DIAG IV INF INIT: CPT

## 2025-02-24 PROCEDURE — 63600175 PHARM REV CODE 636 W HCPCS: Mod: JZ,TB | Performed by: STUDENT IN AN ORGANIZED HEALTH CARE EDUCATION/TRAINING PROGRAM

## 2025-02-24 RX ORDER — DIPHENHYDRAMINE HYDROCHLORIDE 50 MG/ML
50 INJECTION INTRAMUSCULAR; INTRAVENOUS ONCE AS NEEDED
Status: DISCONTINUED | OUTPATIENT
Start: 2025-02-24 | End: 2025-02-24 | Stop reason: HOSPADM

## 2025-02-24 RX ORDER — DIPHENHYDRAMINE HYDROCHLORIDE 50 MG/ML
50 INJECTION INTRAMUSCULAR; INTRAVENOUS ONCE AS NEEDED
OUTPATIENT
Start: 2025-03-22

## 2025-02-24 RX ORDER — EPINEPHRINE 0.3 MG/.3ML
0.3 INJECTION SUBCUTANEOUS ONCE AS NEEDED
OUTPATIENT
Start: 2025-03-22

## 2025-02-24 RX ORDER — EPINEPHRINE 0.3 MG/.3ML
0.3 INJECTION SUBCUTANEOUS ONCE AS NEEDED
Status: DISCONTINUED | OUTPATIENT
Start: 2025-02-24 | End: 2025-02-24 | Stop reason: HOSPADM

## 2025-02-24 RX ADMIN — DEXTROSE MONOHYDRATE 525 MG: 50 INJECTION, SOLUTION INTRAVENOUS at 12:02

## 2025-03-02 DIAGNOSIS — E87.6 HYPOKALEMIA: Primary | ICD-10-CM

## 2025-03-02 DIAGNOSIS — Z94.0 KIDNEY REPLACED BY TRANSPLANT: ICD-10-CM

## 2025-03-02 RX ORDER — POTASSIUM CHLORIDE 20 MEQ/1
40 TABLET, EXTENDED RELEASE ORAL 2 TIMES DAILY
Qty: 120 TABLET | Refills: 11 | Status: CANCELLED | OUTPATIENT
Start: 2025-03-02 | End: 2026-03-03

## 2025-03-03 DIAGNOSIS — Z94.0 KIDNEY REPLACED BY TRANSPLANT: ICD-10-CM

## 2025-03-03 RX ORDER — POTASSIUM CHLORIDE 20 MEQ/1
20 TABLET, EXTENDED RELEASE ORAL 2 TIMES DAILY
Qty: 60 TABLET | Refills: 3 | OUTPATIENT
Start: 2025-03-03

## 2025-03-03 RX ORDER — POTASSIUM CHLORIDE 1500 MG/1
40 TABLET, EXTENDED RELEASE ORAL 2 TIMES DAILY
Qty: 120 TABLET | Refills: 1 | Status: SHIPPED | OUTPATIENT
Start: 2025-03-03

## 2025-03-03 NOTE — TELEPHONE ENCOUNTER
"----- Message from Jm sent at 3/3/2025 10:18 AM CST -----  Refill RequestName Of Caller: SelfContact Preference?: [phone] Provider: Sohan Hughes is the nature of the call?: Requesting refill for potassium chloride SA (K-DUR,KLOR-CON) 20 [facility]Q [facility]EL[place] [address]Cqwbl: [phone] Fax: [phone] Notes: Stating the matter is urgent because is currently out of this medication since yesterday"Thank you for all that you do for our patients"  "

## 2025-03-03 NOTE — TELEPHONE ENCOUNTER
----- Message from Marcell sent at 3/3/2025  8:13 AM CST -----  Regarding: Consult/Advisory  Contact: [phone]  Consult/Advisory Name Of Caller: Mary  Contact Preference:  [phone] Nature of call: Pt is calling because she states she needs a refill on her potassium pills as soon as possible. States she is out and can't wait till the end of the day to get them filled because she needs to take them as soon as possible.[facility] Phone: [phone]: [phone]

## 2025-03-10 DIAGNOSIS — Z94.0 KIDNEY REPLACED BY TRANSPLANT: Primary | ICD-10-CM

## 2025-03-18 ENCOUNTER — TELEPHONE (OUTPATIENT)
Dept: TRANSPLANT | Facility: CLINIC | Age: 50
End: 2025-03-18
Payer: MEDICARE

## 2025-03-18 DIAGNOSIS — Z94.0 S/P KIDNEY TRANSPLANT: ICD-10-CM

## 2025-03-18 NOTE — TELEPHONE ENCOUNTER
Tac halfed 2/2 onur. Labs Monday.       Patient should have been following this taper:     Day 1   (12/10/24)          belatacept 5 mg/kg, continue tacrolimus and mycophenolate per protocol   Day 14 (12/24/24)       belatacept 5 mg/kg, continue mycophenolate per protocol, decrease tacrolimus by 50%   Day 28 (1/7/25)       belatacept 5 mg/kg, continue mycophenolate per protocol, decrease tacrolimus by an additional 50%   Day 42 (1/21/25)       belatacept 5 mg/kg, continue mycophenolate per protocol,   Day 56 (2/4/25)       belatacept 5 mg/kg, continue mycophenolate per protocol, (discontinue tacrolimus )   Day 84 (3/4/25)       belatacept 5 mg/kg EVERY 28 DAYS, continue mycophenolate per protocol     ----- Message from Rebecca Delcid sent at 3/10/2025  5:48 PM CDT -----  Regarding: FW: Scheduling request  Contact: JEANNE PHILLIP    ----- Message -----  From: Elodia Resendez  Sent: 3/10/2025  11:42 AM CDT  To: William LIZARRAGA Staff  Subject: Scheduling request                               SCHEDULING/REQUEST  Appt Type: EPDate/Time Preference:Treating Provider:  Ivette Name: JEANNE PHILLIP Contact Preference:  [phone]/Notes:  Dr. Thomas has perform transplant surgery on pt  and she is requesting an appt to establish care with him.  Please schedule pt and send lab orders to [facility] location.

## 2025-03-19 RX ORDER — TACROLIMUS 1 MG/1
CAPSULE ORAL
Qty: 420 CAPSULE | Refills: 11 | Status: SHIPPED | OUTPATIENT
Start: 2025-03-19

## 2025-03-20 PROBLEM — I25.10 CORONARY ARTERY DISEASE INVOLVING NATIVE CORONARY ARTERY OF NATIVE HEART WITHOUT ANGINA PECTORIS: Status: ACTIVE | Noted: 2023-08-17

## 2025-03-20 PROBLEM — E78.5 HYPERLIPIDEMIA ASSOCIATED WITH TYPE 2 DIABETES MELLITUS: Status: ACTIVE | Noted: 2025-03-20

## 2025-03-20 PROBLEM — E11.69 HYPERLIPIDEMIA ASSOCIATED WITH TYPE 2 DIABETES MELLITUS: Status: ACTIVE | Noted: 2025-03-20

## 2025-03-20 NOTE — PROGRESS NOTES
General Cardiology Clinic Note  Last Clinic Visit: 4/9/24 with Dr. Rosa   General Cardiologist: Dr. Rosa    HPI:     Mary Waller is a 50 y.o. , who presents for annual visit.    PROBLEM LIST:  CAD   - s/p PCI to LAD (2019 at [facility])  HTN  HLD  DM2  ESRD s/p renal transplant 11/29/23    Interval HPI:   She presents today for her annual visit. Doing overall well from a cardiovascular standpoint. BP is elevated in clinic today, but she monitors at home -- this morning was 117/65. She does a lot of walking, feels well while doing so. Denies chest pain/discomfort, new/worsening SAENZ, sustained palpitations, PND/orthopnea, edema, lightheadedness or syncope, or changes in exertional capacity. Weight is stable. She had a lipid panel checked by her PCP last month (visible in Care Everywhere) and her LDL was 102. She currently takes rosuvastatin 40mg daily. She continues to follow closely with her transplant team, and her kidney function has been stable.    4/2024 HPI (Dr. Rosa)  Presenting today for follow-up. Last seen 1/2024 two months after renal transplant. Started back on Labetalol since last visit with better HR and BP. She keeps a diligent log of her HR and BP at home. HR 80-90s and -140s. No angina or heart failure symptoms. No bleeding or falls. Only taking Labetalol once daily though most days because she says her bP has been low. No low BP documented in her journal (<100/60).      1/2024 HPI (Dr. Rosa)  Patient first seen for cardiac risk stratification for possible kidney transplantation. Since transplant she was taken off Labetalol for some reason. She has been having palpitations when exerting herself or bending over frequently. They typically last minutes before easing off spontaneously. No angina or heart failure. Admitted at [facility] 3 weeks ago for this. Work-up revealed sinus tachycardia, but no clear source identified. K 2.7 and Cr 1.6-1.8, but no other metabolic derangement  noted off from baseline. No syncope, falls. No bleeding. BP elevated, but not being treated.     10/2023 HPI (Dr. Rosa)  Patient presenting cardiac risk stratification for possible kidney transplantation. Today, feels well. On HD MWF via [facility] AVF for 6 years. ESRD due to HTN, DM2. Had a stent placed at [facility] reportedly 4 years ago, but no record of this. Also, had a nuclear stress test earlier this year at [facility] reportedly. No bleeding noted on ASA. BP low sometimes during HD, but always completes her sessions. Walks with her sister frequently and can walk 2 flights of stairs without issue.   Per transplant nephrology notes:  48 y.o. female with a history of renal disease presents for pre-kidney transplant evaluation.  She has a history of MSSA bacteremia in 2019 treated with ancef for planned 6 weeks. At that time, TTE with possible mitral vavle vegetations. JEROME w/ Evidence of necrotic (caseous) lesions on the mitral valve. Cannot rule out vegetation. Per notes, there was no follow up at completion of therapy.  Patient reports completing therapy and denies infectious issues since that time.  Reports only hardware in body is vascular stents.        Surgical: Reviewed, as below.  Family: Reviewed, as below.   Social: Reviewed, as below.    ROS:    Pertinent ROS included in HPI and below.  PMH:     Past Medical History:   Diagnosis Date    Acute blood loss anemia 11/30/2023    Bacteremia associated with intravascular line     Blind     Coronary artery disease involving native coronary artery of native heart without angina pectoris 8/17/2023    Diabetes     ESRD (end stage renal disease) on dialysis     Hyperkalemia 11/29/2023    Hyperlipidemia associated with type 2 diabetes mellitus 3/20/2025    Hypertension     Hypokalemia 12/22/2023    Pre-transplant evaluation for kidney transplant 08/17/2023    Right cataract     Staphylococcus aureus bacteremia 01/06/2019     Past Surgical History:   Procedure Laterality Date  "   AV FISTULA PLACEMENT      EYE SURGERY      KIDNEY TRANSPLANT N/A 11/29/2023    Procedure: TRANSPLANT, KIDNEY;  Surgeon: Torri Moore MD;  Location: [facility] OR [address] [place] [address];  Service: Transplant;  Laterality: N/A;  Kidney in Room @ 0714  Out of Ice @ 0943  Reperfusion @ 1011     Allergies:     Review of patient's allergies indicates:   Allergen Reactions    Codeine Itching    Codeine sulfate Itching    Sulfa (sulfonamide antibiotics)     Sulfur Other (See Comments)     Reaction not specified.     Medications:   Medications Ordered Prior to Encounter[1]  Social History:     Social History     Tobacco Use    Smoking status: Never    Smokeless tobacco: Never   Substance Use Topics    Alcohol use: No     Family History:     Family History   Problem Relation Name Age of Onset    Diabetes Mother      Hyperlipidemia Mother      Diabetes Father      Hyperlipidemia Father      Diabetes Sister      Colon cancer Maternal Grandmother      Breast cancer Neg Hx      Ovarian cancer Neg Hx       Physical Exam:   BP (!) 188/88 (Patient Position: Sitting)   Pulse 92   Ht 5' 5" (1.651 m)   Wt 105.7 kg (233 lb 0.4 oz)   SpO2 100%   BMI 38.78 kg/m²      Physical Exam  Constitutional:       Appearance: Normal appearance.   Neck:      Vascular: No carotid bruit.   Cardiovascular:      Rate and Rhythm: Normal rate and regular rhythm.      Pulses:           Carotid pulses are 2+ on the right side and 2+ on the left side.       Radial pulses are 2+ on the right side and 2+ on the left side.      Heart sounds: Normal heart sounds.   Pulmonary:      Effort: Pulmonary effort is normal.      Breath sounds: Normal breath sounds.   Musculoskeletal:      Right lower leg: No edema.      Left lower leg: No edema.   Skin:     General: Skin is warm and dry.   Neurological:      Mental Status: She is alert.          Labs:     Blood Tests:  Lab Results   Component Value Date    BNP 48 12/21/2023     01/28/2025    K 3.7 01/28/2025     " 01/28/2025     12/06/2023    CO2 25 01/28/2025    BUN 27 (H) 01/28/2025    CREATININE 1.4 01/28/2025     01/28/2025    HGBA1C 7.2 (H) 03/24/2025    HGBA1C 6.9 (H) 07/11/2024    MG 2.0 11/25/2024    AST 9 (L) 01/29/2024    ALT 9 (L) 01/29/2024    ALBUMIN 3.5 01/28/2025    ALBUMIN 4.0 12/06/2023    PROT 7.7 01/29/2024    BILITOT 0.4 01/29/2024    WBC 7.20 01/28/2025    HGB 12.2 01/28/2025    HCT 41.5 01/28/2025    HCT 31 (L) 11/29/2023    MCV 97 01/28/2025     01/28/2025    INR 1.1 12/22/2023    TSH 0.459 12/21/2023       Lab Results   Component Value Date    CHOL 98 (L) 08/17/2023    HDL 30 (L) 08/17/2023    TRIG 105 08/17/2023       Lab Results   Component Value Date    LDLCALC 47.0 (L) 08/17/2023       Lab Results   Component Value Date    TSH 0.459 12/21/2023       Lab Results   Component Value Date    HGBA1C 7.2 (H) 03/24/2025         Imaging:     Echocardiogram  TTE 12/2023    Left Ventricle: The left ventricle is normal in size. Normal wall thickness. There is concentric remodeling. Normal wall motion. There is normal systolic function with a visually estimated ejection fraction of 65 - 70%. Ejection fraction by visual approximation is 70%.    Right Ventricle: Normal right ventricular cavity size. Wall thickness is normal. Right ventricle wall motion  is normal. Systolic function is normal.    Aortic Valve: There is moderate aortic valve sclerosis. Moderately restricted motion. There is mild stenosis. Aortic valve area by VTI is 1.89 cm². Aortic valve peak velocity is 2.44 m/s. Mean gradient is 14 mmHg. The dimensionless index is 0.72. There is mild-moderate to moderate ( at least 2+) aortic regurgitation.    Mitral Valve: There is severe mitral annular calcification present. There is normal leaflet mobility. There is a large calcified lesion present on the posterior leaflet consistent with old healed vegetation vs caseous necrosis. There is at most trivial  stenosis. The mean pressure  gradient across the mitral valve is 4 mmHg at a heart rate of  bpm. There is no significant regurgitation.    IVC/SVC: Normal venous pressure at 3 mmHg.    Pericardium: There is a trivial posterior effusion.    JEROME 2019  Normal left ventricular systolic function. The estimated ejection fraction is 65%  Normal right ventricular systolic function.  Normal LV diastolic function.  Presence of large round calcified mass attached to the posterior MV leaflet with central echolucent space measuring 1.2 x 0.9 cm that is consistent with caseous necrosis, a variant of Mitral annular calcification. An old healed vegetation cannot be ruled out. There are also small nodular calcium deposits seen on the posterior MV leaflet.  Limited JEROME due to patient developing hypoxia, pulmonic not evaluated on this study.  No gross abnormality of the Aortic (trileaflet) and TV valves seen.    TTE 2019  Normal left ventricular systolic function. The estimated ejection fraction is 65%  Markedly calcified mitral annulus. Echodensity under the posterior mitral valve leaflet. Cannot rule out vegetation.  Concentric left ventricular remodeling.  Severe left atrial enlargement.  Indeterminate left ventricular diastolic function.  Normal right ventricular systolic function.  There is right ventricular hypertrophy.  The estimated PA systolic pressure is 12 mm Hg  Normal central venous pressure (3 mm Hg).  Trivial circumferential pericardial effusion.    Stress testing  SPECT 2023      Cath Lab  None    Other  None    EK/9/24 - Sinus tachycardia, 106 bpm  Possible Left atrial enlargement   LVH   Possible Lateral infarct (cited on or before 07-AUG-2023)   Abnormal ECG     Assessment:     1. Essential hypertension    2. S/P kidney transplant    3. Coronary artery disease involving native coronary artery of native heart without angina pectoris    4. Hyperlipidemia associated with type 2 diabetes mellitus    5. Class 2 severe obesity due to  excess calories with serious comorbidity and body mass index (BMI) of 38.0 to 38.9 in adult        Plan:     Essential hypertension  S/P kidney transplant  BP well-controlled. Continue labetalol 200mg BID; refilled today. Encourage low-sodium diet.    Coronary artery disease involving native coronary artery of native heart without angina pectoris  Stable. No anginal symptoms. Continue current medication regimen.    Hyperlipidemia associated with type 2 diabetes mellitus  LDL would ideally be < 70 given CAD hx -- will add zetia to current crestor 40mg.         Follow up in 1 year to establish care with new MD now that Dr. Rosa has completed fellowship.    Signed:  Lauren Vlosich, PA-C [facility]     4/10/2025     Follow-up:     Future Appointments   Date Time Provider Department Center   4/21/2025  7:00 AM LAB, TRANSPLANT [facility]   4/21/2025 11:30 AM INFUSION, [facility] SOLID ORGAN TRANSPLANT INFUSION [facility]   5/5/2025 11:00 AM Justine Masterson, NP [facility]              [1]   Current Outpatient Medications on File Prior to Visit   Medication Sig Dispense Refill    atropine 1% (ISOPTO ATROPINE) 1 % Drop Place 2 drops into the left eye 2 (two) times a day. 2 mL 6    atropine 1% (ISOPTO ATROPINE) 1 % Drop Place 1 drop into the left eye 2 (two) times a day. 5 mL 10    BELatacept 250 mg SolR 5 mg/kg IV every 28 days (+/- 3 days)      blood sugar diagnostic Strp 1 each by Misc.(Non-Drug; Combo Route) route 3 (three) times daily. 90 each 11    blood-glucose meter kit USE TO TEST BLOOD GLUCOSE 1 each 0    blood-glucose sensor (DEXCOM G7 SENSOR MISC) as directed      calcitRIOL (ROCALTROL) 0.25 MCG Cap Take 1 capsule (0.25 mcg total) by mouth once daily. 30 capsule 5    COMBIGAN 0.2-0.5 % Drop Place 1 drop into the right eye every evening.      dulaglutide (TRULICITY) 4.5 mg/0.5 mL pen injector 1 syringe Subcutaneous Once a week 30 days 4 Pen 3    ergocalciferol (VITAMIN D2)  "50,000 unit Cap Take 1 capsule (50,000 Units total) by mouth every 7 days. Take 1 capsule by mouth weekly x 12 weeks, then decrease to once per month. 12 capsule 3    furosemide (LASIX) 20 MG tablet Take 2 tablets (40 mg total) by mouth once daily. 60 tablet 11    insulin glargine U-100, Lantus, (LANTUS SOLOSTAR U-100 INSULIN) 100 unit/mL (3 mL) SubQ InPn pen 10-14 units daily as direct by endocrinology 6 mL 11    lancets 17 gauge Misc USE 1 LANCET IN VITRO TWICE A DAY for 30 DAYS      lancets Misc 1 each by Misc.(Non-Drug; Combo Route) route 3 (three) times daily. 90 each 11    Lmefol Ca-acetyl-meB12-algal (CEREFOLIN NAC, ALGAL OIL,) 6 mg-600 mg- 2 mg-90.314 mg Tab Take 1 tablet by mouth once daily.      magnesium oxide (MAG-OX) 400 mg (241.3 mg magnesium) tablet Take 2 tablets (800 mg total) by mouth 2 (two) times daily. 120 tablet 0    magnesium oxide (MAG-OX) 400 mg (241.3 mg magnesium) tablet Take 2 tablets (800 mg total) by mouth 2 (two) times daily. 120 tablet 0    mycophenolate (CELLCEPT) 250 mg Cap Take 2 capsules (500 mg total) by mouth 2 (two) times daily. 120 capsule 11    norethindrone (MICRONOR) 0.35 mg tablet Take 1 tablet (0.35 mg total) by mouth once daily. 84 tablet 3    pen needle, diabetic (EASY COMFORT PEN NEEDLES) 32 gauge x 5/32" Ndle Inject 1 each into the skin 3 (three) times daily. 100 each 11    potassium chloride (K-TAB) 20 mEq Take 2 tablets (40 mEq total) by mouth 2 (two) times daily. 120 tablet 1    predniSONE (DELTASONE) 5 MG tablet Take 1 tablet (5 mg total) by mouth once daily. 30 tablet 11    rosuvastatin (CRESTOR) 40 MG Tab Take 40 mg by mouth every evening.      tacrolimus (PROGRAF) 1 MG Cap Take 2 capsules (2 mg total) by mouth every morning AND 2 capsules (2 mg total) every evening. 420 capsule 11    [DISCONTINUED] labetaloL (NORMODYNE) 200 MG tablet Take 1 tablet (200 mg total) by mouth every 12 (twelve) hours. 180 tablet 3    valGANciclovir (VALCYTE) 450 mg Tab 1 tablet " Orally Once a day      [DISCONTINUED] amLODIPine (NORVASC) 10 MG tablet Take 10 mg by mouth once daily.      [DISCONTINUED] hydrALAZINE (APRESOLINE) 50 MG tablet Take 100 mg by mouth every 8 (eight) hours.       [DISCONTINUED] losartan (COZAAR) 100 MG tablet Take 100 mg by mouth once daily.       No current facility-administered medications on file prior to visit.

## 2025-03-24 ENCOUNTER — INFUSION (OUTPATIENT)
Dept: INFUSION THERAPY | Facility: HOSPITAL | Age: 50
End: 2025-03-24
Attending: STUDENT IN AN ORGANIZED HEALTH CARE EDUCATION/TRAINING PROGRAM
Payer: MEDICARE

## 2025-03-24 ENCOUNTER — TELEPHONE (OUTPATIENT)
Dept: INFUSION THERAPY | Facility: HOSPITAL | Age: 50
End: 2025-03-24

## 2025-03-24 VITALS
HEIGHT: 65 IN | BODY MASS INDEX: 38.42 KG/M2 | TEMPERATURE: 99 F | WEIGHT: 230.63 LBS | HEART RATE: 90 BPM | SYSTOLIC BLOOD PRESSURE: 157 MMHG | DIASTOLIC BLOOD PRESSURE: 79 MMHG | OXYGEN SATURATION: 100 %

## 2025-03-24 DIAGNOSIS — Z94.0 S/P KIDNEY TRANSPLANT: Primary | ICD-10-CM

## 2025-03-24 PROCEDURE — 63600175 PHARM REV CODE 636 W HCPCS: Mod: JW,TB | Performed by: STUDENT IN AN ORGANIZED HEALTH CARE EDUCATION/TRAINING PROGRAM

## 2025-03-24 PROCEDURE — 96365 THER/PROPH/DIAG IV INF INIT: CPT

## 2025-03-24 PROCEDURE — 25000003 PHARM REV CODE 250: Performed by: STUDENT IN AN ORGANIZED HEALTH CARE EDUCATION/TRAINING PROGRAM

## 2025-03-24 RX ORDER — DIPHENHYDRAMINE HYDROCHLORIDE 50 MG/ML
50 INJECTION, SOLUTION INTRAMUSCULAR; INTRAVENOUS ONCE AS NEEDED
Status: DISCONTINUED | OUTPATIENT
Start: 2025-03-24 | End: 2025-03-24 | Stop reason: HOSPADM

## 2025-03-24 RX ORDER — DIPHENHYDRAMINE HYDROCHLORIDE 50 MG/ML
50 INJECTION, SOLUTION INTRAMUSCULAR; INTRAVENOUS ONCE AS NEEDED
OUTPATIENT
Start: 2025-04-21

## 2025-03-24 RX ORDER — EPINEPHRINE 0.3 MG/.3ML
0.3 INJECTION SUBCUTANEOUS ONCE AS NEEDED
Status: DISCONTINUED | OUTPATIENT
Start: 2025-03-24 | End: 2025-03-24 | Stop reason: HOSPADM

## 2025-03-24 RX ORDER — EPINEPHRINE 0.3 MG/.3ML
0.3 INJECTION SUBCUTANEOUS ONCE AS NEEDED
OUTPATIENT
Start: 2025-04-21

## 2025-03-24 RX ADMIN — DEXTROSE MONOHYDRATE 525 MG: 50 INJECTION, SOLUTION INTRAVENOUS at 11:03

## 2025-03-24 NOTE — PROGRESS NOTES
Belatacept infused via PIV in rt forearm over 30 minutes IV removed, pt left ambulatory.  Addendum  Difficulty obtaining venous access. IV accessed in rt forearm @ 1245 per Siobhan Mir RN.Belatacept infused from 1250 to 1320.

## 2025-03-24 NOTE — TELEPHONE ENCOUNTER
1220 Having difficulty obtaining venous access in right arm, attempts times 2,staff called to assist  Radha Esparza RN. Dr.T. Thomas called , okay for venipuncture in left arm below the AV shunt site.

## 2025-03-27 ENCOUNTER — RESULTS FOLLOW-UP (OUTPATIENT)
Dept: TRANSPLANT | Facility: HOSPITAL | Age: 50
End: 2025-03-27

## 2025-04-09 ENCOUNTER — OFFICE VISIT (OUTPATIENT)
Dept: NEPHROLOGY | Facility: CLINIC | Age: 50
End: 2025-04-09
Payer: MEDICARE

## 2025-04-09 DIAGNOSIS — E87.6 HYPOKALEMIA: ICD-10-CM

## 2025-04-09 DIAGNOSIS — I10 ESSENTIAL HYPERTENSION: ICD-10-CM

## 2025-04-09 DIAGNOSIS — Z94.0 S/P KIDNEY TRANSPLANT: Primary | ICD-10-CM

## 2025-04-09 DIAGNOSIS — E83.42 HYPOMAGNESEMIA: ICD-10-CM

## 2025-04-09 NOTE — PROGRESS NOTES
Nephrology Virtual Visit (Audiovisual)    The patient location is: Home      Chief Complaints  ESRD due to DM, needed HD for 5 years  S/P -donor renal transplant in 2023, baseline Cr 1.4    HPI:  Miss Waller is a 50 year old lady who was referred to us for general nephrology follow up, she is following with transplant nephrology as well, the patient is on tacrolimus/prednisone/MMF and monthly Belatacept infusion, tolerating meds well, she feels well, no issues with appetite, no n/v/sob/or legs edema, home BP is in the 120s-140s.          Medications:    Outpatient Medications as of 2025   Medication Sig Dispense Refill    atropine 1% (ISOPTO ATROPINE) 1 % Drop Place 2 drops into the left eye 2 (two) times a day. 2 mL 6    atropine 1% (ISOPTO ATROPINE) 1 % Drop Place 1 drop into the left eye 2 (two) times a day. 5 mL 10    BELatacept 250 mg SolR 5 mg/kg IV every 28 days (+/- 3 days)      blood sugar diagnostic Strp 1 each by Misc.(Non-Drug; Combo Route) route 3 (three) times daily. 90 each 11    blood-glucose meter kit USE TO TEST BLOOD GLUCOSE 1 each 0    blood-glucose sensor (DEXCOM G7 SENSOR MISC) as directed      calcitRIOL (ROCALTROL) 0.25 MCG Cap Take 1 capsule (0.25 mcg total) by mouth once daily. 30 capsule 5    COMBIGAN 0.2-0.5 % Drop Place 1 drop into the right eye every evening.      dulaglutide (TRULICITY) 4.5 mg/0.5 mL pen injector 1 syringe Subcutaneous Once a week 30 days 4 Pen 3    ergocalciferol (VITAMIN D2) 50,000 unit Cap Take 1 capsule (50,000 Units total) by mouth every 7 days. Take 1 capsule by mouth weekly x 12 weeks, then decrease to once per month. 12 capsule 3    furosemide (LASIX) 20 MG tablet Take 2 tablets (40 mg total) by mouth once daily. 60 tablet 11    insulin glargine U-100, Lantus, (LANTUS SOLOSTAR U-100 INSULIN) 100 unit/mL (3 mL) SubQ InPn pen 10-14 units daily as direct by endocrinology 6 mL 11    labetaloL (NORMODYNE) 200 MG tablet Take 1 tablet (200 mg total) by  "mouth every 12 (twelve) hours. 180 tablet 3    lancets 17 gauge Misc USE 1 LANCET IN VITRO TWICE A DAY for 30 DAYS      lancets Misc 1 each by Misc.(Non-Drug; Combo Route) route 3 (three) times daily. 90 each 11    Lmefol Ca-acetyl-meB12-algal (CEREFOLIN NAC, ALGAL OIL,) 6 mg-600 mg- 2 mg-90.314 mg Tab Take 1 tablet by mouth once daily.      magnesium oxide (MAG-OX) 400 mg (241.3 mg magnesium) tablet Take 2 tablets (800 mg total) by mouth 2 (two) times daily. 120 tablet 0    magnesium oxide (MAG-OX) 400 mg (241.3 mg magnesium) tablet Take 2 tablets (800 mg total) by mouth 2 (two) times daily. 120 tablet 0    mycophenolate (CELLCEPT) 250 mg Cap Take 2 capsules (500 mg total) by mouth 2 (two) times daily. 120 capsule 11    norethindrone (MICRONOR) 0.35 mg tablet Take 1 tablet (0.35 mg total) by mouth once daily. 84 tablet 3    pen needle, diabetic (EASY COMFORT PEN NEEDLES) 32 gauge x 5/32" Ndle Inject 1 each into the skin 3 (three) times daily. 100 each 11    potassium chloride (K-TAB) 20 mEq Take 2 tablets (40 mEq total) by mouth 2 (two) times daily. 120 tablet 1    predniSONE (DELTASONE) 5 MG tablet Take 1 tablet (5 mg total) by mouth once daily. 30 tablet 11    rosuvastatin (CRESTOR) 40 MG Tab Take 40 mg by mouth every evening.      tacrolimus (PROGRAF) 1 MG Cap Take 2 capsules (2 mg total) by mouth every morning AND 2 capsules (2 mg total) every evening. 420 capsule 11    valGANciclovir (VALCYTE) 450 mg Tab 1 tablet Orally Once a day       No current facility-administered medications on file as of 4/9/2025.             Physical Exam:    General: Alert and normally oriented, not in acute distress   HEENT: No pallor, no icterus  Extremities: No edema      Chemistry        Component Value Date/Time     01/28/2025 0801    K 3.7 01/28/2025 0801     01/28/2025 0801     12/06/2023 0000    CO2 25 01/28/2025 0801    BUN 27 (H) 01/28/2025 0801    CREATININE 1.4 01/28/2025 0801     01/28/2025 0801 "        Component Value Date/Time    CALCIUM 10.0 01/28/2025 0801    ALKPHOS 55 01/29/2024 0846    AST 9 (L) 01/29/2024 0846    ALT 9 (L) 01/29/2024 0846    BILITOT 0.4 01/29/2024 0846    ESTGFRAFRICA 11 (A) 12/29/2019 1820    EGFRNONAA 9 (A) 12/29/2019 1820            Prot/Creat Ratio, Urine   Date Value Ref Range Status   11/25/2024 0.21 (H) 0.00 - 0.20 Final   09/09/2024 0.23 (H) 0.00 - 0.20 Final   08/26/2024 0.13 0.00 - 0.20 Final         Assessment/Plan:    S/P DDRT: Stable graft function, next lab test is in April per transplant protocol including BK PCR and tacrolimus trough level    Hypertension: Well controlled on lasix and labetalol, continue the same.    Hypokalemia and hypomagnesemia: Continue Mg oxide 800 mg bid and KCL 40 mEq bid    Malignancy screening: PAP and mammogram per gynecology done in 2024, following up on annual basis    Follow up in 6 months.       Face to Face time with patient: 23 minutes of total time spent on the encounter, which includes face to face time and non-face to face time preparing to see the patient (eg, review of tests), Obtaining and/or reviewing separately obtained history, Documenting clinical information in the electronic or other health record, Independently interpreting results (not separately reported) and communicating results to the patient/family/caregiver, or Care coordination (not separately reported).

## 2025-04-10 ENCOUNTER — OFFICE VISIT (OUTPATIENT)
Dept: CARDIOLOGY | Facility: CLINIC | Age: 50
End: 2025-04-10
Payer: MEDICARE

## 2025-04-10 VITALS
HEART RATE: 92 BPM | SYSTOLIC BLOOD PRESSURE: 188 MMHG | WEIGHT: 233 LBS | OXYGEN SATURATION: 100 % | DIASTOLIC BLOOD PRESSURE: 88 MMHG | BODY MASS INDEX: 38.82 KG/M2 | HEIGHT: 65 IN

## 2025-04-10 DIAGNOSIS — Z94.0 S/P KIDNEY TRANSPLANT: ICD-10-CM

## 2025-04-10 DIAGNOSIS — E78.5 HYPERLIPIDEMIA ASSOCIATED WITH TYPE 2 DIABETES MELLITUS: ICD-10-CM

## 2025-04-10 DIAGNOSIS — I25.10 CORONARY ARTERY DISEASE INVOLVING NATIVE CORONARY ARTERY OF NATIVE HEART WITHOUT ANGINA PECTORIS: ICD-10-CM

## 2025-04-10 DIAGNOSIS — I10 ESSENTIAL HYPERTENSION: Primary | ICD-10-CM

## 2025-04-10 DIAGNOSIS — E66.812 CLASS 2 SEVERE OBESITY DUE TO EXCESS CALORIES WITH SERIOUS COMORBIDITY AND BODY MASS INDEX (BMI) OF 38.0 TO 38.9 IN ADULT: ICD-10-CM

## 2025-04-10 DIAGNOSIS — E11.69 HYPERLIPIDEMIA ASSOCIATED WITH TYPE 2 DIABETES MELLITUS: ICD-10-CM

## 2025-04-10 DIAGNOSIS — E66.01 CLASS 2 SEVERE OBESITY DUE TO EXCESS CALORIES WITH SERIOUS COMORBIDITY AND BODY MASS INDEX (BMI) OF 38.0 TO 38.9 IN ADULT: ICD-10-CM

## 2025-04-10 PROCEDURE — 3008F BODY MASS INDEX DOCD: CPT | Mod: CPTII,S$GLB,,

## 2025-04-10 PROCEDURE — 3066F NEPHROPATHY DOC TX: CPT | Mod: CPTII,S$GLB,,

## 2025-04-10 PROCEDURE — 99214 OFFICE O/P EST MOD 30 MIN: CPT | Mod: S$GLB,,,

## 2025-04-10 PROCEDURE — 1159F MED LIST DOCD IN RCRD: CPT | Mod: CPTII,S$GLB,,

## 2025-04-10 PROCEDURE — 3077F SYST BP >= 140 MM HG: CPT | Mod: CPTII,S$GLB,,

## 2025-04-10 PROCEDURE — 3079F DIAST BP 80-89 MM HG: CPT | Mod: CPTII,S$GLB,,

## 2025-04-10 PROCEDURE — 3051F HG A1C>EQUAL 7.0%<8.0%: CPT | Mod: CPTII,S$GLB,,

## 2025-04-10 PROCEDURE — 99999 PR PBB SHADOW E&M-EST. PATIENT-LVL III: CPT | Mod: PBBFAC,,,

## 2025-04-10 RX ORDER — EZETIMIBE 10 MG/1
10 TABLET ORAL DAILY
Qty: 90 TABLET | Refills: 3 | Status: SHIPPED | OUTPATIENT
Start: 2025-04-10

## 2025-04-10 RX ORDER — LABETALOL 200 MG/1
200 TABLET, FILM COATED ORAL EVERY 12 HOURS
Qty: 180 TABLET | Refills: 3 | Status: SHIPPED | OUTPATIENT
Start: 2025-04-10 | End: 2026-04-10

## 2025-04-17 DIAGNOSIS — Z94.0 S/P KIDNEY TRANSPLANT: Primary | ICD-10-CM

## 2025-04-21 ENCOUNTER — INFUSION (OUTPATIENT)
Dept: INFUSION THERAPY | Facility: HOSPITAL | Age: 50
End: 2025-04-21
Attending: STUDENT IN AN ORGANIZED HEALTH CARE EDUCATION/TRAINING PROGRAM
Payer: MEDICARE

## 2025-04-21 ENCOUNTER — RESULTS FOLLOW-UP (OUTPATIENT)
Dept: TRANSPLANT | Facility: CLINIC | Age: 50
End: 2025-04-21
Payer: MEDICARE

## 2025-04-21 VITALS
OXYGEN SATURATION: 100 % | BODY MASS INDEX: 38.6 KG/M2 | TEMPERATURE: 99 F | RESPIRATION RATE: 18 BRPM | WEIGHT: 231.69 LBS | HEIGHT: 65 IN | HEART RATE: 98 BPM | SYSTOLIC BLOOD PRESSURE: 168 MMHG | DIASTOLIC BLOOD PRESSURE: 82 MMHG

## 2025-04-21 DIAGNOSIS — Z94.0 S/P KIDNEY TRANSPLANT: ICD-10-CM

## 2025-04-21 DIAGNOSIS — Z94.0 S/P KIDNEY TRANSPLANT: Primary | ICD-10-CM

## 2025-04-21 PROCEDURE — 25000003 PHARM REV CODE 250: Performed by: STUDENT IN AN ORGANIZED HEALTH CARE EDUCATION/TRAINING PROGRAM

## 2025-04-21 PROCEDURE — 96365 THER/PROPH/DIAG IV INF INIT: CPT

## 2025-04-21 PROCEDURE — 63600175 PHARM REV CODE 636 W HCPCS: Mod: TB | Performed by: STUDENT IN AN ORGANIZED HEALTH CARE EDUCATION/TRAINING PROGRAM

## 2025-04-21 RX ORDER — EPINEPHRINE 0.3 MG/.3ML
0.3 INJECTION SUBCUTANEOUS ONCE AS NEEDED
OUTPATIENT
Start: 2025-05-19

## 2025-04-21 RX ORDER — EPINEPHRINE 0.3 MG/.3ML
0.3 INJECTION SUBCUTANEOUS ONCE AS NEEDED
Status: DISCONTINUED | OUTPATIENT
Start: 2025-04-21 | End: 2025-04-21 | Stop reason: HOSPADM

## 2025-04-21 RX ORDER — DIPHENHYDRAMINE HYDROCHLORIDE 50 MG/ML
50 INJECTION, SOLUTION INTRAMUSCULAR; INTRAVENOUS ONCE AS NEEDED
OUTPATIENT
Start: 2025-05-19

## 2025-04-21 RX ORDER — DIPHENHYDRAMINE HYDROCHLORIDE 50 MG/ML
50 INJECTION, SOLUTION INTRAMUSCULAR; INTRAVENOUS ONCE AS NEEDED
Status: DISCONTINUED | OUTPATIENT
Start: 2025-04-21 | End: 2025-04-21 | Stop reason: HOSPADM

## 2025-04-21 RX ADMIN — DEXTROSE MONOHYDRATE 525 MG: 50 INJECTION, SOLUTION INTRAVENOUS at 12:04

## 2025-04-22 RX ORDER — TACROLIMUS 1 MG/1
CAPSULE ORAL
Qty: 46 CAPSULE | Refills: 0 | Status: SHIPPED | OUTPATIENT
Start: 2025-04-22 | End: 2025-05-23

## 2025-04-22 NOTE — TELEPHONE ENCOUNTER
Pt aware and message sent.     ----- Message from Pharmacist Willis sent at 4/21/2025  1:58 PM CDT -----  Hello All,    I recommend lowering the tacrolimus dose to 1mg BID (from 2mg BID), followed by 1mg daily 2 weeks later. Lastly, dc tacrolimus all together 2 weeks from then.     If we were able to lower dose tomorrow to 1mg BID, the taper would be:  1mg BID 4/22-5/6  1mg daily 5/7-5/21  Stop tacrolimus 5/22    Please let me know if you have any questions.    Thank you,  Willis Andersen, PharmD. BCTXP, BCPS  ----- Message -----  From: Randall Thomas Jr., MD  Sent: 4/21/2025   1:55 PM CDT  To: Abdominal Transplant Pharmacists; [facility] Post-#    Somewhere we got off track. We should wean the tac to off. Added pharmacy to confirm when to stop the tac.   ----- Message -----  From: Lab, Background User  Sent: 4/21/2025  10:57 AM CDT  To: Randall Thomas Jr., MD

## 2025-04-24 DIAGNOSIS — E87.6 HYPOKALEMIA: ICD-10-CM

## 2025-04-24 RX ORDER — POTASSIUM CHLORIDE 1500 MG/1
40 TABLET, EXTENDED RELEASE ORAL 2 TIMES DAILY
Qty: 120 TABLET | Refills: 1 | Status: CANCELLED | OUTPATIENT
Start: 2025-04-24

## 2025-04-28 ENCOUNTER — TELEPHONE (OUTPATIENT)
Dept: NEPHROLOGY | Facility: CLINIC | Age: 50
End: 2025-04-28
Payer: MEDICARE

## 2025-04-28 DIAGNOSIS — E87.6 HYPOKALEMIA: ICD-10-CM

## 2025-04-28 RX ORDER — POTASSIUM CHLORIDE 1500 MG/1
40 TABLET, EXTENDED RELEASE ORAL 2 TIMES DAILY
Qty: 120 TABLET | Refills: 1 | Status: CANCELLED | OUTPATIENT
Start: 2025-04-24

## 2025-04-28 NOTE — PROGRESS NOTES
Kidney Post-Transplant Assessment    Referring Physician: Aries Bourne  Current Nephrologist: Aries Bourne    ORGAN: LEFT KIDNEY  Donor Type: donation after brain death  PHS Increased Risk: yes  Cold Ischemia: 903 mins  Induction Medications: thymo    Subjective:     CC:  Reassessment of renal allograft function and management of chronic immunosuppression.    HPI:  Ms. Waller is a 50 y.o. year old Black or  female with history of ESRD secondary to DM and HTN who received a donation after brain death kidney transplant on 11/29/23 (KDPI 35 CIT 15 CMV+ cPRA 0).  She has CKD stage 3 - GFR 30-59 and her baseline creatinine is between 1.5-1.7. She takes mycophenolate mofetil, prednisone, and tacrolimus for maintenance immunosuppression.     Feels well today without complaints. She is following with endocrinology. Staying hydrated, no problems with urination. Home -140. Currently without peripheral edema. Tolerating IS without issue, no diarrhea or vomiting. Has established with general nephrology post transplant.    Current Outpatient Medications   Medication Sig Dispense Refill    atropine 1% (ISOPTO ATROPINE) 1 % Drop Place 2 drops into the left eye 2 (two) times a day. 2 mL 6    atropine 1% (ISOPTO ATROPINE) 1 % Drop Place 1 drop into the left eye 2 (two) times a day. 5 mL 10    BELatacept 250 mg SolR 5 mg/kg IV every 28 days (+/- 3 days)      blood sugar diagnostic Strp 1 each by Misc.(Non-Drug; Combo Route) route 3 (three) times daily. 90 each 11    blood-glucose meter kit USE TO TEST BLOOD GLUCOSE 1 each 0    blood-glucose sensor (DEXCOM G7 SENSOR MISC) as directed      calcitRIOL (ROCALTROL) 0.25 MCG Cap Take 1 capsule (0.25 mcg total) by mouth once daily. 30 capsule 5    COMBIGAN 0.2-0.5 % Drop Place 1 drop into the right eye every evening.      dulaglutide (TRULICITY) 4.5 mg/0.5 mL pen injector 1 syringe Subcutaneous Once a week 30 days 4 Pen 3    ergocalciferol (VITAMIN D2)  "50,000 unit Cap Take 1 capsule (50,000 Units total) by mouth every 7 days. Take 1 capsule by mouth weekly x 12 weeks, then decrease to once per month. 12 capsule 3    ezetimibe (ZETIA) 10 mg tablet Take 1 tablet (10 mg total) by mouth once daily. 90 tablet 3    furosemide (LASIX) 20 MG tablet Take 2 tablets (40 mg total) by mouth once daily. 60 tablet 11    insulin glargine U-100, Lantus, (LANTUS SOLOSTAR U-100 INSULIN) 100 unit/mL (3 mL) SubQ InPn pen 10-14 units daily as direct by endocrinology 6 mL 11    labetaloL (NORMODYNE) 200 MG tablet Take 1 tablet (200 mg total) by mouth every 12 (twelve) hours. 180 tablet 3    lancets 17 gauge Misc USE 1 LANCET IN VITRO TWICE A DAY for 30 DAYS      lancets Misc 1 each by Misc.(Non-Drug; Combo Route) route 3 (three) times daily. 90 each 11    Lmefol Ca-acetyl-meB12-algal (CEREFOLIN NAC, ALGAL OIL,) 6 mg-600 mg- 2 mg-90.314 mg Tab Take 1 tablet by mouth once daily.      magnesium oxide (MAG-OX) 400 mg (241.3 mg magnesium) tablet Take 2 tablets (800 mg total) by mouth 2 (two) times daily. 120 tablet 0    magnesium oxide (MAG-OX) 400 mg (241.3 mg magnesium) tablet Take 2 tablets (800 mg total) by mouth 2 (two) times daily. 120 tablet 0    mycophenolate (CELLCEPT) 250 mg Cap Take 2 capsules (500 mg total) by mouth 2 (two) times daily. 120 capsule 11    norethindrone (MICRONOR) 0.35 mg tablet Take 1 tablet (0.35 mg total) by mouth once daily. 84 tablet 3    pen needle, diabetic (EASY COMFORT PEN NEEDLES) 32 gauge x 5/32" Ndle Inject 1 each into the skin 3 (three) times daily. 100 each 11    potassium chloride (K-TAB) 20 mEq Take 2 tablets (40 mEq total) by mouth 2 (two) times daily. 120 tablet 1    predniSONE (DELTASONE) 5 MG tablet Take 1 tablet (5 mg total) by mouth once daily. 30 tablet 11    rosuvastatin (CRESTOR) 40 MG Tab Take 40 mg by mouth every evening.      tacrolimus (PROGRAF) 1 MG Cap Take 1 capsule (1 mg total) by mouth every 12 (twelve) hours for 15 days, THEN 1 " "capsule (1 mg total) once daily for 16 days. 46 capsule 0    valGANciclovir (VALCYTE) 450 mg Tab 1 tablet Orally Once a day       No current facility-administered medications for this visit.       Past Medical History:   Diagnosis Date    Acute blood loss anemia 11/30/2023    Bacteremia associated with intravascular line     Blind     Coronary artery disease involving native coronary artery of native heart without angina pectoris 8/17/2023    Diabetes     ESRD (end stage renal disease) on dialysis     Hyperkalemia 11/29/2023    Hyperlipidemia associated with type 2 diabetes mellitus 3/20/2025    Hypertension     Hypokalemia 12/22/2023    Pre-transplant evaluation for kidney transplant 08/17/2023    Right cataract     Staphylococcus aureus bacteremia 01/06/2019     Review of Systems   Constitutional:  Negative for activity change and fever.   Eyes:  Negative for visual disturbance.   Respiratory:  Negative for shortness of breath.    Cardiovascular:  Negative for chest pain and leg swelling.   Gastrointestinal:  Negative for constipation, diarrhea and nausea.   Genitourinary:  Negative for difficulty urinating, frequency and hematuria.   Musculoskeletal:  Negative for arthralgias and myalgias.   Skin:  Negative for wound.   Allergic/Immunologic: Positive for immunocompromised state.   Neurological:  Negative for weakness and numbness.   Psychiatric/Behavioral:  Negative for sleep disturbance. The patient is not nervous/anxious.      Objective:     Blood pressure (!) 163/72, pulse 88, temperature 97.3 °F (36.3 °C), temperature source Temporal, resp. rate 18, height 5' 5" (1.651 m), weight 104.3 kg (229 lb 15 oz), SpO2 96%.body mass index is 38.26 kg/m².    Physical Exam  Vitals and nursing note reviewed.   Constitutional:       Appearance: Normal appearance.   Pulmonary:      Effort: Pulmonary effort is normal.   Musculoskeletal:         General: Normal range of motion.      Cervical back: Normal range of motion. "   Skin:     General: Skin is warm and dry.   Neurological:      Mental Status: She is alert.         Labs:  Lab Results   Component Value Date    WBC 7.41 04/21/2025    HGB 11.9 (L) 04/21/2025    HCT 40.5 04/21/2025     04/21/2025    K 4.2 04/21/2025     04/21/2025    CO2 28 04/21/2025    BUN 25 (H) 04/21/2025    CREATININE 1.2 04/21/2025    EGFRNORACEVR 56 (L) 04/21/2025    CALCIUM 9.5 04/21/2025    PHOS 2.5 (L) 04/21/2025    MG 2.0 11/25/2024    ALBUMIN 3.6 04/21/2025    AST 9 (L) 01/29/2024    ALT 9 (L) 01/29/2024    UTPCR 0.21 (H) 11/25/2024     (H) 12/06/2023    TACROLIMUS 2.5 (L) 04/21/2025     Labs were reviewed with the patient.    Assessment:     1. S/P kidney transplant    2. Stage 3b chronic kidney disease    3. Essential hypertension    4. 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 MMF, and Pred for immunosuppression  Cr has responded nicely to Twyla   Continue transplant routine transplant f/u and labs    1. Immunosuppression: Prograf trough 2.5, Also on Twyla Continue Prograf 1mg daily,  mg BID, and Prednisone 5 mg QD. Will continue to monitor for drug toxicities    2. Allograft Function: Stable. Continue good oral hydration.   - ESRD secondary to DM and HTN s/p donation after brain death kidney transplant on 11/29/23 (KDPI 35 CIT 15 CMV+ cPRA 0).    - baseline creatinine is 1.2.   - allosure 7/11/2024: 0.15  Lab Results   Component Value Date    CREATININE 1.2 04/21/2025     Lab Results   Component Value Date    HGBA1C 7.2 (H) 03/24/2025         3. Hypertension management: advise low salt diet and home BP monitoring      4. Metabolic Bone Disease/Secondary Hyperparathyroidism:  Lab Results   Component Value Date     (H) 12/06/2023    CALCIUM 9.5 04/21/2025    PHOS 2.5 (L) 04/21/2025       5. Hypokalemia   - continue KCl 40mEq BID    6. Neutropenia   - stable on 1/2 dose MMF    7.  Proteinuria: continue p/c ratio as per guidelines   [facility] [place]: 0.21    8. BK virus infection screening:  BK PCR per protocol   BK PCR 1/28/25: not detected    9. Weight education: provided during the clinic visit   Body mass index is 38.26 kg/m².     10. Patient safety education regarding immunosuppression including prophylaxis posttransplant for CMV, PCP         Follow-up:   Clinic: return to transplant clinic weekly for the first month after transplant; every 2 weeks during months 2-3; then at 6-, 9-, 12-, 18-, 24-, and 36- months post-transplant to reassess for complications from immunosuppression toxicity and monitor for rejection.  Annually thereafter.    Labs: since patient remains at high risk for rejection and drug-related complications that warrant close monitoring, labs will be ordered as follows: continue twice weekly CBC, renal panel, and drug level for first month; then same labs once weekly through 3rd month post-transplant.  Urine for UA and protein/creatinine ratio monthly.  Serum BK - PCR at 1-, 3-, 6-, 9-, 12-, 18-, 24-, 36- 48-, and 60 months post-transplant.  Hepatic panel at 1-, 2-, 3-, 6-, 9-, 12-, 18-, 24-, and 36- months post-transplant.    Justine Masterson NP

## 2025-04-28 NOTE — TELEPHONE ENCOUNTER
What is the nature of the call?: Requesting prior auth for potassium chloride (K-TAB) 20 mEq refill

## 2025-04-29 RX ORDER — POTASSIUM CHLORIDE 1500 MG/1
40 TABLET, EXTENDED RELEASE ORAL 2 TIMES DAILY
Qty: 120 TABLET | Refills: 1 | Status: SHIPPED | OUTPATIENT
Start: 2025-04-29

## 2025-05-05 ENCOUNTER — OFFICE VISIT (OUTPATIENT)
Dept: TRANSPLANT | Facility: CLINIC | Age: 50
End: 2025-05-05
Payer: MEDICARE

## 2025-05-05 VITALS
OXYGEN SATURATION: 96 % | HEIGHT: 65 IN | HEART RATE: 88 BPM | WEIGHT: 229.94 LBS | TEMPERATURE: 97 F | BODY MASS INDEX: 38.31 KG/M2 | RESPIRATION RATE: 18 BRPM | DIASTOLIC BLOOD PRESSURE: 72 MMHG | SYSTOLIC BLOOD PRESSURE: 163 MMHG

## 2025-05-05 DIAGNOSIS — N18.32 STAGE 3B CHRONIC KIDNEY DISEASE: ICD-10-CM

## 2025-05-05 DIAGNOSIS — Z94.0 S/P KIDNEY TRANSPLANT: Primary | ICD-10-CM

## 2025-05-05 DIAGNOSIS — D84.821 IMMUNODEFICIENCY DUE TO LONG TERM DRUG THERAPY: ICD-10-CM

## 2025-05-05 DIAGNOSIS — Z79.899 IMMUNODEFICIENCY DUE TO LONG TERM DRUG THERAPY: ICD-10-CM

## 2025-05-05 DIAGNOSIS — I10 ESSENTIAL HYPERTENSION: ICD-10-CM

## 2025-05-05 PROCEDURE — 1159F MED LIST DOCD IN RCRD: CPT | Mod: CPTII,S$GLB,, | Performed by: NURSE PRACTITIONER

## 2025-05-05 PROCEDURE — 3066F NEPHROPATHY DOC TX: CPT | Mod: CPTII,S$GLB,, | Performed by: NURSE PRACTITIONER

## 2025-05-05 PROCEDURE — 3051F HG A1C>EQUAL 7.0%<8.0%: CPT | Mod: CPTII,S$GLB,, | Performed by: NURSE PRACTITIONER

## 2025-05-05 PROCEDURE — 99999 PR PBB SHADOW E&M-EST. PATIENT-LVL III: CPT | Mod: PBBFAC,,, | Performed by: NURSE PRACTITIONER

## 2025-05-05 PROCEDURE — 3078F DIAST BP <80 MM HG: CPT | Mod: CPTII,S$GLB,, | Performed by: NURSE PRACTITIONER

## 2025-05-05 PROCEDURE — 3008F BODY MASS INDEX DOCD: CPT | Mod: CPTII,S$GLB,, | Performed by: NURSE PRACTITIONER

## 2025-05-05 PROCEDURE — 3077F SYST BP >= 140 MM HG: CPT | Mod: CPTII,S$GLB,, | Performed by: NURSE PRACTITIONER

## 2025-05-05 PROCEDURE — 99214 OFFICE O/P EST MOD 30 MIN: CPT | Mod: S$GLB,,, | Performed by: NURSE PRACTITIONER

## 2025-05-05 RX ORDER — MYCOPHENOLATE MOFETIL 250 MG/1
500 CAPSULE ORAL 2 TIMES DAILY
Qty: 120 CAPSULE | Refills: 11 | Status: SHIPPED | OUTPATIENT
Start: 2025-05-05 | End: 2026-05-05

## 2025-05-05 NOTE — LETTER
May 5, 2025        Aries Bourne  [address]  [facility] [address]  Phone: [phone]  Fax: [phone]             [facility] 1st Fl  [address]  [facility] [address]  Phone: [phone]   Patient: Mary Waller   MR Number: [id number]   YOB: 1975   Date of Visit: 5/5/2025       Dear Dr. Aries Bourne    Thank you for referring Mary Waller to me for evaluation. Attached you will find relevant portions of my assessment and plan of care.    If you have questions, please do not hesitate to call me. I look forward to following Mary Waller along with you.    Sincerely,    Justine Masterson, NP    Enclosure    If you would like to receive this communication electronically, please contact [email or web address] or [phone] to request Stamped Link access.    Stamped Link is a tool which provides read-only access to select patient information with whom you have a relationship. Its easy to use and provides real time access to review your patients record including encounter summaries, notes, results, and demographic information.    If you feel you have received this communication in error or would no longer like to receive these types of communications, please e-mail [email or web address]

## 2025-05-19 ENCOUNTER — INFUSION (OUTPATIENT)
Dept: INFUSION THERAPY | Facility: HOSPITAL | Age: 50
End: 2025-05-19
Payer: MEDICARE

## 2025-05-19 VITALS
HEIGHT: 65 IN | HEART RATE: 88 BPM | WEIGHT: 231.25 LBS | DIASTOLIC BLOOD PRESSURE: 74 MMHG | BODY MASS INDEX: 38.53 KG/M2 | OXYGEN SATURATION: 98 % | RESPIRATION RATE: 18 BRPM | TEMPERATURE: 98 F | SYSTOLIC BLOOD PRESSURE: 169 MMHG

## 2025-05-19 DIAGNOSIS — Z94.0 S/P KIDNEY TRANSPLANT: Primary | ICD-10-CM

## 2025-05-19 PROCEDURE — 25000003 PHARM REV CODE 250: Performed by: STUDENT IN AN ORGANIZED HEALTH CARE EDUCATION/TRAINING PROGRAM

## 2025-05-19 PROCEDURE — 96365 THER/PROPH/DIAG IV INF INIT: CPT

## 2025-05-19 PROCEDURE — 63600175 PHARM REV CODE 636 W HCPCS: Mod: JZ,TB | Performed by: STUDENT IN AN ORGANIZED HEALTH CARE EDUCATION/TRAINING PROGRAM

## 2025-05-19 RX ORDER — EPINEPHRINE 0.3 MG/.3ML
0.3 INJECTION SUBCUTANEOUS ONCE AS NEEDED
Status: DISCONTINUED | OUTPATIENT
Start: 2025-05-19 | End: 2025-05-19 | Stop reason: HOSPADM

## 2025-05-19 RX ORDER — EPINEPHRINE 0.3 MG/.3ML
0.3 INJECTION SUBCUTANEOUS ONCE AS NEEDED
OUTPATIENT
Start: 2025-06-16

## 2025-05-19 RX ORDER — DIPHENHYDRAMINE HYDROCHLORIDE 50 MG/ML
50 INJECTION, SOLUTION INTRAMUSCULAR; INTRAVENOUS ONCE AS NEEDED
Status: DISCONTINUED | OUTPATIENT
Start: 2025-05-19 | End: 2025-05-19 | Stop reason: HOSPADM

## 2025-05-19 RX ORDER — DIPHENHYDRAMINE HYDROCHLORIDE 50 MG/ML
50 INJECTION, SOLUTION INTRAMUSCULAR; INTRAVENOUS ONCE AS NEEDED
OUTPATIENT
Start: 2025-06-16

## 2025-05-19 RX ADMIN — DEXTROSE 525 MG: 50 INJECTION, SOLUTION INTRAVENOUS at 11:05

## 2025-06-02 NOTE — PROGRESS NOTES
Arrived to dialysis via stretcher. Amb to scale then to bed. AAOx 4. NAD noted. Agreed to 3.5 hour Tx. Successful cannulation x 2 needles x 1 attempt to Lt. Upper arm AVF.    Quality 226: Preventive Care And Screening: Tobacco Use: Screening And Cessation Intervention: Patient screened for tobacco use and is an ex/non-smoker Quality 47: Advance Care Plan: Advance Care Planning discussed and documented; advance care plan or surrogate decision maker documented in the medical record. Detail Level: Detailed

## 2025-06-17 ENCOUNTER — INFUSION (OUTPATIENT)
Dept: INFUSION THERAPY | Facility: HOSPITAL | Age: 50
End: 2025-06-17
Payer: MEDICARE

## 2025-06-17 VITALS
HEIGHT: 65 IN | WEIGHT: 228.38 LBS | DIASTOLIC BLOOD PRESSURE: 69 MMHG | RESPIRATION RATE: 18 BRPM | TEMPERATURE: 99 F | SYSTOLIC BLOOD PRESSURE: 181 MMHG | OXYGEN SATURATION: 100 % | HEART RATE: 91 BPM | BODY MASS INDEX: 38.05 KG/M2

## 2025-06-17 DIAGNOSIS — Z94.0 S/P KIDNEY TRANSPLANT: Primary | ICD-10-CM

## 2025-06-17 PROCEDURE — 25000003 PHARM REV CODE 250: Performed by: STUDENT IN AN ORGANIZED HEALTH CARE EDUCATION/TRAINING PROGRAM

## 2025-06-17 PROCEDURE — 63600175 PHARM REV CODE 636 W HCPCS: Mod: TB | Performed by: STUDENT IN AN ORGANIZED HEALTH CARE EDUCATION/TRAINING PROGRAM

## 2025-06-17 RX ORDER — DIPHENHYDRAMINE HYDROCHLORIDE 50 MG/ML
50 INJECTION, SOLUTION INTRAMUSCULAR; INTRAVENOUS ONCE AS NEEDED
OUTPATIENT
Start: 2025-07-15

## 2025-06-17 RX ORDER — DIPHENHYDRAMINE HYDROCHLORIDE 50 MG/ML
50 INJECTION, SOLUTION INTRAMUSCULAR; INTRAVENOUS ONCE AS NEEDED
Status: ACTIVE | OUTPATIENT
Start: 2025-06-17 | End: 2036-11-13

## 2025-06-17 RX ORDER — EPINEPHRINE 0.3 MG/.3ML
0.3 INJECTION SUBCUTANEOUS ONCE AS NEEDED
Status: ACTIVE | OUTPATIENT
Start: 2025-06-17 | End: 2036-11-13

## 2025-06-17 RX ORDER — EPINEPHRINE 0.3 MG/.3ML
0.3 INJECTION SUBCUTANEOUS ONCE AS NEEDED
OUTPATIENT
Start: 2025-07-15

## 2025-06-17 RX ADMIN — DEXTROSE 512.5 MG: 50 INJECTION, SOLUTION INTRAVENOUS at 12:06

## 2025-06-27 DIAGNOSIS — E87.6 HYPOKALEMIA: ICD-10-CM

## 2025-06-27 RX ORDER — POTASSIUM CHLORIDE 1500 MG/1
40 TABLET, EXTENDED RELEASE ORAL 2 TIMES DAILY
Qty: 120 TABLET | Refills: 1 | Status: SHIPPED | OUTPATIENT
Start: 2025-06-27

## 2025-07-15 ENCOUNTER — INFUSION (OUTPATIENT)
Dept: INFUSION THERAPY | Facility: HOSPITAL | Age: 50
End: 2025-07-15
Payer: MEDICARE

## 2025-07-15 VITALS
SYSTOLIC BLOOD PRESSURE: 161 MMHG | HEART RATE: 93 BPM | HEIGHT: 65 IN | RESPIRATION RATE: 18 BRPM | OXYGEN SATURATION: 98 % | WEIGHT: 226.63 LBS | BODY MASS INDEX: 37.76 KG/M2 | DIASTOLIC BLOOD PRESSURE: 80 MMHG | TEMPERATURE: 99 F

## 2025-07-15 DIAGNOSIS — Z94.0 S/P KIDNEY TRANSPLANT: Primary | ICD-10-CM

## 2025-07-15 PROCEDURE — 63600175 PHARM REV CODE 636 W HCPCS: Mod: JZ,TB | Performed by: STUDENT IN AN ORGANIZED HEALTH CARE EDUCATION/TRAINING PROGRAM

## 2025-07-15 PROCEDURE — 63600175 PHARM REV CODE 636 W HCPCS: Mod: JW,TB

## 2025-07-15 PROCEDURE — 25000003 PHARM REV CODE 250: Performed by: STUDENT IN AN ORGANIZED HEALTH CARE EDUCATION/TRAINING PROGRAM

## 2025-07-15 RX ORDER — EPINEPHRINE 0.3 MG/.3ML
0.3 INJECTION SUBCUTANEOUS ONCE AS NEEDED
OUTPATIENT
Start: 2025-08-12

## 2025-07-15 RX ORDER — DIPHENHYDRAMINE HYDROCHLORIDE 50 MG/ML
50 INJECTION, SOLUTION INTRAMUSCULAR; INTRAVENOUS ONCE AS NEEDED
OUTPATIENT
Start: 2025-08-12

## 2025-07-15 RX ORDER — DIPHENHYDRAMINE HYDROCHLORIDE 50 MG/ML
50 INJECTION, SOLUTION INTRAMUSCULAR; INTRAVENOUS ONCE AS NEEDED
Status: DISCONTINUED | OUTPATIENT
Start: 2025-07-15 | End: 2025-07-15 | Stop reason: HOSPADM

## 2025-07-15 RX ORDER — EPINEPHRINE 0.3 MG/.3ML
0.3 INJECTION SUBCUTANEOUS ONCE AS NEEDED
Status: DISCONTINUED | OUTPATIENT
Start: 2025-07-15 | End: 2025-07-15 | Stop reason: HOSPADM

## 2025-07-15 RX ADMIN — DEXTROSE MONOHYDRATE 512.5 MG: 50 INJECTION, SOLUTION INTRAVENOUS at 10:07

## 2025-07-17 DIAGNOSIS — Z94.0 S/P KIDNEY TRANSPLANT: ICD-10-CM

## 2025-07-17 RX ORDER — LANOLIN ALCOHOL/MO/W.PET/CERES
800 CREAM (GRAM) TOPICAL 2 TIMES DAILY
Qty: 120 TABLET | Refills: 0 | Status: CANCELLED | OUTPATIENT
Start: 2025-07-17 | End: 2026-07-18

## 2025-07-18 DIAGNOSIS — Z94.0 S/P KIDNEY TRANSPLANT: ICD-10-CM

## 2025-07-18 RX ORDER — LANOLIN ALCOHOL/MO/W.PET/CERES
800 CREAM (GRAM) TOPICAL 2 TIMES DAILY
Qty: 120 TABLET | Refills: 0 | Status: SHIPPED | OUTPATIENT
Start: 2025-07-18 | End: 2026-07-19

## 2025-07-18 RX ORDER — LANOLIN ALCOHOL/MO/W.PET/CERES
800 CREAM (GRAM) TOPICAL 2 TIMES DAILY
Qty: 120 TABLET | Refills: 0 | Status: CANCELLED | OUTPATIENT
Start: 2025-07-17 | End: 2026-07-18

## 2025-08-12 ENCOUNTER — INFUSION (OUTPATIENT)
Dept: INFUSION THERAPY | Facility: HOSPITAL | Age: 50
End: 2025-08-12
Payer: MEDICARE

## 2025-08-12 VITALS
HEART RATE: 92 BPM | HEIGHT: 65 IN | RESPIRATION RATE: 18 BRPM | TEMPERATURE: 98 F | SYSTOLIC BLOOD PRESSURE: 174 MMHG | BODY MASS INDEX: 37.8 KG/M2 | DIASTOLIC BLOOD PRESSURE: 73 MMHG | WEIGHT: 226.88 LBS | OXYGEN SATURATION: 100 %

## 2025-08-12 DIAGNOSIS — Z94.0 S/P KIDNEY TRANSPLANT: Primary | ICD-10-CM

## 2025-08-12 PROCEDURE — 63600175 PHARM REV CODE 636 W HCPCS: Mod: TB | Performed by: STUDENT IN AN ORGANIZED HEALTH CARE EDUCATION/TRAINING PROGRAM

## 2025-08-12 PROCEDURE — 25000003 PHARM REV CODE 250: Performed by: STUDENT IN AN ORGANIZED HEALTH CARE EDUCATION/TRAINING PROGRAM

## 2025-08-12 PROCEDURE — 96365 THER/PROPH/DIAG IV INF INIT: CPT

## 2025-08-12 RX ORDER — EPINEPHRINE 0.3 MG/.3ML
0.3 INJECTION SUBCUTANEOUS ONCE AS NEEDED
OUTPATIENT
Start: 2025-09-09

## 2025-08-12 RX ORDER — DIPHENHYDRAMINE HYDROCHLORIDE 50 MG/ML
50 INJECTION, SOLUTION INTRAMUSCULAR; INTRAVENOUS ONCE AS NEEDED
OUTPATIENT
Start: 2025-09-09

## 2025-08-12 RX ORDER — EPINEPHRINE 0.3 MG/.3ML
0.3 INJECTION SUBCUTANEOUS ONCE AS NEEDED
Status: DISCONTINUED | OUTPATIENT
Start: 2025-08-12 | End: 2025-08-12 | Stop reason: HOSPADM

## 2025-08-12 RX ORDER — DIPHENHYDRAMINE HYDROCHLORIDE 50 MG/ML
50 INJECTION, SOLUTION INTRAMUSCULAR; INTRAVENOUS ONCE AS NEEDED
Status: DISCONTINUED | OUTPATIENT
Start: 2025-08-12 | End: 2025-08-12 | Stop reason: HOSPADM

## 2025-08-12 RX ADMIN — DEXTROSE MONOHYDRATE 512.5 MG: 50 INJECTION, SOLUTION INTRAVENOUS at 10:08

## 2025-08-14 ENCOUNTER — OFFICE VISIT (OUTPATIENT)
Facility: CLINIC | Age: 50
End: 2025-08-14
Payer: MEDICARE

## 2025-08-14 VITALS
HEIGHT: 65 IN | TEMPERATURE: 98 F | BODY MASS INDEX: 38.09 KG/M2 | OXYGEN SATURATION: 98 % | HEART RATE: 98 BPM | WEIGHT: 228.63 LBS | RESPIRATION RATE: 18 BRPM | DIASTOLIC BLOOD PRESSURE: 84 MMHG | SYSTOLIC BLOOD PRESSURE: 126 MMHG

## 2025-08-14 DIAGNOSIS — E11.59 TYPE 2 DIABETES MELLITUS WITH OTHER CIRCULATORY COMPLICATION, WITH LONG-TERM CURRENT USE OF INSULIN: Primary | ICD-10-CM

## 2025-08-14 DIAGNOSIS — I10 ESSENTIAL HYPERTENSION: ICD-10-CM

## 2025-08-14 DIAGNOSIS — Z79.4 TYPE 2 DIABETES MELLITUS WITH OTHER CIRCULATORY COMPLICATION, WITH LONG-TERM CURRENT USE OF INSULIN: Primary | ICD-10-CM

## 2025-08-14 DIAGNOSIS — H40.052 OCULAR HYPERTENSION, LEFT EYE: ICD-10-CM

## 2025-08-14 PROCEDURE — 99999 PR PBB SHADOW E&M-EST. PATIENT-LVL V: CPT | Mod: PBBFAC,,,

## 2025-08-14 RX ORDER — DULAGLUTIDE 4.5 MG/.5ML
INJECTION, SOLUTION SUBCUTANEOUS
Qty: 4 PEN | Refills: 3 | Status: SHIPPED | OUTPATIENT
Start: 2025-08-14

## 2025-08-14 RX ORDER — BROMFENAC SODIUM 0.7 MG/ML
1 SOLUTION/ DROPS OPHTHALMIC 2 TIMES DAILY
COMMUNITY
Start: 2025-07-08

## 2025-08-14 RX ORDER — EMPAGLIFLOZIN 25 MG/1
25 TABLET, FILM COATED ORAL EVERY MORNING
COMMUNITY
Start: 2025-06-03

## 2025-08-14 RX ORDER — DULAGLUTIDE 4.5 MG/.5ML
INJECTION, SOLUTION SUBCUTANEOUS
Qty: 4 PEN | Refills: 3 | Status: SHIPPED | OUTPATIENT
Start: 2025-08-14 | End: 2025-08-14 | Stop reason: SDUPTHER

## 2025-08-25 ENCOUNTER — RESULTS FOLLOW-UP (OUTPATIENT)
Dept: TRANSPLANT | Facility: CLINIC | Age: 50
End: 2025-08-25
Payer: MEDICARE

## 2025-08-25 ENCOUNTER — LAB VISIT (OUTPATIENT)
Dept: LAB | Facility: HOSPITAL | Age: 50
End: 2025-08-25
Attending: STUDENT IN AN ORGANIZED HEALTH CARE EDUCATION/TRAINING PROGRAM
Payer: MEDICARE

## 2025-08-25 DIAGNOSIS — Z94.0 S/P KIDNEY TRANSPLANT: ICD-10-CM

## 2025-08-25 LAB
ABSOLUTE EOSINOPHIL (OHS): 0.12 K/UL
ABSOLUTE MONOCYTE (OHS): 0.82 K/UL (ref 0.3–1)
ABSOLUTE NEUTROPHIL COUNT (OHS): 4.69 K/UL (ref 1.8–7.7)
ALBUMIN SERPL BCP-MCNC: 3.7 G/DL (ref 3.5–5.2)
ANION GAP (OHS): 11 MMOL/L (ref 8–16)
BASOPHILS # BLD AUTO: 0.04 K/UL
BASOPHILS NFR BLD AUTO: 0.6 %
BUN SERPL-MCNC: 26 MG/DL (ref 6–20)
CALCIUM SERPL-MCNC: 9.9 MG/DL (ref 8.7–10.5)
CHLORIDE SERPL-SCNC: 103 MMOL/L (ref 95–110)
CO2 SERPL-SCNC: 27 MMOL/L (ref 23–29)
CREAT SERPL-MCNC: 1.5 MG/DL (ref 0.5–1.4)
ERYTHROCYTE [DISTWIDTH] IN BLOOD BY AUTOMATED COUNT: 15.8 % (ref 11.5–14.5)
GFR SERPLBLD CREATININE-BSD FMLA CKD-EPI: 43 ML/MIN/1.73/M2
GLUCOSE SERPL-MCNC: 109 MG/DL (ref 70–110)
HCT VFR BLD AUTO: 40.8 % (ref 37–48.5)
HGB BLD-MCNC: 11.8 GM/DL (ref 12–16)
IMM GRANULOCYTES # BLD AUTO: 0.02 K/UL (ref 0–0.04)
IMM GRANULOCYTES NFR BLD AUTO: 0.3 % (ref 0–0.5)
LYMPHOCYTES # BLD AUTO: 0.87 K/UL (ref 1–4.8)
MCH RBC QN AUTO: 27.3 PG (ref 27–31)
MCHC RBC AUTO-ENTMCNC: 28.9 G/DL (ref 32–36)
MCV RBC AUTO: 94 FL (ref 82–98)
NUCLEATED RBC (/100WBC) (OHS): 0 /100 WBC
PHOSPHATE SERPL-MCNC: 3.8 MG/DL (ref 2.7–4.5)
PLATELET # BLD AUTO: 172 K/UL (ref 150–450)
PMV BLD AUTO: 11.2 FL (ref 9.2–12.9)
POTASSIUM SERPL-SCNC: 3.8 MMOL/L (ref 3.5–5.1)
RBC # BLD AUTO: 4.32 M/UL (ref 4–5.4)
RELATIVE EOSINOPHIL (OHS): 1.8 %
RELATIVE LYMPHOCYTE (OHS): 13.3 % (ref 18–48)
RELATIVE MONOCYTE (OHS): 12.5 % (ref 4–15)
RELATIVE NEUTROPHIL (OHS): 71.5 % (ref 38–73)
SODIUM SERPL-SCNC: 141 MMOL/L (ref 136–145)
WBC # BLD AUTO: 6.56 K/UL (ref 3.9–12.7)

## 2025-08-25 PROCEDURE — 87799 DETECT AGENT NOS DNA QUANT: CPT

## 2025-08-25 PROCEDURE — 36415 COLL VENOUS BLD VENIPUNCTURE: CPT

## 2025-08-25 PROCEDURE — 82040 ASSAY OF SERUM ALBUMIN: CPT

## 2025-08-25 PROCEDURE — 85025 COMPLETE CBC W/AUTO DIFF WBC: CPT

## 2025-08-27 LAB
W BK VIRUS DNA, QUALITATIVE, PLASMA: NOT DETECTED
W BK VIRUS DNA, QUANTITATIVE, PLASMA: <50 IU/ML
W LOG BK VIRUS DNA, PLASMA: <1.7 LOG (10) IU/ML

## 2025-08-28 DIAGNOSIS — E87.6 HYPOKALEMIA: ICD-10-CM

## 2025-08-28 DIAGNOSIS — Z94.0 KIDNEY REPLACED BY TRANSPLANT: ICD-10-CM

## 2025-08-28 RX ORDER — CALCITRIOL 0.25 UG/1
0.25 CAPSULE ORAL DAILY
Qty: 30 CAPSULE | Refills: 5 | Status: CANCELLED | OUTPATIENT
Start: 2025-08-28

## 2025-08-28 RX ORDER — POTASSIUM CHLORIDE 1500 MG/1
40 TABLET, EXTENDED RELEASE ORAL 2 TIMES DAILY
Qty: 120 TABLET | Refills: 1 | Status: SHIPPED | OUTPATIENT
Start: 2025-08-28

## 2025-09-03 DIAGNOSIS — Z94.0 KIDNEY REPLACED BY TRANSPLANT: ICD-10-CM

## 2025-09-03 RX ORDER — CALCITRIOL 0.25 UG/1
0.25 CAPSULE ORAL DAILY
Qty: 30 CAPSULE | Refills: 5 | OUTPATIENT
Start: 2025-09-03

## 2025-09-03 RX ORDER — CALCITRIOL 0.25 UG/1
0.25 CAPSULE ORAL DAILY
Qty: 30 CAPSULE | Refills: 5 | Status: CANCELLED | OUTPATIENT
Start: 2025-09-03

## (undated) DEVICE — SUT PDS BV 6-0

## (undated) DEVICE — ELECTRODE REM PLYHSV RETURN 9

## (undated) DEVICE — SUT SILK 2-0 STRANDS 30IN

## (undated) DEVICE — HEMOSTAT SURGICEL NU-KNIT 6X9

## (undated) DEVICE — CLIPPER BLADE MOD 4406 (CAREF)

## (undated) DEVICE — DRESSING ABSRBNT ISLAND 3.6X8

## (undated) DEVICE — DRAPE SLUSH WARMER WITH DISC

## (undated) DEVICE — SUT SILK 3-0 STRANDS 30IN

## (undated) DEVICE — PACK UNIVERSAL SPLIT II

## (undated) DEVICE — SUT 4-0 12-18IN SILK BLACK

## (undated) DEVICE — STAPLER SKIN PROXIMATE WIDE

## (undated) DEVICE — SUT PROLENE 5-0 36IN C-1

## (undated) DEVICE — PLUG CATHETER STERILE FOLEY

## (undated) DEVICE — FOLEY BLLN 20FR 3WAY 5CC

## (undated) DEVICE — SET IRR URLGY 2LINE UNIV SPIKE

## (undated) DEVICE — PACK KIDNEY TRANSPLANT CUSTOM

## (undated) DEVICE — TIP YANKAUERS BULB NO VENT

## (undated) DEVICE — SPONGE IV DRAIN 4X4 STERILE

## (undated) DEVICE — SYR ONLY LUER LOCK 20CC

## (undated) DEVICE — PUNCH AORTIC 4.0MM 6/CASE

## (undated) DEVICE — SUT 2-0 12-18IN SILK

## (undated) DEVICE — DRESSING ADH ISLAND 3.6 X 14

## (undated) DEVICE — SUT 3-0 12-18IN SILK

## (undated) DEVICE — SUT ETHILON 3-0 PS2 18 BLK

## (undated) DEVICE — ADHESIVE DERMABOND ADVANCED

## (undated) DEVICE — TRAY CATH FOL SIL URIMTR 16FR

## (undated) DEVICE — DECANTER FLUID TRNSF WHITE 9IN

## (undated) DEVICE — TAPE MEDIPORE 1 X 10YD

## (undated) DEVICE — SUT PROLENE 6-0 BV-1 30IN

## (undated) DEVICE — SOL NS 1000CC

## (undated) DEVICE — SUT 1 36IN PDS II VIO MONO

## (undated) DEVICE — Device

## (undated) DEVICE — TOWEL OR XRAY WHITE 17X26IN